# Patient Record
Sex: FEMALE | Race: WHITE | Employment: OTHER | URBAN - METROPOLITAN AREA
[De-identification: names, ages, dates, MRNs, and addresses within clinical notes are randomized per-mention and may not be internally consistent; named-entity substitution may affect disease eponyms.]

---

## 2017-11-01 PROBLEM — R07.9 CHEST PAIN: Status: ACTIVE | Noted: 2017-11-01

## 2018-05-07 ENCOUNTER — TELEPHONE (OUTPATIENT)
Dept: PRIMARY CARE CLINIC | Age: 53
End: 2018-05-07

## 2018-06-06 ENCOUNTER — TELEPHONE (OUTPATIENT)
Dept: PRIMARY CARE CLINIC | Age: 53
End: 2018-06-06

## 2018-06-06 DIAGNOSIS — E03.9 HYPOTHYROIDISM, UNSPECIFIED TYPE: Primary | Chronic | ICD-10-CM

## 2018-06-14 DIAGNOSIS — E03.9 HYPOTHYROIDISM, UNSPECIFIED TYPE: Chronic | ICD-10-CM

## 2018-06-18 ENCOUNTER — NURSE ONLY (OUTPATIENT)
Dept: PRIMARY CARE CLINIC | Age: 53
End: 2018-06-18

## 2018-06-18 ENCOUNTER — HOSPITAL ENCOUNTER (OUTPATIENT)
Age: 53
Discharge: HOME | End: 2018-06-20
Payer: MEDICARE

## 2018-06-18 DIAGNOSIS — E03.9 ACQUIRED HYPOTHYROIDISM: Primary | ICD-10-CM

## 2018-06-18 LAB — TSH SERPL DL<=0.05 MIU/L-ACNC: 0.46 UIU/ML (ref 0.27–4.2)

## 2018-06-18 PROCEDURE — 84443 ASSAY THYROID STIM HORMONE: CPT

## 2018-07-02 RX ORDER — FLUDROCORTISONE ACETATE 0.1 MG/1
0.1 TABLET ORAL DAILY
Qty: 30 TABLET | Refills: 5 | Status: SHIPPED | OUTPATIENT
Start: 2018-07-02 | End: 2019-01-08 | Stop reason: SDUPTHER

## 2018-07-07 ENCOUNTER — APPOINTMENT (OUTPATIENT)
Dept: GENERAL RADIOLOGY | Age: 53
End: 2018-07-07
Payer: MEDICARE

## 2018-07-07 ENCOUNTER — HOSPITAL ENCOUNTER (EMERGENCY)
Age: 53
Discharge: HOME | End: 2018-07-07
Attending: EMERGENCY MEDICINE
Payer: MEDICARE

## 2018-07-07 VITALS
HEIGHT: 60 IN | DIASTOLIC BLOOD PRESSURE: 84 MMHG | HEART RATE: 56 BPM | OXYGEN SATURATION: 100 % | RESPIRATION RATE: 16 BRPM | WEIGHT: 133 LBS | TEMPERATURE: 98.3 F | SYSTOLIC BLOOD PRESSURE: 126 MMHG | BODY MASS INDEX: 26.11 KG/M2

## 2018-07-07 DIAGNOSIS — S52.124A CLOSED NONDISPLACED FRACTURE OF HEAD OF RIGHT RADIUS, INITIAL ENCOUNTER: Primary | ICD-10-CM

## 2018-07-07 PROCEDURE — 73080 X-RAY EXAM OF ELBOW: CPT

## 2018-07-07 PROCEDURE — 99283 EMERGENCY DEPT VISIT LOW MDM: CPT

## 2018-07-07 RX ORDER — NAPROXEN 250 MG/1
250 TABLET ORAL 2 TIMES DAILY WITH MEALS
COMMUNITY
End: 2018-08-23 | Stop reason: ALTCHOICE

## 2018-07-07 ASSESSMENT — PAIN SCALES - WONG BAKER: WONGBAKER_NUMERICALRESPONSE: 6

## 2018-07-07 ASSESSMENT — PAIN DESCRIPTION - ORIENTATION: ORIENTATION: RIGHT

## 2018-07-07 ASSESSMENT — PAIN DESCRIPTION - FREQUENCY: FREQUENCY: CONTINUOUS

## 2018-07-07 ASSESSMENT — PAIN DESCRIPTION - LOCATION: LOCATION: ELBOW

## 2018-07-07 ASSESSMENT — PAIN DESCRIPTION - ONSET: ONSET: GRADUAL

## 2018-07-07 ASSESSMENT — PAIN DESCRIPTION - PROGRESSION: CLINICAL_PROGRESSION: GRADUALLY WORSENING

## 2018-07-07 ASSESSMENT — PAIN DESCRIPTION - PAIN TYPE: TYPE: ACUTE PAIN

## 2018-07-07 ASSESSMENT — PAIN DESCRIPTION - DESCRIPTORS: DESCRIPTORS: ACHING;CONSTANT;DISCOMFORT;SORE

## 2018-07-07 NOTE — ED PROVIDER NOTES
07/07/18. RADIOLOGY:  Interpreted by Radiologist.  XR ELBOW RIGHT (MIN 3 VIEWS)   Final Result   Limited study, the study is nearly nondiagnostic,   degenerative changes, findings suspicious for a radial head fracture.          ------------------------- NURSING NOTES AND VITALS REVIEWED ---------------------------   The nursing notes within the ED encounter and vital signs as below have been reviewed. /84   Pulse 56   Temp 98.3 °F (36.8 °C) (Oral)   Resp 16   Ht 5' (1.524 m)   Wt 133 lb (60.3 kg)   SpO2 100%   BMI 25.97 kg/m²   Oxygen Saturation Interpretation: Normal      ---------------------------------------------------PHYSICAL EXAM--------------------------------------      Constitutional/General: Alert and oriented x3, well appearing, non toxic in NAD  Head: NC/AT  Eyes: PERRL, EOMI  Mouth: Oropharynx clear, handling secretions, no trismus  Neck: Supple, full ROM,   Pulmonary: Lungs clear to auscultation bilaterally, no wheezes, rales, or rhonchi. Not in respiratory distress  Cardiovascular:  Regular rate and rhythm, no murmurs, gallops, or rubs. 2+ distal pulses  Abdomen: Soft, non tender, non distended,   Extremities: Moves all extremities x 4. Warm and well perfused. There is pain on flexion the wrist and quite a bit of pain on supination of the patient's right forearm. Skin: warm and dry without rash  Neurologic: GCS 15,  Psych: Normal Affect      ------------------------------ ED COURSE/MEDICAL DECISION MAKING----------------------  Medications - No data to display      Medical Decision Making:    Variation many but with limited history due to trisomy 21. However the patient's x-ray strongly suggestive of a radial head fracture. Counseling: The emergency provider has spoken with the patient and discussed todays results, in addition to providing specific details for the plan of care and counseling regarding the diagnosis and prognosis.   Questions are answered at this time and

## 2018-07-10 ENCOUNTER — CARE COORDINATION (OUTPATIENT)
Dept: CARE COORDINATION | Age: 53
End: 2018-07-10

## 2018-07-10 NOTE — CARE COORDINATION
Ambulatory Care Coordination ED Follow up Call       Reason for ED Visit:  Arm pain  Care Management Risk Score: CMRS 1  How are you feeling? :     improved  Patient Reports the following:  Patients dad Rudy Damon reports, patient is doing well. Very little pain. She's on schedule with the orthopedics. .     Did you call your PCP prior to going to the ED? No          Post Discharge Status:  What health concerns since you left the Emergency Room? None    Do you have wounds that you are caring for at home? No    Do you have a follow up appt scheduled? yes    Review of Instructions:                                 Do you have any questions regarding your discharge instructions?:  No  Medications:    What questions do you have about your medications? No  Are you taking your medications as directed? If not - why? Yes   Can you afford your medications? yes  ADLS:  Do you need assistance of any kind at home? No   What assistance is needed? None      FU appts/Provider:    Future Appointments  Date Time Provider Drea Mar   8/20/2018 1:15 PM [facility] Drew [facility] [address] [facility] Due   Topic Date Due    HIV screen  03/09/1981    Cervical cancer screen  01/08/2016    Shingles Vaccine (1 of 2 - 2 Dose Series) 03/09/2016     Patient advised to contact PCP office to have HM items/records faxed to PCP Office directly?   N/A

## 2018-08-23 ENCOUNTER — TELEPHONE (OUTPATIENT)
Dept: PRIMARY CARE CLINIC | Age: 53
End: 2018-08-23

## 2018-08-23 ENCOUNTER — OFFICE VISIT (OUTPATIENT)
Dept: PRIMARY CARE CLINIC | Age: 53
End: 2018-08-23
Payer: MEDICARE

## 2018-08-23 VITALS
WEIGHT: 139 LBS | TEMPERATURE: 97 F | OXYGEN SATURATION: 98 % | HEART RATE: 72 BPM | SYSTOLIC BLOOD PRESSURE: 128 MMHG | BODY MASS INDEX: 27.15 KG/M2 | DIASTOLIC BLOOD PRESSURE: 82 MMHG

## 2018-08-23 DIAGNOSIS — E03.9 ACQUIRED HYPOTHYROIDISM: Primary | Chronic | ICD-10-CM

## 2018-08-23 DIAGNOSIS — Q90.9 DOWN'S SYNDROME: Chronic | ICD-10-CM

## 2018-08-23 PROCEDURE — G8427 DOCREV CUR MEDS BY ELIG CLIN: HCPCS | Performed by: INTERNAL MEDICINE

## 2018-08-23 PROCEDURE — 1036F TOBACCO NON-USER: CPT | Performed by: INTERNAL MEDICINE

## 2018-08-23 PROCEDURE — G8419 CALC BMI OUT NRM PARAM NOF/U: HCPCS | Performed by: INTERNAL MEDICINE

## 2018-08-23 PROCEDURE — 99213 OFFICE O/P EST LOW 20 MIN: CPT | Performed by: INTERNAL MEDICINE

## 2018-08-23 PROCEDURE — 3014F SCREEN MAMMO DOC REV: CPT | Performed by: INTERNAL MEDICINE

## 2018-08-23 PROCEDURE — 3017F COLORECTAL CA SCREEN DOC REV: CPT | Performed by: INTERNAL MEDICINE

## 2018-08-23 NOTE — TELEPHONE ENCOUNTER
Patient's mother called with information about a possible alternative for Synthroid. She said it is called dulse iodine and it was discovered by Ranjit Iverson, a researcher.

## 2018-08-23 NOTE — PROGRESS NOTES
8/23/18    Jordy Diaz Sharath, a female of 47 y.o. came to the office     Jordy Early presents today for 6 month follow up. She has a history of Down syndrome hypothyroidism and syncope. Her TSH was WNL. She was in the ER on July 7 for a radial head fracture - she had a fall at that time. She followed up with orthopedics - who did not recommend a sling. Her issues have improved. She  She does not have any symptoms of hypo/hyperthyroidism. Patient Active Problem List   Diagnosis    Down's syndrome    Systolic murmur    Vasodepressor syncope    Asymptomatic varicose veins of bilateral lower extremities    Hypothyroidism    Chest pain        No Known Allergies    Current Outpatient Prescriptions on File Prior to Visit   Medication Sig Dispense Refill    fludrocortisone (FLORINEF) 0.1 MG tablet Take 1 tablet by mouth daily 30 tablet 5    levothyroxine (SYNTHROID) 100 MCG tablet Take 1 tablet by mouth Daily (Patient taking differently: Take 150 mcg by mouth Daily ) 30 tablet 5     No current facility-administered medications on file prior to visit. Review of Systems  Constitutional: Negative for activity change, appetite change, chills, fatigue and fever. Respiratory: Negative for choking, chest tightness, shortness of breath and wheezing. Cardiovascular: Negative for chest pain, palpitations and leg swelling. Gastrointestinal: Negative for abdominal distention, constipation, diarrhea, nausea and vomiting. Musculoskeletal: Negative for arthralgias, back pain, gait problem and joint swelling. Neurological: Negative for dizziness, weakness, numbness and headaches. OBJECTIVE:  /82   Pulse 72   Temp 97 °F (36.1 °C)   Wt 139 lb (63 kg)   SpO2 98%   BMI 27.15 kg/m²      Physical Exam   Constitutional:  oriented to person, place, and time. appears well-developed and well-nourished. No distress. HENT: No sinus tenderness or lymphadenopathy  Head: Normocephalic and atraumatic.    Eyes: Left eye exhibits no discharge. No scleral icterus. Neck: No tracheal deviation present. No thyromegaly present. Cardiovascular: Normal rate, regular rhythm, normal heart sounds and intact distal pulses. Exam reveals no gallop and no friction rub. No murmur heard. Pulmonary/Chest: Effort normal and breath sounds normal. No respiratory distress. She has no wheezes. no rales. no tenderness. Musculoskeletal:  no tenderness. Neurological:alert and oriented to person, place, and time. Skin: No diaphoresis. Psychiatric: Normal mood and affect. Behavior is normal.     ASSESSMENT AND PLAN:    Milton Payne was seen today for hypothyroidism and 6 month follow-up. Diagnoses and all orders for this visit:    Acquired hypothyroidism    Down's syndrome        Milton MORALES See presents today for routine follow up. She did have a radial fracture - this appears to be healing well. She is still physically active. Her mom is present who questioned her thyroid medication. I will keep it the same for now and recheck it in 3 months. Return in about 6 months (around 2/23/2019).     Natacha Denson DO

## 2018-09-14 DIAGNOSIS — E03.9 HYPOTHYROIDISM, UNSPECIFIED TYPE: ICD-10-CM

## 2018-09-14 RX ORDER — LEVOTHYROXINE SODIUM 0.1 MG/1
100 TABLET ORAL DAILY
Qty: 30 TABLET | Refills: 5 | Status: SHIPPED | OUTPATIENT
Start: 2018-09-14 | End: 2018-12-18 | Stop reason: SDUPTHER

## 2018-12-11 ENCOUNTER — TELEPHONE (OUTPATIENT)
Dept: PRIMARY CARE CLINIC | Age: 53
End: 2018-12-11

## 2018-12-11 DIAGNOSIS — Q90.9 DOWN'S SYNDROME: Primary | Chronic | ICD-10-CM

## 2018-12-11 DIAGNOSIS — E03.9 ACQUIRED HYPOTHYROIDISM: Chronic | ICD-10-CM

## 2018-12-11 DIAGNOSIS — Z13.220 LIPID SCREENING: ICD-10-CM

## 2018-12-13 LAB
BASOPHILS ABSOLUTE: 0.07 /ΜL
BASOPHILS RELATIVE PERCENT: 2.2 %
BUN BLDV-MCNC: 28 MG/DL
CALCIUM SERPL-MCNC: 8.3 MG/DL
CHLORIDE BLD-SCNC: 107 MMOL/L
CHOLESTEROL, TOTAL: 174 MG/DL
CHOLESTEROL/HDL RATIO: 2.9
CO2: 30 MMOL/L
CREAT SERPL-MCNC: 0.94 MG/DL
EOSINOPHILS ABSOLUTE: 0.12 /ΜL
EOSINOPHILS RELATIVE PERCENT: 3.8 %
GFR CALCULATED: >60
GLUCOSE BLD-MCNC: 70 MG/DL
HCT VFR BLD CALC: 46.3 % (ref 36–46)
HDLC SERPL-MCNC: 59 MG/DL (ref 35–70)
HEMOGLOBIN: 15.2 G/DL (ref 12–16)
LDL CHOLESTEROL CALCULATED: 98 MG/DL (ref 0–160)
LYMPHOCYTES ABSOLUTE: 1.26 /ΜL
LYMPHOCYTES RELATIVE PERCENT: 39.4 %
MCH RBC QN AUTO: ABNORMAL PG
MCHC RBC AUTO-ENTMCNC: ABNORMAL G/DL
MCV RBC AUTO: 97.1 FL
MONOCYTES ABSOLUTE: 0.35 /ΜL
MONOCYTES RELATIVE PERCENT: 10.9 %
NEUTROPHILS ABSOLUTE: 1.39 /ΜL
NEUTROPHILS RELATIVE PERCENT: 43.4 %
PLATELET # BLD: 188 K/ΜL
PMV BLD AUTO: 10 FL
POTASSIUM SERPL-SCNC: 4.6 MMOL/L
RBC # BLD: 4.77 10^6/ΜL
SODIUM BLD-SCNC: 143 MMOL/L
TRIGL SERPL-MCNC: 83 MG/DL
TSH SERPL DL<=0.05 MIU/L-ACNC: 6.13 UIU/ML
VLDLC SERPL CALC-MCNC: NORMAL MG/DL
WBC # BLD: 3.2 10^3/ML

## 2018-12-14 DIAGNOSIS — Z13.220 LIPID SCREENING: ICD-10-CM

## 2018-12-14 DIAGNOSIS — E03.9 ACQUIRED HYPOTHYROIDISM: Chronic | ICD-10-CM

## 2018-12-14 DIAGNOSIS — Q90.9 DOWN'S SYNDROME: Chronic | ICD-10-CM

## 2018-12-18 DIAGNOSIS — E03.9 HYPOTHYROIDISM, UNSPECIFIED TYPE: ICD-10-CM

## 2018-12-18 RX ORDER — LEVOTHYROXINE SODIUM 112 UG/1
112 TABLET ORAL DAILY
Qty: 30 TABLET | Refills: 3 | Status: SHIPPED | OUTPATIENT
Start: 2018-12-18 | End: 2019-02-19 | Stop reason: SDUPTHER

## 2019-01-08 RX ORDER — FLUDROCORTISONE ACETATE 0.1 MG/1
0.1 TABLET ORAL DAILY
Qty: 30 TABLET | Refills: 5 | Status: SHIPPED | OUTPATIENT
Start: 2019-01-08 | End: 2019-02-19 | Stop reason: SDUPTHER

## 2019-02-19 DIAGNOSIS — E03.9 HYPOTHYROIDISM, UNSPECIFIED TYPE: ICD-10-CM

## 2019-02-19 RX ORDER — FLUDROCORTISONE ACETATE 0.1 MG/1
0.1 TABLET ORAL DAILY
Qty: 30 TABLET | Refills: 5 | Status: SHIPPED | OUTPATIENT
Start: 2019-02-19 | End: 2019-04-05

## 2019-02-19 RX ORDER — LEVOTHYROXINE SODIUM 112 UG/1
112 TABLET ORAL DAILY
Qty: 30 TABLET | Refills: 3 | Status: SHIPPED | OUTPATIENT
Start: 2019-02-19 | End: 2019-12-03

## 2019-02-21 ENCOUNTER — OFFICE VISIT (OUTPATIENT)
Dept: PRIMARY CARE CLINIC | Age: 54
End: 2019-02-21
Payer: MEDICARE

## 2019-02-21 ENCOUNTER — HOSPITAL ENCOUNTER (OUTPATIENT)
Age: 54
Discharge: HOME | End: 2019-02-23
Payer: MEDICARE

## 2019-02-21 VITALS
SYSTOLIC BLOOD PRESSURE: 110 MMHG | DIASTOLIC BLOOD PRESSURE: 80 MMHG | WEIGHT: 140 LBS | BODY MASS INDEX: 27.34 KG/M2 | TEMPERATURE: 98.2 F | HEART RATE: 68 BPM | OXYGEN SATURATION: 98 %

## 2019-02-21 DIAGNOSIS — L71.9 ROSACEA: ICD-10-CM

## 2019-02-21 DIAGNOSIS — L30.9 ACUTE ECZEMA: ICD-10-CM

## 2019-02-21 DIAGNOSIS — Q90.9 DOWN'S SYNDROME: Chronic | ICD-10-CM

## 2019-02-21 DIAGNOSIS — E03.9 ACQUIRED HYPOTHYROIDISM: Chronic | ICD-10-CM

## 2019-02-21 DIAGNOSIS — E03.9 ACQUIRED HYPOTHYROIDISM: Primary | Chronic | ICD-10-CM

## 2019-02-21 LAB — TSH SERPL DL<=0.05 MIU/L-ACNC: 0.96 UIU/ML (ref 0.27–4.2)

## 2019-02-21 PROCEDURE — G8419 CALC BMI OUT NRM PARAM NOF/U: HCPCS | Performed by: INTERNAL MEDICINE

## 2019-02-21 PROCEDURE — 84443 ASSAY THYROID STIM HORMONE: CPT

## 2019-02-21 PROCEDURE — 99213 OFFICE O/P EST LOW 20 MIN: CPT | Performed by: INTERNAL MEDICINE

## 2019-02-21 PROCEDURE — 3017F COLORECTAL CA SCREEN DOC REV: CPT | Performed by: INTERNAL MEDICINE

## 2019-02-21 PROCEDURE — 1036F TOBACCO NON-USER: CPT | Performed by: INTERNAL MEDICINE

## 2019-02-21 PROCEDURE — G8427 DOCREV CUR MEDS BY ELIG CLIN: HCPCS | Performed by: INTERNAL MEDICINE

## 2019-02-21 PROCEDURE — G8484 FLU IMMUNIZE NO ADMIN: HCPCS | Performed by: INTERNAL MEDICINE

## 2019-02-21 ASSESSMENT — PATIENT HEALTH QUESTIONNAIRE - PHQ9
SUM OF ALL RESPONSES TO PHQ9 QUESTIONS 1 & 2: 0
SUM OF ALL RESPONSES TO PHQ QUESTIONS 1-9: 0
2. FEELING DOWN, DEPRESSED OR HOPELESS: 0
SUM OF ALL RESPONSES TO PHQ QUESTIONS 1-9: 0
1. LITTLE INTEREST OR PLEASURE IN DOING THINGS: 0

## 2019-02-21 ASSESSMENT — ENCOUNTER SYMPTOMS
WHEEZING: 0
SHORTNESS OF BREATH: 0
COLOR CHANGE: 1
BACK PAIN: 0

## 2019-03-19 ENCOUNTER — OFFICE VISIT (OUTPATIENT)
Dept: PRIMARY CARE CLINIC | Age: 54
End: 2019-03-19
Payer: MEDICARE

## 2019-03-19 VITALS
WEIGHT: 136 LBS | DIASTOLIC BLOOD PRESSURE: 64 MMHG | BODY MASS INDEX: 26.56 KG/M2 | SYSTOLIC BLOOD PRESSURE: 120 MMHG | HEART RATE: 61 BPM | OXYGEN SATURATION: 98 % | TEMPERATURE: 97 F

## 2019-03-19 DIAGNOSIS — R55 SYNCOPE, UNSPECIFIED SYNCOPE TYPE: Primary | ICD-10-CM

## 2019-03-19 DIAGNOSIS — Q90.9 DOWN'S SYNDROME: ICD-10-CM

## 2019-03-19 DIAGNOSIS — S06.5XAA SUBDURAL HEMATOMA: ICD-10-CM

## 2019-03-19 PROCEDURE — G8484 FLU IMMUNIZE NO ADMIN: HCPCS | Performed by: INTERNAL MEDICINE

## 2019-03-19 PROCEDURE — 99213 OFFICE O/P EST LOW 20 MIN: CPT | Performed by: INTERNAL MEDICINE

## 2019-03-19 PROCEDURE — 1036F TOBACCO NON-USER: CPT | Performed by: INTERNAL MEDICINE

## 2019-03-19 PROCEDURE — 3017F COLORECTAL CA SCREEN DOC REV: CPT | Performed by: INTERNAL MEDICINE

## 2019-03-19 PROCEDURE — G8419 CALC BMI OUT NRM PARAM NOF/U: HCPCS | Performed by: INTERNAL MEDICINE

## 2019-03-19 PROCEDURE — G8427 DOCREV CUR MEDS BY ELIG CLIN: HCPCS | Performed by: INTERNAL MEDICINE

## 2019-03-19 RX ORDER — CLINDAMYCIN PHOSPHATE 10 UG/ML
LOTION TOPICAL
Status: ON HOLD | COMMUNITY
Start: 2019-02-20 | End: 2019-05-02 | Stop reason: HOSPADM

## 2019-03-19 RX ORDER — KETOCONAZOLE 20 MG/G
CREAM TOPICAL
Status: ON HOLD | COMMUNITY
Start: 2019-02-20 | End: 2019-05-02 | Stop reason: HOSPADM

## 2019-03-20 ENCOUNTER — TELEPHONE (OUTPATIENT)
Dept: PRIMARY CARE CLINIC | Age: 54
End: 2019-03-20

## 2019-03-20 DIAGNOSIS — S06.5XAA SUBDURAL HEMATOMA: Primary | ICD-10-CM

## 2019-03-21 ENCOUNTER — TELEPHONE (OUTPATIENT)
Dept: PRIMARY CARE CLINIC | Age: 54
End: 2019-03-21

## 2019-03-21 DIAGNOSIS — F41.8 SITUATIONAL ANXIETY: Primary | ICD-10-CM

## 2019-03-21 RX ORDER — LORAZEPAM 0.5 MG/1
0.5 TABLET ORAL EVERY 8 HOURS PRN
Qty: 2 TABLET | Refills: 0 | Status: SHIPPED | OUTPATIENT
Start: 2019-03-21 | End: 2019-05-21

## 2019-03-22 ENCOUNTER — HOSPITAL ENCOUNTER (OUTPATIENT)
Dept: CT IMAGING | Age: 54
Discharge: HOME | End: 2019-03-24
Payer: MEDICARE

## 2019-03-22 DIAGNOSIS — S06.5XAA SUBDURAL HEMATOMA: ICD-10-CM

## 2019-03-22 PROCEDURE — 70450 CT HEAD/BRAIN W/O DYE: CPT

## 2019-03-25 ENCOUNTER — INITIAL CONSULT (OUTPATIENT)
Dept: NEUROSURGERY | Age: 54
End: 2019-03-25
Payer: MEDICARE

## 2019-03-25 VITALS
WEIGHT: 134 LBS | BODY MASS INDEX: 26.31 KG/M2 | DIASTOLIC BLOOD PRESSURE: 68 MMHG | HEART RATE: 73 BPM | SYSTOLIC BLOOD PRESSURE: 112 MMHG | HEIGHT: 60 IN

## 2019-03-25 DIAGNOSIS — G44.011 INTRACTABLE EPISODIC CLUSTER HEADACHE: ICD-10-CM

## 2019-03-25 DIAGNOSIS — S06.5XAA SUBDURAL HEMATOMA: Primary | ICD-10-CM

## 2019-03-25 PROCEDURE — 3017F COLORECTAL CA SCREEN DOC REV: CPT | Performed by: PHYSICIAN ASSISTANT

## 2019-03-25 PROCEDURE — 1036F TOBACCO NON-USER: CPT | Performed by: PHYSICIAN ASSISTANT

## 2019-03-25 PROCEDURE — G8484 FLU IMMUNIZE NO ADMIN: HCPCS | Performed by: PHYSICIAN ASSISTANT

## 2019-03-25 PROCEDURE — 99213 OFFICE O/P EST LOW 20 MIN: CPT | Performed by: PHYSICIAN ASSISTANT

## 2019-03-25 PROCEDURE — G8427 DOCREV CUR MEDS BY ELIG CLIN: HCPCS | Performed by: PHYSICIAN ASSISTANT

## 2019-03-25 PROCEDURE — G8419 CALC BMI OUT NRM PARAM NOF/U: HCPCS | Performed by: PHYSICIAN ASSISTANT

## 2019-03-25 ASSESSMENT — ENCOUNTER SYMPTOMS
GASTROINTESTINAL NEGATIVE: 1
ALLERGIC/IMMUNOLOGIC NEGATIVE: 1
RESPIRATORY NEGATIVE: 1
EYES NEGATIVE: 1

## 2019-03-26 ENCOUNTER — TELEPHONE (OUTPATIENT)
Dept: NEUROSURGERY | Age: 54
End: 2019-03-26

## 2019-03-26 ENCOUNTER — TELEPHONE (OUTPATIENT)
Dept: PRIMARY CARE CLINIC | Age: 54
End: 2019-03-26

## 2019-03-26 DIAGNOSIS — R55 SYNCOPE, UNSPECIFIED SYNCOPE TYPE: Primary | ICD-10-CM

## 2019-03-26 DIAGNOSIS — S06.5XAA SUBDURAL HEMATOMA: Primary | ICD-10-CM

## 2019-03-28 ENCOUNTER — TELEPHONE (OUTPATIENT)
Dept: NON INVASIVE DIAGNOSTICS | Age: 54
End: 2019-03-28

## 2019-03-28 ENCOUNTER — TELEPHONE (OUTPATIENT)
Dept: NEUROSURGERY | Age: 54
End: 2019-03-28

## 2019-03-28 NOTE — TELEPHONE ENCOUNTER
----- Message from Grayson Talley DO sent at 3/26/2019  2:16 PM EDT -----  Unfortunately I cannot give her any advice. I have not seen her in 7 years and unaware if her clinical history has changed. If her mother wants I can see her in the office as an IE and then need records from [place].  ----- Message -----  From: Patrizia Rome  Sent: 3/26/2019   1:53 PM  To: Grayson Talley DO    Patient was seen 6/26/2012. She is a Down Syndrome patient. Mother said she had a bad fall in [place] and was in the ICU. The mother said she had 3 dizzy spells this week.       Please advise

## 2019-04-05 ENCOUNTER — OFFICE VISIT (OUTPATIENT)
Dept: PRIMARY CARE CLINIC | Age: 54
End: 2019-04-05
Payer: MEDICARE

## 2019-04-05 VITALS
WEIGHT: 136 LBS | TEMPERATURE: 97 F | HEART RATE: 58 BPM | DIASTOLIC BLOOD PRESSURE: 62 MMHG | BODY MASS INDEX: 26.56 KG/M2 | SYSTOLIC BLOOD PRESSURE: 120 MMHG | OXYGEN SATURATION: 95 %

## 2019-04-05 DIAGNOSIS — L71.9 ROSACEA: ICD-10-CM

## 2019-04-05 DIAGNOSIS — Q90.9 DOWN'S SYNDROME: ICD-10-CM

## 2019-04-05 DIAGNOSIS — H04.123 DRY EYES: ICD-10-CM

## 2019-04-05 DIAGNOSIS — R55 SYNCOPE, UNSPECIFIED SYNCOPE TYPE: ICD-10-CM

## 2019-04-05 DIAGNOSIS — J40 BRONCHITIS: Primary | ICD-10-CM

## 2019-04-05 LAB
INFLUENZA A ANTIBODY: NEGATIVE
INFLUENZA B ANTIBODY: NEGATIVE

## 2019-04-05 PROCEDURE — G8419 CALC BMI OUT NRM PARAM NOF/U: HCPCS | Performed by: INTERNAL MEDICINE

## 2019-04-05 PROCEDURE — 3017F COLORECTAL CA SCREEN DOC REV: CPT | Performed by: INTERNAL MEDICINE

## 2019-04-05 PROCEDURE — 87804 INFLUENZA ASSAY W/OPTIC: CPT | Performed by: INTERNAL MEDICINE

## 2019-04-05 PROCEDURE — 99214 OFFICE O/P EST MOD 30 MIN: CPT | Performed by: INTERNAL MEDICINE

## 2019-04-05 PROCEDURE — 1036F TOBACCO NON-USER: CPT | Performed by: INTERNAL MEDICINE

## 2019-04-05 PROCEDURE — G8427 DOCREV CUR MEDS BY ELIG CLIN: HCPCS | Performed by: INTERNAL MEDICINE

## 2019-04-05 RX ORDER — FLUDROCORTISONE ACETATE 0.1 MG/1
0.2 TABLET ORAL DAILY
Qty: 60 TABLET | Refills: 2 | Status: ON HOLD | OUTPATIENT
Start: 2019-04-05 | End: 2019-11-27 | Stop reason: HOSPADM

## 2019-04-05 RX ORDER — DOXYCYCLINE HYCLATE 100 MG/1
100 CAPSULE ORAL 2 TIMES DAILY
Qty: 20 CAPSULE | Refills: 0 | Status: SHIPPED | OUTPATIENT
Start: 2019-04-05 | End: 2019-04-15

## 2019-04-05 NOTE — PROGRESS NOTES
4/5/19    Bridger Early, a female of 49 y.o. came to the office     Bridger Early presents today for evaluation of a cough. Is also been having a sore throat. Her cough is occasionally productive. She does have some abdominal pain and nausea. She denies any fevers. She is following with neurosurgery who just fitted her with a home at this morning. She does have question why discomfort with photophobia that is been persistent since her injury. She's been using refresh drops. Despite her stomach issues she denies any changes in her bowel habits    Patient Active Problem List   Diagnosis    Down's syndrome    Systolic murmur    Vasodepressor syncope    Asymptomatic varicose veins of bilateral lower extremities    Hypothyroidism    Chest pain        No Known Allergies    Current Outpatient Medications on File Prior to Visit   Medication Sig Dispense Refill    LORazepam (ATIVAN) 0.5 MG tablet Take 1 tablet by mouth every 8 hours as needed for Anxiety (take 1/2 tablet 30 min before procedure) for up to 2 doses. 2 tablet 0    clindamycin (CLEOCIN T) 1 % lotion       ketoconazole (NIZORAL) 2 % cream       Crisaborole (EUCRISA) 2 % OINT Apply 1 Squirt topically 2 times daily 1 Tube 0    levothyroxine (SYNTHROID) 112 MCG tablet Take 1 tablet by mouth Daily 30 tablet 3     No current facility-administered medications on file prior to visit. Review of Systems  Constitutional:Negative for activity change, appetite change, chills, fatigue and fever. Respiratory:   Cardiovascular: Negative for chest pain, palpitations and leg swelling. Gastrointestinal: Negative for abdominal distention, constipation, diarrhea, nausea and vomiting. Musculoskeletal: Negative for arthralgias, back pain, gait problem and joint swelling. Neurological: Negative for dizziness, weakness,numbness and headaches.      OBJECTIVE:  /62   Pulse 58   Temp 97 °F (36.1 °C)   Wt 136 lb (61.7 kg)   SpO2 95%   BMI 26.56 kg/m² Physical Exam   Constitutional:  oriented to person, place, and time. appears well-developed and well-nourished. No distress. HENT: No sinustenderness or lymphadenopathy  Head: Normocephalic and atraumatic. Eyes: Left eye exhibits no discharge. No scleral icterus. Neck: No tracheal deviation present. No thyromegalypresent. Cardiovascular: Normal rate, regular rhythm, normal heart sounds and intact distal pulses. Exam reveals no gallop and no friction rub. No murmur heard. Pulmonary/Chest: Effort normal and breath sounds normal. No respiratory distress. She has no wheezes. no rales. no tenderness. Musculoskeletal:  no tenderness. Neurological:alert and oriented to person, place, and time. Skin: No diaphoresis. Psychiatric: Normal mood and affect. Behavior isnormal.     ASSESSMENT AND PLAN:    Leydi Morgan was seen today for cough and abdominal pain. Diagnoses and all orders for this visit:    Bronchitis  -     POCT Influenza A/B  -     doxycycline hyclate (VIBRAMYCIN) 100 MG capsule; Take 1 capsule by mouth 2 times daily for 10 days    Syncope, unspecified syncope type  -     fludrocortisone (FLORINEF) 0.1 MG tablet; Take 2 tablets by mouth daily    Down's syndrome    Rosacea    Dry eyes        Leydi MORALES See presents today for evaluation of bronchitis. I will treat her with doxycycline. I will also obtain influenza testing. For her orthostasis I will increase her Florinef to 2 tablets daily. I did review her chart which shows that she is following up with neurosurgery and is due to see electrophysiology in June. She denies any recurrence of her falls. She has follow-up with neurosurgery who plans on repeating a CT scan of the head. She does have rosacea and is seeing dermatology for this. I hope that the doxycycline will help this issue as well. We Discussed her dry eyes, I advised her to start using referesh optiva 3-4 times a day.      Please note that the above documentation was prepared using voice recognition software. Every attempt was made to ensure accuracy but there may be spelling, grammatical, and contextual errors. No follow-ups on file.     David Fitzgerald, DO

## 2019-04-11 ENCOUNTER — HOSPITAL ENCOUNTER (OUTPATIENT)
Dept: CT IMAGING | Age: 54
Discharge: HOME | End: 2019-04-13
Payer: MEDICARE

## 2019-04-11 ENCOUNTER — TELEPHONE (OUTPATIENT)
Dept: NEUROSURGERY | Age: 54
End: 2019-04-11

## 2019-04-11 ENCOUNTER — HOSPITAL ENCOUNTER (INPATIENT)
Age: 54
LOS: 5 days | Discharge: NURSING, REHAB OR LONG-TERM CARE | DRG: 026 | End: 2019-04-16
Attending: EMERGENCY MEDICINE | Admitting: FAMILY MEDICINE
Payer: MEDICARE

## 2019-04-11 DIAGNOSIS — S06.5XAA SUBDURAL HEMATOMA: Primary | ICD-10-CM

## 2019-04-11 DIAGNOSIS — S06.5XAA SUBDURAL HEMATOMA: ICD-10-CM

## 2019-04-11 LAB
ALBUMIN SERPL-MCNC: 3.7 G/DL (ref 3.5–5.2)
ALP BLD-CCNC: 129 U/L (ref 35–104)
ALT SERPL-CCNC: 31 U/L (ref 0–32)
ANION GAP SERPL CALCULATED.3IONS-SCNC: 9 MMOL/L (ref 7–16)
APTT: 32.3 SEC (ref 24.5–35.1)
AST SERPL-CCNC: 31 U/L (ref 0–31)
BASOPHILS ABSOLUTE: 0.04 E9/L (ref 0–0.2)
BASOPHILS RELATIVE PERCENT: 0.8 % (ref 0–2)
BILIRUB SERPL-MCNC: 0.5 MG/DL (ref 0–1.2)
BUN BLDV-MCNC: 13 MG/DL (ref 6–20)
CALCIUM SERPL-MCNC: 9.1 MG/DL (ref 8.6–10.2)
CHLORIDE BLD-SCNC: 104 MMOL/L (ref 98–107)
CO2: 27 MMOL/L (ref 22–29)
CREAT SERPL-MCNC: 0.8 MG/DL (ref 0.5–1)
EOSINOPHILS ABSOLUTE: 0.02 E9/L (ref 0.05–0.5)
EOSINOPHILS RELATIVE PERCENT: 0.4 % (ref 0–6)
GFR AFRICAN AMERICAN: >60
GFR NON-AFRICAN AMERICAN: >60 ML/MIN/1.73
GLUCOSE BLD-MCNC: 138 MG/DL (ref 74–99)
HCT VFR BLD CALC: 40.9 % (ref 34–48)
HEMOGLOBIN: 13.9 G/DL (ref 11.5–15.5)
IMMATURE GRANULOCYTES #: 0.04 E9/L
IMMATURE GRANULOCYTES %: 0.8 % (ref 0–5)
INR BLD: 1.1
LYMPHOCYTES ABSOLUTE: 1.27 E9/L (ref 1.5–4)
LYMPHOCYTES RELATIVE PERCENT: 26.5 % (ref 20–42)
MCH RBC QN AUTO: 31.8 PG (ref 26–35)
MCHC RBC AUTO-ENTMCNC: 34 % (ref 32–34.5)
MCV RBC AUTO: 93.6 FL (ref 80–99.9)
MONOCYTES ABSOLUTE: 0.19 E9/L (ref 0.1–0.95)
MONOCYTES RELATIVE PERCENT: 4 % (ref 2–12)
NEUTROPHILS ABSOLUTE: 3.24 E9/L (ref 1.8–7.3)
NEUTROPHILS RELATIVE PERCENT: 67.5 % (ref 43–80)
PDW BLD-RTO: 12.8 FL (ref 11.5–15)
PLATELET # BLD: 226 E9/L (ref 130–450)
PMV BLD AUTO: 9.6 FL (ref 7–12)
POTASSIUM SERPL-SCNC: 3.7 MMOL/L (ref 3.5–5)
PROTHROMBIN TIME: 12.8 SEC (ref 9.3–12.4)
RBC # BLD: 4.37 E12/L (ref 3.5–5.5)
SODIUM BLD-SCNC: 140 MMOL/L (ref 132–146)
TOTAL PROTEIN: 7.4 G/DL (ref 6.4–8.3)
WBC # BLD: 4.8 E9/L (ref 4.5–11.5)

## 2019-04-11 PROCEDURE — 85610 PROTHROMBIN TIME: CPT

## 2019-04-11 PROCEDURE — 85730 THROMBOPLASTIN TIME PARTIAL: CPT

## 2019-04-11 PROCEDURE — 70450 CT HEAD/BRAIN W/O DYE: CPT

## 2019-04-11 PROCEDURE — 2000000000 HC ICU R&B

## 2019-04-11 PROCEDURE — 36415 COLL VENOUS BLD VENIPUNCTURE: CPT

## 2019-04-11 PROCEDURE — 99285 EMERGENCY DEPT VISIT HI MDM: CPT

## 2019-04-11 PROCEDURE — 85025 COMPLETE CBC W/AUTO DIFF WBC: CPT

## 2019-04-11 PROCEDURE — 80053 COMPREHEN METABOLIC PANEL: CPT

## 2019-04-11 ASSESSMENT — PAIN DESCRIPTION - LOCATION: LOCATION: HEAD

## 2019-04-11 ASSESSMENT — PAIN DESCRIPTION - PAIN TYPE: TYPE: ACUTE PAIN

## 2019-04-11 ASSESSMENT — PAIN SCALES - GENERAL: PAINLEVEL_OUTOF10: 7

## 2019-04-11 NOTE — TELEPHONE ENCOUNTER
Lilo's See mother called, will order head CT.   I suggested they go to the ED, but they do not want to

## 2019-04-12 ENCOUNTER — APPOINTMENT (OUTPATIENT)
Dept: CT IMAGING | Age: 54
DRG: 026 | End: 2019-04-12
Payer: MEDICARE

## 2019-04-12 ENCOUNTER — ANESTHESIA EVENT (OUTPATIENT)
Dept: OPERATING ROOM | Age: 54
DRG: 026 | End: 2019-04-12
Payer: MEDICARE

## 2019-04-12 ENCOUNTER — ANESTHESIA (OUTPATIENT)
Dept: OPERATING ROOM | Age: 54
DRG: 026 | End: 2019-04-12
Payer: MEDICARE

## 2019-04-12 ENCOUNTER — TELEPHONE (OUTPATIENT)
Dept: PRIMARY CARE CLINIC | Age: 54
End: 2019-04-12

## 2019-04-12 VITALS
DIASTOLIC BLOOD PRESSURE: 73 MMHG | RESPIRATION RATE: 20 BRPM | SYSTOLIC BLOOD PRESSURE: 116 MMHG | OXYGEN SATURATION: 99 %

## 2019-04-12 LAB
ABO/RH: NORMAL
ANION GAP SERPL CALCULATED.3IONS-SCNC: 12 MMOL/L (ref 7–16)
ANTIBODY SCREEN: NORMAL
BACTERIA: ABNORMAL /HPF
BASOPHILS ABSOLUTE: 0.03 E9/L (ref 0–0.2)
BASOPHILS RELATIVE PERCENT: 0.6 % (ref 0–2)
BILIRUBIN URINE: NEGATIVE
BLOOD, URINE: ABNORMAL
BUN BLDV-MCNC: 11 MG/DL (ref 6–20)
CALCIUM SERPL-MCNC: 8.9 MG/DL (ref 8.6–10.2)
CHLORIDE BLD-SCNC: 102 MMOL/L (ref 98–107)
CLARITY: CLEAR
CO2: 25 MMOL/L (ref 22–29)
COLOR: YELLOW
CREAT SERPL-MCNC: 0.8 MG/DL (ref 0.5–1)
EOSINOPHILS ABSOLUTE: 0.01 E9/L (ref 0.05–0.5)
EOSINOPHILS RELATIVE PERCENT: 0.2 % (ref 0–6)
EPITHELIAL CELLS, UA: ABNORMAL /HPF
GFR AFRICAN AMERICAN: >60
GFR NON-AFRICAN AMERICAN: >60 ML/MIN/1.73
GLUCOSE BLD-MCNC: 116 MG/DL (ref 74–99)
GLUCOSE URINE: NEGATIVE MG/DL
HCT VFR BLD CALC: 42 % (ref 34–48)
HEMOGLOBIN: 14.1 G/DL (ref 11.5–15.5)
IMMATURE GRANULOCYTES #: 0.02 E9/L
IMMATURE GRANULOCYTES %: 0.4 % (ref 0–5)
KETONES, URINE: 40 MG/DL
LEUKOCYTE ESTERASE, URINE: NEGATIVE
LYMPHOCYTES ABSOLUTE: 1.7 E9/L (ref 1.5–4)
LYMPHOCYTES RELATIVE PERCENT: 33.5 % (ref 20–42)
MCH RBC QN AUTO: 31.8 PG (ref 26–35)
MCHC RBC AUTO-ENTMCNC: 33.6 % (ref 32–34.5)
MCV RBC AUTO: 94.8 FL (ref 80–99.9)
METER GLUCOSE: 102 MG/DL (ref 74–99)
MONOCYTES ABSOLUTE: 0.24 E9/L (ref 0.1–0.95)
MONOCYTES RELATIVE PERCENT: 4.7 % (ref 2–12)
NEUTROPHILS ABSOLUTE: 3.07 E9/L (ref 1.8–7.3)
NEUTROPHILS RELATIVE PERCENT: 60.6 % (ref 43–80)
NITRITE, URINE: NEGATIVE
PDW BLD-RTO: 12.7 FL (ref 11.5–15)
PH UA: 6 (ref 5–9)
PLATELET # BLD: 241 E9/L (ref 130–450)
PMV BLD AUTO: 9.6 FL (ref 7–12)
POTASSIUM SERPL-SCNC: 3.8 MMOL/L (ref 3.5–5)
PROTEIN UA: NEGATIVE MG/DL
RBC # BLD: 4.43 E12/L (ref 3.5–5.5)
RBC UA: ABNORMAL /HPF (ref 0–2)
SODIUM BLD-SCNC: 139 MMOL/L (ref 132–146)
SPECIFIC GRAVITY UA: 1.01 (ref 1–1.03)
UROBILINOGEN, URINE: 0.2 E.U./DL
WBC # BLD: 5.1 E9/L (ref 4.5–11.5)
WBC UA: ABNORMAL /HPF (ref 0–5)

## 2019-04-12 PROCEDURE — 2580000003 HC RX 258: Performed by: NURSE ANESTHETIST, CERTIFIED REGISTERED

## 2019-04-12 PROCEDURE — 2580000003 HC RX 258: Performed by: PHYSICIAN ASSISTANT

## 2019-04-12 PROCEDURE — 7100000001 HC PACU RECOVERY - ADDTL 15 MIN: Performed by: NEUROLOGICAL SURGERY

## 2019-04-12 PROCEDURE — 6370000000 HC RX 637 (ALT 250 FOR IP): Performed by: NEUROLOGICAL SURGERY

## 2019-04-12 PROCEDURE — 3600000015 HC SURGERY LEVEL 5 ADDTL 15MIN: Performed by: NEUROLOGICAL SURGERY

## 2019-04-12 PROCEDURE — 36415 COLL VENOUS BLD VENIPUNCTURE: CPT

## 2019-04-12 PROCEDURE — 009400Z DRAINAGE OF INTRACRANIAL SUBDURAL SPACE WITH DRAINAGE DEVICE, OPEN APPROACH: ICD-10-PCS | Performed by: NEUROLOGICAL SURGERY

## 2019-04-12 PROCEDURE — 6370000000 HC RX 637 (ALT 250 FOR IP): Performed by: PHYSICIAN ASSISTANT

## 2019-04-12 PROCEDURE — 80048 BASIC METABOLIC PNL TOTAL CA: CPT

## 2019-04-12 PROCEDURE — 82962 GLUCOSE BLOOD TEST: CPT

## 2019-04-12 PROCEDURE — 6370000000 HC RX 637 (ALT 250 FOR IP): Performed by: NURSE PRACTITIONER

## 2019-04-12 PROCEDURE — 61154 BURR HOLE W/EVAC&/DRG HMTMA: CPT | Performed by: PHYSICIAN ASSISTANT

## 2019-04-12 PROCEDURE — 87081 CULTURE SCREEN ONLY: CPT

## 2019-04-12 PROCEDURE — 2000000000 HC ICU R&B

## 2019-04-12 PROCEDURE — 99222 1ST HOSP IP/OBS MODERATE 55: CPT | Performed by: NEUROLOGICAL SURGERY

## 2019-04-12 PROCEDURE — 6360000002 HC RX W HCPCS: Performed by: EMERGENCY MEDICINE

## 2019-04-12 PROCEDURE — 7100000000 HC PACU RECOVERY - FIRST 15 MIN: Performed by: NEUROLOGICAL SURGERY

## 2019-04-12 PROCEDURE — 2500000003 HC RX 250 WO HCPCS: Performed by: NURSE ANESTHETIST, CERTIFIED REGISTERED

## 2019-04-12 PROCEDURE — 6360000002 HC RX W HCPCS: Performed by: NURSE ANESTHETIST, CERTIFIED REGISTERED

## 2019-04-12 PROCEDURE — 3600000005 HC SURGERY LEVEL 5 BASE: Performed by: NEUROLOGICAL SURGERY

## 2019-04-12 PROCEDURE — 00N00ZZ RELEASE BRAIN, OPEN APPROACH: ICD-10-PCS | Performed by: NEUROLOGICAL SURGERY

## 2019-04-12 PROCEDURE — 6360000002 HC RX W HCPCS: Performed by: PHYSICIAN ASSISTANT

## 2019-04-12 PROCEDURE — 86901 BLOOD TYPING SEROLOGIC RH(D): CPT

## 2019-04-12 PROCEDURE — 3700000000 HC ANESTHESIA ATTENDED CARE: Performed by: NEUROLOGICAL SURGERY

## 2019-04-12 PROCEDURE — 2720000010 HC SURG SUPPLY STERILE: Performed by: NEUROLOGICAL SURGERY

## 2019-04-12 PROCEDURE — 86850 RBC ANTIBODY SCREEN: CPT

## 2019-04-12 PROCEDURE — 6360000002 HC RX W HCPCS: Performed by: NEUROLOGICAL SURGERY

## 2019-04-12 PROCEDURE — 3700000001 HC ADD 15 MINUTES (ANESTHESIA): Performed by: NEUROLOGICAL SURGERY

## 2019-04-12 PROCEDURE — 61154 BURR HOLE W/EVAC&/DRG HMTMA: CPT | Performed by: NEUROLOGICAL SURGERY

## 2019-04-12 PROCEDURE — 2709999900 HC NON-CHARGEABLE SUPPLY: Performed by: NEUROLOGICAL SURGERY

## 2019-04-12 PROCEDURE — 2500000003 HC RX 250 WO HCPCS: Performed by: NEUROLOGICAL SURGERY

## 2019-04-12 PROCEDURE — 2580000003 HC RX 258: Performed by: FAMILY MEDICINE

## 2019-04-12 PROCEDURE — 99233 SBSQ HOSP IP/OBS HIGH 50: CPT | Performed by: NURSE PRACTITIONER

## 2019-04-12 PROCEDURE — 70450 CT HEAD/BRAIN W/O DYE: CPT

## 2019-04-12 PROCEDURE — 86900 BLOOD TYPING SEROLOGIC ABO: CPT

## 2019-04-12 PROCEDURE — C1713 ANCHOR/SCREW BN/BN,TIS/BN: HCPCS | Performed by: NEUROLOGICAL SURGERY

## 2019-04-12 PROCEDURE — 6360000002 HC RX W HCPCS: Performed by: FAMILY MEDICINE

## 2019-04-12 PROCEDURE — 85025 COMPLETE CBC W/AUTO DIFF WBC: CPT

## 2019-04-12 PROCEDURE — 81001 URINALYSIS AUTO W/SCOPE: CPT

## 2019-04-12 DEVICE — BURR HOLE COVER PLATE WITH TAB, 14MM
Type: IMPLANTABLE DEVICE | Site: CRANIAL | Status: FUNCTIONAL
Brand: UNIVERSAL NEURO 2

## 2019-04-12 DEVICE — SCREW UN3 SLFTP 1.5X4MM: Type: IMPLANTABLE DEVICE | Site: CRANIAL | Status: FUNCTIONAL

## 2019-04-12 DEVICE — LOW PROFILE BURR HOLE COVER, W/TAB, 10MM
Type: IMPLANTABLE DEVICE | Site: CRANIAL | Status: FUNCTIONAL
Brand: UNIVERSAL NEURO 2

## 2019-04-12 RX ORDER — DIAPER,BRIEF,INFANT-TODD,DISP
EACH MISCELLANEOUS PRN
Status: DISCONTINUED | OUTPATIENT
Start: 2019-04-12 | End: 2019-04-12 | Stop reason: ALTCHOICE

## 2019-04-12 RX ORDER — PROPOFOL 10 MG/ML
INJECTION, EMULSION INTRAVENOUS PRN
Status: DISCONTINUED | OUTPATIENT
Start: 2019-04-12 | End: 2019-04-12 | Stop reason: SDUPTHER

## 2019-04-12 RX ORDER — LIDOCAINE HYDROCHLORIDE AND EPINEPHRINE BITARTRATE 20; .01 MG/ML; MG/ML
INJECTION, SOLUTION SUBCUTANEOUS PRN
Status: DISCONTINUED | OUTPATIENT
Start: 2019-04-12 | End: 2019-04-12 | Stop reason: ALTCHOICE

## 2019-04-12 RX ORDER — SODIUM CHLORIDE 9 MG/ML
INJECTION, SOLUTION INTRAVENOUS CONTINUOUS PRN
Status: DISCONTINUED | OUTPATIENT
Start: 2019-04-12 | End: 2019-04-12 | Stop reason: SDUPTHER

## 2019-04-12 RX ORDER — DEXAMETHASONE SODIUM PHOSPHATE 10 MG/ML
INJECTION INTRAMUSCULAR; INTRAVENOUS PRN
Status: DISCONTINUED | OUTPATIENT
Start: 2019-04-12 | End: 2019-04-12 | Stop reason: SDUPTHER

## 2019-04-12 RX ORDER — SODIUM CHLORIDE 0.9 % (FLUSH) 0.9 %
10 SYRINGE (ML) INJECTION PRN
Status: DISCONTINUED | OUTPATIENT
Start: 2019-04-12 | End: 2019-04-16 | Stop reason: HOSPADM

## 2019-04-12 RX ORDER — HYDRALAZINE HYDROCHLORIDE 20 MG/ML
5 INJECTION INTRAMUSCULAR; INTRAVENOUS EVERY 4 HOURS PRN
Status: DISCONTINUED | OUTPATIENT
Start: 2019-04-12 | End: 2019-04-12

## 2019-04-12 RX ORDER — FLUDROCORTISONE ACETATE 0.1 MG/1
0.2 TABLET ORAL DAILY
Status: DISCONTINUED | OUTPATIENT
Start: 2019-04-12 | End: 2019-04-16 | Stop reason: HOSPADM

## 2019-04-12 RX ORDER — NEOSTIGMINE METHYLSULFATE 1 MG/ML
INJECTION, SOLUTION INTRAVENOUS PRN
Status: DISCONTINUED | OUTPATIENT
Start: 2019-04-12 | End: 2019-04-12 | Stop reason: SDUPTHER

## 2019-04-12 RX ORDER — CEFAZOLIN SODIUM 2 G/50ML
2 SOLUTION INTRAVENOUS ONCE
Status: COMPLETED | OUTPATIENT
Start: 2019-04-12 | End: 2019-04-12

## 2019-04-12 RX ORDER — ONDANSETRON 2 MG/ML
4 INJECTION INTRAMUSCULAR; INTRAVENOUS EVERY 6 HOURS PRN
Status: DISCONTINUED | OUTPATIENT
Start: 2019-04-12 | End: 2019-04-12 | Stop reason: SDUPTHER

## 2019-04-12 RX ORDER — FENTANYL CITRATE 50 UG/ML
INJECTION, SOLUTION INTRAMUSCULAR; INTRAVENOUS PRN
Status: DISCONTINUED | OUTPATIENT
Start: 2019-04-12 | End: 2019-04-12 | Stop reason: SDUPTHER

## 2019-04-12 RX ORDER — LEVOTHYROXINE SODIUM 112 UG/1
112 TABLET ORAL DAILY
Status: DISCONTINUED | OUTPATIENT
Start: 2019-04-12 | End: 2019-04-16 | Stop reason: HOSPADM

## 2019-04-12 RX ORDER — SODIUM CHLORIDE 9 MG/ML
INJECTION, SOLUTION INTRAVENOUS CONTINUOUS
Status: DISCONTINUED | OUTPATIENT
Start: 2019-04-12 | End: 2019-04-16 | Stop reason: HOSPADM

## 2019-04-12 RX ORDER — ONDANSETRON 2 MG/ML
INJECTION INTRAMUSCULAR; INTRAVENOUS
Status: DISCONTINUED
Start: 2019-04-12 | End: 2019-04-12

## 2019-04-12 RX ORDER — SODIUM CHLORIDE 9 MG/ML
INJECTION, SOLUTION INTRAVENOUS CONTINUOUS
Status: DISCONTINUED | OUTPATIENT
Start: 2019-04-12 | End: 2019-04-12

## 2019-04-12 RX ORDER — DEXTROSE MONOHYDRATE 50 MG/ML
100 INJECTION, SOLUTION INTRAVENOUS PRN
Status: DISCONTINUED | OUTPATIENT
Start: 2019-04-12 | End: 2019-04-12

## 2019-04-12 RX ORDER — SODIUM CHLORIDE 0.9 % (FLUSH) 0.9 %
10 SYRINGE (ML) INJECTION PRN
Status: DISCONTINUED | OUTPATIENT
Start: 2019-04-12 | End: 2019-04-12 | Stop reason: SDUPTHER

## 2019-04-12 RX ORDER — ROCURONIUM BROMIDE 10 MG/ML
INJECTION, SOLUTION INTRAVENOUS PRN
Status: DISCONTINUED | OUTPATIENT
Start: 2019-04-12 | End: 2019-04-12 | Stop reason: SDUPTHER

## 2019-04-12 RX ORDER — SODIUM CHLORIDE 0.9 % (FLUSH) 0.9 %
10 SYRINGE (ML) INJECTION EVERY 12 HOURS SCHEDULED
Status: DISCONTINUED | OUTPATIENT
Start: 2019-04-12 | End: 2019-04-16 | Stop reason: HOSPADM

## 2019-04-12 RX ORDER — ONDANSETRON 2 MG/ML
4 INJECTION INTRAMUSCULAR; INTRAVENOUS EVERY 6 HOURS PRN
Status: DISCONTINUED | OUTPATIENT
Start: 2019-04-12 | End: 2019-04-16 | Stop reason: HOSPADM

## 2019-04-12 RX ORDER — HYDROCODONE BITARTRATE AND ACETAMINOPHEN 5; 325 MG/1; MG/1
2 TABLET ORAL EVERY 4 HOURS PRN
Status: DISCONTINUED | OUTPATIENT
Start: 2019-04-12 | End: 2019-04-13

## 2019-04-12 RX ORDER — ONDANSETRON 2 MG/ML
INJECTION INTRAMUSCULAR; INTRAVENOUS PRN
Status: DISCONTINUED | OUTPATIENT
Start: 2019-04-12 | End: 2019-04-12 | Stop reason: SDUPTHER

## 2019-04-12 RX ORDER — SODIUM CHLORIDE 0.9 % (FLUSH) 0.9 %
10 SYRINGE (ML) INJECTION EVERY 12 HOURS SCHEDULED
Status: DISCONTINUED | OUTPATIENT
Start: 2019-04-12 | End: 2019-04-12 | Stop reason: SDUPTHER

## 2019-04-12 RX ORDER — LEVETIRACETAM 5 MG/ML
500 INJECTION INTRAVASCULAR EVERY 12 HOURS
Status: DISCONTINUED | OUTPATIENT
Start: 2019-04-12 | End: 2019-04-13

## 2019-04-12 RX ORDER — HYDROCODONE BITARTRATE AND ACETAMINOPHEN 5; 325 MG/1; MG/1
1 TABLET ORAL EVERY 4 HOURS PRN
Status: DISCONTINUED | OUTPATIENT
Start: 2019-04-12 | End: 2019-04-13

## 2019-04-12 RX ORDER — CEFAZOLIN SODIUM 2 G/50ML
2 SOLUTION INTRAVENOUS EVERY 8 HOURS
Status: DISCONTINUED | OUTPATIENT
Start: 2019-04-12 | End: 2019-04-14 | Stop reason: SDUPTHER

## 2019-04-12 RX ORDER — LORAZEPAM 0.5 MG/1
0.5 TABLET ORAL EVERY 8 HOURS PRN
Status: DISCONTINUED | OUTPATIENT
Start: 2019-04-12 | End: 2019-04-16 | Stop reason: HOSPADM

## 2019-04-12 RX ORDER — NICOTINE POLACRILEX 4 MG
15 LOZENGE BUCCAL PRN
Status: DISCONTINUED | OUTPATIENT
Start: 2019-04-12 | End: 2019-04-12

## 2019-04-12 RX ORDER — GLYCOPYRROLATE 1 MG/5 ML
SYRINGE (ML) INTRAVENOUS PRN
Status: DISCONTINUED | OUTPATIENT
Start: 2019-04-12 | End: 2019-04-12 | Stop reason: SDUPTHER

## 2019-04-12 RX ORDER — DEXTROSE MONOHYDRATE 25 G/50ML
12.5 INJECTION, SOLUTION INTRAVENOUS PRN
Status: DISCONTINUED | OUTPATIENT
Start: 2019-04-12 | End: 2019-04-12

## 2019-04-12 RX ORDER — HYDRALAZINE HYDROCHLORIDE 20 MG/ML
5 INJECTION INTRAMUSCULAR; INTRAVENOUS EVERY 10 MIN PRN
Status: DISCONTINUED | OUTPATIENT
Start: 2019-04-12 | End: 2019-04-15

## 2019-04-12 RX ADMIN — DEXAMETHASONE SODIUM PHOSPHATE 10 MG: 10 INJECTION INTRAMUSCULAR; INTRAVENOUS at 13:15

## 2019-04-12 RX ADMIN — FLUDROCORTISONE ACETATE 0.2 MG: 0.1 TABLET ORAL at 21:25

## 2019-04-12 RX ADMIN — LEVETIRACETAM 500 MG: 5 INJECTION INTRAVENOUS at 04:57

## 2019-04-12 RX ADMIN — SODIUM CHLORIDE: 9 INJECTION, SOLUTION INTRAVENOUS at 16:31

## 2019-04-12 RX ADMIN — FENTANYL CITRATE 50 MCG: 50 INJECTION, SOLUTION INTRAMUSCULAR; INTRAVENOUS at 13:12

## 2019-04-12 RX ADMIN — SODIUM CHLORIDE: 9 INJECTION, SOLUTION INTRAVENOUS at 20:28

## 2019-04-12 RX ADMIN — SODIUM CHLORIDE: 9 INJECTION, SOLUTION INTRAVENOUS at 13:03

## 2019-04-12 RX ADMIN — Medication 0.6 MG: at 13:57

## 2019-04-12 RX ADMIN — CEFAZOLIN SODIUM 2 G: 2 SOLUTION INTRAVENOUS at 21:25

## 2019-04-12 RX ADMIN — ROCURONIUM BROMIDE 30 MG: 10 INJECTION, SOLUTION INTRAVENOUS at 13:12

## 2019-04-12 RX ADMIN — LEVETIRACETAM 500 MG: 5 INJECTION INTRAVENOUS at 16:28

## 2019-04-12 RX ADMIN — PROPOFOL 120 MG: 10 INJECTION, EMULSION INTRAVENOUS at 13:12

## 2019-04-12 RX ADMIN — Medication 3 MG: at 13:57

## 2019-04-12 RX ADMIN — PHENYLEPHRINE HYDROCHLORIDE 100 MCG: 10 INJECTION INTRAVENOUS at 13:33

## 2019-04-12 RX ADMIN — ONDANSETRON 4 MG: 2 INJECTION INTRAMUSCULAR; INTRAVENOUS at 00:46

## 2019-04-12 RX ADMIN — SODIUM CHLORIDE: 9 INJECTION, SOLUTION INTRAVENOUS at 04:57

## 2019-04-12 RX ADMIN — CEFAZOLIN SODIUM 2 G: 2 SOLUTION INTRAVENOUS at 13:16

## 2019-04-12 RX ADMIN — HYDROCODONE BITARTRATE AND ACETAMINOPHEN 1 TABLET: 5; 325 TABLET ORAL at 21:26

## 2019-04-12 RX ADMIN — ONDANSETRON HYDROCHLORIDE 4 MG: 2 INJECTION, SOLUTION INTRAMUSCULAR; INTRAVENOUS at 13:08

## 2019-04-12 ASSESSMENT — PULMONARY FUNCTION TESTS
PIF_VALUE: 21
PIF_VALUE: 21
PIF_VALUE: 3
PIF_VALUE: 21
PIF_VALUE: 1
PIF_VALUE: 21
PIF_VALUE: 22
PIF_VALUE: 21
PIF_VALUE: 1
PIF_VALUE: 21
PIF_VALUE: 22
PIF_VALUE: 21
PIF_VALUE: 21
PIF_VALUE: 18
PIF_VALUE: 2
PIF_VALUE: 21
PIF_VALUE: 22
PIF_VALUE: 21
PIF_VALUE: 21
PIF_VALUE: 20
PIF_VALUE: 22
PIF_VALUE: 21
PIF_VALUE: 20
PIF_VALUE: 22
PIF_VALUE: 21
PIF_VALUE: 0
PIF_VALUE: 21
PIF_VALUE: 22
PIF_VALUE: 21
PIF_VALUE: 18
PIF_VALUE: 4
PIF_VALUE: 21
PIF_VALUE: 21
PIF_VALUE: 24
PIF_VALUE: 18
PIF_VALUE: 19
PIF_VALUE: 18
PIF_VALUE: 21
PIF_VALUE: 21
PIF_VALUE: 22
PIF_VALUE: 21
PIF_VALUE: 22
PIF_VALUE: 11
PIF_VALUE: 0
PIF_VALUE: 21
PIF_VALUE: 18
PIF_VALUE: 1

## 2019-04-12 ASSESSMENT — PAIN SCALES - GENERAL
PAINLEVEL_OUTOF10: 0
PAINLEVEL_OUTOF10: 0
PAINLEVEL_OUTOF10: 6
PAINLEVEL_OUTOF10: 0
PAINLEVEL_OUTOF10: 0

## 2019-04-12 NOTE — CONSULTS
[address]                  [address] [facility]  [facility]                                  CONSULTATION    PATIENT NAME: Marisa ALEXANDRA                        :        1965  MED REC NO:   [id number]                            ROOM:       4416  ACCOUNT NO:   [de-identified]                           ADMIT DATE: 2019  PROVIDER:     RASHAWN Wright    CONSULT DATE:  2019    HISTORY OF PRESENT ILLNESS:  This 52-year-old female presented to the ED  yesterday with altered mental status. Head CT was done, which  demonstrated a 2-cm right chronic subdural hematoma. The patient is  known to our service, we saw her in the clinic on . At that time  she was one month out from a fall that caused the subdural.  [place]  [place] of state, actually treated in [place]. When stable she  came home. She was doing well and pretty much back to her baseline  mental status. She is developmentally delayed. At the present time,  she denies any headache. She is answering questions appropriately and  following commands. PAST MEDICAL HISTORY:  Significant for being developmentally delayed,  hypothyroidism. PAST SURGICAL HISTORY:  Significant for left total knee arthroplasty,  abdominal procedure, head surgery, toe surgery. SOCIAL HISTORY:  The patient denies any tobacco or alcohol use. MEDICATIONS:  Please see electronic medical record. ALLERGIES:  No known drug allergies. REVIEW OF SYSTEMS:  The patient denying any chest pain or shortness of  breath. Otherwise, please see the HPI for all other pertinent positive  review of systems. PHYSICAL EXAMINATION:  NEUROLOGIC:  Well-nourished female, appearing her stated age in no acute  distress. She is alert to herself. Face is symmetrical.  Pupils equal  and reactive. Tongue is midline. Speech is fairly clear.  She has good  strength and sensation in all four extremities with no focal motor or  sensory deficit appreciated. SKIN:  Warm and dry. LUNGS:  No respiratory distress. ABDOMEN:  No significant abdominal distention. IMPRESSION:  A 64-year-old female with Down syndrome, has a 2-cm right  chronic subdural hematoma with altered mental status. PLAN:  We will plan on adonis hole craniotomy today. We will keep her  n.p.o. No anticoagulation. RASHAWN DANG    I have examined the patient and agree with above. She has a right SDH.   To OR for bur holes    Urban Alcaraz      D: 04/12/2019 7:20:08       T: 04/12/2019 7:22:27     [email or web address]  Job#: [id number]     Doc#: [id number]    CC:

## 2019-04-12 NOTE — BRIEF OP NOTE
Brief Postoperative Note  ______________________________________________________________    Patient: Jessica Nino See  YOB: 1965  MRN: [id number]  Date of Procedure: 4/12/2019    Pre-Op Diagnosis: . Post-Op Diagnosis: Same       Procedure(s):  RIGHT CRANIOTOMY FRANCISCO JAVIER HOLES    Anesthesia: General    Surgeon(s):  Ida Atkins MD    Assistant: Jackelin Lacey    Estimated Blood Loss (mL): 728     Complications: None    Specimens:   * No specimens in log *    Implants:  Implant Name Type Inv. Item Serial No.  Lot No. LRB No. Used   SCREW UN3 SLFTP 1.5X4MM Screw/Plate/Nail/Hussein SCREW UN3 SLFTP 1.5X4MM  JOSE: ORTHOPAEDICS  Right 6   PLATE BUR PLT CVR HL LPROF W/ TAB 10MM Screw/Plate/Nail/Hussein PLATE BUR PLT CVR HL LPROF W/ TAB 10MM  JOSE: ORTHOPAEDICS  Right 1   PLATE SHUNT W/EXPANDED GAP 14MM Screw/Plate/Nail/Hussein PLATE SHUNT W/EXPANDED GAP 14MM  JOSE: ORTHOPAEDICS  Right 1         Drains:   Open Drain Right Scalp (Active)   Site Description Bleeding 4/12/2019  6:00 PM   Dressing Status New drainage; Old drainage; Intact 4/12/2019  6:00 PM   Drainage Appearance Serosanguinous 4/12/2019  6:00 PM   Status Unclamped 4/12/2019  6:00 PM   Output (ml) 0 ml 4/12/2019  6:00 PM       Urethral Catheter Latex (Active)   Catheter Indications Need for fluid management in critically ill patients in a critical care setting not able to be managed by other means such as BSC with hat, bedpan, urinal, condom catheter, or short term intermittent urethral catherization 4/12/2019  4:05 AM   Urine Color Yellow 4/12/2019  2:21 PM   Urine Appearance Clear 4/12/2019  4:05 AM   Output (mL) 30 mL 4/12/2019  6:00 PM       Findings: see dictated op note    Laura Galaviz MD  Date: 4/12/2019  Time: 7:31 PM

## 2019-04-12 NOTE — PROGRESS NOTES
Pt in line room. Family at bedside. Vitals stable per documentation. Chart left upstairs at time of transport. NSICU RN will either bring chart down or tube to PACU.    Saran Phillips RN

## 2019-04-12 NOTE — PROGRESS NOTES
Unable to complete database at this time d/t pt not being able to answer questions and her family not being present

## 2019-04-12 NOTE — PROGRESS NOTES
OCCUPATIONAL THERAPY INITIAL EVALUATION      Date:2019  Patient Name: Holly Pelaez See  MRN: [id number]  : 1965  Room: [address] [place] [address]-A     Chart reviewed. Discussed pt status in nursing rounds this am. Pt scheduled for surgery. Will hold at this time and re-attempt OT evaluation when appropriate.   Alysha Ibrahim, OTR/L [id number]

## 2019-04-12 NOTE — H&P
Hospital Medicine History & Physical      PCP: Doris Torres DO    Date of Admission: 4/11/2019    Date of Service: Pt seen/examined on 4/11/2019 and admitted to Inpatient with expected LOS greater than two midnights due to medical therapy. Chief Complaint:  AMS      History Of Present Illness:    Unable to obtain history due to mental status. H&P as per ED note. Alvaro Rome See is a 49 y.o. female presenting to the ED for head injury, beginning 1 month ago. The complaint has been constant, moderate in severity, and worsened by nothing. Patient fell a few months ago and was diagnosed with a subdural hematoma. She has been following up neurology and had a worsening CT scan completed today. Patient has been less alert per family and has been nauseated with emesis. Patient is MR. Patient is a poor historian. Unable to obtain a complete HPI due to this patient's mental status. Past Medical History:          Diagnosis Date    Arthritis     Down syndrome     Hypothyroidism     Osteoarthritis     Syncope     Thyroid disease     Varicose veins     Wears glasses        Past Surgical History:          Procedure Laterality Date    ABDOMEN SURGERY      duodenal atresia    ABDOMINAL ADHESION SURGERY      ECHO COMPL W DOP COLOR FLOW  5/14/2012         HIP SURGERY      Right    JOINT REPLACEMENT      left knee, right hip-dr Gerhard Mckeon KNEE SURGERY      Right    TOE SURGERY      Right foot    TOTAL KNEE ARTHROPLASTY Left 08/11/2016    DR. De    TUBAL LIGATION         Medications Prior to Admission:      Prior to Admission medications    Medication Sig Start Date End Date Taking?  Authorizing Provider   doxycycline hyclate (VIBRAMYCIN) 100 MG capsule Take 1 capsule by mouth 2 times daily for 10 days 4/5/19 4/15/19  Casey Loera, DO   fludrocortisone (FLORINEF) 0.1 MG tablet Take 2 tablets by mouth daily 4/5/19   Casey Loera, DO   LORazepam (ATIVAN) 0.5 MG tablet Take 1 tablet by mouth every 8 hours as needed for Anxiety (take 1/2 tablet 30 min before procedure) for up to 2 doses. 3/21/19 5/21/19  Wily Irishd, DO   clindamycin (CLEOCIN T) 1 % lotion  2/20/19   Historical Provider, MD   ketoconazole (NIZORAL) 2 % cream  2/20/19   Historical Provider, MD   Crisaborole (EUCRISA) 2 % OINT Apply 1 Squirt topically 2 times daily 2/21/19   [facility] DO Evaristo   levothyroxine (SYNTHROID) 112 MCG tablet Take 1 tablet by mouth Daily 2/19/19   Wily Siad, DO       Allergies:  Patient has no known allergies. Social History:      TOBACCO:   reports that she has never smoked. She has never used smokeless tobacco.  ETOH:   reports that she does not drink alcohol. Family History:          Problem Relation Age of Onset    Heart Disease Maternal Grandmother     Heart Disease Maternal Grandfather     Heart Disease Paternal Aunt     Heart Disease Paternal Uncle          REVIEW OF SYSTEMS:   Pertinent positives as noted in the HPI. All other systems reviewed and negative. PHYSICAL EXAM:  BP (!) 123/48   Pulse 66   Temp 98 °F (36.7 °C) (Temporal)   Resp 18   Wt 140 lb (63.5 kg)   SpO2 99%   BMI 27.34 kg/m²   General appearance: No apparent distress, appears stated age and cooperative. HEENT: Normal cephalic, atraumatic without obvious deformity. Pupils equal, round, and reactive to light. Extra ocular muscles intact. Conjunctivae/corneas clear. Neck: Supple, with full range of motion. No jugular venous distention. Trachea midline. Respiratory:  Normal respiratory effort. Clear to auscultation, bilaterally without Rales/Wheezes/Rhonchi. Cardiovascular: Regular rate and rhythm with normal S1/S2 without murmurs, rubs or gallops. Brisk capillary refill. 2+ lower extremity pulses (dorsalis pedis). Abdomen: Soft, non-tender, non-distended with normal bowel sounds. Musculoskeletal: No clubbing, cyanosis or edema bilaterally. Full range of motion without deformity. Brisk capillary refill. 2+ lower extremity pulses (dorsalis pedis). Skin: Normal skin color. No rashes or lesions. Neurologic:  AAOX1            Labs:     Recent Labs     04/11/19 2320 04/12/19  0507   WBC 4.8 5.1   HGB 13.9 14.1   HCT 40.9 42.0    241     Recent Labs     04/11/19  2320 04/12/19  0507    139   K 3.7 3.8    102   CO2 27 25   BUN 13 11   CREATININE 0.8 0.8   CALCIUM 9.1 8.9     Recent Labs     04/11/19  2320   AST 31   ALT 31   BILITOT 0.5   ALKPHOS 129*     Recent Labs     04/11/19 2320   INR 1.1     No results for input(s): CKTOTAL, TROPONINI in the last 72 hours. Urinalysis:      Lab Results   Component Value Date    NITRU Negative 04/12/2019    WBCUA 0-1 04/12/2019    WBCUA 0-1 05/12/2012    BACTERIA RARE 04/12/2019    RBCUA 5-10 04/12/2019    RBCUA NONE 05/12/2012    BLOODU SMALL 04/12/2019    SPECGRAV 1.015 04/12/2019    GLUCOSEU Negative 04/12/2019    GLUCOSEU NEGATIVE 05/12/2012       Imaging:  CT head without contrast    (Results Pending)           ASSESSMENT:    Acute encephalopathy secondary to expanding subdural hematoma - head CT noted, Neurosurgery to take to the OR today, monitor vitals. Keppra. Hypothyroidism - resume home meds    Down syndrome      · DVT Prophylaxis  · SCD  · Diet  · Diet NPO Effective Now  · Code Status  · Full Code  · PT/OT Eval Status  · eval and treat  · Disposition  · NSICU      Tal Bettencourt MD  [employer]  Please contact me via [employer]        NOTE: This report was transcribed using voice recognition software. Every effort was made to ensure accuracy; however, inadvertent computerized transcription errors may be present.

## 2019-04-12 NOTE — PROGRESS NOTES
Pt taken to OR line room with family, transport, RN, heart monitor, and IV pump. VSS, NAD. Report given to OR nurse at bedside.

## 2019-04-12 NOTE — TELEPHONE ENCOUNTER
visitng nurse called [phone] .  She said kelle has been dropping weight down 5 pounds knot eating much and also is slowing down had issues writing her name this week also ,just FYI she has a ct scan today

## 2019-04-12 NOTE — PROGRESS NOTES
[facility]uro Science Intensive Care Unit  Critical Care  Daily Progress Note 4/12/2019    Date of Admission: 4/11/19  CC: [address]  4/11/19 Presented to ED for AMS. Has recent hx of fall resulting in SDH. CT head revealed increased right SDH with shift approx 9mm. Admitted to NSICU    OVERNIGHT EVENTS: T max 98F. Did not require additional doses of antihypertensive agents in the previous 24 hours. Did not require insulin in the previous 24 hours. PHYSICAL EXAM:    /74   Pulse 59   Temp 97.4 °F (36.3 °C) (Oral)   Resp 12   Wt 140 lb (63.5 kg)   SpO2 97%   BMI 27.34 kg/m²     Intake/Output Summary (Last 24 hours) at 4/12/2019 1123  Last data filed at 4/12/2019 1100  Gross per 24 hour   Intake 156 ml   Output 530 ml   Net -374 ml       General appearance:  Comfortable. NEUROLOGIC:        GCS:    4 - Opens eyes on own   6 - Follows simple motor commands  5 - Alert and oriented       Pupil size:  Left 3 mm  Right 3 mm  Pupil reaction: Yes   PERRLA  Wiggles fingers: Left Yes Right Yes  Hand grasp:   Left normal     Right normal  Wiggles toes: Left Yes    Right Yes  Plantar flexion: Left normal    Right normal    CONSTITUTIONAL: No acute distress  CARDIOVASCULAR: S1 S2, regular rate, regular rhythm,Monitor: SR  PULMONARY:  Respirations unlabored. No rhonchi/rales/wheezes. RENAL:  Patten to gravity, clear yellow urine. ABDOMEN: Soft, nontender, nondistended, nontympanic, normal bowel sounds. MUSCULOSKELETAL:   SKIN/EXTREMITIES: No rashes/ecchymosis, no edema/clubbing, warm/dry, good capillary refill. IV ACCESS:   PIV      ASSESSMENT/PLAN:     Active Problems:    SDH (subdural hematoma) (HCC)  Resolved Problems:    * No resolved hospital problems. *          Neuro:  Right SDH with shift. Rose Mary Duran Hx fall with SDH. Neurosurgery following. Monitor neuro status. Keppra. Plan for adonis hole craniotomy today  CV: No acute issues. SBP goal < 160 mm Hg.  when necessary hydralazine.   Pulm: No acute issues. Monitor respiratory status. GI: Nothing by mouth for surgery. Protonix. Monitor bowel function. Zofran. Renal:  No acute issues. Monitor urinary output, renal function and electrolytes. ID: No acute issues. Afebrile. Endocrine:   Hyperglycemia. Hx thyroid disease. Synthroid. Follow blood sugar. MSK:. Deconditioned. PT OT when able. Heme: No acute issues. Bowel regime: M OM. Pain control/Sedation:  Ativan  DVT prophylaxis: SCDs. GI prophylaxis: Protonix  Glucose protocol: Follow labs  Mouth/Eye care: As needed  Patten: Keep in place for critical care monitoring of fluid balance. Consults: Medicine. Neurosurgery. Patient/Family update: Updated family and room questions asked answers provided. Code status:  Full      Disposition:  NSICU.         KARLA Blair  4/12/2019  12:08 PM

## 2019-04-12 NOTE — PROGRESS NOTES
FLOOR CALLED. NURSE TO NURSE GIVEN. SPOKE WITH SOY,THE PATIENT'S TEST RESULTS REVIEWED, VITAL SIGNS REPORTED TO RECEIVING NURSE. ANY AND ALL IMPORTANT INFORMATION REGARDING PT DISCLOSED.

## 2019-04-12 NOTE — ED PROVIDER NOTES
HPI:  4/11/19, Time: 11:06 PM         Finn Early is a 49 y.o. female presenting to the ED for head injury, beginning 1 month ago. The complaint has been constant, moderate in severity, and worsened by nothing. Patient fell a few months ago and was diagnosed with a subdural hematoma. She has been following up neurology and had a worsening CT scan completed today. Patient has been less alert per family and has been nauseated with emesis. Patient is MR. Patient is a poor historian. Unable to obtain a complete HPI due to this patient's mental status. ROS:   Unable to obtain a complete ROS due to the patients mental status.           --------------------------------------------- PAST HISTORY ---------------------------------------------  Past Medical History:  has a past medical history of Arthritis, Down syndrome, Hypothyroidism, Osteoarthritis, Syncope, Thyroid disease, Varicose veins, and Wears glasses. Past Surgical History:  has a past surgical history that includes knee surgery; hip surgery; Toe Surgery; ECHO Compl W Dop Color Flow (5/14/2012); Tubal ligation; Total knee arthroplasty (Left, 08/11/2016); Abdominal adhesion surgery; Abdomen surgery; and joint replacement. Social History:  reports that she has never smoked. She has never used smokeless tobacco. She reports that she does not drink alcohol or use drugs. Family History: family history includes Heart Disease in her maternal grandfather, maternal grandmother, paternal aunt, and paternal uncle. The patients home medications have been reviewed. Allergies: Patient has no known allergies.         ---------------------------------------------------PHYSICAL EXAM--------------------------------------    Constitutional:  Well developed, well nourished, no acute distress, non-toxic appearance   Eyes:  PERRL, conjunctiva normal, EOMI  HENT:  Atraumatic, external ears normal, nose normal, oropharynx moist. Neck- normal range of motion, no tenderness, supple   Respiratory:  No respiratory distress, normal breath sounds, no rales, no wheezing   Cardiovascular:  Normal rate, normal rhythm, no murmurs, no gallops, no rubs. Radial and DP pulses 2+ bilaterally. GI:  Soft, nondistended, normal bowel sounds, nontender, no organomegaly, no mass, no rebound, no guarding   Musculoskeletal:  No edema, no tenderness, no deformities. Back- no tenderness  Integument:  Well hydrated, no rash. Adequate perfusion. Neurologic:  Alert & oriented x 1 - oriented to person only, CN 2-12 normal, normal motor function, normal sensory function, no focal deficits noted. Psychiatric:  Somnolent affect      -------------------------------------------------- RESULTS -------------------------------------------------  I have personally reviewed all laboratory and imaging results for this patient. Results are listed below.      LABS:  Results for orders placed or performed during the hospital encounter of 04/11/19   CBC auto differential   Result Value Ref Range    WBC 4.8 4.5 - 11.5 E9/L    RBC 4.37 3.50 - 5.50 E12/L    Hemoglobin 13.9 11.5 - 15.5 g/dL    Hematocrit 40.9 34.0 - 48.0 %    MCV 93.6 80.0 - 99.9 fL    MCH 31.8 26.0 - 35.0 pg    MCHC 34.0 32.0 - 34.5 %    RDW 12.8 11.5 - 15.0 fL    Platelets 087 917 - 785 E9/L    MPV 9.6 7.0 - 12.0 fL    Neutrophils % 67.5 43.0 - 80.0 %    Immature Granulocytes % 0.8 0.0 - 5.0 %    Lymphocytes % 26.5 20.0 - 42.0 %    Monocytes % 4.0 2.0 - 12.0 %    Eosinophils % 0.4 0.0 - 6.0 %    Basophils % 0.8 0.0 - 2.0 %    Neutrophils # 3.24 1.80 - 7.30 E9/L    Immature Granulocytes # 0.04 E9/L    Lymphocytes # 1.27 (L) 1.50 - 4.00 E9/L    Monocytes # 0.19 0.10 - 0.95 E9/L    Eosinophils # 0.02 (L) 0.05 - 0.50 E9/L    Basophils # 0.04 0.00 - 0.20 E9/L   Comprehensive Metabolic Panel   Result Value Ref Range    Sodium 140 132 - 146 mmol/L    Potassium 3.7 3.5 - 5.0 mmol/L    Chloride 104 98 - 107 mmol/L    CO2 27 22 - 29 mmol/L    Anion monitored during their ED course. Consultations:               Spoke with Dr. Beverly Campoverde (neurosurgery). Discussed the case. He does not recommend any medical intervention at this time. He recommended medical admission to the neuro ICU. He will see the patient. Spoke with Dr. Dayana López. Discussed the case. Patient admitted to the neuro ICU. Counseling: The emergency provider has spoken with the patient and discussed todays results, in addition to providing specific details for the plan of care and counseling regarding the diagnosis and prognosis. Questions are answered at this time and they are agreeable with the plan.       --------------------------------- IMPRESSION AND DISPOSITION ---------------------------------    IMPRESSION  1. Subdural hematoma ([address]        DISPOSITION  Disposition: Admit to neuro CCU/ICU  Patient condition is stable    4/11/19, 11:11 PM.    Portions of this note is prepared by Dl Dia, acting as Scribe for Sarina Sevilla DO. Sarina Sevilla DO:  The scribe's documentation has been prepared under my direction and personally reviewed by me in its entirety. I confirm that the note above accurately reflects all work, treatment, procedures, and medical decision making performed by me.                  Sarina Sevilla DO  04/12/19 0045

## 2019-04-12 NOTE — ANESTHESIA PRE PROCEDURE
Department of Anesthesiology  Preprocedure Note       Name:  Yasmani Perez See   Age:  49 y.o.  :  1965                                          MRN:  [id number]         Date:  2019      Surgeon: Nadir Matos):  Annie Del Cid MD    Procedure: RIGHT CRANIOTOMY FRANCISCO JAVIER HOLES (Right )    Medications prior to admission:   Prior to Admission medications    Medication Sig Start Date End Date Taking? Authorizing Provider   doxycycline hyclate (VIBRAMYCIN) 100 MG capsule Take 1 capsule by mouth 2 times daily for 10 days 4/5/19 4/15/19  Jeniffer Loera DO   fludrocortisone (FLORINEF) 0.1 MG tablet Take 2 tablets by mouth daily 19   Jeniffer Loera DO   LORazepam (ATIVAN) 0.5 MG tablet Take 1 tablet by mouth every 8 hours as needed for Anxiety (take 1/2 tablet 30 min before procedure) for up to 2 doses.  3/21/19 5/21/19  Amira Lopez DO   clindamycin (CLEOCIN T) 1 % lotion  19   Historical Provider, MD   ketoconazole (NIZORAL) 2 % cream  19   Historical Provider, MD   Crisaborole (EUCRISA) 2 % OINT Apply 1 Squirt topically 2 times daily 19   Jeniffer Loera DO   levothyroxine (SYNTHROID) 112 MCG tablet Take 1 tablet by mouth Daily 19   Amira Lopez DO       Current medications:    Current Facility-Administered Medications   Medication Dose Route Frequency Provider Last Rate Last Dose    ondansetron (ZOFRAN) injection 4 mg  4 mg Intravenous Q6H PRN Kira Heath DO   4 mg at 19 0046    ondansetron (ZOFRAN) 4 MG/2ML injection             levothyroxine (SYNTHROID) tablet 112 mcg  112 mcg Oral Daily Ten Ahumada MD        LORazepam (ATIVAN) tablet 0.5 mg  0.5 mg Oral Q8H PRN Ten Ahumada MD        sodium chloride flush 0.9 % injection 10 mL  10 mL Intravenous 2 times per day Ten Ahumada MD        sodium chloride flush 0.9 % injection 10 mL  10 mL Intravenous PRN Ten Ahumada MD        magnesium hydroxide (MILK OF MAGNESIA) 400 MG/5ML suspension 30 mL  30 Counseling given: Not Answered      Vital Signs (Current):   Vitals:    04/12/19 0900 04/12/19 1000 04/12/19 1100 04/12/19 1210   BP: 99/63 113/65 127/74 (!) 140/87   Pulse: 56 54 59 61   Resp: 14 13 12 20   Temp:       TempSrc:       SpO2: 94% 94% 97% 98%   Weight:    140 lb (63.5 kg)                                              BP Readings from Last 3 Encounters:   04/12/19 (!) 140/87   04/05/19 120/62   03/25/19 112/68       NPO Status: Time of last liquid consumption: 2300                        Time of last solid consumption: 2300                        Date of last liquid consumption: 04/11/19                        Date of last solid food consumption: 04/11/19    BMI:   Wt Readings from Last 3 Encounters:   04/12/19 140 lb (63.5 kg)   04/05/19 136 lb (61.7 kg)   03/25/19 134 lb (60.8 kg)     Body mass index is 27.34 kg/m². CBC:   Lab Results   Component Value Date    WBC 5.1 04/12/2019    RBC 4.43 04/12/2019    HGB 14.1 04/12/2019    HCT 42.0 04/12/2019    MCV 94.8 04/12/2019    RDW 12.7 04/12/2019     04/12/2019       CMP:   Lab Results   Component Value Date     04/12/2019    K 3.8 04/12/2019     04/12/2019    CO2 25 04/12/2019    BUN 11 04/12/2019    CREATININE 0.8 04/12/2019    GFRAA >60 04/12/2019    LABGLOM >60 04/12/2019    GLUCOSE 116 04/12/2019    GLUCOSE 96 05/15/2012    PROT 7.4 04/11/2019    CALCIUM 8.9 04/12/2019    BILITOT 0.5 04/11/2019    ALKPHOS 129 04/11/2019    AST 31 04/11/2019    ALT 31 04/11/2019       POC Tests: No results for input(s): POCGLU, POCNA, POCK, POCCL, POCBUN, POCHEMO, POCHCT in the last 72 hours.     Coags:   Lab Results   Component Value Date    PROTIME 12.8 04/11/2019    PROTIME 11.0 05/12/2012    INR 1.1 04/11/2019    APTT 32.3 04/11/2019       HCG (If Applicable):   Lab Results   Component Value Date    PREGTESTUR NEGATIVE 07/25/2016        ABGs: No results found for: PHART, PO2ART, HSW6GZS, OVG1POB, BEART, N6IZOPYO     Type & Screen (If

## 2019-04-12 NOTE — PROGRESS NOTES
Pt seen and examined  Dictated  49year old female with enlargement of her right SDH on head CT.   Will take to OR this afternoon for adonis hole craniotomy

## 2019-04-13 ENCOUNTER — APPOINTMENT (OUTPATIENT)
Dept: CT IMAGING | Age: 54
DRG: 026 | End: 2019-04-13
Payer: MEDICARE

## 2019-04-13 LAB
ANION GAP SERPL CALCULATED.3IONS-SCNC: 12 MMOL/L (ref 7–16)
BASOPHILS ABSOLUTE: 0 E9/L (ref 0–0.2)
BASOPHILS RELATIVE PERCENT: 0 % (ref 0–2)
BUN BLDV-MCNC: 10 MG/DL (ref 6–20)
CALCIUM SERPL-MCNC: 8.1 MG/DL (ref 8.6–10.2)
CHLORIDE BLD-SCNC: 110 MMOL/L (ref 98–107)
CO2: 21 MMOL/L (ref 22–29)
CREAT SERPL-MCNC: 0.7 MG/DL (ref 0.5–1)
EOSINOPHILS ABSOLUTE: 0 E9/L (ref 0.05–0.5)
EOSINOPHILS RELATIVE PERCENT: 0 % (ref 0–6)
GFR AFRICAN AMERICAN: >60
GFR NON-AFRICAN AMERICAN: >60 ML/MIN/1.73
GLUCOSE BLD-MCNC: 108 MG/DL (ref 74–99)
HCT VFR BLD CALC: 39 % (ref 34–48)
HEMOGLOBIN: 13.2 G/DL (ref 11.5–15.5)
IMMATURE GRANULOCYTES #: 0.01 E9/L
IMMATURE GRANULOCYTES %: 0.2 % (ref 0–5)
LYMPHOCYTES ABSOLUTE: 0.94 E9/L (ref 1.5–4)
LYMPHOCYTES RELATIVE PERCENT: 15.8 % (ref 20–42)
MAGNESIUM: 2 MG/DL (ref 1.6–2.6)
MCH RBC QN AUTO: 31.7 PG (ref 26–35)
MCHC RBC AUTO-ENTMCNC: 33.8 % (ref 32–34.5)
MCV RBC AUTO: 93.8 FL (ref 80–99.9)
METER GLUCOSE: 102 MG/DL (ref 74–99)
METER GLUCOSE: 91 MG/DL (ref 74–99)
MONOCYTES ABSOLUTE: 0.32 E9/L (ref 0.1–0.95)
MONOCYTES RELATIVE PERCENT: 5.4 % (ref 2–12)
MRSA CULTURE ONLY: NORMAL
NEUTROPHILS ABSOLUTE: 4.69 E9/L (ref 1.8–7.3)
NEUTROPHILS RELATIVE PERCENT: 78.6 % (ref 43–80)
PDW BLD-RTO: 12.7 FL (ref 11.5–15)
PHOSPHORUS: 2.2 MG/DL (ref 2.5–4.5)
PLATELET # BLD: 228 E9/L (ref 130–450)
PMV BLD AUTO: 9.6 FL (ref 7–12)
POTASSIUM SERPL-SCNC: 3.7 MMOL/L (ref 3.5–5)
RBC # BLD: 4.16 E12/L (ref 3.5–5.5)
SODIUM BLD-SCNC: 143 MMOL/L (ref 132–146)
WBC # BLD: 6 E9/L (ref 4.5–11.5)

## 2019-04-13 PROCEDURE — 6360000002 HC RX W HCPCS: Performed by: NEUROLOGICAL SURGERY

## 2019-04-13 PROCEDURE — 2580000003 HC RX 258: Performed by: PHYSICIAN ASSISTANT

## 2019-04-13 PROCEDURE — 6360000002 HC RX W HCPCS: Performed by: PHYSICIAN ASSISTANT

## 2019-04-13 PROCEDURE — 70450 CT HEAD/BRAIN W/O DYE: CPT

## 2019-04-13 PROCEDURE — 6370000000 HC RX 637 (ALT 250 FOR IP): Performed by: PHYSICIAN ASSISTANT

## 2019-04-13 PROCEDURE — 84100 ASSAY OF PHOSPHORUS: CPT

## 2019-04-13 PROCEDURE — 83735 ASSAY OF MAGNESIUM: CPT

## 2019-04-13 PROCEDURE — 2580000003 HC RX 258: Performed by: NURSE PRACTITIONER

## 2019-04-13 PROCEDURE — 2500000003 HC RX 250 WO HCPCS: Performed by: NURSE PRACTITIONER

## 2019-04-13 PROCEDURE — 99233 SBSQ HOSP IP/OBS HIGH 50: CPT | Performed by: NURSE PRACTITIONER

## 2019-04-13 PROCEDURE — 97161 PT EVAL LOW COMPLEX 20 MIN: CPT | Performed by: PHYSICAL THERAPIST

## 2019-04-13 PROCEDURE — 36415 COLL VENOUS BLD VENIPUNCTURE: CPT

## 2019-04-13 PROCEDURE — 80048 BASIC METABOLIC PNL TOTAL CA: CPT

## 2019-04-13 PROCEDURE — 6370000000 HC RX 637 (ALT 250 FOR IP): Performed by: NURSE PRACTITIONER

## 2019-04-13 PROCEDURE — 2000000000 HC ICU R&B

## 2019-04-13 PROCEDURE — 82962 GLUCOSE BLOOD TEST: CPT

## 2019-04-13 PROCEDURE — 85025 COMPLETE CBC W/AUTO DIFF WBC: CPT

## 2019-04-13 RX ORDER — LEVETIRACETAM 500 MG/1
500 TABLET ORAL 2 TIMES DAILY
Status: DISCONTINUED | OUTPATIENT
Start: 2019-04-13 | End: 2019-04-16 | Stop reason: HOSPADM

## 2019-04-13 RX ADMIN — Medication 10 ML: at 21:28

## 2019-04-13 RX ADMIN — LEVETIRACETAM 500 MG: 5 INJECTION INTRAVENOUS at 05:30

## 2019-04-13 RX ADMIN — FLUDROCORTISONE ACETATE 0.2 MG: 0.1 TABLET ORAL at 09:18

## 2019-04-13 RX ADMIN — POTASSIUM PHOSPHATE, MONOBASIC AND POTASSIUM PHOSPHATE, DIBASIC 30 MMOL: 224; 236 INJECTION, SOLUTION, CONCENTRATE INTRAVENOUS at 15:51

## 2019-04-13 RX ADMIN — CEFAZOLIN SODIUM 2 G: 2 SOLUTION INTRAVENOUS at 21:23

## 2019-04-13 RX ADMIN — HYDROMORPHONE HYDROCHLORIDE 0.5 MG: 1 INJECTION, SOLUTION INTRAMUSCULAR; INTRAVENOUS; SUBCUTANEOUS at 06:46

## 2019-04-13 RX ADMIN — CEFAZOLIN SODIUM 2 G: 2 SOLUTION INTRAVENOUS at 13:00

## 2019-04-13 RX ADMIN — CEFAZOLIN SODIUM 2 G: 2 SOLUTION INTRAVENOUS at 05:30

## 2019-04-13 RX ADMIN — LEVETIRACETAM 500 MG: 500 TABLET ORAL at 17:42

## 2019-04-13 RX ADMIN — HYDROMORPHONE HYDROCHLORIDE 0.5 MG: 1 INJECTION, SOLUTION INTRAMUSCULAR; INTRAVENOUS; SUBCUTANEOUS at 01:07

## 2019-04-13 RX ADMIN — Medication 10 ML: at 09:21

## 2019-04-13 RX ADMIN — LORAZEPAM 0.5 MG: 0.5 TABLET ORAL at 21:27

## 2019-04-13 ASSESSMENT — PAIN SCALES - GENERAL
PAINLEVEL_OUTOF10: 0
PAINLEVEL_OUTOF10: 0
PAINLEVEL_OUTOF10: 8
PAINLEVEL_OUTOF10: 0
PAINLEVEL_OUTOF10: 0
PAINLEVEL_OUTOF10: 7

## 2019-04-13 NOTE — PLAN OF CARE
Problem: Falls - Risk of:  Goal: Will remain free from falls  Description  Will remain free from falls  4/13/2019 0009 by Roz Murry RN  Outcome: Met This Shift  4/12/2019 1327 by Suzie Kaiser RN  Outcome: Met This Shift     Problem: Falls - Risk of:  Goal: Absence of physical injury  Description  Absence of physical injury  4/12/2019 1327 by Suzie Kaiser RN  Outcome: Met This Shift     Problem: Risk for Impaired Skin Integrity  Goal: Tissue integrity - skin and mucous membranes  Description  Structural intactness and normal physiological function of skin and  mucous membranes.   Outcome: Met This Shift     Problem: Pain:  Goal: Pain level will decrease  Description  Pain level will decrease  Outcome: Met This Shift

## 2019-04-13 NOTE — PROGRESS NOTES
Department of Neurosurgery  Progress Note    CHIEF COMPLAINT: pt s/p adonis hole craniotomy    SUBJECTIVE:  Denies headache. No other concerns    REVIEW OF SYSTEMS :  Constitutional: Negative for chills and fever. Neurological: Negative for dizziness, tremors and speech change. OBJECTIVE:   VITALS:  BP (!) 145/82   Pulse 69   Temp 97.8 °F (36.6 °C)   Resp 21   Wt 135 lb 1.6 oz (61.3 kg)   SpO2 100%   BMI 26.38 kg/m²   PHYSICAL:  CONSTITUTIONAL:  awake, alert, cooperative, no apparent distress, and appears stated age. CATES. FC.   Face symmetric, speech clear    DATA:  CBC:   Lab Results   Component Value Date    WBC 6.0 04/13/2019    RBC 4.16 04/13/2019    HGB 13.2 04/13/2019    HCT 39.0 04/13/2019    MCV 93.8 04/13/2019    MCH 31.7 04/13/2019    MCHC 33.8 04/13/2019    RDW 12.7 04/13/2019     04/13/2019    MPV 9.6 04/13/2019     BMP:    Lab Results   Component Value Date     04/13/2019    K 3.7 04/13/2019     04/13/2019    CO2 21 04/13/2019    BUN 10 04/13/2019    LABALBU 3.7 04/11/2019    LABALBU 2.8 05/13/2012    CREATININE 0.7 04/13/2019    CALCIUM 8.1 04/13/2019    GFRAA >60 04/13/2019    LABGLOM >60 04/13/2019    GLUCOSE 108 04/13/2019    GLUCOSE 96 05/15/2012     PT/INR:    Lab Results   Component Value Date    PROTIME 12.8 04/11/2019    PROTIME 11.0 05/12/2012    INR 1.1 04/11/2019     PTT:    Lab Results   Component Value Date    APTT 32.3 04/11/2019   [APTT}    Current Inpatient Medications  Current Facility-Administered Medications: ondansetron (ZOFRAN) injection 4 mg, 4 mg, Intravenous, Q6H PRN  levothyroxine (SYNTHROID) tablet 112 mcg, 112 mcg, Oral, Daily  LORazepam (ATIVAN) tablet 0.5 mg, 0.5 mg, Oral, Q8H PRN  sodium chloride flush 0.9 % injection 10 mL, 10 mL, Intravenous, 2 times per day  sodium chloride flush 0.9 % injection 10 mL, 10 mL, Intravenous, PRN  magnesium hydroxide (MILK OF MAGNESIA) 400 MG/5ML suspension 30 mL, 30 mL, Oral, Daily PRN  levetiracetam (KEPPRA) 500 mg/100 mL IVPB, 500 mg, Intravenous, Q12H  0.9 % sodium chloride infusion, , Intravenous, Continuous  ceFAZolin (ANCEF) 2 g in dextrose 3 % 50 mL IVPB (duplex), 2 g, Intravenous, Q8H  HYDROmorphone (DILAUDID) injection 0.25 mg, 0.25 mg, Intravenous, Q3H PRN **OR** HYDROmorphone (DILAUDID) injection 0.5 mg, 0.5 mg, Intravenous, Q3H PRN  HYDROcodone-acetaminophen (NORCO) 5-325 MG per tablet 1 tablet, 1 tablet, Oral, Q4H PRN **OR** HYDROcodone-acetaminophen (NORCO) 5-325 MG per tablet 2 tablet, 2 tablet, Oral, Q4H PRN  fludrocortisone (FLORINEF) tablet 0.2 mg, 0.2 mg, Oral, Daily  hydrALAZINE (APRESOLINE) injection 5 mg, 5 mg, Intravenous, Q10 Min PRN    ASSESSMENT:   · 49year old female s/p right adonis hole craniotomy for SDH on 4/12 - stable. Head CT this AM showed improvement of the pre op SDH    PLAN:  · WBAT  · PT/OT  · Ok to tx to [facility]  · No anticoagulants      Electronically signed by RASHAWN Aldrich on 4/13/2019 at 8:00 AM     The patient had right brain compression associated with a subdural hematoma that was treated with right  bur hole drainage of the subdural hematoma.     Angelica Skinner

## 2019-04-13 NOTE — PROGRESS NOTES
Hospitalist Progress Note      PCP: Anuj Jin DO    Date of Admission: 4/11/2019  Days in the hospital: 2    Chief Complaint: [address] Course:     Admitted for AMS secondary to expanding subdural hematoma. Neurosurgery was on board and craniotomy was performed. Subjective  Patient seen and examined at bedside. s/p craniotomy, No overnight events. Medications:  Reviewed    Infusion Medications    sodium chloride 100 mL/hr at 04/12/19 2028     Scheduled Medications    levothyroxine  112 mcg Oral Daily    sodium chloride flush  10 mL Intravenous 2 times per day    levetiracetam  500 mg Intravenous Q12H    ceFAZolin (ANCEF) IVPB  2 g Intravenous Q8H    fludrocortisone  0.2 mg Oral Daily     PRN Meds: ondansetron, LORazepam, sodium chloride flush, magnesium hydroxide, HYDROmorphone **OR** HYDROmorphone, HYDROcodone 5 mg - acetaminophen **OR** HYDROcodone 5 mg - acetaminophen, hydrALAZINE      Intake/Output Summary (Last 24 hours) at 4/13/2019 0749  Last data filed at 4/13/2019 0600  Gross per 24 hour   Intake 1582 ml   Output 1105 ml   Net 477 ml       Exam:    BP (!) 145/82   Pulse 69   Temp 97.8 °F (36.6 °C)   Resp 21   Wt 135 lb 1.6 oz (61.3 kg)   SpO2 100%   BMI 26.38 kg/m²     General appearance: No apparent distress, appears stated age and cooperative. HEENT: Normal cephalic, atraumatic without obvious deformity. Pupils equal, round, and reactive to light. Extra ocular muscles intact. Conjunctivae/corneas clear. Neck: Supple, with full range of motion. No jugular venous distention. Trachea midline. Respiratory:  Normal respiratory effort. Clear to auscultation, bilaterally without Rales/Wheezes/Rhonchi. Cardiovascular: Regular rate and rhythm with normal S1/S2 without murmurs, rubs or gallops. Brisk capillary refill. 2+ lower extremity pulses (dorsalis pedis). Abdomen: Soft, non-tender, non-distended with normal bowel sounds.   Musculoskeletal: No clubbing, cyanosis or edema bilaterally. Full range of motion without deformity. Brisk capillary refill. 2+ lower extremity pulses (dorsalis pedis). Skin: Normal skin color. No rashes or lesions. Neurologic:  AAOX1          Labs:   Recent Labs     04/11/19 2320 04/12/19 0507 04/13/19  0505   WBC 4.8 5.1 6.0   HGB 13.9 14.1 13.2   HCT 40.9 42.0 39.0    241 228     Recent Labs     04/11/19 2320 04/12/19  0507 04/13/19  0505    139 143   K 3.7 3.8 3.7    102 110*   CO2 27 25 21*   BUN 13 11 10   CREATININE 0.8 0.8 0.7   CALCIUM 9.1 8.9 8.1*   PHOS  --   --  2.2*     Recent Labs     04/11/19 2320   AST 31   ALT 31   BILITOT 0.5   ALKPHOS 129*     Recent Labs     04/11/19 2320   INR 1.1     No results for input(s): CKTOTAL, TROPONINI in the last 72 hours. Imaging:  CT head without contrast   Final Result   Large right-sided subdural hemorrhage with mixed densities. The   overall size is not changed since prior study. There is significant   mass effect on the right lateral ventricle and midline shift to the   left. Along the more caudal aspect of the subdural there are areas of   increased attenuation which are mildly more prominent than the prior   study suggestive of acute subdural hemorrhagic component         CT HEAD WO CONTRAST    (Results Pending)         Assessment/Plan:  Active Hospital Problems    Diagnosis Date Noted    Subdural hematoma ([address] [Y70.9E6X]     SDH (subdural hematoma) [address] [address] [S06.5X9A] 04/11/2019     Acute encephalopathy secondary to expanding subdural hematoma - head CT noted, S/p craniotomy, Neurosurgery on board,  monitor vitals. Keppra.     Hypothyroidism - resume home meds     Down syndrome    PT/OT/SLP        · DVT Prophylaxis  ? SCD  · Diet  ? Diet NPO Effective Now  · Code Status  ? Full Code  · PT/OT Eval Status  ? eval and treat  · Disposition  ?  NSICU           Will Printers MD [facility]  Please contact me through perfect serve    NOTE: This report was

## 2019-04-13 NOTE — PROGRESS NOTES
Neuro Science Intensive Care Unit  Critical Care  Daily Progress Note 4/13/2019    Date of Admission: 4/11/19  CC: [address]  4/11/19 Presented to ED for AMS. Has recent hx of fall resulting in SDH. CT head revealed increased right SDH with shift approx 9mm. Admitted to NSICU  4/12/19 Underwent [place] hole craniotomy with insertion of drain. OVERNIGHT EVENTS: T max 97.9F. Did not require additional doses of antihypertensive agents in the previous 24 hours. Did not require insulin in the previous 24 hours. PHYSICAL EXAM:    BP (!) 91/56   Pulse 57   Temp 98.2 °F (36.8 °C)   Resp 18   Wt 135 lb 1.6 oz (61.3 kg)   SpO2 100%   BMI 26.38 kg/m²     Intake/Output Summary (Last 24 hours) at 4/13/2019 1011  Last data filed at 4/13/2019 0900  Gross per 24 hour   Intake 1582 ml   Output 1100 ml   Net 482 ml       General appearance:  Comfortable. NEUROLOGIC:    GCS:    4 - Opens eyes on own   6 - Follows simple motor commands  4 - Seems confused, disoriented      Pupil size:  Left 3 mm  Right 3 mm  Pupil reaction: Yes   PERRLA  Wiggles fingers: Left Yes Right Yes  Hand grasp:   Left normal     Right normal  Wiggles toes: Left Yes    Right Yes  Plantar flexion: Left normal    Right normal    CONSTITUTIONAL: No acute distress  CARDIOVASCULAR: S1 S2, regular rate, regular rhythm,Monitor: SR  PULMONARY:  Respirations unlabored. No rhonchi/rales/wheezes. RENAL:  Patten to gravity, clear yellow urine. ABDOMEN: Soft, nontender, nondistended, nontympanic, normal bowel sounds. MUSCULOSKELETAL: CATES  SKIN/EXTREMITIES: No rashes/ecchymosis, no edema/clubbing, warm/dry, good capillary refill. IV ACCESS:   PIV      ASSESSMENT/PLAN:     Active Problems:    SDH (subdural hematoma) (HCC)    Subdural hematoma (HCC)  Resolved Problems:    * No resolved hospital problems. *          Neuro:  Right SDH with shift. Hx fall with SDH. S/p [place] hole craniotomy evacuation SDH with insertion of drain. Neurosurgery following. Monitor neuro status. Keppra. Neurosurgery removed drain this AM.   CV: No acute issues. Hx syncope. Florinef. Pulm: No acute issues. Monitor respiratory status. GI: Poor appetite. Protonix. Monitor bowel function. Zofran. Renal:  No acute issues. Monitor urinary output, renal function and electrolytes. ID: No acute issues. Afebrile. Endocrine:   Hyperglycemia. Hx thyroid disease. Synthroid. Follow blood sugar. MSK:. Deconditioned. PT OT when able. Heme: No acute issues. Bowel regime: MOM. Pain control/Sedation:  Ativan Norco.   DVT prophylaxis: SCDs. No chemoprophylaxis. GI prophylaxis: Protonix  Glucose protocol: Follow labs  Mouth/Eye care: As needed  Sandor: SHO   Consults: Medicine. Neurosurgery. Patient/Family update: Will update as family available  Code status:  Full      Disposition: Transfer from [facility].         KARLA Blair  4/13/2019  10:11 AM

## 2019-04-13 NOTE — PLAN OF CARE
Problem: Restraint Use - Nonviolent/Non-Self-Destructive Behavior:  Goal: Absence of restraint indications  Description  Absence of restraint indications  Outcome: Met This Shift     Problem: Restraint Use - Nonviolent/Non-Self-Destructive Behavior:  Goal: Absence of restraint indications  Description  Absence of restraint indications  Outcome: Met This Shift

## 2019-04-13 NOTE — ANESTHESIA POSTPROCEDURE EVALUATION
Department of Anesthesiology  Postprocedure Note    Patient: Rito Early  MRN: [id number]  YOB: 1965  Date of evaluation: 4/13/2019  Time:  6:41 AM     Procedure Summary     Date:  04/12/19 Room / Location:  [facility]    Anesthesia Start:  1303 Anesthesia Stop:  6509    Procedure:  RIGHT CRANIOTOMY FRANCISCO JAVIER HOLES (Right ) Diagnosis:  (.)    Surgeon:  Krystal Pina MD Responsible Provider:  Jessica Garcia DO    Anesthesia Type:  general ASA Status:  3          Anesthesia Type: general    Leonel Phase I: Leonel Score: 10    Leonel Phase II:      Last vitals: Reviewed and per EMR flowsheets.        Anesthesia Post Evaluation    Patient location during evaluation: PACU  Patient participation: complete - patient participated  Level of consciousness: awake and alert  Airway patency: patent  Nausea & Vomiting: no nausea and no vomiting  Complications: no  Cardiovascular status: blood pressure returned to baseline  Respiratory status: acceptable  Hydration status: euvolemic

## 2019-04-13 NOTE — PLAN OF CARE
Problem: Restraint Use - Nonviolent/Non-Self-Destructive Behavior:  Goal: Absence of restraint-related injury  Description  Absence of restraint-related injury  Outcome: Met This Shift

## 2019-04-13 NOTE — PROGRESS NOTES
RN called stating patient was less responsive, difficult to arouse. Unable to arouse patient with sternal rubbing. Did say ouch with needle stick for POCT. . Stat CT head done.

## 2019-04-13 NOTE — PLAN OF CARE
Problem: Restraint Use - Nonviolent/Non-Self-Destructive Behavior:  Goal: Absence of restraint indications  Description  Absence of restraint indications  4/13/2019 0931 by Toribio Gipson RN  Outcome: Met This Shift     Problem: Restraint Use - Nonviolent/Non-Self-Destructive Behavior:  Goal: Absence of restraint-related injury  Description  Absence of restraint-related injury  4/13/2019 0931 by Toribio Gipson RN  Outcome: Met This Shift

## 2019-04-13 NOTE — PLAN OF CARE
Problem: Falls - Risk of:  Goal: Will remain free from falls  Description  Will remain free from falls  4/13/2019 0933 by Franky Wallace RN  Outcome: Met This Shift     Problem: Falls - Risk of:  Goal: Absence of physical injury  Description  Absence of physical injury  Outcome: Met This Shift     Problem: Risk for Impaired Skin Integrity  Goal: Tissue integrity - skin and mucous membranes  Description  Structural intactness and normal physiological function of skin and  mucous membranes.   4/13/2019 0933 by Franky Wallace RN  Outcome: Met This Shift

## 2019-04-13 NOTE — PROGRESS NOTES
potential is Good for reaching above PT goals. Pts/ family goals   1. None stated    Patient and or family understand(s) diagnosis, prognosis, and plan of care. PLAN  PT care will be provided in accordance with the objectives noted above. Whenever appropriate, clear delegation orders will be provided for nursing staff. Exercises and functional mobility practice will be used as well as appropriate assistive devices or modalities to obtain goals. Patient and family education will also be administered as needed. Frequency of treatments will be 2-5x/week x 3-5 days.     Time in: 0840  Time out: [facility]   License number:  [id number]

## 2019-04-13 NOTE — OP NOTE
[address]                  [address] [facility],  [facility]                                OPERATIVE REPORT    PATIENT NAME: Shira ALEXANDRA                        :        1965  MED REC NO:   [id number]                            ROOM:       [facility]  ACCOUNT NO:   [de-identified]                           ADMIT DATE: 2019  PROVIDER:     Krystal Pina MD      DATE OF PROCEDURE:  2019    PREOPERATIVE DIAGNOSIS:  Right frontal subacute on chronic subdural  hematoma. POSTOPERATIVE DIAGNOSIS:  Right frontal subacute on chronic subdural  hematoma. OPERATIONS PERFORMED:  1. Right frontal adonis hole drainage of right frontal subacute on  chronic subdural hematoma. 2.  Placement of right frontal subdural drain. ANESTHESIA:  Generalized endotracheal anesthesia. SURGEON 1[facility]  [facility]:  Solitario Sorto PA-C    COMPLICATIONS:  None. ESTIMATED BLOOD LOSS:  200 mL. SPECIMEN:  None. OPERATIVE INDICATIONS:  The patient is a 52-year-old lady who was seen  in the office approximately three weeks ago after she had suffered a  traumatic head injury out of town in the [place]. She was seen for  followup. At that time, she had minimal shift and minimal right frontal  subacute subdural hematoma. She was noted to be more somnolent  yesterday and she subsequently had a CT scan of her head that showed  enlarging right frontal subdural hematoma and after risks, benefits, and  alternatives were discussed with the patients family, it was determined  that she would undergo the above-listed procedure. As the patient is  developmentally delayed, all decisions were made by her family. OPERATIVE PROCEDURE:  The patient was brought into the operating room. A timeout was performed where she was identified by her name, medical  record number, and the operative procedure which she was about to  undergo.   Next, induction of generalized endotracheal anesthesia was  then commenced. Upon completion of induction of generalized  endotracheal anesthesia, she received preoperative antibiotics. She was  then positioned on the operating table with her head in a horseshoe and  her head was then turned towards the left side. Next, her hair was  clipped and in the right frontal region, two 5 cm incisions were then  marked out, one in the anterior frontal region and the other in the  posterior frontal region. Both the incisions were marked out. They  were prepped and draped in the usual sterile fashion. A #10 blade was  then used to open up both up incisions. Monopolar cautery was used to  dissect through the five layers of the scalp down to the pericranium at  both incisions. Next self-retaining Weitlaner retractor was placed into  both incisions. I then used high-speed adonis with a perforated bit to  two drill adonis holes. After the adonis holes are drilled, I used a small  straight curette to remove the thin layer of pericranium that was left  in place. I then proceeded to coagulate the dura at both sides. I then  opened the dura in a cruciate fashion at both sites. After this was  done, there was egress of dark motor oil-appearing fluid under pressure. Once the spontaneous egress had seized, I then proceeded to then insert  a red rubber catheter into the subdural space. I irrigated the subdural  space with a liter of saline until the irrigation became clear, and  after this was done, I then inserted a subdural drain that was tunneled  out anteriorly. I then anchored it to the scalp using the 3-0 nylon  suture. I then proceeded to then close the adonis holes, cover the adonis  holes using the Kaylene Leibinger cranial fixation system. After this  was done, I then proceeded to then close both incisions in layers using  2-0 Vicryl for the galea and staples for the skin. A dry sterile  dressing was placed over both incisions.   The patient was then  subsequently extubated and transported to the postanesthesia care unit  in stable condition. There were no complications. The counts were  correct. I was present for the entire case. Please note Vadim Martínez PA-C, was the only qualified assistant. He  assisted with primary exposure and primary closure.         Miguel Capone MD    D: 04/12/2019 19:31:44       T: 04/13/2019 0:34:07     BRAD/CHRIS_CARTER_ROB  Job#: [id number]     Doc#: [id number]    CC:

## 2019-04-14 LAB
ANION GAP SERPL CALCULATED.3IONS-SCNC: 7 MMOL/L (ref 7–16)
BASOPHILS ABSOLUTE: 0.03 E9/L (ref 0–0.2)
BASOPHILS RELATIVE PERCENT: 0.7 % (ref 0–2)
BUN BLDV-MCNC: 10 MG/DL (ref 6–20)
CALCIUM IONIZED: 1.19 MMOL/L (ref 1.15–1.33)
CALCIUM SERPL-MCNC: 7.9 MG/DL (ref 8.6–10.2)
CHLORIDE BLD-SCNC: 109 MMOL/L (ref 98–107)
CO2: 29 MMOL/L (ref 22–29)
CREAT SERPL-MCNC: 0.8 MG/DL (ref 0.5–1)
EOSINOPHILS ABSOLUTE: 0.02 E9/L (ref 0.05–0.5)
EOSINOPHILS RELATIVE PERCENT: 0.5 % (ref 0–6)
GFR AFRICAN AMERICAN: >60
GFR NON-AFRICAN AMERICAN: >60 ML/MIN/1.73
GLUCOSE BLD-MCNC: 90 MG/DL (ref 74–99)
HCT VFR BLD CALC: 38.3 % (ref 34–48)
HEMOGLOBIN: 13 G/DL (ref 11.5–15.5)
IMMATURE GRANULOCYTES #: 0.01 E9/L
IMMATURE GRANULOCYTES %: 0.2 % (ref 0–5)
LYMPHOCYTES ABSOLUTE: 1.76 E9/L (ref 1.5–4)
LYMPHOCYTES RELATIVE PERCENT: 39.8 % (ref 20–42)
MAGNESIUM: 2 MG/DL (ref 1.6–2.6)
MCH RBC QN AUTO: 32 PG (ref 26–35)
MCHC RBC AUTO-ENTMCNC: 33.9 % (ref 32–34.5)
MCV RBC AUTO: 94.3 FL (ref 80–99.9)
MONOCYTES ABSOLUTE: 0.41 E9/L (ref 0.1–0.95)
MONOCYTES RELATIVE PERCENT: 9.3 % (ref 2–12)
NEUTROPHILS ABSOLUTE: 2.19 E9/L (ref 1.8–7.3)
NEUTROPHILS RELATIVE PERCENT: 49.5 % (ref 43–80)
PDW BLD-RTO: 12.9 FL (ref 11.5–15)
PHOSPHORUS: 3 MG/DL (ref 2.5–4.5)
PLATELET # BLD: 197 E9/L (ref 130–450)
PMV BLD AUTO: 9.4 FL (ref 7–12)
POTASSIUM SERPL-SCNC: 3.6 MMOL/L (ref 3.5–5)
RBC # BLD: 4.06 E12/L (ref 3.5–5.5)
SODIUM BLD-SCNC: 145 MMOL/L (ref 132–146)
WBC # BLD: 4.4 E9/L (ref 4.5–11.5)

## 2019-04-14 PROCEDURE — 97535 SELF CARE MNGMENT TRAINING: CPT

## 2019-04-14 PROCEDURE — 84100 ASSAY OF PHOSPHORUS: CPT

## 2019-04-14 PROCEDURE — 1200000000 HC SEMI PRIVATE

## 2019-04-14 PROCEDURE — 6370000000 HC RX 637 (ALT 250 FOR IP): Performed by: PHYSICIAN ASSISTANT

## 2019-04-14 PROCEDURE — 36415 COLL VENOUS BLD VENIPUNCTURE: CPT

## 2019-04-14 PROCEDURE — 83735 ASSAY OF MAGNESIUM: CPT

## 2019-04-14 PROCEDURE — 2580000003 HC RX 258: Performed by: PHYSICIAN ASSISTANT

## 2019-04-14 PROCEDURE — 82330 ASSAY OF CALCIUM: CPT

## 2019-04-14 PROCEDURE — 6360000002 HC RX W HCPCS: Performed by: NEUROLOGICAL SURGERY

## 2019-04-14 PROCEDURE — 97166 OT EVAL MOD COMPLEX 45 MIN: CPT

## 2019-04-14 PROCEDURE — 85025 COMPLETE CBC W/AUTO DIFF WBC: CPT

## 2019-04-14 PROCEDURE — 97530 THERAPEUTIC ACTIVITIES: CPT

## 2019-04-14 PROCEDURE — 6370000000 HC RX 637 (ALT 250 FOR IP)

## 2019-04-14 PROCEDURE — 99233 SBSQ HOSP IP/OBS HIGH 50: CPT | Performed by: NURSE PRACTITIONER

## 2019-04-14 PROCEDURE — 6370000000 HC RX 637 (ALT 250 FOR IP): Performed by: NURSE PRACTITIONER

## 2019-04-14 PROCEDURE — 80048 BASIC METABOLIC PNL TOTAL CA: CPT

## 2019-04-14 PROCEDURE — 6360000002 HC RX W HCPCS: Performed by: PHYSICIAN ASSISTANT

## 2019-04-14 RX ORDER — ACETAMINOPHEN 325 MG/1
TABLET ORAL
Status: COMPLETED
Start: 2019-04-14 | End: 2019-04-14

## 2019-04-14 RX ORDER — ACETAMINOPHEN 325 MG/1
650 TABLET ORAL EVERY 4 HOURS PRN
Status: DISCONTINUED | OUTPATIENT
Start: 2019-04-14 | End: 2019-04-16 | Stop reason: HOSPADM

## 2019-04-14 RX ORDER — ACETAMINOPHEN 325 MG/1
650 TABLET ORAL EVERY 4 HOURS PRN
Status: DISCONTINUED | OUTPATIENT
Start: 2019-04-14 | End: 2019-04-14

## 2019-04-14 RX ADMIN — Medication 10 ML: at 21:00

## 2019-04-14 RX ADMIN — FLUDROCORTISONE ACETATE 0.2 MG: 0.1 TABLET ORAL at 08:19

## 2019-04-14 RX ADMIN — LEVETIRACETAM 500 MG: 500 TABLET ORAL at 18:29

## 2019-04-14 RX ADMIN — CEFAZOLIN SODIUM 2 G: 2 SOLUTION INTRAVENOUS at 05:30

## 2019-04-14 RX ADMIN — LEVETIRACETAM 500 MG: 500 TABLET ORAL at 06:06

## 2019-04-14 RX ADMIN — ACETAMINOPHEN 650 MG: 325 TABLET, FILM COATED ORAL at 14:07

## 2019-04-14 RX ADMIN — DEXTROSE MONOHYDRATE 2 G: 50 INJECTION, SOLUTION INTRAVENOUS at 14:40

## 2019-04-14 RX ADMIN — LEVOTHYROXINE SODIUM 112 MCG: 112 TABLET ORAL at 06:02

## 2019-04-14 RX ADMIN — LORAZEPAM 0.5 MG: 0.5 TABLET ORAL at 22:27

## 2019-04-14 RX ADMIN — ACETAMINOPHEN 650 MG: 325 TABLET, FILM COATED ORAL at 08:11

## 2019-04-14 ASSESSMENT — PAIN SCALES - GENERAL
PAINLEVEL_OUTOF10: 0
PAINLEVEL_OUTOF10: 4
PAINLEVEL_OUTOF10: 0
PAINLEVEL_OUTOF10: 6
PAINLEVEL_OUTOF10: 0
PAINLEVEL_OUTOF10: 3

## 2019-04-14 ASSESSMENT — PAIN DESCRIPTION - LOCATION
LOCATION: KNEE
LOCATION: LEG

## 2019-04-14 ASSESSMENT — PAIN DESCRIPTION - ORIENTATION
ORIENTATION: RIGHT
ORIENTATION: RIGHT;UPPER

## 2019-04-14 ASSESSMENT — PAIN DESCRIPTION - PAIN TYPE
TYPE: ACUTE PAIN
TYPE: ACUTE PAIN

## 2019-04-14 ASSESSMENT — PAIN SCALES - WONG BAKER: WONGBAKER_NUMERICALRESPONSE: 6

## 2019-04-14 ASSESSMENT — PAIN DESCRIPTION - DESCRIPTORS
DESCRIPTORS: ACHING;DISCOMFORT;SPASM
DESCRIPTORS: ACHING;DISCOMFORT

## 2019-04-14 NOTE — PROGRESS NOTES
Occupational Therapy  OCCUPATIONAL THERAPY INITIAL EVALUATION      Date:2019  Patient Name: Vy Webb See  MRN: [id number]  : 1965  Room: [address] [place] [address]      Evaluating OT: Mohini Eduardo OTR/L     AM-PAC Daily Activity Raw Score:   Recommended Adaptive Equipment: continue to assess     Comments: Based on patient's functional performance as stated above and level of assistance needed prior to admission, this therapist believes that the patient would benefit from Continued therapy for ADL retraining, strengthening, building endurance/activity tolerance and overall functioning in order to safely return home. Diagnosis: SDH  Pt sustained a fall ~1 month ago, worsening CT and symptomatic so pt came to ED    Surgery: s/p adonis hole crani     Pertinent Medical History: frequent falls, MR down syndrome, Arthritis, OA, COLIN and TKA    Precautions:  Falls, MR     Home Living: Pt ? Historian, states she lives with her parents  in a 2 story house with ? step(s) to enter and ? rail(s); bed/bath on 1st and 2nd floor  Bathroom setup: Tub-Shower combo  Equipment owned: [employer], [employer], [employer] chair    Prior Level of Function: assist with ADLs assist with IADLs; using [employer] or [employer]? for ambulation. -homemaking   Driving: no  Occupation: pt enjoys food, dogs and the color pink     Pain Level: pt c/o sever cramping, tender to touch pain in R knee/R thigh-RN aware. Pt initially stating pain is a cramp, however with any attempts to touch or range RLE at knee pt muscle guarding and crying out in pain. NP also notified. Pt ? Historian, unclear if pain is new or from a previous fall    Cognition: A&O: person, place but confused to time; Follows 1-2 step directions. Emotionally labile throughout session. However pt cooperative. baseline MR    Memory: F-   Sequencing: F   Problem solving: F-   Judgement/safety: F     CAM-ICU: negative   RASS: 0     Functional Assessment:   Initial Eval Status  Date:  Treatment Status  Date: Short Term Goals  Treatment frequency: 1-3x/week on PRN    Feeding Min A  Assist with lids/containers; pt easily distracted and labile by pain in R thigh/knee while in chair position in bed   IND       Grooming/Hygiene Min A  To wash face and brush teeth seated EOB; assist with lids and hair care, Supervision sitting balance; pt refusing to get up to chair for task this date d/t RLE pain    SBA   while standing at sink  Mod I    seated   UB Dressing Mod A overall seated EOB   SBA  Seated EOB      LB Dressing DEP   Min A   with AE PRN   Bathing Max A     Toileting DEP   Min A  Including all clothing mgmt    Bed Mobility  Supine to sit: Max A  Sit to supine: Max A  Max cues for sequencing, assist with truck and RLE     Functional Transfers Sit to stand: NT  Stand to sit: NT  Pt refusing . d/t severe pain/cramp in R thigh/knee. Pt muscle guarding with attempts to perform PROM  Min A  From varying surfaces with G safety    Functional Mobility NT   Min A   with G endurance for household distances    Balance Sitting:      Static: Supervision    Dynamic:SBA/Supervision  Standing: NT     Endurance/Activity Tolerance F-; limited by pain    G    during 15-20 min ADL task    Visual/  Perceptual Glasses: yes; at all times; grossly [facility] vision.  ? Depth perception deficits noted during ADL tasks         UE strengthening    See UE Assessment Below  G tolerance  For BUE AROM exercises in all planes to improve overall function for ADL tasks      UE Assessment  Hand dominance: R       Strength ROM  Comments   RUE  Proximal: 4/5  Distal: 4/5 Proximal:WFL  Distal: WFL G  and F+ FMC/dexterity noted during ADL tasks   LUE Proximal: 3-/5  Distal: 4-/5 Proximal: < 3/4 full range   Distal: WFL G  and F+ FMC/dexterity noted during ADL tasks     Hearing: [facility]  Sensation:  No c/o numbness or tingling  Tone: WNL  Edema: none noted     Vitals:   BP at rest: 107/77 BP at end of session: 94/68   HR at rest: 67 HR at end of session: 67   Spo2 at rest:NT  Spo2 at end of session: 98%   Comments: pt on RA                            Comments/Treatment Narrative:  OK from RN to see pt. Upon arrival pt supine in bed, c/o cramping/tender to touch pain in RLE. RN aware. Pain limiting pt throughout session. Causing pt to be emotionally labile, Pt not due for meds. After Bed mobility, pt refusing to get OOB, but agreeable to sit EOB for ADL tasks and dyn sitting exercises. pt with f- tolerance EOB ~5mins, then returned to bed in chair position for self feeding task, pt left with all devices within reach, all lines and tubes intact. Pt required cues and education as noted above for safe facilitation and completion of tasks. Pt properly positioned using pillows and bed mechanics to improve interaction with environment, overall functioning and decrease/prevent edema and contractures. During functional activites and ADLs pt educated on proper hand placement, safety technique, sequencing and pain mgmt techniques. Pt  additionally educated on OT POC, OT role, OT d/c plan, Importance of completing ADL tasks daily as IND as possible to aide in recovery process, fall risk precautions, ROM exercises. Therapist provided skilled monitoring of HR, O2 saturation, blood pressure and patients response during treatment session. Prior to and at the end of session, environmental modifications/line management completed for patients safety and efficiency of treatment session. Eval Complexity:   mod Complexity  · History: Expanded review of medical records and additional review of physical, cognitive, or psychosocial history related to current functional performance  · Exam: 3+ performance deficits  · Assistance/Modification: Min/mod assistance or modifications required to perform tasks. May have comorbidities that affect occupational performance.     Assessment of current deficits:   Functional mobility [x]  ADLs [x] Strength [x]  Cognition [x]  Functional transfers  [x] IADLs [x] Safety Awareness []  Endurance [x]  Fine Motor Coordination [] Balance [x] Vision/perception [] Sensation []   Gross Motor Coordination [] ROM [x] Delirium []                  Motor Control []    Plan of Care:  ADL retraining [x]   Equipment needs [x]   Neuromuscular re-education [] Energy Conservation Techniques [x]  Functional Transfer training [x] Patient and/or Family Education [x]  Functional Mobility training [x]  Environmental Modifications [x]  Cognitive re-training []   Compensatory techniques for ADLs [x]  Splinting Needs []   Positioning to improve overall function [x]   Therapeutic Activity [x]                       Therapeutic Exercise  [x]  Visual/Perceptual: []    Delirium prevention/treatment  [x]   Other:  []    Rehab Potential: Good for established goals    Patient / Family Goal: to decrease pain and go to the bathroom out of the bed    Patient and/or family were instructed/educated on diagnosis, prognosis/goals and plan of care. Demonstrated f- understanding, further information  needed. [] Malnutrition indicators have been identified and nursing has been notified to ensure a dietitian consult is ordered.     mod Evaluation + 40 timed treatment minutes  Treatment Time in: 0840  Treatment Time out: [address], OTR/L   5972

## 2019-04-14 NOTE — PROGRESS NOTES
Neuro Science Intensive Care Unit  Critical Care  Daily Progress Note 4/14/2019    Date of Admission: 4/11/19  CC: [address]  4/11/19 Presented to ED for AMS. Has recent hx of fall resulting in SDH. CT head revealed increased right SDH with shift approx 9mm. Admitted to NSICU  4/12/19 Underwent Tremaine hole craniotomy with insertion of drain. 4/13/19 Drain removed by neurosurgery. Transfer held due to increased lethargy. OVERNIGHT EVENTS: T max 97.9F. Did not require additional doses of antihypertensive agents in the previous 24 hours. Did not require insulin in the previous 24 hours. Complaining of right thigh pain. PHYSICAL EXAM:    /74   Pulse 65   Temp 98.1 °F (36.7 °C)   Resp 11   Wt 134 lb 9.6 oz (61.1 kg)   SpO2 96%   BMI 26.29 kg/m²     Intake/Output Summary (Last 24 hours) at 4/14/2019 0750  Last data filed at 4/14/2019 0500  Gross per 24 hour   Intake 1439 ml   Output 145 ml   Net 1294 ml       General appearance:  Comfortable. NEUROLOGIC:    GCS:    4 - Opens eyes on own   6 - Follows simple motor commands  4 - Seems confused, disoriented      Pupil size:  Left 3 mm  Right 3 mm  Pupil reaction: Yes   PERRLA  Wiggles fingers: Left Yes Right Yes  Hand grasp:   Left normal     Right normal  Wiggles toes: Left Yes    Right Yes  Plantar flexion: Left normal    Right normal    CONSTITUTIONAL: No acute distress  CARDIOVASCULAR: S1 S2, regular rate, regular rhythm,Monitor: SR  PULMONARY:  Respirations unlabored. No rhonchi/rales/wheezes. RENAL:  Patten to gravity, clear yellow urine. ABDOMEN: Soft, nontender, nondistended, nontympanic, normal bowel sounds. MUSCULOSKELETAL: CATES  SKIN/EXTREMITIES: No rashes/ecchymosis, no edema/clubbing, warm/dry, good capillary refill. IV ACCESS:   PIV      ASSESSMENT/PLAN:     Active Problems:    SDH (subdural hematoma) (HCC)    Subdural hematoma (HCC)  Resolved Problems:    * No resolved hospital problems.  *          Neuro:

## 2019-04-14 NOTE — PROGRESS NOTES
Department of Neurosurgery  Progress Note    CHIEF COMPLAINT: pt s/p adonis hole craniotomy    SUBJECTIVE:  Denies headache. C/o right knee pain, known DJD. No other concerns from pt or her parents    REVIEW OF SYSTEMS :  Constitutional: Negative for chills and fever. Neurological: Negative for dizziness, tremors and speech change. OBJECTIVE:   VITALS:  /71   Pulse 69   Temp 97.9 °F (36.6 °C) (Temporal)   Resp 18   Wt 134 lb 9.6 oz (61.1 kg)   SpO2 96%   BMI 26.29 kg/m²   PHYSICAL:  CONSTITUTIONAL:  awake, alert, cooperative, no apparent distress, and appears stated age. CATES. FC. Face symmetric, speech clear.   Op site benign    DATA:  CBC:   Lab Results   Component Value Date    WBC 4.4 04/14/2019    RBC 4.06 04/14/2019    HGB 13.0 04/14/2019    HCT 38.3 04/14/2019    MCV 94.3 04/14/2019    MCH 32.0 04/14/2019    MCHC 33.9 04/14/2019    RDW 12.9 04/14/2019     04/14/2019    MPV 9.4 04/14/2019     BMP:    Lab Results   Component Value Date     04/14/2019    K 3.6 04/14/2019     04/14/2019    CO2 29 04/14/2019    BUN 10 04/14/2019    LABALBU 3.7 04/11/2019    LABALBU 2.8 05/13/2012    CREATININE 0.8 04/14/2019    CALCIUM 7.9 04/14/2019    GFRAA >60 04/14/2019    LABGLOM >60 04/14/2019    GLUCOSE 90 04/14/2019    GLUCOSE 96 05/15/2012     PT/INR:    Lab Results   Component Value Date    PROTIME 12.8 04/11/2019    PROTIME 11.0 05/12/2012    INR 1.1 04/11/2019     PTT:    Lab Results   Component Value Date    APTT 32.3 04/11/2019   [APTT}    Current Inpatient Medications  Current Facility-Administered Medications: acetaminophen (TYLENOL) tablet 650 mg, 650 mg, Oral, Q4H PRN  levETIRAcetam (KEPPRA) tablet 500 mg, 500 mg, Oral, BID  ondansetron (ZOFRAN) injection 4 mg, 4 mg, Intravenous, Q6H PRN  levothyroxine (SYNTHROID) tablet 112 mcg, 112 mcg, Oral, Daily  LORazepam (ATIVAN) tablet 0.5 mg, 0.5 mg, Oral, Q8H PRN  sodium chloride flush 0.9 % injection 10 mL, 10 mL, Intravenous, 2

## 2019-04-14 NOTE — PROGRESS NOTES
Hospitalist Progress Note      PCP: Valeria Perkins DO    Date of Admission: 4/11/2019  Days in the hospital: 3    Chief Complaint: [address] Course:     Admitted for AMS secondary to expanding subdural hematoma. Neurosurgery was on board and craniotomy was performed. Subjective  Patient seen and examined at bedside. s/p craniotomy, No overnight events. Transfer held yesterday due to AMS      Medications:  Reviewed    Infusion Medications    sodium chloride 100 mL/hr at 04/12/19 2028     Scheduled Medications    levETIRAcetam  500 mg Oral BID    levothyroxine  112 mcg Oral Daily    sodium chloride flush  10 mL Intravenous 2 times per day    ceFAZolin (ANCEF) IVPB  2 g Intravenous Q8H    fludrocortisone  0.2 mg Oral Daily     PRN Meds: ondansetron, LORazepam, sodium chloride flush, magnesium hydroxide, hydrALAZINE      Intake/Output Summary (Last 24 hours) at 4/14/2019 0733  Last data filed at 4/14/2019 0500  Gross per 24 hour   Intake 1439 ml   Output 145 ml   Net 1294 ml       Exam:    /74   Pulse 65   Temp 98.1 °F (36.7 °C)   Resp 11   Wt 134 lb 9.6 oz (61.1 kg)   SpO2 96%   BMI 26.29 kg/m²     General appearance: No apparent distress, appears stated age and cooperative. HEENT: Normal cephalic, atraumatic without obvious deformity. Pupils equal, round, and reactive to light. Extra ocular muscles intact. Conjunctivae/corneas clear. Neck: Supple, with full range of motion. No jugular venous distention. Trachea midline. Respiratory:  Normal respiratory effort. Clear to auscultation, bilaterally without Rales/Wheezes/Rhonchi. Cardiovascular: Regular rate and rhythm with normal S1/S2 without murmurs, rubs or gallops. Brisk capillary refill. 2+ lower extremity pulses (dorsalis pedis). Abdomen: Soft, non-tender, non-distended with normal bowel sounds. Musculoskeletal: No clubbing, cyanosis or edema bilaterally. Full range of motion without deformity.  Brisk capillary refill. 2+ lower extremity pulses (dorsalis pedis). Skin: Normal skin color. No rashes or lesions. Neurologic:  AAOX1, follows commands          Labs:   Recent Labs     04/12/19  0507 04/13/19  0505 04/14/19  0430   WBC 5.1 6.0 4.4*   HGB 14.1 13.2 13.0   HCT 42.0 39.0 38.3    228 197     Recent Labs     04/12/19  0507 04/13/19  0505 04/14/19  0430    143 145   K 3.8 3.7 3.6    110* 109*   CO2 25 21* 29   BUN 11 10 10   CREATININE 0.8 0.7 0.8   CALCIUM 8.9 8.1* 7.9*   PHOS  --  2.2* 3.0     Recent Labs     04/11/19  2320   AST 31   ALT 31   BILITOT 0.5   ALKPHOS 129*     Recent Labs     04/11/19  2320   INR 1.1     No results for input(s): CKTOTAL, TROPONINI in the last 72 hours. Imaging:  CT HEAD WO CONTRAST   Final Result   Removal right-sided subdural drain. The amount of air in the right   subdural space is less but the overall size of the subdural collection   is unchanged. The amount of varied densities with subacute and acute   components are unchanged. The amount of mass effect on the right lateral ventricle and midline   shift to the left is unchanged. There is a small amount of air in the left subdural space which is   mildly more prominent then on the prior study         CT HEAD WO CONTRAST   Final Result      Postoperative change with reduction subdural collection although   significant hematoma remains with mass effect; significant reduction   in amount of mass effect since yesterday's exam      No new abnormality         CT head without contrast   Final Result   Large right-sided subdural hemorrhage with mixed densities. The   overall size is not changed since prior study. There is significant   mass effect on the right lateral ventricle and midline shift to the   left.       Along the more caudal aspect of the subdural there are areas of   increased attenuation which are mildly more prominent than the prior   study suggestive of acute subdural hemorrhagic component Assessment/Plan:  Active Hospital Problems    Diagnosis Date Noted    Subdural hematoma ([facility] [Q60.8U7X]     SDH (subdural hematoma) ([facility] [S06.5X9A] 04/11/2019     Acute encephalopathy secondary to expanding subdural hematoma - head CT noted, S/p craniotomy, Neurosurgery on board,  monitor vitals. Keppra. As per neurosurgery - no anticoagulants      Hypothyroidism - resume home meds     Down syndrome    PT/OT/SLP        · DVT Prophylaxis  ? SCD  · Diet  ? Diet NPO Effective Now  · Code Status  ? Full Code  · PT/OT Eval Status  ? eval and treat  · Disposition  ? NSICU           Anne Be MD  [employer]  Please contact me through perfect serve    NOTE: This report was transcribed using voice recognition software. Every effort was made to ensure accuracy; however, inadvertent computerized transcription errors may be present.

## 2019-04-15 ENCOUNTER — APPOINTMENT (OUTPATIENT)
Dept: GENERAL RADIOLOGY | Age: 54
DRG: 026 | End: 2019-04-15
Payer: MEDICARE

## 2019-04-15 LAB
ANION GAP SERPL CALCULATED.3IONS-SCNC: 10 MMOL/L (ref 7–16)
ANION GAP SERPL CALCULATED.3IONS-SCNC: 6 MMOL/L (ref 7–16)
BASOPHILS ABSOLUTE: 0.03 E9/L (ref 0–0.2)
BASOPHILS ABSOLUTE: 0.03 E9/L (ref 0–0.2)
BASOPHILS RELATIVE PERCENT: 0.4 % (ref 0–2)
BASOPHILS RELATIVE PERCENT: 0.6 % (ref 0–2)
BUN BLDV-MCNC: 5 MG/DL (ref 6–20)
BUN BLDV-MCNC: 5 MG/DL (ref 6–20)
CALCIUM SERPL-MCNC: 7.5 MG/DL (ref 8.6–10.2)
CALCIUM SERPL-MCNC: 7.8 MG/DL (ref 8.6–10.2)
CHLORIDE BLD-SCNC: 105 MMOL/L (ref 98–107)
CHLORIDE BLD-SCNC: 109 MMOL/L (ref 98–107)
CO2: 24 MMOL/L (ref 22–29)
CO2: 27 MMOL/L (ref 22–29)
CREAT SERPL-MCNC: 0.5 MG/DL (ref 0.5–1)
CREAT SERPL-MCNC: 0.6 MG/DL (ref 0.5–1)
EOSINOPHILS ABSOLUTE: 0.03 E9/L (ref 0.05–0.5)
EOSINOPHILS ABSOLUTE: 0.05 E9/L (ref 0.05–0.5)
EOSINOPHILS RELATIVE PERCENT: 0.6 % (ref 0–6)
EOSINOPHILS RELATIVE PERCENT: 0.7 % (ref 0–6)
GFR AFRICAN AMERICAN: >60
GFR AFRICAN AMERICAN: >60
GFR NON-AFRICAN AMERICAN: >60 ML/MIN/1.73
GFR NON-AFRICAN AMERICAN: >60 ML/MIN/1.73
GLUCOSE BLD-MCNC: 117 MG/DL (ref 74–99)
GLUCOSE BLD-MCNC: 125 MG/DL (ref 74–99)
HCT VFR BLD CALC: 38.1 % (ref 34–48)
HCT VFR BLD CALC: 38.8 % (ref 34–48)
HEMOGLOBIN: 13.2 G/DL (ref 11.5–15.5)
HEMOGLOBIN: 13.3 G/DL (ref 11.5–15.5)
IMMATURE GRANULOCYTES #: 0.03 E9/L
IMMATURE GRANULOCYTES #: 0.03 E9/L
IMMATURE GRANULOCYTES %: 0.4 % (ref 0–5)
IMMATURE GRANULOCYTES %: 0.6 % (ref 0–5)
LYMPHOCYTES ABSOLUTE: 0.95 E9/L (ref 1.5–4)
LYMPHOCYTES ABSOLUTE: 1.18 E9/L (ref 1.5–4)
LYMPHOCYTES RELATIVE PERCENT: 17.6 % (ref 20–42)
LYMPHOCYTES RELATIVE PERCENT: 18 % (ref 20–42)
MAGNESIUM: 1.8 MG/DL (ref 1.6–2.6)
MAGNESIUM: 2.1 MG/DL (ref 1.6–2.6)
MCH RBC QN AUTO: 31.7 PG (ref 26–35)
MCH RBC QN AUTO: 32.1 PG (ref 26–35)
MCHC RBC AUTO-ENTMCNC: 34.3 % (ref 32–34.5)
MCHC RBC AUTO-ENTMCNC: 34.6 % (ref 32–34.5)
MCV RBC AUTO: 92.4 FL (ref 80–99.9)
MCV RBC AUTO: 92.7 FL (ref 80–99.9)
MONOCYTES ABSOLUTE: 0.53 E9/L (ref 0.1–0.95)
MONOCYTES ABSOLUTE: 0.6 E9/L (ref 0.1–0.95)
MONOCYTES RELATIVE PERCENT: 10 % (ref 2–12)
MONOCYTES RELATIVE PERCENT: 9 % (ref 2–12)
NEUTROPHILS ABSOLUTE: 3.72 E9/L (ref 1.8–7.3)
NEUTROPHILS ABSOLUTE: 4.8 E9/L (ref 1.8–7.3)
NEUTROPHILS RELATIVE PERCENT: 70.2 % (ref 43–80)
NEUTROPHILS RELATIVE PERCENT: 71.9 % (ref 43–80)
PDW BLD-RTO: 12.8 FL (ref 11.5–15)
PDW BLD-RTO: 13 FL (ref 11.5–15)
PHOSPHORUS: 1.7 MG/DL (ref 2.5–4.5)
PLATELET # BLD: 189 E9/L (ref 130–450)
PLATELET # BLD: 203 E9/L (ref 130–450)
PMV BLD AUTO: 9.6 FL (ref 7–12)
PMV BLD AUTO: 9.7 FL (ref 7–12)
POTASSIUM SERPL-SCNC: 2.8 MMOL/L (ref 3.5–5)
POTASSIUM SERPL-SCNC: 3.5 MMOL/L (ref 3.5–5)
PROCALCITONIN: 0.09 NG/ML (ref 0–0.08)
RBC # BLD: 4.11 E12/L (ref 3.5–5.5)
RBC # BLD: 4.2 E12/L (ref 3.5–5.5)
SODIUM BLD-SCNC: 138 MMOL/L (ref 132–146)
SODIUM BLD-SCNC: 143 MMOL/L (ref 132–146)
WBC # BLD: 5.3 E9/L (ref 4.5–11.5)
WBC # BLD: 6.7 E9/L (ref 4.5–11.5)

## 2019-04-15 PROCEDURE — 6370000000 HC RX 637 (ALT 250 FOR IP): Performed by: NURSE PRACTITIONER

## 2019-04-15 PROCEDURE — 80048 BASIC METABOLIC PNL TOTAL CA: CPT

## 2019-04-15 PROCEDURE — 84145 PROCALCITONIN (PCT): CPT

## 2019-04-15 PROCEDURE — 87040 BLOOD CULTURE FOR BACTERIA: CPT

## 2019-04-15 PROCEDURE — 2500000003 HC RX 250 WO HCPCS: Performed by: NURSE PRACTITIONER

## 2019-04-15 PROCEDURE — 83735 ASSAY OF MAGNESIUM: CPT

## 2019-04-15 PROCEDURE — 6360000002 HC RX W HCPCS: Performed by: NURSE PRACTITIONER

## 2019-04-15 PROCEDURE — 97164 PT RE-EVAL EST PLAN CARE: CPT

## 2019-04-15 PROCEDURE — 97530 THERAPEUTIC ACTIVITIES: CPT

## 2019-04-15 PROCEDURE — 2580000003 HC RX 258: Performed by: FAMILY MEDICINE

## 2019-04-15 PROCEDURE — 2580000003 HC RX 258: Performed by: PHYSICIAN ASSISTANT

## 2019-04-15 PROCEDURE — 84100 ASSAY OF PHOSPHORUS: CPT

## 2019-04-15 PROCEDURE — 36415 COLL VENOUS BLD VENIPUNCTURE: CPT

## 2019-04-15 PROCEDURE — 71045 X-RAY EXAM CHEST 1 VIEW: CPT

## 2019-04-15 PROCEDURE — 99024 POSTOP FOLLOW-UP VISIT: CPT | Performed by: PHYSICIAN ASSISTANT

## 2019-04-15 PROCEDURE — 85025 COMPLETE CBC W/AUTO DIFF WBC: CPT

## 2019-04-15 PROCEDURE — 2580000003 HC RX 258: Performed by: NURSE PRACTITIONER

## 2019-04-15 PROCEDURE — 97535 SELF CARE MNGMENT TRAINING: CPT

## 2019-04-15 PROCEDURE — 1200000000 HC SEMI PRIVATE

## 2019-04-15 RX ORDER — MAGNESIUM SULFATE IN WATER 40 MG/ML
2 INJECTION, SOLUTION INTRAVENOUS ONCE
Status: COMPLETED | OUTPATIENT
Start: 2019-04-15 | End: 2019-04-15

## 2019-04-15 RX ORDER — 0.9 % SODIUM CHLORIDE 0.9 %
500 INTRAVENOUS SOLUTION INTRAVENOUS ONCE
Status: COMPLETED | OUTPATIENT
Start: 2019-04-15 | End: 2019-04-15

## 2019-04-15 RX ORDER — LEVETIRACETAM 500 MG/1
500 TABLET ORAL 2 TIMES DAILY
Status: DISCONTINUED | OUTPATIENT
Start: 2019-04-15 | End: 2019-04-15 | Stop reason: SDUPTHER

## 2019-04-15 RX ADMIN — ACETAMINOPHEN 650 MG: 325 TABLET, FILM COATED ORAL at 20:09

## 2019-04-15 RX ADMIN — ACETAMINOPHEN 650 MG: 325 TABLET, FILM COATED ORAL at 09:55

## 2019-04-15 RX ADMIN — ACETAMINOPHEN 650 MG: 325 TABLET, FILM COATED ORAL at 04:09

## 2019-04-15 RX ADMIN — SODIUM CHLORIDE 250 ML: 9 INJECTION, SOLUTION INTRAVENOUS at 20:27

## 2019-04-15 RX ADMIN — LEVETIRACETAM 500 MG: 500 TABLET ORAL at 17:53

## 2019-04-15 RX ADMIN — MAGNESIUM SULFATE HEPTAHYDRATE 2 G: 40 INJECTION, SOLUTION INTRAVENOUS at 08:46

## 2019-04-15 RX ADMIN — POTASSIUM PHOSPHATE, MONOBASIC AND POTASSIUM PHOSPHATE, DIBASIC 30 MMOL: 224; 236 INJECTION, SOLUTION, CONCENTRATE INTRAVENOUS at 08:41

## 2019-04-15 RX ADMIN — SODIUM CHLORIDE: 9 INJECTION, SOLUTION INTRAVENOUS at 00:10

## 2019-04-15 RX ADMIN — FLUDROCORTISONE ACETATE 0.2 MG: 0.1 TABLET ORAL at 08:44

## 2019-04-15 ASSESSMENT — PAIN SCALES - GENERAL
PAINLEVEL_OUTOF10: 0
PAINLEVEL_OUTOF10: 10
PAINLEVEL_OUTOF10: 0
PAINLEVEL_OUTOF10: 0
PAINLEVEL_OUTOF10: 3
PAINLEVEL_OUTOF10: 0

## 2019-04-15 ASSESSMENT — PAIN DESCRIPTION - DESCRIPTORS: DESCRIPTORS: ACHING;DISCOMFORT

## 2019-04-15 ASSESSMENT — PAIN DESCRIPTION - LOCATION: LOCATION: KNEE

## 2019-04-15 ASSESSMENT — PAIN DESCRIPTION - ORIENTATION: ORIENTATION: RIGHT

## 2019-04-15 ASSESSMENT — PAIN DESCRIPTION - PAIN TYPE: TYPE: ACUTE PAIN

## 2019-04-15 NOTE — CONSULTS
have some  GI treated as an infant. ORTHOPEDIC:  Negative. NEUROLOGIC:  Positive  as above. PHYSICAL EXAMINATION:  GENERAL:  A well-nourished, well-developed, white female. HEENT:  Head:  There does appear to be slightly small cranium and there  is the craniotomy present. Eyes:  Pupils equal, round, reactive to  light. Pharynx normal.  LUNGS:  Clear to P and A. HEART:  Regular rate and rhythm. S1, S2 normal.  No murmurs or gallops. ABDOMEN:  Bowel sounds normal.  Soft, nontender. No masses or  organomegaly. EXTREMITIES:  Without clubbing, cyanosis or edema. There is a  contracture of the right knee. MENTAL STATUS:  Alert. The patient does have some higher level  cognitive deficits, but is basically oriented. Sensation is intact. NEUROMUSCULAR:  4/5 except for the right leg which she again appears to  be having some difficulty from an orthopedic standpoint. PROBLEM LIST:  1. Right-sided subdural hematoma. 2.  Status post bur hole craniotomy. 3.  Right knee contracture. 4.  Developmental delay. RECOMMENDATIONS:  I think the patient is going to require acute rehab. I will have to look a little closer at the right knee and work on  getting that functioning. We may have to recheck some films, but the  biggest issue has been the craniotomy, and she seems to be recovering  pretty well from that. We will address any pre-cert issues, but this  clearly is worsening from the time of her previous discharge from rehab  in [place].         Sneha Diop MD    D: 04/15/2019 13:09:25       T: 04/15/2019 13:11:23     [id number]  Job#: [id number]     Doc#: [id number]    CC:

## 2019-04-15 NOTE — PLAN OF CARE
Problem: Falls - Risk of:  Goal: Will remain free from falls  Description  Will remain free from falls  4/15/2019 0234 by Brandt Pope RN  Outcome: Met This Shift  4/14/2019 1908 by Sahil Rodriguez RN  Outcome: Met This Shift     Problem: Falls - Risk of:  Goal: Absence of physical injury  Description  Absence of physical injury  4/14/2019 1908 by Sahil Rodriguez RN  Outcome: Met This Shift     Problem: Risk for Impaired Skin Integrity  Goal: Tissue integrity - skin and mucous membranes  Description  Structural intactness and normal physiological function of skin and  mucous membranes.   4/15/2019 0234 by Brandt Pope RN  Outcome: Met This Shift  4/14/2019 1908 by Sahil Rodriguez RN  Outcome: Met This Shift

## 2019-04-15 NOTE — PROGRESS NOTES
Hospitalist Progress Note      PCP: Tita Stevenson DO    Date of Admission: 4/11/2019  Days in the hospital: 4    Chief Complaint: [address] Course:     Admitted for AMS secondary to expanding subdural hematoma. Neurosurgery was on board and craniotomy was performed. No anticoagulants a per neurosurgery and on keppra. Repeat head CT in 4 weeks. Subjective  Patient seen and examined at bedside. s/p craniotomy, No overnight events. No complaints. Medications:  Reviewed    Infusion Medications    sodium chloride 100 mL/hr at 04/15/19 0010     Scheduled Medications    potassium phosphate IVPB  30 mmol Intravenous Once    magnesium sulfate  2 g Intravenous Once    levETIRAcetam  500 mg Oral BID    levothyroxine  112 mcg Oral Daily    sodium chloride flush  10 mL Intravenous 2 times per day    fludrocortisone  0.2 mg Oral Daily     PRN Meds: acetaminophen, ondansetron, LORazepam, sodium chloride flush, magnesium hydroxide, hydrALAZINE      Intake/Output Summary (Last 24 hours) at 4/15/2019 0711  Last data filed at 4/15/2019 0600  Gross per 24 hour   Intake 1571 ml   Output 750 ml   Net 821 ml       Exam:    BP (!) 130/90   Pulse 65   Temp 98.1 °F (36.7 °C)   Resp 17   Wt 134 lb 9.6 oz (61.1 kg)   SpO2 99%   BMI 26.29 kg/m²     General appearance: No apparent distress, appears stated age and cooperative. HEENT: Normal cephalic, atraumatic without obvious deformity. Pupils equal, round, and reactive to light. Extra ocular muscles intact. Conjunctivae/corneas clear. Neck: Supple, with full range of motion. No jugular venous distention. Trachea midline. Respiratory:  Normal respiratory effort. Clear to auscultation, bilaterally without Rales/Wheezes/Rhonchi. Cardiovascular: Regular rate and rhythm with normal S1/S2 without murmurs, rubs or gallops. Brisk capillary refill. 2+ lower extremity pulses (dorsalis pedis).    Abdomen: Soft, non-tender, non-distended with normal bowel sounds. Musculoskeletal: No clubbing, cyanosis or edema bilaterally. Full range of motion without deformity. Brisk capillary refill. 2+ lower extremity pulses (dorsalis pedis). Skin: Normal skin color. No rashes or lesions. Neurologic:  Awake, follows commands          Labs:   Recent Labs     04/13/19  0505 04/14/19  0430 04/15/19  0500   WBC 6.0 4.4* 5.3   HGB 13.2 13.0 13.2   HCT 39.0 38.3 38.1    197 189     Recent Labs     04/13/19  0505 04/14/19  0430 04/15/19  0500    145 143   K 3.7 3.6 2.8*   * 109* 109*   CO2 21* 29 24   BUN 10 10 5*   CREATININE 0.7 0.8 0.5   CALCIUM 8.1* 7.9* 7.5*   PHOS 2.2* 3.0 1.7*     No results for input(s): AST, ALT, BILIDIR, BILITOT, ALKPHOS in the last 72 hours. No results for input(s): INR in the last 72 hours. No results for input(s): Kaleb Aver in the last 72 hours. Imaging:  CT HEAD WO CONTRAST   Final Result   Removal right-sided subdural drain. The amount of air in the right   subdural space is less but the overall size of the subdural collection   is unchanged. The amount of varied densities with subacute and acute   components are unchanged. The amount of mass effect on the right lateral ventricle and midline   shift to the left is unchanged. There is a small amount of air in the left subdural space which is   mildly more prominent then on the prior study         CT HEAD WO CONTRAST   Final Result      Postoperative change with reduction subdural collection although   significant hematoma remains with mass effect; significant reduction   in amount of mass effect since yesterday's exam      No new abnormality         CT head without contrast   Final Result   Large right-sided subdural hemorrhage with mixed densities. The   overall size is not changed since prior study. There is significant   mass effect on the right lateral ventricle and midline shift to the   left.       Along the more caudal aspect of the subdural there are areas of   increased attenuation which are mildly more prominent than the prior   study suggestive of acute subdural hemorrhagic component               Assessment/Plan:  Active Hospital Problems    Diagnosis Date Noted    Subdural hematoma ([facility] [address] [O46.0P1Z]     SDH (subdural hematoma) ([facility] [address] [S06.5X9A] 04/11/2019     Acute encephalopathy secondary to expanding subdural hematoma - head CT noted, S/p craniotomy, Neurosurgery on board,  monitor vitals. Keppra. As per neurosurgery - no anticoagulants   To have repeat head ct in 4 weeks     Hypothyroidism - resume home meds     Down syndrome    PT/OT/SLP        · DVT Prophylaxis  ? SCD  · Diet  ? Diet NPO Effective Now  · Code Status  ? Full Code  · PT/OT Eval Status  ? eval and treat  · Disposition  ? NSICU- ok for general floor            Radha Crawley MD  [employer]  Please contact me through perfect serve    NOTE: This report was transcribed using voice recognition software. Every effort was made to ensure accuracy; however, inadvertent computerized transcription errors may be present.

## 2019-04-15 NOTE — CARE COORDINATION
Met with pt and mom in room. Per Mom, pt's initial injury occurred with a fall while they were on their way to [place]. Pt ended up in a hospital and subsequently a rehab facility in [facility]. When she returned home , pt was active with [facility] for skilled nursing and was receiving therapy through [facility]. There are 14 steps in the home. Mom is requesting pt go to rehab prior to returning home. She is not interested in MAR. She prefers AR as pt has tolerated AR in the past . Offered list of choices, but mom declines. She states pt has been at [facility] , but prefers [facility] since it is closer to home. Will need PM & R order. Pt works at the [employer] and is planning to return there when able.

## 2019-04-15 NOTE — PROGRESS NOTES
activity. Memory: F-              Sequencing: F-              Problem solving: P              Judgement/safety: P+                CAM-ICU: NT  RASS: 0                Functional Assessment:    Initial Eval Status  Date: 4/14 Treatment Status  Date:4/15 Short Term Goals  Treatment frequency: 1-3x/week on PRN    Feeding Min A  Assist with lids/containers; pt easily distracted and labile by pain in R thigh/knee while in chair position in bed  S; set up   Pt declines sitting up in chair to eat meal due to fear of pain R knee. IND          Grooming/Hygiene Min A  To wash face and brush teeth seated EOB; assist with lids and hair care, Supervision sitting balance; pt refusing to get up to chair for task this date d/t RLE pain   Min A  After set up seated up in chair to wash face and brush teeth; decreased awareness of toothpaste not on brush. Pt declines assist.   SBA   while standing at sink  Mod I    seated   UB Dressing Mod A overall seated EOB NT  SBA  Seated EOB      LB Dressing DEP  NT due to increased pain R LE  Min A   with AE PRN   Bathing Max A  NT     Toileting DEP DEP  Min A  Including all clothing mgmt    Bed Mobility  Supine to sit: Max A  Sit to supine: Max A  Max cues for sequencing, assist with truck and RLE Supine to sit: Dep+2  Scoot: Dep  *increased assist due to poor participation- limited by knee pain. Pt tearful throughout session. Mother reports pt will be tearful but wants therapy to attempt to push through. Mother present prior to session to enocurage OOB activity.       Functional Transfers Sit to stand: NT  Stand to sit: NT  Pt refusing . d/t severe pain/cramp in R thigh/knee. Pt muscle guarding with attempts to perform PROM Sit<>stand: Max A+2 [employer] level with max cues for hand placement, posture, and WB. Pt requires encouragement to rest R toes on floor. Performs transfers maintaining  NWB due to fear of pain.  Min A  From varying surfaces with G safety    Functional Mobility NT  Mod guarding R LE; encouraged to WB as able with poor follow through. Pt assisted to bedside chair to improve activity tolerance and to perform grooming tasks. Pt required mod cues for problem solving. Demo overall fair tolerance with encouragement. Mother reports pt was very independent prior to admission and would like for pt to go to acute rehab for therapy prior to discharge home. At end of session pt left seated up in chair;  all lines and tubes intact, call light within reach. Mother present. · Pt has made F progress towards set goals.    · Continue with current plan of care    Time in: 1010  Time out:1050  Total Tx Time: [address], OTR/L 6970

## 2019-04-15 NOTE — PROGRESS NOTES
Acute Rehab Pre-Admission Screen      Referral date: 4/15/19   1027  Onset/Hospital Admit Date: 4/11/2019 10:51 PM  Current Location: [address] [place] [address]-A  Admitting Diagnosis: SDH   Surgery /  Date:  4/12/19 - right craniotomy adonis holes drainage of right frontal subacute on chronic SDH  Name: Tracy Agudelo See  YOB: 1965  Age: 49 y.o. Address: [address] Phone: [phone] (home)  [address]:     Sex: female  Race:   Marital Status: single   Ethnic/Cultural/Presybeterian Considerations: Taoism    Advanced Directives: [x] Full Code  [] [address] [] Medications only       [] Living Will  [] DPOA      []Organ donor      [] No mechanical breathing or ventilation     [] no tube feeding, nutrition or hydration      [x] Patient does not have advanced directives or living will     Copies in Chart: n/a    COVERAGE INFORMATION   Primary Insurer: medicare A&B    Secondary Insurer: medicaid    Medicare #: [id number]  Medicaid #: [id number]  Verified coverage: [] Patient  [] Family/caregiver    [x] financial department [] insurance carrier    PHYSICIAN / REFERRAL INFORMATION    Referring Physician: Rocio MCFADEDN  Attending Physician: Lashon Conner DO  Primary Care Physician: Anuj Jin DO  Consultants/Opinions (see full consult notes on chart): Ryan Umaña ( neurosurgery) - has a 2-cm right chronic subdural hematoma with altered mental status. SOCIAL INFORMATION    Primary  Contact: Ino Chua See  Relationship: parents  Primary Phone: [phone]  Secondary  Contact: Renetta Escobar  Relationship: brother  Primary Phone: [phone]    Previous Community Services:  Therapy at home via [address] available: [x] Yes [] No Hours per day available: as needed  Patient previously employed:  [x] Yes [] Part Time [] Full Time [] No [] Retired  Occupation/Profession: [address]  Prior living arrangements: [x] Home  [] Assisted living  [] SNF [] Other  Lived with:  [] Alone  [] Spouse  [x] Family  [] Other  Lived with: parents  Contact phone: [phone]  Home:  2 story home  3 entry steps  Rails: 1  Steps:  14 to 2nd floor  Rails:  1   Bedroom: [] 1st floor  [x] 2nd floor    Bathroom:  [] 1st floor  [x] 2nd floor    Prior Functional Level: Independent for: ADL's  Assistance for: mobility  Dependent for: driving  Dominant hand: [x] Right  [] Left    Previous Home Equipment:  [x] Janus Green [] Grab bars [] Orthotic / prosthetic   [] Shower chair [x] Tub bench  [x] 3-in-1 Commode [] Long handle sponge   [] Oxygen [] Sock aide  [] Wheelchair  [] motorized wc/scooter  [] Wheelchair cushion   [] Crutches [] Long handle shoehorn  [] Reachers [] Toilet seat elevator  [x] rollator   [] Walker(standard) [] Mechanical lift    [] None of the above    MEDICAL UPDATE:  History of present admission: Patient with history of Downs Syndrome. Approximately 1 month ago fell and hit her head. She was traveling out of [facility], in [place]. Taken to ED and found to have a large left SDH and SAH. [place] to be nonsurgical at that time and had some rehab. Discharged to home and had [facility] PT and OT.   NOW admitted with worsening of SDH on CT scan. Patient has been less alert and had nausea. On 4/12/19 had adonis hole craniotomy. Past Medical:  Past Medical History:   Diagnosis Date    Arthritis     Down syndrome     Hypothyroidism     Osteoarthritis     Syncope     Thyroid disease     Varicose veins     Wears glasses         Influenza vaccine given:  [] yes   [x] no  [] n/a (out of season)    Has patient had 2 or more falls in the past year? [x] yes   [] no Date unknown  Has patient had any fall with injury in the past year? [x] yes   [] no  [] unknown  Has patient had major surgery during past 100 days prior to admission?    [x] yes   [] no Type/ Date: 4/12/19 - right craniotomy adonis holes drainage of right frontal subacute on chronic SDH    Surgical History:  Past Surgical History: Procedure Laterality Date    ABDOMEN SURGERY      duodenal atresia    ABDOMINAL ADHESION SURGERY      CRANIOTOMY Right 4/12/2019    RIGHT CRANIOTOMY FRANCISCO JAVIER HOLES performed by Dolly Fairbanks MD at [facility] ECHO COMPL W DOP COLOR FLOW  5/14/2012         HIP SURGERY      Right    JOINT REPLACEMENT      left knee, right hip-dr Rubio Lucero KNEE SURGERY      Right    TOE SURGERY      Right foot    TOTAL KNEE ARTHROPLASTY Left 08/11/2016    DR. De    TUBAL LIGATION           Current Co-morbidities:  [] Alzheimer's   [] Dysphasia     [] Parkinsonism  [] Amputation   [] Edema of larynx  [] Peripheral artery disease   [] Anemia      [] Encephalopathy  [] Peripheral vascular disease  [] Anxiety   [] Gangrene   [] Pneumonia  [] Aphasia   [] Gout    [] Polyneuropathy  [] Asthma   [] Heart Failure (diastolic) [] Post-polio syndrome  [] Atrial fibrillation  [] Heart Failure (left-sided) [] Pseudomonas enteritis   [] Blind    [] Heart Failure (right-sided) [] Pulmonary embolism  [] Cellulitis     [] Heart Failure (systolic) [] Renal dialysis  [] Clostridium difficile  [] Hemiparesis   [] Renal failure  [] Congestive heart failure [] Hypertension   [] Rheumatoid arthritis  [] COPD   [] Hypotension   [] Seizure disorder   [] Coronary Artery Disease [x] Hypothyroidism  [] Septicemia   [] Deaf    [] Malnutrition   [] Sleep apnea  [] Depression   [] Morbid obesity  [] Spinal cord injury  [] Diabetes   [] MRSA   [] Stroke  [] Diabetic nephropathy  [] Myocardial infarction  [] Tracheostomy  [] Diabetic neuropathy  [] Osteoarthritis  [x] Traumatic brain injury   [] Diabetic retinopathy  [] Osteoporosis   [] Urinary tract infection  [] DVT    [] Pancytopenia  [] Vocal cord paralysis  [x] post op SDH        [] VRE          Medical/Functional Conditions, Risk for Clinical Complications/Interventions Required:    Medical/Functional Conditions: post op craniotomy for SDH ( monitor for compliations)    Risk for Medical/Clinical Complications: falls, injuries, decreased mobility    CLINICAL DATA:     Height : 5'     Weight:  131#   BMI: 25.6         Date: 4/15/19 Date: 4/16/19 Date:    temperature 98.4 98.4    pulse 78 78    respirations 16 18    Blood pressure 120/66 108/59    Pulse oximeter 99% room air 98% room air         ALLERGIES: Patient has no known allergies.     DIET : DIET GENERAL;    Current Lab and Diagnostic Tests:   Recent Results (from the past 24 hour(s))   Procalcitonin    Collection Time: 04/15/19  8:46 PM   Result Value Ref Range    Procalcitonin 0.09 (H) 0.00 - 0.08 ng/mL   CBC Auto Differential    Collection Time: 04/15/19  8:46 PM   Result Value Ref Range    WBC 6.7 4.5 - 11.5 E9/L    RBC 4.20 3.50 - 5.50 E12/L    Hemoglobin 13.3 11.5 - 15.5 g/dL    Hematocrit 38.8 34.0 - 48.0 %    MCV 92.4 80.0 - 99.9 fL    MCH 31.7 26.0 - 35.0 pg    MCHC 34.3 32.0 - 34.5 %    RDW 13.0 11.5 - 15.0 fL    Platelets 900 437 - 018 E9/L    MPV 9.6 7.0 - 12.0 fL    Neutrophils % 71.9 43.0 - 80.0 %    Immature Granulocytes % 0.4 0.0 - 5.0 %    Lymphocytes % 17.6 (L) 20.0 - 42.0 %    Monocytes % 9.0 2.0 - 12.0 %    Eosinophils % 0.7 0.0 - 6.0 %    Basophils % 0.4 0.0 - 2.0 %    Neutrophils # 4.80 1.80 - 7.30 E9/L    Immature Granulocytes # 0.03 E9/L    Lymphocytes # 1.18 (L) 1.50 - 4.00 E9/L    Monocytes # 0.60 0.10 - 0.95 E9/L    Eosinophils # 0.05 0.05 - 0.50 E9/L    Basophils # 0.03 0.00 - 0.20 Z0/Q   Basic Metabolic Panel    Collection Time: 04/15/19  8:46 PM   Result Value Ref Range    Sodium 138 132 - 146 mmol/L    Potassium 3.5 3.5 - 5.0 mmol/L    Chloride 105 98 - 107 mmol/L    CO2 27 22 - 29 mmol/L    Anion Gap 6 (L) 7 - 16 mmol/L    Glucose 125 (H) 74 - 99 mg/dL    BUN 5 (L) 6 - 20 mg/dL    CREATININE 0.6 0.5 - 1.0 mg/dL    GFR Non-African American >60 >=60 mL/min/1.73    GFR African American >60     Calcium 7.8 (L) 8.6 - 10.2 mg/dL   Magnesium    Collection Time: 04/15/19  8:46 PM   Result Value Ref Range    Magnesium 2.1 1.6 - 2.6 mg/dL   Basic metabolic panel    Collection Time: 04/16/19  6:17 AM   Result Value Ref Range    Sodium 141 132 - 146 mmol/L    Potassium 3.2 (L) 3.5 - 5.0 mmol/L    Chloride 109 (H) 98 - 107 mmol/L    CO2 24 22 - 29 mmol/L    Anion Gap 8 7 - 16 mmol/L    Glucose 110 (H) 74 - 99 mg/dL    BUN 5 (L) 6 - 20 mg/dL    CREATININE 0.6 0.5 - 1.0 mg/dL    GFR Non-African American >60 >=60 mL/min/1.73    GFR African American >60     Calcium 7.6 (L) 8.6 - 10.2 mg/dL   CBC Auto Differential    Collection Time: 04/16/19  6:17 AM   Result Value Ref Range    WBC 5.3 4.5 - 11.5 E9/L    RBC 3.85 3.50 - 5.50 E12/L    Hemoglobin 12.3 11.5 - 15.5 g/dL    Hematocrit 36.3 34.0 - 48.0 %    MCV 94.3 80.0 - 99.9 fL    MCH 31.9 26.0 - 35.0 pg    MCHC 33.9 32.0 - 34.5 %    RDW 13.2 11.5 - 15.0 fL    Platelets 386 495 - 245 E9/L    MPV 9.9 7.0 - 12.0 fL    Neutrophils % 69.8 43.0 - 80.0 %    Immature Granulocytes % 0.4 0.0 - 5.0 %    Lymphocytes % 19.5 (L) 20.0 - 42.0 %    Monocytes % 8.6 2.0 - 12.0 %    Eosinophils % 1.1 0.0 - 6.0 %    Basophils % 0.6 0.0 - 2.0 %    Neutrophils # 3.73 1.80 - 7.30 E9/L    Immature Granulocytes # 0.02 E9/L    Lymphocytes # 1.04 (L) 1.50 - 4.00 E9/L    Monocytes # 0.46 0.10 - 0.95 E9/L    Eosinophils # 0.06 0.05 - 0.50 E9/L    Basophils # 0.03 0.00 - 0.20 E9/L   Phosphorus    Collection Time: 04/16/19  6:17 AM   Result Value Ref Range    Phosphorus 2.1 (L) 2.5 - 4.5 mg/dL     Ct Head Without Contrast  Result Date: 4/12/2019  Large right-sided subdural hemorrhage with mixed densities. The overall size is not changed since prior study. There is significant mass effect on the right lateral ventricle and midline shift to the left.  Along the more caudal aspect of the subdural there are areas of increased attenuation which are mildly more prominent than the prior study suggestive of acute subdural hemorrhagic component     Ct Head Wo Contrast  Result Date: 4/11/2019  Very prominently increased right subdural hematoma with a severe subfalcine shift right to left since the prior comparison study. The subfalcine shift is about 9 mm at present. The right-sided subdural hematoma with minimal hemorrhage is about 2 cm thick presently. -increasing subdural hematoma in the right hemisphere with severe mass effect. Note: The critical results coordinator was contacted at the time of this dictation to initiate medication of results to the appropriate medical caregivers.       Additional labs or diagnostic studies needed before admission to rehabilitation unit:  None noted    Medications:   levETIRAcetam  500 mg Oral BID    levothyroxine  112 mcg Oral Daily    sodium chloride flush  10 mL Intravenous 2 times per day    fludrocortisone  0.2 mg Oral Daily      sodium chloride 100 mL/hr at 04/16/19 5203     acetaminophen, ondansetron, LORazepam, sodium chloride flush, magnesium hydroxide    SPECIAL PRECAUTIONS: [x] No current precautions  [] Cardiac  [] Renal [] Sternal [] Respiratory      [] Neurological           [] Hip  [] Spinal [] Seizure  [] Aspiration  [] Isolation precautions:    [] Contact   [] Respiratory   [] Protective     [] Droplet    [x] Weight Bearing precautions:         [] Non Weight Bearing        [] Toe Touch Weight Bearing        [] Partial Weight Bearing        [x] Weight Bearing as Tolerated        [x] Fall Risk:   [x] Recent history of falls [x] Falls risk level Satira Bologna Scale):high      [x] Bed Alarm    [] Do not leave alone in the bathroom    [x] Chair Alarm    [] Cognitive impairment      [] One to One supervision  [] [facility]   [] Safety enclosure bed  [] Decreased balance     SPECIAL REHABILITATION NEEDS:   [x] IV Therapy: [x] PRN Adapter  [] Midline  [] PICC      [] Central Line    [] TPN       [] Oxygen: [] Trach [] Bi-PAP [] CPAP  [] Nasal cannula  [] Liters:     [x] Wound Care:   [] Pressure ulcers(stage and location)    [] Wound vac   [x] Wound or incision care right head    [x] Pain Management (level of pain, meds): 5-0 right knee pain, controlled with tylenol     [x] Incontinence Bladder [] Patten  Insertion date:   []Hemodialysis and  Frequency:   [] Incontinence Bowel    [] Last bowel movement :not charted    [] Substance use history:  [] Tobacco  [] Alcohol  [] Other     [] Ethnic  [] Cultural  [] Spiritual  [] Language [] Needs  [] Other than English  [] Hearing Impaired  [] Visually Impaired  [] Speaking Impaired  [] Blind    [] Special equipment:  [] Devices/Splints  [] Type   [] Brace   [] Type  [] Bariatric bed  [] Extra wide commode  [] Extra wide wheelchair [] Extra wide walker  [] Gil walker  [] Gil wheelchair  [] Transfer lift    [] Other equipment     FUNCTIONAL STATUS PT / Ramona Christine / Dashawn Day:  FIM / EVAL Discipline Initial: 4/15/19 Follow Up: 4/16/19 Current:    Eating OT Minimum assistance  Set up      Grooming OT Minimum assistance  Minimum assistance      Bathing OT Max Assist  Max Assist      Dressing Upper Extremity OT Moderate Assist  Moderate Assist      Dressing Lower Extremity OT Dependent  Max Assist      Toileting OT Dependent  Dependent      Toilet Transfers OT nt Moderate Assist  x2    Tub/Shower Transfers OT nt nt    Homemaking OT nt nt    Bed Mobility PT Dependent x2 Moderate Assist      Bed/Wheelchair Transfers PT Max Assist x2 Moderate Assist  x2    Locomotion Walk / Wheelchair  Device:  Distance: PT nt 2 side steps to bedside chair. Limited by R knee discomfort.     Endurance PT      Expression SP      Social Interaction SP      Problem Solving SP      Memory SP      Comprehension SP      Swallowing SP      Bowel Management NSG      Bladder Management NSG        Comments on Functional Status: patient has knee pain and is to have a TKA in the future, this may impede her mobility    [x] Able to participate a minimum of 3 hours per day of therapy intervention    Required treatments/services: [x] Rehabilitation nursing [] Dietitian / nurtition                 [x] Case management  [] Respiratory Therapy      [x] Social work   [] Other     Required Therapy:  Therapy Hours per Day Days per Week Therapeutic Interventions Required   [x] Physical Therapy 1.5 5-7 Gait, transfers, Safety, strength, education, endurance   [x] Occupational Therapy 1.5 5-7 ADLs, IADLs, Safety, strength, education, endurance   [] Speech Pathology      [] Prosthetics / Orthotics       []         Anticipated Discharge Plan:   Anticipated DME Needs:  [x] Home     [] Commode   [] Alone    [] Wheelchair   [] Supervised    [] Walker   [x] Assist    [] Oxygen  [] LTAC     [] Hospital Bed  [] Assisted Living    [] Ramp  [] Steffen Juan   [x] To Be Determined      Anticipated Home Health Services:  Anticipated Outpatient Services:  [] PT       [] PT  [] OT      [] OT  [] Speech     [] Speech  [] Nursing     [] Dialysis  [] Aide      [x] To Be Determined  [x] To Be Determined    Anticipated support group:  [] Amputation  [] Multiple Sclerosis  [] Stroke  [x] Brain Injury  [] Spinal cord injury  [] Other     Barriers to discharge: pt has downs syndrome    Discharge Support: [] Patient lives alone and does not have a caregiver available     [x] Patient has a caregiver available     [] Discharge plan has been verified with patient's caregiver    [x] Caregiver is in agreement with the discharge plan     Expected functional status for safe discharge: supervision     Patient/support person goals: return home    Expected length of stay: 7-10 days    Discussed expected length of stay and agreeable to IRF plan: [x] Yes   [] No    Impairment Group Category: 2.22    Etiological Diagnosis: TBI    Primary Rehabilitation Diagnosis: TBI      Electronically signed by Heber Mills RN on 4/16/2019 at 1:01 PM    Prescreen completed __________________________________ (signature of prescreener)    Date:   4/16/19 Time:  1330      JUSTIFICATION FOR ADMISSION TO ACUTE REHABILITATION:  Patient has suffered decline in functional abilities for gait, transfers, cognition,  ADL's and IADL's as well as endurance. Patient has functional deficits requiring intensive therapy across multiple disciplines in order to return home safely. Patient will need physician oversight for respiratory issues, abnormal vital signs, nutritional and hydration status, safety issues, medications and therapy modalities. PT and OT will work on deficits as noted in evaluations. Case management and social work will provide services for DME and management of a safe discharge home.        RECOMMEND LEVEL OF CARE  Recommend inpatient rehabilitation: [x] Yes   [] No  If no indicate reason:    [] Functional level too high  [] Unmotivated  [] No insurance carrier approval [] Unlikely to return to community  [] No medical necessity  [] Patient or family chose other facility  [] Too medically complex  [] Inadequate discharge plan  [] Rehabilitation bed unavailable [] Functional level too low  [] patient or family refused ARU    If patient not accepted for IRF admission, recommended level of care:  [] [address]  [] [facility]  [] [place]   [] Home Care  [] Other      [] LTAC       Physician Assigned:  [] Dr. Braulio Chen [] Dr. Brielle Jerez  [x] Dr. Malik Moreno     [] Dr. Jonathan Marques [] Dr. Cristina Hardy  [] Dr. Ariadna Shanks:    ____________________________________________________________________  ____________________________________________________________________  ____________________________________________________________________  ____________________________________________________________________  ____________________________________________________________________      Physician Signature:_____________________________________    Print Signature:_________________________________________    Date:   4/16/19 Time:    5175

## 2019-04-15 NOTE — PLAN OF CARE
Problem: Falls - Risk of:  Goal: Will remain free from falls  Description  Will remain free from falls  4/15/2019 1144 by Bertin Wang RN  Outcome: Met This Shift  4/15/2019 0234 by Semaj Castro RN  Outcome: Met This Shift  Goal: Absence of physical injury  Description  Absence of physical injury  Outcome: Met This Shift     Problem: Risk for Impaired Skin Integrity  Goal: Tissue integrity - skin and mucous membranes  Description  Structural intactness and normal physiological function of skin and  mucous membranes.   4/15/2019 0234 by Semaj Castro RN  Outcome: Met This Shift

## 2019-04-15 NOTE — PROGRESS NOTES
Consult received,chart reviewed. Patient seen by Dr. Aditya Solano  And will accept into ARU pending bed availability. Plan admission tomorrow if medically stable.

## 2019-04-15 NOTE — PROGRESS NOTES
Department of Neurosurgery  Physician Assistant Progress Note    CHIEF COMPLAINT: pt s/p adonis hole craniotomy    SUBJECTIVE:  Denies headaches. States she it \"trying\" to get better. No family present. Some decreased responses over the weekend, head CTs obtained. REVIEW OF SYSTEMS :  Constitutional: Negative for chills and fever. Neurological: Negative for dizziness, tremors and speech change.      OBJECTIVE:   VITALS:  BP (!) 130/90   Pulse 65   Temp 98.1 °F (36.7 °C)   Resp 17   Wt 134 lb 9.6 oz (61.1 kg)   SpO2 99%   BMI 26.29 kg/m²   PHYSICAL:   Awake and alert   No apparent distress   Non-labored breathing   Speech clear, baseline   Face symetric   FC x 4 ext   Incisions clean and dry     DATA:  CBC:   Lab Results   Component Value Date    WBC 5.3 04/15/2019    RBC 4.11 04/15/2019    HGB 13.2 04/15/2019    HCT 38.1 04/15/2019    MCV 92.7 04/15/2019    MCH 32.1 04/15/2019    MCHC 34.6 04/15/2019    RDW 12.8 04/15/2019     04/15/2019    MPV 9.7 04/15/2019     BMP:    Lab Results   Component Value Date     04/15/2019    K 2.8 04/15/2019     04/15/2019    CO2 24 04/15/2019    BUN 5 04/15/2019    LABALBU 3.7 04/11/2019    LABALBU 2.8 05/13/2012    CREATININE 0.5 04/15/2019    CALCIUM 7.5 04/15/2019    GFRAA >60 04/15/2019    LABGLOM >60 04/15/2019    GLUCOSE 117 04/15/2019    GLUCOSE 96 05/15/2012     PT/INR:    Lab Results   Component Value Date    PROTIME 12.8 04/11/2019    PROTIME 11.0 05/12/2012    INR 1.1 04/11/2019     PTT:    Lab Results   Component Value Date    APTT 32.3 04/11/2019   [APTT}    Current Inpatient Medications  Current Facility-Administered Medications: potassium phosphate 30 mmol in dextrose 5 % 500 mL IVPB, 30 mmol, Intravenous, Once  magnesium sulfate 2 g in 50 mL IVPB premix, 2 g, Intravenous, Once  acetaminophen (TYLENOL) tablet 650 mg, 650 mg, Oral, Q4H PRN  levETIRAcetam (KEPPRA) tablet 500 mg, 500 mg, Oral, BID  ondansetron (ZOFRAN) injection 4 mg, 4 mg, Intravenous, Q6H PRN  levothyroxine (SYNTHROID) tablet 112 mcg, 112 mcg, Oral, Daily  LORazepam (ATIVAN) tablet 0.5 mg, 0.5 mg, Oral, Q8H PRN  sodium chloride flush 0.9 % injection 10 mL, 10 mL, Intravenous, 2 times per day  sodium chloride flush 0.9 % injection 10 mL, 10 mL, Intravenous, PRN  magnesium hydroxide (MILK OF MAGNESIA) 400 MG/5ML suspension 30 mL, 30 mL, Oral, Daily PRN  0.9 % sodium chloride infusion, , Intravenous, Continuous  fludrocortisone (FLORINEF) tablet 0.2 mg, 0.2 mg, Oral, Daily  hydrALAZINE (APRESOLINE) injection 5 mg, 5 mg, Intravenous, Q10 Min PRN    ASSESSMENT:   · 49year old female s/p right adonis hole craniotomy for SDH on 4/12 - stable.       SD drain removed 4/13    PLAN:  · WBAT  · Recent head CT reviewed from 4/13--no changes  · PT/OT  · Ok to tx to [facility]  · Medical management   · No anticoagulants  · Keppra  · Staples to be removed 2 weeks from surgery  · Will follow       Electronically signed by RASHAWN Vargas on 4/15/2019 at 8:36 AM

## 2019-04-15 NOTE — PROGRESS NOTES
bed upon arrival, agreeable to re-evaluation with encouragement. Pt was fearful of activity due to R knee pain. Pt required increased time and encouragement to complete mobility. Total assistance required to reach a seated position at EOB. Pt was emotional due to pain. Questionable severity of pain as pt was able to return to baseline affect once distracted. Pt stood and initially maintained NWB RLE. Pt used L foot musculature to complete pivot bedside chair. Pt was able to tolerated TTWB on RLE. Pt left in chair with all needs met and call light in reach. Extensive discussion with pt's mother and discharge options. Noted PMR consult already in chart. Will continue to progress as tolerated. Patient education  Pt educated on safety    Patient response to education:   Pt verbalized understanding Pt demonstrated skill Pt requires further education in this area   x partial x     Pts/ family goals   1. Return home    Patient and or family understand(s) diagnosis, prognosis, and plan of care. PLAN  PT care will be provided in accordance with the objectives noted above. Whenever appropriate, clear delegation orders will be provided for nursing staff. Exercises and functional mobility practice will be used as well as appropriate assistive devices or modalities to obtain goals. Patient and family education will also be administered as needed. Frequency of treatments will be 2-5x/week as able.     Time in: 1010  Time out: [address] [place], [place]  [id number]

## 2019-04-16 ENCOUNTER — HOSPITAL ENCOUNTER (INPATIENT)
Age: 54
LOS: 16 days | Discharge: NURSING, REHAB OR LONG-TERM CARE | DRG: 940 | End: 2019-05-02
Attending: PHYSICAL MEDICINE & REHABILITATION | Admitting: PHYSICAL MEDICINE & REHABILITATION
Payer: MEDICARE

## 2019-04-16 VITALS
HEIGHT: 60 IN | TEMPERATURE: 98.4 F | BODY MASS INDEX: 25.72 KG/M2 | SYSTOLIC BLOOD PRESSURE: 108 MMHG | WEIGHT: 131 LBS | RESPIRATION RATE: 18 BRPM | DIASTOLIC BLOOD PRESSURE: 59 MMHG | OXYGEN SATURATION: 98 % | HEART RATE: 78 BPM

## 2019-04-16 LAB
ANION GAP SERPL CALCULATED.3IONS-SCNC: 8 MMOL/L (ref 7–16)
BASOPHILS ABSOLUTE: 0.03 E9/L (ref 0–0.2)
BASOPHILS RELATIVE PERCENT: 0.6 % (ref 0–2)
BUN BLDV-MCNC: 5 MG/DL (ref 6–20)
CALCIUM SERPL-MCNC: 7.6 MG/DL (ref 8.6–10.2)
CHLORIDE BLD-SCNC: 109 MMOL/L (ref 98–107)
CO2: 24 MMOL/L (ref 22–29)
CREAT SERPL-MCNC: 0.6 MG/DL (ref 0.5–1)
EOSINOPHILS ABSOLUTE: 0.06 E9/L (ref 0.05–0.5)
EOSINOPHILS RELATIVE PERCENT: 1.1 % (ref 0–6)
FILM ARRAY ADENOVIRUS: NORMAL
FILM ARRAY BORDETELLA PERTUSSIS: NORMAL
FILM ARRAY CHLAMYDOPHILIA PNEUMONIAE: NORMAL
FILM ARRAY CORONAVIRUS 229E: NORMAL
FILM ARRAY CORONAVIRUS HKU1: NORMAL
FILM ARRAY CORONAVIRUS NL63: NORMAL
FILM ARRAY CORONAVIRUS OC43: NORMAL
FILM ARRAY INFLUENZA A VIRUS 09H1: NORMAL
FILM ARRAY INFLUENZA A VIRUS H1: NORMAL
FILM ARRAY INFLUENZA A VIRUS H3: NORMAL
FILM ARRAY INFLUENZA A VIRUS: NORMAL
FILM ARRAY INFLUENZA B: NORMAL
FILM ARRAY METAPNEUMOVIRUS: NORMAL
FILM ARRAY MYCOPLASMA PNEUMONIAE: NORMAL
FILM ARRAY PARAINFLUENZA VIRUS 1: NORMAL
FILM ARRAY PARAINFLUENZA VIRUS 2: NORMAL
FILM ARRAY PARAINFLUENZA VIRUS 3: NORMAL
FILM ARRAY PARAINFLUENZA VIRUS 4: NORMAL
FILM ARRAY RESPIRATORY SYNCITIAL VIRUS: NORMAL
FILM ARRAY RHINOVIRUS/ENTEROVIRUS: NORMAL
GFR AFRICAN AMERICAN: >60
GFR NON-AFRICAN AMERICAN: >60 ML/MIN/1.73
GLUCOSE BLD-MCNC: 110 MG/DL (ref 74–99)
HCT VFR BLD CALC: 36.3 % (ref 34–48)
HEMOGLOBIN: 12.3 G/DL (ref 11.5–15.5)
IMMATURE GRANULOCYTES #: 0.02 E9/L
IMMATURE GRANULOCYTES %: 0.4 % (ref 0–5)
LYMPHOCYTES ABSOLUTE: 1.04 E9/L (ref 1.5–4)
LYMPHOCYTES RELATIVE PERCENT: 19.5 % (ref 20–42)
MCH RBC QN AUTO: 31.9 PG (ref 26–35)
MCHC RBC AUTO-ENTMCNC: 33.9 % (ref 32–34.5)
MCV RBC AUTO: 94.3 FL (ref 80–99.9)
MONOCYTES ABSOLUTE: 0.46 E9/L (ref 0.1–0.95)
MONOCYTES RELATIVE PERCENT: 8.6 % (ref 2–12)
NEUTROPHILS ABSOLUTE: 3.73 E9/L (ref 1.8–7.3)
NEUTROPHILS RELATIVE PERCENT: 69.8 % (ref 43–80)
PDW BLD-RTO: 13.2 FL (ref 11.5–15)
PHOSPHORUS: 2.1 MG/DL (ref 2.5–4.5)
PLATELET # BLD: 178 E9/L (ref 130–450)
PMV BLD AUTO: 9.9 FL (ref 7–12)
POTASSIUM SERPL-SCNC: 3.2 MMOL/L (ref 3.5–5)
RBC # BLD: 3.85 E12/L (ref 3.5–5.5)
SODIUM BLD-SCNC: 141 MMOL/L (ref 132–146)
WBC # BLD: 5.3 E9/L (ref 4.5–11.5)

## 2019-04-16 PROCEDURE — 87633 RESP VIRUS 12-25 TARGETS: CPT

## 2019-04-16 PROCEDURE — 87798 DETECT AGENT NOS DNA AMP: CPT

## 2019-04-16 PROCEDURE — 97530 THERAPEUTIC ACTIVITIES: CPT

## 2019-04-16 PROCEDURE — 6370000000 HC RX 637 (ALT 250 FOR IP): Performed by: STUDENT IN AN ORGANIZED HEALTH CARE EDUCATION/TRAINING PROGRAM

## 2019-04-16 PROCEDURE — 85025 COMPLETE CBC W/AUTO DIFF WBC: CPT

## 2019-04-16 PROCEDURE — 80048 BASIC METABOLIC PNL TOTAL CA: CPT

## 2019-04-16 PROCEDURE — 6370000000 HC RX 637 (ALT 250 FOR IP): Performed by: NURSE PRACTITIONER

## 2019-04-16 PROCEDURE — 1280000000 HC REHAB R&B

## 2019-04-16 PROCEDURE — 97535 SELF CARE MNGMENT TRAINING: CPT

## 2019-04-16 PROCEDURE — 84100 ASSAY OF PHOSPHORUS: CPT

## 2019-04-16 PROCEDURE — 2580000003 HC RX 258: Performed by: NURSE PRACTITIONER

## 2019-04-16 PROCEDURE — 87581 M.PNEUMON DNA AMP PROBE: CPT

## 2019-04-16 PROCEDURE — 36415 COLL VENOUS BLD VENIPUNCTURE: CPT

## 2019-04-16 PROCEDURE — 87486 CHLMYD PNEUM DNA AMP PROBE: CPT

## 2019-04-16 RX ORDER — LEVETIRACETAM 500 MG/1
500 TABLET ORAL 2 TIMES DAILY
Status: CANCELLED | OUTPATIENT
Start: 2019-04-16

## 2019-04-16 RX ORDER — LEVOTHYROXINE SODIUM 112 UG/1
112 TABLET ORAL DAILY
Status: DISCONTINUED | OUTPATIENT
Start: 2019-04-17 | End: 2019-05-02 | Stop reason: HOSPADM

## 2019-04-16 RX ORDER — FLUDROCORTISONE ACETATE 0.1 MG/1
0.2 TABLET ORAL DAILY
Status: CANCELLED | OUTPATIENT
Start: 2019-04-16

## 2019-04-16 RX ORDER — ONDANSETRON 2 MG/ML
4 INJECTION INTRAMUSCULAR; INTRAVENOUS EVERY 6 HOURS PRN
Status: CANCELLED | OUTPATIENT
Start: 2019-04-16

## 2019-04-16 RX ORDER — FLUDROCORTISONE ACETATE 0.1 MG/1
0.2 TABLET ORAL DAILY
Status: DISCONTINUED | OUTPATIENT
Start: 2019-04-17 | End: 2019-05-02 | Stop reason: HOSPADM

## 2019-04-16 RX ORDER — ACETAMINOPHEN 325 MG/1
650 TABLET ORAL EVERY 4 HOURS PRN
Status: DISCONTINUED | OUTPATIENT
Start: 2019-04-16 | End: 2019-05-02 | Stop reason: HOSPADM

## 2019-04-16 RX ORDER — LEVETIRACETAM 500 MG/1
500 TABLET ORAL 2 TIMES DAILY
Status: DISCONTINUED | OUTPATIENT
Start: 2019-04-16 | End: 2019-05-02 | Stop reason: HOSPADM

## 2019-04-16 RX ORDER — LORAZEPAM 0.5 MG/1
0.5 TABLET ORAL EVERY 8 HOURS PRN
Status: DISCONTINUED | OUTPATIENT
Start: 2019-04-16 | End: 2019-05-02 | Stop reason: HOSPADM

## 2019-04-16 RX ORDER — LEVOTHYROXINE SODIUM 112 UG/1
112 TABLET ORAL DAILY
Status: CANCELLED | OUTPATIENT
Start: 2019-04-17

## 2019-04-16 RX ORDER — LORAZEPAM 0.5 MG/1
0.5 TABLET ORAL EVERY 8 HOURS PRN
Status: CANCELLED | OUTPATIENT
Start: 2019-04-16

## 2019-04-16 RX ORDER — ACETAMINOPHEN 325 MG/1
650 TABLET ORAL EVERY 4 HOURS PRN
Status: CANCELLED | OUTPATIENT
Start: 2019-04-16

## 2019-04-16 RX ORDER — ACETAMINOPHEN 325 MG/1
650 TABLET ORAL EVERY 4 HOURS PRN
Status: DISCONTINUED | OUTPATIENT
Start: 2019-04-16 | End: 2019-04-16 | Stop reason: SDUPTHER

## 2019-04-16 RX ADMIN — LEVETIRACETAM 500 MG: 500 TABLET, FILM COATED ORAL at 18:46

## 2019-04-16 RX ADMIN — SODIUM CHLORIDE: 9 INJECTION, SOLUTION INTRAVENOUS at 03:33

## 2019-04-16 RX ADMIN — POTASSIUM BICARBONATE 40 MEQ: 782 TABLET, EFFERVESCENT ORAL at 09:30

## 2019-04-16 RX ADMIN — ACETAMINOPHEN 650 MG: 325 TABLET, FILM COATED ORAL at 18:46

## 2019-04-16 RX ADMIN — LEVETIRACETAM 500 MG: 500 TABLET ORAL at 05:32

## 2019-04-16 RX ADMIN — FLUDROCORTISONE ACETATE 0.2 MG: 0.1 TABLET ORAL at 09:30

## 2019-04-16 RX ADMIN — ACETAMINOPHEN 650 MG: 325 TABLET, FILM COATED ORAL at 04:19

## 2019-04-16 RX ADMIN — LEVOTHYROXINE SODIUM 112 MCG: 112 TABLET ORAL at 05:32

## 2019-04-16 ASSESSMENT — PAIN DESCRIPTION - PAIN TYPE
TYPE: CHRONIC PAIN
TYPE: CHRONIC PAIN

## 2019-04-16 ASSESSMENT — PAIN DESCRIPTION - ORIENTATION
ORIENTATION: RIGHT
ORIENTATION: RIGHT

## 2019-04-16 ASSESSMENT — PAIN SCALES - GENERAL
PAINLEVEL_OUTOF10: 6
PAINLEVEL_OUTOF10: 5
PAINLEVEL_OUTOF10: 10
PAINLEVEL_OUTOF10: 0

## 2019-04-16 ASSESSMENT — PAIN DESCRIPTION - DESCRIPTORS
DESCRIPTORS: ACHING;DISCOMFORT;SORE
DESCRIPTORS: ACHING;DISCOMFORT

## 2019-04-16 ASSESSMENT — PAIN DESCRIPTION - FREQUENCY
FREQUENCY: CONTINUOUS
FREQUENCY: INTERMITTENT

## 2019-04-16 ASSESSMENT — PAIN SCALES - WONG BAKER: WONGBAKER_NUMERICALRESPONSE: 0

## 2019-04-16 ASSESSMENT — PAIN DESCRIPTION - PROGRESSION
CLINICAL_PROGRESSION: NOT CHANGED
CLINICAL_PROGRESSION: NOT CHANGED

## 2019-04-16 ASSESSMENT — PAIN DESCRIPTION - ONSET
ONSET: ON-GOING
ONSET: ON-GOING

## 2019-04-16 ASSESSMENT — PAIN DESCRIPTION - LOCATION
LOCATION: KNEE
LOCATION: KNEE

## 2019-04-16 ASSESSMENT — PAIN - FUNCTIONAL ASSESSMENT
PAIN_FUNCTIONAL_ASSESSMENT: ACTIVITIES ARE NOT PREVENTED
PAIN_FUNCTIONAL_ASSESSMENT: PREVENTS OR INTERFERES SOME ACTIVE ACTIVITIES AND ADLS

## 2019-04-16 NOTE — PROGRESS NOTES
Patient oriented to room and new admission folder given. Patient Guide reviewed and explanation of Patient Rights and Responsibilities completed. I gave a signed copy of The Important Message From Medicare to patient and father.  Blake Goodwin  4/16/2019  5:22 PM

## 2019-04-16 NOTE — SIGNIFICANT EVENT
Called by nursing staff to evaluate patient who just spiked a temp of 101.4. She just had craniotomy recently. Patient seen at bedside, she complains about pain in the right knee which apparently is long-standing, this knee is swollen but not red. It's tender to touch. Patient's father had refused imaging studies recently according to nursing staff. Patient is in mild painful distress, she is febrile to touch, and lungs were clear. Labs were sent including stat CBC, pro-calcitonin, urinalysis and chest x-ray. Possible etiologies for this postoperative fever included postop infection with Sepsis, rule out Rt septic knee, UTI and atelectasis. Tylenol as needed for fever. Follow-up labs and x-ray. Antibiotics have not been started yet. Temperature did improve with Tylenol and fluid. Monitor closely.

## 2019-04-16 NOTE — CARE COORDINATION
4/16/2019 social work transition of care  Pt to discharge to acute rehab today.   Electronically signed by VARGAS Curry on 4/16/2019 at 1:43 PM

## 2019-04-16 NOTE — PROGRESS NOTES
Physical Therapy  Treatment Note       NAME: Case Daniel See  : 1965  MRN: [id number]     Date of Service: 2019  Evaluating Therapist: Dwight Blanco PT, DPT     ROOM #: [id number]  DIAGNOSIS: SDH  PRECAUTIONS: Falls  PMHx: Down Syndrome, Hypothyroidism, OA  PROCEDURES:  R adonis hole with drain placement     Social:  Pt lives with parents in a 2 floor plan with 3 step(s) and 1 rail(s) to enter. Bedroom on 2nd floor with 14 steps and 1 rail. Prior to admission pt walked with no device and was Independent with iADLs. Pt works at [employer].      Initial Evaluation  Date: 4/15/19 Treatment  Date:   19 Short Term/ Long Term   Goals   AM-PAC 6 Clicks  85/93      Does pt have pain? Pt c/o R knee pain - unable to rate  Reported increased pain R knee.      Bed Mobility  Rolling: NT  Supine to sit: Dep x 2  Sit to supine: NT  Scooting: MaxA  Rolling: Mod A  Supine to sit: Mod A increased time. Sit to supine: NT  Scooting: Mod A.  ModA   Transfers Sit to stand: MaxA x 2  Stand to sit: MaxA  Stand pivot: ModA with [facility]  Sit to stand: Mod xA x 2  Stand to sit: Mod A  Stand pivot: ModA  with [facility] Noreen with AAD   Ambulation   NT  2 side steps to bedside chair. Limited by R knee discomfort. >40 feet with Noreen with AAD   Stair negotiation: ascended and descended NT  NT >4 steps with 1 rail with Noreen   BLE ROM Unable to assess RLE due to pain  AAROM completed to R knee. Patient self limiting ROM due to discomfort.      BLE strength LLE Grossly 4+/5  RLE NT due to pain  LLE Grossly 4+/5  RLE 3/5 Increase by 1/3 MMT grade   Balance Sitting: Noreen  Standing: ModA with [facility]  Sitting: CGA  Standing: ModA with [facility] Sitting: SBA  Standing: Noreen with AAD         Pt is alert and oriented x 2 (self and place)  Sensation: WNL  Edema:  WNL     ASSESSMENT    Pt displays functional ability as noted in the objective portion of this evaluation.       Comments/Treatment:  Pt supine in bed upon arrival, agreeable to treatment with much encouragement. Pt remains fearful of activity due to R knee pain. Pt required increased time and encouragement to complete mobility. Improved ability this date now Mod a for bed mobility. l assistance required to reach a seated position at EOB. Pt was emotional due to pain. Questionable severity of pain as pt was able to return to baseline affect once distracted. Pt assisted to standing with ability to bear weight through RLE. Pt assisted to standing with use of fww for support. Unable to take steps this date. Pt left in chair with all needs met and call light in reach. Noted PMR consult already in chart. Will continue to progress as tolerated.       Patient education  Pt educated on safety.      Patient response to education:   Pt verbalized understanding Pt demonstrated skill Pt requires further education in this area   x partial x        Pts/ family goals     1. Return home     Patient and or family understand(s) diagnosis, prognosis, and plan of care.     PLAN    PT care will be provided in accordance with the objectives noted above. Whenever appropriate, clear delegation orders will be provided for nursing staff. Exercises and functional mobility practice will be used as well as appropriate assistive devices or modalities to obtain goals.  Patient and family education will also be administered as needed.     Frequency of treatments will be 2-5x/week as able.     Time in: Drea Gilliam  Time out: 0900     Zoey Ely, [address] [facility]

## 2019-04-16 NOTE — PLAN OF CARE
Problem: Falls - Risk of:  Goal: Absence of physical injury  Description  Absence of physical injury  Outcome: Met This Shift     Problem: Risk for Impaired Skin Integrity  Goal: Tissue integrity - skin and mucous membranes  Description  Structural intactness and normal physiological function of skin and  mucous membranes.   Outcome: Met This Shift     Problem: Pain:  Goal: Control of chronic pain  Description  Control of chronic pain  Outcome: Met This Shift

## 2019-04-16 NOTE — DISCHARGE INSTR - COC
Continuity of Care Form    Patient Name: Haley Early   :  1965  MRN:  [id number]    Admit date:  2019  Discharge date: 19  Code Status Order: Full Code   Advance Directives:   [facility] Documentation     Date/Time Healthcare Directive Type of Healthcare Directive Copy in [facility] [address] Agent's Name Healthcare Agent's Phone Number    19 1218  No, patient does not have an advance directive for healthcare treatment -- -- -- -- --          Admitting Physician:  Kenyon Willis MD  PCP: Rodríguez Fernandez DO    Discharging Nurse: [facility] Unit/Room#: 0394/1409-A  Discharging Unit Phone Number: [phone]    Emergency Contact:   Extended Emergency Contact Information  Primary Emergency Contact: Caitlin Early  [facility] [address] [place] [address] Phone: [phone]  Relation: Legal Guardian  Secondary Emergency Contact: Zach Early  Address: [facility], [address] [place] [address] Phone: [phone]  Relation: Parent    Past Surgical History:  Past Surgical History:   Procedure Laterality Date    ABDOMEN SURGERY      duodenal atresia    ABDOMINAL ADHESION SURGERY      CRANIOTOMY Right 2019    RIGHT CRANIOTOMY FRANCISCO JAVIER HOLES performed by Nathalia Rodriguez MD at [place] [address] ECHO COMPL W DOP COLOR FLOW  2012         HIP SURGERY      Right    JOINT REPLACEMENT      left knee, right hip-dr Tarsha Stewart KNEE SURGERY      Right    TOE SURGERY      Right foot    TOTAL KNEE ARTHROPLASTY Left 2016    DR. De    TUBAL LIGATION         Immunization History:   Immunization History   Administered Date(s) Administered    Hepatitis A 2012    Hepatitis B (Recombivax HB) 2012, 2012, 2012    Influenza Vaccine, unspecified formulation 2015    Influenza, High Dose (Fluzone 65 yrs and older) 2017    Influenza, Quadv, 3 yrs and older, IM, PF (Fluzone 3 yrs and older or Afluria 5 yrs and older) 09/20/2016    Pneumococcal Polysaccharide (Cnlypppht36) 12/06/2007, 12/10/2008    Tdap (Boostrix, Adacel) 04/11/2012       Active Problems:  Patient Active Problem List   Diagnosis Code    Down's syndrome T38.3    Systolic murmur V97.7    Vasodepressor syncope R55    Asymptomatic varicose veins of bilateral lower extremities I83.93    Hypothyroidism E03.9    Chest pain R07.9    SDH (subdural hematoma) (HCC) S06.5X9A    Subdural hematoma (HCC) S06.5X9A       Isolation/Infection:   Isolation          No Isolation            Nurse Assessment:  Last Vital Signs: BP (!) 108/59   Pulse 78   Temp 98.4 °F (36.9 °C) (Temporal)   Resp 18   Ht 5' (1.524 m)   Wt 131 lb (59.4 kg)   SpO2 98%   BMI 25.58 kg/m²     Last documented pain score (0-10 scale): Pain Level: 5  Last Weight:   Wt Readings from Last 1 Encounters:   04/15/19 131 lb (59.4 kg)     Mental Status:  oriented, alert and coherent    IV Access:  - None    Nursing Mobility/ADLs:  Walking   Assisted  Transfer  Assisted  Bathing  Assisted  Dressing  Assisted  Toileting  Assisted  Feeding  Independent  Med Admin  Independent  Med Delivery   prefers mixed with CHOCOLATE PUDDING    Wound Care Documentation and Therapy:        Elimination:  Continence:   · Bowel: Yes  · Bladder: Yes  INCONTINENT AT TIMES  Urinary Catheter: None   Colostomy/Ileostomy/Ileal Conduit: No       Date of Last BM: NONE RECORDED IN LAST 24 HOURS    Intake/Output Summary (Last 24 hours) at 4/16/2019 1328  Last data filed at 4/16/2019 0533  Gross per 24 hour   Intake 1180 ml   Output --   Net 1180 ml     I/O last 3 completed shifts: In: 1300 [P.O.:300; I.V.:1000]  Out: -     Safety Concerns:     None and At Risk for Falls    Impairments/Disabilities:      None    Nutrition Therapy:  Current Nutrition Therapy:   - Oral Diet:  General    Routes of Feeding: Oral  Liquids:  Thin Liquids  Daily Fluid Restriction: no  Last Modified Barium Swallow with Video (Video Swallowing Test): not done    Treatments at the Time of Hospital Discharge:   Respiratory Treatments: NONE  Oxygen Therapy:  is not on home oxygen therapy. Ventilator:    - No ventilator support    Rehab Therapies: Physical Therapy and Occupational Therapy  Weight Bearing Status/Restrictions: No weight bearing restirctions  Other Medical Equipment (for information only, NOT a DME order):  walker  Other Treatments: ***    Patient's personal belongings (please select all that are sent with patient):  {[facility] DME Belongings:461052016}    RN SIGNATURE:  {Esignature:[id number]}    CASE MANAGEMENT/SOCIAL WORK SECTION    Inpatient Status Date: ***    Readmission Risk Assessment Score:  Readmission Risk              Risk of Unplanned Readmission:        10           Discharging to Facility/ Agency   · Name:   · Address:  · Phone:  · Fax:    Dialysis Facility (if applicable)   · Name:  · Address:  · Dialysis Schedule:  · Phone:  · Fax:    / signature: {Esignature:464268251}    PHYSICIAN SECTION    Prognosis: {Prognosis:8347657847}    Condition at Discharge: [address] [place] [address] Patient Condition:[id number]}    Rehab Potential (if transferring to Rehab): {Prognosis:9426777012}    Recommended Labs or Other Treatments After Discharge: ***    Physician Certification: I certify the above information and transfer of Ana Damon See  is necessary for the continuing treatment of the diagnosis listed and that she requires {Admit to Appropriate Level of Care:12955} for {GREATER/LESS:150788809} 30 days.      Update Admission H&P: {CHP DME Changes in PDJFT:175936560}    PHYSICIAN SIGNATURE:  {Esignature:547624526}

## 2019-04-16 NOTE — PROGRESS NOTES
Type and Reason for Visit: Consult, Calorie Count    Patient pending unit transfer from [phone] to [phone] per progress notes. Provided calorie count envelope with ARU RN, pending unit transfer. Dietary and nursing was notified.     Electronically signed by Estrella Kelly MS, RD, LD on 4/16/19 at 3:14 PM    Contact Number: [phone]

## 2019-04-16 NOTE — DISCHARGE SUMMARY
Hospital Medicine Discharge Summary    Patient ID: Ana Damon See      Patient's PCP: Molly Young DO    Admit Date: 4/11/2019     Discharge Date:   04/16/19    Admitting Physician: Antony Denson MD     Discharge Physician: Aminata Rogel MD     Discharge Diagnoses: Active Hospital Problems    Diagnosis Date Noted    Subdural hematoma ([facility] [address] [I84.7W3V]     SDH (subdural hematoma) ([facility] [address] [S06.5X9A] 04/11/2019       The patient was seen and examined on day of discharge and this discharge summary is in conjunction with any daily progress note from day of discharge. Admission HPI: Abimael Angela a 49 y. o. female presenting to the ED for head injury, beginning 1 month ago.  The complaint has been constant, moderate in severity, and worsened by nothing.  Patient fell a few months ago and was diagnosed with a subdural hematoma. She has been following up neurology and had a worsening CT scan completed today. Patient has been less alert per family and has been nauseated with emesis. Patient is MR. Patient is a poor historian. Unable to obtain a complete HPI due to this patient's mental status. Hospital Course:   Ms. Ana Damon See, a 49y.o. year old female  who  has a past medical history of Arthritis, Down syndrome, Hypothyroidism, Osteoarthritis, Syncope, Thyroid disease, Varicose veins, and Wears glasses. During the course the patient's hospital stay Admitted for AMS secondary to expanding subdural hematoma. Neurosurgery was on board and craniotomy was performed. No anticoagulants a per neurosurgery and on keppra. Repeat head CT in 4 weeks. 04/15/19- overnight had one fever spike of 101 but no complaints except her chronic knee pain which did not show any infectious signs. Fever improved with tylenol , CXR was clear, no leucocytosis. She felt fine with no complaints. Pro- diane - 0.09.   Staples to be removed 2 weeks from surgery  Stable condition at time of discharge to acute rehab.      Consults:     IP CONSULT TO NEUROSURGERY  IP CONSULT TO HOSPITALIST  IP CONSULT TO SOCIAL WORK  IP CONSULT TO NEUROSURGERY  IP CONSULT TO PHYSICAL MEDICINE REHAB  IP CONSULT TO DIETITIAN    Significant Diagnostic Studies:  As above      Discharge Instructions/Follow-up:  As above       Activity: activity as tolerated    Physical Exam:  Vitals:    04/15/19 2345   BP: (!) 108/59   Pulse: 78   Resp: 18   Temp: 98.4 °F (36.9 °C)   SpO2: 98%     General appearance: No apparent distress, appears stated age and cooperative. HEENT: Normal cephalic, atraumatic without obvious deformity. Pupils equal, round, and reactive to light.  Extra ocular muscles intact. Conjunctivae/corneas clear. Neck: Supple, with full range of motion. No jugular venous distention. Trachea midline. Respiratory:  Normal respiratory effort. Clear to auscultation, bilaterally without Rales/Wheezes/Rhonchi. Cardiovascular: Regular rate and rhythm with normal S1/S2 without murmurs, rubs or gallops. Brisk capillary refill. 2+ lower extremity pulses (dorsalis pedis). Abdomen: Soft, non-tender, non-distended with normal bowel sounds. Musculoskeletal: No clubbing, cyanosis or edema bilaterally.  Full range of motion without deformity. Brisk capillary refill. 2+ lower extremity pulses (dorsalis pedis). Skin: Normal skin color.  No rashes or lesions. Neurologic:  Awake, follows commands          Labs:  For convenience and continuity at follow-up the following most recent labs are provided:      CBC:    Lab Results   Component Value Date    WBC 5.3 04/16/2019    HGB 12.3 04/16/2019    HCT 36.3 04/16/2019     04/16/2019       Renal:    Lab Results   Component Value Date     04/16/2019    K 3.2 04/16/2019     04/16/2019    CO2 24 04/16/2019    BUN 5 04/16/2019    CREATININE 0.6 04/16/2019    CALCIUM 7.6 04/16/2019    PHOS 2.1 04/16/2019       Imaging:  XR CHEST PORTABLE   Final Result      NO ACUTE CARDIOPULMONARY PROCESS CT HEAD WO CONTRAST   Final Result   Removal right-sided subdural drain. The amount of air in the right   subdural space is less but the overall size of the subdural collection   is unchanged. The amount of varied densities with subacute and acute   components are unchanged. The amount of mass effect on the right lateral ventricle and midline   shift to the left is unchanged. There is a small amount of air in the left subdural space which is   mildly more prominent then on the prior study         CT HEAD WO CONTRAST   Final Result      Postoperative change with reduction subdural collection although   significant hematoma remains with mass effect; significant reduction   in amount of mass effect since yesterday's exam      No new abnormality         CT head without contrast   Final Result   Large right-sided subdural hemorrhage with mixed densities. The   overall size is not changed since prior study. There is significant   mass effect on the right lateral ventricle and midline shift to the   left. Along the more caudal aspect of the subdural there are areas of   increased attenuation which are mildly more prominent than the prior   study suggestive of acute subdural hemorrhagic component               Discharge Medications:     Current Discharge Medication List           Details   fludrocortisone (FLORINEF) 0.1 MG tablet Take 2 tablets by mouth daily  Qty: 60 tablet, Refills: 2    Associated Diagnoses: Syncope, unspecified syncope type      levothyroxine (SYNTHROID) 112 MCG tablet Take 1 tablet by mouth Daily  Qty: 30 tablet, Refills: 3    Associated Diagnoses: Hypothyroidism, unspecified type      LORazepam (ATIVAN) 0.5 MG tablet Take 1 tablet by mouth every 8 hours as needed for Anxiety (take 1/2 tablet 30 min before procedure) for up to 2 doses.   Qty: 2 tablet, Refills: 0    Associated Diagnoses: Situational anxiety      clindamycin (CLEOCIN T) 1 % lotion       ketoconazole (NIZORAL) 2 % cream       Crisaborole (EUCRISA) 2 % OINT Apply 1 Squirt topically 2 times daily  Qty: 1 Tube, Refills: 0    Associated Diagnoses: Acute eczema             Time Spent on discharge is more than 31 min in the examination, evaluation, counseling and review of medications and discharge plan. Dayana Lacey MD   4/16/2019      Thank you Tita Stevenson DO for the opportunity to be involved in this patient's care. If you have any questions or concerns please feel free to contact me. NOTE: This report was transcribed using voice recognition software. Every effort was made to ensure accuracy; however, inadvertent computerized transcription errors may be present.

## 2019-04-16 NOTE — PROGRESS NOTES
Pt accepted to ARU room 5515B. Orders placed under misc nursing. Bed currently dirty and staff from ARU will call for report when bed is ready.

## 2019-04-16 NOTE — PROGRESS NOTES
OT BEDSIDE TREATMENT NOTE      Date:2019  Patient Name: Bridger Kumar See  MRN: [id number]  : 1965  [facility]     Per OT Eval:    Evaluating OT: [place], OTR/L      AM-PAC Daily Activity Raw Score:   Recommended Adaptive Equipment: continue to assess      Comments: Based on patient's functional performance as stated above and level of assistance needed prior to admission, this therapist believes that the patient would benefit from Continued therapy for ADL retraining, strengthening, building endurance/activity tolerance and overall functioning in order to safely return home.       Diagnosis: SDH  Pt sustained a fall ~1 month ago, worsening CT and symptomatic so pt came to ED     Surgery: s/p adonis hole crani     Pertinent Medical History: frequent falls, MR down syndrome, Arthritis, OA, COLIN and TKA     Precautions:  Falls, MR     Home Living: Per mother, pt lives with parents in a 2 floor plan with 3 steps to enter/1HR; 14 steps/1HR to 2nd floor bed/bath. Father supervises pt while negotiating steps. Bathroom setup: Tub-Shower combo with extended bench, 3in1 commode  Equipment owned: rollator, cane, tub bench, 3in1 commode     Prior Level of Function: Mod I with ADLS per mother report; assist with IADLs; Pt ambulated with no device. Pt used rollator on vacation.     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 does not rate    Cognition: A&O grossly to 3 (not to situation). Follows 1 step commands with min. cues, easily distracted and requires frequent re-direction. Also requires mod/max.  encouragement throughout    Functional Assessment:     Initial Eval Status  Date:  Treatment Status  Date:19  Short Term Goals  Treatment frequency: 1-3x/week on PRN   Feeding Min A  Assist with lids/containers; pt transfer/mobility training with increased time. At end of session seated in bedside chair, all needs met, RN notified, all lines and tubes intact, call light within reach. Mother arrived following session, all questions answered/addressed. Pt. would benefit from aggressive OT services/ Acute Rehab stay. · Pt has made fair progress towards set goals. · Continue with current plan of care    Time in: 8:35  Time out: 9:00  Total Tx Time: 25 min. treatment to include ADLs, functional transfers/mobility training/instruction     Charity Solorzano, OTR/L   License #  [id number]

## 2019-04-16 NOTE — PROGRESS NOTES
Department of Neurosurgery  Physician Assistant Progress Note    CHIEF COMPLAINT: pt s/p adonis hole craniotomy    SUBJECTIVE:  Denies headaches. Family present without concerns. Awaiting rehab placement. REVIEW OF SYSTEMS :  Constitutional: Negative for chills and fever. Neurological: Negative for dizziness, tremors and speech change.      OBJECTIVE:   VITALS:  BP (!) 108/59   Pulse 78   Temp 98.4 °F (36.9 °C) (Temporal)   Resp 18   Ht 5' (1.524 m)   Wt 131 lb (59.4 kg)   SpO2 98%   BMI 25.58 kg/m²   PHYSICAL:   Awake and alert   No apparent distress   Non-labored breathing   Speech clear, baseline   Face symetric   FC x 4 ext   Incisions clean and dry     DATA:  CBC:   Lab Results   Component Value Date    WBC 5.3 04/16/2019    RBC 3.85 04/16/2019    HGB 12.3 04/16/2019    HCT 36.3 04/16/2019    MCV 94.3 04/16/2019    MCH 31.9 04/16/2019    MCHC 33.9 04/16/2019    RDW 13.2 04/16/2019     04/16/2019    MPV 9.9 04/16/2019     BMP:    Lab Results   Component Value Date     04/16/2019    K 3.2 04/16/2019     04/16/2019    CO2 24 04/16/2019    BUN 5 04/16/2019    LABALBU 3.7 04/11/2019    LABALBU 2.8 05/13/2012    CREATININE 0.6 04/16/2019    CALCIUM 7.6 04/16/2019    GFRAA >60 04/16/2019    LABGLOM >60 04/16/2019    GLUCOSE 110 04/16/2019    GLUCOSE 96 05/15/2012     PT/INR:    Lab Results   Component Value Date    PROTIME 12.8 04/11/2019    PROTIME 11.0 05/12/2012    INR 1.1 04/11/2019     PTT:    Lab Results   Component Value Date    APTT 32.3 04/11/2019   [APTT}    Current Inpatient Medications  Current Facility-Administered Medications: acetaminophen (TYLENOL) tablet 650 mg, 650 mg, Oral, Q4H PRN  levETIRAcetam (KEPPRA) tablet 500 mg, 500 mg, Oral, BID  ondansetron (ZOFRAN) injection 4 mg, 4 mg, Intravenous, Q6H PRN  levothyroxine (SYNTHROID) tablet 112 mcg, 112 mcg, Oral, Daily  LORazepam (ATIVAN) tablet 0.5 mg, 0.5 mg, Oral, Q8H PRN  sodium chloride flush 0.9 % injection 10 mL, 10 mL, Intravenous, 2 times per day  sodium chloride flush 0.9 % injection 10 mL, 10 mL, Intravenous, PRN  magnesium hydroxide (MILK OF MAGNESIA) 400 MG/5ML suspension 30 mL, 30 mL, Oral, Daily PRN  0.9 % sodium chloride infusion, , Intravenous, Continuous  fludrocortisone (FLORINEF) tablet 0.2 mg, 0.2 mg, Oral, Daily    ASSESSMENT:   · 49year old female s/p right adonis hole craniotomy for SDH on 4/12 - stable.       SD drain removed 4/13    PLAN:  · WBAT  · Recent head CT reviewed from 4/13--no changes  · PT/OT  · Medical management   · No anticoagulants  · Keppra  · Staples to be removed 2 weeks from surgery  · Ok for [place] from [address] [place] [address]

## 2019-04-17 ENCOUNTER — APPOINTMENT (OUTPATIENT)
Dept: GENERAL RADIOLOGY | Age: 54
DRG: 940 | End: 2019-04-17
Attending: PHYSICAL MEDICINE & REHABILITATION
Payer: MEDICARE

## 2019-04-17 LAB
ANION GAP SERPL CALCULATED.3IONS-SCNC: 8 MMOL/L (ref 7–16)
BASOPHILS ABSOLUTE: 0.04 E9/L (ref 0–0.2)
BASOPHILS RELATIVE PERCENT: 1.2 % (ref 0–2)
BUN BLDV-MCNC: 6 MG/DL (ref 6–20)
CALCIUM SERPL-MCNC: 8.1 MG/DL (ref 8.6–10.2)
CHLORIDE BLD-SCNC: 108 MMOL/L (ref 98–107)
CO2: 28 MMOL/L (ref 22–29)
CREAT SERPL-MCNC: 0.6 MG/DL (ref 0.5–1)
EOSINOPHILS ABSOLUTE: 0.13 E9/L (ref 0.05–0.5)
EOSINOPHILS RELATIVE PERCENT: 4 % (ref 0–6)
GFR AFRICAN AMERICAN: >60
GFR NON-AFRICAN AMERICAN: >60 ML/MIN/1.73
GLUCOSE BLD-MCNC: 93 MG/DL (ref 74–99)
HCT VFR BLD CALC: 36.5 % (ref 34–48)
HEMOGLOBIN: 12.3 G/DL (ref 11.5–15.5)
IMMATURE GRANULOCYTES #: 0.01 E9/L
IMMATURE GRANULOCYTES %: 0.3 % (ref 0–5)
LYMPHOCYTES ABSOLUTE: 1.05 E9/L (ref 1.5–4)
LYMPHOCYTES RELATIVE PERCENT: 32.1 % (ref 20–42)
MAGNESIUM: 2.2 MG/DL (ref 1.6–2.6)
MCH RBC QN AUTO: 31.6 PG (ref 26–35)
MCHC RBC AUTO-ENTMCNC: 33.7 % (ref 32–34.5)
MCV RBC AUTO: 93.8 FL (ref 80–99.9)
MONOCYTES ABSOLUTE: 0.3 E9/L (ref 0.1–0.95)
MONOCYTES RELATIVE PERCENT: 9.2 % (ref 2–12)
NEUTROPHILS ABSOLUTE: 1.74 E9/L (ref 1.8–7.3)
NEUTROPHILS RELATIVE PERCENT: 53.2 % (ref 43–80)
PDW BLD-RTO: 13.2 FL (ref 11.5–15)
PLATELET # BLD: 193 E9/L (ref 130–450)
PMV BLD AUTO: 9.7 FL (ref 7–12)
POTASSIUM REFLEX MAGNESIUM: 3.5 MMOL/L (ref 3.5–5)
RBC # BLD: 3.89 E12/L (ref 3.5–5.5)
SODIUM BLD-SCNC: 144 MMOL/L (ref 132–146)
WBC # BLD: 3.3 E9/L (ref 4.5–11.5)

## 2019-04-17 PROCEDURE — 80048 BASIC METABOLIC PNL TOTAL CA: CPT

## 2019-04-17 PROCEDURE — 97535 SELF CARE MNGMENT TRAINING: CPT

## 2019-04-17 PROCEDURE — 36415 COLL VENOUS BLD VENIPUNCTURE: CPT

## 2019-04-17 PROCEDURE — 97530 THERAPEUTIC ACTIVITIES: CPT

## 2019-04-17 PROCEDURE — 6370000000 HC RX 637 (ALT 250 FOR IP): Performed by: PHYSICAL MEDICINE & REHABILITATION

## 2019-04-17 PROCEDURE — 97162 PT EVAL MOD COMPLEX 30 MIN: CPT

## 2019-04-17 PROCEDURE — 6370000000 HC RX 637 (ALT 250 FOR IP): Performed by: STUDENT IN AN ORGANIZED HEALTH CARE EDUCATION/TRAINING PROGRAM

## 2019-04-17 PROCEDURE — 97110 THERAPEUTIC EXERCISES: CPT

## 2019-04-17 PROCEDURE — 92523 SPEECH SOUND LANG COMPREHEN: CPT

## 2019-04-17 PROCEDURE — 1280000000 HC REHAB R&B

## 2019-04-17 PROCEDURE — 97166 OT EVAL MOD COMPLEX 45 MIN: CPT

## 2019-04-17 PROCEDURE — 73562 X-RAY EXAM OF KNEE 3: CPT

## 2019-04-17 PROCEDURE — 97165 OT EVAL LOW COMPLEX 30 MIN: CPT

## 2019-04-17 PROCEDURE — 83735 ASSAY OF MAGNESIUM: CPT

## 2019-04-17 PROCEDURE — 85025 COMPLETE CBC W/AUTO DIFF WBC: CPT

## 2019-04-17 PROCEDURE — 92610 EVALUATE SWALLOWING FUNCTION: CPT | Performed by: SPEECH-LANGUAGE PATHOLOGIST

## 2019-04-17 RX ADMIN — LEVETIRACETAM 500 MG: 500 TABLET, FILM COATED ORAL at 17:04

## 2019-04-17 RX ADMIN — DICLOFENAC EPOLAMINE 1 PATCH: 0.01 PATCH TOPICAL at 22:06

## 2019-04-17 RX ADMIN — LEVETIRACETAM 500 MG: 500 TABLET, FILM COATED ORAL at 06:12

## 2019-04-17 RX ADMIN — DICLOFENAC EPOLAMINE 1 PATCH: 0.01 PATCH TOPICAL at 13:30

## 2019-04-17 RX ADMIN — FLUDROCORTISONE ACETATE 0.2 MG: 0.1 TABLET ORAL at 08:38

## 2019-04-17 RX ADMIN — LEVOTHYROXINE SODIUM 112 MCG: 112 TABLET ORAL at 06:12

## 2019-04-17 ASSESSMENT — PAIN DESCRIPTION - ORIENTATION: ORIENTATION: RIGHT

## 2019-04-17 ASSESSMENT — PAIN DESCRIPTION - PROGRESSION: CLINICAL_PROGRESSION: NOT CHANGED

## 2019-04-17 ASSESSMENT — PAIN - FUNCTIONAL ASSESSMENT: PAIN_FUNCTIONAL_ASSESSMENT: PREVENTS OR INTERFERES SOME ACTIVE ACTIVITIES AND ADLS

## 2019-04-17 ASSESSMENT — PAIN DESCRIPTION - FREQUENCY: FREQUENCY: INTERMITTENT

## 2019-04-17 ASSESSMENT — PAIN DESCRIPTION - DESCRIPTORS: DESCRIPTORS: ACHING;DISCOMFORT;DULL

## 2019-04-17 ASSESSMENT — PAIN SCALES - GENERAL
PAINLEVEL_OUTOF10: 0
PAINLEVEL_OUTOF10: 5
PAINLEVEL_OUTOF10: 5

## 2019-04-17 ASSESSMENT — PAIN DESCRIPTION - LOCATION: LOCATION: KNEE

## 2019-04-17 ASSESSMENT — PAIN DESCRIPTION - PAIN TYPE: TYPE: CHRONIC PAIN

## 2019-04-17 ASSESSMENT — PAIN DESCRIPTION - ONSET: ONSET: ON-GOING

## 2019-04-17 NOTE — PLAN OF CARE
Problem: Inadequate oral food/beverage intake (NI-2.1)  Goal: Food and/or Nutrient Delivery  Description Add Ensure BID, continue calorie count   Individualized approach for food/nutrient provision.   Outcome: Met This Shift

## 2019-04-17 NOTE — PROGRESS NOTES
Gait Training, Cognitive Reorientation, Patient/Caregiver Education & Training, Home Management Training, Cognitive/Perceptual Training, Equipment Evaluation, Education, & procurement, Endurance Training, Balance Training, Neuromuscular Re-education, Safety Education & Training, Self-Care / ADL      [x] Continue with current OT Plan of care.   [] Prepare for Discharge     DISCHARGE RECOMMENDATIONS  Recommended DME:    Post Discharge Care:   []Home Independently  []Home with 24hr Care / Supervision []Home with Partial Supervision []Home with Home Health OT []Home with Out Pt OT []Other: ___   Comments:         Time in Time out Tx Time Breakdown  Variance:   First Session    [] Individual Tx-   [] Concurrent Tx -  [] Co-Tx -   [] Group Tx -   [] Time Missed -     Second Session 1:00 1:45 [] Individual Tx-   [x] Concurrent Tx - 45 Mins  [] Co-Tx -   [] Group Tx -   [] Time Missed -     Third Session    [] Individual Tx-   [] Concurrent Tx -  [] Co-Tx -   [] Group Tx -   [] Time Missed -         Total Tx Time  45 Mins       Mohsen JAUREGUI/MARIA GUADALUPE [id number]

## 2019-04-17 NOTE — PROGRESS NOTES
Nutrition Assessment    Type and Reason for Visit: Initial, Calorie Count, Positive Nutrition Screen    Nutrition Recommendations: Continue current calorie count. Continue current diet as ordered. Will start patient on Ensure BID to promote optimal PO intake and healing. D/w Patient and family bedside importance of maintaining PO intake for recovery and progress through therapy. Will continue to monitor daily while calorie count active. Nutrition Assessment: patient at risk of nutritional compromise d/t increased nutrient needs for healing w/ presence of surgical incision s/p adonis hole crani. Patient ordered a calorie count, envelope on door, kitchen and RN made aware, will closely monitor and follow patient's PO intake. Malnutrition Assessment:  · Malnutrition Status: At risk for malnutrition  · Context: Acute illness or injury  · Findings of the 6 clinical characteristics of malnutrition (Minimum of 2 out of 6 clinical characteristics is required to make the diagnosis of moderate or severe Protein Calorie Malnutrition based on AND/ASPEN Guidelines):  1. Energy Intake-Less than or equal to 75% of estimated energy requirement, Greater than or equal to 7 days(PTA to ARU  )    2. Weight Loss-No significant weight loss(using OV wts ),    3. Fat Loss-No significant subcutaneous fat loss,    4. Muscle Loss-No significant muscle mass loss,    5. Fluid Accumulation-No significant fluid accumulation,    6.  Strength-Not measured    Nutrition Risk Level:  Moderate    Nutrient Needs:  · Estimated Daily Total Kcal: 1169-5580(MSJ: 1134 x 1.2 )  · Estimated Daily Protein (g): 80-90(1.3-1.5)  · Estimated Daily Total Fluid (ml/day): 1300ml     Nutrition Diagnosis:   · Problem: Inadequate oral intake  · Etiology: related to Acute injury/trauma     Signs and symptoms:  as evidenced by Intake 50-75%    Objective Information:  · Nutrition-Focused Physical Findings: No I/Os documented, oriented x2, hx of downs syndrome, soft abd, active bs, no edema noted, admit to ARU s/p fall w/ SDH and adonis hole crani,.    · Wound Type: Surgical Wound  · Current Nutrition Therapies:  · Oral Diet Orders: General   · Oral Diet intake: 51-75%  · Oral Nutrition Supplement (ONS) Orders: None  · Anthropometric Measures:  · Ht: 5' (152.4 cm)   · Current Body Wt: 134 lb (60.8 kg)(4/16 actual bedscale- MARYCHUY updated wt 2/2 pt oob in wheelchair during visit )  · Usual Body Wt: (no actual wts per EMR- family reports unaware of any wt changes wt per OV 7/2018 = 139# )  · % Weight Change:  ,  No significant wt changes per EMR using OV wts- family unaware of any wt changes   · Ideal Body Wt: 100 lb (45.4 kg), % Ideal Body 134%  · BMI Classification: BMI 25.0 - 29.9 Overweight    Nutrition Interventions:   Continue current diet, Start ONS, Start Calorie Count  Continued Inpatient Monitoring, Education Initiated, Coordination of Care(d/w pt and family importance of maintaining PO intake )    Nutrition Evaluation:   · Evaluation: Goals set   · Goals: consume 75% or more of most meals/ONS     · Monitoring: Meal Intake, Supplement Intake, Diet Tolerance, Skin Integrity, Wound Healing, I&O, Mental Status/Confusion, Weight, Pertinent Labs, Chewing/Swallowing, Patient/Family Education, Monitor Bowel Function      Electronically signed by Arianna Pearl RD, LD on 4/17/19 at 2:52 PM    Contact Number: [phone]

## 2019-04-17 NOTE — PROGRESS NOTES
Occupational Therapy   Occupational Therapy Initial Assessment  Date: 2019   Patient Name: Aaron Hsu See  MRN: [id number]     : 1965  Diagnosis: subdural hematoma, s/p adonis hole craniotomy   Additional Pertinent Hx: down syndrome  Referring Practitioner:     Precautions: falls, down syndrome    Date of Service: 2019    Discharge Recommendations: home with 24 hr, home health OT (pending OT progress)     Subjective   General  Chart Reviewed: Yes  Pain Level: 5/10 R knee    Social/Functional History (mom present and able to confirm history)  Lives With: (parents)  Type of Home: House  Home Layout: Two level, Bed/Bath upstairs  Home Access: Stairs to enter with rails  Entrance Stairs - Number of Steps: 3  Bathroom Shower/Tub: Walk-in shower  Bathroom Toilet: Standard  Bathroom Equipment: Grab bars in shower, extended tub bench, 3 in 1 commode  Home Equipment: Cane,rollator, helmet  Receives Help From: Family  ADL Assistance: Independent  Homemaking Assistance: Needs assistance (able to participate and help out family)  Ambulation Assistance: Independent  Transfer Assistance: Independent  Active : No  Mode of Transportation: Van(to/from work)  Occupation: (works at [employer])     Objective   Vision: Within Functional Limits  Hearing: Within functional limits      Overall Orientation Status: Impaired  Orientation Level: Oriented to place, oriented to name (pt able to choose year and month with options)     Balance  Sitting Balance: Minimal assistance (EOB)  Standing Balance: Minimal assistance (at sink)    Toilet Transfer: Moderate assistance (cues for body mechanics)  Shower Transfers:  Moderate assistance (cues for body mechanics)  Bed-w/c transfer: Max A (pt verbalizing anxiety)    ADL  Feeding: Supervision (self feeding upon arrival to )  Grooming: Setup (seated at sink for oral hygiene)  UE sponge Bathing: Minimal assistance (cues for initiation)  LE sponge Bathing: Moderate assistance (assist with B feet and steadying when standing for assist with javi areas)  UE Dressing: Setup  LE Dressing: Maximum assistance (educated on crossing of leg technique, but pt required assist to chanda socks/brief/pants)  Toileting: Mod A (pt able to complete some pants management and javi care, assist for steadying and pants)    Coordination  Movements Are Fluid And Coordinated: Yes (pt able to open toothpaste and self feed)     Bed mobility  Supine to Sit: Moderate assistance (assist with RLE and to scoot to EOB)     Vision - Basic Assessment  Prior Vision: Wears glasses all the time    Cognition  Overall Cognitive Status: Exceptions (pt asked to complete clock drawing, pt only able to write numbers on clock but not line up correctly)   Arousal/Alertness: Delayed responses to stimuli  Following Commands: Follows one step commands with repetition; Follows one step commands with increased time   Attention Span: Attends with cues to redirect; Difficulty attending to directions; Difficulty dividing attention  Memory: (decreased awareness of date)  Safety Judgement: Decreased awareness of need for safety;Decreased awareness of need for assistance  Problem Solving: Assistance required to implement solutions;Assistance required to identify errors made;Assistance required to correct errors made;Assistance required to generate solutions  Insights: Decreased awareness of deficits  Initiation: Requires cues for some  Sequencing: Requires cues for some  Perception  Overall Perceptual Status: WFL     Sensation  Overall Sensation Status: WFL      BUE AROM : WFL  Gross BUE Strength: WFL 4+/5  B Hand Grasp: 4+/5    Plan: 2x/day, 5-7 days/wk  Plan weeks: 2-4  Current Treatment Recommendations: Strengthening, Functional Mobility Training, Gait Training, Cognitive Reorientation, Patient/Caregiver Education & Training, Home Management Training, Cognitive/Perceptual Training, Equipment Evaluation, Education, & procurement, Endurance Training, Balance Training, Neuromuscular Re-education, Safety Education & Training, Self-Care / ADL      Assessment   Performance deficits / Impairments: Decreased functional mobility ; Decreased strength;Decreased endurance;Decreased ADL status; Decreased safe awareness;Decreased cognition;Decreased balance  Prognosis: Good  Decision Making: Medium Complexity  Patient Education:  Pt and mom educated in regards to OT process and pt participated in OT goal planning. Pt in agreement with POC established. Pt appears motivated, pleasant, and cooperative. REQUIRES OT FOLLOW UP: Yes  Activity Tolerance: Patient Tolerated treatment well(but limited by pain and anxiety)  Treatment initiated: Pt educated in regards to body mechanics for bed mobility, sit-stands, ADLs, and functional transfers. Pt gets emotional and verbalizes anxiety frequently due to pain in RLE and fear of being mobile. Required cues to motivate and self challenge self.    Safety Devices in place: Yes  Type of devices: Call light within reach        Goals  Long term goals  Time Frame for Long term goals : 2-4 wks  Long term goal 1: Pt will be IND with self feeding  Long term goal 2: Pt will be IND with UE dessing  Long term goal 3: Pt will be Min A for LE dressing, using AE as needed  Long term goal 4: Pt will be SUP for bathing task, seated/standing  Long term goal 5: Pt will be SUP for toilet transfer  Long term goals 6: Pt will be SUP for shower transfer  Long term goal 7: Pt will be IND with grooming task, seated/standing  Long term goal 8: Pt will be Mod I for homemaking task  Patient Goals   Patient goals : pt demo'ing difficulty forming goal at this time       Therapy Time   Individual Concurrent Group Co-treatment   Time In Eval: 8:05  Tx: 8:20         Time Out Eval: 8:20  Tx: 9:05         Minutes [place], [place], OTR/L [id number]

## 2019-04-17 NOTE — PROGRESS NOTES
SPEECH/LANGUAGE PATHOLOGY  BEDSIDE SWALLOWING EVALUATION    PATIENT NAME:  Felicity Gilford A See      :  1965      TODAY'S DATE:  2019    SUMMARY OF EVALUATION     DYSPHAGIA DIAGNOSIS:   WFL      DIET RECOMMENDATIONS:  Regular, thin liquids as tolerated; no straws     FEEDING RECOMMENDATIONS:    []No assistance required   [x]Stand by assist    []Full assistance required  []Set up required    []Supervision with all PO intake     []Double swallow    []Chin tuck   []Multiple swallow     [x]Small bites/sips      []Alternate solids / liquids  []Check for oral pocketing   [x]No straw    []Throat clear       []Spoon sip liquids   []Effortful swallow   []   [] Malnutrition indicators have been identified and nursing has been notified to ensure a dietary consult is ordered.       THERAPY RECOMMENDATIONS:       []  Therapy is not recommended       [x]  Therapy is recommended to:     []  Improve oral motor strength and range of motion     []  Improve tongue base retraction      []  Improve laryngeal strength and range of motion     [x]  Mealtime assessment of patient's tolerance of prescribed diet and train on comp strats for safe swallow    []  Repeat bedside swallow study in    []  Video Swallowing Evaluation is recommended and requires a Physician order                PROCEDURE     Consistencies Administered During the Evaluation   Liquids: [x]  Thin    []  Nectar   []  Honey   Solids:  [x]  Pureed/Pudding   []  Soft Solid   [x]  Cookie   Other:      Method of Intake:   [x]  Cup  [x]  Spoon  [x]  Straw  [x]  Self Fed  []  Fed by Clinician     Position:   [x]  Upright seated ([x]In bed [] in chair)   []  Reclined in bed                   RESULTS     Oral Stage:   []  Normal   [x]  Functional  []  Abnormal     [x]  Dentition: ([x]  natural  [x]  missing teeth  []  edentulous  []  partials  [] dentures )    []  Inadequate labial seal resulting anterior labial spillage from ([] right  [] left  [] midline )    []  Decreased mastication due to ([]  poor/missing dentition  []  decreased lingual control  []  vertical munch  []  cognitive function)     [x]  Delayed A-P transit due to ([]  decreased initiation   [x] reduced lingual strength   []  cognitive function )    [x]  Oral residuals []  right buccal cavity  []  left buccal cavity []  sub lingually   []  on tongue base   [x]  throughout oral cavity     []  on superior tongue       []  on palate               []  on velum              []  anterior sulcus    Comments:      Pharyngeal Stage:  []  Normal   [x]  Functional      []  Abnormal     []  No signs of aspiration were noted during this evaluation however, silent aspiration cannot be ruled out at bedside. If silent aspiration is suspected, a Videofluoroscopic Study of Swallowing (MBS) is recommended and requires a physician order. [x]  Throat clearing present after presentation of ([x]  Thin per straw  []  nectar  []  honey  []  pureed  []  solid)    []  Immediate wet cough was noted after presentation of ([]  thin  []  nectar  []  honey  []  pureed  []  Solid)    []  Latent wet cough was noted after presentation of ([]  thin  []  nectar  []  honey  []  pureed  []  solid)    []  Wet respirations were noted after presentation of ([]  thin  []  nectar  []  honey  []  pureed  []  solid)    []  Wet/gurgly vocal quality was noted after presentation of ([]  thin  []  nectar  []  honey  []  pureed  []  Solid)    [] Multiple swallows were noted after presentation of ([]  thin  []  nectar  []  honey  []  pureed  [] solid)    []  Consistent O2  desaturation after the swallow    []  Eye watering/flushing of skin    []  Congested cough throughout evaluation    [] Delayed initiation of the pharyngeal swallow noted    []  Absent swallow                    [x]  Prognosis for improvements is good  [x]  This plan will be re-evaluated and revised in 1 week  if warranted.    [x]  Patient stated goals: quit coughing   [x]  Treatment goals discussed with [x]  patient/  [x]  family. [x]  The []  patient/ [x]  family understand the diagnosis, prognosis and plan of care. [x]The admitting diagnosis and active problem list, as listed below have been reviewed prior to initiation of this evaluation.      ADMITTING DIAGNOSIS: Subdural hematoma ([facility] [address] [S06.5X9A]     ACTIVE PROBLEM LIST:   Patient Active Problem List   Diagnosis    Down's syndrome    Systolic murmur    Vasodepressor syncope    Asymptomatic varicose veins of bilateral lower extremities    Hypothyroidism    Chest pain    SDH (subdural hematoma) (HCC)    Subdural hematoma (HCC)

## 2019-04-17 NOTE — CARE COORDINATION
Social Work Assessment Summary    PCP: Dr. Ej Dyer    Patient Resides: Pt lives at home with her her mom and dad, Myla Dick (80) and Zach (80). Home Architecture : Pt lives in a 2 story home with 3 steps to enter and 1 rail, and 14 steps to the 2nd floor with 1 rail. The bathroom has a walk-in shower with no curtain or door. Will you return to your own home? Yes        Primary Caregiver: Pts primary caregivers are her mom, Myla Dick (80) and her dad, Kelly Roland (80). They are both retired, healthy and they drive. Level of Function PTA : Pt was independent in all areas other than driving. Employment: Pt went to [employer] and worked in [employer]. DME Pta  : Pt owns a wheeled walker, a rollator, and a bedside commode. Community Agency Involvement PTA : Pt had [facility] come for PT. [facility] for skilled nursing. Do they have a medical profile: No    Family Teaching: TBS    Strength: \"Loves to be busy, very helpful. \"     Preference: \"Be independent, use no medical equipment and be where she was before. \"      NAME RELATION HOME # WORK # CELL #   Myla Dick and Kelly Roland parents Luke Pratt brother   [phone]              Height: 5'  Weight: [address] Intern  Lutricia Head  4/17/2019

## 2019-04-17 NOTE — PROGRESS NOTES
Physical Therapy    Facility/Department: [facility]  Initial Assessment    NAME: Khadra Siegel See  : 1965  MRN: [id number]    Date of Service: 2019  Evaluating Therapist: Toni Coughlin P.T.    ROOM: [facility]  DIAGNOSIS: TBI  PRECAUTIONS: Fall risk, Down Syndrome, OA   PROCEDURE(S):  R adonis hole with drain placement  HPI: The pt has been following with neurology after a fall that occurred in 2019 resulting in a temporal fracture, SDH, and SAH. The pt was admitted on  for worsening CT. Social:  Pt lives with her parents in a 2 floor plan with 3 steps and 1 rail to enter with 14 steps and 1 rail to the 2nd floor bath/bedroom, 1/2 bath on the 1st floor. Prior to admission pt ambulated with no AD and worked at [employer]. 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 Initial Evaluation  19          Short Term Goals Long Term Goals    Was pt agreeable to Eval/treatment? Yes Initial Evaluation Initial Evaluation     Does pt have pain?  R knee pain       Bed Mobility  Rolling: Min A  Supine to sit: Min A  Sit to supine: Min A  Scooting: Min A   SBA Modified Independent   Transfers Sit to stand: SBA  Stand to sit: Min A  Stand pivot: Min A with franck standard walker   SBA Modified Independent   Ambulation   10 feet with franck standard walker with Min A   20 feet with AAD with SBA >150 feet with AAD with Supervision   Walking 10 feet on uneven surface NT   10 feet with AAD with SBA >10 feet with AAD with Supervision   Wheel Chair Mobility NT       Car Transfers NT   SBA Supervision   Stair negotiation: ascended and descended 1 step with 2 rails with Min A   >4 steps with 1 rail with SBA >12 steps with 1 rail with Supervision   Curb Step:   ascended and descended NT   4 inch step with AAD and SBA 4 inch step with AAD and Supervision   Picking up object off the floor NT   Will  an object with SBA Will  an object with Supervision   BLE ROM WFL, RLE limited by pain       BLE Strength RLE is grossly 2/5 at the hip and knee and 4/5 at the ankle  LLE is grossly 4/5       Balance  Sitting EOB: Supervision  Dynamic standing: Min A       Date Family Teach Completed Pt's mother present for initial evaluation       Is additional Family Teaching Needed? Y or N Yes       Hindering Progress R knee pain       PT recommended ELOS 2 weeks       Team's Discharge Plan        Therapist at Team Meeting          Pt is alert and Oriented x3 to self, place, and day  Sensation: Denied numbness/tingling, reported symmetry to light touch sensation  Edema: R knee is swollen, no edema noted  Endurance: Fair, limited by pain  Skin was inspected: BLE appear intact     ASSESSMENT  Pt displays functional ability as noted in the objective portion of this evaluation. Comments: The pt perseverates on her R knee pain, she is able to return to baseline quickly but becomes emotional when talking about moving her R leg. Attempted to redirect and focus on task, the pt was able to use her arms to take weight off the R knee when given a franck standard walker. Educated the pt and her mother on the importance of movement for reducing knee pain and the pt's mother stated her understanding. The pt was able to perform several RLE LAQs with limited range that she reported was pain free. Patient education  Pt educated on moving her RLE in bed and in the chair and the importance of activity to reduce pain. Patient response to education:   Pt verbalized understanding Pt demonstrated skill Pt requires further education in this area   Yes With encouragement Yes     Rehab potential is Good for reaching above PT goals. Pts/ family goals   1. Per pt's mother, Stacie Mcnair she is up walking and stable on her feet, the way she was before she had her fall. \"    Patient and or family understand(s) diagnosis, prognosis, and plan of care: Yes    PLAN  PT care will be provided in accordance with the objectives noted above.  Whenever appropriate, clear delegation orders will be provided for nursing staff. Exercises and functional mobility practice will be used as well as appropriate assistive devices or modalities to obtain goals. Patient and family education will also be administered as needed. Frequency of treatments will be 2x/day M-F and 1x/day Sat or Sun x  14 days. Time in: 0915  Time out: [address] [facility] [address]. My [place] P.T.    License number:  [id number]

## 2019-04-17 NOTE — PROGRESS NOTES
Physical Therapy    Facility/Department: [address] REHAB  Rx. note    NAME: Brit Mccloud See  : 1965  MRN: [id number]    Date of Service: 2019  Evaluating Therapist: Yenni Wallace. Bety Bourgeois P.T.    ROOM: [facility]  DIAGNOSIS: TBI  PRECAUTIONS: Fall risk, Down Syndrome, OA   PROCEDURE(S):  R adonis hole with drain placement  HPI: The pt has been following with neurology after a fall that occurred in 2019 resulting in a temporal fracture, SDH, and SAH. The pt was admitted on  for worsening CT. Social:  Pt lives with her parents in a 2 floor plan with 3 steps and 1 rail to enter with 14 steps and 1 rail to the 2nd floor bath/bedroom, 1/2 bath on the 1st floor. Prior to admission pt ambulated with no AD and worked at [employer]. 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 Initial Evaluation  19      PM Short Term Goals Long Term Goals    Was pt agreeable to Eval/treatment? Yes Initial Evaluation yes     Does pt have pain?  R knee pain  Right knee pain     Bed Mobility  Rolling: Min A  Supine to sit: Min A  Sit to supine: Min A  Scooting: Min A    Rolling min  Supine to sit min  Sit to supine min  Scooting min SBA Modified Independent   Transfers Sit to stand: SBA  Stand to sit: Min A  Stand pivot: Min A with franck standard walker   sit to stand cga - assist for hand placement    Stand to sit   cga  Stand pivot franck standard walker  min SBA Modified Independent   Ambulation   10 feet with franck standard walker with Min A  15 feet x 2 franck standard   walker min assist  -increase time 20 feet with AAD with SBA >150 feet with AAD with Supervision   Walking 10 feet on uneven surface NT  n t 10 feet with AAD with SBA >10 feet with AAD with Supervision   Wheel Chair Mobility NT  nt     Car Transfers NT  nt SBA Supervision   Stair negotiation: ascended and descended 1 step with 2 rails with Min A          nt >4 steps with 1 rail with SBA >12 steps with 1 rail with Supervision Curb Step:   ascended and descended NT  nt 4 inch step with AAD and SBA 4 inch step with AAD and Supervision   Picking up object off the floor NT  nt Will  an object with SBA Will  an object with Supervision   BLE ROM WFL, RLE limited by pain  Decrease knee extension with pain and end range     BLE Strength RLE is grossly 2/5 at the hip and knee and 4/5 at the ankle  LLE is grossly 4/5       Balance  Sitting EOB: Supervision  Dynamic standing: Min A       Date Family Teach Completed Pt's mother present for initial evaluation  Pt father present     Is additional Family Teaching Needed? Y or N Yes  yes     Hindering Progress R knee pain  Right knee pain     PT recommended ELOS 2 weeks       Team's Discharge Plan        Therapist at Team Meeting            Therapeutic Exercise: Laq with cues and light assist on right,   Left laq with 3# weight   heel slides with assist on right  saq on right with assist and arom on left      Patient education  Pt educated on importance of mobility    Patient response to education:   Pt verbalized understanding Pt demonstrated skill Pt requires further education in this area   x x x     Additional Comments: pt fearful and crying a lot. Father present and reports she walked more at home and has more pain  and feels she is more emotional since this incident with TBI. Pt required increase time to ambulate due to pain but she was willing and required increase time and encouragement during rx. Pt back to room with father. PM     Time in: 245  Time out: 330        Pt is making slow progress toward established Physical Therapy goals. Continue with physical therapy current plan of care.   Cristy Subramanian, [address]

## 2019-04-17 NOTE — PROGRESS NOTES
4/17 Daily Calorie Count:     Breakfast Lunch Dinner Total   Calories (kcal) 330  200 Pending  pending   Protein (gm) 10 15 Pending Pending      Comments: Spoke w/ patient and family today. Started Ensure BID. ONS should come up on pending dinner tray to encourage patient's PO and increased protein intake. Will follow. Calculated Nutrient Needs:  · Estimated Daily Total Kcal: 1746-3904(MSJ: 1134 x 1.2 )  · Estimated Daily Protein (g): 80-90(1.3-1.5)  · Estimated Daily Total Fluid (ml/day): 1300ml     Thank you!  Electronically signed by Ronny Alvarado RD, LD on 4/17/2019 at 2:58 PM

## 2019-04-17 NOTE — H&P
[address]                  [address] [facility],  [address]                              HISTORY AND PHYSICAL    PATIENT NAME: Irene ALEXANDRA                        :        1965  MED REC NO:   [id number]                            ROOM:       [id number]  ACCOUNT NO:   [de-identified]                           ADMIT DATE: 2019  PROVIDER:     Stuart Ortega MD    History from the patient and medical records. CHIEF COMPLAINT:  Intracranial bleed. HISTORY OF PRESENT ILLNESS:  The patient was admitted to [facility]  with a subdural hematoma. She had had a fall in [place] with a  traumatic brain injury and was admitted there, initially to acute care  and then to rehab, but she was not felt to require any neurosurgical  intervention there. She improved and came home, but a few weeks after  she came home, she developed worsening nausea, emesis, and gait  impairment, and was found at [facility] to have a recurrent  subdural.  She underwent a bur hole craniotomy by Dr. Joey Schulz. She is  still at a moderate assist level functionally and is felt to be a good  candidate for further rehab. PAST MEDICAL HISTORY:  1. Developmental delay. 2.  Hypothyroidism. 3.  Previous knee replacement with severe osteoarthritis of the right  knee. ALLERGIES:  None known. CURRENT MEDICATIONS:  Florinef, Keppra, Synthroid and Tylenol for pain. HABITS:  No smoking or alcohol. SOCIAL HISTORY:  She lives with her mother. REVIEW OF SYSTEMS:  See prior consult. PHYSICAL EXAMINATION:  GENERAL:  A well-nourished, well-developed, white female. HEAD:  Craniotomy at the right side. LUNGS:  Clear to P and A. HEART:  Regular rate and rhythm. S1, S2 normal.  No murmurs or gallops. ABDOMEN:  Bowel sounds normal.  Soft, nontender. No masses or  organomegaly. EXTREMITIES:  Without clubbing, cyanosis or edema.   There is a  contracture at the right knee.  MENTAL STATUS:  Alert, some cognitive deficits. Sensation is intact. NEUROMUSCULAR:  4/5 throughout except the right knee which seems to be  limited more due to pain. PROBLEM LIST:  1. Right side subdural hematoma. 2.  Status post bur hole craniotomy. 3.  Right knee osteoarthritis. 4.  Previous left knee replacement. 5.  Developmental delay. 6.  Hypothyroidism. Individualized overall plan of care, I think the patient is a good  candidate for further rehab. I am going to x-ray the knee and try some  Voltaren to that and see if we can get improved pain control and  movement there. We may need an Orthopedic consult if it persists. She  has also, I think, got some deficits related to the subdural that we  will need to address. Overall medical prognosis is good. Rehab  prognosis is good. PT will be working on ambulation, transfers and advanced transfers an  hour and a half, five to seven days a week with goals of modified  independent. OT will be working on upper extremity strengthening,  functioning, ADL skills and basic homemaking an hour and a half, five to  seven days a week with goals of modified independent. Speech will be  seeing her half an hour daily. She will have 24-hour-a-day,  seven-day-a-week rehab nursing to address bowel and bladder issues,  carryover from therapy and safety. Overall length of stay will probably about two weeks with the patient  returning home at the end of that time.         Ashley Finley MD    D: 04/17/2019 8:50:50       T: 04/17/2019 8:53:08     [email or web address]  Job#: [id number]     Doc#: [id number]    CC:

## 2019-04-17 NOTE — PROGRESS NOTES
SPEECH/LANGUAGE PATHOLOGY  SPEECH/LANGUAGE/COGNITIVE EVALUATION      PATIENT NAME:  Klaudia MORALES See      :  1965      TODAY'S DATE:  2019      SPEECH PATHOLOGY DIAGNOSIS:    mild cognitive deficits    THERAPY RECOMMENDATIONS:    Speech Pathology intervention is recommended 3-5 times per week for LOS or when goals are met with emphasis on the following: To improve all areas of memory for functional activities  To improve problem solving                MOTOR SPEECH       Oral Peripheral Examination   Oral motor strength and coordination were within functional limits    Parameters of Speech Production  Respiration:  Within normal limits  Articulation:  Within normal limits  Resonance:  Within normal limits  Quality:   Within normal limits  Pitch: Within normal limits  Intensity: Within normal limits  Fluency:  Within normal limits  Prosody Within normal limits      RECEPTIVE LANGUAGE    Comprehension of Yes/No Questions: Within normal limits    Process  Simple Verbal Commands: Within normal limits  Process Intermediate Verbal Commands: Within normal limits  Process Complex Verbal Commands: Within normal limits     Comprehension of Conversation: Within normal limits       EXPRESSIVE LANGUAGE     Serials: Within normal limits    Imitation:  Words   Within normal limits  Sentences Within normal limits    Naming:  (Modality used: verbal)  Confrontation Naming  Within normal limits  Functional Description  Within normal limits}  Response Naming: Within normal limits    Conversation:   Within normal limits    COGNITION     Attention/Orientation  Attention: Sustained attention   Orientation:  Oriented to Person, Place  Memory   Biographical:  recalled Address, Birthdate, Age and Family  Delayed recall:  recalled 1/3 words    Organization/Problem Solving/Reasoning   Verbal Sequencing: At baseline  Problem solving (verbal):   At baseline      CLINICAL OBSERVATIONS NOTED DURING THE EVALUATION  Within functional limits                    Prognosis for improvements is good  This plan will be re-evaluated and revised in 1 week  if warranted. Patient stated goals: Agreed with above  Treatment goals discussed with patient andFamily  The Patient and Family understand the diagnosis, prognosis and plan of care. Malnutrition indicators have been identified and nursing has been notified to ensure a dietary consult is ordered. The admitting diagnosis and active problem list, as listed below have been reviewed prior to initiation of this evaluation.      ADMITTING DIAGNOSIS: Subdural hematoma ([facility] [address] [S06.5X9A]     ACTIVE PROBLEM LIST:   Patient Active Problem List   Diagnosis    Down's syndrome    Systolic murmur    Vasodepressor syncope    Asymptomatic varicose veins of bilateral lower extremities    Hypothyroidism    Chest pain    SDH (subdural hematoma) (HCC)    Subdural hematoma (HCC)

## 2019-04-18 PROCEDURE — 1280000000 HC REHAB R&B

## 2019-04-18 PROCEDURE — 97110 THERAPEUTIC EXERCISES: CPT

## 2019-04-18 PROCEDURE — 97530 THERAPEUTIC ACTIVITIES: CPT

## 2019-04-18 PROCEDURE — 6370000000 HC RX 637 (ALT 250 FOR IP): Performed by: STUDENT IN AN ORGANIZED HEALTH CARE EDUCATION/TRAINING PROGRAM

## 2019-04-18 PROCEDURE — 6370000000 HC RX 637 (ALT 250 FOR IP): Performed by: PHYSICAL MEDICINE & REHABILITATION

## 2019-04-18 PROCEDURE — 97535 SELF CARE MNGMENT TRAINING: CPT

## 2019-04-18 PROCEDURE — 97127 HC SP THER IVNTJ W/FOCUS COG FUNCJ: CPT | Performed by: SPEECH-LANGUAGE PATHOLOGIST

## 2019-04-18 PROCEDURE — 92526 ORAL FUNCTION THERAPY: CPT | Performed by: SPEECH-LANGUAGE PATHOLOGIST

## 2019-04-18 RX ADMIN — DICLOFENAC EPOLAMINE 1 PATCH: 0.01 PATCH TOPICAL at 08:40

## 2019-04-18 RX ADMIN — FLUDROCORTISONE ACETATE 0.2 MG: 0.1 TABLET ORAL at 08:40

## 2019-04-18 RX ADMIN — LEVOTHYROXINE SODIUM 112 MCG: 112 TABLET ORAL at 06:12

## 2019-04-18 RX ADMIN — DICLOFENAC EPOLAMINE 1 PATCH: 0.01 PATCH TOPICAL at 21:51

## 2019-04-18 RX ADMIN — LEVETIRACETAM 500 MG: 500 TABLET, FILM COATED ORAL at 06:12

## 2019-04-18 RX ADMIN — LEVETIRACETAM 500 MG: 500 TABLET, FILM COATED ORAL at 17:05

## 2019-04-18 RX ADMIN — ACETAMINOPHEN 650 MG: 325 TABLET, FILM COATED ORAL at 13:06

## 2019-04-18 ASSESSMENT — PAIN DESCRIPTION - FREQUENCY: FREQUENCY: INTERMITTENT

## 2019-04-18 ASSESSMENT — PAIN DESCRIPTION - DESCRIPTORS: DESCRIPTORS: ACHING;DISCOMFORT;DULL

## 2019-04-18 ASSESSMENT — PAIN DESCRIPTION - ONSET: ONSET: ON-GOING

## 2019-04-18 ASSESSMENT — PAIN DESCRIPTION - ORIENTATION: ORIENTATION: RIGHT

## 2019-04-18 ASSESSMENT — PAIN DESCRIPTION - LOCATION: LOCATION: KNEE

## 2019-04-18 ASSESSMENT — PAIN DESCRIPTION - PAIN TYPE: TYPE: CHRONIC PAIN

## 2019-04-18 ASSESSMENT — PAIN SCALES - GENERAL: PAINLEVEL_OUTOF10: 6

## 2019-04-18 NOTE — PROGRESS NOTES
Occupational Therapy  OCCUPATIONAL THERAPY DAILY NOTE    Date:2019  Patient Name: Carla Jay See  MRN: [id number]  : 1965  Room: [address] [place] [address]       Diagnosis: subdural hematoma, s/p adonis hole craniotomy   Precautions:  falls, down syndrome    Functional Assessment:   Date Status AE  Comments   Feeding 19 Supervision      Grooming 19 Set up     Bathing 19 UB: Min A  LB: Mod A     UB Dressing 19 Set up     LB Dressing 19 Max A  Don/doff pants and socks. Assistance to pull pants over hips and cuing for technique to don sock and pants onto RLE. Homemaking         Functional Transfers / Balance:   Date Status DME  Comments   Sit Balance 19 Min A     Stand Balance 19 CGA/Min A     [] Tub  [] Shower   Transfer 19 Mod A     Commode   Transfer 19 Mod A     Functional   Mobility TBA      Other:  Bed>WC   19   Max A    Per eval     Functional Exercises / Activity:  2# free weight 3 X 10 shoulder flexion and scapular protraction/retraction to increase BUE strength for independence with functional tasks and transfers. VCs for encouragement at times. Static stand at table top with focus on stand tolerance and stand balance. Pt completed X 3 stands X 3-4 Mins with CGA for safety while completing prehension clip activity. Min/Mod resistive mehran bar X 30 reps on 4 planes to increase BUE forearm, wrist and  strength for independence with functional tasks and transfers. Sensory / Neuromuscular Re-Education:      Cognitive Skills:   Status Comments   Problem   Solving fair     Memory fair     Sequencing fair     Safety fair       Visual Perception:    Education:  Pt educated on LB dressing technique. [] Family teach completed on:    Pain Level: Additional Notes:       Patient has made good  progress during treatment sessions toward set goals.  Therapy emphasis to obtain goals:Strengthening, Functional Mobility Training, Gait Training, Cognitive Reorientation, Patient/Caregiver Education & Training, Home Management Training, Cognitive/Perceptual Training, Equipment Evaluation, Education, & procurement, Endurance Training, Balance Training, Neuromuscular Re-education, Safety Education & Training, Self-Care / ADL      [x] Continue with current OT Plan of care. [] Prepare for Discharge     DISCHARGE RECOMMENDATIONS  Recommended DME:    Post Discharge Care:   []Home Independently  []Home with 24hr Care / Supervision []Home with Partial Supervision []Home with Home Health OT []Home with Out Pt OT []Other: ___   Comments:         Time in Time out Tx Time Breakdown  Variance:   First Session  10:45 11:30 [] Individual Tx-   [x] Concurrent Tx - 45 Mins  [] Co-Tx -   [] Group Tx -   [] Time Missed -     Second Session 1:00 1:45 [] Individual Tx-   [x] Concurrent Tx - 45 Mins  [] Co-Tx -   [] Group Tx -   [] Time Missed -     Third Session    [] Individual Tx-   [] Concurrent Tx -  [] Co-Tx -   [] Group Tx -   [] Time Missed -         Total Tx Time  90 Mins       Mohsen Young JAUREGUI/L [id number]  I have read the above note and agree with the documentation.   Gunnar Herrmann OTR/L [id number]

## 2019-04-18 NOTE — PROGRESS NOTES
Speech Language Pathology  DAILY PROGRESS NOTE  Speech Language Pathology  ACUTE REHABILITATION--DAILY PROGRESS NOTE      PATIENT NAME:  Chio MORALES See      :  1965      TODAY'S DATE:  2019         SWALLOWING        CURRENT LEVEL 6    Diet: regular, thin liquids as tolerated, no straws     Oral prep/oral:    min oral containment issues were identified. Fair-good oral prep and A-P propulsion of bolus were noted with good clearance of oral residuals. Pharyngeal:    clear vocal quality was noted. no clinical indicators of aspiration were apparent. Intake-   good    INTERVENTION:   Pt actively participated in mealtime assessment. Father present. Pt educated on comp strats for safe swallow, including no straws. Educated father on no straws recommendation. LANGUAGE     Receptive  Current Status  6      Short Term Goal 6       Long Term Goal 6     Expressive  Current Status  6      Short Term Goal 6       Long Term Goal 6            INTERVENTION:  NA    COGNITION:      Problem Solving Current Status  5      Short Term Goal 5       Long Term Goal 6       Memory  Current Status  3      Short Term Goal 4       Long Term Goal 5      INTERVENTION: Pt unable to completed simple 3 step sequencing task with visual cues. Father reports he is unsure if she would be able to complete prior to admit. Pt completed written orientation task with fair ability. Verbal cues needed. Father reports pt would have been able to provide this prior to admit. SPEECH:       INTERVENTION: Pt presents with distortions that were present prior to admit. Pt 100% intelligible to unknown listener.       Cognitive/linguistic supervision recommendations      24 hour supervision    Close supervision   Intermittent supervision    No supervision              x                                                       Oral feeding recommendations               Close supervision Remote  supervision  No supervision necessary    Not applicable (Pt NPO)              x                                                                                                                                                                                                      Will continue SP intervention as per previously established POC.     Three Hour Rule Tracking:    Individual therapy:    minutes  Concurrent therapy:  60 minutes  Group therapy:    minutes  Co-treatment therapy:   minutes    Total minutes for 4/18/2019: 60 minutes

## 2019-04-18 NOTE — PROGRESS NOTES
Yasmani Early is a 49 y.o. female patient. Current Facility-Administered Medications   Medication Dose Route Frequency Provider Last Rate Last Dose    diclofenac (FLECTOR) 1.3 % patch 1 patch  1 patch Transdermal BID Hardeep Al MD   1 patch at 04/18/19 0840    fludrocortisone (FLORINEF) tablet 0.2 mg  0.2 mg Oral Daily Virgin Rinne, MD   0.2 mg at 04/18/19 0840    levETIRAcetam (KEPPRA) tablet 500 mg  500 mg Oral BID Virgin Rinne, MD   500 mg at 04/18/19 0612    levothyroxine (SYNTHROID) tablet 112 mcg  112 mcg Oral Daily Virgin Rinne, MD   112 mcg at 04/18/19 0612    LORazepam (ATIVAN) tablet 0.5 mg  0.5 mg Oral Q8H PRN Virgin Rinne, MD        acetaminophen (TYLENOL) tablet 650 mg  650 mg Oral Q4H PRN Hardeep Al MD        magnesium hydroxide (MILK OF MAGNESIA) 400 MG/5ML suspension 30 mL  30 mL Oral Daily PRN Hardeep Al MD         No Known Allergies  Active Problems:    Subdural hematoma (HCC)  Resolved Problems:    * No resolved hospital problems. *    Blood pressure 122/74, pulse 68, temperature 98.6 °F (37 °C), resp. rate 15, height 5' (1.524 m), weight 134 lb 1.6 oz (60.8 kg), SpO2 95 %. Subjective:  Symptoms:  Stable. She reports weakness. Diet:  Adequate intake. Activity level: Impaired due to weakness. Pain:  She reports no pain. Objective:  General Appearance: In no acute distress. Vital signs: (most recent): Blood pressure 122/74, pulse 68, temperature 98.6 °F (37 °C), resp. rate 15, height 5' (1.524 m), weight 134 lb 1.6 oz (60.8 kg), SpO2 95 %. Vital signs are normal.    Output: Producing urine and producing stool. Lungs:  Normal effort and normal respiratory rate. Breath sounds clear to auscultation. Heart: Normal rate. Regular rhythm. S1 normal and S2 normal.    Abdomen: Abdomen is soft. Bowel sounds are normal.   There is no abdominal tenderness. Extremities: Decreased range of motion. Neurological: Patient is alert. (4/5 str). Assessment:    Condition: In stable condition. Improving.   (SDH). Plan:   Encourage ambulation. (Moving right knee better  X-ray showed osteoarthritis  Improved with Flector patch).        Billie Apley, MD  4/18/2019

## 2019-04-18 NOTE — PROGRESS NOTES
Car Transfers NT [address] with hand placement  SBA Supervision   Stair negotiation: ascended and descended 1 step with 2 rails with Min A  4 steps 2 rails  Min/cga  - non reciprocal  Lead with good and down with sore leg     4 steps 2 rails  Min/cga  - non reciprocal  Lead with good and down with sore leg     4 steps L HR ascending with sideways approach with max cues and min/mod assist      Handrail on left at home ascending  17 steps    >4 steps with 1 rail with SBA >12 steps with 1 rail with Supervision   Curb Step:   ascended and descended NT  nt 4 inch step with AAD and SBA 4 inch step with AAD and Supervision   Picking up object off the floor NT  nt Will  an object with SBA Will  an object with Supervision   BLE ROM WFL, RLE limited by pain  Decrease knee extension with pain and end range     BLE Strength RLE is grossly 2/5 at the hip and knee and 4/5 at the ankle  LLE is grossly 4/5       Balance  Sitting EOB: Supervision  Dynamic standing: Min A       Date Family Teach Completed Pt's mother present for initial evaluation Father present am Mother present pm     Is additional Family Teaching Needed? Y or N Yes yes yes     Hindering Progress R knee pain Right knee pain Right knee pain     PT recommended ELOS 2 weeks       Team's Discharge Plan        Therapist at Team Meeting            Therapeutic Exercise:   SLR  2 x 10 on left and AA on right x 10     Function     Patient education  Pt educated on steps    Patient response to education:   Pt verbalized understanding Pt demonstrated skill Pt requires further education in this area   x x x     Additional Comments:   Pt was walking better this am but has episodes of knee pain which limits her. PM   Increase distance with gait but co a cramping in her leg. Used ww for her second gait she did well with gait smoother but limited by pain.       Pt's mother present in pm and father in am.    Pt required increase time to ambulate due to pain but she was willing and required increase time and encouragement during rx. Am  845 to 930  - back to room with father  Pm  245  To 18  Back to room with mother         Pt is making slow progress toward established Physical Therapy goals. Continue with physical therapy current plan of care.   Kyle Torres, [address]

## 2019-04-19 PROCEDURE — 97127 HC SP THER IVNTJ W/FOCUS COG FUNCJ: CPT | Performed by: SPEECH-LANGUAGE PATHOLOGIST

## 2019-04-19 PROCEDURE — 97530 THERAPEUTIC ACTIVITIES: CPT

## 2019-04-19 PROCEDURE — 1280000000 HC REHAB R&B

## 2019-04-19 PROCEDURE — 6370000000 HC RX 637 (ALT 250 FOR IP): Performed by: PHYSICAL MEDICINE & REHABILITATION

## 2019-04-19 PROCEDURE — 97535 SELF CARE MNGMENT TRAINING: CPT

## 2019-04-19 PROCEDURE — 6370000000 HC RX 637 (ALT 250 FOR IP): Performed by: STUDENT IN AN ORGANIZED HEALTH CARE EDUCATION/TRAINING PROGRAM

## 2019-04-19 PROCEDURE — 92526 ORAL FUNCTION THERAPY: CPT | Performed by: SPEECH-LANGUAGE PATHOLOGIST

## 2019-04-19 PROCEDURE — 97110 THERAPEUTIC EXERCISES: CPT

## 2019-04-19 RX ADMIN — DICLOFENAC EPOLAMINE 1 PATCH: 0.01 PATCH TOPICAL at 08:45

## 2019-04-19 RX ADMIN — DICLOFENAC EPOLAMINE 1 PATCH: 0.01 PATCH TOPICAL at 21:03

## 2019-04-19 RX ADMIN — LEVETIRACETAM 500 MG: 500 TABLET, FILM COATED ORAL at 06:36

## 2019-04-19 RX ADMIN — FLUDROCORTISONE ACETATE 0.2 MG: 0.1 TABLET ORAL at 08:45

## 2019-04-19 RX ADMIN — LEVOTHYROXINE SODIUM 112 MCG: 112 TABLET ORAL at 06:37

## 2019-04-19 RX ADMIN — LEVETIRACETAM 500 MG: 500 TABLET, FILM COATED ORAL at 16:38

## 2019-04-19 ASSESSMENT — PAIN DESCRIPTION - DESCRIPTORS: DESCRIPTORS: PATIENT UNABLE TO DESCRIBE

## 2019-04-19 ASSESSMENT — PAIN DESCRIPTION - ORIENTATION: ORIENTATION: RIGHT

## 2019-04-19 ASSESSMENT — PAIN DESCRIPTION - ONSET: ONSET: ON-GOING

## 2019-04-19 ASSESSMENT — PAIN DESCRIPTION - LOCATION: LOCATION: KNEE

## 2019-04-19 ASSESSMENT — PAIN SCALES - GENERAL
PAINLEVEL_OUTOF10: 0
PAINLEVEL_OUTOF10: 0

## 2019-04-19 ASSESSMENT — PAIN DESCRIPTION - FREQUENCY: FREQUENCY: INTERMITTENT

## 2019-04-19 ASSESSMENT — PAIN DESCRIPTION - PROGRESSION: CLINICAL_PROGRESSION: NOT CHANGED

## 2019-04-19 ASSESSMENT — PAIN DESCRIPTION - PAIN TYPE: TYPE: CHRONIC PAIN

## 2019-04-19 NOTE — PROGRESS NOTES
Occupational Therapy  OCCUPATIONAL THERAPY DAILY NOTE    Date:2019  Patient Name: Aaron Hsu See  MRN: [id number]  : 1965  Room: [address] [place], [place] [address]       Diagnosis: subdural hematoma, s/p adonis hole craniotomy   Precautions:  falls, down syndrome    Functional Assessment:   Date Status AE  Comments   Feeding 19 Supervision      Grooming 19 Set up     Bathing 19 UB: Min A  LB: Mod A     UB Dressing 19 Set up     LB Dressing 19 MOD A  LH shoe horn  To chanda/doff socks, tennis shoes    Homemaking         Functional Transfers / Balance:   Date Status DME  Comments   Sit Balance 19 Min A     Stand Balance 19 CGA     [] Tub  [x] Shower   Transfer 19 Min A ww Assistance to manage walker and VCs for safety. Commode   Transfer    Toiletin19 Min A      MIN A  ww AM/PM VCs for hand placement. Pt requiring assist to maintain standing balance to manage clothing; nursing staff assist with pt when finished with toileting task   Functional   Mobility 19 Min A ww Short distance in room v/c's for safety    Other:  Bed>WC   19   Max A         Functional Exercises / Activity:  Mod resistive theraputty with focus on BUE strength, endurance and B hand strength. Pt rolled out putty with wheel and retrieved coins. Mod VCs for encouragement and problem solving required to retrieve coins from putty. Pt could accurately sort coins into piles. Min/Mod resistive mehran bar X 20 reps on 1 plane to increase BUE forearm, wrist and  strength for independence with functional tasks and transfers. Pt engaged in pipe building activity requiring mod/max v/c's to follow diagram focusing on problem solving, [address] to increased independence with ADL tasks.      Sensory / Neuromuscular Re-Education:      Cognitive Skills:   Status Comments   Problem   Solving fair     Memory fair     Sequencing fair     Safety fair       Visual Perception:    Education:  Pt educated on safety during functional transfers. [] Family teach completed on:    Pain Level: Additional Notes:       Patient has made good  progress during treatment sessions toward set goals. Therapy emphasis to obtain goals:Strengthening, Functional Mobility Training, Gait Training, Cognitive Reorientation, Patient/Caregiver Education & Training, Home Management Training, Cognitive/Perceptual Training, Equipment Evaluation, Education, & procurement, Endurance Training, Balance Training, Neuromuscular Re-education, Safety Education & Training, Self-Care / ADL      [x] Continue with current OT Plan of care.   [] Prepare for Discharge     DISCHARGE RECOMMENDATIONS  Recommended DME:    Post Discharge Care:   []Home Independently  []Home with 24hr Care / Supervision []Home with Partial Supervision []Home with Home Health OT []Home with Out Pt OT []Other: ___   Comments:         Time in Time out Tx Time Breakdown  Variance:   First Session  10:45 11:30 [] Individual Tx-   [x] Concurrent Tx - 45 Mins  [] Co-Tx -   [] Group Tx -   [] Time Missed -     Second Session [phone] [] Individual Tx-   [x] Concurrent Tx - 45 min  [] Co-Tx -   [] Group Tx -   [] Time Missed -     Third Session    [] Individual Tx-   [] Concurrent Tx -  [] Co-Tx -   [] Group Tx -   [] Time Missed -         Total Tx Time  90 Mins       Mariam JAUREGUI/L [facility] [id number]

## 2019-04-19 NOTE — PROGRESS NOTES
Physical Therapy    Facility/Department: [address] REHAB  Rx. note    NAME: Finn Rhodes See  : 1965  MRN: [id number]    Date of Service: 2019  Evaluating Therapist: Eneida Stringer. Dottie Norwood P.T.    ROOM: [facility]  DIAGNOSIS: TBI  PRECAUTIONS: Fall risk, Down Syndrome, OA   PROCEDURE(S):  R adonis hole with drain placement  HPI: The pt has been following with neurology after a fall that occurred in 2019 resulting in a temporal fracture, SDH, and SAH. The pt was admitted on  for worsening CT. Social:  Pt lives with her parents in a 2 floor plan with 3 steps and 1 rail to enter with 14 steps and 1 rail to the 2nd floor bath/bedroom, 1/2 bath on the 1st floor. Prior to admission pt ambulated with no AD and worked at [employer]. 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   The 14 steps rail on left ascending and on the 3 steps rail on the right      Initial Evaluation  19 AM   PM Short Term Goals Long Term Goals    Was pt agreeable to Eval/treatment? Yes yes yes     Does pt have pain?  R knee pain Right knee pain Right knee pain     Bed Mobility  Rolling: Min A  Supine to sit: Min A  Sit to supine: Min A  Scooting: Min A Supine to sit sba  Sit to supine sba  Scooting sba     Rolling nt  Supine to sit nt  Sit to supine sba  Scooting sba SBA Modified Independent   Transfers Sit to stand: SBA  Stand to sit: Min A  Stand pivot: Min A with franck standard walker Sit to stand sba  Stand to sit sba cues for hand placement with sit <> stand  sit to stand sba/    Stand to sit   sba with cues for hand placement   Stand pivot franck standard walker  cga/sba SBA Modified Independent   Ambulation   10 feet with franck standard walker with Min A  50 feet with ww with occasional cues for walker placement and posture, encouragement with  sba    20 feet x 2 50 feet jr ww sba/ occasional cga     Recommend chair follow 20 feet with AAD with SBA >150 feet with AAD with Supervision   Walking 10 feet on uneven surface NT  n t 10 feet with AAD with SBA >10 feet with AAD with Supervision   Wheel Chair Mobility NT  nt     Car Transfers NT sba with using the handi bar sba   Verbal tactile cues for hand placement   SBA Supervision   Stair negotiation: ascended and descended 1 step with 2 rails with Min A  4 steps ascending rail on left and using therapist arm on her right to simulate chair rail like home with ligh min assist  -  non reciprocal  Lead with good and down with sore leg     4 steps 2 rails  X 2  Min/cga  - non reciprocal  Lead with good and down with sore leg         Handrail on left at home ascending  14 steps        >4 steps with 1 rail with SBA >12 steps with 1 rail with Supervision   Curb Step:   ascended and descended NT  nt 4 inch step with AAD and SBA 4 inch step with AAD and Supervision   Picking up object off the floor NT  nt Will  an object with SBA Will  an object with Supervision   BLE ROM WFL, RLE limited by pain  Decrease knee extension with pain and end range     BLE Strength RLE is grossly 2/5 at the hip and knee and 4/5 at the ankle  LLE is grossly 4/5       Balance  Sitting EOB: Supervision  Dynamic standing: Min A       Date Family Teach Completed Pt's mother present for initial evaluation Father present am Mother present pm     Is additional Family Teaching Needed?   Y or N Yes yes yes     Hindering Progress R knee pain Right knee pain Right knee pain     PT recommended ELOS 2 weeks       Team's Discharge Plan        Therapist at Team Meeting            Therapeutic Exercise:   AM  - function  Pm  laq        Patient education  Pt educated on steps    Patient response to education:   Pt verbalized understanding Pt demonstrated skill Pt requires further education in this area   x x x     Additional Comments:   Using a ww vs standard walker pain more under control using a ww and will continue to progress      Am  915 to 1000  - back to room with father  Pm  245  To 330 Back to room and pt in bed         Pt is making slow progress toward established Physical Therapy goals. Continue with physical therapy current plan of care.   Stevan Zaragoza, [address]

## 2019-04-19 NOTE — PROGRESS NOTES
Finn Early is a 49 y.o. female patient. Current Facility-Administered Medications   Medication Dose Route Frequency Provider Last Rate Last Dose    diclofenac (FLECTOR) 1.3 % patch 1 patch  1 patch Transdermal BID Charis Aaron MD   1 patch at 04/18/19 2151    fludrocortisone (FLORINEF) tablet 0.2 mg  0.2 mg Oral Daily Kaylyn Graves MD   0.2 mg at 04/18/19 0840    levETIRAcetam (KEPPRA) tablet 500 mg  500 mg Oral BID Kaylyn Graves MD   500 mg at 04/19/19 0636    levothyroxine (SYNTHROID) tablet 112 mcg  112 mcg Oral Daily Kaylyn Graves MD   112 mcg at 04/19/19 6518    LORazepam (ATIVAN) tablet 0.5 mg  0.5 mg Oral Q8H PRN Kaylyn Graves MD        acetaminophen (TYLENOL) tablet 650 mg  650 mg Oral Q4H PRN Charis Aaron MD   650 mg at 04/18/19 1306    magnesium hydroxide (MILK OF MAGNESIA) 400 MG/5ML suspension 30 mL  30 mL Oral Daily PRN Charis Aaron MD         No Known Allergies  Active Problems:    Subdural hematoma (HCC)  Resolved Problems:    * No resolved hospital problems. *    Blood pressure (!) 96/57, pulse 78, temperature 97.8 °F (36.6 °C), temperature source Temporal, resp. rate 16, height 5' (1.524 m), weight 134 lb 1.6 oz (60.8 kg), SpO2 98 %. Subjective:  Symptoms:  Stable. She reports weakness. Diet:  Adequate intake. Activity level: Impaired due to weakness. Pain:  She complains of pain that is moderate. Objective:  General Appearance: In no acute distress. Vital signs: (most recent): Blood pressure (!) 96/57, pulse 78, temperature 97.8 °F (36.6 °C), temperature source Temporal, resp. rate 16, height 5' (1.524 m), weight 134 lb 1.6 oz (60.8 kg), SpO2 98 %. Vital signs are normal.    Output: Producing urine and producing stool. Lungs:  Normal effort and normal respiratory rate. Breath sounds clear to auscultation. Heart: Normal rate. Regular rhythm. S1 normal and S2 normal.    Abdomen: Abdomen is soft.   Bowel sounds are normal.   There is no abdominal tenderness. Extremities: Decreased range of motion. Neurological: Patient is alert. (4/5 str).   Initial Evaluation  4/17/19      PM Short Term Goals Long Term Goals    Was pt agreeable to Eval/treatment? Yes Initial Evaluation yes       Does pt have pain? R knee pain Right knee pain Right knee pain       Bed Mobility  Rolling: Min A  Supine to sit: Min A  Sit to supine: Min A  Scooting: Min A Supine to sit sba  Sit to supine sba  Scooting sba      Rolling min  Supine to sit min  Sit to supine min  Scooting min SBA Modified Independent   Transfers Sit to stand: SBA  Stand to sit: Min A  Stand pivot: Min A with franck standard walker Sit to stand cga  Stand to sit min sitting  Sat abruptly  sit to stand sba/     Stand to sit   cga  Stand pivot franck standard walker  cga SBA Modified Independent   Ambulation   10 feet with franck standard walker with Min A 18 and 15 feet with franck st walker with light cga and encouragement  25 feet standard franck standard   walker      20 feet ww  Sba/cga  assist    Recommend chair follow 20 feet with AAD with SBA >150 feet with AAD with Supervision   Walking 10 feet on uneven surface NT   n t 10 feet with AAD with SBA >10 feet with AAD with Supervision   Wheel Chair Mobility NT   nt       Car Transfers NT cga  cga   Assist with hand placement  SBA Supervision   Stair negotiation: ascended and descended 1 step with 2 rails with Min A  4 steps 2 rails  Min/cga  - non reciprocal  Lead with good and down with sore leg              Assessment:    Condition: In stable condition. Improving.   (SDH). Plan:   Encourage ambulation. (Tolerating therapy  Right knee is moving better  Flector is helping with pain).        Kelly Swenson MD  4/19/2019

## 2019-04-19 NOTE — PROGRESS NOTES
Speech Language Pathology  DAILY PROGRESS NOTE  Speech Language Pathology  ACUTE REHABILITATION--DAILY PROGRESS NOTE      PATIENT NAME:  Kelsea MORALES See      :  1965      TODAY'S DATE:  2019         SWALLOWING        CURRENT LEVEL 6    Diet: regular, thin liquids as tolerated, no straws     Oral prep/oral:    min oral containment issues were identified. Fair-good oral prep and A-P propulsion of bolus were noted with good clearance of oral residuals. Pharyngeal:    clear vocal quality was noted. no clinical indicators of aspiration were apparent. Intake-   good    INTERVENTION:   Pt actively participated in mealtime assessment. Mother present. Pt educated on comp strats for safe swallow, including no straws. LANGUAGE     Receptive  Current Status  6      Short Term Goal 6       Long Term Goal 6     Expressive  Current Status  6      Short Term Goal 6       Long Term Goal 6            INTERVENTION:  NA    COGNITION:      Problem Solving Current Status  5      Short Term Goal 5       Long Term Goal 6       Memory  Current Status  3      Short Term Goal 4       Long Term Goal 5      INTERVENTION: Memory recall task completed this date related to friend and family first names and living locations. Father reports pt was able to do this prior to admit. Pt able to recall approximately 60%, increased to 70% with mod verbal cues from father. Written cues provided to assist with recall. SPEECH:       INTERVENTION: Pt presents with distortions that were present prior to admit. Pt 100% intelligible to unknown listener.       Cognitive/linguistic supervision recommendations      24 hour supervision    Close supervision   Intermittent supervision    No supervision              x                                                       Oral feeding recommendations               Close supervision Remote  supervision  No supervision necessary    Not applicable (Pt NPO)              x Will continue SP intervention as per previously established POC.     Three Hour Rule Tracking:    Individual therapy:    minutes  Concurrent therapy:  60 minutes  Group therapy:    minutes  Co-treatment therapy:   minutes    Total minutes for 4/19/2019: 60 minutes

## 2019-04-19 NOTE — PROGRESS NOTES
4/19 Daily Calorie Count:     Breakfast Lunch Dinner Total   Calories (kcal) None saved 75% doc None saved 75% doc Pending  Pending    Protein (gm)   Pending  pending     Comments: No actual data saved 4/19- however patient documented to have consumed 75% of breakfast and lunch tray. Spoke with nursing staff and this is correct- patient has been consuming most trays and drinking Ensure ONS. Spoke with family about ordering trays as well- mother will be calling kitchen for meal ordering. 4/18 Daily Calorie Count:       Breakfast Lunch Dinner Total   Calories (kcal) 300 770 pending pending   Protein (gm) 14 34 pending pending      Comments: Spoke w/ patient and family today. Patient reports eating most of her breakfast and lunch today as well as the Ensure. Will continue to follow.     4/17 Daily Calorie Count:       Breakfast Lunch Dinner Total   Calories (kcal) 330  200 Pending  pending   Protein (gm) 10 15 Pending Pending       Comments: Dinner ticket from 4/17 not saved.         Electronically signed by Elvis Chavez RD, LUCILA on 4/19/2019 at 3:03 PM

## 2019-04-20 ENCOUNTER — APPOINTMENT (OUTPATIENT)
Dept: ULTRASOUND IMAGING | Age: 54
DRG: 940 | End: 2019-04-20
Attending: PHYSICAL MEDICINE & REHABILITATION
Payer: MEDICARE

## 2019-04-20 LAB
BLOOD CULTURE, ROUTINE: NORMAL
CULTURE, BLOOD 2: NORMAL

## 2019-04-20 PROCEDURE — 6370000000 HC RX 637 (ALT 250 FOR IP): Performed by: STUDENT IN AN ORGANIZED HEALTH CARE EDUCATION/TRAINING PROGRAM

## 2019-04-20 PROCEDURE — 6370000000 HC RX 637 (ALT 250 FOR IP): Performed by: PHYSICAL MEDICINE & REHABILITATION

## 2019-04-20 PROCEDURE — 93971 EXTREMITY STUDY: CPT

## 2019-04-20 PROCEDURE — 1280000000 HC REHAB R&B

## 2019-04-20 PROCEDURE — 97530 THERAPEUTIC ACTIVITIES: CPT

## 2019-04-20 PROCEDURE — 92508 TX SP LANG VOICE COMM GROUP: CPT | Performed by: SPEECH-LANGUAGE PATHOLOGIST

## 2019-04-20 RX ADMIN — LEVETIRACETAM 500 MG: 500 TABLET, FILM COATED ORAL at 06:33

## 2019-04-20 RX ADMIN — DICLOFENAC EPOLAMINE 1 PATCH: 0.01 PATCH TOPICAL at 08:42

## 2019-04-20 RX ADMIN — LEVETIRACETAM 500 MG: 500 TABLET, FILM COATED ORAL at 17:58

## 2019-04-20 RX ADMIN — LEVOTHYROXINE SODIUM 112 MCG: 112 TABLET ORAL at 06:33

## 2019-04-20 RX ADMIN — ACETAMINOPHEN 650 MG: 325 TABLET, FILM COATED ORAL at 08:42

## 2019-04-20 RX ADMIN — FLUDROCORTISONE ACETATE 0.2 MG: 0.1 TABLET ORAL at 08:42

## 2019-04-20 RX ADMIN — DICLOFENAC EPOLAMINE 1 PATCH: 0.01 PATCH TOPICAL at 20:48

## 2019-04-20 ASSESSMENT — PAIN SCALES - GENERAL: PAINLEVEL_OUTOF10: 2

## 2019-04-20 NOTE — PROGRESS NOTES
[facility] CLINICS Physical Medicine and Rehabilitation  Progress Note    GENERAL:   Covering for Dr Papito Black. 49year old F, with Down syndrome, admitted to the ARU on 4/16. Patient had fallen a few months ago, and found to have a SDH, which was treated conservatively. She presented to the ED on 4/11 with altered mental status. CT head:  Increase right subdural hematoma with severe subfalcine right to left shift, of 9 mm. Patient was evaluated by Dr Meredith Arango,  and on 4/12 had Right Frontal tremaine Hole Drainage of Subdural Hematoma, with placement of a right Frontal Subdural Drain. Patient up in the chair, father and brother visiting. Scalp Tremaine holes incisions healing, staples in place, no drainage. Reports Pain and swelling of the right knee, and has also tenderness and swelling of the right calf. XRay right knee, 4/17:  Large Right Knee joint effusion, with severe lateral Patello-Femoral Osteoarthritis. VITALS:   Vitals:    04/17/19 1438 04/18/19 1015 04/19/19 0830 04/20/19 0800   BP:  (!) 96/57 99/76 111/75   Pulse:  78 85 73   Resp:  16 20 15   Temp:  97.8 °F (36.6 °C) 97.7 °F (36.5 °C) 97.9 °F (36.6 °C)   TempSrc:  Temporal Temporal Temporal   SpO2:  98%     Weight:       Height: 5' (1.524 m)          Scheduled Meds:   diclofenac  1 patch Transdermal BID    fludrocortisone  0.2 mg Oral Daily    levETIRAcetam  500 mg Oral BID    levothyroxine  112 mcg Oral Daily     Continuous Infusions:  PRN Meds:. LORazepam, acetaminophen, magnesium hydroxide      LABS:  CBC with Differential:    Lab Results   Component Value Date    WBC 3.3 04/17/2019    RBC 3.89 04/17/2019    HGB 12.3 04/17/2019    HCT 36.5 04/17/2019     04/17/2019    MCV 93.8 04/17/2019    MCH 31.6 04/17/2019    MCHC 33.7 04/17/2019    RDW 13.2 04/17/2019    SEGSPCT 30 05/15/2012    LYMPHOPCT 32.1 04/17/2019    MONOPCT 9.2 04/17/2019    EOSPCT 3.8 12/13/2018    BASOPCT 1.2 04/17/2019    MONOSABS 0.30 04/17/2019    LYMPHSABS 1.05 04/17/2019 EOSABS 0.13 04/17/2019    BASOSABS 0.04 04/17/2019     BMP:    Lab Results   Component Value Date     04/17/2019    K 3.5 04/17/2019     04/17/2019    CO2 28 04/17/2019    BUN 6 04/17/2019    LABALBU 3.7 04/11/2019    LABALBU 2.8 05/13/2012    CREATININE 0.6 04/17/2019    CALCIUM 8.1 04/17/2019    GFRAA >60 04/17/2019    LABGLOM >60 04/17/2019    GLUCOSE 93 04/17/2019    GLUCOSE 96 05/15/2012       FUNCTIONAL STATUS:     Cognition:   Severe Deficits. Speech:  WFL. ADLs and Self Care:  MOD to MIN A. Bed Mobility:  MIN to SBA. Transfers:   SBA. Gait:     50' x 2 with Victoriano Milder WW, with SBA. Stairs:    > 4 steps with 1 rail with SBA.    ROM:        PLAN:    1.) US right L.EX. To rule out DVT. 2.) Ortho Consult, for possible IA steroid injection.   3.) Continue Rehab Program.

## 2019-04-20 NOTE — CONSULTS
Hospital Medicine  Consult History & Physical        Chief Complaint: Right lower extremity swelling      History Of Present Illness:      49 y.o. female who we are asked to see/evaluate by Garfield Hatfield MD for medical management of DVT. She has a history of Down syndrome found to have an SDH and   status post right frontal adonis hole. Ultrasound shows DVT in the right popliteal vein which is nonocclusive. She reports right lower extremity tenderness. She denies fever, chills, nausea, vomiting, chest pain. Past Medical History:        Diagnosis Date    Arthritis     Down syndrome     Hypothyroidism     Osteoarthritis     Syncope     Thyroid disease     Varicose veins     Wears glasses        Past Surgical History:        Procedure Laterality Date    ABDOMEN SURGERY      duodenal atresia    ABDOMINAL ADHESION SURGERY      CRANIOTOMY Right 4/12/2019    RIGHT CRANIOTOMY ADONIS HOLES performed by Homa Purcell MD at [facility] COMPL W DOP COLOR FLOW  5/14/2012         HIP SURGERY      Right    JOINT REPLACEMENT      left knee, right hip-dr Mehta Pinjackson KNEE SURGERY      Right    TOE SURGERY      Right foot    TOTAL KNEE ARTHROPLASTY Left 08/11/2016    DR. De    TUBAL LIGATION         Medications Prior to Admission:    Prior to Admission medications    Medication Sig Start Date End Date Taking?  Authorizing Provider   fludrocortisone (FLORINEF) 0.1 MG tablet Take 2 tablets by mouth daily 4/5/19  Yes Rachid Loera,    clindamycin (CLEOCIN T) 1 % lotion  2/20/19  Yes Historical Provider, MD   ketoconazole (NIZORAL) 2 % cream  2/20/19  Yes Historical Provider, MD   Crisaborole (EUCRISA) 2 % OINT Apply 1 Squirt topically 2 times daily 2/21/19  Yes Rachid Loera DO   levothyroxine (SYNTHROID) 112 MCG tablet Take 1 tablet by mouth Daily 2/19/19  Yes Rachid Loera DO   LORazepam (ATIVAN) 0.5 MG tablet Take 1 tablet by mouth every 8 hours as needed for Anxiety (take 1/2 tablet 30 min before procedure) for up to 2 doses. 3/21/19 5/21/19  Maryelizabeth Eisenmenger, DO       Allergies:  Patient has no known allergies. Social History:      The patient currently lives     TOBACCO:   reports that she has never smoked. She has never used smokeless tobacco.  ETOH:   reports that she does not drink alcohol. Family History:      Reviewed in detail and negative for DM, CAD, Cancer, CVA. Positive as follows:        Problem Relation Age of Onset    Heart Disease Maternal Grandmother     Heart Disease Maternal Grandfather     Heart Disease Paternal Aunt     Heart Disease Paternal Uncle        REVIEW OF SYSTEMS:   Pertinent positives as noted in the HPI. All other systems reviewed and negative. PHYSICAL EXAM:  /75   Pulse 73   Temp 97.9 °F (36.6 °C) (Temporal)   Resp 15   Ht 5' (1.524 m)   Wt 134 lb 1.6 oz (60.8 kg)   SpO2 98%   BMI 26.19 kg/m²   General appearance: No apparent distress, appears stated age  HEENT: Normal cephalic, atraumatic without obvious deformity. . Conjunctivae/corneas clear. Neck: Supple, No jugular venous distention. Respiratory:  Normal respiratory effort. Clear to auscultation, bilaterally without Rales/Wheezes/Rhonchi. Cardiovascular: Regular rate and rhythm with normal S1/S2 without murmurs, rubs or gallops. Abdomen: Soft, non-tender, non-distended with normal bowel sounds. Musculoskeletal: Right lower extremity more swollen than left. Neurologic:  Difficult at this time  Psychiatric: Difficult to assess at this time. Labs:     No results for input(s): WBC, HGB, HCT, PLT in the last 72 hours. No results for input(s): NA, K, CL, CO2, BUN, CREATININE, CALCIUM, PHOS in the last 72 hours. Invalid input(s): MAGNES  No results for input(s): AST, ALT, BILIDIR, BILITOT, ALKPHOS in the last 72 hours. No results for input(s): INR in the last 72 hours. No results for input(s): Ellender Christy in the last 72 hours.     Urinalysis:    Lab Results

## 2019-04-20 NOTE — PROGRESS NOTES
step with AAD and SBA 4 inch step with AAD and Supervision   Picking up object off the floor NT  Will  an object with SBA Will  an object with Supervision   BLE ROM WFL, RLE limited by pain      BLE Strength RLE is grossly 2/5 at the hip and knee and 4/5 at the ankle  LLE is grossly 4/5      Balance  Sitting EOB: Supervision  Dynamic standing: Min A      Date Family Teach Completed Pt's mother present for initial evaluation Mother present am     Is additional Family Teaching Needed? Y or N Yes yes     Hindering Progress R knee pain Right knee pain     PT recommended ELOS 2 weeks      Team's Discharge Plan       Therapist at Team Meeting           Therapeutic Exercise:   Weaving QC 4 reps with jr ww with SBA        Patient education  Pt educated on hand placement during sit<>stand, stair negotiation    Patient response to education:   Pt verbalized understanding Pt demonstrated skill Pt requires further education in this area   yes With cues reinforce     Additional Comments:   Pt required cues for hand placement during all sit<>stand transfers. Pt with occasional c/o R knee pain during ambulation. Educated pt on decreasing B step length and WB through BUE when advancing LLE. Pt expressed decreased pain with this sequencing but required frequent cueing to perform. Pt required cues for BLE sequencing while ascending and descending stairs. Pt became tearful and anxious after completing stairs.        AM  Time in: 1030  Time out: Yee 263 PTA [id number]

## 2019-04-20 NOTE — PROGRESS NOTES
Shante sent to Dr Aiken Guest regarding new consult to vascular. Message read at [address]. Dr Arlene Cedeño updated.

## 2019-04-20 NOTE — PROGRESS NOTES
Spoke to Dr Ángel Garcia. Patient not to have anticoagulations. He recommended consult to vascular for IVC filter placement. Notified Dr Juno Thorne of Dr Tamika Sepulveda recommendations.

## 2019-04-20 NOTE — PROGRESS NOTES
Speech Language Pathology   DAILY PROGRESS NOTE                 SWALLOWING:  current FIM level: 5    Patient actively participated in functionally-based mealtime assessment; required min assistance to set up meal tray and was able to feed self independently. Diet: Regular consistency solids and thin consistency liquids     Oral prep/oral-     No oral containment issues were identified. Adequate oral prep and A-P propulsion of bolus were noted with good clearance of oral residuals. Pharyngeal-     Clear vocal quality was maintained throughout. No overt clinical indicators of aspiration were apparent.      Intake: good        OTHER:    Given current swallowing deficits, SLP recommends supervision with all PO intake:           [x]  Yes           []  No      Silva Crenshwa MSCCC/SLP  Speech Language Pathologist  [id number]           Three Hour Rule Tracking:    Individual therapy:   0 minutes  Concurrent therapy:  0 minutes  Group therapy:   30 minutes  Co-treatment therapy:  0 minutes    Total minutes for 4/20/2019: 30 minutes

## 2019-04-21 PROBLEM — I82.431 ACUTE DEEP VEIN THROMBOSIS (DVT) OF POPLITEAL VEIN OF RIGHT LOWER EXTREMITY (HCC): Status: ACTIVE | Noted: 2019-04-21

## 2019-04-21 PROCEDURE — 99223 1ST HOSP IP/OBS HIGH 75: CPT | Performed by: SURGERY

## 2019-04-21 PROCEDURE — 6370000000 HC RX 637 (ALT 250 FOR IP): Performed by: STUDENT IN AN ORGANIZED HEALTH CARE EDUCATION/TRAINING PROGRAM

## 2019-04-21 PROCEDURE — 1280000000 HC REHAB R&B

## 2019-04-21 PROCEDURE — 6370000000 HC RX 637 (ALT 250 FOR IP): Performed by: PHYSICAL MEDICINE & REHABILITATION

## 2019-04-21 RX ADMIN — LEVETIRACETAM 500 MG: 500 TABLET, FILM COATED ORAL at 17:56

## 2019-04-21 RX ADMIN — FLUDROCORTISONE ACETATE 0.2 MG: 0.1 TABLET ORAL at 08:11

## 2019-04-21 RX ADMIN — ACETAMINOPHEN 650 MG: 325 TABLET, FILM COATED ORAL at 12:52

## 2019-04-21 RX ADMIN — LEVETIRACETAM 500 MG: 500 TABLET, FILM COATED ORAL at 06:18

## 2019-04-21 RX ADMIN — DICLOFENAC EPOLAMINE 1 PATCH: 0.01 PATCH TOPICAL at 20:24

## 2019-04-21 RX ADMIN — DICLOFENAC EPOLAMINE 1 PATCH: 0.01 PATCH TOPICAL at 08:11

## 2019-04-21 RX ADMIN — LEVOTHYROXINE SODIUM 112 MCG: 112 TABLET ORAL at 06:18

## 2019-04-21 ASSESSMENT — PAIN SCALES - GENERAL
PAINLEVEL_OUTOF10: 0
PAINLEVEL_OUTOF10: 4

## 2019-04-21 NOTE — CONSULTS
Department of Orthopedic Surgery  Resident Consult Note          Reason for Consult:  Right RLE pain and swelling     HISTORY OF PRESENT ILLNESS:       Patient is a 49 y.o. female who presents with right leg pain and swelling. Patient is MR. Patient was found to have SDH 4/11/19. Denies numbness/tingling/paresthesias. Denies any other orthopedic complaints at this time. Past Medical History:        Diagnosis Date    Arthritis     Down syndrome     Hypothyroidism     Osteoarthritis     Syncope     Thyroid disease     Varicose veins     Wears glasses      Past Surgical History:        Procedure Laterality Date    ABDOMEN SURGERY      duodenal atresia    ABDOMINAL ADHESION SURGERY      CRANIOTOMY Right 4/12/2019    RIGHT CRANIOTOMY FRANCISCO JAVIER HOLES performed by Robin Encinas MD at [facility] ECHO COMPL W DOP COLOR FLOW  5/14/2012         HIP SURGERY      Right    JOINT REPLACEMENT      left knee, right hip-dr Verna Mcardle KNEE SURGERY      Right    TOE SURGERY      Right foot    TOTAL KNEE ARTHROPLASTY Left 08/11/2016    DR. De    TUBAL LIGATION       Current Medications:   Current Facility-Administered Medications: diclofenac (FLECTOR) 1.3 % patch 1 patch, 1 patch, Transdermal, BID  fludrocortisone (FLORINEF) tablet 0.2 mg, 0.2 mg, Oral, Daily  levETIRAcetam (KEPPRA) tablet 500 mg, 500 mg, Oral, BID  levothyroxine (SYNTHROID) tablet 112 mcg, 112 mcg, Oral, Daily  LORazepam (ATIVAN) tablet 0.5 mg, 0.5 mg, Oral, Q8H PRN  acetaminophen (TYLENOL) tablet 650 mg, 650 mg, Oral, Q4H PRN  magnesium hydroxide (MILK OF MAGNESIA) 400 MG/5ML suspension 30 mL, 30 mL, Oral, Daily PRN  Allergies:  Patient has no known allergies. Social History:   TOBACCO:   reports that she has never smoked. She has never used smokeless tobacco.  ETOH:   reports that she does not drink alcohol. DRUGS:   reports that she does not use drugs.   ACTIVITIES OF DAILY LIVING:    OCCUPATION:    Family History:       Problem Relation Age of Onset    Heart Disease Maternal Grandmother     Heart Disease Maternal Grandfather     Heart Disease Paternal Aunt     Heart Disease Paternal Uncle        REVIEW OF SYSTEMS:  CONSTITUTIONAL:  negative for  fevers, chills  EYES:  negative for blurred vision, visual disturbance  HEENT:  negative for  hearing loss, voice change  RESPIRATORY:  negative for  dyspnea, wheezing  CARDIOVASCULAR:  negative for  chest pain, palpitations  GASTROINTESTINAL:  negative for nausea, vomiting  GENITOURINARY:  negative for frequency, urinary incontinence  HEMATOLOGIC/LYMPHATIC:  negative for bleeding and petechiae  MUSCULOSKELETAL:  positive for  Right leg pain  NEUROLOGICAL:  negative for headaches, dizziness  BEHAVIOR/PSYCH:  negative for increased agitation and anxiety    PHYSICAL EXAM:    VITALS:  /75   Pulse 73   Temp 97.9 °F (36.6 °C) (Temporal)   Resp 15   Ht 5' (1.524 m)   Wt 134 lb 1.6 oz (60.8 kg)   SpO2 98%   BMI 26.19 kg/m²   CONSTITUTIONAL:  Awake, MR, does answer questions  MUSCULOSKELETAL:  Right lower Extremity:  Skin intact with moderate swelling to RLE   Minimal joint effusion at knee  Full ROM at knee with mild pain  +TTP medial and lateral joint lines  Compartments soft and compressible, calf is tender to squeeze  +DP & PT pulses, Brisk Cap refill, Toes warm and perfused  Sensation grossly intact superficial/deep peroneal,saphenous,sural,tibial n. distributions  · +GS/TA/EH    Secondary Exam:   bilateralUE: No obvious signs of trauma. -TTP to fingers, hand, wrist, forearm, elbow, humerus, shoulder or clavicle. leftLE: No obvious signs of trauma.    -TTP to foot, ankle, leg, knee, thigh, hip    · Pelvis: -TTP, -Log roll, -Heel strike     DATA:    CBC:   Lab Results   Component Value Date    WBC 3.3 04/17/2019    RBC 3.89 04/17/2019    HGB 12.3 04/17/2019    HCT 36.5 04/17/2019    MCV 93.8 04/17/2019    MCH 31.6 04/17/2019    MCHC 33.7 04/17/2019    RDW 13.2 04/17/2019

## 2019-04-21 NOTE — CONSULTS
Vascular Surgery Inpatient Consultation Note      Reason for Consultation:  Vena cava filter. HISTORY OF PRESENT ILLNESS:                The patient is a 49 y.o. female who is admitted to the hospital for treatment of altered mental status. Patient has history of a chronic subdural hematoma developed [place] She required a bur hole craniotomy and is recovering in the rehabilitation unit. The patient developed significant right knee pain and was diagnosed with an effusion. She also had swelling of the lower leg and an ultrasound reported DVT involving the popliteal vein. Neurosurgery has recommended that the patient not receive anticoagulation. Vascular surgery is consulted for evaluation and treatment. Patient has history of Down syndrome. Her parents are present at bedside. IMPRESSION:  Acute deep venous thrombosis involving the popliteal vein on the right lower extremity. Recent bur hole craniotomy. RECOMMENDATIONS:  I agree with the indication for the vena cava filter. I reviewed the procedure with the patient's parents. I reviewed the risks, benefits, and alternatives of the procedure. They understand and consent for the procedure. All questions were answered.     Patient Active Problem List   Diagnosis Code    Down's syndrome E23.7    Systolic murmur R40.5    Vasodepressor syncope R55    Asymptomatic varicose veins of bilateral lower extremities I83.93    Hypothyroidism E03.9    Chest pain R07.9    SDH (subdural hematoma) (HCC) S06.5X9A    Subdural hematoma (HCC) S06.5X9A    Acute deep vein thrombosis (DVT) of popliteal vein of right lower extremity (HCC) I82.431       Past Medical History:   Diagnosis Date    Arthritis     Down syndrome     Hypothyroidism     Osteoarthritis     Syncope     Thyroid disease     Varicose veins     Wears glasses         Past Surgical History:   Procedure Laterality Date    ABDOMEN SURGERY      duodenal atresia    ABDOMINAL ADHESION SURGERY      CRANIOTOMY Right 4/12/2019    RIGHT CRANIOTOMY FRANCISCO JAVIER HOLES performed by Qing Farris MD at [facility] ECHO COMPL W DOP COLOR FLOW  5/14/2012         HIP SURGERY      Right    JOINT REPLACEMENT      left knee, right hip-dr Gale Schulz KNEE SURGERY      Right    TOE SURGERY      Right foot    TOTAL KNEE ARTHROPLASTY Left 08/11/2016    DR. De    TUBAL LIGATION         Current Medications:      LORazepam, acetaminophen, magnesium hydroxide    diclofenac  1 patch Transdermal BID    fludrocortisone  0.2 mg Oral Daily    levETIRAcetam  500 mg Oral BID    levothyroxine  112 mcg Oral Daily        Allergies:  Patient has no known allergies.     Social History     Socioeconomic History    Marital status: Single     Spouse name: Not on file    Number of children: Not on file    Years of education: Not on file    Highest education level: Not on file   Occupational History    Not on file   Social Needs    Financial resource strain: Not on file    Food insecurity:     Worry: Not on file     Inability: Not on file    Transportation needs:     Medical: Not on file     Non-medical: Not on file   Tobacco Use    Smoking status: Never Smoker    Smokeless tobacco: Never Used   Substance and Sexual Activity    Alcohol use: No    Drug use: No    Sexual activity: Never   Lifestyle    Physical activity:     Days per week: Not on file     Minutes per session: Not on file    Stress: Not on file   Relationships    Social connections:     Talks on phone: Not on file     Gets together: Not on file     Attends Christianity service: Not on file     Active member of club or organization: Not on file     Attends meetings of clubs or organizations: Not on file     Relationship status: Not on file    Intimate partner violence:     Fear of current or ex partner: Not on file     Emotionally abused: Not on file     Physically abused: Not on file     Forced sexual activity: Not on file   Other Topics Concern    Not on file   Social History Narrative    Not on file        Family History   Problem Relation Age of Onset    Heart Disease Maternal Grandmother     Heart Disease Maternal Grandfather     Heart Disease Paternal Aunt     Heart Disease Paternal Uncle        REVIEW OF SYSTEMS:      Eyes:      Blurred vision:  No [x]/Yes []               Diplopia:   No [x]/Yes []               Vision loss:       No [x]/Yes []   Ears, nose, throat:             Hearing loss:    No [x]/Yes []      Vertigo:   No [x]/Yes []                       Swallowing problem:  No [x]/Yes []               Nose bleeds:   No [x]/Yes []      Voice hoarseness:  No [x]/Yes []  Respiratory:             Cough:   No [x]/Yes []      Pleuritic chest pain:  No [x]/Yes []                        Dyspnea:   No [x]/Yes []      Wheezing:   No [x]/Yes []  Cardiovascular:             Angina:   No [x]/Yes []      Palpitations:   No [x]/Yes []          Claudication:    No [x]/Yes []      Leg swelling:   No [x]/Yes []  Gastrointestinal:             Nausea or vomiting:  No [x]/Yes []               Abdominal pain:  No [x]/Yes []                     Intestinal bleeding: No [x]/Yes []  Musculoskeletal:             Leg pain:   No [x]/Yes []      Back pain:   No [x]/Yes []                    Weakness:   No [x]/Yes []  Neurologic:             Numbness:   No [x]/Yes []      Paralysis:   No [x]/Yes []                       Headaches:   No [x]/Yes []  Hematologic, lymphatic:   Anemia:   No [x]/Yes []              Bleeding or bruising:  No [x]/Yes []              Fevers or chills: No [x]/Yes []  Endocrine:             Temp intolerance:   No [x]/Yes []                       Polydipsia, polyuria:  No [x]/Yes []  Skin:              Rash:    No [x]/Yes []      Ulcers:   No [x]/Yes []              Abnorm pigment: No [x]/Yes []  :              Frequency/urgency:  No [x]/Yes []      Hematuria:    No [x]/Yes []                      Incontinence:    No [x]/Yes []    PHYSICAL EXAM:  Vitals:    04/21/19 0800   BP: (!) 95/58   Pulse: 79   Resp: 15   Temp: 99.1 °F (37.3 °C)   SpO2: 97%     General Appearance: alert and oriented to person, place and time, well developed and well- nourished, in no acute distress  Skin: warm and dry, no rash or erythema  Head: normocephalic, right craniotomy incision healing nicely. Eyes: extraocular eye movements intact, conjunctivae normal  ENT: external ear and ear canal normal bilaterally, nose without deformity  Pulmonary/Chest: normal air movement, no respiratory distress  Cardiovascular: normal rate, regular rhythm  Abdomen: soft, non-tender, non-distended, no masses or organomegaly  Musculoskeletal: normal range of motion, no joint deformity or tenderness  Neurologic: no cranial nerve deficit, speech normal  Extremities: right leg edema    PULSE EXAM      Right      Left   Brachial     Radial     Femoral     Popliteal     Dorsalis Pedis     Posterior Tibial     (3=normal, 2=diminished, 1=barely palpable, 4=widened)      LABS:    Lab Results   Component Value Date    WBC 3.3 (L) 04/17/2019    HGB 12.3 04/17/2019    HCT 36.5 04/17/2019     04/17/2019    PROTIME 12.8 (H) 04/11/2019    INR 1.1 04/11/2019    K 3.5 04/17/2019    BUN 6 04/17/2019    CREATININE 0.6 04/17/2019       RADIOLOGY:    US RLE reviewed.

## 2019-04-21 NOTE — PROGRESS NOTES
Occupational Therapy  OCCUPATIONAL THERAPY DAILY NOTE    Date:2019  Patient Name: Yasmani Comment See  MRN: [id number]  : 1965  Room: [address] [place], [place] [address]       Diagnosis: subdural hematoma, s/p adonis hole craniotomy   Precautions:  falls, down syndrome    Functional Assessment:   Date Status AE  Comments   Feeding 19 Supervision      Grooming 19 Set up     Bathing 19 UB: Min A  LB: Mod A     UB Dressing 19 Set up     LB Dressing 19 MOD A  LH shoe horn     Homemaking         Functional Transfers / Balance:   Date Status DME  Comments   Sit Balance 19 Min A     Stand Balance 19 CGA     [] Tub  [x] Shower   Transfer 19 Min A ww    Commode   Transfer    Toiletin19 Min A      MIN A  ww    Functional   Mobility 19 Min A ww    Other:  Bed>WC   19   Max A         Functional Exercises / Activity:      Sensory / Neuromuscular Re-Education:      Cognitive Skills:   Status Comments   Problem   Solving fair     Memory fair     Sequencing fair     Safety fair       Visual Perception:    Education:      [] Family teach completed on:    Pain Level: Additional Notes:       Patient has made good  progress during treatment sessions toward set goals. Therapy emphasis to obtain goals:Strengthening, Functional Mobility Training, Gait Training, Cognitive Reorientation, Patient/Caregiver Education & Training, Home Management Training, Cognitive/Perceptual Training, Equipment Evaluation, Education, & procurement, Endurance Training, Balance Training, Neuromuscular Re-education, Safety Education & Training, Self-Care / ADL      [x] Continue with current OT Plan of care.   [] Prepare for Discharge     DISCHARGE RECOMMENDATIONS  Recommended DME:    Post Discharge Care:   []Home Independently  []Home with 24hr Care / Supervision []Home with Partial Supervision []Home with Home Health OT []Home with Out Pt OT []Other: ___   Comments:         Time in Time out Tx Time Breakdown  Variance:   First Session  1025  [] Individual Tx-   [] Concurrent Tx -   [] Co-Tx -   [] Group Tx -   [x] Time Missed - 30 Mins  Hold per nursing.    Second Session   [] Individual Tx-   [] Concurrent Tx -   [] Co-Tx -   [] Group Tx -   [] Time Missed -     Third Session    [] Individual Tx-   [] Concurrent Tx -  [] Co-Tx -   [] Group Tx -   [] Time Missed -         Total Tx Time  0 Mins       Joi JAUREGUI/MARIA GUADALUPE [id number]

## 2019-04-21 NOTE — PROGRESS NOTES
Occupational Therapy  Therapeutic Recreation Assessment     LEISURE INTEREST SURVEY               Key: P = Past Interest C = Current Interest F = Future Interest     Arts/Crafts:  ___Mechanical  ___Woodworking    ___ Painting   ___Floral arranging ___ Ceramics         _ __ Knitting                                                     ___ Crocheting        _C__Other: [place]qu[place] / Crafts___________      Cooking:  _C _Baking ___Cooking    ___   Other: _______   Comments:       Games:  C___Cards  ___Darts  ___Board games    ___Word games  ___Crossword puzzles    ___Seek-n-find/jumble                   _C__Other: _Uno________      Horticulture:  ___Vegetables  ___Flowers  ___House plants                                                     ___Other: ___________      House/Yard Care:  ___Ironing  ___Laundry  ___Cleaning                                                      ___Repairs              ___Lawn care                                                     ___Other: ___________      Music Listening/Playing:  ___Classical       ___Big Band       _F__[id number]-Zshx                                                     ___Country          ___R&B             ___Gospel/Hymns                                                     _C__Other: __ABBA_________      Outdoor Activities:  ___Hunting          ___Fishing          ___Camping                                                     ___Other: ___________      Pets/Animals:  _P__Dogs             ___Cats                ___Fish                                                     ___Birds             ___Livestock         ___Other: _________    Reading:   ___Newspapers  ___Magazines ___Other:stories                                                     _C__Other: ___[employer]________      Sikhism Activities:  Zoroastrian: ___________   Jenny Golas Activities: ___________      Shopping:   ___Grocery  ___Clothing  ___Flea market     ___Other: ___________      Socialization: C___Family  ___Friends ___Neighbors       ___Church  ___Volunteer ___Club/Organization                                                     ___Other: ___________    Sports/Exercises:  ___Walking  ___Football  _P__Basketball     ___Golf  ___Baseball  ___Tennis       ___Bowling  _P__Other: ___Cheerleader________      Traveling:   ___Global  ___Stateside  ___Local       _C__Other: ___Car rides________      TV/Radio:   ___Mysteries  _C__Comedies ___Romance                                                     ___Game show       ___Sports       ___News                                                      ___Talk show          ___Other: ___________      Other:    Personal Leisure Profile:   Question Response   Attributes Identify a positive quality: []Ambitious  []Appreciative  []Caring  []Courteous  []Considerate  []Compassionate  []Creative  []Dependable   []Generous  []Honest  []Hard working  [employer]  [x]Kind  [x][place]   [][place]  []Mannerly  []Patient  []Polite  []Respectful  []Sociable  []Sense of humor  []Thoughtful  []Other: ___________    Linda Labrum are very good at [employer]   Awareness Why do you participate in your leisure interest: []Competition  []Creativity  []Concentration  []Exercise  [x]Enjoyment  []Fun  []Hand/eye Coordination  []Increase confidence  []Learning a new skill  []Relaxation  []Social Interaction  []Supporting one another  []Thinking  [x]Other: __\" I like It \"_________    Are your leisure activities limited at this time? []Yes  [x]No  Comments: _________    How often do you participate in your leisure interest? \"Sometimes I do\"    Resources Name a restaurant, store or business you frequent? [employer]   Personal When are you most happy?  Doing paper     Barriers to Leisure Participation:  []Visual   [][place]  []Cognition/Communication  []Motivation  []Financial  []Transportation  []Time Constraints  [x]Physical Ability  []Leisure Awareness  []Drug/Alcohol  []Other: ___________    Recommendations:  [x]Group Session

## 2019-04-22 ENCOUNTER — ANESTHESIA (OUTPATIENT)
Dept: OPERATING ROOM | Age: 54
DRG: 940 | End: 2019-04-22
Payer: MEDICARE

## 2019-04-22 ENCOUNTER — ANESTHESIA EVENT (OUTPATIENT)
Dept: OPERATING ROOM | Age: 54
DRG: 940 | End: 2019-04-22
Payer: MEDICARE

## 2019-04-22 VITALS
SYSTOLIC BLOOD PRESSURE: 89 MMHG | OXYGEN SATURATION: 99 % | DIASTOLIC BLOOD PRESSURE: 48 MMHG | RESPIRATION RATE: 14 BRPM

## 2019-04-22 PROCEDURE — C1769 GUIDE WIRE: HCPCS | Performed by: SURGERY

## 2019-04-22 PROCEDURE — 06H03DZ INSERTION OF INTRALUMINAL DEVICE INTO INFERIOR VENA CAVA, PERCUTANEOUS APPROACH: ICD-10-PCS | Performed by: SURGERY

## 2019-04-22 PROCEDURE — 1280000000 HC REHAB R&B

## 2019-04-22 PROCEDURE — 2580000003 HC RX 258: Performed by: SURGERY

## 2019-04-22 PROCEDURE — 2500000003 HC RX 250 WO HCPCS: Performed by: SURGERY

## 2019-04-22 PROCEDURE — 3700000000 HC ANESTHESIA ATTENDED CARE: Performed by: SURGERY

## 2019-04-22 PROCEDURE — 6360000002 HC RX W HCPCS: Performed by: SURGERY

## 2019-04-22 PROCEDURE — 6370000000 HC RX 637 (ALT 250 FOR IP): Performed by: STUDENT IN AN ORGANIZED HEALTH CARE EDUCATION/TRAINING PROGRAM

## 2019-04-22 PROCEDURE — C1880 VENA CAVA FILTER: HCPCS | Performed by: SURGERY

## 2019-04-22 PROCEDURE — 3700000001 HC ADD 15 MINUTES (ANESTHESIA): Performed by: SURGERY

## 2019-04-22 PROCEDURE — 6360000002 HC RX W HCPCS

## 2019-04-22 PROCEDURE — 37191 INS ENDOVAS VENA CAVA FILTR: CPT | Performed by: SURGERY

## 2019-04-22 PROCEDURE — 3600000012 HC SURGERY LEVEL 2 ADDTL 15MIN: Performed by: SURGERY

## 2019-04-22 PROCEDURE — 7100000001 HC PACU RECOVERY - ADDTL 15 MIN: Performed by: SURGERY

## 2019-04-22 PROCEDURE — 2709999900 HC NON-CHARGEABLE SUPPLY: Performed by: SURGERY

## 2019-04-22 PROCEDURE — 2580000003 HC RX 258

## 2019-04-22 PROCEDURE — 6370000000 HC RX 637 (ALT 250 FOR IP): Performed by: PHYSICAL MEDICINE & REHABILITATION

## 2019-04-22 PROCEDURE — 3600000002 HC SURGERY LEVEL 2 BASE: Performed by: SURGERY

## 2019-04-22 PROCEDURE — 7100000000 HC PACU RECOVERY - FIRST 15 MIN: Performed by: SURGERY

## 2019-04-22 DEVICE — FILTER VASC L50MM DIA30MM INTRO 7FR L65CM VENA CAVA FEM W: Type: IMPLANTABLE DEVICE | Site: GROIN | Status: FUNCTIONAL

## 2019-04-22 RX ORDER — FENTANYL CITRATE 50 UG/ML
INJECTION, SOLUTION INTRAMUSCULAR; INTRAVENOUS PRN
Status: DISCONTINUED | OUTPATIENT
Start: 2019-04-22 | End: 2019-04-22 | Stop reason: SDUPTHER

## 2019-04-22 RX ORDER — OXYCODONE HYDROCHLORIDE AND ACETAMINOPHEN 5; 325 MG/1; MG/1
1 TABLET ORAL
Status: DISCONTINUED | OUTPATIENT
Start: 2019-04-22 | End: 2019-04-22 | Stop reason: HOSPADM

## 2019-04-22 RX ORDER — SODIUM CHLORIDE 9 MG/ML
INJECTION, SOLUTION INTRAVENOUS CONTINUOUS PRN
Status: DISCONTINUED | OUTPATIENT
Start: 2019-04-22 | End: 2019-04-22 | Stop reason: SDUPTHER

## 2019-04-22 RX ORDER — FENTANYL CITRATE 50 UG/ML
25 INJECTION, SOLUTION INTRAMUSCULAR; INTRAVENOUS EVERY 5 MIN PRN
Status: DISCONTINUED | OUTPATIENT
Start: 2019-04-22 | End: 2019-04-22 | Stop reason: HOSPADM

## 2019-04-22 RX ORDER — MIDAZOLAM HYDROCHLORIDE 1 MG/ML
INJECTION INTRAMUSCULAR; INTRAVENOUS PRN
Status: DISCONTINUED | OUTPATIENT
Start: 2019-04-22 | End: 2019-04-22 | Stop reason: SDUPTHER

## 2019-04-22 RX ORDER — PROPOFOL 10 MG/ML
INJECTION, EMULSION INTRAVENOUS CONTINUOUS PRN
Status: DISCONTINUED | OUTPATIENT
Start: 2019-04-22 | End: 2019-04-22 | Stop reason: SDUPTHER

## 2019-04-22 RX ADMIN — PROPOFOL 50 MCG/KG/MIN: 10 INJECTION, EMULSION INTRAVENOUS at 09:07

## 2019-04-22 RX ADMIN — SODIUM CHLORIDE: 9 INJECTION, SOLUTION INTRAVENOUS at 09:00

## 2019-04-22 RX ADMIN — FENTANYL CITRATE 25 MCG: 50 INJECTION, SOLUTION INTRAMUSCULAR; INTRAVENOUS at 09:18

## 2019-04-22 RX ADMIN — LEVETIRACETAM 500 MG: 500 TABLET, FILM COATED ORAL at 16:56

## 2019-04-22 RX ADMIN — DICLOFENAC EPOLAMINE 1 PATCH: 0.01 PATCH TOPICAL at 21:58

## 2019-04-22 RX ADMIN — MIDAZOLAM HYDROCHLORIDE 1 MG: 1 INJECTION, SOLUTION INTRAMUSCULAR; INTRAVENOUS at 09:07

## 2019-04-22 RX ADMIN — MIDAZOLAM HYDROCHLORIDE 1 MG: 1 INJECTION, SOLUTION INTRAMUSCULAR; INTRAVENOUS at 09:00

## 2019-04-22 ASSESSMENT — PAIN SCALES - GENERAL
PAINLEVEL_OUTOF10: 0

## 2019-04-22 ASSESSMENT — PAIN - FUNCTIONAL ASSESSMENT: PAIN_FUNCTIONAL_ASSESSMENT: 0-10

## 2019-04-22 NOTE — PROGRESS NOTES
Occupational Therapy  OCCUPATIONAL THERAPY DAILY NOTE    Date:2019  Patient Name: Wilfrido Bhatt See  MRN: [id number]  : 1965  Room: OR POOL /NONE       Diagnosis: subdural hematoma, s/p adonis hole craniotomy   Precautions:  falls, down syndrome    Functional Assessment:   Date Status AE  Comments   Feeding 19 Supervision      Grooming 19 Set up     Bathing 19 UB: Min A  LB: Mod A     UB Dressing 19 Set up     LB Dressing 19 MOD A  LH shoe horn     Homemaking         Functional Transfers / Balance:   Date Status DME  Comments   Sit Balance 19 Min A     Stand Balance 19 SBA-CGA     [] Tub  [x] Shower   Transfer 19 Min A ww    Commode   Transfer    Toiletin19 CGA      MIN A  ww    Functional   Mobility 19 Min A ww    Other:  Bed<>WC   19   CGA         Functional Exercises / Activity:      Sensory / Neuromuscular Re-Education:      Cognitive Skills:   Status Comments   Problem   Solving fair     Memory fair     Sequencing fair     Safety fair       Visual Perception:    Education:      [] Family teach completed on:    Pain Level: Additional Notes:       Patient has made good  progress during treatment sessions toward set goals. Therapy emphasis to obtain goals:Strengthening, Functional Mobility Training, Gait Training, Cognitive Reorientation, Patient/Caregiver Education & Training, Home Management Training, Cognitive/Perceptual Training, Equipment Evaluation, Education, & procurement, Endurance Training, Balance Training, Neuromuscular Re-education, Safety Education & Training, Self-Care / ADL      [x] Continue with current OT Plan of care.   [] Prepare for Discharge     DISCHARGE RECOMMENDATIONS  Recommended DME:    Post Discharge Care:   []Home Independently  []Home with 24hr Care / Supervision []Home with Partial Supervision []Home with Home Health OT []Home with Out Pt OT []Other: ___   Comments:         Time in Time out Tx Time

## 2019-04-22 NOTE — PROGRESS NOTES
Nutrition Assessment    Type and Reason for Visit: Reassess    Nutrition Recommendations: Continue current diet and ONS as ordered. Patient w/ improved PO intake, drinking Ensure. Will d/c calorie count but continue to monitor and follow. Nutrition Assessment: Patient w/ improving PO intake since admit, usually % of meals. Drinking 100% of ensure shakes. Now at increased risk of nutritional compromse w/ patient in OR for IVC filter placement. Will d./c  calorie count but continue to monitor and follow patient. Malnutrition Assessment:  · Malnutrition Status: At risk for malnutrition  · Context: Acute illness or injury  · Findings of the 6 clinical characteristics of malnutrition (Minimum of 2 out of 6 clinical characteristics is required to make the diagnosis of moderate or severe Protein Calorie Malnutrition based on AND/ASPEN Guidelines):  1. Energy Intake-Less than or equal to 75% of estimated energy requirement, Greater than or equal to 7 days(PTA to ARU  )    2. Weight Loss-No significant weight loss(using OV wts ),    3. Fat Loss-No significant subcutaneous fat loss,    4. Muscle Loss-No significant muscle mass loss,    5. Fluid Accumulation-No significant fluid accumulation,    6.  Strength-Not measured    Nutrition Risk Level:  Moderate    Nutrient Needs:  · Estimated Daily Total Kcal: 4536-8426(MSJ: 1134 x 1.2 )  · Estimated Daily Protein (g): 80-90(1.3-1.5)  · Estimated Daily Total Fluid (ml/day): 1300ml     Nutrition Diagnosis:   · Problem: Inadequate oral intake  · Etiology: related to Acute injury/trauma     Signs and symptoms:  as evidenced by Diet history of poor intake    Objective Information:  · Nutrition-Focused Physical Findings: -I/Os, oriented x1 w/ hx of down syndrome, soft abd active bs, no edema, Admit w/ SDH s/p adonis hold crani   · Wound Type: Surgical Wound  · Current Nutrition Therapies:  · Oral Diet Orders: General   · Oral Diet intake: %  · Oral Nutrition Supplement (ONS) Orders: Standard High Calorie Oral Supplement  · Anthropometric Measures:  · Ht: 5' (152.4 cm)   · Current Body Wt: 134 lb (60.8 kg)(MARYCHUY updated wt 2/2 pt off unit in OR )  · Usual Body Wt: (no actual wts per EMR- family reports unaware of any wt changes wt per OV 7/2018 = 139# )  · % Weight Change:  ,  No significant wt changes per EMR using OV wts- family unaware of any wt changes   · Ideal Body Wt: 100 lb (45.4 kg), % Ideal Body 134%  · BMI Classification: BMI 25.0 - 29.9 Overweight    Nutrition Interventions:   Continue current diet, Continue current ONS(when diet re-ordered post op )  Continued Inpatient Monitoring, Coordination of Care, No recommendations at this time    Nutrition Evaluation:   · Evaluation: Progressing toward goals   · Goals: Consume 75% or more of most meals/ONS     · Monitoring: Meal Intake, Supplement Intake, Diet Tolerance, Skin Integrity, Wound Healing, I&O, Mental Status/Confusion, Weight, Pertinent Labs, Monitor Bowel Function      Electronically signed by Srinivasan Briseno RD, LD on 4/22/19 at 11:03 AM    Contact Number: [phone]

## 2019-04-22 NOTE — ANESTHESIA PRE PROCEDURE
Department of Anesthesiology  Preprocedure Note       Name:  Hloly Pelaez See   Age:  49 y.o.  :  1965                                          MRN:  [id number]         Date:  2019      Surgeon: Shantel Coleman):  Gilda Mccollum MD    Procedure: VENA CAVA FILTER INSERTION (N/A )    Medications prior to admission:   Prior to Admission medications    Medication Sig Start Date End Date Taking? Authorizing Provider   fludrocortisone (FLORINEF) 0.1 MG tablet Take 2 tablets by mouth daily 19  Yes Laine Loera DO   clindamycin (CLEOCIN T) 1 % lotion  19  Yes Historical Provider, MD   ketoconazole (NIZORAL) 2 % cream  19  Yes Historical Provider, MD   Crisaborole (EUCRISA) 2 % OINT Apply 1 Squirt topically 2 times daily 19  Yes Laine Loera DO   levothyroxine (SYNTHROID) 112 MCG tablet Take 1 tablet by mouth Daily 19  Yes Laine Loera DO   LORazepam (ATIVAN) 0.5 MG tablet Take 1 tablet by mouth every 8 hours as needed for Anxiety (take 1/2 tablet 30 min before procedure) for up to 2 doses.  3/21/19 5/21/19  Isabella Moss DO       Current medications:    Current Facility-Administered Medications   Medication Dose Route Frequency Provider Last Rate Last Dose    diclofenac (FLECTOR) 1.3 % patch 1 patch  1 patch Transdermal BID Magy Larson MD   1 patch at 19    fludrocortisone (FLORINEF) tablet 0.2 mg  0.2 mg Oral Daily Chandu Husain MD   0.2 mg at 19 3451    levETIRAcetam (KEPPRA) tablet 500 mg  500 mg Oral BID Chandu Husain MD   Stopped at 19 0523    levothyroxine (SYNTHROID) tablet 112 mcg  112 mcg Oral Daily Chandu Husain MD   Stopped at 1923    LORazepam (ATIVAN) tablet 0.5 mg  0.5 mg Oral Q8H PRN Chandu Husain MD        acetaminophen (TYLENOL) tablet 650 mg  650 mg Oral Q4H PRN Magy Larson MD   650 mg at 19 1252    magnesium hydroxide (MILK OF MAGNESIA) 400 MG/5ML suspension 30 mL  30 mL Oral Daily PRN Magy Larson MD BMI:   Wt Readings from Last 3 Encounters:   04/16/19 134 lb 1.6 oz (60.8 kg)   04/15/19 131 lb (59.4 kg)   04/05/19 136 lb (61.7 kg)     Body mass index is 26.19 kg/m². CBC:   Lab Results   Component Value Date    WBC 3.3 04/17/2019    RBC 3.89 04/17/2019    HGB 12.3 04/17/2019    HCT 36.5 04/17/2019    MCV 93.8 04/17/2019    RDW 13.2 04/17/2019     04/17/2019       CMP:   Lab Results   Component Value Date     04/17/2019    K 3.5 04/17/2019     04/17/2019    CO2 28 04/17/2019    BUN 6 04/17/2019    CREATININE 0.6 04/17/2019    GFRAA >60 04/17/2019    LABGLOM >60 04/17/2019    GLUCOSE 93 04/17/2019    GLUCOSE 96 05/15/2012    PROT 7.4 04/11/2019    CALCIUM 8.1 04/17/2019    BILITOT 0.5 04/11/2019    ALKPHOS 129 04/11/2019    AST 31 04/11/2019    ALT 31 04/11/2019       POC Tests: No results for input(s): POCGLU, POCNA, POCK, POCCL, POCBUN, POCHEMO, POCHCT in the last 72 hours. Coags:   Lab Results   Component Value Date    PROTIME 12.8 04/11/2019    PROTIME 11.0 05/12/2012    INR 1.1 04/11/2019    APTT 32.3 04/11/2019       HCG (If Applicable):   Lab Results   Component Value Date    PREGTESTUR NEGATIVE 07/25/2016        ABGs: No results found for: PHART, PO2ART, RXB4ZEM, DXZ6IEI, BEART, C0VFOJAW     Type & Screen (If Applicable):  No results found for: LABABO, LABRH     EKG 11/1/2017  HR 56  Sinus bradycardia  Possible Anterior infarct , age undetermined  Abnormal ECG  No previous ECGs available    ECHO 5/14/12  Findings       Left Ventricle    Extremely limited study    Off axis views    In certain views the LV is normal    EF 50-55%    Can not comment on WMA due to the limitation of the study       Right Ventricle    The right ventricle was not clearly visualized. Left Atrium    Normal left atrium. Interatrial septum not well visualized. Right Atrium    Right Atrium is not clearly visualized.        Mitral Valve    Physiologic and/or trace mitral regurgitation is present. Tricuspid Valve    Physiologic and/or trace tricuspid regurgitation. Aortic Valve       Pulmonic Valve    Physiologic and/or trace pulmonic regurgitation present. Conclusions       Summary    Extremely limited study    Off axis views    In certain views the LV is normal    EF 50-55%    Can not comment on WMA due to the limitation of the study    Physiologic and/or trace mitral regurgitation is present. Physiologic and/or trace tricuspid regurgitation. Anesthesia Evaluation  Patient summary reviewed and Nursing notes reviewed no history of anesthetic complications:   Airway: Mallampati: III  TM distance: <3 FB   Neck ROM: full  Mouth opening: > = 3 FB Dental:      Comment: Denies any loose, chipped or removable teeth    Pulmonary: breath sounds clear to auscultation                             Cardiovascular:Negative CV ROS          ECG reviewed  Rhythm: regular  Rate: normal  Echocardiogram reviewed                  Neuro/Psych:                ROS comment: Down's syndrome  HX recurrent subdural hematoma feb 26, 2019 GI/Hepatic/Renal: Neg GI/Hepatic/Renal ROS            Endo/Other:    (+) hypothyroidism: arthritis: OA., .                 Abdominal:           Vascular:   + DVT (Rt. popliteal artery), . Anesthesia Plan      MAC     ASA 3     (No IV present at this time )  Induction: intravenous. Anesthetic plan and risks discussed with patient, mother and father. Plan discussed with CRNA and attending. Kirk Greenwood RN   4/22/2019    Patient seen and examined, chart reviewed, agree with above findings. Anesthetic plan, risks, benefits, alternatives, and personnel involved discussed with patient and her parents. Patient and her parents verbalized an understanding and agreed to proceed. NPO status confirmed.     Anesthetic plan discussed with care team members and agreed upon.     Jerome Oliveira DO   4/22/2019  9:00 AM

## 2019-04-22 NOTE — PROGRESS NOTES
4/21 Daily Calorie Count:     Breakfast Lunch Dinner Total   Calories (kcal) 300 870 None saved  1170   Protein (gm) 8 42 None saved 50     Comments: patient intake improving. Per RN no issues w/ PO intake. Patient finding foods she enjoys and drinks 100% of ensure shakes         4/19 Daily Calorie Count:       Breakfast Lunch Dinner Total   Calories (kcal) None saved 75% doc None saved 75% doc Pending  Pending    Protein (gm)     Pending  pending      Comments: No actual data saved 4/19- however patient documented to have consumed 75% of breakfast and lunch tray. Spoke with nursing staff and this is correct- patient has been consuming most trays and drinking Ensure ONS. Spoke with family about ordering trays as well- mother will be calling kitchen for meal ordering.            4/18 Daily Calorie Count:       Breakfast Lunch Dinner Total   Calories (kcal) 300 770 pending pending   Protein (gm) 14 34 pending pending      Comments: Spoke w/ patient and family today. Patient reports eating most of her breakfast and lunch today as well as the Ensure. Will continue to follow.     4/17 Daily Calorie Count:       Breakfast Lunch Dinner Total   Calories (kcal) 330  200 Pending  pending   Protein (gm) 10 15 Pending Pending       Comments: Dinner ticket from 4/17 not saved.       Electronically signed by Jeremy Duarte RD, LUCILA on 4/22/2019 at 11:12 AM

## 2019-04-22 NOTE — PLAN OF CARE
Problem: Falls - Risk of:  Goal: Will remain free from falls  Description  Will remain free from falls  Outcome: Met This Shift     Problem: Risk for Impaired Skin Integrity  Goal: Tissue integrity - skin and mucous membranes  Description  Structural intactness and normal physiological function of skin and  mucous membranes. Outcome: Met This Shift     Problem: Inadequate oral food/beverage intake (NI-2.1)  Goal: Food and/or Nutrient Delivery  Description  Individualized approach for food/nutrient provision.   4/22/2019 1104 by Arron Miguel RD, LD  Outcome: Met This Shift

## 2019-04-22 NOTE — PROGRESS NOTES
tenderness. Extremities: Decreased range of motion. Neurological: Patient is alert. (4/5 str). Assessment:    Condition: In stable condition. Improving.   (SDH). Plan:   Encourage ambulation. (Right knee is improving  She has a popliteal deep vein thrombosis  Cannot be anticoagulated because of subdural  Vascular surgery is putting in an IVC filter).        Hedy Will MD  4/22/2019

## 2019-04-22 NOTE — PROGRESS NOTES
Physical Therapy    Facility/Department: [address] [facility]HAB  Weekly Note     NAME: Alvaro Rome See  : 1965  MRN: [id number]    Date of Service: 2019  Evaluating Therapist: Aleta Mcfarland P.T.    ROOM: [facility]  DIAGNOSIS: TBI  PRECAUTIONS: Fall risk, Down Syndrome, OA   PROCEDURE(S):  R adonis hole with drain placement  HPI: The pt has been following with neurology after a fall that occurred in 2019 resulting in a temporal fracture, SDH, and SAH. The pt was admitted on  for worsening CT. Social:  Pt lives with her parents in a 2 floor plan with 3 steps and 1 rail to enter with 14 steps and 1 rail to the 2nd floor bath/bedroom, 1/2 bath on the 1st floor. Prior to admission pt ambulated with no AD and worked at [employer]. 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 Initial Evaluation  19 Comments 19 Short Term Goals Long Term Goals    Was pt agreeable to Eval/treatment? Yes Yes       Does pt have pain?  R knee pain R knee pain       Bed Mobility  Rolling: Min A  Supine to sit: Min A  Sit to supine: Min A  Scooting: Min A Rolling SBA  Supine to sit SBA  Sit to supine SBA  Scooting to edge of bed SBA  SBA Modified Independent   Transfers Sit to stand: SBA  Stand to sit: Min A  Stand pivot: Min A with franck standard walker Sit to stand SBA  Stand to sit SBA  Stand pivot with jr ww with CG/SBA Verbal cues for hand placement  SBA Modified Independent   Ambulation   10 feet with franck standard walker with Min A 50 feet with jr ww with CG/SBA Verbal cues for walker placement, posture,  20 feet with AAD with SBA >150 feet with AAD with Supervision   Wheel Chair Mobility NT       Car Transfers NT SBA with using handi bar   SBA Supervision   Stair negotiation: ascended and descended 1 step with 2 rails with Min A 4 steps with bilateral rails with min/cga   >4 steps with 1 rail with SBA >12 steps with 1 rail with Supervision   BLE ROM WFL, RLE limited by pain BLE Strength RLE is grossly 2/5 at the hip and knee and 4/5 at the ankle  LLE is grossly 4/5       Balance  Sitting EOB: Supervision  Dynamic standing: Min A       Date Family Teach Completed Pt's mother present for initial evaluation Parents have attended therapy session however no hands on performed       Is additional Family Teaching Needed? Y or N Yes Yes       Hindering Progress R knee pain       PT recommended ELOS 2 weeks 2 weeks       Team's Discharge Plan  7-10 days      Therapist at Team Meeting  Cinthia Henderson PT, DPT [id number]        Date:  4/22/19  Supporting factors:  Supportive family   Barriers to discharge:  R knee pain   Additional comments:  Pt had procedure 4/22 in am and hold 4/22 in pm.  Pt can resume therapy on 4/23   DME:  Elif Shepard ww  After Care:  Home PT      Luis Alberto Gee [id number]    4/23/19:  FT to be set up at end of week.   Cinthia Henderson PT, DPT [id number]

## 2019-04-22 NOTE — PROGRESS NOTES
Speech Language Pathology      NAME:  Aaron Hsu See  :  1965  DATE: 2019  ROOM:  5515/5515-B    Attempted to see pt in AM during scheduled ST session and during noon meal. Pt off unit in AM for procedure and per nursing will not be eating noon meal. SLP to re attempt next date. Courtney HUNTLEY CCC/SLP [id number]  Speech-Language Pathologist      [facility] [H46.2B5Q]

## 2019-04-22 NOTE — OP NOTE
DATE OF OPERATION:    4/22/2019    PREOPERATIVE DIAGNOSIS:    Deep venous thrombosis    POSTOPERATIVE DIAGNOSIS:    Same    SURGEON:    Gautam Arauz M.D.    ASSISTANT(S):    Hayden Cohen CFA    ANESTHESIA:    [facility]    OPERATION:    Insertion of inferior vena cava filter (65015)    ESTIMATED BLOOD LOSS:    Minimal    COMPLICATIONS:    None    DESCRIPTION OF OPERATION:    The patient was identified and the procedure confirmed. The groin regions were prepped and draped in the usual sterile fashion. Lidocaine 1% mixed with marcaine 0.25% were used for local anesthesia. The right common femoral vein was percutaneously entered and a wire was advanced into the inferior vena cava under fluoroscopic guidance. A small incision was made around the wire. The dilator was passed over the wire followed by the introducer, which was advanced into the inferior vena cava. The filter Lonell Dills) was advanced through the introducer then positioned and deployed at the level of the 2nd- 3rd vertebra. The filter deployed properly. The introducer was removed and Monocryl stitch was placed for hemostasis. A sterile pressure bandage was applied to the puncture site in the operating room. Needle, sponge, and instrument counts were reported as correct. The patient tolerated the procedure and was transferred to the recovery in satisfactory condition.

## 2019-04-22 NOTE — PLAN OF CARE
Problem: Inadequate oral food/beverage intake (NI-2.1)  Goal: Food and/or Nutrient Delivery  Description continue current diet and ONS as ordered   Individualized approach for food/nutrient provision.   Outcome: Met This Shift

## 2019-04-22 NOTE — PROGRESS NOTES
Physical Therapy    Facility/Department: [address] REHAB  Rx. note    NAME: Jennifer Castillo See  : 1965  MRN: [id number]    Date of Service: 2019  Evaluating Therapist: Nadir Perry. Winnie Acevedo P.T.    [facility]OM: [facility]  DIAGNOSIS: TBI  PRECAUTIONS: Fall risk, Down Syndrome, OA   PROCEDURE(S):  R adonis hole with drain placement  HPI: The pt has been following with neurology after a fall that occurred in 2019 resulting in a temporal fracture, SDH, and SAH. The pt was admitted on  for worsening CT. Social:  Pt lives with her parents in a 2 floor plan with 3 steps and 1 rail to enter with 14 steps and 1 rail to the 2nd floor bath/bedroom, 1/2 bath on the 1st floor. Prior to admission pt ambulated with no AD and worked at [employer]. 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 Initial Evaluation  19 AM PM Short Term Goals Long Term Goals    Was pt agreeable to Eval/treatment? Yes NT see comments  NT see comments      Does pt have pain?  R knee pain       Bed Mobility  Rolling: Min A  Supine to sit: Min A  Sit to supine: Min A  Scooting: Min A   SBA Modified Independent   Transfers Sit to stand: SBA  Stand to sit: Min A  Stand pivot: Min A with franck standard walker   SBA Modified Independent   Ambulation   10 feet with franck standard walker with Min A   20 feet with AAD with SBA >150 feet with AAD with Supervision   Walking 10 feet on uneven surface NT   10 feet with AAD with SBA >10 feet with AAD with Supervision   Wheel Chair Mobility NT       Car Transfers NT   SBA Supervision   Stair negotiation: ascended and descended 1 step with 2 rails with Min A   >4 steps with 1 rail with SBA >12 steps with 1 rail with Supervision   Curb Step:   ascended and descended NT   4 inch step with AAD and SBA 4 inch step with AAD and Supervision   Picking up object off the floor NT   Will  an object with SBA Will  an object with Supervision   BLE ROM WFL, RLE limited by pain       BLE Strength RLE is grossly 2/5 at the hip and knee and 4/5 at the ankle  LLE is grossly 4/5       Balance  Sitting EOB: Supervision  Dynamic standing: Min A       Date Family Teach Completed Pt's mother present for initial evaluation       Is additional Family Teaching Needed? Y or N Yes       Hindering Progress R knee pain       PT recommended ELOS 2 weeks       Team's Discharge Plan        Therapist at Team Meeting            Additional Comments: No AM treatment session due to pt off the unit for procedure. PM  No treatment this pm due to pt on hold due to procedure this am.  Will resume physical therapy tomorrow per physician order.        Kush Valiente Number: [id number]

## 2019-04-23 PROCEDURE — 97535 SELF CARE MNGMENT TRAINING: CPT

## 2019-04-23 PROCEDURE — 97110 THERAPEUTIC EXERCISES: CPT

## 2019-04-23 PROCEDURE — 97530 THERAPEUTIC ACTIVITIES: CPT

## 2019-04-23 PROCEDURE — 6370000000 HC RX 637 (ALT 250 FOR IP): Performed by: PHYSICAL MEDICINE & REHABILITATION

## 2019-04-23 PROCEDURE — 97127 HC SP THER IVNTJ W/FOCUS COG FUNCJ: CPT | Performed by: SPEECH-LANGUAGE PATHOLOGIST

## 2019-04-23 PROCEDURE — 1280000000 HC REHAB R&B

## 2019-04-23 PROCEDURE — 6370000000 HC RX 637 (ALT 250 FOR IP): Performed by: STUDENT IN AN ORGANIZED HEALTH CARE EDUCATION/TRAINING PROGRAM

## 2019-04-23 RX ORDER — TRAMADOL HYDROCHLORIDE 50 MG/1
50 TABLET ORAL 3 TIMES DAILY
Status: DISCONTINUED | OUTPATIENT
Start: 2019-04-23 | End: 2019-04-27

## 2019-04-23 RX ADMIN — LEVOTHYROXINE SODIUM 112 MCG: 112 TABLET ORAL at 07:17

## 2019-04-23 RX ADMIN — LEVETIRACETAM 500 MG: 500 TABLET, FILM COATED ORAL at 07:16

## 2019-04-23 RX ADMIN — LEVETIRACETAM 500 MG: 500 TABLET, FILM COATED ORAL at 18:30

## 2019-04-23 RX ADMIN — FLUDROCORTISONE ACETATE 0.2 MG: 0.1 TABLET ORAL at 08:33

## 2019-04-23 RX ADMIN — TRAMADOL HYDROCHLORIDE 50 MG: 50 TABLET, FILM COATED ORAL at 20:41

## 2019-04-23 RX ADMIN — DICLOFENAC EPOLAMINE 1 PATCH: 0.01 PATCH TOPICAL at 08:32

## 2019-04-23 RX ADMIN — ACETAMINOPHEN 650 MG: 325 TABLET, FILM COATED ORAL at 14:36

## 2019-04-23 RX ADMIN — DICLOFENAC EPOLAMINE 1 PATCH: 0.01 PATCH TOPICAL at 20:40

## 2019-04-23 ASSESSMENT — PAIN SCALES - GENERAL
PAINLEVEL_OUTOF10: 8
PAINLEVEL_OUTOF10: 0
PAINLEVEL_OUTOF10: 3
PAINLEVEL_OUTOF10: 1

## 2019-04-23 NOTE — PROGRESS NOTES
Occupational Therapy     04/23/19 1432   Attendance   Activity Arts/Crafts  (Coloring)   Participation Active participation   Therapeutic Recreation   Community Reintegration Demonstrates ability to complete social goals   Leisure Education Demonstrates knowledge of benefits of leisure involvement  Jaime Michael stated\" I loved it\" as a benefit.)   Leisure Attitude/Participation Participates in 1:1 structured activity   Time Spent With Patient   Minutes 27

## 2019-04-23 NOTE — PROGRESS NOTES
Physical Therapy    Facility/Department: [address] REHAB  Rx. note    NAME: Joesph Kuo See  : 1965  MRN: [id number]    Date of Service: 2019  Evaluating Therapist: Teo Trent. Amber Cleary P.T.    ROOM: [facility]  DIAGNOSIS: TBI  PRECAUTIONS: Fall risk, Down Syndrome, OA   PROCEDURE(S):  R adonis hole with drain placement  HPI: The pt has been following with neurology after a fall that occurred in 2019 resulting in a temporal fracture, SDH, and SAH. The pt was admitted on  for worsening CT. Social:  Pt lives with her parents in a 2 floor plan with 3 steps and 1 rail to enter with 14 steps and 1 rail to the 2nd floor bath/bedroom, 1/2 bath on the 1st floor. Prior to admission pt ambulated with no AD and worked at [employer]. 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   The 14 steps rail on left ascending and on the 3 steps rail on the right      Initial Evaluation  19 AM   PM Short Term Goals Long Term Goals    Was pt agreeable to Eval/treatment? Yes yes yes     Does pt have pain? R knee pain Right knee pain and nursing notified.    Right knee pain     Bed Mobility  Rolling: Min A  Supine to sit: Min A  Sit to supine: Min A  Scooting: Min A NT   Rolling nt  Supine to sit nt  Sit to supine SBA  Scooting SBA SBA Modified Independent   Transfers Sit to stand: SBA  Stand to sit: Min A  Stand pivot: Min A with franck standard walker Sit to stand min A  Stand to sit min A  Stand pivot with min A  sit to stand CGA  Stand to sit CGA with cues for hand placement    SBA Modified Independent   Ambulation   10 feet with franck standard walker with Min A 25 feet x2 and 50 feet with jr ww with CGA 75 feet with ww with CG/SBA 20 feet with AAD with SBA >150 feet with AAD with Supervision   Walking 10 feet on uneven surface NT  n t 10 feet with AAD with SBA >10 feet with AAD with Supervision   Wheel Chair Mobility NT  nt     Car Transfers NT NT SBA with verbal cues for technique   SBA Supervision   Stair negotiation: ascended and descended 1 step with 2 rails with Min A 4 steps with bilateral rails with min/mod A  4 steps with bilateral rails with min A   >4 steps with 1 rail with SBA >12 steps with 1 rail with Supervision   Curb Step:   ascended and descended NT  nt 4 inch step with AAD and SBA 4 inch step with AAD and Supervision   Picking up object off the floor NT  nt Will  an object with SBA Will  an object with Supervision   BLE ROM WFL, RLE limited by pain       BLE Strength RLE is grossly 2/5 at the hip and knee and 4/5 at the ankle  LLE is grossly 4/5       Balance  Sitting EOB: Supervision  Dynamic standing: Min A       Date Family Teach Completed Pt's mother present for initial evaluation  Mother present pm     Is additional Family Teaching Needed? Y or N Yes yes yes     Hindering Progress R knee pain Right knee pain Right knee pain     PT recommended ELOS 2 weeks       Team's Discharge Plan        Therapist at Team Meeting              Patient education  Pt educated on hand placement with transfers     Patient response to education:   Pt verbalized understanding Pt demonstrated skill Pt requires further education in this area   x X with verbal cues  x     Additional Comments:   Pt reported increased R knee pain and nursing notified. Increased difficulty with stair negotiation especially with descending steps. Time in 0915  Time out 1000    Time in 1430  Time out 1515        Pt is making slow progress toward established Physical Therapy goals. Continue with physical therapy current plan of care.     Adrián Lubin [id number]

## 2019-04-23 NOTE — ANESTHESIA POSTPROCEDURE EVALUATION
Department of Anesthesiology  Postprocedure Note    Patient: Yun Cardenas See  MRN: [id number]  YOB: 1965  Date of evaluation: 4/22/2019  Time:  8:31 PM     Procedure Summary     Date:  04/22/19 Room / Location:  [facility]    Anesthesia Start:  0900 Anesthesia Stop:  0940    Procedure:  VENA CAVA FILTER INSERTION (Bilateral Groin) Diagnosis:  (pulmonary emboli)    Surgeon:  Peyton Valverde MD Responsible Provider:  Dominga Coughlin DO    Anesthesia Type:  MAC ASA Status:  3          Anesthesia Type: MAC    Leonel Phase I: Leonel Score: 10    Leonel Phase II:      Last vitals: Reviewed and per EMR flowsheets.        Anesthesia Post Evaluation    Patient location during evaluation: PACU  Patient participation: complete - patient participated  Level of consciousness: awake and alert  Pain score: 1  Airway patency: patent  Nausea & Vomiting: no nausea and no vomiting  Complications: no  Cardiovascular status: hemodynamically stable  Respiratory status: acceptable  Hydration status: euvolemic

## 2019-04-23 NOTE — PROGRESS NOTES
Pardeep Early is a 49 y.o. female patient. Current Facility-Administered Medications   Medication Dose Route Frequency Provider Last Rate Last Dose    diclofenac (FLECTOR) 1.3 % patch 1 patch  1 patch Transdermal BID Laura Butts MD   1 patch at 04/22/19 2158    fludrocortisone (FLORINEF) tablet 0.2 mg  0.2 mg Oral Daily Inocente Mojica MD   Stopped at 04/22/19 0757    levETIRAcetam (KEPPRA) tablet 500 mg  500 mg Oral BID Inocente Mojica MD   500 mg at 04/23/19 0716    levothyroxine (SYNTHROID) tablet 112 mcg  112 mcg Oral Daily Inocente Mojica MD   112 mcg at 04/23/19 0717    LORazepam (ATIVAN) tablet 0.5 mg  0.5 mg Oral Q8H PRN Inocente Mojica MD        acetaminophen (TYLENOL) tablet 650 mg  650 mg Oral Q4H PRN Laura Butts MD   650 mg at 04/21/19 1252    magnesium hydroxide (MILK OF MAGNESIA) 400 MG/5ML suspension 30 mL  30 mL Oral Daily PRN Laura Butts MD         No Known Allergies  Active Problems:    Subdural hematoma (HCC)    Acute deep vein thrombosis (DVT) of popliteal vein of right lower extremity (HCC)  Resolved Problems:    * No resolved hospital problems. *    Blood pressure 104/62, pulse 60, temperature 97.5 °F (36.4 °C), resp. rate 15, height 5' (1.524 m), weight 134 lb 1.6 oz (60.8 kg), SpO2 96 %. Subjective:  Symptoms:  Stable. She reports weakness. Diet:  Adequate intake. Activity level: Impaired due to weakness. Pain:  She reports no pain. Objective:  General Appearance: In no acute distress. Vital signs: (most recent): Blood pressure 104/62, pulse 60, temperature 97.5 °F (36.4 °C), resp. rate 15, height 5' (1.524 m), weight 134 lb 1.6 oz (60.8 kg), SpO2 96 %. Vital signs are normal.    Output: Producing urine and producing stool. Lungs:  Normal effort and normal respiratory rate. Breath sounds clear to auscultation. Heart: Normal rate. Regular rhythm. S1 normal and S2 normal.    Abdomen: Abdomen is soft.   Bowel sounds are normal.   There is no abdominal tenderness. Extremities: Normal range of motion. There is local swelling. Neurological: Patient is alert. (4/5 str). Assessment:    Condition: In stable condition. Improving.   (SDH). Plan:   Encourage ambulation. (Tolerating therapy well  Had IVC filter put in yesterday  Will get back to therapy today  Review in team).        Angie Elaine MD  4/23/2019

## 2019-04-23 NOTE — PROGRESS NOTES
Patient's mother states she could not find patient's glasses. Nursing searched in room as well as PT/OT gyms. Patient's physical and occupational therapist stated the last they saw her glasses were yesterday prior to surgery. Nursing called down to surgery, they stated the line room would have them if anything and they are closed until tomorrow. Will attempt to call tomorrow.

## 2019-04-23 NOTE — PROGRESS NOTES
Occupational Therapy  OCCUPATIONAL THERAPY DAILY NOTE    Date:2019  Patient Name: Jacque Mccoy See  MRN: [id number]  : 1965  Room: [address] [place] [address]       Diagnosis: subdural hematoma, s/p adonis hole craniotomy   Precautions:  falls, down syndrome    Functional Assessment:   Date Status AE  Comments   Feeding 19 Supervision      Grooming 19 Set up  Seated at sink level pt brushed teeth. VCs to place toothpaste onto toothbrush. Bathing 19 Min A  UB and LB bathing completed seated and standing with VCs to wash areas of body. Assistance to was buttocks on L side and thoroughly wash feet. UB Dressing 19 Set up  Don bra and pullover shirt. VCs to manage sleeve over RUE d/t IV site. LB Dressing 19 Min A  LH shoe horn  Don/doff socks, brief, pants and shoes. Assistance to manage R pant leg. Encouragement provided to tie shoelaces. Homemaking         Functional Transfers / Balance:   Date Status DME  Comments   Sit Balance 19 Min A     Stand Balance 19 SBA-CGA     [] Tub  [x] Shower   Transfer 19 Min A ww    Commode   Transfer    Toiletin19 CGA      MIN A  ww    Functional   Mobility 19 Min A ww    Other:  Bed<>WC   19   CGA         Functional Exercises / Activity:  Siddharth MaxxAthletes box with 5# weight X 30 on 3 planes to increase BUE strength and endurance for independence with functional tasks. Standing and seated pt participated in coloring activity to increase stand tolerance, stand balance and endurance for independence with functional tasks. Pt stood with fair stand tolerance and completed X 3 stands throughout task with fair endurance. Sensory / Neuromuscular Re-Education:      Cognitive Skills:   Status Comments   Problem   Solving fair     Memory fair     Sequencing fair     Safety fair       Visual Perception:    Education:  Pt educated on hand placement during sit to stands. Pt educated on technique for LB dressing.    [] Family

## 2019-04-23 NOTE — PATIENT CARE CONFERENCE
assist-Contact guard assist with jr wheeled walker  Short term Chair/bed transfers goal: standby assist  Long term Chair/bed transfers goal: Modified independent      Ambulation:   Current level: 50 ft wheeled walker standby assist-Contact guard assist and verbal cues  Short term ambulation goal: met  Long term ambulation goal: 150 ft supervision      Car transfers:   Current level: standby assist  Short term car transfers goal: supervision  Long term car transfers goal:supervision    Stairs:   Current level : 4 with 2 rails Contact guard assist  Short term stairs goal: 4 at standby assist  Long term stairs goal: 12 at supervision    OCCUPATIONAL THERAPY:      Tub/shower:   Current level: min assist with seat and rails  Short term tub/shower goal: Contact guard assist  Long term tub/shower goal: standby assist      Feeding:  Current level: supervision  Short term feeding goal: Modified independent  Long term feeding goal: independent    Grooming:   Current level: supervision seated  Short term grooming goal: Modified independent  Long term grooming goal: Modified independent    Bathing:  Current level: mod assist  Short term bathing goal: min assist  Long term bathing goal: standby assist    Homemaking:   Current level: to be assessed    Upper body dressing:  Current level: supervision  Short term upper body dressing goal: Modified independent  Long term upper body dressing goal: independent    Lower body dressing:  Current level: mod assist  Short term lower body dressing goal: min assist  Long term lower body dressing goal: min assist    Toilet transfer:   Current level: Contact guard assist-min assist  Short term toilet transfer goal: Contact guard assist  Long term toilet transfer goal: standby assist      SPEECH THERAPY  Swallowing:  Current level:  within functional evels    Comprehension:   Current level: Modified independent  Short term comprehension goal: Modified independent  Long term comprehension goal: Modified independent    Expression:   Current level: Modified independent  Short term expression goal: Modified independent  Long term expression goal: Modified independent    Problem solving:   Current level: supervision  Short term problem solving goal: Modified independent  Long term problem solving goal: Modified independent    Memory:  Current level: mod assist  Short term memory goal: min assist  Long term memory goal: supervision    Social interaction:  min assist, goal supervision    Safety awareness: fair      Patient/family's personal goals: \"go back home\"  Factors supporting goal achievement:  cooperative, making gains  Factors hindering goal achievement:  right knee pain      Discharge Plan   Estimated Length of Stay: 7-10 days         Destination: home  Services at Discharge: waiver assessment 4/29  Equipment at Discharge: to be assessed      INTERDISCIPLINARY TEAM/PHYSICIAN RECOMMENDATION AND/OR REVISIONS OF PLAN OF CARE:  family ed with parents to be scheduled      I approve the established interdisciplinary plan of care as documented within the medical record of Jennifer Early. Please see team conference signature page for those in attendance.     Electronically signed by Jairon Agarwal RN  on 4/23/2019 at 9:13 AM

## 2019-04-23 NOTE — PROGRESS NOTES
Speech Language Pathology  DAILY PROGRESS NOTE  Speech Language Pathology  ACUTE REHABILITATION--DAILY PROGRESS NOTE      PATIENT NAME:  Salima MORALES See      :  1965      TODAY'S DATE:  2019         SWALLOWING        CURRENT LEVEL 6    Diet: regular, thin liquids as tolerated, no straws     Oral prep/oral:    min oral containment issues were identified. Fair-good oral prep and A-P propulsion of bolus were noted with good clearance of oral residuals. Pharyngeal:    clear vocal quality was noted. no clinical indicators of aspiration were apparent. Intake-   good    INTERVENTION:   Pt actively participated in mealtime assessment. Pt educated on comp strats for safe swallow, including no straws. Decreased PO intake noted this date. LANGUAGE     Receptive  Current Status  6      Short Term Goal 6       Long Term Goal 6     Expressive  Current Status  6      Short Term Goal 6       Long Term Goal 6            INTERVENTION:  NA    COGNITION:      Problem Solving Current Status  5      Short Term Goal 5       Long Term Goal 6       Memory  Current Status  3      Short Term Goal 4       Long Term Goal 5      INTERVENTION: Memory recall task completed this date related to friend and family first names and living locations. Pt able to recall approximately 40%, increased to 50% with mod verbal cues. Written cues provided to assist with recall. STM/PS task completed with fair-poor ability this date. SPEECH:       INTERVENTION: Pt presents with distortions that were present prior to admit. Pt 100% intelligible to unknown listener.       Cognitive/linguistic supervision recommendations      24 hour supervision    Close supervision   Intermittent supervision    No supervision              x                                                       Oral feeding recommendations               Close supervision Remote  supervision  No supervision necessary    Not applicable (Pt NPO)              x Will continue SP intervention as per previously established POC.     Three Hour Rule Tracking:    Individual therapy:   15 minutes  Concurrent therapy:  30 minutes  Group therapy:    minutes  Co-treatment therapy:   minutes    Total minutes for 4/23/2019: 45 minutes

## 2019-04-23 NOTE — CARE COORDINATION
Per Team: ELOS: 7-10days. Updated patient and mom, Nury Greer. Goals: SBA/ Independent. Patient is requiring verbal cues for safety and insight. DME: TBD closer to D/C    Aftercare: TBD closer to D/C    FT: 4/29 PM with parents    Waiver assessment scheduled 4/29 AM.   Waiver , Lee Thompson. 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        Larry Hubbard- WIN Intern  Zunilda Ortiz  4/23/2019

## 2019-04-24 PROCEDURE — 97535 SELF CARE MNGMENT TRAINING: CPT

## 2019-04-24 PROCEDURE — 97530 THERAPEUTIC ACTIVITIES: CPT

## 2019-04-24 PROCEDURE — 6370000000 HC RX 637 (ALT 250 FOR IP): Performed by: PHYSICAL MEDICINE & REHABILITATION

## 2019-04-24 PROCEDURE — 1280000000 HC REHAB R&B

## 2019-04-24 PROCEDURE — 6370000000 HC RX 637 (ALT 250 FOR IP): Performed by: STUDENT IN AN ORGANIZED HEALTH CARE EDUCATION/TRAINING PROGRAM

## 2019-04-24 PROCEDURE — 97127 HC SP THER IVNTJ W/FOCUS COG FUNCJ: CPT | Performed by: SPEECH-LANGUAGE PATHOLOGIST

## 2019-04-24 PROCEDURE — 92526 ORAL FUNCTION THERAPY: CPT | Performed by: SPEECH-LANGUAGE PATHOLOGIST

## 2019-04-24 PROCEDURE — 97110 THERAPEUTIC EXERCISES: CPT

## 2019-04-24 RX ADMIN — DICLOFENAC EPOLAMINE 1 PATCH: 0.01 PATCH TOPICAL at 09:00

## 2019-04-24 RX ADMIN — TRAMADOL HYDROCHLORIDE 50 MG: 50 TABLET, FILM COATED ORAL at 13:48

## 2019-04-24 RX ADMIN — LEVOTHYROXINE SODIUM 112 MCG: 112 TABLET ORAL at 06:29

## 2019-04-24 RX ADMIN — LEVETIRACETAM 500 MG: 500 TABLET, FILM COATED ORAL at 19:46

## 2019-04-24 RX ADMIN — LEVETIRACETAM 500 MG: 500 TABLET, FILM COATED ORAL at 06:29

## 2019-04-24 RX ADMIN — DICLOFENAC EPOLAMINE 1 PATCH: 0.01 PATCH TOPICAL at 19:48

## 2019-04-24 RX ADMIN — TRAMADOL HYDROCHLORIDE 50 MG: 50 TABLET, FILM COATED ORAL at 19:46

## 2019-04-24 RX ADMIN — FLUDROCORTISONE ACETATE 0.2 MG: 0.1 TABLET ORAL at 08:58

## 2019-04-24 RX ADMIN — TRAMADOL HYDROCHLORIDE 50 MG: 50 TABLET, FILM COATED ORAL at 08:58

## 2019-04-24 ASSESSMENT — PAIN DESCRIPTION - PROGRESSION: CLINICAL_PROGRESSION: NOT CHANGED

## 2019-04-24 ASSESSMENT — PAIN DESCRIPTION - LOCATION
LOCATION: KNEE
LOCATION: KNEE

## 2019-04-24 ASSESSMENT — PAIN DESCRIPTION - PAIN TYPE
TYPE: ACUTE PAIN
TYPE: ACUTE PAIN

## 2019-04-24 ASSESSMENT — PAIN SCALES - GENERAL
PAINLEVEL_OUTOF10: 5
PAINLEVEL_OUTOF10: 9
PAINLEVEL_OUTOF10: 2
PAINLEVEL_OUTOF10: 0
PAINLEVEL_OUTOF10: 6
PAINLEVEL_OUTOF10: 0
PAINLEVEL_OUTOF10: 2
PAINLEVEL_OUTOF10: 2

## 2019-04-24 ASSESSMENT — PAIN DESCRIPTION - ONSET: ONSET: ON-GOING

## 2019-04-24 ASSESSMENT — PAIN DESCRIPTION - DESCRIPTORS
DESCRIPTORS: ACHING;DISCOMFORT
DESCRIPTORS: DISCOMFORT
DESCRIPTORS: ACHING;DISCOMFORT

## 2019-04-24 ASSESSMENT — PAIN DESCRIPTION - FREQUENCY
FREQUENCY: INTERMITTENT
FREQUENCY: INTERMITTENT

## 2019-04-24 ASSESSMENT — PAIN - FUNCTIONAL ASSESSMENT: PAIN_FUNCTIONAL_ASSESSMENT: ACTIVITIES ARE NOT PREVENTED

## 2019-04-24 ASSESSMENT — PAIN DESCRIPTION - ORIENTATION: ORIENTATION: RIGHT

## 2019-04-24 NOTE — PROGRESS NOTES
Physical Therapy    Facility/Department: [address] [facility]  Rx. note    NAME: Khadra Siegel See  : 1965  MRN: [id number]    Date of Service: 2019  Evaluating Therapist: Toni Coughlin P.T.    ROOM: [facility]  DIAGNOSIS: TBI  PRECAUTIONS: Fall risk, Down Syndrome, OA   PROCEDURE(S):  R adonis hole with drain placement  HPI: The pt has been following with neurology after a fall that occurred in 2019 resulting in a temporal fracture, SDH, and SAH. The pt was admitted on  for worsening CT. Social:  Pt lives with her parents in a 2 floor plan with 3 steps and 1 rail to enter with 14 steps and 1 rail to the 2nd floor bath/bedroom, 1/2 bath on the 1st floor. Prior to admission pt ambulated with no AD and worked at [employer]. 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   The 14 steps rail on left ascending and on the 3 steps rail on the right      Initial Evaluation  19 AM   PM Short Term Goals Long Term Goals    Was pt agreeable to Eval/treatment? Yes yes yes     Does pt have pain?  R knee pain Minimal Right knee pain    Right knee pain     Bed Mobility  Rolling: Min A  Supine to sit: Min A  Sit to supine: Min A  Scooting: Min A Sit to supine min A with R LE management   Supine to sit min A with R LE management    Rolling nt  Supine to sit CGA  Sit to supine SBA  Scooting SBA SBA Modified Independent   Transfers Sit to stand: SBA  Stand to sit: Min A  Stand pivot: Min A with franck standard walker Sit to stand CG/SBA  Stand to sit CG/SBA  Stand pivot with ww with CGA  sit to stand CG/ASBA  Stand to sit CG/SBA with cues for hand placement    SBA Modified Independent   Ambulation   10 feet with franck standard walker with Min A 75 feet and 100 feet with jr ww with SBA 75 feet x2  with jr ww with SBA 20 feet with AAD with SBA >150 feet with AAD with Supervision   Walking 10 feet on uneven surface NT  n t 10 feet with AAD with SBA >10 feet with AAD with Supervision   Wheel Chair Mobility NT  nt     Car Transfers NT NT NT SBA Supervision   Stair negotiation: ascended and descended 1 step with 2 rails with Min A 4 steps with unilateral rail + hand held assist with min A  NT >4 steps with 1 rail with SBA >12 steps with 1 rail with Supervision   Curb Step:   ascended and descended NT  nt 4 inch step with AAD and SBA 4 inch step with AAD and Supervision   Picking up object off the floor NT  nt Will  an object with SBA Will  an object with Supervision   BLE ROM WFL, RLE limited by pain       BLE Strength RLE is grossly 2/5 at the hip and knee and 4/5 at the ankle  LLE is grossly 4/5       Balance  Sitting EOB: Supervision  Dynamic standing: Min A       Date Family Teach Completed Pt's mother present for initial evaluation       Is additional Family Teaching Needed? Y or N Yes yes yes     Hindering Progress R knee pain Right knee pain Right knee pain     PT recommended ELOS 2 weeks       Team's Discharge Plan        Therapist at Team Meeting          PM  Ankle pumps ( x15)   Hip abduction ( AAROM R LE and AROM L LE 2x10)   Heel slides ( AAROM R LE and AROM L LE 2x10)     Patient education  Pt educated on hand placement with transfers     Patient response to education:   Pt verbalized understanding Pt demonstrated skill Pt requires further education in this area   x X with verbal cues  x     Additional Comments:   Pt having  difficulty with transitioning to chair after ambulation and requires verbal cues. Improved tolerance with ambulation as pt increased distance. Time in 0915  Time out 1000    Time in 1430  Time out 1515    Pt is making  progress toward established Physical Therapy goals. Continue with physical therapy current plan of care.     Klaudia Aaron [id number]

## 2019-04-24 NOTE — PROGRESS NOTES
Occupational Therapy  OCCUPATIONAL THERAPY DAILY NOTE    Date:2019  Patient Name: Anil Shelby See  MRN: [id number]  : 1965  Room: [address] [place], [place] [address]       Diagnosis: subdural hematoma, s/p adonis hole craniotomy   Precautions:  falls, down syndrome    Functional Assessment:   Date Status AE  Comments   Feeding 19 Supervision      Grooming 19 Set up     Bathing 19 Min A     UB Dressing 19 Set up     LB Dressing 19 Min A  LH shoe horn  To chanda shoes seated EOB requiring assist to tie B shoes v/c's for adaptive technique    Homemaking         Functional Transfers / Balance:   Date Status DME  Comments   Sit Balance 19 SBA  demo'd sitting EOB and shower bench    Stand Balance 19 SBA-CGA w/w demo'd during transfers    [] Tub  [x] Shower   Transfer 19 Min A w/w, grab bar  Pt completed requiring v/c's for hand placement, walker safety    Commode   Transfer    Toiletin19 CGA      MIN A  ww    Functional   Mobility 19 Min A ww In living environment on various surface v/c's for safety awareness    Other:  Bed<>WC      Couch, recliner    19   CGA      MIN A    w/w      w/w   V/c's for hand placement       Pt requiring v/c's for technique and hand placement      Functional Exercises / Activity:  Pt engaged in leisure activity bingo requiring mod/max v/c's to follow rule of play, locating numbers on card, increased time, focusing on insight/reason for increased independence with ADL tasks. Sensory / Neuromuscular Re-Education:      Cognitive Skills:   Status Comments   Problem   Solving fair     Memory fair     Sequencing fair     Safety fair       Visual Perception:    Education:  Pt educated on hand placement during sit to stands. Pt educated on technique for LB dressing.    [x] Family teach completed on: 19: mother present for therapy session participating in and observing functional transfers; discussing transfers safety to shower bench, couch/recliner in living environment, walker safety on various surfaces carpet/hard floors. Pain Level: Additional Notes:       Patient has made good  progress during treatment sessions toward set goals. Therapy emphasis to obtain goals:Strengthening, Functional Mobility Training, Gait Training, Cognitive Reorientation, Patient/Caregiver Education & Training, Home Management Training, Cognitive/Perceptual Training, Equipment Evaluation, Education, & procurement, Endurance Training, Balance Training, Neuromuscular Re-education, Safety Education & Training, Self-Care / ADL      [x] Continue with current OT Plan of care.   [] Prepare for Discharge     DISCHARGE RECOMMENDATIONS  Recommended DME:    Post Discharge Care:   []Home Independently  []Home with 24hr Care / Supervision []Home with Partial Supervision []Home with Home Health OT []Home with Out Pt OT []Other: ___   Comments:         Time in Time out Tx Time Breakdown  Variance:   First Session  9991 [id number] [] Individual Tx-   [x] Concurrent Tx - 45 Mins  [] Co-Tx -   [] Group Tx -   [] Time Missed -     Second Session  [phone] [x] Individual Tx- 45 Mins  [] Concurrent Tx -   [] Co-Tx -   [] Group Tx -   [] Time Missed -     Third Session    [] Individual Tx-   [] Concurrent Tx -  [] Co-Tx -   [] Group Tx -   [] Time Missed -         Total Tx Time 90 Mins       Carlo Pod   [id number]/L [id number]

## 2019-04-24 NOTE — PROGRESS NOTES
Holly Early is a 49 y.o. female patient. Current Facility-Administered Medications   Medication Dose Route Frequency Provider Last Rate Last Dose    traMADol (ULTRAM) tablet 50 mg  50 mg Oral TID Magy Larson MD   50 mg at 04/23/19 2041    diclofenac (FLECTOR) 1.3 % patch 1 patch  1 patch Transdermal BID Magy Larson MD   1 patch at 04/23/19 2040    fludrocortisone (FLORINEF) tablet 0.2 mg  0.2 mg Oral Daily Chandu Husain MD   0.2 mg at 04/23/19 6254    levETIRAcetam (KEPPRA) tablet 500 mg  500 mg Oral BID Chandu Husain MD   500 mg at 04/24/19 0413    levothyroxine (SYNTHROID) tablet 112 mcg  112 mcg Oral Daily Chandu Husain MD   112 mcg at 04/24/19 7732    LORazepam (ATIVAN) tablet 0.5 mg  0.5 mg Oral Q8H PRN Chandu Husain MD        acetaminophen (TYLENOL) tablet 650 mg  650 mg Oral Q4H PRN Magy Larson MD   650 mg at 04/23/19 1436    magnesium hydroxide (MILK OF MAGNESIA) 400 MG/5ML suspension 30 mL  30 mL Oral Daily PRN Magy Larson MD         No Known Allergies  Active Problems:    Subdural hematoma (HCC)    Acute deep vein thrombosis (DVT) of popliteal vein of right lower extremity (HCC)  Resolved Problems:    * No resolved hospital problems. *    Blood pressure 105/63, pulse 67, temperature 98.9 °F (37.2 °C), temperature source Temporal, resp. rate 16, height 5' (1.524 m), weight 134 lb 1.6 oz (60.8 kg), SpO2 96 %. Subjective:  Symptoms:  Stable. She reports weakness. Diet:  Adequate intake. Activity level: Impaired due to weakness. Pain:  She reports no pain. Objective:  General Appearance: In no acute distress. Vital signs: (most recent): Blood pressure 105/63, pulse 67, temperature 98.9 °F (37.2 °C), temperature source Temporal, resp. rate 16, height 5' (1.524 m), weight 134 lb 1.6 oz (60.8 kg), SpO2 96 %. Vital signs are normal.    Output: Producing urine and producing stool. Lungs:  Normal effort and normal respiratory rate.   Breath sounds clear to auscultation. Heart: Normal rate. Regular rhythm. S1 normal and S2 normal.    Abdomen: Abdomen is soft. Bowel sounds are normal.   There is no abdominal tenderness. Extremities: Normal range of motion. Neurological: Patient is alert. (4/ str). Assessment:      Date   Status   AE    Comments     Feeding   19   Supervision              Grooming   19   Set up       Seated at sink level pt brushed teeth. VCs to place toothpaste onto toothbrush. Bathing   19   Min A     UB and LB bathing completed seated and standing with VCs to wash areas of body. Assistance to was buttocks on L side and thoroughly wash feet. UB Dressing   19   Set up       Don bra and pullover shirt. VCs to manage sleeve over RUE d/t IV site. LB Dressing   19   Min A    LH shoe horn    Don/doff socks, brief, pants and shoes. Assistance to manage R pant leg. Encouragement provided to tie shoelaces. Homemaking                          Functional Transfers / Balance:      Date Status DME  Comments   Sit Balance 19   Min A       Stand Balance 19   SBA-CGA       [] Tub  [x] Shower   Transfer 19 Min A ww     Commode   Transfer     Toiletin19 CGA        MIN A  ww     Functional   Mobility 19   Min A ww     Other:  Bed<>WC    19    CGA                   Assessment:    Condition: In stable condition. Improving.   (SDH). Plan:   Encourage ambulation. (Tolerating therapy well  Has IVC filter in place  Incision healing  Working on balance and safety).        Carlene Regan MD  2019

## 2019-04-24 NOTE — PROGRESS NOTES
Vascular Surgery Progress Note    CC: f/u IVC filter    HISTORY:  The patient is awake, alert, and oriented. Denies pain. IMPRESSION:  POD # 2 s/p IVC filter insertion. PLAN:  OK for discharge from vascular. F/u prn. Please call with concerns. Patient Active Problem List   Diagnosis Code    Down's syndrome P24.5    Systolic murmur H57.9    Vasodepressor syncope R55    Asymptomatic varicose veins of bilateral lower extremities I83.93    Hypothyroidism E03.9    Chest pain R07.9    SDH (subdural hematoma) (HCC) S06.5X9A    Subdural hematoma (HCC) S06.5X9A    Acute deep vein thrombosis (DVT) of popliteal vein of right lower extremity (HCC) I82.431       Current Medications:      LORazepam, acetaminophen, magnesium hydroxide    traMADol  50 mg Oral TID    diclofenac  1 patch Transdermal BID    fludrocortisone  0.2 mg Oral Daily    levETIRAcetam  500 mg Oral BID    levothyroxine  112 mcg Oral Daily          PHYSICAL EXAM:    Vitals:    04/24/19 0930   BP: 96/68   Pulse: 68   Resp: 14   Temp: 97.5 °F (36.4 °C)   SpO2: 98%     CONSTITUTIONAL:  awake, alert, cooperative, no apparent distress  Right groin puncture site CDI. No swelling.     LABS:    Lab Results   Component Value Date    WBC 3.3 (L) 04/17/2019    HGB 12.3 04/17/2019    HCT 36.5 04/17/2019     04/17/2019    PROTIME 12.8 (H) 04/11/2019    INR 1.1 04/11/2019    APTT 32.3 04/11/2019    K 3.5 04/17/2019    BUN 6 04/17/2019    CREATININE 0.6 04/17/2019       RADIOLOGY:

## 2019-04-24 NOTE — PROGRESS NOTES
Speech Language Pathology  DAILY PROGRESS NOTE  Speech Language Pathology  ACUTE REHABILITATION--DAILY PROGRESS NOTE      PATIENT NAME:  Tirso MORALES See      :  1965      TODAY'S DATE:  2019         SWALLOWING        CURRENT LEVEL 6    Diet: regular, thin liquids as tolerated, no straws     Oral prep/oral:    min oral containment issues were identified. Fair-good oral prep and A-P propulsion of bolus were noted with good clearance of oral residuals. Pharyngeal:    clear vocal quality was noted. no clinical indicators of aspiration were apparent. Intake-   good    INTERVENTION:   Pt actively participated in mealtime assessment. Pt educated on comp strats for safe swallow, including no straws. Pt needed encouragement to increase po intake. Mother reports this is not new for this pt. LANGUAGE     Receptive  Current Status  6      Short Term Goal 6       Long Term Goal 6     Expressive  Current Status  6      Short Term Goal 6       Long Term Goal 6            INTERVENTION:  NA    COGNITION:      Problem Solving Current Status  5      Short Term Goal 5       Long Term Goal 6       Memory  Current Status  3      Short Term Goal 4       Long Term Goal 5      INTERVENTION: Memory recall task completed this date related to friend and family first names and living locations. Pt able to recall approximately 40%, increased to 50% with mod verbal cues. Written cues provided to assist with recall. Provided for self practive. STM/PS task completed with fair  ability this date. Poor delayed recall continues. SPEECH:       INTERVENTION: Pt presents with distortions that were present prior to admit. Pt 100% intelligible to unknown listener.       Cognitive/linguistic supervision recommendations      24 hour supervision    Close supervision   Intermittent supervision    No supervision              x                                                       Oral feeding recommendations

## 2019-04-25 PROCEDURE — 97127 HC SP THER IVNTJ W/FOCUS COG FUNCJ: CPT | Performed by: SPEECH-LANGUAGE PATHOLOGIST

## 2019-04-25 PROCEDURE — 97530 THERAPEUTIC ACTIVITIES: CPT

## 2019-04-25 PROCEDURE — 6370000000 HC RX 637 (ALT 250 FOR IP): Performed by: STUDENT IN AN ORGANIZED HEALTH CARE EDUCATION/TRAINING PROGRAM

## 2019-04-25 PROCEDURE — 1280000000 HC REHAB R&B

## 2019-04-25 PROCEDURE — 92526 ORAL FUNCTION THERAPY: CPT | Performed by: SPEECH-LANGUAGE PATHOLOGIST

## 2019-04-25 PROCEDURE — 97535 SELF CARE MNGMENT TRAINING: CPT

## 2019-04-25 PROCEDURE — 97110 THERAPEUTIC EXERCISES: CPT

## 2019-04-25 PROCEDURE — 6370000000 HC RX 637 (ALT 250 FOR IP): Performed by: PHYSICAL MEDICINE & REHABILITATION

## 2019-04-25 RX ADMIN — TRAMADOL HYDROCHLORIDE 50 MG: 50 TABLET, FILM COATED ORAL at 13:31

## 2019-04-25 RX ADMIN — LEVETIRACETAM 500 MG: 500 TABLET, FILM COATED ORAL at 06:30

## 2019-04-25 RX ADMIN — DICLOFENAC EPOLAMINE 1 PATCH: 0.01 PATCH TOPICAL at 10:26

## 2019-04-25 RX ADMIN — LEVOTHYROXINE SODIUM 112 MCG: 112 TABLET ORAL at 06:32

## 2019-04-25 RX ADMIN — ACETAMINOPHEN 650 MG: 325 TABLET, FILM COATED ORAL at 21:18

## 2019-04-25 RX ADMIN — LEVETIRACETAM 500 MG: 500 TABLET, FILM COATED ORAL at 16:55

## 2019-04-25 RX ADMIN — TRAMADOL HYDROCHLORIDE 50 MG: 50 TABLET, FILM COATED ORAL at 09:24

## 2019-04-25 RX ADMIN — DICLOFENAC EPOLAMINE 1 PATCH: 0.01 PATCH TOPICAL at 21:16

## 2019-04-25 RX ADMIN — FLUDROCORTISONE ACETATE 0.2 MG: 0.1 TABLET ORAL at 09:24

## 2019-04-25 ASSESSMENT — PAIN SCALES - GENERAL
PAINLEVEL_OUTOF10: 0
PAINLEVEL_OUTOF10: 5
PAINLEVEL_OUTOF10: 2
PAINLEVEL_OUTOF10: 6
PAINLEVEL_OUTOF10: 6
PAINLEVEL_OUTOF10: 0

## 2019-04-25 ASSESSMENT — PAIN DESCRIPTION - LOCATION
LOCATION: KNEE
LOCATION: KNEE

## 2019-04-25 ASSESSMENT — PAIN DESCRIPTION - DESCRIPTORS
DESCRIPTORS: ACHING;DISCOMFORT
DESCRIPTORS: DISCOMFORT
DESCRIPTORS: ACHING;DISCOMFORT;SORE

## 2019-04-25 ASSESSMENT — PAIN DESCRIPTION - PAIN TYPE
TYPE: ACUTE PAIN
TYPE: ACUTE PAIN

## 2019-04-25 ASSESSMENT — PAIN DESCRIPTION - ORIENTATION
ORIENTATION: RIGHT
ORIENTATION: RIGHT

## 2019-04-25 ASSESSMENT — PAIN DESCRIPTION - FREQUENCY
FREQUENCY: INTERMITTENT
FREQUENCY: INTERMITTENT

## 2019-04-25 ASSESSMENT — PAIN DESCRIPTION - ONSET
ONSET: GRADUAL
ONSET: GRADUAL

## 2019-04-25 NOTE — PROGRESS NOTES
Speech Language Pathology  DAILY PROGRESS NOTE  Speech Language Pathology  ACUTE REHABILITATION--DAILY PROGRESS NOTE      PATIENT NAME:  Felicity Gilford A See      :  1965      TODAY'S DATE:  2019         SWALLOWING        CURRENT LEVEL 6    Diet: regular, thin liquids as tolerated, no straws     Oral prep/oral:    min oral containment issues were identified. Fair-good oral prep and A-P propulsion of bolus were noted with good clearance of oral residuals. Pharyngeal:    clear vocal quality was noted. no clinical indicators of aspiration were apparent. Intake-   Fair-encouragement needed for pt to increase PO    INTERVENTION:   Pt actively participated in mealtime assessment. Pt educated on comp strats for safe swallow, including no straws. Pt needed encouragement to increase po intake. LANGUAGE     Receptive  Current Status  6      Short Term Goal 6       Long Term Goal 6     Expressive  Current Status  6      Short Term Goal 6       Long Term Goal 6            INTERVENTION:  NA    COGNITION:      Problem Solving Current Status  5      Short Term Goal 5       Long Term Goal 6       Memory  Current Status  3      Short Term Goal 4       Long Term Goal 5      INTERVENTION: Memory recall task completed this date related to friend and family first names and living locations. Pt able to recall approximately 25%, increased to 50% with mod verbal cues. Written cues provided to assist with recall. Provided for self practice. STM/PS task completed with fair ability this date. Poor delayed recall continues. SPEECH:       INTERVENTION: Pt presents with distortions that were present prior to admit. Pt 100% intelligible to unknown listener.       Cognitive/linguistic supervision recommendations      24 hour supervision    Close supervision   Intermittent supervision    No supervision              x                                                       Oral feeding recommendations               Close supervision Remote  supervision  No supervision necessary    Not applicable (Pt NPO)              x                                                                                                                                                                                                      Will continue SP intervention as per previously established POC.     Three Hour Rule Tracking:    Individual therapy:   15 minutes  Concurrent therapy:  45 minutes  Group therapy:    minutes  Co-treatment therapy:   minutes    Total minutes for 4/25/2019: 60 minutes

## 2019-04-25 NOTE — PROGRESS NOTES
Raymondo Sever See is a 49 y.o. female patient. Current Facility-Administered Medications   Medication Dose Route Frequency Provider Last Rate Last Dose    traMADol (ULTRAM) tablet 50 mg  50 mg Oral TID Ghada Powell MD   50 mg at 04/25/19 0924    diclofenac (FLECTOR) 1.3 % patch 1 patch  1 patch Transdermal BID Ghada Powell MD   1 patch at 04/25/19 1026    fludrocortisone (FLORINEF) tablet 0.2 mg  0.2 mg Oral Daily Geoff Garcia MD   0.2 mg at 04/25/19 3840    levETIRAcetam (KEPPRA) tablet 500 mg  500 mg Oral BID Geoff Garcia MD   500 mg at 04/25/19 0630    levothyroxine (SYNTHROID) tablet 112 mcg  112 mcg Oral Daily Geoff Garcia MD   112 mcg at 04/25/19 8791    LORazepam (ATIVAN) tablet 0.5 mg  0.5 mg Oral Q8H PRN Geoff Garcia MD        acetaminophen (TYLENOL) tablet 650 mg  650 mg Oral Q4H PRN Ghada Powell MD   650 mg at 04/23/19 1436    magnesium hydroxide (MILK OF MAGNESIA) 400 MG/5ML suspension 30 mL  30 mL Oral Daily PRN Ghada oPwell MD         No Known Allergies  Active Problems:    Subdural hematoma (HCC)    Acute deep vein thrombosis (DVT) of popliteal vein of right lower extremity (HCC)  Resolved Problems:    * No resolved hospital problems. *    Blood pressure 96/68, pulse 68, temperature 97.5 °F (36.4 °C), temperature source Temporal, resp. rate 14, height 5' (1.524 m), weight 134 lb 1.6 oz (60.8 kg), SpO2 98 %. Subjective:  Symptoms:  Stable. She reports weakness. Diet:  Adequate intake. Activity level: Impaired due to weakness. Pain:  She reports no pain. Objective:  General Appearance: In no acute distress. Vital signs: (most recent): Blood pressure 96/68, pulse 68, temperature 97.5 °F (36.4 °C), temperature source Temporal, resp. rate 14, height 5' (1.524 m), weight 134 lb 1.6 oz (60.8 kg), SpO2 98 %. Vital signs are normal.    Output: Producing urine and producing stool. Lungs:  Normal effort and normal respiratory rate.   Breath sounds clear to auscultation. Heart: Normal rate. Regular rhythm. S1 normal and S2 normal.    Abdomen: Abdomen is soft. Bowel sounds are normal.   There is no abdominal tenderness. Extremities: Normal range of motion. Neurological: Patient is alert. (4/5 str). Assessment:    Condition: In stable condition. Improving.   (SDH). Plan:   Encourage ambulation. (Recovering well  Remove staples  Working on strength balance and safety).        Billie Apley, MD  4/25/2019

## 2019-04-25 NOTE — PROGRESS NOTES
Physical Therapy    Facility/Department: [address] REHAB  Rx. note    NAME: Blessing Terry See  : 1965  MRN: [id number]    Date of Service: 2019  Evaluating Therapist: Jakob Lopez P.T.    ROOM: [facility]  DIAGNOSIS: TBI  PRECAUTIONS: Fall risk, Down Syndrome, OA   PROCEDURE(S):  R adonis hole with drain placement  HPI: The pt has been following with neurology after a fall that occurred in 2019 resulting in a temporal fracture, SDH, and SAH. The pt was admitted on  for worsening CT. Social:  Pt lives with her parents in a 2 floor plan with 3 steps and 1 rail to enter with 14 steps and 1 rail to the 2nd floor bath/bedroom, 1/2 bath on the 1st floor. Prior to admission pt ambulated with no AD and worked at [employer]. 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   The 14 steps rail on left ascending and on the 3 steps rail on the right      Initial Evaluation  19 AM   PM Short Term Goals Long Term Goals    Was pt agreeable to Eval/treatment? Yes yes yes     Does pt have pain? R knee pain  Right knee pain and pt medicated  Right knee pain     Bed Mobility  Rolling: Min A  Supine to sit: Min A  Sit to supine: Min A  Scooting: Min A NT   Rolling nt  Supine to sit SBA  Sit to supine SBA  Scooting SBA SBA Modified Independent   Transfers Sit to stand: SBA  Stand to sit: Min A  Stand pivot: Min A with franck standard walker Sit to stand SBA  Stand to sit SBA  Stand pivot with ww with CG/SBA  sit to stand SBA  Stand to sit SBA with cues for hand placement    SBA Modified Independent   Ambulation   10 feet with franck standard walker with Min A 75 feet and 100 feet with jr ww with SBA 75 feet  with jr ww with SBA ( tends to run into objects on R however able to correct with verbal cues.    20 feet with AAD with SBA >150 feet with AAD with Supervision   Walking 10 feet on uneven surface NT NT n t 10 feet with AAD with SBA >10 feet with AAD with Supervision   Wheel Chair mobility at this time. Time in 0930  Time out 1000    Time in 1430  Time out 1515    Pt is making  progress toward established Physical Therapy goals. Continue with physical therapy current plan of care.     Mira Matta [id number]

## 2019-04-25 NOTE — PROGRESS NOTES
Occupational Therapy  OCCUPATIONAL THERAPY DAILY NOTE    Date:2019  Patient Name: Yun Cardenas See  MRN: [id number]  : 1965  [facility]       Diagnosis: subdural hematoma, s/p adonis hole craniotomy   Precautions:  falls, down syndrome    Functional Assessment:   Date Status AE  Comments   Feeding 19 Supervision      Grooming 19 Set up     Bathing 19 Min A     UB Dressing 19 Set up     LB Dressing 19 SBA LH shoe horn  Don/doff pants, socks and shoes. Increased time to complete. Homemaking         Functional Transfers / Balance:   Date Status DME  Comments   Sit Balance 19 SBA     Stand Balance 19 SBA-CGA w/w    [] Tub  [x] Shower   Transfer 19 Min A w/w, grab bar     Commode   Transfer    Toiletin19 SBA/CGA      MIN A  ww    Functional   Mobility 19 Min A ww    Other:  Bed<>WC      Couch, recliner    19   CGA      MIN A    w/w      w/w      Functional Exercises / Activity:  3# dowel david 3 X 10 on all planes with VC for positioning for proper form. Theraputty with wheel standing to increase BUE strength, endurance and stand tolerance. Pt completes with SBA and requires encouragement to complete task. Pt demo's fair stand tolerance. Min/Mod resistive mehran bar X 20 reps on 2 planes to increase BUE forearm, wrist and  strength for independence with functional transfers, mobility and ADLs. Dynamic stand balance activity with focus on stand balance, endurance, safety awareness and strengthening. Pt thre bean bags into bucket using ww for safety. Pt retrieved bean bags with reacher and demo'd poor skill d/t carryover. Sensory / Neuromuscular Re-Education:      Cognitive Skills:   Status Comments   Problem   Solving fair     Memory fair     Sequencing fair     Safety fair       Visual Perception:    Education:  Pt educated on hand placement during sit to stands.    Pt educated on technique for LB dressing. [x] Family teach completed on: 4/25/19: mother present for therapy session participating in and observing functional transfers; discussing transfers safety to shower bench, couch/recliner in living environment, walker safety on various surfaces carpet/hard floors. Pain Level: Additional Notes:       Patient has made good  progress during treatment sessions toward set goals. Therapy emphasis to obtain goals:Strengthening, Functional Mobility Training, Gait Training, Cognitive Reorientation, Patient/Caregiver Education & Training, Home Management Training, Cognitive/Perceptual Training, Equipment Evaluation, Education, & procurement, Endurance Training, Balance Training, Neuromuscular Re-education, Safety Education & Training, Self-Care / ADL      [x] Continue with current OT Plan of care.   [] Prepare for Discharge     DISCHARGE RECOMMENDATIONS  Recommended DME:    Post Discharge Care:   []Home Independently  []Home with 24hr Care / Supervision []Home with Partial Supervision []Home with Home Health OT []Home with Out Pt OT []Other: ___   Comments:         Time in Time out Tx Time Breakdown  Variance:   First Session  [id number] [] Individual Tx-   [x] Concurrent Tx - 45 Mins  [] Co-Tx -   [] Group Tx -   [] Time Missed -     Second Session  1:45 2:30 [] Individual Tx-   [x] Concurrent Tx - 45 Mins  [] Co-Tx -   [] Group Tx -   [] Time Missed -     Third Session    [] Individual Tx-   [] Concurrent Tx -  [] Co-Tx -   [] Group Tx -   [] Time Missed -         Total Tx Time 90 Mins       Tee JAUREGUI/MARIA GUADALUPE [id number]

## 2019-04-26 ENCOUNTER — APPOINTMENT (OUTPATIENT)
Dept: GENERAL RADIOLOGY | Age: 54
DRG: 940 | End: 2019-04-26
Attending: PHYSICAL MEDICINE & REHABILITATION
Payer: MEDICARE

## 2019-04-26 PROCEDURE — 6370000000 HC RX 637 (ALT 250 FOR IP): Performed by: STUDENT IN AN ORGANIZED HEALTH CARE EDUCATION/TRAINING PROGRAM

## 2019-04-26 PROCEDURE — 97530 THERAPEUTIC ACTIVITIES: CPT

## 2019-04-26 PROCEDURE — 97127 HC SP THER IVNTJ W/FOCUS COG FUNCJ: CPT | Performed by: SPEECH-LANGUAGE PATHOLOGIST

## 2019-04-26 PROCEDURE — 97535 SELF CARE MNGMENT TRAINING: CPT

## 2019-04-26 PROCEDURE — 72040 X-RAY EXAM NECK SPINE 2-3 VW: CPT

## 2019-04-26 PROCEDURE — 6370000000 HC RX 637 (ALT 250 FOR IP): Performed by: PHYSICAL MEDICINE & REHABILITATION

## 2019-04-26 PROCEDURE — 1280000000 HC REHAB R&B

## 2019-04-26 PROCEDURE — 97110 THERAPEUTIC EXERCISES: CPT

## 2019-04-26 PROCEDURE — 92526 ORAL FUNCTION THERAPY: CPT | Performed by: SPEECH-LANGUAGE PATHOLOGIST

## 2019-04-26 RX ADMIN — FLUDROCORTISONE ACETATE 0.2 MG: 0.1 TABLET ORAL at 08:20

## 2019-04-26 RX ADMIN — TRAMADOL HYDROCHLORIDE 50 MG: 50 TABLET, FILM COATED ORAL at 20:58

## 2019-04-26 RX ADMIN — LEVETIRACETAM 500 MG: 500 TABLET, FILM COATED ORAL at 06:01

## 2019-04-26 RX ADMIN — TRAMADOL HYDROCHLORIDE 50 MG: 50 TABLET, FILM COATED ORAL at 13:15

## 2019-04-26 RX ADMIN — ACETAMINOPHEN 650 MG: 325 TABLET, FILM COATED ORAL at 08:21

## 2019-04-26 RX ADMIN — DICLOFENAC EPOLAMINE 1 PATCH: 0.01 PATCH TOPICAL at 08:20

## 2019-04-26 RX ADMIN — TRAMADOL HYDROCHLORIDE 50 MG: 50 TABLET, FILM COATED ORAL at 08:21

## 2019-04-26 RX ADMIN — LEVOTHYROXINE SODIUM 112 MCG: 112 TABLET ORAL at 06:01

## 2019-04-26 RX ADMIN — LEVETIRACETAM 500 MG: 500 TABLET, FILM COATED ORAL at 17:37

## 2019-04-26 RX ADMIN — DICLOFENAC EPOLAMINE 1 PATCH: 0.01 PATCH TOPICAL at 20:58

## 2019-04-26 ASSESSMENT — PAIN DESCRIPTION - DESCRIPTORS
DESCRIPTORS: ACHING;DISCOMFORT;DULL
DESCRIPTORS: ACHING;DISCOMFORT;DULL
DESCRIPTORS: ACHING;CONSTANT;DISCOMFORT

## 2019-04-26 ASSESSMENT — PAIN DESCRIPTION - PAIN TYPE
TYPE: CHRONIC PAIN

## 2019-04-26 ASSESSMENT — PAIN SCALES - GENERAL
PAINLEVEL_OUTOF10: 0
PAINLEVEL_OUTOF10: 7
PAINLEVEL_OUTOF10: 5
PAINLEVEL_OUTOF10: 2

## 2019-04-26 ASSESSMENT — PAIN DESCRIPTION - PROGRESSION
CLINICAL_PROGRESSION: NOT CHANGED

## 2019-04-26 ASSESSMENT — PAIN DESCRIPTION - ONSET
ONSET: ON-GOING

## 2019-04-26 ASSESSMENT — PAIN DESCRIPTION - FREQUENCY
FREQUENCY: CONTINUOUS

## 2019-04-26 ASSESSMENT — PAIN - FUNCTIONAL ASSESSMENT: PAIN_FUNCTIONAL_ASSESSMENT: ACTIVITIES ARE NOT PREVENTED

## 2019-04-26 ASSESSMENT — PAIN DESCRIPTION - LOCATION
LOCATION: KNEE

## 2019-04-26 ASSESSMENT — PAIN DESCRIPTION - ORIENTATION
ORIENTATION: RIGHT

## 2019-04-26 NOTE — PROGRESS NOTES
Physical Therapy    Facility/Department: [address] REHAB  Rx. note    NAME: Alvaro Rome See  : 1965  MRN: [id number]    Date of Service: 2019  Evaluating Therapist: Aleta Mcfarland P.T.    ROOM: [facility]  DIAGNOSIS: TBI  PRECAUTIONS: Fall risk, Down Syndrome, OA   PROCEDURE(S):  R adonis hole with drain placement  HPI: The pt has been following with neurology after a fall that occurred in 2019 resulting in a temporal fracture, SDH, and SAH. The pt was admitted on  for worsening CT. Social:  Pt lives with her parents in a 2 floor plan with 3 steps and 1 rail to enter with 14 steps and 1 rail to the 2nd floor bath/bedroom, 1/2 bath on the 1st floor. Prior to admission pt ambulated with no AD and worked at [employer]. 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   The 14 steps rail on left ascending and on the 3 steps rail on the right      Initial Evaluation  19 AM   PM Short Term Goals Long Term Goals    Was pt agreeable to Eval/treatment? Yes yes yes     Does pt have pain? R knee pain  Pt voiced no complaints of R knee pain  Right knee pain     Bed Mobility  Rolling: Min A  Supine to sit: Min A  Sit to supine: Min A  Scooting: Min A NT   SBA Modified Independent   Transfers Sit to stand: SBA  Stand to sit: Min A  Stand pivot: Min A with franck standard walker Sit to stand SBA  Stand to sit SBA    sit to stand SBA  Stand to sit SBA with cues for hand placement    SBA Modified Independent   Ambulation   10 feet with franck standard walker with Min A 75 feet and 100 feet with jr ww with SBA 75 feet  with jr ww with SBA ( tends to run into objects on R however able to correct with verbal cues.    20 feet with AAD with SBA >150 feet with AAD with Supervision   Walking 10 feet on uneven surface NT NT n t 10 feet with AAD with SBA >10 feet with AAD with Supervision   Wheel Chair Mobility NT NT nt     Car Transfers NT SBA SBA  Verbal cues for technique/sequencing SBA Supervision   Stair negotiation: ascended and descended 1 step with 2 rails with Min A 4 steps with unilateral rail + hand held assist with min/cga 4 steps with unilateral rail + hand held assist with min/cga >4 steps with 1 rail with SBA >12 steps with 1 rail with Supervision   Curb Step:   ascended and descended NT  nt 4 inch step with AAD and SBA 4 inch step with AAD and Supervision   Picking up object off the floor NT  nt Will  an object with SBA Will  an object with Supervision   BLE ROM WFL, RLE limited by pain       BLE Strength RLE is grossly 2/5 at the hip and knee and 4/5 at the ankle  LLE is grossly 4/5       Balance  Sitting EOB: Supervision  Dynamic standing: Min A       Date Family Teach Completed Pt's mother present for initial evaluation  Father present      Is additional Family Teaching Needed? Y or N Yes yes yes     Hindering Progress R knee pain Right knee pain Right knee pain     PT recommended ELOS 2 weeks       Team's Discharge Plan        Therapist at Team Meeting            Patient education  Pt educated on hand placement with transfers     Patient response to education:   Pt verbalized understanding Pt demonstrated skill Pt requires further education in this area   x X with verbal cues  x     Additional Comments:   AM  Pt continues to progress toward goals however still has difficulty transitioning to chair without verbal cues. Pt also continues to require hands on with stair negotiation. Difficulty with transitioning to chair with walker safety due to possible fatigue. Time in 0915  Time out 1000    Time in 1430  Time out 1515    Pt is making  progress toward established Physical Therapy goals. Continue with physical therapy current plan of care.     Luis Alberto Gee [id number]

## 2019-04-26 NOTE — PROGRESS NOTES
Occupational Therapy  OCCUPATIONAL THERAPY DAILY NOTE    Date:2019  Patient Name: Vy Webb See  MRN: [id number]  : 1965  Room: [address] [place], [place] [address]A       Diagnosis: subdural hematoma, s/p adonis hole craniotomy   Precautions:  falls, down syndrome    Functional Assessment:   Date Status AE  Comments   Feeding 19 Supervision      Grooming 19 Set up  Washed face. Bathing 19 SBA  UB and LB bathing completed seated and standing with SBA for stand balance and VCs to thoroughly rinse. UB Dressing 19 Set up  Don/doff bra and pullover shirt. LB Dressing 19 SBA LH shoe horn  Don/doff socks, pants, brief and don shoes. Encouragement provided to complete tasks and for problem solving. Homemaking         Functional Transfers / Balance:   Date Status DME  Comments   Sit Balance 19 SBA     Stand Balance 19 SBA-CGA w/w    [] Tub  [x] Shower   Transfer 19 SBA/CGA w/w, grab bar  VCs for walker positioning and hand placement. Commode   Transfer    Toiletin19 SBA/CGA      MIN A  ww    Functional   Mobility 19 SBA ww    Other:  Bed<>WC      Couch, recliner    19   CGA      MIN A    w/w      w/w      Functional Exercises / Activity:  Leisure task with focus on sequencing, following 2 step command, stand balance and stand tolerance. Pt participated in card game and required Min VCs throughout to recall instructions and problem solving with S/SBA for stand balance and demo'd fair stand tolerance completing X 2 stands throughout. MIn resistive mehran bar X 20 reps on 4 planes to increase B Forearm, wrist and  strength for independence with functional tasks. Sensory / Neuromuscular Re-Education:      Cognitive Skills:   Status Comments   Problem   Solving fair     Memory fair     Sequencing fair     Safety fair       Visual Perception:    Education:  Pt educated on hand placement during sit to stands.    Pt educated on technique for LB dressing. [x] Family teach completed on: 4/25/19: mother present for therapy session participating in and observing functional transfers; discussing transfers safety to shower bench, couch/recliner in living environment, walker safety on various surfaces carpet/hard floors. Pain Level: Additional Notes:       Patient has made good  progress during treatment sessions toward set goals. Therapy emphasis to obtain goals:Strengthening, Functional Mobility Training, Gait Training, Cognitive Reorientation, Patient/Caregiver Education & Training, Home Management Training, Cognitive/Perceptual Training, Equipment Evaluation, Education, & procurement, Endurance Training, Balance Training, Neuromuscular Re-education, Safety Education & Training, Self-Care / ADL      [x] Continue with current OT Plan of care. [] Prepare for Discharge     DISCHARGE RECOMMENDATIONS  Recommended DME:    Post Discharge Care:   []Home Independently  []Home with 24hr Care / Supervision []Home with Partial Supervision []Home with Home Health OT []Home with Out Pt OT []Other: ___   Comments:         Time in Time out Tx Time Breakdown  Variance:   First Session  8:20 9:15 [] Individual Tx-   [x] Concurrent Tx - 45 Mins  [] Co-Tx -   [] Group Tx -   [] Time Missed -     Second Session 10:45 11:30 [] Individual Tx-   [x] Concurrent Tx - 45 Mins  [] Co-Tx -   [] Group Tx -   [] Time Missed -     Third Session    [] Individual Tx-   [] Concurrent Tx -  [] Co-Tx -   [] Group Tx -   [] Time Missed -         Total Tx Time 90 Mins       Mariam Leon   JAUREGUI/L [id number]  I have read the above note and agree with the documentation.   Gerre Nyhan OTR/L [id number]

## 2019-04-26 NOTE — PROGRESS NOTES
clear to auscultation. Heart: Normal rate. Regular rhythm. S1 normal and S2 normal.    Abdomen: Abdomen is soft. Bowel sounds are normal.   There is no abdominal tenderness. Extremities: Decreased range of motion. Neurological: Patient is alert. (4/5 str). :      Date   Status   AE    Comments     Feeding   19   Supervision              Grooming   19   Set up             Bathing   19   Min A           UB Dressing   19   Set up             LB Dressing   19   SBA   LH shoe horn    Don/doff pants, socks and shoes. Increased time to complete. Homemaking                          Functional Transfers / Balance:      Date Status DME  Comments   Sit Balance 19   SBA       Stand Balance 19   SBA-CGA w/w     [] Tub  [x] Shower   Transfer 19 Min A w/w, grab bar      Commode   Transfer     Toiletin19 SBA/CGA        MIN A  ww     Functional   Mobility 19   Min A ww     Other:  Bed<>WC        Couch, recliner     19    CGA        MIN A     w/w        w/w        Functional Exercises / Activity:  3# dowel david 3 X 10 on all planes with VC for positioning for proper form.      Theraputty with wheel standing to increase BUE strength, endurance and stand tolerance. Pt completes with SBA and requires encouragement to complete task. Pt demo's fair stand tolerance.      Min/Mod resistive mehran bar X 20 reps on 2 planes to increase BUE forearm, wrist and  strength for independence with functional transfers, mobility and ADLs.    Dynamic stand balance activity with focus on stand balance, endurance, safety awareness and strengthening. Pt thre bean bags into bucket using ww for safety. Pt retrieved bean bags with reacher and demo'd poor skill d/t carryover.            Assessment:    Condition: In stable condition. Improving.   (SDH). Plan:   Encourage ambulation.   (Improving in therapy  Staples removed  Still has some right knee pain  Using Flector and therapy to knee).        Hyun Noriega MD  4/26/2019

## 2019-04-26 NOTE — PROGRESS NOTES
Will continue SP intervention as per previously established POC.     Three Hour Rule Tracking:    Individual therapy:    minutes  Concurrent therapy:  60 minutes  Group therapy:    minutes  Co-treatment therapy:   minutes    Total minutes for 4/26/2019: 60 minutes

## 2019-04-27 PROCEDURE — 6370000000 HC RX 637 (ALT 250 FOR IP): Performed by: STUDENT IN AN ORGANIZED HEALTH CARE EDUCATION/TRAINING PROGRAM

## 2019-04-27 PROCEDURE — 1280000000 HC REHAB R&B

## 2019-04-27 PROCEDURE — 97530 THERAPEUTIC ACTIVITIES: CPT

## 2019-04-27 PROCEDURE — 6370000000 HC RX 637 (ALT 250 FOR IP): Performed by: PHYSICAL MEDICINE & REHABILITATION

## 2019-04-27 PROCEDURE — 92526 ORAL FUNCTION THERAPY: CPT | Performed by: SPEECH-LANGUAGE PATHOLOGIST

## 2019-04-27 RX ORDER — TRAMADOL HYDROCHLORIDE 50 MG/1
50 TABLET ORAL EVERY 6 HOURS PRN
Status: DISCONTINUED | OUTPATIENT
Start: 2019-04-27 | End: 2019-05-02 | Stop reason: HOSPADM

## 2019-04-27 RX ADMIN — ACETAMINOPHEN 650 MG: 325 TABLET, FILM COATED ORAL at 08:53

## 2019-04-27 RX ADMIN — ACETAMINOPHEN 650 MG: 325 TABLET, FILM COATED ORAL at 18:11

## 2019-04-27 RX ADMIN — TRAMADOL HYDROCHLORIDE 50 MG: 50 TABLET, FILM COATED ORAL at 08:53

## 2019-04-27 RX ADMIN — DICLOFENAC EPOLAMINE 1 PATCH: 0.01 PATCH TOPICAL at 21:01

## 2019-04-27 RX ADMIN — LEVETIRACETAM 500 MG: 500 TABLET, FILM COATED ORAL at 18:10

## 2019-04-27 RX ADMIN — DICLOFENAC EPOLAMINE 1 PATCH: 0.01 PATCH TOPICAL at 08:53

## 2019-04-27 RX ADMIN — FLUDROCORTISONE ACETATE 0.2 MG: 0.1 TABLET ORAL at 08:53

## 2019-04-27 RX ADMIN — LEVOTHYROXINE SODIUM 112 MCG: 112 TABLET ORAL at 06:35

## 2019-04-27 RX ADMIN — LEVETIRACETAM 500 MG: 500 TABLET, FILM COATED ORAL at 06:35

## 2019-04-27 ASSESSMENT — PAIN DESCRIPTION - LOCATION
LOCATION: KNEE
LOCATION: KNEE

## 2019-04-27 ASSESSMENT — PAIN DESCRIPTION - ORIENTATION
ORIENTATION: RIGHT
ORIENTATION: RIGHT

## 2019-04-27 ASSESSMENT — PAIN SCALES - GENERAL
PAINLEVEL_OUTOF10: 4
PAINLEVEL_OUTOF10: 0
PAINLEVEL_OUTOF10: 6
PAINLEVEL_OUTOF10: 5
PAINLEVEL_OUTOF10: 0

## 2019-04-27 ASSESSMENT — PAIN DESCRIPTION - FREQUENCY: FREQUENCY: CONTINUOUS

## 2019-04-27 ASSESSMENT — PAIN DESCRIPTION - ONSET: ONSET: ON-GOING

## 2019-04-27 ASSESSMENT — PAIN DESCRIPTION - DESCRIPTORS
DESCRIPTORS: ACHING
DESCRIPTORS: ACHING;CONSTANT;DISCOMFORT;DULL

## 2019-04-27 ASSESSMENT — PAIN DESCRIPTION - PROGRESSION: CLINICAL_PROGRESSION: NOT CHANGED

## 2019-04-27 ASSESSMENT — PAIN DESCRIPTION - PAIN TYPE: TYPE: CHRONIC PAIN

## 2019-04-27 NOTE — PROGRESS NOTES
Physical Therapy    Facility/Department: [address] REHAB  Treatment Note    NAME: Yun Cardenas See  : 1965  MRN: [id number]    Date of Service: 2019  Evaluating Therapist: Hubert Murry P.TMartha    ROOM: [facility]  DIAGNOSIS: TBI  PRECAUTIONS: Fall risk, Down Syndrome, OA   PROCEDURE(S):  R adonis hole with drain placement  HPI: The pt has been following with neurology after a fall that occurred in 2019 resulting in a temporal fracture, SDH, and SAH. The pt was admitted on  for worsening CT. Social:  Pt lives with her parents in a 2 floor plan with 3 steps and 1 rail to enter with 14 steps and 1 rail to the 2nd floor bath/bedroom, 1/2 bath on the 1st floor. Prior to admission pt ambulated with no AD and worked at [employer]. 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   The 14 steps rail on left ascending and on the 3 steps rail on the right      Initial Evaluation  19 AM   Short Term Goals Long Term Goals    Was pt agreeable to Eval/treatment? Yes yes     Does pt have pain?  R knee pain Right knee pain     Bed Mobility  Rolling: Min A  Supine to sit: Min A  Sit to supine: Min A  Scooting: Min A NT SBA Modified Independent   Transfers Sit to stand: SBA  Stand to sit: Min A  Stand pivot: Min A with franck standard walker Sit to stand: SBA  Stand to sit: SBA  Stand pivot: SBA with jr std walker   SBA Modified Independent   Ambulation   10 feet with franck standard walker with Min A 75 feet x2 reps with jr std walker with SBA 20 feet with AAD with SBA >150 feet with AAD with Supervision   Walking 10 feet on uneven surface NT NT 10 feet with AAD with SBA >10 feet with AAD with Supervision   Wheel Chair Mobility NT NT     Car Transfers NT SBA SBA Supervision   Stair negotiation: ascended and descended 1 step with 2 rails with Min A 8 steps with 1 rail and HHA min A >4 steps with 1 rail with SBA >12 steps with 1 rail with Supervision   Curb Step:   ascended and descended NT  4 inch step with AAD and SBA 4 inch step with AAD and Supervision   Picking up object off the floor NT  Will  an object with SBA Will  an object with Supervision   BLE ROM WFL, RLE limited by pain      BLE Strength RLE is grossly 2/5 at the hip and knee and 4/5 at the ankle  LLE is grossly 4/5      Balance  Sitting EOB: Supervision  Dynamic standing: Min A      Date Family Teach Completed Pt's mother present for initial evaluation      Is additional Family Teaching Needed? Y or N Yes yes     Hindering Progress R knee pain Right knee pain     PT recommended ELOS 2 weeks      Team's Discharge Plan       Therapist at Team Meeting           Patient education  Pt educated on hand placement with transfers    Patient response to education:   Pt verbalized understanding Pt demonstrated skill Pt requires further education in this area   yes With cues reinforce     Additional Comments:   Pt ambulated with decreased chaparro with occasional c/o R knee pain. Pt required cues for hand placement during some sit<>stand transfers and for sequencing car transfer.  Pt required encouragement to increase stair negotiation      Time in: 0915  Time out: 0408 Pamela Isaacs,Third Floor PTA [id number]

## 2019-04-27 NOTE — PROGRESS NOTES
Jamaal Early is a 49 y.o. female patient. Current Facility-Administered Medications   Medication Dose Route Frequency Provider Last Rate Last Dose    traMADol (ULTRAM) tablet 50 mg  50 mg Oral TID Karey Anglin MD   50 mg at 04/26/19 2058    diclofenac (FLECTOR) 1.3 % patch 1 patch  1 patch Transdermal BID Karey Anglin MD   1 patch at 04/26/19 2058    fludrocortisone (FLORINEF) tablet 0.2 mg  0.2 mg Oral Daily Gianfranco Sams MD   0.2 mg at 04/26/19 0820    levETIRAcetam (KEPPRA) tablet 500 mg  500 mg Oral BID Gianfranco Sams MD   500 mg at 04/27/19 1450    levothyroxine (SYNTHROID) tablet 112 mcg  112 mcg Oral Daily Gianfranco Sams MD   112 mcg at 04/27/19 2304    LORazepam (ATIVAN) tablet 0.5 mg  0.5 mg Oral Q8H PRN Gianfranco Sams MD        acetaminophen (TYLENOL) tablet 650 mg  650 mg Oral Q4H PRN Karey Anglin MD   650 mg at 04/26/19 2062    magnesium hydroxide (MILK OF MAGNESIA) 400 MG/5ML suspension 30 mL  30 mL Oral Daily PRN Karey Anglin MD         No Known Allergies  Active Problems:    Subdural hematoma (HCC)    Acute deep vein thrombosis (DVT) of popliteal vein of right lower extremity (HCC)  Resolved Problems:    * No resolved hospital problems. *    Blood pressure 132/76, pulse 70, temperature 97.6 °F (36.4 °C), resp. rate 16, height 5' (1.524 m), weight 134 lb 1.6 oz (60.8 kg), SpO2 97 %. Subjective:  Symptoms:  Stable. She reports weakness. Diet:  Adequate intake. Activity level: Impaired due to weakness. Pain:  She reports no pain. Objective:  General Appearance: In no acute distress. Vital signs: (most recent): Blood pressure 132/76, pulse 70, temperature 97.6 °F (36.4 °C), resp. rate 16, height 5' (1.524 m), weight 134 lb 1.6 oz (60.8 kg), SpO2 97 %. Vital signs are normal.    Output: Producing urine and producing stool. Lungs:  Normal effort and normal respiratory rate. Breath sounds clear to auscultation. Heart: Normal rate. Regular rhythm.   S1 normal and S2 normal.    Abdomen: Abdomen is soft. Bowel sounds are normal.   There is no abdominal tenderness. Extremities: Normal range of motion. Neurological: Patient is alert. (4/5 str). Assessment:    Condition: In stable condition. Improving.   (SDH). Plan:   (Tolerating therapy well  There was some neck stiffness  I did an x-ray which just showed some osteophyte formation  I think that is normal given her underlying deformities  I have called her mother about the x-ray  We do not need to change her treatment at this point).        Elida Bundy MD  4/27/2019

## 2019-04-28 PROCEDURE — 97530 THERAPEUTIC ACTIVITIES: CPT

## 2019-04-28 PROCEDURE — 6370000000 HC RX 637 (ALT 250 FOR IP): Performed by: PHYSICAL MEDICINE & REHABILITATION

## 2019-04-28 PROCEDURE — 1280000000 HC REHAB R&B

## 2019-04-28 PROCEDURE — 6370000000 HC RX 637 (ALT 250 FOR IP): Performed by: STUDENT IN AN ORGANIZED HEALTH CARE EDUCATION/TRAINING PROGRAM

## 2019-04-28 PROCEDURE — 97535 SELF CARE MNGMENT TRAINING: CPT

## 2019-04-28 RX ADMIN — LEVOTHYROXINE SODIUM 112 MCG: 112 TABLET ORAL at 06:36

## 2019-04-28 RX ADMIN — ACETAMINOPHEN 650 MG: 325 TABLET, FILM COATED ORAL at 08:56

## 2019-04-28 RX ADMIN — DICLOFENAC EPOLAMINE 1 PATCH: 0.01 PATCH TOPICAL at 21:26

## 2019-04-28 RX ADMIN — LEVETIRACETAM 500 MG: 500 TABLET, FILM COATED ORAL at 18:11

## 2019-04-28 RX ADMIN — LEVETIRACETAM 500 MG: 500 TABLET, FILM COATED ORAL at 06:36

## 2019-04-28 RX ADMIN — TRAMADOL HYDROCHLORIDE 50 MG: 50 TABLET, FILM COATED ORAL at 08:55

## 2019-04-28 RX ADMIN — DICLOFENAC EPOLAMINE 1 PATCH: 0.01 PATCH TOPICAL at 08:55

## 2019-04-28 RX ADMIN — ACETAMINOPHEN 650 MG: 325 TABLET, FILM COATED ORAL at 21:24

## 2019-04-28 RX ADMIN — FLUDROCORTISONE ACETATE 0.2 MG: 0.1 TABLET ORAL at 08:56

## 2019-04-28 ASSESSMENT — PAIN SCALES - GENERAL
PAINLEVEL_OUTOF10: 0
PAINLEVEL_OUTOF10: 0
PAINLEVEL_OUTOF10: 5
PAINLEVEL_OUTOF10: 0
PAINLEVEL_OUTOF10: 10
PAINLEVEL_OUTOF10: 5

## 2019-04-28 ASSESSMENT — PAIN DESCRIPTION - PAIN TYPE: TYPE: CHRONIC PAIN

## 2019-04-28 ASSESSMENT — PAIN DESCRIPTION - LOCATION
LOCATION: KNEE
LOCATION: KNEE

## 2019-04-28 ASSESSMENT — PAIN DESCRIPTION - ORIENTATION
ORIENTATION: RIGHT
ORIENTATION: RIGHT

## 2019-04-28 ASSESSMENT — PAIN DESCRIPTION - DESCRIPTORS
DESCRIPTORS: ACHING;DISCOMFORT;DULL;SORE
DESCRIPTORS: ACHING

## 2019-04-28 ASSESSMENT — PAIN DESCRIPTION - PROGRESSION: CLINICAL_PROGRESSION: NOT CHANGED

## 2019-04-28 ASSESSMENT — PAIN DESCRIPTION - ONSET: ONSET: ON-GOING

## 2019-04-28 ASSESSMENT — PAIN DESCRIPTION - FREQUENCY: FREQUENCY: CONTINUOUS

## 2019-04-28 NOTE — PROGRESS NOTES
Occupational Therapy  OCCUPATIONAL THERAPY DAILY NOTE    Date:2019  Patient Name: Bear العلي See  MRN: [id number]  : 1965  Room: [address] [place], [place] [address]       Diagnosis: subdural hematoma, s/p adonis hole craniotomy   Precautions:  falls, down syndrome    Functional Assessment:   Date Status AE  Comments   Feeding 19 Supervision      Grooming 19 Set up  Pt stood at sink to wash her hands   Bathing 19 SBA     UB Dressing 19 Set up     LB Dressing 19 SBA LH shoe horn     Homemaking         Functional Transfers / Balance:   Date Status DME  Comments   Sit Balance 19 SBA     Stand Balance 19 SBA-CGA w/w    [] Tub  [x] Shower   Transfer 19 SBA/CGA w/w, grab bar     Commode   Transfer    Toiletin19 SBA/CGA      CGA ww      walker Pt required assist for balance and to don pants over hips   Functional   Mobility 19 SBA ww    Other:  Bed<>WC      Couch, recliner    19   CGA      MIN A    w/w      w/w      Functional Exercises / Activity:  Max assist to complete 24 piece jigsaw puzzle with assist for correct orientation for pieces. JAUREGUI provided verbal cues for matching colors, patterns, and the flat side to be on top, side or bottom. Sensory / Neuromuscular Re-Education:      Cognitive Skills:   Status Comments   Problem   Solving fair     Memory fair     Sequencing fair     Safety fair       Visual Perception:  Cues for orientation of puzzle pieces    Education:  Pt educated on safety with walker during functional transfers    [x] Family teach completed on: 19: mother present for therapy session participating in and observing functional transfers; discussing transfers safety to shower bench, couch/recliner in living environment, walker safety on various surfaces carpet/hard floors. Pain Level: Additional Notes:       Patient has made good  progress during treatment sessions toward set goals.  Therapy emphasis to obtain goals:Strengthening, Functional Mobility Training, Gait Training, Cognitive Reorientation, Patient/Caregiver Education & Training, Home Management Training, Cognitive/Perceptual Training, Equipment Evaluation, Education, & procurement, Endurance Training, Balance Training, Neuromuscular Re-education, Safety Education & Training, Self-Care / ADL      [x] Continue with current OT Plan of care. [] Prepare for Discharge     DISCHARGE RECOMMENDATIONS  Recommended DME:    Post Discharge Care:   []Home Independently  []Home with 24hr Care / Supervision []Home with Partial Supervision []Home with Home Health OT []Home with Out Pt OT []Other: ___   Comments:         Time in Time out Tx Time Breakdown  Variance:   First Session  [id number] [x] Individual Tx-  47 mins  [] Concurrent Tx -   [] Co-Tx -   [] Group Tx -   [] Time Missed -     Second Session   [] Individual Tx-   [] Concurrent Tx -   [] Co-Tx -   [] Group Tx -   [] Time Missed -     Third Session    [] Individual Tx-   [] Concurrent Tx -  [] Co-Tx -   [] Group Tx -   [] Time Missed -         Total Tx Time 47 mins     Zan Francisco JAUREGUI/L  I have read the above note and agree with the documentation.   Jamila Landa OTR/L [id number]

## 2019-04-29 PROCEDURE — 97530 THERAPEUTIC ACTIVITIES: CPT

## 2019-04-29 PROCEDURE — 97110 THERAPEUTIC EXERCISES: CPT

## 2019-04-29 PROCEDURE — 1280000000 HC REHAB R&B

## 2019-04-29 PROCEDURE — 97535 SELF CARE MNGMENT TRAINING: CPT

## 2019-04-29 PROCEDURE — 6370000000 HC RX 637 (ALT 250 FOR IP): Performed by: STUDENT IN AN ORGANIZED HEALTH CARE EDUCATION/TRAINING PROGRAM

## 2019-04-29 PROCEDURE — 92526 ORAL FUNCTION THERAPY: CPT | Performed by: SPEECH-LANGUAGE PATHOLOGIST

## 2019-04-29 PROCEDURE — 6370000000 HC RX 637 (ALT 250 FOR IP): Performed by: PHYSICAL MEDICINE & REHABILITATION

## 2019-04-29 PROCEDURE — 97127 HC SP THER IVNTJ W/FOCUS COG FUNCJ: CPT | Performed by: SPEECH-LANGUAGE PATHOLOGIST

## 2019-04-29 RX ADMIN — LEVETIRACETAM 500 MG: 500 TABLET, FILM COATED ORAL at 17:53

## 2019-04-29 RX ADMIN — ACETAMINOPHEN 650 MG: 325 TABLET, FILM COATED ORAL at 20:47

## 2019-04-29 RX ADMIN — LEVOTHYROXINE SODIUM 112 MCG: 112 TABLET ORAL at 06:17

## 2019-04-29 RX ADMIN — ACETAMINOPHEN 650 MG: 325 TABLET, FILM COATED ORAL at 08:45

## 2019-04-29 RX ADMIN — FLUDROCORTISONE ACETATE 0.2 MG: 0.1 TABLET ORAL at 08:45

## 2019-04-29 RX ADMIN — LEVETIRACETAM 500 MG: 500 TABLET, FILM COATED ORAL at 06:17

## 2019-04-29 RX ADMIN — DICLOFENAC EPOLAMINE 1 PATCH: 0.01 PATCH TOPICAL at 08:45

## 2019-04-29 RX ADMIN — DICLOFENAC EPOLAMINE 1 PATCH: 0.01 PATCH TOPICAL at 20:47

## 2019-04-29 ASSESSMENT — PAIN DESCRIPTION - FREQUENCY
FREQUENCY: CONTINUOUS
FREQUENCY: CONTINUOUS

## 2019-04-29 ASSESSMENT — PAIN SCALES - GENERAL
PAINLEVEL_OUTOF10: 5
PAINLEVEL_OUTOF10: 0
PAINLEVEL_OUTOF10: 2
PAINLEVEL_OUTOF10: 1

## 2019-04-29 ASSESSMENT — PAIN DESCRIPTION - DESCRIPTORS
DESCRIPTORS: ACHING;CONSTANT;DISCOMFORT
DESCRIPTORS: ACHING;CONSTANT;DISCOMFORT

## 2019-04-29 ASSESSMENT — PAIN DESCRIPTION - LOCATION
LOCATION: KNEE
LOCATION: KNEE

## 2019-04-29 ASSESSMENT — PAIN DESCRIPTION - PAIN TYPE
TYPE: CHRONIC PAIN
TYPE: CHRONIC PAIN

## 2019-04-29 ASSESSMENT — PAIN - FUNCTIONAL ASSESSMENT: PAIN_FUNCTIONAL_ASSESSMENT: ACTIVITIES ARE NOT PREVENTED

## 2019-04-29 ASSESSMENT — PAIN DESCRIPTION - PROGRESSION
CLINICAL_PROGRESSION: NOT CHANGED
CLINICAL_PROGRESSION: NOT CHANGED

## 2019-04-29 ASSESSMENT — PAIN DESCRIPTION - ORIENTATION
ORIENTATION: RIGHT
ORIENTATION: RIGHT

## 2019-04-29 ASSESSMENT — PAIN DESCRIPTION - ONSET
ONSET: ON-GOING
ONSET: ON-GOING

## 2019-04-29 NOTE — PROGRESS NOTES
with Min A 4 steps x2 with unilateral rail with CG/SBA 12 steps with unilateral rail with CG/SBA  ( B UE both on rail )  >4 steps with 1 rail with SBA >12 steps with 1 rail with Supervision   Curb Step:   ascended and descended NT  4 inch step with ww with min A 4 inch step with AAD and SBA 4 inch step with AAD and Supervision   Picking up object off the floor NT  nt Will  an object with SBA Will  an object with Supervision   BLE ROM WFL, RLE limited by pain       BLE Strength RLE is grossly 2/5 at the hip and knee and 4/5 at the ankle  LLE is grossly 4/5       Balance  Sitting EOB: Supervision  Dynamic standing: Min A       Date Family Teach Completed Pt's mother present for initial evaluation  Family schedule however no show 4/29     Is additional Family Teaching Needed? Y or N Yes yes Yes, if discharging home. Hindering Progress R knee pain Right knee pain Right knee pain     PT recommended ELOS 2 weeks       Team's Discharge Plan        Therapist at Team Meeting          AM  Pt performed ankle pumps, LAQS (L LE AROM and R LE AROM/AAROM 2x10)   Patient education  Pt educated on hand placement with transfers     Patient response to education:   Pt verbalized understanding Pt demonstrated skill Pt requires further education in this area   x X with verbal cues  x     Additional Comments:   AM  Pt continues to improve with ability to complete functional mobility. Pt continues to require verbal cues for technique/sequencing with walker as pt is transitioning to chair. Pt tends to turn with walker ( to far away from chair)  when approaching chair, pt requires verbal cues to approach chair with increased safety. Pt perseverating on speech therapy and R knee replacement throughout treatment session. Time in 0915  Time out 1000    Time in 1430  Time out 1515    Pt is making  progress toward established Physical Therapy goals. Continue with physical therapy current plan of care.     Meghan Bharath Jenkins [id number]

## 2019-04-29 NOTE — PROGRESS NOTES
Physical Therapy    Facility/Department: [address] [facility]  Weekly Note     NAME: Holly Pelaez See  : 1965  MRN: [id number]    Date of Service: 2019  Evaluating Therapist: Corby Alejandro P.T.    ROOM: [facility]  DIAGNOSIS: TBI  PRECAUTIONS: Fall risk, Down Syndrome, OA   PROCEDURE(S):  R adonis hole with drain placement  HPI: The pt has been following with neurology after a fall that occurred in 2019 resulting in a temporal fracture, SDH, and SAH. The pt was admitted on  for worsening CT. Social:  Pt lives with her parents in a 2 floor plan with 3 steps and 1 rail to enter with 14 steps and 1 rail to the 2nd floor bath/bedroom, 1/2 bath on the 1st floor. Prior to admission pt ambulated with no AD and worked at [employer]. 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 Initial Evaluation  19 Comments 19 Short Term Goals Long Term Goals    Was pt agreeable to Eval/treatment? Yes Yes  Yes       Does pt have pain?  R knee pain R knee pain  Pt did not report R knee       Bed Mobility  Rolling: Min A  Supine to sit: Min A  Sit to supine: Min A  Scooting: Min A Rolling SBA  Supine to sit SBA  Sit to supine SBA  Scooting to edge of bed SBA SBA with all aspects of bed mobility   SBA Modified Independent   Transfers Sit to stand: SBA  Stand to sit: Min A  Stand pivot: Min A with franck standard walker Sit to stand SBA  Stand to sit SBA  Stand pivot with jr ww with CG/SBA Sit to stand SBA  Stand to sit SBA   Verbal cues for improved technique/safety when transitioning to chair ( being able to square self up to chair appropriately )  SBA Modified Independent   Ambulation   10 feet with franck standard walker with Min A 50 feet with jr ww with CG/ feet with jr ww with SBA  20 feet with AAD with SBA >150 feet with AAD with Supervision   Wheel Chair Mobility NT        Car Transfers NT SBA with using handi bar  SBA  Verbal cues for proper hand placement SBA Supervision   Stair negotiation: ascended and descended 1 step with 2 rails with Min A 4 steps with bilateral rails with min/cga  12 steps  with unilateral rail ( holding on with B UE ) with CG/SBA  Pt requires CGA with descending steps due to Pt reports R knee pain. >4 steps with 1 rail with SBA >12 steps with 1 rail with Supervision   BLE ROM WFL, RLE limited by pain        BLE Strength RLE is grossly 2/5 at the hip and knee and 4/5 at the ankle  LLE is grossly 4/5        Balance  Sitting EOB: Supervision  Dynamic standing: Min A        Date Family Teach Completed Pt's mother present for initial evaluation Parents have attended therapy session however no hands on performed  Family scheduled for training 4/29-- no show  Mother/father present for therapy sessions. They were educated/instructed in proper technique/safety with all functional mobility. Is additional Family Teaching Needed? Y or N Yes Yes  Yes, if discharging home       Hindering Progress R knee pain        PT recommended ELOS 2 weeks 2 weeks  Friday       Team's Discharge Plan  7-10 days Discharge Thursday 5/2/19 to SNF      Therapist at Team Meeting  Ambrocio Lerma, PT, DPT [id number]   Franine Band, PT, DPT [id number]        Date:  4/22/19  Supporting factors:  Supportive family   Barriers to discharge:  R knee pain   Additional comments:  Pt had procedure 4/22 in am and hold 4/22 in pm.  Pt can resume therapy on 4/23   DME:  Jay [facility]aires  After Care:  Home PT      Ruma Smoke [id number]    4/23/19:  FT to be set up at end of week. Adaline Band, PT, DPT [id number]    Date:  4/29/19  Supporting factors:  Supportive family   Barriers to discharge:  R knee pain and some cognitive deficits with carry over with safety with mobility . Additional comments:  Pt progressing well toward goals of supervision however pt still requiring hands on with stair negotiation.   Family does not want pt to have pain medication however requesting a type of knee

## 2019-04-29 NOTE — PROGRESS NOTES
Breath sounds clear to auscultation. Heart: Normal rate. Regular rhythm. S1 normal and S2 normal.    Abdomen: Abdomen is soft. Bowel sounds are normal.   There is no abdominal tenderness. Extremities: Decreased range of motion. Neurological: Patient is alert. (4/5 L). Functional Assessment:      Date   Status   AE    Comments     Feeding   19   Supervision              Grooming   19   Set up       Pt stood at sink to wash her hands     Bathing   19   SBA           UB Dressing   19   Set up             LB Dressing   19   SBA   LH shoe horn          Homemaking                          Functional Transfers / Balance:      Date Status DME  Comments   Sit Balance 19   SBA       Stand Balance 19   SBA-CGA w/w     [] Tub  [x] Shower   Transfer 19 SBA/CGA w/w, grab bar      Commode   Transfer     Toiletin19 SBA/CGA        CGA ww        walker Pt required assist for balance and to don pants over hips   Functional   Mobility 19   SBA ww     Other:  Bed<>WC        Couch, recliner     19    CGA        MIN A     w/w        w/w              Assessment:    Condition: In stable condition. Improving.   (SDH). Plan:   Encourage ambulation. (Right knee pain is interfering with function  Family does not want her taking any pain medications  Or so has evaluated the knee and has no plans for intervention  If knee pain is limiting I still think she should use the Ultram).        Pilo Fitzgerald MD  2019

## 2019-04-29 NOTE — PROGRESS NOTES
Will continue SP intervention as per previously established POC.     Three Hour Rule Tracking:    Individual therapy:    minutes  Concurrent therapy:  60 minutes  Group therapy:    minutes  Co-treatment therapy:   minutes    Total minutes for 4/29/2019: 60 minutes

## 2019-04-29 NOTE — PROGRESS NOTES
Occupational Therapy  OCCUPATIONAL THERAPY DAILY NOTE    Date:2019  Patient Name: Brit Mccloud See  MRN: [id number]  : 1965  [facility]       Diagnosis: subdural hematoma, s/p adonis hole craniotomy   Precautions:  falls, down syndrome    Functional Assessment:   Date Status AE  Comments   Feeding 19 Supervision      Grooming 19 Set up  Standing at sink pt brushed teeth. Pt required VCs to rinse mouth out. Bathing 19 SBA  UB and LB bathing completed seated and standing. VCs to attend to body areas. UB Dressing 19 Set up  Don bra and pullover shirt. Set up provided for bra to properly don and secure closure. LB Dressing 19 SBA LH shoe horn  Don brief, pants, socks and shoes. Encouragement provided to complete shoe tying on R shoe. Homemaking 19 SBA  Standing at table top pt folded laundry and required encouragement to complete task and VCs for technique. Pt then placed clothing into dresser drawers. Functional Transfers / Balance:   Date Status DME  Comments   Sit Balance 19 Mod I     Stand Balance 19 S/SBA w/w    [] Tub  [x] Shower   Transfer 19 SBA w/w, grab bar     Commode   Transfer    Toiletin19 SBA/CGA      CGA ww      walker    Functional   Mobility 19 SBA ww    Other:  Bed<>WC      Couch, kitchen chair without arms    19   CGA       SBA   w/w      w/w         VCs for hand placement     Functional Exercises / Activity:      Sensory / Neuromuscular Re-Education:      Cognitive Skills:   Status Comments   Problem   Solving fair     Memory fair     Sequencing fair     Safety fair       Visual Perception:      Education:  Pt educated on hand placement during sit to stand transfers.     [x] Family teach completed on: 19: mother present for therapy session participating in and observing functional transfers; discussing transfers safety to shower bench, couch/recliner in living environment, walker safety on various surfaces carpet/hard floors. 4/29/19  Pt's mother and father present for family teach this PM and educated on levels of assist for ADLs, observed pt completing transfers throughout functional living environment and laundry task. Family communicates good understanding of instructions. Pain Level: Additional Notes:       Patient has made good  progress during treatment sessions toward set goals. Therapy emphasis to obtain goals:Strengthening, Functional Mobility Training, Gait Training, Cognitive Reorientation, Patient/Caregiver Education & Training, Home Management Training, Cognitive/Perceptual Training, Equipment Evaluation, Education, & procurement, Endurance Training, Balance Training, Neuromuscular Re-education, Safety Education & Training, Self-Care / ADL      [x] Continue with current OT Plan of care. [] Prepare for Discharge     DISCHARGE RECOMMENDATIONS  Recommended DME:  Grab bar around commode and shower. Post Discharge Care:   []Home Independently  [x]Home with 24hr Care / Supervision []Home with Partial Supervision []Home with Home Health OT []Home with Out Pt OT []Other: ___   Comments:         Time in Time out Tx Time Breakdown  Variance:   First Session  8:20 9:10 [x] Individual Tx-  50 mins  [] Concurrent Tx -   [] Co-Tx -   [] Group Tx -   [] Time Missed -     Second Session 1:00 1:45 [x] Individual Tx- 45 Mins  [] Concurrent Tx -   [] Co-Tx -   [] Group Tx -   [] Time Missed -     Third Session    [] Individual Tx-   [] Concurrent Tx -  [] Co-Tx -   [] Group Tx -   [] Time Missed -         Total Tx Time 95 mins       Anil Chilel JAUREGUI/L [id number]  I have read the above note and agree with the documentation.   Aliza Strickland OTR/L [id number]

## 2019-04-30 PROCEDURE — 1280000000 HC REHAB R&B

## 2019-04-30 PROCEDURE — 97127 HC SP THER IVNTJ W/FOCUS COG FUNCJ: CPT | Performed by: SPEECH-LANGUAGE PATHOLOGIST

## 2019-04-30 PROCEDURE — 97110 THERAPEUTIC EXERCISES: CPT

## 2019-04-30 PROCEDURE — 97530 THERAPEUTIC ACTIVITIES: CPT

## 2019-04-30 PROCEDURE — 6370000000 HC RX 637 (ALT 250 FOR IP): Performed by: PHYSICAL MEDICINE & REHABILITATION

## 2019-04-30 PROCEDURE — 6370000000 HC RX 637 (ALT 250 FOR IP): Performed by: STUDENT IN AN ORGANIZED HEALTH CARE EDUCATION/TRAINING PROGRAM

## 2019-04-30 PROCEDURE — 92526 ORAL FUNCTION THERAPY: CPT | Performed by: SPEECH-LANGUAGE PATHOLOGIST

## 2019-04-30 RX ADMIN — ACETAMINOPHEN 650 MG: 325 TABLET, FILM COATED ORAL at 09:00

## 2019-04-30 RX ADMIN — DICLOFENAC EPOLAMINE 1 PATCH: 0.01 PATCH TOPICAL at 21:28

## 2019-04-30 RX ADMIN — FLUDROCORTISONE ACETATE 0.2 MG: 0.1 TABLET ORAL at 08:56

## 2019-04-30 RX ADMIN — LEVETIRACETAM 500 MG: 500 TABLET, FILM COATED ORAL at 07:00

## 2019-04-30 RX ADMIN — DICLOFENAC EPOLAMINE 1 PATCH: 0.01 PATCH TOPICAL at 11:26

## 2019-04-30 RX ADMIN — LEVETIRACETAM 500 MG: 500 TABLET, FILM COATED ORAL at 17:31

## 2019-04-30 RX ADMIN — LEVOTHYROXINE SODIUM 112 MCG: 112 TABLET ORAL at 07:00

## 2019-04-30 ASSESSMENT — PAIN SCALES - GENERAL
PAINLEVEL_OUTOF10: 0
PAINLEVEL_OUTOF10: 0
PAINLEVEL_OUTOF10: 2
PAINLEVEL_OUTOF10: 1
PAINLEVEL_OUTOF10: 0

## 2019-04-30 ASSESSMENT — PAIN DESCRIPTION - DESCRIPTORS: DESCRIPTORS: ACHING;DISCOMFORT

## 2019-04-30 ASSESSMENT — PAIN DESCRIPTION - PROGRESSION: CLINICAL_PROGRESSION: NOT CHANGED

## 2019-04-30 ASSESSMENT — PAIN DESCRIPTION - FREQUENCY: FREQUENCY: CONTINUOUS

## 2019-04-30 ASSESSMENT — PAIN DESCRIPTION - ONSET: ONSET: ON-GOING

## 2019-04-30 ASSESSMENT — PAIN - FUNCTIONAL ASSESSMENT: PAIN_FUNCTIONAL_ASSESSMENT: ACTIVITIES ARE NOT PREVENTED

## 2019-04-30 ASSESSMENT — PAIN DESCRIPTION - LOCATION: LOCATION: KNEE

## 2019-04-30 ASSESSMENT — PAIN DESCRIPTION - PAIN TYPE: TYPE: CHRONIC PAIN

## 2019-04-30 ASSESSMENT — PAIN DESCRIPTION - ORIENTATION: ORIENTATION: RIGHT

## 2019-04-30 NOTE — PROGRESS NOTES
Physical Therapy    Facility/Department: [address] REHAB  Treatment Note     NAME: Jose C Allen See  : 1965  MRN: [id number]    Date of Service: 2019  Evaluating Therapist: Lilo Shane P.T.    ROOM: [facility]  DIAGNOSIS: TBI  PRECAUTIONS: Fall risk, Down Syndrome, OA   PROCEDURE(S):  R adonis hole with drain placement  HPI: The pt has been following with neurology after a fall that occurred in 2019 resulting in a temporal fracture, SDH, and SAH. The pt was admitted on  for worsening CT. Social:  Pt lives with her parents in a 2 floor plan with 3 steps and 1 rail to enter with 14 steps and 1 rail to the 2nd floor bath/bedroom, 1/2 bath on the 1st floor. Prior to admission pt ambulated with no AD and worked at [employer]. 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   The 14 steps rail on left ascending and on the 3 steps rail on the right      Initial Evaluation  19 AM   PM Short Term Goals Long Term Goals    Was pt agreeable to Eval/treatment? Yes yes yes     Does pt have pain? R knee pain  Pt voiced no complaints of R knee pain  Pt did not voice any complaints of R knee pain      Bed Mobility  Rolling: Min A  Supine to sit: Min A  Sit to supine: Min A  Scooting: Min A NT SBA with all aspects of bed mobility  SBA Modified Independent   Transfers Sit to stand: SBA  Stand to sit: Min A  Stand pivot: Min A with franck standard walker Sit to stand SBA  Stand to sit SBA    sit to stand SBA  Stand to sit SBA   SBA Modified Independent   Ambulation   10 feet with franck standard walker with Min A 150 feet with jr ww with SB/supervision  150  feet and 100 feet  with jr ww with SB/superision .    20 feet with AAD with SBA >150 feet with AAD with Supervision   Walking 10 feet on uneven surface NT NT n t 10 feet with AAD with SBA >10 feet with AAD with Supervision   Wheel Chair Mobility NT NT nt     Car Transfers NT SBA  SBA Supervision   Stair negotiation: ascended and descended 1 step with 2 rails with Min A 12 steps  with unilateral rail with CG/SBA NT >4 steps with 1 rail with SBA >12 steps with 1 rail with Supervision   Curb Step:   ascended and descended NT  Nt 4 inch step with AAD and SBA 4 inch step with AAD and Supervision   Picking up object off the floor NT  nt Will  an object with SBA Will  an object with Supervision   BLE ROM WFL, RLE limited by pain       BLE Strength RLE is grossly 2/5 at the hip and knee and 4/5 at the ankle  LLE is grossly 4/5       Balance  Sitting EOB: Supervision  Dynamic standing: Min A       Date Family Teach Completed Pt's mother present for initial evaluation  Family schedule however no show 4/29     Is additional Family Teaching Needed? Y or N Yes yes Yes, if discharging home. Hindering Progress R knee pain Right knee pain Right knee pain     PT recommended ELOS 2 weeks       Team's Discharge Plan        Therapist at Team Meeting          PM  Ankle pumps ( x20)   Marching ( B LE 2x10)   LAQs ( B LE 2x10)     Patient education  Pt educated on hand placement with transfers     Patient response to education:   Pt verbalized understanding Pt demonstrated skill Pt requires further education in this area   x X with verbal cues  x     Additional Comments:   AM  Pt continues to improve with ability to complete functional mobility. Pt continues to require verbal cues for technique/sequencing with walker as pt is transitioning to chair. Pt tends to turn with walker ( to far away from chair)  when approaching chair, pt requires verbal cues to approach chair with increased safety. Time in 0915  Time out 1000    Time in 1430  Time out 1515    Pt is making  progress toward established Physical Therapy goals. Continue with physical therapy current plan of care.     Kei Dove [id number]

## 2019-04-30 NOTE — PROGRESS NOTES
Haley Early is a 49 y.o. female patient. Current Facility-Administered Medications   Medication Dose Route Frequency Provider Last Rate Last Dose    traMADol (ULTRAM) tablet 50 mg  50 mg Oral Q6H PRN Lynnette Turcios MD   50 mg at 04/28/19 0855    diclofenac (FLECTOR) 1.3 % patch 1 patch  1 patch Transdermal BID Lynnette Turcios MD   1 patch at 04/29/19 2047    fludrocortisone (FLORINEF) tablet 0.2 mg  0.2 mg Oral Daily Will MD Vania   0.2 mg at 04/29/19 0845    levETIRAcetam (KEPPRA) tablet 500 mg  500 mg Oral BID Will MD Vania   500 mg at 04/30/19 0700    levothyroxine (SYNTHROID) tablet 112 mcg  112 mcg Oral Daily Will MD Vania   112 mcg at 04/30/19 0700    LORazepam (ATIVAN) tablet 0.5 mg  0.5 mg Oral Q8H PRN Will MD Vania        acetaminophen (TYLENOL) tablet 650 mg  650 mg Oral Q4H PRN Lynnette Turcios MD   650 mg at 04/29/19 2047    magnesium hydroxide (MILK OF MAGNESIA) 400 MG/5ML suspension 30 mL  30 mL Oral Daily PRN Lynnette Turcios MD         No Known Allergies  Active Problems:    Subdural hematoma (HCC)    Acute deep vein thrombosis (DVT) of popliteal vein of right lower extremity ([facility] [address]  Resolved Problems:    * No resolved hospital problems. *    Blood pressure (!) 117/59, pulse 70, temperature 97.8 °F (36.6 °C), temperature source Temporal, resp. rate 16, height 5' (1.524 m), weight 136 lb 11.2 oz (62 kg), SpO2 97 %. Subjective:  Symptoms:  Stable. She reports weakness. Diet:  Adequate intake. Activity level: Impaired due to pain. Pain:  She complains of pain that is moderate. Objective:  General Appearance: In no acute distress. Vital signs: (most recent): Blood pressure (!) 117/59, pulse 70, temperature 97.8 °F (36.6 °C), temperature source Temporal, resp. rate 16, height 5' (1.524 m), weight 136 lb 11.2 oz (62 kg), SpO2 97 %. Vital signs are normal.    Output: Producing urine and producing stool. Lungs:  Normal effort and normal respiratory rate.   Breath sounds clear to auscultation. Heart: Normal rate. Regular rhythm. S1 normal.    Abdomen: Abdomen is soft. Bowel sounds are normal.   There is no abdominal tenderness. Extremities: Decreased range of motion. There is deformity. Neurological: Patient is alert. (4/5 str). Assessment:    Condition: In stable condition. Improving.   (SDH). Plan:   Encourage ambulation. (Limited by knee pain  Review in team  Using flector  Family does not want pain meds).        Lukas Smith MD  4/30/2019

## 2019-04-30 NOTE — PROGRESS NOTES
Nutrition Assessment    Type and Reason for Visit: Reassess    Nutrition Recommendations: Continue current diet and ONS as ordered. Will continue to monitor intakes w/ noted since last assessment fluctuations in documented PO intake. Nutrition Assessment: Patient w/ documented 1-25% intakes since last assessment. Patient reports that she is eating \"ok\". Encouraged patient to continue to consume meals as able and ask for additional tray items if she does not like what she has ordered originallty. Will continue ONS as ordered to help supplement variable intakes. Malnutrition Assessment:  · Malnutrition Status: At risk for malnutrition  · Context: Acute illness or injury  · Findings of the 6 clinical characteristics of malnutrition (Minimum of 2 out of 6 clinical characteristics is required to make the diagnosis of moderate or severe Protein Calorie Malnutrition based on AND/ASPEN Guidelines):  1. Energy Intake-Less than or equal to 75% of estimated energy requirement, Greater than or equal to 7 days(PTA to ARU  )    2. Weight Loss-No significant weight loss(using OV wts ),    3. Fat Loss-No significant subcutaneous fat loss,    4. Muscle Loss-No significant muscle mass loss,    5. Fluid Accumulation-No significant fluid accumulation,    6.  Strength-Not measured    Nutrition Risk Level:  Moderate    Nutrient Needs:  · Estimated Daily Total Kcal: 3741-5751(MSJ: 1134 x 1.2 )  · Estimated Daily Protein (g): 80-90(1.3-1.5)  · Estimated Daily Total Fluid (ml/day): 1300ml     Nutrition Diagnosis:   · Problem: Inadequate oral intake  · Etiology: related to Acute injury/trauma     Signs and symptoms:  as evidenced by Intake 25-50%, Diet history of poor intake    Objective Information:  · Nutrition-Focused Physical Findings: +I/Os, oriented x 1 w/ hx of down syndrome, soft abd,active bs, +1 LLE edema   · Wound Type: Surgical Wound  · Current Nutrition Therapies:  · Oral Diet Orders: General   · Oral Diet intake: 26-50%  · Oral Nutrition Supplement (ONS) Orders: Standard High Calorie Oral Supplement  · Anthropometric Measures:  · Ht: 5' (152.4 cm)   · Current Body Wt: 136 lb (61.7 kg)(4/30 actual bedscale )  · Admission Body Wt: 134 lb (60.8 kg)(4/16 actual bedscale )  · Usual Body Wt: (no actual wts per EMR- family reports unaware of any wt changes wt per OV 7/2018 = 139# )  · % Weight Change:  ,  No significant wt changes per EMR using OV wts- family unaware of any wt changes   · Ideal Body Wt: 100 lb (45.4 kg), % Ideal Body 134%  · BMI Classification: BMI 25.0 - 29.9 Overweight    Nutrition Interventions:   Continue current diet  Continued Inpatient Monitoring, Coordination of Care    Nutrition Evaluation:   · Evaluation: Progress towards goals declining   · Goals: consume 75% or more of most meals/ONS     · Monitoring: Meal Intake, Supplement Intake, Diet Tolerance, Skin Integrity, Wound Healing, I&O, Mental Status/Confusion, Weight, Pertinent Labs, Monitor Bowel Function      Electronically signed by Alex Zapata RD, LD on 4/30/19 at 2:44 PM    Contact Number: x [phone]

## 2019-04-30 NOTE — CARE COORDINATION
After FT yesterday and meeting with Candelario BEE SSA - parents have decied on MAR. Choices provided. [facility] declined. [facility] a bed. They will accept on 5/3 @ 11 am via [facility]. Patient and mother Mrs. Early are aware of plan. JAIME completed.     Miri Yeboah

## 2019-04-30 NOTE — PROGRESS NOTES
Speech Language Pathology  ACUTE REHABILITATION--DAILY PROGRESS NOTE      PATIENT NAME:  Carlin MORALES See      :  1965      TODAY'S DATE:  2019         SWALLOWING        CURRENT LEVEL 6    Diet: regular, thin liquids as tolerated, no straws     Oral prep/oral:    min oral containment issues were identified. Fair-good oral prep and A-P propulsion of bolus were noted with good clearance of oral residuals. Pharyngeal:    clear vocal quality was noted. no clinical indicators of aspiration were apparent. Intake-   Good with encouragement from SLP    INTERVENTION:   Pt actively participated in mealtime assessment. Pt educated on comp strats for safe swallow, including no straws. Fair intake noted this date. Encouragement needed to increase po intake. LANGUAGE     Receptive  Current Status  6      Short Term Goal 6       Long Term Goal 6     Expressive  Current Status  6      Short Term Goal 6       Long Term Goal 6            INTERVENTION:  NA    COGNITION:      Problem Solving Current Status  5      Short Term Goal 5       Long Term Goal 6       Memory  Current Status  3      Short Term Goal 4       Long Term Goal 5      INTERVENTION: Attempted money management task that mother reports pt would have been able to complete prior to hospital admit. Poor completion noted. Max verbal and visual cues noted. Poor delayed recall continues. SPEECH:       INTERVENTION: Pt presents with distortions that were present prior to admit. Pt 100% intelligible to unknown listener.       Cognitive/linguistic supervision recommendations      24 hour supervision    Close supervision   Intermittent supervision    No supervision              x                                                       Oral feeding recommendations               Close supervision Remote  supervision  No supervision necessary    Not applicable (Pt NPO)              x Will continue SP intervention as per previously established POC.     Three Hour Rule Tracking:    Individual therapy:    minutes  Concurrent therapy:  60 minutes  Group therapy:    minutes  Co-treatment therapy:   minutes    Total minutes for 4/30/2019: 60 minutes

## 2019-04-30 NOTE — PATIENT CARE CONFERENCE
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, right [place] hole craniotomy 19  Impairment Group Code:  2.22      MEDICAL/FUNCTIONAL HISTORY/STATUS:  right knee still causing trouble , will  benefit from  brace??, has IVC filter in due to DVT found on ultrasound-non-occlusive right popliteal    Consultations/Labs/X-rays: none recent      MEDICATION UPDATE:  flector patch continues for right knee pain      NURSING FIMS:    Bowel:   Current level: Modified independent  Short term bowel goal:  Modified independent  Long term bowel goal: Modified independent    Bladder:   Current level: independent  Short term bladder goal: independent  Long term bladder goal: independent    Toilet Hygiene:   Current level : supervision  Short term Toilet hygiene goal: Modified independent  Long term toilet hygiene goal:  Modified independent    Skin integrity: 2 scalp incisions healing  Pain: right knee pain, flector patch effective    NUTRITION    Diet  general  Liquid consistency   thin    SOCIAL INFORMATION:  Lives with: parents  Prior community services:  went to a workshop Monday-Friday, had home health services for therapy  Home Architecture:  2 story, 3 entry, 1 rail, 14 up to 2nd, 1 rail, walk in shower  Prior Level of function:  independent, no driving  DME:  wheeled walker, rollator, bedside commode     FAMILY / PATIENT EDUCATION:  parents have been in for formal education    PHYSICAL THERAPY    Bed mobility:   Current level: standby assist  Short term bed mobility goal: standby assist  Long term bed mobility goal: Modified independent    Chair/bed transfers:  Current level: standby assist with verbal cues  Short term Chair/bed transfers goal: standby assist  Long term Chair/bed transfers goal: Modified independent      Ambulation:   Current level: 150 ft wheeled walker at standby assist  Short term ambulation goal: met  Long term ambulation goal: 150 ft. at supervision    Car transfers:   Current level: standby assist  Short term car transfers goal: standby assist  Long term car transfers goal:supervision    Stairs:   Current level : 12 with 1 rail at [facility] assist-Contact guard assist  Short term stairs goal: met  Long term stairs goal: 12 at supervision      OCCUPATIONAL THERAPY:      Tub/shower:   Current level: standby assist to walk in shower, seat, needs occasional verbal cues  Short term tub/shower goal: supervision  Long term tub/shower goal: supervision      Feeding:  Current level: supervision  Short term feeding goal: Modified independent  Long term feeding goal: independent    Grooming:   Current level: supervision  Short term grooming goal: Modified independent  Long term grooming goal: independent    Bathing:  Current level: standby assist  Short term bathing goal: standby assist  Long term bathing goal: standby assist    Homemaking:   Current level: light task , verbal cues  Short term homemaking goal: Modified independent  Long term homemaking goal: Modified independent    Upper body dressing:  Current level: supervision  Short term upper body dressing goal: supervision  Long term upper body dressing goal: independent    Lower body dressing:  Current level: standby assist  Short term lower body dressing goal: standby assist  Long term lower body dressing goal: standby assist    Toilet transfer:   Current level: standby assist  Short term toilet transfer goal: supervision  Long term toilet transfer goal: supervision      SPEECH THERAPY  Swallowing:  Current level:  Modified independent  Short term swallowing goal: Modified independent  Long term swallowing Goal: Modified independent    Comprehension:   Current level: Modified independent  Short term comprehension goal: Modified independent  Long term comprehension goal: Modified independent    Expression:   Current level: Modified independent  Short term expression goal: Modified independent  Long term expression goal: Modified independent    Problem solving:   Current level: supervision  Short term problem solving goal: Modified independent  Long term problem solving goal: Modified independent    Memory:  Current level: mod assist  Short term memory goal: supervision  Long term memory goal: supervision    Social interaction:  min assist, goal supervision    Safety awareness: fair    Comments: not making gains, family feels she is not at baseline    Patient/family's personal goals: \"go home on Friday\"  Factors supporting goal achievement:  made gains in OT and PT  Factors hindering goal achievement:  developmental delay      Discharge Plan   Estimated Length of Stay: 5/3/19            Destination: [facility] at Discharge: NA  Equipment at Discharge: NA      INTERDISCIPLINARY TEAM/PHYSICIAN [address]:  Family choice for SNF      I approve the established interdisciplinary plan of care as documented within the medical record of Fanta Early. Please see team conference signature page for those in attendance.     Electronically signed by Lupe Grant RN  on 4/30/2019 at 9:12 AM

## 2019-04-30 NOTE — PROGRESS NOTES
Occupational Therapy     04/30/19 1616   Attendance   Activity Cards  Shay Arroyo)   Participation Active participation   [place] Demonstrates ability to complete social goals   Social Skills Cooperates with others in group activity   Leisure Education Demonstrates knowledge of benefits of leisure involvement  John Hirsch identified enjoyment as a benefit.)   Time Spent With Patient   Minutes 30

## 2019-04-30 NOTE — PLAN OF CARE
Problem: Inadequate oral food/beverage intake (NI-2.1)  Goal: Food and/or Nutrient Delivery  Description continue current diet and ONS as ordered. Individualized approach for food/nutrient provision.   Outcome: Met This Shift

## 2019-04-30 NOTE — PROGRESS NOTES
Visual Perception:      Education:  Pt educated on hand placement during sit to stand transfers. [x] Family teach completed on: 4/25/19: mother present for therapy session participating in and observing functional transfers; discussing transfers safety to shower bench, couch/recliner in living environment, walker safety on various surfaces carpet/hard floors. 4/29/19  Pt's mother and father present for family teach this PM and educated on levels of assist for ADLs, observed pt completing transfers throughout functional living environment and laundry task. Family communicates good understanding of instructions. Pain Level: 0/10    Additional Notes:   4/30/19 Father present during PM tx session to offer support and encouragement. Patient has made good  progress during treatment sessions toward set goals. Therapy emphasis to obtain goals:Strengthening, Functional Mobility Training, Gait Training, Cognitive Reorientation, Patient/Caregiver Education & Training, Home Management Training, Cognitive/Perceptual Training, Equipment Evaluation, Education, & procurement, Endurance Training, Balance Training, Neuromuscular Re-education, Safety Education & Training, Self-Care / ADL      [x] Continue with current OT Plan of care. [] Prepare for Discharge     DISCHARGE RECOMMENDATIONS  Recommended DME:  Grab bar around commode and shower.    Post Discharge Care:   []Home Independently  [x]Home with 24hr Care / Supervision []Home with Partial Supervision []Home with Home Health OT []Home with Out Pt OT []Other: ___   Comments:         Time in Time out Tx Time Breakdown  Variance:   First Session  10:45 11:30 [] Individual Tx-   [x] Concurrent Tx - 45 Mins  [] Co-Tx -   [] Group Tx -   [] Time Missed -     Second Session 13:00pm 13:45pm [] Individual Tx-   [x] Concurrent Tx - 45[] Co-Tx -   [] Group Tx -   [] Time Missed -     Third Session    [] Individual Tx-   [] Concurrent Tx -  [] Co-Tx -   [] Group Tx -   []

## 2019-04-30 NOTE — DISCHARGE INSTR - COC
Continuity of Care Form    Patient Name: Jennifer Castillo See   :  1965  MRN:  [id number]    Admit date:  2019  Discharge date:  19    Code Status Order: Full Code   Advance Directives:   [facility] Documentation     Date/Time Healthcare Directive Type of Healthcare Directive Copy in [facility] [address] Agent's Name Healthcare Agent's Phone Number    19 0804  No, patient does not have an advance directive for healthcare treatment -- -- -- -- --    19 1802  Other (Comment) pt mother caitlin See is legal guardian -- -- -- -- --          Admitting Physician:  Hyun Noriega MD  PCP: David Fitzgerald DO    Discharging Nurse: [facility] Unit/Room#: 9253/0435-F  Discharging Unit Phone Number: [phone]    Emergency Contact:   Extended Emergency Contact Information  Primary Emergency Contact: Caitlin Early  Address: [address] Phone: [phone]  Relation: Legal Guardian  Secondary Emergency Contact: Zach Early  Address: [facility] [address] [facility] Phone: [phone]  Relation: Parent    Past Surgical History:  Past Surgical History:   Procedure Laterality Date    ABDOMEN SURGERY      duodenal atresia    ABDOMINAL ADHESION SURGERY      CRANIOTOMY Right 2019    RIGHT CRANIOTOMY FRANCISCO JAVIER HOLES performed by Kira Rodriguez MD at [facility] COMPL W DOP COLOR FLOW  2012         HIP SURGERY      Right    JOINT REPLACEMENT      left knee, right hip-dr Radha Andersen KNEE SURGERY      Right    TOE SURGERY      Right foot    TOTAL KNEE ARTHROPLASTY Left 2016    DR. De    TUBAL LIGATION      VENA CAVA FILTER PLACEMENT Bilateral 2019    VENA CAVA FILTER INSERTION performed by Leigh Reese MD at [address] [place] [address]       Immunization History:   Immunization History   Administered Date(s) Administered    Hepatitis A 2012    Hepatitis B (Recombivax HB) 04/11/2012, 05/30/2012, 12/07/2012    Influenza Vaccine, unspecified formulation 09/17/2015    Influenza, High Dose (Fluzone 65 yrs and older) 09/28/2017    Influenza, [place], 3 yrs and older, IM, PF (Fluzone 3 yrs and older or Afluria 5 yrs and older) 09/20/2016    Pneumococcal Polysaccharide (Unuclyyoj22) 12/06/2007, 12/10/2008    Tdap (Boostrix, Adacel) 04/11/2012       Active Problems:  Patient Active Problem List   Diagnosis Code    Down's syndrome X85.9    Systolic murmur I35.0    Vasodepressor syncope R55    Asymptomatic varicose veins of bilateral lower extremities I83.93    Hypothyroidism E03.9    Chest pain R07.9    SDH (subdural hematoma) (HCC) S06.5X9A    Subdural hematoma (HCC) S06.5X9A    Acute deep vein thrombosis (DVT) of popliteal vein of right lower extremity ([facility]) I82.431       Isolation/Infection:   Isolation          No Isolation            Nurse Assessment:  Last Vital Signs: /78   Pulse 67   Temp 98.9 °F (37.2 °C) (Temporal)   Resp 16   Ht 5' (1.524 m)   Wt 136 lb 11.2 oz (62 kg)   SpO2 99%   BMI 26.70 kg/m²     Last documented pain score (0-10 scale): Pain Level: 2  Last Weight:   Wt Readings from Last 1 Encounters:   04/30/19 136 lb 11.2 oz (62 kg)     Mental Status:  oriented, alert, coherent and able to concentrate and follow conversation    IV Access:  - None    Nursing Mobility/ADLs:  Walking   Assisted  Transfer  Assisted  Bathing  Assisted  Dressing  Assisted  Toileting  Assisted  Feeding  Assisted  Med Admin  Assisted  Med Delivery   prefers mixed with applesauce    Wound Care Documentation and Therapy:  Wound 04/22/19 Groin Anterior;Proximal;Right Puncture site with pressure dressing (Active)   Dressing Status Clean;Dry; Intact 4/22/2019 11:27 AM   Dressing Changed Changed/New 4/22/2019 11:27 AM   Dressing/Treatment Other (comment) 4/22/2019 11:27 AM   Number of days: 7        Elimination:  Continence:   · Bowel:  Yes  · Bladder: Yes  Urinary Catheter: None   Colostomy/Ileostomy/Ileal Conduit: No       Date of Last BM: 5-2-19    Intake/Output Summary (Last 24 hours) at 4/30/2019 0954  Last data filed at 4/30/2019 0649  Gross per 24 hour   Intake 900 ml   Output --   Net 900 ml     I/O last 3 completed shifts: In: 36 [P.O.:920]  Out: -     Safety Concerns:     History of Falls (last 30 days) and At Risk for Falls, Aspiration risk      Impairments/Disabilities:      Vision    Nutrition Therapy:  Current Nutrition Therapy:   - Oral Diet:  General    Routes of Feeding: Oral  Liquids: Thin Liquids  Daily Fluid Restriction: no  Last Modified Barium Swallow with Video (Video Swallowing Test): not done    Treatments at the Time of Hospital Discharge:   Respiratory Treatments: n/a  Oxygen Therapy:  is not on home oxygen therapy.   Ventilator:    - No ventilator support    Rehab Therapies: Physical Therapy, Occupational Therapy and Speech/Language Therapy  Weight Bearing Status/Restrictions: No weight bearing restirctions  Other Medical Equipment (for information only, NOT a DME order):  wheelchair, walker, bath bench and bedside commode  Other Treatments: n/a    Patient's personal belongings (please select all that are sent with patient):  Glasses    RN SIGNATURE:  Electronically signed by Marcos Bella RN on 5/2/19 at 8:55 AM    CASE MANAGEMENT/SOCIAL WORK SECTION    Inpatient Status Date: 04/16/19    Readmission Risk Assessment Score:  Readmission Risk              Risk of Unplanned Readmission:        12           Discharging to Facility/ Agency   · Name: [facility]lyssa Fernandez  · Address:  · Phone:  [phone]  · Fax:    Dialysis Facility (if applicable)   · Name:  · Address:  · Dialysis Schedule:  · Phone:  · Fax:    / signature: MICKY Little LSW    PHYSICIAN SECTION    Prognosis: Good    Condition at Discharge: Stable    Rehab Potential (if transferring to Rehab): Good    Recommended Labs or Other Treatments After Discharge: PT, OT    Physician Certification: I certify the above information and transfer of Alcus Rule See  is necessary for the continuing treatment of the diagnosis listed and that she requires [place] for less 30 days.      Update Admission H&P: No change in H&P    PHYSICIAN SIGNATURE:  Electronically signed by Angie Elaine MD on 4/30/19 at 9:55 AM

## 2019-05-01 PROCEDURE — 97530 THERAPEUTIC ACTIVITIES: CPT

## 2019-05-01 PROCEDURE — 97127 HC SP THER IVNTJ W/FOCUS COG FUNCJ: CPT | Performed by: SPEECH-LANGUAGE PATHOLOGIST

## 2019-05-01 PROCEDURE — 92526 ORAL FUNCTION THERAPY: CPT | Performed by: SPEECH-LANGUAGE PATHOLOGIST

## 2019-05-01 PROCEDURE — 6370000000 HC RX 637 (ALT 250 FOR IP): Performed by: PHYSICAL MEDICINE & REHABILITATION

## 2019-05-01 PROCEDURE — 6370000000 HC RX 637 (ALT 250 FOR IP): Performed by: STUDENT IN AN ORGANIZED HEALTH CARE EDUCATION/TRAINING PROGRAM

## 2019-05-01 PROCEDURE — 97110 THERAPEUTIC EXERCISES: CPT

## 2019-05-01 PROCEDURE — 1280000000 HC REHAB R&B

## 2019-05-01 RX ADMIN — ACETAMINOPHEN 650 MG: 325 TABLET, FILM COATED ORAL at 08:46

## 2019-05-01 RX ADMIN — DICLOFENAC EPOLAMINE 1 PATCH: 0.01 PATCH TOPICAL at 21:09

## 2019-05-01 RX ADMIN — LEVOTHYROXINE SODIUM 112 MCG: 112 TABLET ORAL at 06:18

## 2019-05-01 RX ADMIN — FLUDROCORTISONE ACETATE 0.2 MG: 0.1 TABLET ORAL at 08:46

## 2019-05-01 RX ADMIN — DICLOFENAC EPOLAMINE 1 PATCH: 0.01 PATCH TOPICAL at 08:45

## 2019-05-01 RX ADMIN — LEVETIRACETAM 500 MG: 500 TABLET, FILM COATED ORAL at 06:18

## 2019-05-01 RX ADMIN — LEVETIRACETAM 500 MG: 500 TABLET, FILM COATED ORAL at 18:57

## 2019-05-01 ASSESSMENT — PAIN - FUNCTIONAL ASSESSMENT: PAIN_FUNCTIONAL_ASSESSMENT: ACTIVITIES ARE NOT PREVENTED

## 2019-05-01 ASSESSMENT — PAIN SCALES - GENERAL
PAINLEVEL_OUTOF10: 0
PAINLEVEL_OUTOF10: 4
PAINLEVEL_OUTOF10: 0
PAINLEVEL_OUTOF10: 1

## 2019-05-01 ASSESSMENT — PAIN DESCRIPTION - FREQUENCY: FREQUENCY: INTERMITTENT

## 2019-05-01 ASSESSMENT — PAIN DESCRIPTION - ORIENTATION: ORIENTATION: RIGHT

## 2019-05-01 ASSESSMENT — PAIN DESCRIPTION - LOCATION: LOCATION: KNEE

## 2019-05-01 ASSESSMENT — PAIN DESCRIPTION - DESCRIPTORS: DESCRIPTORS: ACHING;DISCOMFORT

## 2019-05-01 ASSESSMENT — PAIN DESCRIPTION - ONSET: ONSET: ON-GOING

## 2019-05-01 ASSESSMENT — PAIN DESCRIPTION - PAIN TYPE: TYPE: CHRONIC PAIN

## 2019-05-01 ASSESSMENT — PAIN DESCRIPTION - PROGRESSION: CLINICAL_PROGRESSION: GRADUALLY IMPROVING

## 2019-05-01 NOTE — PROGRESS NOTES
NPO)              x                                                                                                                                                                                                      Will continue SP intervention as per previously established POC.     Three Hour Rule Tracking:    Individual therapy:    minutes  Concurrent therapy:  60 minutes  Group therapy:    minutes  Co-treatment therapy:   minutes    Total minutes for 5/1/2019: 60 minutes

## 2019-05-01 NOTE — PROGRESS NOTES
Finn Early is a 49 y.o. female patient. Current Facility-Administered Medications   Medication Dose Route Frequency Provider Last Rate Last Dose    traMADol (ULTRAM) tablet 50 mg  50 mg Oral Q6H PRN Charis Aaron MD   50 mg at 04/28/19 0855    diclofenac (FLECTOR) 1.3 % patch 1 patch  1 patch Transdermal BID Charis Aaron MD   1 patch at 04/30/19 2128    fludrocortisone (FLORINEF) tablet 0.2 mg  0.2 mg Oral Daily Kaylyn Graves MD   0.2 mg at 04/30/19 0856    levETIRAcetam (KEPPRA) tablet 500 mg  500 mg Oral BID Kaylyn Graves MD   500 mg at 05/01/19 6353    levothyroxine (SYNTHROID) tablet 112 mcg  112 mcg Oral Daily Kaylyn Graves MD   112 mcg at 05/01/19 0618    LORazepam (ATIVAN) tablet 0.5 mg  0.5 mg Oral Q8H PRN Kaylyn Graves MD        acetaminophen (TYLENOL) tablet 650 mg  650 mg Oral Q4H PRN Charis Aaron MD   650 mg at 04/30/19 0900    magnesium hydroxide (MILK OF MAGNESIA) 400 MG/5ML suspension 30 mL  30 mL Oral Daily PRN Charis Aaron MD         No Known Allergies  Active Problems:    Subdural hematoma (HCC)    Acute deep vein thrombosis (DVT) of popliteal vein of right lower extremity ([facility] [address]  Resolved Problems:    * No resolved hospital problems. *    Blood pressure 125/78, pulse 67, temperature 98.9 °F (37.2 °C), temperature source Temporal, resp. rate 16, height 5' (1.524 m), weight 136 lb 11.2 oz (62 kg), SpO2 99 %. Subjective:  Symptoms:  Stable. She reports weakness. Diet:  Adequate intake. Activity level: Impaired due to weakness. Pain:  She complains of pain that is mild. Objective:  General Appearance: In no acute distress. Vital signs: (most recent): Blood pressure 125/78, pulse 67, temperature 98.9 °F (37.2 °C), temperature source Temporal, resp. rate 16, height 5' (1.524 m), weight 136 lb 11.2 oz (62 kg), SpO2 99 %. Vital signs are normal.    Output: Producing urine and producing stool. Lungs:  Normal effort and normal respiratory rate.   Breath sounds clear to auscultation. Heart: Normal rate. Regular rhythm. S1 normal and S2 normal.    Abdomen: Abdomen is soft. Bowel sounds are normal.   There is no abdominal tenderness. Extremities: Normal range of motion. Neurological: Patient is alert. (4/5 str). Assessment:    Condition: In stable condition. Improving.   (SDH). Plan:   Encourage ambulation. (Tolerating therapy well  Still having some right knee pain  I discussed that with therapy yesterday  They did not feel that she would benefit from a brace  Even though vascular has okayed it I don't think a brace is a good idea with her thrombosis if it is not going to be of any benefit).        Billie Apley, MD  5/1/2019

## 2019-05-01 NOTE — PROGRESS NOTES
Perception:      Education:  Pt educated on purpose of therapeutic activity. [x] Family teach completed on: 4/25/19: mother present for therapy session participating in and observing functional transfers; discussing transfers safety to shower bench, couch/recliner in living environment, walker safety on various surfaces carpet/hard floors. 4/29/19  Pt's mother and father present for family teach this PM and educated on levels of assist for ADLs, observed pt completing transfers throughout functional living environment and laundry task. Family communicates good understanding of instructions. Pain Level: 0/10    Additional Notes:   4/30/19 Father present during PM tx session to offer support and encouragement. Patient has made good  progress during treatment sessions toward set goals. Therapy emphasis to obtain goals:Strengthening, Functional Mobility Training, Gait Training, Cognitive Reorientation, Patient/Caregiver Education & Training, Home Management Training, Cognitive/Perceptual Training, Equipment Evaluation, Education, & procurement, Endurance Training, Balance Training, Neuromuscular Re-education, Safety Education & Training, Self-Care / ADL      [x] Continue with current OT Plan of care. [] Prepare for Discharge     DISCHARGE RECOMMENDATIONS  Recommended DME:  Grab bar around commode and shower.    Post Discharge Care:   []Home Independently  [x]Home with 24hr Care / Supervision []Home with Partial Supervision []Home with Home Health OT []Home with Out Pt OT []Other: ___   Comments:         Time in Time out Tx Time Breakdown  Variance:   First Session  10:45 11:30 [] Individual Tx-   [x] Concurrent Tx - 45 Mins  [] Co-Tx -   [] Group Tx -   [] Time Missed -     Second Session 13:00pm 13:45pm [] Individual Tx-   [x] Concurrent Tx - 45 Mins  [] Co-Tx -   [] Group Tx -   [] Time Missed -     Third Session    [] Individual Tx-   [] Concurrent Tx -  [] Co-Tx -   [] Group Tx -   [] Time Missed -

## 2019-05-01 NOTE — PROGRESS NOTES
Supervision   Stair negotiation: ascended and descended 1 step with 2 rails with Min A 4 steps x2 with unilateral rail with CG/SBA 8 steps with unilateral rail with SBA >4 steps with 1 rail with SBA >12 steps with 1 rail with Supervision   Curb Step:   ascended and descended NT  Nt 4 inch step with AAD and SBA 4 inch step with AAD and Supervision   Picking up object off the floor NT  nt Will  an object with SBA Will  an object with Supervision   BLE ROM WFL, RLE limited by pain       BLE Strength RLE is grossly 2/5 at the hip and knee and 4/5 at the ankle  LLE is grossly 4/5       Balance  Sitting EOB: Supervision  Dynamic standing: Min A       Date Family Teach Completed Pt's mother present for initial evaluation  Family schedule however no show 4/29     Is additional Family Teaching Needed? Y or N Yes yes Yes, if discharging home. Hindering Progress R knee pain Right knee pain Right knee pain     PT recommended ELOS 2 weeks       Team's Discharge Plan        Therapist at Team Meeting          PM  Ankle pumps ( x20)   SAQs ( BLE 2x10)     Patient education  Pt educated on hand placement with transfers     Patient response to education:   Pt verbalized understanding Pt demonstrated skill Pt requires further education in this area   x X with verbal cues  x     Additional Comments:   AM  Pt continues to improve with ability to complete functional mobility. Pt continues to require verbal cues for technique/sequencing with walker as pt is transitioning to chair. Pt tends to turn with walker ( to far away from chair)  when approaching chair, pt requires verbal cues to approach chair with increased safety. Poor carry over with safety techniques with functional mobility. Time in 0915  Time out 1000    Time in 1430  Time out 1515    Pt is making  progress toward established Physical Therapy goals. Continue with physical therapy current plan of care.     Vianney Jade [id number]

## 2019-05-02 VITALS
WEIGHT: 136.7 LBS | OXYGEN SATURATION: 99 % | HEART RATE: 71 BPM | BODY MASS INDEX: 26.84 KG/M2 | SYSTOLIC BLOOD PRESSURE: 131 MMHG | DIASTOLIC BLOOD PRESSURE: 73 MMHG | RESPIRATION RATE: 16 BRPM | TEMPERATURE: 98.4 F | HEIGHT: 60 IN

## 2019-05-02 PROCEDURE — 6370000000 HC RX 637 (ALT 250 FOR IP): Performed by: STUDENT IN AN ORGANIZED HEALTH CARE EDUCATION/TRAINING PROGRAM

## 2019-05-02 PROCEDURE — 97127 HC SP THER IVNTJ W/FOCUS COG FUNCJ: CPT | Performed by: SPEECH-LANGUAGE PATHOLOGIST

## 2019-05-02 RX ORDER — LEVETIRACETAM 500 MG/1
500 TABLET ORAL 2 TIMES DAILY
Qty: 60 TABLET | Refills: 3 | Status: SHIPPED | OUTPATIENT
Start: 2019-05-02 | End: 2019-12-03

## 2019-05-02 RX ADMIN — LEVOTHYROXINE SODIUM 112 MCG: 112 TABLET ORAL at 05:58

## 2019-05-02 RX ADMIN — FLUDROCORTISONE ACETATE 0.2 MG: 0.1 TABLET ORAL at 08:49

## 2019-05-02 RX ADMIN — DICLOFENAC EPOLAMINE 1 PATCH: 0.01 PATCH TOPICAL at 08:49

## 2019-05-02 RX ADMIN — LEVETIRACETAM 500 MG: 500 TABLET, FILM COATED ORAL at 05:58

## 2019-05-02 NOTE — PROGRESS NOTES
Speech Language Pathology  ACUTE REHABILITATION--DAILY PROGRESS NOTE/  DISCHARGE SUMMARY      PATIENT NAME:  Kaycee MORALES See      :  1965      TODAY'S DATE:  2019         SWALLOWING        CURRENT LEVEL 6    Diet: regular, thin liquids as tolerated, no straws       LANGUAGE     Receptive  Current Status  6      Short Term Goal 6       Long Term Goal 6     Expressive  Current Status  6      Short Term Goal 6       Long Term Goal 6           COGNITION:      Problem Solving Current Status  5      Short Term Goal 5       Long Term Goal 6       Memory  Current Status  4      Short Term Goal 4       Long Term Goal 5      INTERVENTION: Pt completed STM/problem solving task with fair ability. Improved ability to follow multistep directives and delayed recall noted this date. SPEECH:    Pt presents with distortions that were present prior to admit. Pt 100% intelligible to unknown listener. Cognitive/linguistic supervision recommendations      24 hour supervision    Close supervision   Intermittent supervision    No supervision              x                                                       Oral feeding recommendations               Close supervision Remote  supervision  No supervision necessary    Not applicable (Pt NPO)              x                                                                                                                                                                                                      Will continue SP intervention as per previously established POC. Three Hour Rule Tracking:    Individual therapy:    minutes  Concurrent therapy:  30 minutes  Group therapy:    minutes  Co-treatment therapy:   minutes    Total minutes for 2019: 30 minutes        CURRENT LEVEL OF FUNCTION:  See above    Speech Pathologist (SLP) completed education with the patient and parents regarding type of swallowing and cognitive impairment.  Discussed compensatory strategies to assist in improving attention, STM/LTM, problem solving, abstract reasoning and safety/insight. Encouraged patient and family to engage SLP in structured Q&A session relative to identified deficit areas. Family indicated understanding of all information provided via satisfactory response. OUTCOME:    Good progress toward goals. Will benefit from ongoing SP intervention to target the identified deficit areas. Angie MORALES. ÓSCAR/SLP [id number]  Speech-Language Pathologist

## 2019-05-02 NOTE — PROGRESS NOTES
Physical Therapy    Facility/Department: [address] REHAB  Treatment Note     NAME: Vy Webb See  : 1965  MRN: [id number]    Date of Service: 2019  Evaluating Therapist: Nicole Adler. Karey Chow P.T.    ROOM: [facility]  DIAGNOSIS: TBI  PRECAUTIONS: Fall risk, Down Syndrome, OA   PROCEDURE(S):  R adonis hole with drain placement  HPI: The pt has been following with neurology after a fall that occurred in 2019 resulting in a temporal fracture, SDH, and SAH. The pt was admitted on  for worsening CT. Social:  Pt lives with her parents in a 2 floor plan with 3 steps and 1 rail to enter with 14 steps and 1 rail to the 2nd floor bath/bedroom, 1/2 bath on the 1st floor. Prior to admission pt ambulated with no AD and worked at [employer]. 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   The 14 steps rail on left ascending and on the 3 steps rail on the right      Initial Evaluation  19 AM   PM Short Term Goals Long Term Goals    Was pt agreeable to Eval/treatment? Yes yes NT     Does pt have pain?  R knee pain  R knee pain with stair negotiation       Bed Mobility  Rolling: Min A  Supine to sit: Min A  Sit to supine: Min A  Scooting: Min A Mod I with all aspects of bed mobility   SBA Modified Independent   Transfers Sit to stand: SBA  Stand to sit: Min A  Stand pivot: Min A with franck standard walker Sit to stand supervision   Stand to sit supervision     SBA Modified Independent   Ambulation   10 feet with franck standard walker with Min A 150 feet x2 with jr ww with supervision   20 feet with AAD with SBA >150 feet with AAD with Supervision   Walking 10 feet on uneven surface NT 10 feet with ww with SBA  10 feet with AAD with SBA >10 feet with AAD with Supervision   Wheel Chair Mobility NT NT      Car Transfers NT SBA  SBA Supervision   Stair negotiation: ascended and descended 1 step with 2 rails with Min A 8 steps with unilateral rail with CG/SBA  >4 steps with 1 rail with SBA >12 steps with 1 rail with Supervision   Curb Step:   ascended and descended NT 4 inch step with ww with SBA. Assistance with walker management   4 inch step with AAD and SBA 4 inch step with AAD and Supervision   Picking up object off the floor NT   Will  an object with SBA Will  an object with Supervision   BLE ROM WFL, RLE limited by pain       BLE Strength RLE is grossly 2/5 at the hip and knee and 4/5 at the ankle  LLE is grossly 4/5       Balance  Sitting EOB: Supervision  Dynamic standing: Min A       Date Family Teach Completed Pt's mother present for initial evaluation       Is additional Family Teaching Needed? Y or N Yes yes      Hindering Progress R knee pain Right knee pain      PT recommended ELOS 2 weeks       Team's Discharge Plan        Therapist at Team Meeting              Patient education  Pt educated on hand placement with transfers     Patient response to education:   Pt verbalized understanding Pt demonstrated skill Pt requires further education in this area   x X  x     Additional Comments:   AM  Pt continues to improve with ability to complete functional mobility. Pt continues to require verbal cues for technique/sequencing with walker as pt is transitioning to chair. Pt tends to turn with walker ( to far away from chair)  when approaching chair, pt requires verbal cues to approach chair with increased safety. Poor carry over with safety techniques with functional mobility. Time in 0915  Time out 1000    Pt is making  progress toward established Physical Therapy goals. Continue with physical therapy current plan of care.     Poppy Clements [id number]

## 2019-05-02 NOTE — PROGRESS NOTES
Occupational Therapy  OCCUPATIONAL THERAPY DAILY NOTE/DISCHARGE SUMMARY     Date:2019  Patient Name: Haley Garcia See  MRN: [id number]  : 1965  [facility]       Diagnosis: subdural hematoma, s/p adonis hole craniotomy   Precautions:  falls, down syndrome    Functional Assessment:   Date Status AE  Comments   Feeding 19 Supervision      Grooming 19 Set up     Bathing 19 SBA     UB Dressing 19 Set up     LB Dressing 19 SBA LH shoe horn     Homemaking 19 SBA       Functional Transfers / Balance:   Date Status DME  Comments   Sit Balance 19 Mod I     Stand Balance 19 S/SBA W/w  Table top    [] Tub  [x] Shower   Transfer 19 SBA w/w, grab bar     Commode   Transfer    Toiletin19 SBA/CGA      CGA ww      walker    Functional   Mobility 19 SBA ww    Other:  Bed>WC      Couch, kitchen chair without arms    19   CGA       SBA   w/w      w/w      Functional Exercises / Activity:       Sensory / Neuromuscular Re-Education:      Cognitive Skills:   Status Comments   Problem   Solving fair     Memory fair     Sequencing fair     Safety fair       Visual Perception:      Education:      [x] Family teach completed on: 19: mother present for therapy session participating in and observing functional transfers; discussing transfers safety to shower bench, couch/recliner in living environment, walker safety on various surfaces carpet/hard floors. 19  Pt's mother and father present for family teach this PM and educated on levels of assist for ADLs, observed pt completing transfers throughout functional living environment and laundry task. Family communicates good understanding of instructions. Pain Level: 0/10    Additional Notes:   19 Father present during PM tx session to offer support and encouragement. Patient has made good  progress during treatment sessions toward set goals.  Therapy emphasis to obtain goals:Strengthening, Functional Mobility Training, Gait Training, Cognitive Reorientation, Patient/Caregiver Education & Training, Home Management Training, Cognitive/Perceptual Training, Equipment Evaluation, Education, & procurement, Endurance Training, Balance Training, Neuromuscular Re-education, Safety Education & Training, Self-Care / ADL      [] Continue with current OT Plan of care. [x] Prepare for Discharge     DISCHARGE RECOMMENDATIONS  OCCUPATIONAL THERAPY DISCHARGE SUMMARY    Discontinue Occupational Therapy intervention. Pt has:   [] met all set goals. [x] made good progress toward set goals. [] has made slow progress toward goals and would benefit from rehab setting change.  [] had a medical decline and therefore was transferred off the Rehab unit. Long term goals  Time Frame for Long term goals : 2-4 wks  Long term goal 1: Pt will be IND with self feeding  Long term goal 2: Pt will be IND with UE dessing  Long term goal 3: Pt will be Min A for LE dressing, using AE as needed  Long term goal 4: Pt will be SUP for bathing task, seated/standing  Long term goal 5: Pt will be SUP for toilet transfer  Long term goals 6: Pt will be SUP for shower transfer  Long term goal 7: Pt will be IND with grooming task, seated/standing  Long term goal 8: Pt will be Mod I for homemaking task    The Patient has received education on:  [x]Transfer Safety [x]Compensatory tech for ADLs [x]AE training [x]DME training [x]Energy Conservation []Home / Kitchen Safety  [x]Fall Prevention  []Other:    Family training was completed: yes    Recommendations: Continue OT at [facility]       Recommended DME:  Grab bar around commode and shower.    Post Discharge Care:   []Home Independently  [x]Home with 24hr Care / Supervision []Home with Partial Supervision []Home with Home Health OT []Home with Out Pt OT []Other: ___   Comments:         Time in Time out Tx Time Breakdown  Variance:   First Session    [] Individual Tx-   [] Concurrent Tx -   [] Co-Tx -   [] Group Tx -   [] Time Missed -     Second Session   [] Individual Tx-   [] Concurrent Tx -   [] Co-Tx -   [] Group Tx -   [] Time Missed -     Third Session    [] Individual Tx-   [] Concurrent Tx -  [] Co-Tx -   [] Group Tx -   [] Time Missed -         Total Tx Time: 0 mins        Veleta Fabry OTR/L [id number]

## 2019-05-02 NOTE — PROGRESS NOTES
Physical Therapy    Facility/Department: [address] REHAB  Discharge Summary    NAME: Nerissa Alpers See  : 1965  MRN: [id number]    Date of Service: 2019  Evaluating Therapist: Lisa Yeboah. Nicole Perdue P.T.    ROOM: [facility]  DIAGNOSIS: TBI  PRECAUTIONS: Fall risk, Down Syndrome, OA   PROCEDURE(S):  R adonis hole with drain placement  HPI: The pt has been following with neurology after a fall that occurred in 2019 resulting in a temporal fracture, SDH, and SAH. The pt was admitted on  for worsening CT. Social:  Pt lives with her parents in a 2 floor plan with 3 steps and 1 rail to enter with 14 steps and 1 rail to the 2nd floor bath/bedroom, 1/2 bath on the 1st floor. Prior to admission pt ambulated with no AD and worked at [employer]. 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       The 14 steps rail on left ascending and on the 3 steps rail on the right      Initial Evaluation  19 Discharge  19 Short Term Goals Long Term Goals    Bed Mobility  Rolling: Min A  Supine to sit: Min A  Sit to supine: Min A  Scooting: Min A Mod I with all aspects of bed mobility  SBA Modified Independent   Transfers Sit to stand: SBA  Stand to sit: Min A  Stand pivot: Min A with franck standard walker Sit to stand supervision   Stand to sit supervision    SBA Modified Independent   Ambulation   10 feet with franck standard walker with Min A 150 feet x2 with jr ww with supervision  20 feet with AAD with SBA >150 feet with AAD with Supervision   Walking 10 feet on uneven surface NT 10 feet with ww with SBA 10 feet with AAD with SBA >10 feet with AAD with Supervision   Wheel Chair Mobility NT NT     Car Transfers NT SBA SBA Supervision   Stair negotiation: ascended and descended 1 step with 2 rails with Min A 8 steps with unilateral rail with CG/SBA >4 steps with 1 rail with SBA >12 steps with 1 rail with Supervision   Curb Step:   ascended and descended NT 4 inch step with ww with SBA.  Assistance with walker management  4 inch step with AAD and SBA 4 inch step with AAD and Supervision   Picking up object off the floor NT NT Will  an object with SBA Will  an object with Supervision   BLE ROM WFL, RLE limited by pain NT     BLE Strength RLE is grossly 2/5 at the hip and knee and 4/5 at the ankle  LLE is grossly 4/5 NT     Balance  Sitting EOB: Supervision  Dynamic standing: Min A Sitting EOB: Independent  Dynamic standing: SBA/Supervision with [employer]     Date Family Teach Completed Pt's mother present for initial evaluation Family have been present for multiple sessions     Hindering Progress R knee pain Right knee pain       Additional Comments: The pt made fair progress during her time in acute rehab meeting some but not all of her established long-term PT goals. The pt was generally limited by her R knee pain but was able to progress to a Supervision level for transfers and ambulation with a walker. Sherrell Knott.  Sande Peabody, [address]  number:  PT [phone]

## 2019-05-03 NOTE — DISCHARGE SUMMARY
[address]                  [address] [facility],  [address]                               DISCHARGE SUMMARY    PATIENT NAME: James ALEXANDRA                        :        1965  MED REC NO:   [id number]                            ROOM:       0094  ACCOUNT NO:   [de-identified]                           ADMIT DATE: 2019  PROVIDER:     Radha Bettencourt MD                  DISCHARGE DATE:  2019    INTRODUCTION:  The patient has a history of developmental delay. She  had a fall in [place] several months ago with a traumatic brain  injury and apparent subdural hematoma. She came back to this area and  was found to have had worsening of the subdural.  She was admitted to  [facility] and had a adonis hole craniotomy done. She tolerated the  surgery well, was transferred to acute rehab on 2019. HOSPITAL COURSE:  On admission, the patient was felt to be a good rehab  candidate. She improved overall to the point that she was at a standby  assist level with most of her functioning. She is approaching her  baseline, but still requires a lot more assistance and supervision than  she had previously. Her family was instructed. Her mother did not feel  that she could provide the 24- hour supervision and so the patient was  discharged to a skilled nursing facility on 2019. LABORATORY DATA:  CBC and metabolic profile were unremarkable. MEDICATIONS:  Include:  1. Flector patch to the knees. 2.  Florinef 0.2 mg daily. 3.  Keppra 500 mg b.i.d.  4.  Synthroid 112 mcg daily. DIAGNOSES:  1. Right-sided subdural hematoma. 2.  Status post adonis hole craniotomy. 3.  Right knee osteoarthritis. 4.  Previous left total knee replacement. 5.  Developmental delay. 6.  Hypothyroidism.         Danika Barakat MD    D: 2019 10:14:58       T: 2019 10:22:54     [email or web address]  Job#: [id number]     Doc#: [id number]    CC:

## 2019-05-06 ENCOUNTER — HOSPITAL ENCOUNTER (OUTPATIENT)
Dept: CT IMAGING | Age: 54
Discharge: HOME | End: 2019-05-08
Payer: MEDICARE

## 2019-05-06 ENCOUNTER — OFFICE VISIT (OUTPATIENT)
Dept: NEUROSURGERY | Age: 54
End: 2019-05-06

## 2019-05-06 VITALS — SYSTOLIC BLOOD PRESSURE: 110 MMHG | DIASTOLIC BLOOD PRESSURE: 59 MMHG | HEART RATE: 63 BPM

## 2019-05-06 DIAGNOSIS — S06.5XAA SUBDURAL HEMATOMA: Primary | ICD-10-CM

## 2019-05-06 DIAGNOSIS — S06.5XAA SUBDURAL HEMATOMA: ICD-10-CM

## 2019-05-06 PROCEDURE — 99024 POSTOP FOLLOW-UP VISIT: CPT | Performed by: PHYSICIAN ASSISTANT

## 2019-05-06 PROCEDURE — 70450 CT HEAD/BRAIN W/O DYE: CPT

## 2019-05-08 ENCOUNTER — TELEPHONE (OUTPATIENT)
Dept: NEUROSURGERY | Age: 54
End: 2019-05-08

## 2019-05-08 NOTE — TELEPHONE ENCOUNTER
Ester Vargas would like to speak with you regarding 5/6 head CT.  #[phone]    Josseline Churchill

## 2019-05-09 ENCOUNTER — CARE COORDINATION (OUTPATIENT)
Dept: CASE MANAGEMENT | Age: 54
End: 2019-05-09

## 2019-05-09 NOTE — CARE COORDINATION
Care Transition Discharge from [facility] Follow Up     Call within 2 business days of discharge: Yes        Spoke with Mikal Brooks [facility]    Attempted [facility] follow up call. [facility] Spoke with [employer] who states patient is currently at their facility for rehab.             Follow up appointments:    Future Appointments   Date Time Provider Drea Mar   6/5/2019  7:30 AM DO DIAZ Toscano [facility]   6/11/2019  1:00 PM Yuridia Galicia, [address] [facility]

## 2019-05-20 ENCOUNTER — APPOINTMENT (OUTPATIENT)
Dept: CT IMAGING | Age: 54
End: 2019-05-20
Payer: MEDICARE

## 2019-05-20 ENCOUNTER — APPOINTMENT (OUTPATIENT)
Dept: GENERAL RADIOLOGY | Age: 54
End: 2019-05-20
Payer: MEDICARE

## 2019-05-20 ENCOUNTER — HOSPITAL ENCOUNTER (EMERGENCY)
Age: 54
Discharge: HOME | End: 2019-05-20
Attending: EMERGENCY MEDICINE
Payer: MEDICARE

## 2019-05-20 ENCOUNTER — TELEPHONE (OUTPATIENT)
Dept: NEUROSURGERY | Age: 54
End: 2019-05-20

## 2019-05-20 VITALS
SYSTOLIC BLOOD PRESSURE: 120 MMHG | BODY MASS INDEX: 26.5 KG/M2 | DIASTOLIC BLOOD PRESSURE: 83 MMHG | TEMPERATURE: 98.2 F | OXYGEN SATURATION: 99 % | WEIGHT: 135 LBS | HEIGHT: 60 IN | HEART RATE: 98 BPM | RESPIRATION RATE: 20 BRPM

## 2019-05-20 DIAGNOSIS — R44.3 HALLUCINATIONS: ICD-10-CM

## 2019-05-20 DIAGNOSIS — N30.01 ACUTE CYSTITIS WITH HEMATURIA: Primary | ICD-10-CM

## 2019-05-20 DIAGNOSIS — I62.03 CHRONIC SUBDURAL HEMATOMA (HCC): ICD-10-CM

## 2019-05-20 LAB
ALBUMIN SERPL-MCNC: 3 G/DL (ref 3.5–5.2)
ALP BLD-CCNC: 127 U/L (ref 35–104)
ALT SERPL-CCNC: 16 U/L (ref 0–32)
AMORPHOUS: ABNORMAL
ANION GAP SERPL CALCULATED.3IONS-SCNC: 7 MMOL/L (ref 7–16)
AST SERPL-CCNC: 22 U/L (ref 0–31)
BACTERIA: ABNORMAL /HPF
BASOPHILS ABSOLUTE: 0.04 E9/L (ref 0–0.2)
BASOPHILS RELATIVE PERCENT: 0.6 % (ref 0–2)
BILIRUB SERPL-MCNC: 0.4 MG/DL (ref 0–1.2)
BILIRUBIN URINE: NEGATIVE
BLOOD, URINE: ABNORMAL
BUN BLDV-MCNC: 10 MG/DL (ref 6–20)
CALCIUM SERPL-MCNC: 8.9 MG/DL (ref 8.6–10.2)
CHLORIDE BLD-SCNC: 102 MMOL/L (ref 98–107)
CLARITY: ABNORMAL
CO2: 31 MMOL/L (ref 22–29)
COLOR: YELLOW
CREAT SERPL-MCNC: 0.7 MG/DL (ref 0.5–1)
EOSINOPHILS ABSOLUTE: 0.03 E9/L (ref 0.05–0.5)
EOSINOPHILS RELATIVE PERCENT: 0.4 % (ref 0–6)
GFR AFRICAN AMERICAN: >60
GFR NON-AFRICAN AMERICAN: >60 ML/MIN/1.73
GLUCOSE BLD-MCNC: 128 MG/DL (ref 74–99)
GLUCOSE URINE: NEGATIVE MG/DL
HCT VFR BLD CALC: 36.8 % (ref 34–48)
HEMOGLOBIN: 12.1 G/DL (ref 11.5–15.5)
IMMATURE GRANULOCYTES #: 0.03 E9/L
IMMATURE GRANULOCYTES %: 0.4 % (ref 0–5)
KETONES, URINE: NEGATIVE MG/DL
LACTIC ACID: 1.1 MMOL/L (ref 0.5–2.2)
LEUKOCYTE ESTERASE, URINE: ABNORMAL
LYMPHOCYTES ABSOLUTE: 1.29 E9/L (ref 1.5–4)
LYMPHOCYTES RELATIVE PERCENT: 17.9 % (ref 20–42)
MCH RBC QN AUTO: 30.6 PG (ref 26–35)
MCHC RBC AUTO-ENTMCNC: 32.9 % (ref 32–34.5)
MCV RBC AUTO: 93.2 FL (ref 80–99.9)
MONOCYTES ABSOLUTE: 0.61 E9/L (ref 0.1–0.95)
MONOCYTES RELATIVE PERCENT: 8.5 % (ref 2–12)
NEUTROPHILS ABSOLUTE: 5.2 E9/L (ref 1.8–7.3)
NEUTROPHILS RELATIVE PERCENT: 72.2 % (ref 43–80)
NITRITE, URINE: NEGATIVE
PDW BLD-RTO: 13.2 FL (ref 11.5–15)
PH UA: 6.5 (ref 5–9)
PLATELET # BLD: 303 E9/L (ref 130–450)
PMV BLD AUTO: 9.2 FL (ref 7–12)
POTASSIUM REFLEX MAGNESIUM: 3.9 MMOL/L (ref 3.5–5)
PROTEIN UA: ABNORMAL MG/DL
RBC # BLD: 3.95 E12/L (ref 3.5–5.5)
RBC UA: ABNORMAL /HPF (ref 0–2)
SODIUM BLD-SCNC: 140 MMOL/L (ref 132–146)
SPECIFIC GRAVITY UA: 1.01 (ref 1–1.03)
TOTAL PROTEIN: 7.3 G/DL (ref 6.4–8.3)
TSH SERPL DL<=0.05 MIU/L-ACNC: 7.22 UIU/ML (ref 0.27–4.2)
UROBILINOGEN, URINE: 0.2 E.U./DL
WBC # BLD: 7.2 E9/L (ref 4.5–11.5)
WBC UA: ABNORMAL /HPF (ref 0–5)

## 2019-05-20 PROCEDURE — 99285 EMERGENCY DEPT VISIT HI MDM: CPT

## 2019-05-20 PROCEDURE — 85025 COMPLETE CBC W/AUTO DIFF WBC: CPT

## 2019-05-20 PROCEDURE — 87088 URINE BACTERIA CULTURE: CPT

## 2019-05-20 PROCEDURE — 36415 COLL VENOUS BLD VENIPUNCTURE: CPT

## 2019-05-20 PROCEDURE — 80053 COMPREHEN METABOLIC PANEL: CPT

## 2019-05-20 PROCEDURE — 71045 X-RAY EXAM CHEST 1 VIEW: CPT

## 2019-05-20 PROCEDURE — 93005 ELECTROCARDIOGRAM TRACING: CPT | Performed by: FAMILY MEDICINE

## 2019-05-20 PROCEDURE — 81001 URINALYSIS AUTO W/SCOPE: CPT

## 2019-05-20 PROCEDURE — 93010 ELECTROCARDIOGRAM REPORT: CPT | Performed by: INTERNAL MEDICINE

## 2019-05-20 PROCEDURE — 70450 CT HEAD/BRAIN W/O DYE: CPT

## 2019-05-20 PROCEDURE — 83605 ASSAY OF LACTIC ACID: CPT

## 2019-05-20 PROCEDURE — 84443 ASSAY THYROID STIM HORMONE: CPT

## 2019-05-20 RX ORDER — CEPHALEXIN 500 MG/1
500 CAPSULE ORAL 2 TIMES DAILY
Qty: 14 CAPSULE | Refills: 0 | Status: SHIPPED | OUTPATIENT
Start: 2019-05-20 | End: 2019-05-27

## 2019-05-20 NOTE — TELEPHONE ENCOUNTER
Patient's father states for 3-4 days she has had worsening symptoms. She can't concentrate, has hallucinations, drowsy, weak, anxious and depressed. He doesn't know if these are due to [address].  Please call Lindsay Green at [phone]

## 2019-05-20 NOTE — ED NOTES
Spoke with [facility] regarding transport back to [place]. [facility] doesn't have any openings at the moment, but will send for her asap.      Shauna Thomas RN  05/20/19 1927

## 2019-05-20 NOTE — ED PROVIDER NOTES
Department of Emergency Medicine   ED  Provider Note  Admit Date/RoomTime: 5/20/2019  2:43 PM  ED Room: Peter Pickard    5/20/19  4:28 PM    History of Present Illness:   Bridger Early is a 49 y.o. female presenting to the ED for altered mental status, beginning 5-6 days ago. The complaint has been constant, mild in severity, and worsened by nothing. Patient had a fall in February that subsequently resulted in hematoma in March. Patient had evacuation with neurosurgery and developed a DVT requiring IVC filter in April. Patient has been compliant with Keppra for seizure ppx and denies new trauma or seizure activity. Family states patient has been having \"visual hallucinations\". She denies fevers/chills, SI/HI, chest pain, SOB, N/V and headaches/changes in vision. Review of Systems:   Pertinent positives and negatives are stated within HPI, all other systems reviewed and are negative.    --------------------------------------------- PAST HISTORY ---------------------------------------------  Past Medical History:  has a past medical history of Arthritis, Down syndrome, Hypothyroidism, Osteoarthritis, Syncope, Thyroid disease, Varicose veins, and Wears glasses. Past Surgical History:  has a past surgical history that includes knee surgery; hip surgery; Toe Surgery; ECHO Compl W Dop Color Flow (5/14/2012); Tubal ligation; Total knee arthroplasty (Left, 08/11/2016); Abdominal adhesion surgery; Abdomen surgery; joint replacement; craniotomy (Right, 4/12/2019); and Vena Cava Filter Placement (Bilateral, 4/22/2019). Social History:  reports that she has never smoked. She has never used smokeless tobacco. She reports that she does not drink alcohol or use drugs. Family History: family history includes Heart Disease in her maternal grandfather, maternal grandmother, paternal aunt, and paternal uncle. The patients home medications have been reviewed.     Allergies: Patient has no known allergies.     -------------------------------------------------- RESULTS -------------------------------------------------  All laboratory and radiology results have been personally reviewed by myself   LABS:  Results for orders placed or performed during the hospital encounter of 05/20/19   CBC Auto Differential   Result Value Ref Range    WBC 7.2 4.5 - 11.5 E9/L    RBC 3.95 3.50 - 5.50 E12/L    Hemoglobin 12.1 11.5 - 15.5 g/dL    Hematocrit 36.8 34.0 - 48.0 %    MCV 93.2 80.0 - 99.9 fL    MCH 30.6 26.0 - 35.0 pg    MCHC 32.9 32.0 - 34.5 %    RDW 13.2 11.5 - 15.0 fL    Platelets 113 931 - 129 E9/L    MPV 9.2 7.0 - 12.0 fL    Neutrophils % 72.2 43.0 - 80.0 %    Immature Granulocytes % 0.4 0.0 - 5.0 %    Lymphocytes % 17.9 (L) 20.0 - 42.0 %    Monocytes % 8.5 2.0 - 12.0 %    Eosinophils % 0.4 0.0 - 6.0 %    Basophils % 0.6 0.0 - 2.0 %    Neutrophils # 5.20 1.80 - 7.30 E9/L    Immature Granulocytes # 0.03 E9/L    Lymphocytes # 1.29 (L) 1.50 - 4.00 E9/L    Monocytes # 0.61 0.10 - 0.95 E9/L    Eosinophils # 0.03 (L) 0.05 - 0.50 E9/L    Basophils # 0.04 0.00 - 0.20 E9/L   Comprehensive Metabolic Panel w/ Reflex to MG   Result Value Ref Range    Sodium 140 132 - 146 mmol/L    Potassium reflex Magnesium 3.9 3.5 - 5.0 mmol/L    Chloride 102 98 - 107 mmol/L    CO2 31 (H) 22 - 29 mmol/L    Anion Gap 7 7 - 16 mmol/L    Glucose 128 (H) 74 - 99 mg/dL    BUN 10 6 - 20 mg/dL    CREATININE 0.7 0.5 - 1.0 mg/dL    GFR Non-African American >60 >=60 mL/min/1.73    GFR African American >60     Calcium 8.9 8.6 - 10.2 mg/dL    Total Protein 7.3 6.4 - 8.3 g/dL    Alb 3.0 (L) 3.5 - 5.2 g/dL    Total Bilirubin 0.4 0.0 - 1.2 mg/dL    Alkaline Phosphatase 127 (H) 35 - 104 U/L    ALT 16 0 - 32 U/L    AST 22 0 - 31 U/L   TSH without Reflex   Result Value Ref Range    TSH 7.220 (H) 0.270 - 4.200 uIU/mL   Lactic Acid, Plasma   Result Value Ref Range    Lactic Acid 1.1 0.5 - 2.2 mmol/L   Urinalysis, reflex to microscopic   Result Value Ref Range Color, UA Yellow Straw/Yellow    Clarity, UA CLOUDY (A) Clear    Glucose, Ur Negative Negative mg/dL    Bilirubin Urine Negative Negative    Ketones, Urine Negative Negative mg/dL    Specific Gravity, UA 1.010 1.005 - 1.030    Blood, Urine MODERATE (A) Negative    pH, UA 6.5 5.0 - 9.0    Protein, UA TRACE Negative mg/dL    Urobilinogen, Urine 0.2 <2.0 E.U./dL    Nitrite, Urine Negative Negative    Leukocyte Esterase, Urine TRACE (A) Negative   Microscopic Urinalysis   Result Value Ref Range    WBC, UA 1-3 0 - 5 /HPF    RBC, UA 0-1 0 - 2 /HPF    Bacteria, UA FEW (A) /HPF    Amorphous, UA MODERATE        RADIOLOGY:  Interpreted by Radiologist.  CT Head WO Contrast   Final Result      1. Evolving right subdural hemorrhage, approximately unchanged in size   and mass effect. 2. Mild hydrocephalus, similar to the prior study. XR CHEST PORTABLE   Final Result      No airspace opacities or pleural effusion.                ------------------------- NURSING NOTES AND VITALS REVIEWED ---------------------------   The nursing notes within the ED encounter and vital signs as below have been reviewed. BP (!) 123/91   Pulse 98   Temp 98.2 °F (36.8 °C) (Oral)   Resp 18   Ht 5' (1.524 m)   Wt 135 lb (61.2 kg)   SpO2 97%   BMI 26.37 kg/m²   Oxygen Saturation Interpretation: Normal      ---------------------------------------------------PHYSICAL EXAM--------------------------------------    Constitutional/General: Alert and oriented x2 (at baseline), well appearing, non toxic in NAD  HEENT: NC/AT, PERRL, EOMI, conjunctiva normal, sclera non icteric, oropharynx clear, handling secretions, no trismus, no asymmetry of the posterior oropharynx or uvular edema  Neck: Supple, full ROM, non tender to palpation in the midline, no stridor, no crepitus, no meningeal signs  Respiratory: Lungs CTAB, no wheezes, rales, or rhonchi. Not in respiratory distress  Cardiovascular: RRR. No murmurs, gallops, or rubs.  2+ distal pulses  Chest: No chest wall tenderness to palpation  GI:  Abdomen SNTND, +BSx4. No organomegaly, no palpable masses,  No rebound, guarding, or rigidity. Musculoskeletal: Moves all extremities x 4. Warm and well perfused, no clubbing, cyanosis, or edema. Capillary refill <3 seconds. Tenderness in right calf (known blood clot)  Integument: Skin warm and dry. No rashes. Lymphatic: No lymphadenopathy noted  Neurologic: GCS 15, no focal deficits, symmetric strength 5/5 in the upper and lower extremities bilaterally, CN 2-12 intact, senation grossly intact  Psychiatric: Normal Affect    EKG Interpretation    Interpreted by emergency department physician    Rhythm: normal sinus   Rate: normal (90-99)  Axis: normal  Ectopy: none  Conduction: normal  ST Segments: normal  T Waves: normal  Q Waves: none    Clinical Impression: normal sinus rhythm with no acute changes    Mj Villegas MD     ------------------------------ ED COURSE/MEDICAL DECISION MAKING----------------------  Medications - No data to display    Medical Decision Making:    Joesph Early is a 49 y.o. female with a PMH of head bleed who presented to the ED for evaluation of altered mental status. Physical exam reveals showed no focal neurologic deficits. Labs, UA, CXR, EKG and CT head were obtained and reviewed. CT head showed no acute changes, Neurosurgery was contacted and recommended discharge back to rehab pending lab results. UA concerning for UTI. Patient was given Rx for Keflex. Patient was determined to be in suitable condition for discharge back to facility. Counseling: The emergency provider has spoken with the family and discussed todays results, in addition to providing specific details for the plan of care and counseling regarding the diagnosis and prognosis.   Questions are answered at this time and they are agreeable with the plan.    --------------------------------- IMPRESSION AND DISPOSITION ---------------------------------    IMPRESSION  1. Acute cystitis with hematuria    2. Hallucinations    3.  [facility]      DISPOSITION  Disposition: Discharge to nursing home  Patient condition is stable             Riley Lucio MD  Resident  05/20/19 9680

## 2019-05-21 ENCOUNTER — OFFICE VISIT (OUTPATIENT)
Dept: VASCULAR SURGERY | Age: 54
End: 2019-05-21

## 2019-05-21 DIAGNOSIS — I82.411 ACUTE DEEP VEIN THROMBOSIS (DVT) OF RIGHT FEMORAL VEIN (HCC): Primary | ICD-10-CM

## 2019-05-21 LAB
EKG ATRIAL RATE: 99 BPM
EKG P AXIS: 74 DEGREES
EKG P-R INTERVAL: 182 MS
EKG Q-T INTERVAL: 338 MS
EKG QRS DURATION: 78 MS
EKG QTC CALCULATION (BAZETT): 433 MS
EKG R AXIS: 74 DEGREES
EKG T AXIS: 34 DEGREES
EKG VENTRICULAR RATE: 99 BPM

## 2019-05-21 PROCEDURE — 99024 POSTOP FOLLOW-UP VISIT: CPT | Performed by: SURGERY

## 2019-05-21 RX ORDER — ACETAMINOPHEN 160 MG
TABLET,DISINTEGRATING ORAL
Status: ON HOLD | COMMUNITY
End: 2019-11-27 | Stop reason: HOSPADM

## 2019-05-21 NOTE — ED NOTES
Paper work completed in soft chart. [facility]ing on [facility] to transport patient back to [facility].      Winnie Gilliam RN  05/20/19 2010

## 2019-05-21 NOTE — PROGRESS NOTES
Vascular Surgery Progress Note    Chief Complaint   Patient presents with    Post-Op Check     Leodan Avinash and swelling (R) leg, DVT. Patient returns for post operative evaluation status post vena cava filter insertion for right leg DVT. She is accompanied by her parents. They state that she is continuing to resist therapy due to pain in her knee with ambulation. She has had treatment with cortisone injections by Dr. Corine Atkins in the past.        Procedure Laterality Date    ABDOMEN SURGERY      duodenal atresia    ABDOMINAL ADHESION SURGERY      BRAIN SURGERY      CRANIOTOMY Right 4/12/2019    RIGHT CRANIOTOMY FRANCISCO JAVIER HOLES performed by Flakita Morrison MD at [facility] ECHO COMPL W DOP COLOR FLOW  5/14/2012         HIP SURGERY      Right    JOINT REPLACEMENT      left knee, right hip-dr Gilman Kate KNEE SURGERY      Right    TOE SURGERY      Right foot    TOTAL KNEE ARTHROPLASTY Left 08/11/2016    DR. De    TUBAL LIGATION      VENA CAVA FILTER PLACEMENT Bilateral 4/22/2019    VENA CAVA FILTER INSERTION performed by Gilda Mccollum MD at [address] [place] [address]       Physical Exam:  Leg swelling has significantly improved since hospitalization. Right      Left   Brachial     Radial     Femoral     Popliteal     Dorsalis Pedis     Posterior Tibial     (3=normal, 2=diminished, 1=barely palpable, 4=widened)    Problem List Items Addressed This Visit     None      Visit Diagnoses     Acute deep vein thrombosis (DVT) of right femoral vein ([address]    -  Primary          I reviewed with the patient that normal activities can be resumed as tolerated. She may benefit from reevaluation for arthritis of the knee. I reviewed with the parents that DVT would not cause pain with ambulation. Plan: Return as needed.

## 2019-05-21 NOTE — ED NOTES
Spoke with [facility]. Will be about 20 minutes or so until pick-up. Made family aware.      Julita Cooley RN  05/20/19 2959

## 2019-05-22 LAB — URINE CULTURE, ROUTINE: NORMAL

## 2019-06-03 ENCOUNTER — TELEPHONE (OUTPATIENT)
Dept: VASCULAR SURGERY | Age: 54
End: 2019-06-03

## 2019-06-03 ENCOUNTER — TELEPHONE (OUTPATIENT)
Dept: NEUROSURGERY | Age: 54
End: 2019-06-03

## 2019-06-03 NOTE — TELEPHONE ENCOUNTER
Patient's mother called, also Yuli from [facility]. Patient is having severe pain in left leg. Dr. Qasim Ramachandran ordered an ultrasound which now shows DVT in left leg, also. Patient did have filter placed in April, not on anticoagulation due to subdural hematoma. Patient's mom concerned because of the pain.

## 2019-06-03 NOTE — TELEPHONE ENCOUNTER
Ansley Mom called - patient has a confirmed DVT in her left leg and they want to know if she is able to take blood thinners.  [phone]

## 2019-06-04 ENCOUNTER — TELEPHONE (OUTPATIENT)
Dept: NEUROSURGERY | Age: 54
End: 2019-06-04

## 2019-06-04 NOTE — TELEPHONE ENCOUNTER
Umang from [facility] called stating patient's mother is asking if patient can be taken off Keppra. States she thinks she may be hallucinating from it. [place] states patient's keppra was changed from 500 mg BID to 250 mg BID. Unsure why it was changed.   [facility]

## 2019-06-04 NOTE — PROGRESS NOTES
Rfl: 3    levETIRAcetam (KEPPRA) 500 MG tablet, Take 1 tablet by mouth 2 times daily, Disp: 60 tablet, Rfl: 3    No current facility-administered medications for this visit. No Known Allergies    PHYSICAL EXAM:  Vitals:    06/05/19 0727   BP: 128/82   Pulse: 87   Resp: 16   Weight: 136 lb (61.7 kg)   Height: 5' (1.524 m)      Head: Normocephalic and atraumatic. Eyes: Conjunctivae are normal. Pupils are equal, round, and reactive to light. Neck: No hepatojugular reflux and no JVD present. Carotid bruit is not present. Cardiovascular: S1 normal, S2 normal and intact distal pulses. A regular rhythm present. PMI is not displaced. Pulmonary/Chest: Effort normal and breath sounds normal. No respiratory distress. Abdominal: Soft. Normal appearance and bowel sounds are normal.  No abdominal bruit and no pulsatile midline mass. There is no hepatomegaly. No tenderness. Skin: Skin is warm and dry. No bruising, no ecchymosis and no rash noted. Extremity: No clubbing or cyanosis. + edema. Pertinent Labs:  CBC: No results for input(s): WBC, HGB, HCT, PLT in the last 72 hours. BMP:No results for input(s): NA, K, CL, CO2, BUN, CREATININE, CALCIUM in the last 72 hours. Invalid input(s): GLU  ABGs: No results found for: PHART, PO2ART, WLN1YAX  INR: No results for input(s): INR in the last 72 hours. BNP: No results for input(s): BNP in the last 72 hours. TSH:   Lab Results   Component Value Date    TSH 7.220 (H) 05/20/2019      Cardiac Injury Profile: No results for input(s): CKTOTAL, CKMB, CKMBINDEX, TROPONINI in the last 72 hours.    Lipid Profile:   Lab Results   Component Value Date    TRIG 83 12/13/2018    HDL 59 12/13/2018    LDLCALC 98 12/13/2018    CHOL 174 12/13/2018      Hemoglobin A1C: No components found for: HGBA1C   Xray:   No orders to display     Pertinent Cardiac Testing:     Last TTE 5/12/12:  Summary    Extremely limited study    Off axis views    In certain views the LV is normal EF 50-55%    Can not comment on WMA due to the limitation of the study    Physiologic and/or trace mitral regurgitation is present. Physiologic and/or trace tricuspid regurgitation. ECG 6/5/2019: SR, NAD, PRWP, no acute ST changes. I have independently reviewed all of the ECGs and rhythm strips per above. I have personally reviewed the laboratory, cardiac diagnostic and radiographic testing as outlined above. I have reviewed previous records noted in [address]. Impression:    1. Vasodepressor syncope  - + TTT--2012  - Florinef 0.2 mg QD--not currently taking    2. Hypothyroidism  - Synthroid replacement  Lab Results   Component Value Date    TSH 7.220 (H) 05/20/2019    T4FREE 0.98 05/12/2012     3. Down's syndrome    4. SDH  - chronic    5. H/O RLE DVT  - s/p IVC filter    6. H/O OA  - hip and knee surgery    7. Behavioral changes  - Seroquel/Zoloft--new medications    Recommendations:    1. Resume Florinef 0.2 mg QD. 2. Discontinue or change Seroquel/Zoloft. These medications may/can contribute to hypotension and syncope. Will defer to her PCP. 3. 2D-echocardiogram for re-evaluation of LV/RV/Valvular function. 4. Office follow-up in 6 months. I have spent a total of 80 minutes with the patient and her family reviewing the above stated recommendations. A total of >50% of that time involved face-to-face time providing counseling and or coordination of care with the other providers. Thank you for allowing me to participate in your patient's care. Carly Ellis DO  [facility] Cardiac Electrophysiology  [address] [facility]    NOTE: This report was transcribed using voice recognition software. Every effort was made to ensure accuracy; however, inadvertent computerized transcription errors may be present.

## 2019-06-05 ENCOUNTER — OFFICE VISIT (OUTPATIENT)
Dept: NON INVASIVE DIAGNOSTICS | Age: 54
End: 2019-06-05
Payer: MEDICARE

## 2019-06-05 ENCOUNTER — TELEPHONE (OUTPATIENT)
Dept: NEUROSURGERY | Age: 54
End: 2019-06-05

## 2019-06-05 VITALS
RESPIRATION RATE: 16 BRPM | HEIGHT: 60 IN | SYSTOLIC BLOOD PRESSURE: 128 MMHG | HEART RATE: 87 BPM | BODY MASS INDEX: 26.7 KG/M2 | WEIGHT: 136 LBS | DIASTOLIC BLOOD PRESSURE: 82 MMHG

## 2019-06-05 DIAGNOSIS — R06.02 SHORTNESS OF BREATH: ICD-10-CM

## 2019-06-05 DIAGNOSIS — R55 VASODEPRESSOR SYNCOPE: Primary | Chronic | ICD-10-CM

## 2019-06-05 PROCEDURE — 99215 OFFICE O/P EST HI 40 MIN: CPT | Performed by: INTERNAL MEDICINE

## 2019-06-05 PROCEDURE — 93000 ELECTROCARDIOGRAM COMPLETE: CPT | Performed by: INTERNAL MEDICINE

## 2019-06-05 PROCEDURE — G8419 CALC BMI OUT NRM PARAM NOF/U: HCPCS | Performed by: INTERNAL MEDICINE

## 2019-06-05 PROCEDURE — G8427 DOCREV CUR MEDS BY ELIG CLIN: HCPCS | Performed by: INTERNAL MEDICINE

## 2019-06-05 RX ORDER — QUETIAPINE FUMARATE 25 MG/1
25 TABLET, FILM COATED ORAL DAILY
Status: ON HOLD | COMMUNITY
Start: 2019-06-03 | End: 2019-11-27 | Stop reason: HOSPADM

## 2019-06-05 RX ORDER — LORAZEPAM 0.5 MG/1
0.5 TABLET ORAL EVERY 8 HOURS PRN
COMMUNITY
End: 2019-12-19 | Stop reason: SDUPTHER

## 2019-06-05 NOTE — TELEPHONE ENCOUNTER
Art called in stating he is concerned for Mark Hayes. She is hallucinating and just not acting like herself.  Please call him #[phone]    Sierra Tavares

## 2019-06-06 ENCOUNTER — APPOINTMENT (OUTPATIENT)
Dept: CT IMAGING | Age: 54
End: 2019-06-06
Payer: MEDICARE

## 2019-06-06 ENCOUNTER — APPOINTMENT (OUTPATIENT)
Dept: GENERAL RADIOLOGY | Age: 54
End: 2019-06-06
Payer: MEDICARE

## 2019-06-06 ENCOUNTER — HOSPITAL ENCOUNTER (EMERGENCY)
Age: 54
Discharge: HOME | End: 2019-06-07
Attending: EMERGENCY MEDICINE
Payer: MEDICARE

## 2019-06-06 VITALS
SYSTOLIC BLOOD PRESSURE: 103 MMHG | BODY MASS INDEX: 26.7 KG/M2 | TEMPERATURE: 98.5 F | DIASTOLIC BLOOD PRESSURE: 63 MMHG | HEART RATE: 86 BPM | HEIGHT: 60 IN | WEIGHT: 136 LBS | RESPIRATION RATE: 18 BRPM | OXYGEN SATURATION: 97 %

## 2019-06-06 DIAGNOSIS — R44.3 HALLUCINATIONS: ICD-10-CM

## 2019-06-06 DIAGNOSIS — N30.00 ACUTE CYSTITIS WITHOUT HEMATURIA: Primary | ICD-10-CM

## 2019-06-06 DIAGNOSIS — I62.03 CHRONIC SUBDURAL HEMATOMA (HCC): ICD-10-CM

## 2019-06-06 LAB
ALBUMIN SERPL-MCNC: 2.8 G/DL (ref 3.5–5.2)
ALP BLD-CCNC: 104 U/L (ref 35–104)
ALT SERPL-CCNC: 12 U/L (ref 0–32)
ANION GAP SERPL CALCULATED.3IONS-SCNC: 9 MMOL/L (ref 7–16)
AST SERPL-CCNC: 22 U/L (ref 0–31)
BACTERIA: ABNORMAL /HPF
BASOPHILS ABSOLUTE: 0.03 E9/L (ref 0–0.2)
BASOPHILS RELATIVE PERCENT: 0.5 % (ref 0–2)
BILIRUB SERPL-MCNC: 0.4 MG/DL (ref 0–1.2)
BILIRUBIN URINE: NEGATIVE
BLOOD, URINE: ABNORMAL
BUN BLDV-MCNC: 11 MG/DL (ref 6–20)
CALCIUM SERPL-MCNC: 8.5 MG/DL (ref 8.6–10.2)
CHLORIDE BLD-SCNC: 100 MMOL/L (ref 98–107)
CHP ED QC CHECK: YES
CLARITY: CLEAR
CO2: 29 MMOL/L (ref 22–29)
COLOR: YELLOW
CREAT SERPL-MCNC: 0.7 MG/DL (ref 0.5–1)
EOSINOPHILS ABSOLUTE: 0.11 E9/L (ref 0.05–0.5)
EOSINOPHILS RELATIVE PERCENT: 1.7 % (ref 0–6)
GFR AFRICAN AMERICAN: >60
GFR NON-AFRICAN AMERICAN: >60 ML/MIN/1.73
GLUCOSE BLD-MCNC: 116 MG/DL
GLUCOSE BLD-MCNC: 116 MG/DL (ref 74–99)
GLUCOSE URINE: NEGATIVE MG/DL
HCT VFR BLD CALC: 32.7 % (ref 34–48)
HEMOGLOBIN: 10.3 G/DL (ref 11.5–15.5)
IMMATURE GRANULOCYTES #: 0.04 E9/L
IMMATURE GRANULOCYTES %: 0.6 % (ref 0–5)
KETONES, URINE: NEGATIVE MG/DL
LACTIC ACID, SEPSIS: 1.2 MMOL/L (ref 0.5–1.9)
LEUKOCYTE ESTERASE, URINE: ABNORMAL
LYMPHOCYTES ABSOLUTE: 1 E9/L (ref 1.5–4)
LYMPHOCYTES RELATIVE PERCENT: 15.3 % (ref 20–42)
MCH RBC QN AUTO: 29.3 PG (ref 26–35)
MCHC RBC AUTO-ENTMCNC: 31.5 % (ref 32–34.5)
MCV RBC AUTO: 93.2 FL (ref 80–99.9)
MONOCYTES ABSOLUTE: 0.55 E9/L (ref 0.1–0.95)
MONOCYTES RELATIVE PERCENT: 8.4 % (ref 2–12)
NEUTROPHILS ABSOLUTE: 4.79 E9/L (ref 1.8–7.3)
NEUTROPHILS RELATIVE PERCENT: 73.5 % (ref 43–80)
NITRITE, URINE: NEGATIVE
PDW BLD-RTO: 14.3 FL (ref 11.5–15)
PH UA: 6 (ref 5–9)
PLATELET # BLD: 189 E9/L (ref 130–450)
PMV BLD AUTO: 10.3 FL (ref 7–12)
POTASSIUM SERPL-SCNC: 4 MMOL/L (ref 3.5–5)
PROTEIN UA: 30 MG/DL
RBC # BLD: 3.51 E12/L (ref 3.5–5.5)
RBC UA: ABNORMAL /HPF (ref 0–2)
SODIUM BLD-SCNC: 138 MMOL/L (ref 132–146)
SPECIFIC GRAVITY UA: 1.02 (ref 1–1.03)
TOTAL PROTEIN: 6.5 G/DL (ref 6.4–8.3)
UROBILINOGEN, URINE: 0.2 E.U./DL
WBC # BLD: 6.5 E9/L (ref 4.5–11.5)
WBC UA: ABNORMAL /HPF (ref 0–5)

## 2019-06-06 PROCEDURE — 36415 COLL VENOUS BLD VENIPUNCTURE: CPT

## 2019-06-06 PROCEDURE — 93005 ELECTROCARDIOGRAM TRACING: CPT | Performed by: STUDENT IN AN ORGANIZED HEALTH CARE EDUCATION/TRAINING PROGRAM

## 2019-06-06 PROCEDURE — 85025 COMPLETE CBC W/AUTO DIFF WBC: CPT

## 2019-06-06 PROCEDURE — 80053 COMPREHEN METABOLIC PANEL: CPT

## 2019-06-06 PROCEDURE — 70450 CT HEAD/BRAIN W/O DYE: CPT

## 2019-06-06 PROCEDURE — 81001 URINALYSIS AUTO W/SCOPE: CPT

## 2019-06-06 PROCEDURE — 83605 ASSAY OF LACTIC ACID: CPT

## 2019-06-06 PROCEDURE — 93010 ELECTROCARDIOGRAM REPORT: CPT | Performed by: INTERNAL MEDICINE

## 2019-06-06 PROCEDURE — 87040 BLOOD CULTURE FOR BACTERIA: CPT

## 2019-06-06 PROCEDURE — 99285 EMERGENCY DEPT VISIT HI MDM: CPT

## 2019-06-06 PROCEDURE — 6360000002 HC RX W HCPCS: Performed by: EMERGENCY MEDICINE

## 2019-06-06 PROCEDURE — 71045 X-RAY EXAM CHEST 1 VIEW: CPT

## 2019-06-06 PROCEDURE — 96374 THER/PROPH/DIAG INJ IV PUSH: CPT

## 2019-06-06 RX ORDER — CEFDINIR 300 MG/1
300 CAPSULE ORAL 2 TIMES DAILY
Qty: 20 CAPSULE | Refills: 0 | Status: SHIPPED | OUTPATIENT
Start: 2019-06-06 | End: 2019-06-16

## 2019-06-06 RX ORDER — LORAZEPAM 2 MG/ML
0.5 INJECTION INTRAMUSCULAR ONCE
Status: COMPLETED | OUTPATIENT
Start: 2019-06-06 | End: 2019-06-06

## 2019-06-06 RX ADMIN — LORAZEPAM 0.5 MG: 2 INJECTION INTRAMUSCULAR; INTRAVENOUS at 19:34

## 2019-06-06 ASSESSMENT — PAIN DESCRIPTION - PAIN TYPE: TYPE: ACUTE PAIN

## 2019-06-06 ASSESSMENT — PAIN DESCRIPTION - LOCATION: LOCATION: NECK

## 2019-06-06 NOTE — ED NOTES
Bed: 20  Expected date:   Expected time:   Means of arrival:   Comments:  ems     Ger Elaine RN  06/06/19 0413

## 2019-06-06 NOTE — ED PROVIDER NOTES
is not agitated and not aggressive. Patient sees animals that are not present and stated she saw something crawling on her when there was nothing else present. Nursing note and vitals reviewed. Procedures    MDM  Number of Diagnoses or Management Options  Acute cystitis without hematuria:   Chronic subdural hematoma ([facility]):   Hallucinations:   Diagnosis management comments: Patient is a 52-year-old female who presents emergency burn for evaluation of altered mental status, hallucinations. Patient alert and talking on arrival to the emergency department. Vital signs are stable. When evaluating the patient in talking with the patient's mother the patient began hallucinating stating that there is something crawling on her. There is nothing there when observing patient. Blood work ordered which was unremarkable. CT scan of the head was ordered due to patient's history of subdural hematoma but showed an improving subdural hematoma. Spoke with Dr. Zaina Rodriguez who reviewed the patient's CT scan on compared to previous stated hallucinations would not be from the subdural hematoma. He stated there is nothing to be done from neurosurgery at this point in time. Urinalysis showed possible urinary tract infection chest pain and patient was given a prescription for Omnicef. 1 discussed with family that this may or may not improve the hallucinations and they may be related to patient's previous brain injury and possible side effects from the antiseizure medication she is on. Instructed to follow-up with the patient's primary care physician within the next week for further evaluation. Patient discharged back to the rehab facility in stable condition.         Amount and/or Complexity of Data Reviewed  Decide to obtain previous medical records or to obtain history from someone other than the patient: yes             --------------------------------------------- PAST HISTORY ---------------------------------------------  Past Comprehensive Metabolic Panel   Result Value Ref Range    Sodium 138 132 - 146 mmol/L    Potassium 4.0 3.5 - 5.0 mmol/L    Chloride 100 98 - 107 mmol/L    CO2 29 22 - 29 mmol/L    Anion Gap 9 7 - 16 mmol/L    Glucose 116 (H) 74 - 99 mg/dL    BUN 11 6 - 20 mg/dL    CREATININE 0.7 0.5 - 1.0 mg/dL    GFR Non-African American >60 >=60 mL/min/1.73    GFR African American >60     Calcium 8.5 (L) 8.6 - 10.2 mg/dL    Total Protein 6.5 6.4 - 8.3 g/dL    Alb 2.8 (L) 3.5 - 5.2 g/dL    Total Bilirubin 0.4 0.0 - 1.2 mg/dL    Alkaline Phosphatase 104 35 - 104 U/L    ALT 12 0 - 32 U/L    AST 22 0 - 31 U/L   Lactate, Sepsis   Result Value Ref Range    Lactic Acid, Sepsis 1.2 0.5 - 1.9 mmol/L   Urinalysis   Result Value Ref Range    Color, UA Yellow Straw/Yellow    Clarity, UA Clear Clear    Glucose, Ur Negative Negative mg/dL    Bilirubin Urine Negative Negative    Ketones, Urine Negative Negative mg/dL    Specific Gravity, UA 1.025 1.005 - 1.030    Blood, Urine SMALL (A) Negative    pH, UA 6.0 5.0 - 9.0    Protein, UA 30 (A) Negative mg/dL    Urobilinogen, Urine 0.2 <2.0 E.U./dL    Nitrite, Urine Negative Negative    Leukocyte Esterase, Urine SMALL (A) Negative   Microscopic Urinalysis   Result Value Ref Range    WBC, UA 2-5 0 - 5 /HPF    RBC, UA 1-3 0 - 2 /HPF    Bacteria, UA FEW (A) /HPF   POCT Glucose   Result Value Ref Range    Glucose 116 mg/dL    QC OK? yes    EKG 12 Lead   Result Value Ref Range    Ventricular Rate 84 BPM    Atrial Rate 84 BPM    P-R Interval 172 ms    QRS Duration 70 ms    Q-T Interval 350 ms    QTc Calculation (Bazett) 413 ms    P Axis 86 degrees    R Axis 86 degrees    T Axis 64 degrees       Radiology:  CT Head WO Contrast   Final Result   1. Hydrocephalus, worsened since the prior exam.   2. Evolving right subdural hematoma. ALERT:  THIS IS AN ABNORMAL REPORT         XR CHEST PORTABLE   Final Result   No acute cardiopulmonary findings.           ------------------------- NURSING NOTES AND VITALS REVIEWED ---------------------------  Date / Time Roomed:  6/6/2019  5:43 PM  ED Bed Assignment:  20/20    The nursing notes within the ED encounter and vital signs as below have been reviewed. /63   Pulse 86   Temp 98.5 °F (36.9 °C) (Oral)   Resp 18   Ht 5' (1.524 m)   Wt 136 lb (61.7 kg)   SpO2 97%   BMI 26.56 kg/m²   Oxygen Saturation Interpretation: Normal      ------------------------------------------ PROGRESS NOTES ------------------------------------------  11:59 PM  I have spoken with the mother and father and discussed todays results, in addition to providing specific details for the plan of care and counseling regarding the diagnosis and prognosis. Their questions are answered at this time and they are agreeable with the plan. I discussed at length with them reasons for immediate return here for re evaluation. They will followup with their primary care physician by calling their office tomorrow. --------------------------------- ADDITIONAL PROVIDER NOTES ---------------------------------  At this time the patient is without objective evidence of an acute process requiring hospitalization or inpatient management. They have remained hemodynamically stable throughout their entire ED visit and are stable for discharge with outpatient follow-up. The plan has been discussed in detail and they are aware of the specific conditions for emergent return, as well as the importance of follow-up. New Prescriptions    CEFDINIR (OMNICEF) 300 MG CAPSULE    Take 1 capsule by mouth 2 times daily for 10 days       Diagnosis:  1. Acute cystitis without hematuria    2. Hallucinations    3. Chronic subdural hematoma (HCC)        Disposition:  Patient's disposition: Discharge to rehab facility  Patient's condition is stable.        Melville Osgood.,   Resident  06/07/19 0899

## 2019-06-07 NOTE — ED NOTES
[facility] ambulance will pick this pt up.  I was told around one hour for pickup     Evaristo Avitia RN  06/06/19 3444

## 2019-06-09 LAB
EKG ATRIAL RATE: 84 BPM
EKG P AXIS: 86 DEGREES
EKG P-R INTERVAL: 172 MS
EKG Q-T INTERVAL: 350 MS
EKG QRS DURATION: 70 MS
EKG QTC CALCULATION (BAZETT): 413 MS
EKG R AXIS: 86 DEGREES
EKG T AXIS: 64 DEGREES
EKG VENTRICULAR RATE: 84 BPM

## 2019-06-11 ENCOUNTER — HOSPITAL ENCOUNTER (OUTPATIENT)
Dept: CARDIOLOGY | Age: 54
Discharge: HOME | End: 2019-06-11
Payer: COMMERCIAL

## 2019-06-11 ENCOUNTER — OFFICE VISIT (OUTPATIENT)
Dept: NEUROSURGERY | Age: 54
End: 2019-06-11

## 2019-06-11 ENCOUNTER — OFFICE VISIT (OUTPATIENT)
Dept: VASCULAR SURGERY | Age: 54
End: 2019-06-11
Payer: MEDICARE

## 2019-06-11 VITALS — DIASTOLIC BLOOD PRESSURE: 63 MMHG | HEART RATE: 70 BPM | SYSTOLIC BLOOD PRESSURE: 107 MMHG

## 2019-06-11 DIAGNOSIS — R06.02 SHORTNESS OF BREATH: ICD-10-CM

## 2019-06-11 DIAGNOSIS — S06.5XAA SUBDURAL HEMATOMA: Primary | ICD-10-CM

## 2019-06-11 DIAGNOSIS — I82.411 ACUTE DEEP VEIN THROMBOSIS (DVT) OF RIGHT FEMORAL VEIN (HCC): Primary | ICD-10-CM

## 2019-06-11 LAB
BLOOD CULTURE, ROUTINE: NORMAL
CULTURE, BLOOD 2: NORMAL
LV EF: 65 %
LVEF MODALITY: NORMAL

## 2019-06-11 PROCEDURE — G8427 DOCREV CUR MEDS BY ELIG CLIN: HCPCS | Performed by: SURGERY

## 2019-06-11 PROCEDURE — 93306 TTE W/DOPPLER COMPLETE: CPT | Performed by: PSYCHIATRY & NEUROLOGY

## 2019-06-11 PROCEDURE — 99024 POSTOP FOLLOW-UP VISIT: CPT | Performed by: PHYSICIAN ASSISTANT

## 2019-06-11 PROCEDURE — 3017F COLORECTAL CA SCREEN DOC REV: CPT | Performed by: SURGERY

## 2019-06-11 PROCEDURE — G8419 CALC BMI OUT NRM PARAM NOF/U: HCPCS | Performed by: SURGERY

## 2019-06-11 PROCEDURE — 99212 OFFICE O/P EST SF 10 MIN: CPT | Performed by: SURGERY

## 2019-06-11 PROCEDURE — 1036F TOBACCO NON-USER: CPT | Performed by: SURGERY

## 2019-06-11 RX ORDER — LIOTHYRONINE SODIUM 5 UG/1
5 TABLET ORAL DAILY
COMMUNITY
End: 2019-12-19 | Stop reason: SDUPTHER

## 2019-06-11 NOTE — PROGRESS NOTES
Post Operative Follow-up    Patient is status post: right adonis hole craniotomy 4.5 weeks ago. She has been having confusion and hallucinations. Physical Exam  Alert and Oriented to person  DEVYNMARYANA GR 5/5  Wound: C/D/I  No drift    A/P: patient is s/p right adonis hole craniotomy 4.5 weeks ago. She did have a DVT with IVC filter placed. Head CT reveals right chronic SDH measuring 11mm with minimal shift. No anticogulants. F/u 1 month with head CT. Continue all restrictions.

## 2019-06-11 NOTE — PROGRESS NOTES
6/11/2019    Mark MORALES See  1965    Chief Complaint   Patient presents with    Circulatory Problem     legs swollen, (R) > (L)       Patient returns for follow-up of bilateral DVT, history of IVC filter, history of intracranial hemorrhage. The patient's parents state that she continues to have swelling. She has been wearing her support stockings. She is having considerable pain in her right knee and has severe arthritis. She is seeing Dr. Alicja Knowles today for follow-up of her intracranial hemorrhage. Procedure Laterality Date    ABDOMEN SURGERY      duodenal atresia    ABDOMINAL ADHESION SURGERY      BRAIN SURGERY      CRANIOTOMY Right 4/12/2019    RIGHT CRANIOTOMY FRANCISCO JAVIER HOLES performed by Annamaria Torres MD at [facility] ECHO COMPL W DOP COLOR FLOW  5/14/2012         HIP SURGERY      Right    JOINT REPLACEMENT      left knee, right hip-dr Austin Gabriel KNEE SURGERY      Right    TOE SURGERY      Right foot    TOTAL KNEE ARTHROPLASTY Left 08/11/2016    DR. De    TUBAL LIGATION      VENA CAVA FILTER PLACEMENT Bilateral 4/22/2019    VENA CAVA FILTER INSERTION performed by Isabella Huynh MD at [address] [place] [address] Ave:    There were no vitals filed for this visit. CONSTITUTIONAL:  awake, alert, cooperative, no apparent distress  LUNGS:  no increased work of breathing, good air exchange and clear to auscultation  CARDIOVASCULAR:  regular rate and rhythm  ABDOMEN:  soft, non-distended and non-tender  Extremities: Both legs are full but not tense. Tender to squeezing.            Right      Left   Brachial     Radial     Femoral     Popliteal     Dorsalis Pedis     Posterior Tibial     (3=normal, 2=diminished, 1=barely palpable, 4=widened)    Assessment:    Problem List Items Addressed This Visit     None      Visit Diagnoses     Acute deep vein thrombosis (DVT) of right femoral vein (HCC)    -  Primary        I reassured the patient's parents that the filter will protect her from a PE originating from either leg. Again the only treatment at this point is leg elevation and the use of support stockings. I would recommend anticoagulation if this is safe from a neurosurgical standpoint. I reviewed with them that her legs would always be swollen to some degree due to scarring from the DVTs.

## 2019-06-12 ENCOUNTER — TELEPHONE (OUTPATIENT)
Dept: NON INVASIVE DIAGNOSTICS | Age: 54
End: 2019-06-12

## 2019-06-12 NOTE — TELEPHONE ENCOUNTER
----- Message from Mercedez Doyle DO sent at 6/11/2019  7:11 PM EDT -----  Please let her parents know that her echo is normal. Thx.    ALK

## 2019-07-10 ENCOUNTER — HOSPITAL ENCOUNTER (OUTPATIENT)
Dept: CT IMAGING | Age: 54
Discharge: HOME | End: 2019-07-12
Payer: MEDICARE

## 2019-07-10 DIAGNOSIS — S06.5XAA SUBDURAL HEMATOMA: ICD-10-CM

## 2019-07-10 PROCEDURE — 70450 CT HEAD/BRAIN W/O DYE: CPT

## 2019-07-11 ENCOUNTER — OFFICE VISIT (OUTPATIENT)
Dept: NEUROSURGERY | Age: 54
End: 2019-07-11

## 2019-07-11 VITALS — HEART RATE: 68 BPM | SYSTOLIC BLOOD PRESSURE: 99 MMHG | DIASTOLIC BLOOD PRESSURE: 56 MMHG

## 2019-07-11 DIAGNOSIS — S06.5XAA SUBDURAL HEMATOMA: Primary | ICD-10-CM

## 2019-07-11 PROCEDURE — 99024 POSTOP FOLLOW-UP VISIT: CPT | Performed by: PHYSICIAN ASSISTANT

## 2019-08-06 ENCOUNTER — HOSPITAL ENCOUNTER (OUTPATIENT)
Age: 54
Discharge: HOME | End: 2019-08-08
Payer: MEDICARE

## 2019-08-06 LAB
ANION GAP SERPL CALCULATED.3IONS-SCNC: 11 MMOL/L (ref 7–16)
BUN BLDV-MCNC: 9 MG/DL (ref 6–20)
CALCIUM SERPL-MCNC: 9 MG/DL (ref 8.6–10.2)
CHLORIDE BLD-SCNC: 105 MMOL/L (ref 98–107)
CO2: 28 MMOL/L (ref 22–29)
CREAT SERPL-MCNC: 0.8 MG/DL (ref 0.5–1)
GFR AFRICAN AMERICAN: >60
GFR NON-AFRICAN AMERICAN: >60 ML/MIN/1.73
GLUCOSE BLD-MCNC: 101 MG/DL (ref 74–99)
HCT VFR BLD CALC: 40.8 % (ref 34–48)
HEMOGLOBIN: 12.4 G/DL (ref 11.5–15.5)
MCH RBC QN AUTO: 28.6 PG (ref 26–35)
MCHC RBC AUTO-ENTMCNC: 30.4 % (ref 32–34.5)
MCV RBC AUTO: 94.2 FL (ref 80–99.9)
PDW BLD-RTO: 17 FL (ref 11.5–15)
PLATELET # BLD: 202 E9/L (ref 130–450)
PMV BLD AUTO: 10.1 FL (ref 7–12)
POTASSIUM SERPL-SCNC: 4 MMOL/L (ref 3.5–5)
RBC # BLD: 4.33 E12/L (ref 3.5–5.5)
SODIUM BLD-SCNC: 144 MMOL/L (ref 132–146)
WBC # BLD: 4.3 E9/L (ref 4.5–11.5)

## 2019-08-06 PROCEDURE — 80048 BASIC METABOLIC PNL TOTAL CA: CPT

## 2019-08-06 PROCEDURE — 85027 COMPLETE CBC AUTOMATED: CPT

## 2019-08-10 ENCOUNTER — HOSPITAL ENCOUNTER (OUTPATIENT)
Dept: CT IMAGING | Age: 54
Discharge: HOME | End: 2019-08-12
Payer: MEDICARE

## 2019-08-10 DIAGNOSIS — S06.5XAA SUBDURAL HEMATOMA: ICD-10-CM

## 2019-08-10 PROCEDURE — 70450 CT HEAD/BRAIN W/O DYE: CPT

## 2019-08-13 ENCOUNTER — OFFICE VISIT (OUTPATIENT)
Dept: NEUROSURGERY | Age: 54
End: 2019-08-13
Payer: MEDICARE

## 2019-08-13 VITALS — HEART RATE: 60 BPM | DIASTOLIC BLOOD PRESSURE: 66 MMHG | SYSTOLIC BLOOD PRESSURE: 108 MMHG

## 2019-08-13 DIAGNOSIS — S06.5XAA SUBDURAL HEMATOMA: Primary | ICD-10-CM

## 2019-08-13 PROCEDURE — G8419 CALC BMI OUT NRM PARAM NOF/U: HCPCS | Performed by: PHYSICIAN ASSISTANT

## 2019-08-13 PROCEDURE — 3017F COLORECTAL CA SCREEN DOC REV: CPT | Performed by: PHYSICIAN ASSISTANT

## 2019-08-13 PROCEDURE — G8427 DOCREV CUR MEDS BY ELIG CLIN: HCPCS | Performed by: PHYSICIAN ASSISTANT

## 2019-08-13 PROCEDURE — 1036F TOBACCO NON-USER: CPT | Performed by: PHYSICIAN ASSISTANT

## 2019-08-13 PROCEDURE — 99212 OFFICE O/P EST SF 10 MIN: CPT | Performed by: PHYSICIAN ASSISTANT

## 2019-08-15 ENCOUNTER — TELEPHONE (OUTPATIENT)
Dept: PRIMARY CARE CLINIC | Age: 54
End: 2019-08-15

## 2019-08-15 NOTE — TELEPHONE ENCOUNTER
Patient's mother would like to know who you would recommend for a neurologist.  They did get a couple names, but would like your opinion. Please advise. Names they were given:   Genevieve Leone

## 2019-09-03 ENCOUNTER — HOSPITAL ENCOUNTER (OUTPATIENT)
Dept: ULTRASOUND IMAGING | Age: 54
Discharge: HOME | End: 2019-09-05
Payer: MEDICARE

## 2019-09-03 ENCOUNTER — HOSPITAL ENCOUNTER (OUTPATIENT)
Dept: GENERAL RADIOLOGY | Age: 54
Discharge: HOME | End: 2019-09-05
Payer: MEDICARE

## 2019-09-03 DIAGNOSIS — R32 URINARY INCONTINENCE, UNSPECIFIED TYPE: ICD-10-CM

## 2019-09-03 PROCEDURE — 76770 US EXAM ABDO BACK WALL COMP: CPT

## 2019-09-03 PROCEDURE — 74018 RADEX ABDOMEN 1 VIEW: CPT

## 2019-09-04 ENCOUNTER — TELEPHONE (OUTPATIENT)
Dept: NEUROSURGERY | Age: 54
End: 2019-09-04

## 2019-10-02 ENCOUNTER — OFFICE VISIT (OUTPATIENT)
Dept: NEUROSURGERY | Age: 54
End: 2019-10-02
Payer: MEDICARE

## 2019-10-02 VITALS — DIASTOLIC BLOOD PRESSURE: 62 MMHG | SYSTOLIC BLOOD PRESSURE: 99 MMHG | HEART RATE: 73 BPM

## 2019-10-02 DIAGNOSIS — N39.0 URINARY TRACT INFECTION WITHOUT HEMATURIA, SITE UNSPECIFIED: ICD-10-CM

## 2019-10-02 DIAGNOSIS — G91.0 COMMUNICATING HYDROCEPHALUS (HCC): Primary | ICD-10-CM

## 2019-10-02 PROCEDURE — G8419 CALC BMI OUT NRM PARAM NOF/U: HCPCS | Performed by: NEUROLOGICAL SURGERY

## 2019-10-02 PROCEDURE — G8427 DOCREV CUR MEDS BY ELIG CLIN: HCPCS | Performed by: NEUROLOGICAL SURGERY

## 2019-10-02 PROCEDURE — G8484 FLU IMMUNIZE NO ADMIN: HCPCS | Performed by: NEUROLOGICAL SURGERY

## 2019-10-02 PROCEDURE — 1036F TOBACCO NON-USER: CPT | Performed by: NEUROLOGICAL SURGERY

## 2019-10-02 PROCEDURE — 3017F COLORECTAL CA SCREEN DOC REV: CPT | Performed by: NEUROLOGICAL SURGERY

## 2019-10-02 PROCEDURE — 99213 OFFICE O/P EST LOW 20 MIN: CPT | Performed by: NEUROLOGICAL SURGERY

## 2019-10-10 DIAGNOSIS — Z23 NEED FOR INFLUENZA VACCINATION: Primary | ICD-10-CM

## 2019-10-11 ENCOUNTER — NURSE ONLY (OUTPATIENT)
Dept: PRIMARY CARE CLINIC | Age: 54
End: 2019-10-11
Payer: MEDICARE

## 2019-10-11 DIAGNOSIS — Z23 NEED FOR PNEUMOCOCCAL VACCINATION: ICD-10-CM

## 2019-10-11 DIAGNOSIS — Z23 NEED FOR INFLUENZA VACCINATION: Primary | ICD-10-CM

## 2019-10-11 PROCEDURE — 90670 PCV13 VACCINE IM: CPT | Performed by: INTERNAL MEDICINE

## 2019-10-11 PROCEDURE — G0008 ADMIN INFLUENZA VIRUS VAC: HCPCS | Performed by: INTERNAL MEDICINE

## 2019-10-11 PROCEDURE — 90653 IIV ADJUVANT VACCINE IM: CPT | Performed by: INTERNAL MEDICINE

## 2019-10-11 PROCEDURE — G0009 ADMIN PNEUMOCOCCAL VACCINE: HCPCS | Performed by: INTERNAL MEDICINE

## 2019-10-15 ENCOUNTER — PREP FOR PROCEDURE (OUTPATIENT)
Dept: NEUROSURGERY | Age: 54
End: 2019-10-15

## 2019-10-15 DIAGNOSIS — G91.0 COMMUNICATING HYDROCEPHALUS (HCC): Primary | ICD-10-CM

## 2019-10-15 RX ORDER — SODIUM CHLORIDE 9 MG/ML
INJECTION, SOLUTION INTRAVENOUS CONTINUOUS
Status: CANCELLED | OUTPATIENT
Start: 2019-10-15

## 2019-10-15 RX ORDER — SODIUM CHLORIDE 0.9 % (FLUSH) 0.9 %
10 SYRINGE (ML) INJECTION EVERY 12 HOURS SCHEDULED
Status: CANCELLED | OUTPATIENT
Start: 2019-10-15

## 2019-10-15 RX ORDER — SODIUM CHLORIDE 0.9 % (FLUSH) 0.9 %
10 SYRINGE (ML) INJECTION PRN
Status: CANCELLED | OUTPATIENT
Start: 2019-10-15

## 2019-11-12 ENCOUNTER — APPOINTMENT (OUTPATIENT)
Dept: CT IMAGING | Age: 54
DRG: 033 | End: 2019-11-12
Attending: NEUROLOGICAL SURGERY
Payer: MEDICARE

## 2019-11-12 ENCOUNTER — HOSPITAL ENCOUNTER (INPATIENT)
Age: 54
LOS: 2 days | Discharge: NURSING, REHAB OR LONG-TERM CARE | DRG: 033 | End: 2019-11-14
Attending: NEUROLOGICAL SURGERY | Admitting: NEUROLOGICAL SURGERY
Payer: MEDICARE

## 2019-11-12 ENCOUNTER — ANESTHESIA EVENT (OUTPATIENT)
Dept: OPERATING ROOM | Age: 54
DRG: 033 | End: 2019-11-12
Payer: MEDICARE

## 2019-11-12 ENCOUNTER — ANESTHESIA (OUTPATIENT)
Dept: OPERATING ROOM | Age: 54
DRG: 033 | End: 2019-11-12
Payer: MEDICARE

## 2019-11-12 VITALS — SYSTOLIC BLOOD PRESSURE: 141 MMHG | DIASTOLIC BLOOD PRESSURE: 89 MMHG | OXYGEN SATURATION: 100 %

## 2019-11-12 DIAGNOSIS — Z01.818 PREOPERATIVE TESTING: Primary | ICD-10-CM

## 2019-11-12 DIAGNOSIS — G91.0 COMMUNICATING HYDROCEPHALUS (HCC): ICD-10-CM

## 2019-11-12 PROBLEM — N43.2 COMMUNICATING HYDROCELE: Status: ACTIVE | Noted: 2019-11-12

## 2019-11-12 LAB
ABO/RH: NORMAL
AMORPHOUS: ABNORMAL
ANION GAP SERPL CALCULATED.3IONS-SCNC: 4 MMOL/L (ref 7–16)
ANTIBODY SCREEN: NORMAL
BACTERIA: ABNORMAL /HPF
BASOPHILS ABSOLUTE: 0.07 E9/L (ref 0–0.2)
BASOPHILS RELATIVE PERCENT: 2.1 % (ref 0–2)
BILIRUBIN URINE: NEGATIVE
BLOOD, URINE: NEGATIVE
BUN BLDV-MCNC: 19 MG/DL (ref 6–20)
CALCIUM SERPL-MCNC: 9.1 MG/DL (ref 8.6–10.2)
CHLORIDE BLD-SCNC: 105 MMOL/L (ref 98–107)
CLARITY: NORMAL
CO2: 31 MMOL/L (ref 22–29)
COLOR: YELLOW
CREAT SERPL-MCNC: 1 MG/DL (ref 0.5–1)
EOSINOPHILS ABSOLUTE: 0.14 E9/L (ref 0.05–0.5)
EOSINOPHILS RELATIVE PERCENT: 4.1 % (ref 0–6)
GFR AFRICAN AMERICAN: >60
GFR NON-AFRICAN AMERICAN: 58 ML/MIN/1.73
GLUCOSE BLD-MCNC: 84 MG/DL (ref 74–99)
GLUCOSE URINE: NEGATIVE MG/DL
HCT VFR BLD CALC: 45.9 % (ref 34–48)
HEMOGLOBIN: 14.5 G/DL (ref 11.5–15.5)
IMMATURE GRANULOCYTES #: 0.01 E9/L
IMMATURE GRANULOCYTES %: 0.3 % (ref 0–5)
INR BLD: 1
KETONES, URINE: NEGATIVE MG/DL
LEUKOCYTE ESTERASE, URINE: NEGATIVE
LYMPHOCYTES ABSOLUTE: 1.07 E9/L (ref 1.5–4)
LYMPHOCYTES RELATIVE PERCENT: 31.5 % (ref 20–42)
MCH RBC QN AUTO: 29.5 PG (ref 26–35)
MCHC RBC AUTO-ENTMCNC: 31.6 % (ref 32–34.5)
MCV RBC AUTO: 93.5 FL (ref 80–99.9)
MONOCYTES ABSOLUTE: 0.35 E9/L (ref 0.1–0.95)
MONOCYTES RELATIVE PERCENT: 10.3 % (ref 2–12)
NEUTROPHILS ABSOLUTE: 1.76 E9/L (ref 1.8–7.3)
NEUTROPHILS RELATIVE PERCENT: 51.7 % (ref 43–80)
NITRITE, URINE: NEGATIVE
PDW BLD-RTO: 15.3 FL (ref 11.5–15)
PH UA: 7 (ref 5–9)
PLATELET # BLD: 182 E9/L (ref 130–450)
PMV BLD AUTO: 10.1 FL (ref 7–12)
POTASSIUM REFLEX MAGNESIUM: 5.1 MMOL/L (ref 3.5–5)
PROTEIN UA: NEGATIVE MG/DL
PROTHROMBIN TIME: 10.7 SEC (ref 9.3–12.4)
RBC # BLD: 4.91 E12/L (ref 3.5–5.5)
RBC UA: ABNORMAL /HPF (ref 0–2)
SODIUM BLD-SCNC: 140 MMOL/L (ref 132–146)
SPECIFIC GRAVITY UA: 1.01 (ref 1–1.03)
UROBILINOGEN, URINE: 0.2 E.U./DL
WBC # BLD: 3.4 E9/L (ref 4.5–11.5)
WBC UA: ABNORMAL /HPF (ref 0–5)

## 2019-11-12 PROCEDURE — 6370000000 HC RX 637 (ALT 250 FOR IP): Performed by: NEUROLOGICAL SURGERY

## 2019-11-12 PROCEDURE — 2580000003 HC RX 258: Performed by: PHYSICIAN ASSISTANT

## 2019-11-12 PROCEDURE — 7100000001 HC PACU RECOVERY - ADDTL 15 MIN: Performed by: NEUROLOGICAL SURGERY

## 2019-11-12 PROCEDURE — 86900 BLOOD TYPING SEROLOGIC ABO: CPT

## 2019-11-12 PROCEDURE — 2780000010 HC IMPLANT OTHER: Performed by: NEUROLOGICAL SURGERY

## 2019-11-12 PROCEDURE — 2709999900 HC NON-CHARGEABLE SUPPLY: Performed by: NEUROLOGICAL SURGERY

## 2019-11-12 PROCEDURE — 6360000002 HC RX W HCPCS: Performed by: ANESTHESIOLOGY

## 2019-11-12 PROCEDURE — 62223 ESTABLISH BRAIN CAVITY SHUNT: CPT | Performed by: SURGERY

## 2019-11-12 PROCEDURE — 36415 COLL VENOUS BLD VENIPUNCTURE: CPT

## 2019-11-12 PROCEDURE — 86901 BLOOD TYPING SEROLOGIC RH(D): CPT

## 2019-11-12 PROCEDURE — 87081 CULTURE SCREEN ONLY: CPT

## 2019-11-12 PROCEDURE — 2500000003 HC RX 250 WO HCPCS: Performed by: NEUROLOGICAL SURGERY

## 2019-11-12 PROCEDURE — 86850 RBC ANTIBODY SCREEN: CPT

## 2019-11-12 PROCEDURE — 87088 URINE BACTERIA CULTURE: CPT

## 2019-11-12 PROCEDURE — 2720000010 HC SURG SUPPLY STERILE: Performed by: NEUROLOGICAL SURGERY

## 2019-11-12 PROCEDURE — 2580000003 HC RX 258: Performed by: NEUROLOGICAL SURGERY

## 2019-11-12 PROCEDURE — 2500000003 HC RX 250 WO HCPCS

## 2019-11-12 PROCEDURE — 7100000000 HC PACU RECOVERY - FIRST 15 MIN: Performed by: NEUROLOGICAL SURGERY

## 2019-11-12 PROCEDURE — 6360000002 HC RX W HCPCS: Performed by: NEUROLOGICAL SURGERY

## 2019-11-12 PROCEDURE — 3700000001 HC ADD 15 MINUTES (ANESTHESIA): Performed by: NEUROLOGICAL SURGERY

## 2019-11-12 PROCEDURE — 6360000002 HC RX W HCPCS: Performed by: PHYSICIAN ASSISTANT

## 2019-11-12 PROCEDURE — 85610 PROTHROMBIN TIME: CPT

## 2019-11-12 PROCEDURE — 62223 ESTABLISH BRAIN CAVITY SHUNT: CPT | Performed by: NEUROLOGICAL SURGERY

## 2019-11-12 PROCEDURE — 81001 URINALYSIS AUTO W/SCOPE: CPT

## 2019-11-12 PROCEDURE — 6360000002 HC RX W HCPCS

## 2019-11-12 PROCEDURE — 3700000000 HC ANESTHESIA ATTENDED CARE: Performed by: NEUROLOGICAL SURGERY

## 2019-11-12 PROCEDURE — 3600000002 HC SURGERY LEVEL 2 BASE: Performed by: NEUROLOGICAL SURGERY

## 2019-11-12 PROCEDURE — 2000000000 HC ICU R&B

## 2019-11-12 PROCEDURE — 85025 COMPLETE CBC W/AUTO DIFF WBC: CPT

## 2019-11-12 PROCEDURE — 70450 CT HEAD/BRAIN W/O DYE: CPT

## 2019-11-12 PROCEDURE — C1729 CATH, DRAINAGE: HCPCS | Performed by: NEUROLOGICAL SURGERY

## 2019-11-12 PROCEDURE — 3600000012 HC SURGERY LEVEL 2 ADDTL 15MIN: Performed by: NEUROLOGICAL SURGERY

## 2019-11-12 PROCEDURE — 80048 BASIC METABOLIC PNL TOTAL CA: CPT

## 2019-11-12 PROCEDURE — 00160J6 BYPASS CEREBRAL VENTRICLE TO PERITONEAL CAVITY WITH SYNTHETIC SUBSTITUTE, OPEN APPROACH: ICD-10-PCS | Performed by: NEUROLOGICAL SURGERY

## 2019-11-12 PROCEDURE — 0WJG4ZZ INSPECTION OF PERITONEAL CAVITY, PERCUTANEOUS ENDOSCOPIC APPROACH: ICD-10-PCS | Performed by: SURGERY

## 2019-11-12 DEVICE — CODMAN® HAKIM® PROGRAMMABLE VALVE IN-LINE VALVE WITH SIPHONGUARD® DEVICE PROGRAMMABLE IN STEPS OF 10MM H2O (98 PA): 30MM H2O TO 200MM H2O (294 PA - 1960 PA)
Type: IMPLANTABLE DEVICE | Site: CRANIAL | Status: FUNCTIONAL
Brand: CODMAN® HAKIM®

## 2019-11-12 RX ORDER — MEPERIDINE HYDROCHLORIDE 50 MG/ML
12.5 INJECTION INTRAMUSCULAR; INTRAVENOUS; SUBCUTANEOUS EVERY 5 MIN PRN
Status: DISCONTINUED | OUTPATIENT
Start: 2019-11-12 | End: 2019-11-12 | Stop reason: HOSPADM

## 2019-11-12 RX ORDER — SODIUM CHLORIDE 0.9 % (FLUSH) 0.9 %
10 SYRINGE (ML) INJECTION PRN
Status: DISCONTINUED | OUTPATIENT
Start: 2019-11-12 | End: 2019-11-14 | Stop reason: HOSPADM

## 2019-11-12 RX ORDER — MIDAZOLAM HYDROCHLORIDE 1 MG/ML
INJECTION INTRAMUSCULAR; INTRAVENOUS PRN
Status: DISCONTINUED | OUTPATIENT
Start: 2019-11-12 | End: 2019-11-12 | Stop reason: SDUPTHER

## 2019-11-12 RX ORDER — LIDOCAINE HYDROCHLORIDE 20 MG/ML
INJECTION, SOLUTION INTRAVENOUS PRN
Status: DISCONTINUED | OUTPATIENT
Start: 2019-11-12 | End: 2019-11-12 | Stop reason: SDUPTHER

## 2019-11-12 RX ORDER — LORAZEPAM 0.5 MG/1
0.5 TABLET ORAL EVERY 8 HOURS PRN
Status: DISCONTINUED | OUTPATIENT
Start: 2019-11-12 | End: 2019-11-14 | Stop reason: HOSPADM

## 2019-11-12 RX ORDER — SODIUM CHLORIDE 9 MG/ML
INJECTION, SOLUTION INTRAVENOUS CONTINUOUS
Status: DISCONTINUED | OUTPATIENT
Start: 2019-11-12 | End: 2019-11-12

## 2019-11-12 RX ORDER — SULFAMETHOXAZOLE AND TRIMETHOPRIM 800; 160 MG/1; MG/1
1 TABLET ORAL 2 TIMES DAILY
Status: DISCONTINUED | OUTPATIENT
Start: 2019-11-12 | End: 2019-11-14 | Stop reason: HOSPADM

## 2019-11-12 RX ORDER — PHENYLEPHRINE HYDROCHLORIDE 10 MG/ML
INJECTION INTRAVENOUS PRN
Status: DISCONTINUED | OUTPATIENT
Start: 2019-11-12 | End: 2019-11-12 | Stop reason: SDUPTHER

## 2019-11-12 RX ORDER — OXYCODONE HYDROCHLORIDE 5 MG/1
10 TABLET ORAL EVERY 4 HOURS PRN
Status: DISCONTINUED | OUTPATIENT
Start: 2019-11-12 | End: 2019-11-14 | Stop reason: HOSPADM

## 2019-11-12 RX ORDER — LEVOTHYROXINE SODIUM 112 UG/1
112 TABLET ORAL DAILY
Status: DISCONTINUED | OUTPATIENT
Start: 2019-11-12 | End: 2019-11-14 | Stop reason: HOSPADM

## 2019-11-12 RX ORDER — SODIUM CHLORIDE 0.9 % (FLUSH) 0.9 %
10 SYRINGE (ML) INJECTION EVERY 12 HOURS SCHEDULED
Status: DISCONTINUED | OUTPATIENT
Start: 2019-11-12 | End: 2019-11-14 | Stop reason: HOSPADM

## 2019-11-12 RX ORDER — DIMETHICONE, OXYBENZONE, AND PADIMATE O 2; 2.5; 6.6 G/100G; G/100G; G/100G
STICK TOPICAL
Status: DISPENSED
Start: 2019-11-12 | End: 2019-11-13

## 2019-11-12 RX ORDER — SODIUM PHOSPHATE, DIBASIC AND SODIUM PHOSPHATE, MONOBASIC 7; 19 G/133ML; G/133ML
1 ENEMA RECTAL DAILY PRN
Status: DISCONTINUED | OUTPATIENT
Start: 2019-11-12 | End: 2019-11-14 | Stop reason: HOSPADM

## 2019-11-12 RX ORDER — CEFAZOLIN SODIUM 2 G/50ML
2 SOLUTION INTRAVENOUS EVERY 8 HOURS
Status: COMPLETED | OUTPATIENT
Start: 2019-11-12 | End: 2019-11-13

## 2019-11-12 RX ORDER — PROPOFOL 10 MG/ML
INJECTION, EMULSION INTRAVENOUS PRN
Status: DISCONTINUED | OUTPATIENT
Start: 2019-11-12 | End: 2019-11-12 | Stop reason: SDUPTHER

## 2019-11-12 RX ORDER — ONDANSETRON 2 MG/ML
INJECTION INTRAMUSCULAR; INTRAVENOUS PRN
Status: DISCONTINUED | OUTPATIENT
Start: 2019-11-12 | End: 2019-11-12 | Stop reason: SDUPTHER

## 2019-11-12 RX ORDER — SODIUM CHLORIDE 0.9 % (FLUSH) 0.9 %
10 SYRINGE (ML) INJECTION PRN
Status: DISCONTINUED | OUTPATIENT
Start: 2019-11-12 | End: 2019-11-12 | Stop reason: HOSPADM

## 2019-11-12 RX ORDER — GLYCOPYRROLATE 1 MG/5 ML
SYRINGE (ML) INTRAVENOUS PRN
Status: DISCONTINUED | OUTPATIENT
Start: 2019-11-12 | End: 2019-11-12 | Stop reason: SDUPTHER

## 2019-11-12 RX ORDER — CEFAZOLIN SODIUM 2 G/50ML
2 SOLUTION INTRAVENOUS
Status: COMPLETED | OUTPATIENT
Start: 2019-11-12 | End: 2019-11-12

## 2019-11-12 RX ORDER — LIOTHYRONINE SODIUM 5 UG/1
5 TABLET ORAL DAILY
Status: DISCONTINUED | OUTPATIENT
Start: 2019-11-12 | End: 2019-11-14 | Stop reason: HOSPADM

## 2019-11-12 RX ORDER — MORPHINE SULFATE 4 MG/ML
4 INJECTION, SOLUTION INTRAMUSCULAR; INTRAVENOUS
Status: DISCONTINUED | OUTPATIENT
Start: 2019-11-12 | End: 2019-11-14 | Stop reason: HOSPADM

## 2019-11-12 RX ORDER — VANCOMYCIN HYDROCHLORIDE 500 MG/10ML
INJECTION, POWDER, LYOPHILIZED, FOR SOLUTION INTRAVENOUS PRN
Status: DISCONTINUED | OUTPATIENT
Start: 2019-11-12 | End: 2019-11-12 | Stop reason: ALTCHOICE

## 2019-11-12 RX ORDER — DEXAMETHASONE SODIUM PHOSPHATE 10 MG/ML
INJECTION INTRAMUSCULAR; INTRAVENOUS PRN
Status: DISCONTINUED | OUTPATIENT
Start: 2019-11-12 | End: 2019-11-12 | Stop reason: SDUPTHER

## 2019-11-12 RX ORDER — FENTANYL CITRATE 50 UG/ML
INJECTION, SOLUTION INTRAMUSCULAR; INTRAVENOUS PRN
Status: DISCONTINUED | OUTPATIENT
Start: 2019-11-12 | End: 2019-11-12 | Stop reason: SDUPTHER

## 2019-11-12 RX ORDER — ROCURONIUM BROMIDE 10 MG/ML
INJECTION, SOLUTION INTRAVENOUS PRN
Status: DISCONTINUED | OUTPATIENT
Start: 2019-11-12 | End: 2019-11-12 | Stop reason: SDUPTHER

## 2019-11-12 RX ORDER — LABETALOL HYDROCHLORIDE 5 MG/ML
5 INJECTION, SOLUTION INTRAVENOUS EVERY 10 MIN PRN
Status: DISCONTINUED | OUTPATIENT
Start: 2019-11-12 | End: 2019-11-12 | Stop reason: HOSPADM

## 2019-11-12 RX ORDER — SODIUM CHLORIDE 0.9 % (FLUSH) 0.9 %
10 SYRINGE (ML) INJECTION EVERY 12 HOURS SCHEDULED
Status: DISCONTINUED | OUTPATIENT
Start: 2019-11-12 | End: 2019-11-12 | Stop reason: HOSPADM

## 2019-11-12 RX ORDER — ONDANSETRON 2 MG/ML
4 INJECTION INTRAMUSCULAR; INTRAVENOUS EVERY 6 HOURS PRN
Status: DISCONTINUED | OUTPATIENT
Start: 2019-11-12 | End: 2019-11-14 | Stop reason: HOSPADM

## 2019-11-12 RX ORDER — OXYCODONE HYDROCHLORIDE 5 MG/1
5 TABLET ORAL EVERY 4 HOURS PRN
Status: DISCONTINUED | OUTPATIENT
Start: 2019-11-12 | End: 2019-11-14 | Stop reason: HOSPADM

## 2019-11-12 RX ORDER — QUETIAPINE FUMARATE 25 MG/1
25 TABLET, FILM COATED ORAL DAILY
Status: DISCONTINUED | OUTPATIENT
Start: 2019-11-12 | End: 2019-11-14 | Stop reason: HOSPADM

## 2019-11-12 RX ORDER — LEVETIRACETAM 500 MG/1
500 TABLET ORAL 2 TIMES DAILY
Status: DISCONTINUED | OUTPATIENT
Start: 2019-11-12 | End: 2019-11-14 | Stop reason: HOSPADM

## 2019-11-12 RX ORDER — PROMETHAZINE HYDROCHLORIDE 25 MG/ML
25 INJECTION, SOLUTION INTRAMUSCULAR; INTRAVENOUS PRN
Status: DISCONTINUED | OUTPATIENT
Start: 2019-11-12 | End: 2019-11-12 | Stop reason: HOSPADM

## 2019-11-12 RX ORDER — NEOSTIGMINE METHYLSULFATE 1 MG/ML
INJECTION, SOLUTION INTRAVENOUS PRN
Status: DISCONTINUED | OUTPATIENT
Start: 2019-11-12 | End: 2019-11-12 | Stop reason: SDUPTHER

## 2019-11-12 RX ORDER — EPHEDRINE SULFATE/0.9% NACL/PF 50 MG/5 ML
SYRINGE (ML) INTRAVENOUS PRN
Status: DISCONTINUED | OUTPATIENT
Start: 2019-11-12 | End: 2019-11-12 | Stop reason: SDUPTHER

## 2019-11-12 RX ORDER — MORPHINE SULFATE 2 MG/ML
2 INJECTION, SOLUTION INTRAMUSCULAR; INTRAVENOUS
Status: DISCONTINUED | OUTPATIENT
Start: 2019-11-12 | End: 2019-11-14 | Stop reason: HOSPADM

## 2019-11-12 RX ORDER — DOCUSATE SODIUM 100 MG/1
100 CAPSULE, LIQUID FILLED ORAL 2 TIMES DAILY
Status: DISCONTINUED | OUTPATIENT
Start: 2019-11-12 | End: 2019-11-14 | Stop reason: HOSPADM

## 2019-11-12 RX ORDER — LIDOCAINE HYDROCHLORIDE AND EPINEPHRINE 10; 10 MG/ML; UG/ML
INJECTION, SOLUTION INFILTRATION; PERINEURAL PRN
Status: DISCONTINUED | OUTPATIENT
Start: 2019-11-12 | End: 2019-11-12 | Stop reason: ALTCHOICE

## 2019-11-12 RX ADMIN — ONDANSETRON HYDROCHLORIDE 4 MG: 2 INJECTION, SOLUTION INTRAMUSCULAR; INTRAVENOUS at 07:51

## 2019-11-12 RX ADMIN — ROCURONIUM BROMIDE 30 MG: 10 INJECTION, SOLUTION INTRAVENOUS at 06:45

## 2019-11-12 RX ADMIN — ROCURONIUM BROMIDE 10 MG: 10 INJECTION, SOLUTION INTRAVENOUS at 07:02

## 2019-11-12 RX ADMIN — SODIUM CHLORIDE: 9 INJECTION, SOLUTION INTRAVENOUS at 06:15

## 2019-11-12 RX ADMIN — DOCUSATE SODIUM 100 MG: 100 CAPSULE, LIQUID FILLED ORAL at 20:37

## 2019-11-12 RX ADMIN — Medication 0.2 MG: at 06:58

## 2019-11-12 RX ADMIN — CEFAZOLIN SODIUM 2 G: 2 SOLUTION INTRAVENOUS at 18:15

## 2019-11-12 RX ADMIN — Medication 5 MG: at 07:07

## 2019-11-12 RX ADMIN — PHENYLEPHRINE HYDROCHLORIDE 100 MCG: 10 INJECTION INTRAVENOUS at 07:26

## 2019-11-12 RX ADMIN — LEVOTHYROXINE SODIUM 112 MCG: 0.11 TABLET ORAL at 14:18

## 2019-11-12 RX ADMIN — HYDROMORPHONE HYDROCHLORIDE 0.5 MG: 1 INJECTION, SOLUTION INTRAMUSCULAR; INTRAVENOUS; SUBCUTANEOUS at 09:37

## 2019-11-12 RX ADMIN — DEXAMETHASONE SODIUM PHOSPHATE 10 MG: 10 INJECTION INTRAMUSCULAR; INTRAVENOUS at 06:45

## 2019-11-12 RX ADMIN — PHENYLEPHRINE HYDROCHLORIDE 100 MCG: 10 INJECTION INTRAVENOUS at 06:49

## 2019-11-12 RX ADMIN — LEVETIRACETAM 500 MG: 500 TABLET ORAL at 14:17

## 2019-11-12 RX ADMIN — Medication 3 MG: at 08:01

## 2019-11-12 RX ADMIN — LEVETIRACETAM 500 MG: 500 TABLET ORAL at 20:37

## 2019-11-12 RX ADMIN — CEFAZOLIN SODIUM 2 G: 2 SOLUTION INTRAVENOUS at 06:38

## 2019-11-12 RX ADMIN — SULFAMETHOXAZOLE AND TRIMETHOPRIM 1 TABLET: 800; 160 TABLET ORAL at 14:19

## 2019-11-12 RX ADMIN — LIDOCAINE HYDROCHLORIDE 40 MG: 20 INJECTION, SOLUTION INTRAVENOUS at 06:45

## 2019-11-12 RX ADMIN — MIDAZOLAM 2 MG: 1 INJECTION INTRAMUSCULAR; INTRAVENOUS at 06:38

## 2019-11-12 RX ADMIN — SODIUM CHLORIDE: 9 INJECTION, SOLUTION INTRAVENOUS at 07:32

## 2019-11-12 RX ADMIN — PROPOFOL 100 MG: 10 INJECTION, EMULSION INTRAVENOUS at 06:45

## 2019-11-12 RX ADMIN — PHENYLEPHRINE HYDROCHLORIDE 100 MCG: 10 INJECTION INTRAVENOUS at 07:30

## 2019-11-12 RX ADMIN — FENTANYL CITRATE 50 MCG: 50 INJECTION, SOLUTION INTRAMUSCULAR; INTRAVENOUS at 07:11

## 2019-11-12 RX ADMIN — SULFAMETHOXAZOLE AND TRIMETHOPRIM 1 TABLET: 800; 160 TABLET ORAL at 20:37

## 2019-11-12 RX ADMIN — SODIUM CHLORIDE: 9 INJECTION, SOLUTION INTRAVENOUS at 06:38

## 2019-11-12 RX ADMIN — Medication 10 MG: at 07:02

## 2019-11-12 RX ADMIN — FENTANYL CITRATE 50 MCG: 50 INJECTION, SOLUTION INTRAMUSCULAR; INTRAVENOUS at 07:15

## 2019-11-12 RX ADMIN — HYDROMORPHONE HYDROCHLORIDE 0.5 MG: 1 INJECTION, SOLUTION INTRAMUSCULAR; INTRAVENOUS; SUBCUTANEOUS at 09:45

## 2019-11-12 RX ADMIN — LIOTHYRONINE SODIUM 5 MCG: 5 TABLET ORAL at 14:24

## 2019-11-12 RX ADMIN — PHENYLEPHRINE HYDROCHLORIDE 100 MCG: 10 INJECTION INTRAVENOUS at 06:47

## 2019-11-12 RX ADMIN — PHENYLEPHRINE HYDROCHLORIDE 200 MCG: 10 INJECTION INTRAVENOUS at 06:52

## 2019-11-12 RX ADMIN — FENTANYL CITRATE 50 MCG: 50 INJECTION, SOLUTION INTRAMUSCULAR; INTRAVENOUS at 06:45

## 2019-11-12 RX ADMIN — Medication 0.6 MG: at 08:01

## 2019-11-12 RX ADMIN — PHENYLEPHRINE HYDROCHLORIDE 300 MCG: 10 INJECTION INTRAVENOUS at 07:01

## 2019-11-12 ASSESSMENT — PULMONARY FUNCTION TESTS
PIF_VALUE: 2
PIF_VALUE: 0
PIF_VALUE: 20
PIF_VALUE: 28
PIF_VALUE: 20
PIF_VALUE: 11
PIF_VALUE: 19
PIF_VALUE: 28
PIF_VALUE: 31
PIF_VALUE: 27
PIF_VALUE: 26
PIF_VALUE: 30
PIF_VALUE: 27
PIF_VALUE: 1
PIF_VALUE: 26
PIF_VALUE: 18
PIF_VALUE: 30
PIF_VALUE: 29
PIF_VALUE: 15
PIF_VALUE: 21
PIF_VALUE: 22
PIF_VALUE: 35
PIF_VALUE: 26
PIF_VALUE: 20
PIF_VALUE: 35
PIF_VALUE: 12
PIF_VALUE: 13
PIF_VALUE: 30
PIF_VALUE: 33
PIF_VALUE: 10
PIF_VALUE: 22
PIF_VALUE: 20
PIF_VALUE: 30
PIF_VALUE: 29
PIF_VALUE: 29
PIF_VALUE: 25
PIF_VALUE: 31
PIF_VALUE: 29
PIF_VALUE: 26
PIF_VALUE: 19
PIF_VALUE: 31
PIF_VALUE: 36
PIF_VALUE: 21
PIF_VALUE: 3
PIF_VALUE: 0
PIF_VALUE: 30
PIF_VALUE: 21
PIF_VALUE: 25
PIF_VALUE: 17
PIF_VALUE: 35
PIF_VALUE: 23
PIF_VALUE: 21
PIF_VALUE: 36
PIF_VALUE: 36
PIF_VALUE: 31
PIF_VALUE: 28
PIF_VALUE: 20
PIF_VALUE: 20
PIF_VALUE: 35
PIF_VALUE: 26
PIF_VALUE: 20
PIF_VALUE: 23
PIF_VALUE: 0
PIF_VALUE: 33
PIF_VALUE: 28
PIF_VALUE: 36
PIF_VALUE: 30
PIF_VALUE: 18
PIF_VALUE: 1
PIF_VALUE: 20
PIF_VALUE: 20
PIF_VALUE: 22
PIF_VALUE: 25
PIF_VALUE: 27
PIF_VALUE: 20
PIF_VALUE: 13
PIF_VALUE: 26
PIF_VALUE: 20
PIF_VALUE: 28
PIF_VALUE: 19
PIF_VALUE: 21
PIF_VALUE: 21
PIF_VALUE: 35
PIF_VALUE: 34
PIF_VALUE: 2
PIF_VALUE: 3
PIF_VALUE: 36
PIF_VALUE: 3
PIF_VALUE: 20
PIF_VALUE: 24
PIF_VALUE: 21
PIF_VALUE: 20
PIF_VALUE: 28
PIF_VALUE: 25
PIF_VALUE: 27
PIF_VALUE: 21
PIF_VALUE: 20
PIF_VALUE: 35
PIF_VALUE: 22
PIF_VALUE: 29

## 2019-11-12 ASSESSMENT — PAIN DESCRIPTION - FREQUENCY
FREQUENCY: CONTINUOUS
FREQUENCY: CONTINUOUS

## 2019-11-12 ASSESSMENT — PAIN SCALES - GENERAL
PAINLEVEL_OUTOF10: 0
PAINLEVEL_OUTOF10: 7
PAINLEVEL_OUTOF10: 0
PAINLEVEL_OUTOF10: 4
PAINLEVEL_OUTOF10: 0
PAINLEVEL_OUTOF10: 4
PAINLEVEL_OUTOF10: 7
PAINLEVEL_OUTOF10: 3

## 2019-11-12 ASSESSMENT — PAIN DESCRIPTION - LOCATION
LOCATION: HEAD;ABDOMEN
LOCATION: HEAD
LOCATION: ABDOMEN;HEAD
LOCATION: HEAD;ABDOMEN

## 2019-11-12 ASSESSMENT — PAIN DESCRIPTION - DESCRIPTORS
DESCRIPTORS: ACHING;BURNING;CONSTANT
DESCRIPTORS: HEADACHE
DESCRIPTORS: HEADACHE
DESCRIPTORS: DULL;HEADACHE

## 2019-11-12 ASSESSMENT — PAIN - FUNCTIONAL ASSESSMENT: PAIN_FUNCTIONAL_ASSESSMENT: 0-10

## 2019-11-13 ENCOUNTER — APPOINTMENT (OUTPATIENT)
Dept: CT IMAGING | Age: 54
DRG: 033 | End: 2019-11-13
Attending: NEUROLOGICAL SURGERY
Payer: MEDICARE

## 2019-11-13 LAB — MRSA CULTURE ONLY: NORMAL

## 2019-11-13 PROCEDURE — 70450 CT HEAD/BRAIN W/O DYE: CPT

## 2019-11-13 PROCEDURE — 2580000003 HC RX 258: Performed by: NEUROLOGICAL SURGERY

## 2019-11-13 PROCEDURE — 6360000002 HC RX W HCPCS: Performed by: NEUROLOGICAL SURGERY

## 2019-11-13 PROCEDURE — 97535 SELF CARE MNGMENT TRAINING: CPT

## 2019-11-13 PROCEDURE — 6370000000 HC RX 637 (ALT 250 FOR IP): Performed by: NEUROLOGICAL SURGERY

## 2019-11-13 PROCEDURE — 97166 OT EVAL MOD COMPLEX 45 MIN: CPT

## 2019-11-13 PROCEDURE — 97162 PT EVAL MOD COMPLEX 30 MIN: CPT

## 2019-11-13 PROCEDURE — 97530 THERAPEUTIC ACTIVITIES: CPT

## 2019-11-13 PROCEDURE — 1200000000 HC SEMI PRIVATE

## 2019-11-13 RX ADMIN — LEVETIRACETAM 500 MG: 500 TABLET ORAL at 21:13

## 2019-11-13 RX ADMIN — QUETIAPINE FUMARATE 25 MG: 25 TABLET ORAL at 09:19

## 2019-11-13 RX ADMIN — Medication 10 ML: at 21:13

## 2019-11-13 RX ADMIN — LIOTHYRONINE SODIUM 5 MCG: 5 TABLET ORAL at 09:19

## 2019-11-13 RX ADMIN — SULFAMETHOXAZOLE AND TRIMETHOPRIM 1 TABLET: 800; 160 TABLET ORAL at 09:19

## 2019-11-13 RX ADMIN — CEFAZOLIN SODIUM 2 G: 2 SOLUTION INTRAVENOUS at 01:47

## 2019-11-13 RX ADMIN — MORPHINE SULFATE 2 MG: 2 INJECTION, SOLUTION INTRAMUSCULAR; INTRAVENOUS at 09:14

## 2019-11-13 RX ADMIN — LEVOTHYROXINE SODIUM 112 MCG: 0.11 TABLET ORAL at 06:03

## 2019-11-13 RX ADMIN — DOCUSATE SODIUM 100 MG: 100 CAPSULE, LIQUID FILLED ORAL at 09:18

## 2019-11-13 RX ADMIN — OXYCODONE HYDROCHLORIDE 5 MG: 5 TABLET ORAL at 21:57

## 2019-11-13 RX ADMIN — SERTRALINE 50 MG: 50 TABLET, FILM COATED ORAL at 09:19

## 2019-11-13 RX ADMIN — Medication 10 ML: at 09:19

## 2019-11-13 RX ADMIN — SULFAMETHOXAZOLE AND TRIMETHOPRIM 1 TABLET: 800; 160 TABLET ORAL at 21:57

## 2019-11-13 RX ADMIN — LEVETIRACETAM 500 MG: 500 TABLET ORAL at 09:19

## 2019-11-13 RX ADMIN — DOCUSATE SODIUM 100 MG: 100 CAPSULE, LIQUID FILLED ORAL at 21:13

## 2019-11-13 ASSESSMENT — PAIN SCALES - GENERAL
PAINLEVEL_OUTOF10: 6
PAINLEVEL_OUTOF10: 3
PAINLEVEL_OUTOF10: 0
PAINLEVEL_OUTOF10: 0
PAINLEVEL_OUTOF10: 5
PAINLEVEL_OUTOF10: 3

## 2019-11-13 ASSESSMENT — PAIN DESCRIPTION - FREQUENCY
FREQUENCY: INTERMITTENT
FREQUENCY: INTERMITTENT

## 2019-11-13 ASSESSMENT — PAIN - FUNCTIONAL ASSESSMENT: PAIN_FUNCTIONAL_ASSESSMENT: PREVENTS OR INTERFERES SOME ACTIVE ACTIVITIES AND ADLS

## 2019-11-13 ASSESSMENT — PAIN DESCRIPTION - DESCRIPTORS
DESCRIPTORS: SORE
DESCRIPTORS: DISCOMFORT;DULL;SORE

## 2019-11-13 ASSESSMENT — PAIN DESCRIPTION - LOCATION
LOCATION: ABDOMEN;HEAD
LOCATION: ABDOMEN

## 2019-11-13 ASSESSMENT — PAIN DESCRIPTION - PROGRESSION: CLINICAL_PROGRESSION: NOT CHANGED

## 2019-11-13 ASSESSMENT — PAIN DESCRIPTION - ONSET: ONSET: ON-GOING

## 2019-11-13 ASSESSMENT — PAIN DESCRIPTION - PAIN TYPE: TYPE: SURGICAL PAIN

## 2019-11-14 ENCOUNTER — HOSPITAL ENCOUNTER (INPATIENT)
Age: 54
LOS: 13 days | Discharge: HOME HEALTH | DRG: 949 | End: 2019-11-27
Attending: PHYSICAL MEDICINE & REHABILITATION | Admitting: PHYSICAL MEDICINE & REHABILITATION
Payer: MEDICARE

## 2019-11-14 VITALS
WEIGHT: 136 LBS | TEMPERATURE: 98.7 F | BODY MASS INDEX: 26.7 KG/M2 | RESPIRATION RATE: 16 BRPM | HEIGHT: 60 IN | DIASTOLIC BLOOD PRESSURE: 69 MMHG | HEART RATE: 59 BPM | SYSTOLIC BLOOD PRESSURE: 115 MMHG | OXYGEN SATURATION: 96 %

## 2019-11-14 PROBLEM — G91.9 HYDROCEPHALUS (HCC): Status: ACTIVE | Noted: 2019-11-14

## 2019-11-14 LAB — URINE CULTURE, ROUTINE: NORMAL

## 2019-11-14 PROCEDURE — 2580000003 HC RX 258: Performed by: NEUROLOGICAL SURGERY

## 2019-11-14 PROCEDURE — 6370000000 HC RX 637 (ALT 250 FOR IP): Performed by: PHYSICAL MEDICINE & REHABILITATION

## 2019-11-14 PROCEDURE — 6370000000 HC RX 637 (ALT 250 FOR IP): Performed by: NEUROLOGICAL SURGERY

## 2019-11-14 PROCEDURE — 1280000000 HC REHAB R&B

## 2019-11-14 PROCEDURE — 6370000000 HC RX 637 (ALT 250 FOR IP): Performed by: PHYSICIAN ASSISTANT

## 2019-11-14 RX ORDER — SODIUM PHOSPHATE, DIBASIC AND SODIUM PHOSPHATE, MONOBASIC 7; 19 G/133ML; G/133ML
1 ENEMA RECTAL DAILY PRN
Status: DISCONTINUED | OUTPATIENT
Start: 2019-11-14 | End: 2019-11-27 | Stop reason: HOSPADM

## 2019-11-14 RX ORDER — LEVETIRACETAM 500 MG/1
500 TABLET ORAL 2 TIMES DAILY
Status: DISCONTINUED | OUTPATIENT
Start: 2019-11-14 | End: 2019-11-27 | Stop reason: HOSPADM

## 2019-11-14 RX ORDER — LEVOTHYROXINE SODIUM 112 UG/1
112 TABLET ORAL DAILY
Status: CANCELLED | OUTPATIENT
Start: 2019-11-15

## 2019-11-14 RX ORDER — LIOTHYRONINE SODIUM 5 UG/1
5 TABLET ORAL DAILY
Status: DISCONTINUED | OUTPATIENT
Start: 2019-11-15 | End: 2019-11-27 | Stop reason: HOSPADM

## 2019-11-14 RX ORDER — DOCUSATE SODIUM 100 MG/1
100 CAPSULE, LIQUID FILLED ORAL 2 TIMES DAILY
Status: CANCELLED | OUTPATIENT
Start: 2019-11-14

## 2019-11-14 RX ORDER — LEVETIRACETAM 500 MG/1
500 TABLET ORAL 2 TIMES DAILY
Status: CANCELLED | OUTPATIENT
Start: 2019-11-14

## 2019-11-14 RX ORDER — ONDANSETRON 2 MG/ML
4 INJECTION INTRAMUSCULAR; INTRAVENOUS EVERY 6 HOURS PRN
Status: CANCELLED | OUTPATIENT
Start: 2019-11-14

## 2019-11-14 RX ORDER — QUETIAPINE FUMARATE 25 MG/1
25 TABLET, FILM COATED ORAL DAILY
Status: DISCONTINUED | OUTPATIENT
Start: 2019-11-15 | End: 2019-11-18

## 2019-11-14 RX ORDER — QUETIAPINE FUMARATE 25 MG/1
25 TABLET, FILM COATED ORAL DAILY
Status: CANCELLED | OUTPATIENT
Start: 2019-11-15

## 2019-11-14 RX ORDER — LIOTHYRONINE SODIUM 5 UG/1
5 TABLET ORAL DAILY
Status: CANCELLED | OUTPATIENT
Start: 2019-11-15

## 2019-11-14 RX ORDER — SULFAMETHOXAZOLE AND TRIMETHOPRIM 800; 160 MG/1; MG/1
1 TABLET ORAL 2 TIMES DAILY
Status: DISCONTINUED | OUTPATIENT
Start: 2019-11-14 | End: 2019-11-17 | Stop reason: ALTCHOICE

## 2019-11-14 RX ORDER — LORAZEPAM 0.5 MG/1
0.5 TABLET ORAL EVERY 8 HOURS PRN
Status: DISCONTINUED | OUTPATIENT
Start: 2019-11-14 | End: 2019-11-27 | Stop reason: HOSPADM

## 2019-11-14 RX ORDER — SODIUM PHOSPHATE, DIBASIC AND SODIUM PHOSPHATE, MONOBASIC 7; 19 G/133ML; G/133ML
1 ENEMA RECTAL DAILY PRN
Status: CANCELLED | OUTPATIENT
Start: 2019-11-14

## 2019-11-14 RX ORDER — ONDANSETRON 2 MG/ML
4 INJECTION INTRAMUSCULAR; INTRAVENOUS EVERY 6 HOURS PRN
Status: DISCONTINUED | OUTPATIENT
Start: 2019-11-14 | End: 2019-11-15

## 2019-11-14 RX ORDER — SULFAMETHOXAZOLE AND TRIMETHOPRIM 800; 160 MG/1; MG/1
1 TABLET ORAL 2 TIMES DAILY
Status: CANCELLED | OUTPATIENT
Start: 2019-11-14

## 2019-11-14 RX ORDER — ACETAMINOPHEN 325 MG/1
650 TABLET ORAL EVERY 4 HOURS PRN
Status: DISCONTINUED | OUTPATIENT
Start: 2019-11-14 | End: 2019-11-27 | Stop reason: HOSPADM

## 2019-11-14 RX ORDER — DOCUSATE SODIUM 100 MG/1
100 CAPSULE, LIQUID FILLED ORAL 2 TIMES DAILY
Status: DISCONTINUED | OUTPATIENT
Start: 2019-11-14 | End: 2019-11-27 | Stop reason: HOSPADM

## 2019-11-14 RX ORDER — LEVOTHYROXINE SODIUM 112 UG/1
112 TABLET ORAL DAILY
Status: DISCONTINUED | OUTPATIENT
Start: 2019-11-15 | End: 2019-11-27 | Stop reason: HOSPADM

## 2019-11-14 RX ORDER — LORAZEPAM 0.5 MG/1
0.5 TABLET ORAL EVERY 8 HOURS PRN
Status: CANCELLED | OUTPATIENT
Start: 2019-11-14

## 2019-11-14 RX ADMIN — SERTRALINE 50 MG: 50 TABLET, FILM COATED ORAL at 10:34

## 2019-11-14 RX ADMIN — SULFAMETHOXAZOLE AND TRIMETHOPRIM 1 TABLET: 800; 160 TABLET ORAL at 21:05

## 2019-11-14 RX ADMIN — DOCUSATE SODIUM 100 MG: 100 CAPSULE, LIQUID FILLED ORAL at 10:31

## 2019-11-14 RX ADMIN — ACETAMINOPHEN 650 MG: 325 TABLET, FILM COATED ORAL at 21:05

## 2019-11-14 RX ADMIN — LEVETIRACETAM 500 MG: 500 TABLET ORAL at 21:05

## 2019-11-14 RX ADMIN — QUETIAPINE FUMARATE 25 MG: 25 TABLET ORAL at 10:34

## 2019-11-14 RX ADMIN — ACETAMINOPHEN 650 MG: 325 TABLET, FILM COATED ORAL at 16:25

## 2019-11-14 RX ADMIN — OXYCODONE HYDROCHLORIDE 5 MG: 5 TABLET ORAL at 06:22

## 2019-11-14 RX ADMIN — Medication 10 ML: at 10:31

## 2019-11-14 RX ADMIN — LEVETIRACETAM 500 MG: 500 TABLET ORAL at 10:31

## 2019-11-14 RX ADMIN — LEVOTHYROXINE SODIUM 112 MCG: 0.11 TABLET ORAL at 06:21

## 2019-11-14 RX ADMIN — DOCUSATE SODIUM 100 MG: 100 CAPSULE, LIQUID FILLED ORAL at 21:05

## 2019-11-14 RX ADMIN — SULFAMETHOXAZOLE AND TRIMETHOPRIM 1 TABLET: 800; 160 TABLET ORAL at 10:31

## 2019-11-14 RX ADMIN — LIOTHYRONINE SODIUM 5 MCG: 5 TABLET ORAL at 10:31

## 2019-11-14 ASSESSMENT — PAIN SCALES - GENERAL
PAINLEVEL_OUTOF10: 6
PAINLEVEL_OUTOF10: 5
PAINLEVEL_OUTOF10: 0

## 2019-11-14 ASSESSMENT — PAIN DESCRIPTION - FREQUENCY
FREQUENCY: INTERMITTENT
FREQUENCY: INTERMITTENT

## 2019-11-14 ASSESSMENT — PAIN DESCRIPTION - LOCATION
LOCATION: ABDOMEN;HEAD
LOCATION: ABDOMEN;HEAD

## 2019-11-14 ASSESSMENT — PAIN DESCRIPTION - DESCRIPTORS
DESCRIPTORS: ACHING;TENDER;THROBBING;DISCOMFORT
DESCRIPTORS: ACHING;CONSTANT;DISCOMFORT;DULL

## 2019-11-14 ASSESSMENT — PAIN DESCRIPTION - PAIN TYPE
TYPE: ACUTE PAIN;SURGICAL PAIN
TYPE: SURGICAL PAIN

## 2019-11-14 ASSESSMENT — PAIN - FUNCTIONAL ASSESSMENT: PAIN_FUNCTIONAL_ASSESSMENT: PREVENTS OR INTERFERES SOME ACTIVE ACTIVITIES AND ADLS

## 2019-11-14 ASSESSMENT — PAIN DESCRIPTION - ONSET: ONSET: AWAKENED FROM SLEEP

## 2019-11-15 LAB
ANION GAP SERPL CALCULATED.3IONS-SCNC: 16 MMOL/L (ref 7–16)
BASOPHILS ABSOLUTE: 0.04 E9/L (ref 0–0.2)
BASOPHILS RELATIVE PERCENT: 1.1 % (ref 0–2)
BUN BLDV-MCNC: 25 MG/DL (ref 6–20)
CALCIUM SERPL-MCNC: 8.7 MG/DL (ref 8.6–10.2)
CHLORIDE BLD-SCNC: 102 MMOL/L (ref 98–107)
CO2: 23 MMOL/L (ref 22–29)
CREAT SERPL-MCNC: 1.3 MG/DL (ref 0.5–1)
EOSINOPHILS ABSOLUTE: 0.14 E9/L (ref 0.05–0.5)
EOSINOPHILS RELATIVE PERCENT: 3.8 % (ref 0–6)
GFR AFRICAN AMERICAN: 52
GFR NON-AFRICAN AMERICAN: 43 ML/MIN/1.73
GLUCOSE BLD-MCNC: 93 MG/DL (ref 74–99)
HCT VFR BLD CALC: 42.6 % (ref 34–48)
HEMOGLOBIN: 13.5 G/DL (ref 11.5–15.5)
IMMATURE GRANULOCYTES #: 0.01 E9/L
IMMATURE GRANULOCYTES %: 0.3 % (ref 0–5)
LYMPHOCYTES ABSOLUTE: 1.19 E9/L (ref 1.5–4)
LYMPHOCYTES RELATIVE PERCENT: 32.7 % (ref 20–42)
MCH RBC QN AUTO: 29.9 PG (ref 26–35)
MCHC RBC AUTO-ENTMCNC: 31.7 % (ref 32–34.5)
MCV RBC AUTO: 94.2 FL (ref 80–99.9)
MONOCYTES ABSOLUTE: 0.42 E9/L (ref 0.1–0.95)
MONOCYTES RELATIVE PERCENT: 11.5 % (ref 2–12)
NEUTROPHILS ABSOLUTE: 1.84 E9/L (ref 1.8–7.3)
NEUTROPHILS RELATIVE PERCENT: 50.6 % (ref 43–80)
PDW BLD-RTO: 15.7 FL (ref 11.5–15)
PLATELET # BLD: 177 E9/L (ref 130–450)
PMV BLD AUTO: 9.8 FL (ref 7–12)
POTASSIUM REFLEX MAGNESIUM: 5.3 MMOL/L (ref 3.5–5)
RBC # BLD: 4.52 E12/L (ref 3.5–5.5)
SODIUM BLD-SCNC: 141 MMOL/L (ref 132–146)
WBC # BLD: 3.6 E9/L (ref 4.5–11.5)

## 2019-11-15 PROCEDURE — 97166 OT EVAL MOD COMPLEX 45 MIN: CPT

## 2019-11-15 PROCEDURE — 51798 US URINE CAPACITY MEASURE: CPT

## 2019-11-15 PROCEDURE — 97127 HC SP THER IVNTJ W/FOCUS COG FUNCJ: CPT

## 2019-11-15 PROCEDURE — 6370000000 HC RX 637 (ALT 250 FOR IP): Performed by: PHYSICAL MEDICINE & REHABILITATION

## 2019-11-15 PROCEDURE — 97530 THERAPEUTIC ACTIVITIES: CPT

## 2019-11-15 PROCEDURE — 97110 THERAPEUTIC EXERCISES: CPT

## 2019-11-15 PROCEDURE — 1280000000 HC REHAB R&B

## 2019-11-15 PROCEDURE — 92610 EVALUATE SWALLOWING FUNCTION: CPT

## 2019-11-15 PROCEDURE — 80048 BASIC METABOLIC PNL TOTAL CA: CPT

## 2019-11-15 PROCEDURE — 92523 SPEECH SOUND LANG COMPREHEN: CPT

## 2019-11-15 PROCEDURE — 97162 PT EVAL MOD COMPLEX 30 MIN: CPT

## 2019-11-15 PROCEDURE — 36415 COLL VENOUS BLD VENIPUNCTURE: CPT

## 2019-11-15 PROCEDURE — 97535 SELF CARE MNGMENT TRAINING: CPT

## 2019-11-15 PROCEDURE — 85025 COMPLETE CBC W/AUTO DIFF WBC: CPT

## 2019-11-15 PROCEDURE — 6370000000 HC RX 637 (ALT 250 FOR IP): Performed by: PHYSICIAN ASSISTANT

## 2019-11-15 RX ORDER — ONDANSETRON 4 MG/1
4 TABLET, ORALLY DISINTEGRATING ORAL EVERY 6 HOURS PRN
Status: DISCONTINUED | OUTPATIENT
Start: 2019-11-15 | End: 2019-11-27 | Stop reason: HOSPADM

## 2019-11-15 RX ADMIN — ONDANSETRON 4 MG: 4 TABLET, ORALLY DISINTEGRATING ORAL at 14:59

## 2019-11-15 RX ADMIN — LIOTHYRONINE SODIUM 5 MCG: 5 TABLET ORAL at 11:14

## 2019-11-15 RX ADMIN — SERTRALINE 50 MG: 50 TABLET, FILM COATED ORAL at 11:13

## 2019-11-15 RX ADMIN — SULFAMETHOXAZOLE AND TRIMETHOPRIM 1 TABLET: 800; 160 TABLET ORAL at 11:14

## 2019-11-15 RX ADMIN — LEVOTHYROXINE SODIUM 112 MCG: 0.11 TABLET ORAL at 06:24

## 2019-11-15 RX ADMIN — QUETIAPINE FUMARATE 25 MG: 25 TABLET ORAL at 11:14

## 2019-11-15 RX ADMIN — LORAZEPAM 0.5 MG: 0.5 TABLET ORAL at 21:05

## 2019-11-15 RX ADMIN — DOCUSATE SODIUM 100 MG: 100 CAPSULE, LIQUID FILLED ORAL at 11:15

## 2019-11-15 RX ADMIN — LEVETIRACETAM 500 MG: 500 TABLET ORAL at 21:06

## 2019-11-15 RX ADMIN — DOCUSATE SODIUM 100 MG: 100 CAPSULE, LIQUID FILLED ORAL at 21:06

## 2019-11-15 RX ADMIN — LEVETIRACETAM 500 MG: 500 TABLET ORAL at 11:15

## 2019-11-15 RX ADMIN — SULFAMETHOXAZOLE AND TRIMETHOPRIM 1 TABLET: 800; 160 TABLET ORAL at 21:06

## 2019-11-15 ASSESSMENT — PAIN SCALES - GENERAL: PAINLEVEL_OUTOF10: 0

## 2019-11-16 LAB — POTASSIUM SERPL-SCNC: 5 MMOL/L (ref 3.5–5)

## 2019-11-16 PROCEDURE — 6370000000 HC RX 637 (ALT 250 FOR IP): Performed by: PHYSICIAN ASSISTANT

## 2019-11-16 PROCEDURE — 99232 SBSQ HOSP IP/OBS MODERATE 35: CPT | Performed by: PHYSICAL MEDICINE & REHABILITATION

## 2019-11-16 PROCEDURE — 1280000000 HC REHAB R&B

## 2019-11-16 PROCEDURE — 84132 ASSAY OF SERUM POTASSIUM: CPT

## 2019-11-16 PROCEDURE — 36415 COLL VENOUS BLD VENIPUNCTURE: CPT

## 2019-11-16 PROCEDURE — 6370000000 HC RX 637 (ALT 250 FOR IP): Performed by: PHYSICAL MEDICINE & REHABILITATION

## 2019-11-16 PROCEDURE — 97127 HC SP THER IVNTJ W/FOCUS COG FUNCJ: CPT

## 2019-11-16 RX ADMIN — QUETIAPINE FUMARATE 25 MG: 25 TABLET ORAL at 08:34

## 2019-11-16 RX ADMIN — LEVETIRACETAM 500 MG: 500 TABLET ORAL at 08:34

## 2019-11-16 RX ADMIN — SULFAMETHOXAZOLE AND TRIMETHOPRIM 1 TABLET: 800; 160 TABLET ORAL at 21:06

## 2019-11-16 RX ADMIN — ACETAMINOPHEN 650 MG: 325 TABLET, FILM COATED ORAL at 10:10

## 2019-11-16 RX ADMIN — LEVOTHYROXINE SODIUM 112 MCG: 0.11 TABLET ORAL at 07:05

## 2019-11-16 RX ADMIN — LEVETIRACETAM 500 MG: 500 TABLET ORAL at 21:06

## 2019-11-16 RX ADMIN — LIOTHYRONINE SODIUM 5 MCG: 5 TABLET ORAL at 08:34

## 2019-11-16 RX ADMIN — DOCUSATE SODIUM 100 MG: 100 CAPSULE, LIQUID FILLED ORAL at 08:34

## 2019-11-16 RX ADMIN — DOCUSATE SODIUM 100 MG: 100 CAPSULE, LIQUID FILLED ORAL at 21:06

## 2019-11-16 RX ADMIN — ONDANSETRON 4 MG: 4 TABLET, ORALLY DISINTEGRATING ORAL at 10:12

## 2019-11-16 RX ADMIN — SERTRALINE 50 MG: 50 TABLET, FILM COATED ORAL at 08:34

## 2019-11-16 RX ADMIN — SULFAMETHOXAZOLE AND TRIMETHOPRIM 1 TABLET: 800; 160 TABLET ORAL at 08:34

## 2019-11-16 ASSESSMENT — PAIN SCALES - GENERAL
PAINLEVEL_OUTOF10: 10
PAINLEVEL_OUTOF10: 0

## 2019-11-16 ASSESSMENT — PAIN DESCRIPTION - PAIN TYPE: TYPE: ACUTE PAIN

## 2019-11-16 ASSESSMENT — PAIN DESCRIPTION - FREQUENCY: FREQUENCY: INTERMITTENT

## 2019-11-16 ASSESSMENT — PAIN DESCRIPTION - LOCATION: LOCATION: HEAD

## 2019-11-16 ASSESSMENT — PAIN DESCRIPTION - ORIENTATION: ORIENTATION: ANTERIOR

## 2019-11-16 ASSESSMENT — PAIN DESCRIPTION - DESCRIPTORS: DESCRIPTORS: ACHING

## 2019-11-17 PROCEDURE — 1280000000 HC REHAB R&B

## 2019-11-17 PROCEDURE — 6370000000 HC RX 637 (ALT 250 FOR IP): Performed by: PHYSICIAN ASSISTANT

## 2019-11-17 PROCEDURE — 6370000000 HC RX 637 (ALT 250 FOR IP): Performed by: PHYSICAL MEDICINE & REHABILITATION

## 2019-11-17 PROCEDURE — 97535 SELF CARE MNGMENT TRAINING: CPT

## 2019-11-17 PROCEDURE — 51798 US URINE CAPACITY MEASURE: CPT

## 2019-11-17 PROCEDURE — 97530 THERAPEUTIC ACTIVITIES: CPT

## 2019-11-17 RX ORDER — SULFAMETHOXAZOLE AND TRIMETHOPRIM 200; 40 MG/5ML; MG/5ML
160 SUSPENSION ORAL EVERY 12 HOURS
Status: DISCONTINUED | OUTPATIENT
Start: 2019-11-17 | End: 2019-11-19

## 2019-11-17 RX ADMIN — ONDANSETRON 4 MG: 4 TABLET, ORALLY DISINTEGRATING ORAL at 05:55

## 2019-11-17 RX ADMIN — LEVETIRACETAM 500 MG: 500 TABLET ORAL at 08:53

## 2019-11-17 RX ADMIN — LEVOTHYROXINE SODIUM 112 MCG: 0.11 TABLET ORAL at 07:00

## 2019-11-17 RX ADMIN — SULFAMETHOXAZOLE AND TRIMETHOPRIM 20 ML: 200; 40 SUSPENSION ORAL at 08:53

## 2019-11-17 RX ADMIN — QUETIAPINE FUMARATE 25 MG: 25 TABLET ORAL at 08:55

## 2019-11-17 RX ADMIN — DOCUSATE SODIUM 100 MG: 100 CAPSULE, LIQUID FILLED ORAL at 20:44

## 2019-11-17 RX ADMIN — DOCUSATE SODIUM 100 MG: 100 CAPSULE, LIQUID FILLED ORAL at 08:53

## 2019-11-17 RX ADMIN — SULFAMETHOXAZOLE AND TRIMETHOPRIM 20 ML: 200; 40 SUSPENSION ORAL at 20:44

## 2019-11-17 RX ADMIN — LEVETIRACETAM 500 MG: 500 TABLET ORAL at 20:44

## 2019-11-17 RX ADMIN — LIOTHYRONINE SODIUM 5 MCG: 5 TABLET ORAL at 08:54

## 2019-11-17 RX ADMIN — SERTRALINE 50 MG: 50 TABLET, FILM COATED ORAL at 08:55

## 2019-11-17 ASSESSMENT — PAIN SCALES - GENERAL
PAINLEVEL_OUTOF10: 0

## 2019-11-18 LAB
ANION GAP SERPL CALCULATED.3IONS-SCNC: 13 MMOL/L (ref 7–16)
BASOPHILS ABSOLUTE: 0.04 E9/L (ref 0–0.2)
BASOPHILS RELATIVE PERCENT: 1.3 % (ref 0–2)
BUN BLDV-MCNC: 16 MG/DL (ref 6–20)
CALCIUM SERPL-MCNC: 9.4 MG/DL (ref 8.6–10.2)
CHLORIDE BLD-SCNC: 102 MMOL/L (ref 98–107)
CO2: 25 MMOL/L (ref 22–29)
CREAT SERPL-MCNC: 1.1 MG/DL (ref 0.5–1)
EOSINOPHILS ABSOLUTE: 0.12 E9/L (ref 0.05–0.5)
EOSINOPHILS RELATIVE PERCENT: 3.8 % (ref 0–6)
GFR AFRICAN AMERICAN: >60
GFR NON-AFRICAN AMERICAN: 52 ML/MIN/1.73
GLUCOSE BLD-MCNC: 90 MG/DL (ref 74–99)
HCT VFR BLD CALC: 43 % (ref 34–48)
HEMOGLOBIN: 14.1 G/DL (ref 11.5–15.5)
IMMATURE GRANULOCYTES #: 0.01 E9/L
IMMATURE GRANULOCYTES %: 0.3 % (ref 0–5)
LYMPHOCYTES ABSOLUTE: 1.56 E9/L (ref 1.5–4)
LYMPHOCYTES RELATIVE PERCENT: 49.1 % (ref 20–42)
MCH RBC QN AUTO: 30.3 PG (ref 26–35)
MCHC RBC AUTO-ENTMCNC: 32.8 % (ref 32–34.5)
MCV RBC AUTO: 92.3 FL (ref 80–99.9)
MONOCYTES ABSOLUTE: 0.3 E9/L (ref 0.1–0.95)
MONOCYTES RELATIVE PERCENT: 9.4 % (ref 2–12)
NEUTROPHILS ABSOLUTE: 1.15 E9/L (ref 1.8–7.3)
NEUTROPHILS RELATIVE PERCENT: 36.1 % (ref 43–80)
PDW BLD-RTO: 14.9 FL (ref 11.5–15)
PLATELET # BLD: 212 E9/L (ref 130–450)
PMV BLD AUTO: 9.5 FL (ref 7–12)
POTASSIUM REFLEX MAGNESIUM: 4.7 MMOL/L (ref 3.5–5)
RBC # BLD: 4.66 E12/L (ref 3.5–5.5)
SODIUM BLD-SCNC: 140 MMOL/L (ref 132–146)
WBC # BLD: 3.2 E9/L (ref 4.5–11.5)

## 2019-11-18 PROCEDURE — 80048 BASIC METABOLIC PNL TOTAL CA: CPT

## 2019-11-18 PROCEDURE — 6370000000 HC RX 637 (ALT 250 FOR IP): Performed by: PHYSICAL MEDICINE & REHABILITATION

## 2019-11-18 PROCEDURE — 97530 THERAPEUTIC ACTIVITIES: CPT | Performed by: OCCUPATIONAL THERAPY ASSISTANT

## 2019-11-18 PROCEDURE — 97127 HC SP THER IVNTJ W/FOCUS COG FUNCJ: CPT

## 2019-11-18 PROCEDURE — 36415 COLL VENOUS BLD VENIPUNCTURE: CPT

## 2019-11-18 PROCEDURE — 97530 THERAPEUTIC ACTIVITIES: CPT

## 2019-11-18 PROCEDURE — 1280000000 HC REHAB R&B

## 2019-11-18 PROCEDURE — 85025 COMPLETE CBC W/AUTO DIFF WBC: CPT

## 2019-11-18 PROCEDURE — 97535 SELF CARE MNGMENT TRAINING: CPT

## 2019-11-18 PROCEDURE — 6370000000 HC RX 637 (ALT 250 FOR IP): Performed by: PHYSICIAN ASSISTANT

## 2019-11-18 RX ORDER — QUETIAPINE FUMARATE 25 MG/1
25 TABLET, FILM COATED ORAL 2 TIMES DAILY
Status: DISCONTINUED | OUTPATIENT
Start: 2019-11-18 | End: 2019-11-21

## 2019-11-18 RX ADMIN — SULFAMETHOXAZOLE AND TRIMETHOPRIM 20 ML: 200; 40 SUSPENSION ORAL at 20:48

## 2019-11-18 RX ADMIN — LEVETIRACETAM 500 MG: 500 TABLET ORAL at 08:29

## 2019-11-18 RX ADMIN — DOCUSATE SODIUM 100 MG: 100 CAPSULE, LIQUID FILLED ORAL at 08:29

## 2019-11-18 RX ADMIN — SULFAMETHOXAZOLE AND TRIMETHOPRIM 20 ML: 200; 40 SUSPENSION ORAL at 08:29

## 2019-11-18 RX ADMIN — DOCUSATE SODIUM 100 MG: 100 CAPSULE, LIQUID FILLED ORAL at 20:48

## 2019-11-18 RX ADMIN — SERTRALINE 50 MG: 50 TABLET, FILM COATED ORAL at 08:29

## 2019-11-18 RX ADMIN — QUETIAPINE FUMARATE 25 MG: 25 TABLET ORAL at 08:29

## 2019-11-18 RX ADMIN — LEVETIRACETAM 500 MG: 500 TABLET ORAL at 20:47

## 2019-11-18 RX ADMIN — LIOTHYRONINE SODIUM 5 MCG: 5 TABLET ORAL at 08:29

## 2019-11-18 RX ADMIN — QUETIAPINE FUMARATE 25 MG: 25 TABLET ORAL at 20:47

## 2019-11-18 RX ADMIN — LEVOTHYROXINE SODIUM 112 MCG: 0.11 TABLET ORAL at 05:56

## 2019-11-18 ASSESSMENT — PAIN SCALES - GENERAL
PAINLEVEL_OUTOF10: 0

## 2019-11-19 PROCEDURE — 1280000000 HC REHAB R&B

## 2019-11-19 PROCEDURE — 97530 THERAPEUTIC ACTIVITIES: CPT

## 2019-11-19 PROCEDURE — 97535 SELF CARE MNGMENT TRAINING: CPT

## 2019-11-19 PROCEDURE — 97127 HC SP THER IVNTJ W/FOCUS COG FUNCJ: CPT

## 2019-11-19 PROCEDURE — 6370000000 HC RX 637 (ALT 250 FOR IP): Performed by: PHYSICAL MEDICINE & REHABILITATION

## 2019-11-19 PROCEDURE — 97110 THERAPEUTIC EXERCISES: CPT

## 2019-11-19 PROCEDURE — 6370000000 HC RX 637 (ALT 250 FOR IP): Performed by: PHYSICIAN ASSISTANT

## 2019-11-19 RX ADMIN — LEVOTHYROXINE SODIUM 112 MCG: 0.11 TABLET ORAL at 06:53

## 2019-11-19 RX ADMIN — SERTRALINE 50 MG: 50 TABLET, FILM COATED ORAL at 08:32

## 2019-11-19 RX ADMIN — LIOTHYRONINE SODIUM 5 MCG: 5 TABLET ORAL at 08:31

## 2019-11-19 RX ADMIN — DOCUSATE SODIUM 100 MG: 100 CAPSULE, LIQUID FILLED ORAL at 20:05

## 2019-11-19 RX ADMIN — LEVETIRACETAM 500 MG: 500 TABLET ORAL at 08:31

## 2019-11-19 RX ADMIN — QUETIAPINE FUMARATE 25 MG: 25 TABLET ORAL at 20:04

## 2019-11-19 RX ADMIN — DOCUSATE SODIUM 100 MG: 100 CAPSULE, LIQUID FILLED ORAL at 08:31

## 2019-11-19 RX ADMIN — QUETIAPINE FUMARATE 25 MG: 25 TABLET ORAL at 08:31

## 2019-11-19 RX ADMIN — LEVETIRACETAM 500 MG: 500 TABLET ORAL at 20:04

## 2019-11-19 RX ADMIN — SULFAMETHOXAZOLE AND TRIMETHOPRIM 20 ML: 200; 40 SUSPENSION ORAL at 08:31

## 2019-11-19 ASSESSMENT — PAIN SCALES - GENERAL
PAINLEVEL_OUTOF10: 0

## 2019-11-20 PROCEDURE — 1280000000 HC REHAB R&B

## 2019-11-20 PROCEDURE — 97530 THERAPEUTIC ACTIVITIES: CPT

## 2019-11-20 PROCEDURE — 97110 THERAPEUTIC EXERCISES: CPT

## 2019-11-20 PROCEDURE — 97535 SELF CARE MNGMENT TRAINING: CPT

## 2019-11-20 PROCEDURE — 97127 HC SP THER IVNTJ W/FOCUS COG FUNCJ: CPT

## 2019-11-20 PROCEDURE — 6370000000 HC RX 637 (ALT 250 FOR IP): Performed by: PHYSICAL MEDICINE & REHABILITATION

## 2019-11-20 PROCEDURE — 6370000000 HC RX 637 (ALT 250 FOR IP): Performed by: PHYSICIAN ASSISTANT

## 2019-11-20 RX ADMIN — LEVETIRACETAM 500 MG: 500 TABLET ORAL at 20:27

## 2019-11-20 RX ADMIN — LEVETIRACETAM 500 MG: 500 TABLET ORAL at 08:24

## 2019-11-20 RX ADMIN — LEVOTHYROXINE SODIUM 112 MCG: 0.11 TABLET ORAL at 06:31

## 2019-11-20 RX ADMIN — QUETIAPINE FUMARATE 25 MG: 25 TABLET ORAL at 20:27

## 2019-11-20 RX ADMIN — DOCUSATE SODIUM 100 MG: 100 CAPSULE, LIQUID FILLED ORAL at 08:24

## 2019-11-20 RX ADMIN — QUETIAPINE FUMARATE 25 MG: 25 TABLET ORAL at 08:24

## 2019-11-20 RX ADMIN — DOCUSATE SODIUM 100 MG: 100 CAPSULE, LIQUID FILLED ORAL at 20:27

## 2019-11-20 RX ADMIN — LIOTHYRONINE SODIUM 5 MCG: 5 TABLET ORAL at 08:24

## 2019-11-20 ASSESSMENT — PAIN SCALES - GENERAL
PAINLEVEL_OUTOF10: 0

## 2019-11-21 ENCOUNTER — APPOINTMENT (OUTPATIENT)
Dept: CT IMAGING | Age: 54
DRG: 949 | End: 2019-11-21
Attending: PHYSICAL MEDICINE & REHABILITATION
Payer: MEDICARE

## 2019-11-21 PROCEDURE — 6370000000 HC RX 637 (ALT 250 FOR IP): Performed by: PHYSICIAN ASSISTANT

## 2019-11-21 PROCEDURE — 97530 THERAPEUTIC ACTIVITIES: CPT

## 2019-11-21 PROCEDURE — 6370000000 HC RX 637 (ALT 250 FOR IP): Performed by: PHYSICAL MEDICINE & REHABILITATION

## 2019-11-21 PROCEDURE — 70450 CT HEAD/BRAIN W/O DYE: CPT

## 2019-11-21 PROCEDURE — 97110 THERAPEUTIC EXERCISES: CPT

## 2019-11-21 PROCEDURE — 97535 SELF CARE MNGMENT TRAINING: CPT

## 2019-11-21 PROCEDURE — 1280000000 HC REHAB R&B

## 2019-11-21 RX ORDER — QUETIAPINE FUMARATE 25 MG/1
50 TABLET, FILM COATED ORAL 2 TIMES DAILY
Status: DISCONTINUED | OUTPATIENT
Start: 2019-11-21 | End: 2019-11-27 | Stop reason: HOSPADM

## 2019-11-21 RX ADMIN — DOCUSATE SODIUM 100 MG: 100 CAPSULE, LIQUID FILLED ORAL at 20:50

## 2019-11-21 RX ADMIN — DOCUSATE SODIUM 100 MG: 100 CAPSULE, LIQUID FILLED ORAL at 08:35

## 2019-11-21 RX ADMIN — LEVOTHYROXINE SODIUM 112 MCG: 0.11 TABLET ORAL at 07:09

## 2019-11-21 RX ADMIN — QUETIAPINE FUMARATE 50 MG: 25 TABLET ORAL at 20:49

## 2019-11-21 RX ADMIN — LEVETIRACETAM 500 MG: 500 TABLET ORAL at 08:35

## 2019-11-21 RX ADMIN — QUETIAPINE FUMARATE 25 MG: 25 TABLET ORAL at 08:35

## 2019-11-21 RX ADMIN — LIOTHYRONINE SODIUM 5 MCG: 5 TABLET ORAL at 08:35

## 2019-11-21 RX ADMIN — LEVETIRACETAM 500 MG: 500 TABLET ORAL at 20:50

## 2019-11-21 ASSESSMENT — PAIN SCALES - GENERAL
PAINLEVEL_OUTOF10: 0

## 2019-11-22 PROBLEM — E44.0 MODERATE PROTEIN-CALORIE MALNUTRITION (HCC): Chronic | Status: ACTIVE | Noted: 2019-11-22

## 2019-11-22 PROCEDURE — 1280000000 HC REHAB R&B

## 2019-11-22 PROCEDURE — 6370000000 HC RX 637 (ALT 250 FOR IP): Performed by: PHYSICIAN ASSISTANT

## 2019-11-22 PROCEDURE — 97127 HC SP THER IVNTJ W/FOCUS COG FUNCJ: CPT

## 2019-11-22 PROCEDURE — 6370000000 HC RX 637 (ALT 250 FOR IP): Performed by: PHYSICAL MEDICINE & REHABILITATION

## 2019-11-22 PROCEDURE — 97535 SELF CARE MNGMENT TRAINING: CPT

## 2019-11-22 PROCEDURE — 97530 THERAPEUTIC ACTIVITIES: CPT

## 2019-11-22 RX ADMIN — LIOTHYRONINE SODIUM 5 MCG: 5 TABLET ORAL at 09:44

## 2019-11-22 RX ADMIN — DOCUSATE SODIUM 100 MG: 100 CAPSULE, LIQUID FILLED ORAL at 21:13

## 2019-11-22 RX ADMIN — QUETIAPINE FUMARATE 50 MG: 25 TABLET ORAL at 21:13

## 2019-11-22 RX ADMIN — LEVOTHYROXINE SODIUM 112 MCG: 0.11 TABLET ORAL at 06:40

## 2019-11-22 RX ADMIN — ACETAMINOPHEN 650 MG: 325 TABLET, FILM COATED ORAL at 21:13

## 2019-11-22 RX ADMIN — LEVETIRACETAM 500 MG: 500 TABLET ORAL at 21:13

## 2019-11-22 RX ADMIN — LEVETIRACETAM 500 MG: 500 TABLET ORAL at 09:43

## 2019-11-22 RX ADMIN — QUETIAPINE FUMARATE 50 MG: 25 TABLET ORAL at 09:43

## 2019-11-22 RX ADMIN — DOCUSATE SODIUM 100 MG: 100 CAPSULE, LIQUID FILLED ORAL at 09:43

## 2019-11-22 ASSESSMENT — PAIN DESCRIPTION - ONSET: ONSET: ON-GOING

## 2019-11-22 ASSESSMENT — PAIN SCALES - GENERAL
PAINLEVEL_OUTOF10: 10
PAINLEVEL_OUTOF10: 0

## 2019-11-22 ASSESSMENT — PAIN DESCRIPTION - ORIENTATION: ORIENTATION: RIGHT;LEFT

## 2019-11-22 ASSESSMENT — PAIN DESCRIPTION - PAIN TYPE: TYPE: CHRONIC PAIN

## 2019-11-22 ASSESSMENT — PAIN DESCRIPTION - DESCRIPTORS: DESCRIPTORS: ACHING

## 2019-11-22 ASSESSMENT — PAIN DESCRIPTION - LOCATION: LOCATION: KNEE

## 2019-11-22 ASSESSMENT — PAIN DESCRIPTION - FREQUENCY: FREQUENCY: INTERMITTENT

## 2019-11-23 PROCEDURE — 6370000000 HC RX 637 (ALT 250 FOR IP): Performed by: PHYSICAL MEDICINE & REHABILITATION

## 2019-11-23 PROCEDURE — 97127 HC SP THER IVNTJ W/FOCUS COG FUNCJ: CPT | Performed by: SPEECH-LANGUAGE PATHOLOGIST

## 2019-11-23 PROCEDURE — 6370000000 HC RX 637 (ALT 250 FOR IP): Performed by: PHYSICIAN ASSISTANT

## 2019-11-23 PROCEDURE — 1280000000 HC REHAB R&B

## 2019-11-23 PROCEDURE — 97110 THERAPEUTIC EXERCISES: CPT

## 2019-11-23 PROCEDURE — 97535 SELF CARE MNGMENT TRAINING: CPT

## 2019-11-23 RX ADMIN — QUETIAPINE FUMARATE 50 MG: 25 TABLET ORAL at 20:44

## 2019-11-23 RX ADMIN — LIOTHYRONINE SODIUM 5 MCG: 5 TABLET ORAL at 07:49

## 2019-11-23 RX ADMIN — QUETIAPINE FUMARATE 50 MG: 25 TABLET ORAL at 07:49

## 2019-11-23 RX ADMIN — DOCUSATE SODIUM 100 MG: 100 CAPSULE, LIQUID FILLED ORAL at 20:44

## 2019-11-23 RX ADMIN — LEVETIRACETAM 500 MG: 500 TABLET ORAL at 07:49

## 2019-11-23 RX ADMIN — ACETAMINOPHEN 650 MG: 325 TABLET, FILM COATED ORAL at 13:06

## 2019-11-23 RX ADMIN — DOCUSATE SODIUM 100 MG: 100 CAPSULE, LIQUID FILLED ORAL at 07:49

## 2019-11-23 RX ADMIN — LEVETIRACETAM 500 MG: 500 TABLET ORAL at 20:44

## 2019-11-23 RX ADMIN — LEVOTHYROXINE SODIUM 112 MCG: 0.11 TABLET ORAL at 06:57

## 2019-11-23 RX ADMIN — LORAZEPAM 0.5 MG: 0.5 TABLET ORAL at 14:04

## 2019-11-23 ASSESSMENT — PAIN DESCRIPTION - ONSET: ONSET: ON-GOING

## 2019-11-23 ASSESSMENT — PAIN DESCRIPTION - PAIN TYPE: TYPE: ACUTE PAIN

## 2019-11-23 ASSESSMENT — PAIN DESCRIPTION - FREQUENCY: FREQUENCY: INTERMITTENT

## 2019-11-23 ASSESSMENT — PAIN DESCRIPTION - LOCATION: LOCATION: ABDOMEN

## 2019-11-23 ASSESSMENT — PAIN DESCRIPTION - DESCRIPTORS: DESCRIPTORS: ACHING;DISCOMFORT;DULL

## 2019-11-24 PROCEDURE — 6370000000 HC RX 637 (ALT 250 FOR IP): Performed by: PHYSICAL MEDICINE & REHABILITATION

## 2019-11-24 PROCEDURE — 6370000000 HC RX 637 (ALT 250 FOR IP): Performed by: PHYSICIAN ASSISTANT

## 2019-11-24 PROCEDURE — 97530 THERAPEUTIC ACTIVITIES: CPT

## 2019-11-24 PROCEDURE — 1280000000 HC REHAB R&B

## 2019-11-24 RX ADMIN — LIOTHYRONINE SODIUM 5 MCG: 5 TABLET ORAL at 08:37

## 2019-11-24 RX ADMIN — LORAZEPAM 0.5 MG: 0.5 TABLET ORAL at 19:58

## 2019-11-24 RX ADMIN — QUETIAPINE FUMARATE 50 MG: 25 TABLET ORAL at 21:16

## 2019-11-24 RX ADMIN — LEVOTHYROXINE SODIUM 112 MCG: 0.11 TABLET ORAL at 06:26

## 2019-11-24 RX ADMIN — LEVETIRACETAM 500 MG: 500 TABLET ORAL at 08:37

## 2019-11-24 RX ADMIN — DOCUSATE SODIUM 100 MG: 100 CAPSULE, LIQUID FILLED ORAL at 08:37

## 2019-11-24 RX ADMIN — DOCUSATE SODIUM 100 MG: 100 CAPSULE, LIQUID FILLED ORAL at 21:16

## 2019-11-24 RX ADMIN — LEVETIRACETAM 500 MG: 500 TABLET ORAL at 21:16

## 2019-11-24 RX ADMIN — QUETIAPINE FUMARATE 50 MG: 25 TABLET ORAL at 08:37

## 2019-11-24 ASSESSMENT — PAIN SCALES - GENERAL
PAINLEVEL_OUTOF10: 0
PAINLEVEL_OUTOF10: 0

## 2019-11-25 PROCEDURE — 6370000000 HC RX 637 (ALT 250 FOR IP): Performed by: PHYSICAL MEDICINE & REHABILITATION

## 2019-11-25 PROCEDURE — 97530 THERAPEUTIC ACTIVITIES: CPT

## 2019-11-25 PROCEDURE — 97127 HC SP THER IVNTJ W/FOCUS COG FUNCJ: CPT | Performed by: SPEECH-LANGUAGE PATHOLOGIST

## 2019-11-25 PROCEDURE — 6370000000 HC RX 637 (ALT 250 FOR IP): Performed by: PHYSICIAN ASSISTANT

## 2019-11-25 PROCEDURE — 1280000000 HC REHAB R&B

## 2019-11-25 PROCEDURE — 97535 SELF CARE MNGMENT TRAINING: CPT

## 2019-11-25 RX ADMIN — DOCUSATE SODIUM 100 MG: 100 CAPSULE, LIQUID FILLED ORAL at 22:09

## 2019-11-25 RX ADMIN — QUETIAPINE FUMARATE 50 MG: 25 TABLET ORAL at 22:09

## 2019-11-25 RX ADMIN — LEVETIRACETAM 500 MG: 500 TABLET ORAL at 22:10

## 2019-11-25 RX ADMIN — DOCUSATE SODIUM 100 MG: 100 CAPSULE, LIQUID FILLED ORAL at 08:32

## 2019-11-25 RX ADMIN — QUETIAPINE FUMARATE 50 MG: 25 TABLET ORAL at 08:28

## 2019-11-25 RX ADMIN — LEVOTHYROXINE SODIUM 112 MCG: 0.11 TABLET ORAL at 06:19

## 2019-11-25 RX ADMIN — LIOTHYRONINE SODIUM 5 MCG: 5 TABLET ORAL at 08:31

## 2019-11-25 RX ADMIN — LEVETIRACETAM 500 MG: 500 TABLET ORAL at 08:28

## 2019-11-25 ASSESSMENT — PAIN SCALES - GENERAL
PAINLEVEL_OUTOF10: 0
PAINLEVEL_OUTOF10: 0

## 2019-11-26 PROCEDURE — 97530 THERAPEUTIC ACTIVITIES: CPT

## 2019-11-26 PROCEDURE — 97110 THERAPEUTIC EXERCISES: CPT

## 2019-11-26 PROCEDURE — 1280000000 HC REHAB R&B

## 2019-11-26 PROCEDURE — 97127 HC SP THER IVNTJ W/FOCUS COG FUNCJ: CPT | Performed by: SPEECH-LANGUAGE PATHOLOGIST

## 2019-11-26 PROCEDURE — 97535 SELF CARE MNGMENT TRAINING: CPT

## 2019-11-26 PROCEDURE — 6370000000 HC RX 637 (ALT 250 FOR IP): Performed by: PHYSICAL MEDICINE & REHABILITATION

## 2019-11-26 PROCEDURE — 6370000000 HC RX 637 (ALT 250 FOR IP): Performed by: PHYSICIAN ASSISTANT

## 2019-11-26 RX ADMIN — DOCUSATE SODIUM 100 MG: 100 CAPSULE, LIQUID FILLED ORAL at 20:27

## 2019-11-26 RX ADMIN — QUETIAPINE FUMARATE 50 MG: 25 TABLET ORAL at 20:27

## 2019-11-26 RX ADMIN — LEVETIRACETAM 500 MG: 500 TABLET ORAL at 08:47

## 2019-11-26 RX ADMIN — ACETAMINOPHEN 650 MG: 325 TABLET, FILM COATED ORAL at 20:33

## 2019-11-26 RX ADMIN — ACETAMINOPHEN 650 MG: 325 TABLET, FILM COATED ORAL at 08:47

## 2019-11-26 RX ADMIN — DOCUSATE SODIUM 100 MG: 100 CAPSULE, LIQUID FILLED ORAL at 08:47

## 2019-11-26 RX ADMIN — LIOTHYRONINE SODIUM 5 MCG: 5 TABLET ORAL at 08:47

## 2019-11-26 RX ADMIN — LEVOTHYROXINE SODIUM 112 MCG: 0.11 TABLET ORAL at 06:39

## 2019-11-26 RX ADMIN — QUETIAPINE FUMARATE 50 MG: 25 TABLET ORAL at 08:47

## 2019-11-26 RX ADMIN — LEVETIRACETAM 500 MG: 500 TABLET ORAL at 20:27

## 2019-11-26 RX ADMIN — LORAZEPAM 0.5 MG: 0.5 TABLET ORAL at 20:33

## 2019-11-26 ASSESSMENT — PAIN DESCRIPTION - DESCRIPTORS
DESCRIPTORS: ACHING;HEADACHE
DESCRIPTORS: ACHING

## 2019-11-26 ASSESSMENT — PAIN DESCRIPTION - PAIN TYPE
TYPE: SURGICAL PAIN;ACUTE PAIN
TYPE: ACUTE PAIN

## 2019-11-26 ASSESSMENT — PAIN DESCRIPTION - FREQUENCY
FREQUENCY: INTERMITTENT
FREQUENCY: INTERMITTENT

## 2019-11-26 ASSESSMENT — PAIN DESCRIPTION - PROGRESSION: CLINICAL_PROGRESSION: NOT CHANGED

## 2019-11-26 ASSESSMENT — PAIN SCALES - GENERAL
PAINLEVEL_OUTOF10: 0
PAINLEVEL_OUTOF10: 10
PAINLEVEL_OUTOF10: 3
PAINLEVEL_OUTOF10: 0

## 2019-11-26 ASSESSMENT — PAIN DESCRIPTION - LOCATION
LOCATION: ABDOMEN
LOCATION: HEAD

## 2019-11-26 ASSESSMENT — PAIN - FUNCTIONAL ASSESSMENT: PAIN_FUNCTIONAL_ASSESSMENT: ACTIVITIES ARE NOT PREVENTED

## 2019-11-26 ASSESSMENT — PAIN DESCRIPTION - ONSET
ONSET: ON-GOING
ONSET: ON-GOING

## 2019-11-26 ASSESSMENT — PAIN DESCRIPTION - ORIENTATION: ORIENTATION: MID

## 2019-11-27 VITALS
TEMPERATURE: 98 F | WEIGHT: 119.2 LBS | SYSTOLIC BLOOD PRESSURE: 112 MMHG | OXYGEN SATURATION: 98 % | BODY MASS INDEX: 23.4 KG/M2 | HEART RATE: 81 BPM | DIASTOLIC BLOOD PRESSURE: 72 MMHG | HEIGHT: 60 IN | RESPIRATION RATE: 18 BRPM

## 2019-11-27 PROCEDURE — 97530 THERAPEUTIC ACTIVITIES: CPT

## 2019-11-27 PROCEDURE — 6370000000 HC RX 637 (ALT 250 FOR IP): Performed by: PHYSICIAN ASSISTANT

## 2019-11-27 PROCEDURE — 6370000000 HC RX 637 (ALT 250 FOR IP): Performed by: PHYSICAL MEDICINE & REHABILITATION

## 2019-11-27 RX ORDER — QUETIAPINE FUMARATE 50 MG/1
50 TABLET, FILM COATED ORAL 2 TIMES DAILY
Qty: 60 TABLET | Refills: 3 | Status: SHIPPED | OUTPATIENT
Start: 2019-11-27 | End: 2019-12-19 | Stop reason: SDUPTHER

## 2019-11-27 RX ORDER — LEVETIRACETAM 500 MG/1
500 TABLET ORAL 2 TIMES DAILY
Qty: 60 TABLET | Refills: 3 | Status: SHIPPED | OUTPATIENT
Start: 2019-11-27 | End: 2019-12-03

## 2019-11-27 RX ADMIN — QUETIAPINE FUMARATE 50 MG: 25 TABLET ORAL at 08:33

## 2019-11-27 RX ADMIN — LEVOTHYROXINE SODIUM 112 MCG: 0.11 TABLET ORAL at 07:02

## 2019-11-27 RX ADMIN — LIOTHYRONINE SODIUM 5 MCG: 5 TABLET ORAL at 08:33

## 2019-11-27 RX ADMIN — LEVETIRACETAM 500 MG: 500 TABLET ORAL at 08:33

## 2019-11-27 RX ADMIN — DOCUSATE SODIUM 100 MG: 100 CAPSULE, LIQUID FILLED ORAL at 08:33

## 2019-11-27 ASSESSMENT — PAIN SCALES - GENERAL
PAINLEVEL_OUTOF10: 0
PAINLEVEL_OUTOF10: 0

## 2019-11-29 ENCOUNTER — CARE COORDINATION (OUTPATIENT)
Dept: CASE MANAGEMENT | Age: 54
End: 2019-11-29

## 2019-11-30 ENCOUNTER — CARE COORDINATION (OUTPATIENT)
Dept: CASE MANAGEMENT | Age: 54
End: 2019-11-30

## 2019-12-02 ENCOUNTER — OFFICE VISIT (OUTPATIENT)
Dept: NON INVASIVE DIAGNOSTICS | Age: 54
End: 2019-12-02
Payer: MEDICARE

## 2019-12-02 VITALS
DIASTOLIC BLOOD PRESSURE: 70 MMHG | RESPIRATION RATE: 16 BRPM | HEIGHT: 60 IN | SYSTOLIC BLOOD PRESSURE: 90 MMHG | HEART RATE: 80 BPM | BODY MASS INDEX: 24.35 KG/M2 | WEIGHT: 124 LBS

## 2019-12-02 DIAGNOSIS — R55 VASODEPRESSOR SYNCOPE: Primary | Chronic | ICD-10-CM

## 2019-12-02 PROCEDURE — G8427 DOCREV CUR MEDS BY ELIG CLIN: HCPCS | Performed by: NURSE PRACTITIONER

## 2019-12-02 PROCEDURE — 1111F DSCHRG MED/CURRENT MED MERGE: CPT | Performed by: NURSE PRACTITIONER

## 2019-12-02 PROCEDURE — 93000 ELECTROCARDIOGRAM COMPLETE: CPT | Performed by: INTERNAL MEDICINE

## 2019-12-02 PROCEDURE — 99213 OFFICE O/P EST LOW 20 MIN: CPT | Performed by: NURSE PRACTITIONER

## 2019-12-02 PROCEDURE — 1036F TOBACCO NON-USER: CPT | Performed by: NURSE PRACTITIONER

## 2019-12-02 PROCEDURE — G8420 CALC BMI NORM PARAMETERS: HCPCS | Performed by: NURSE PRACTITIONER

## 2019-12-02 PROCEDURE — G8482 FLU IMMUNIZE ORDER/ADMIN: HCPCS | Performed by: NURSE PRACTITIONER

## 2019-12-02 PROCEDURE — 3017F COLORECTAL CA SCREEN DOC REV: CPT | Performed by: NURSE PRACTITIONER

## 2019-12-02 RX ORDER — FLUDROCORTISONE ACETATE 0.1 MG/1
0.2 TABLET ORAL DAILY
COMMUNITY
End: 2020-07-06

## 2019-12-02 ASSESSMENT — ENCOUNTER SYMPTOMS: RESPIRATORY NEGATIVE: 1

## 2019-12-03 ENCOUNTER — OFFICE VISIT (OUTPATIENT)
Dept: PRIMARY CARE CLINIC | Age: 54
End: 2019-12-03
Payer: MEDICARE

## 2019-12-03 VITALS
DIASTOLIC BLOOD PRESSURE: 68 MMHG | SYSTOLIC BLOOD PRESSURE: 94 MMHG | TEMPERATURE: 97.5 F | BODY MASS INDEX: 24.31 KG/M2 | WEIGHT: 124.5 LBS | HEART RATE: 71 BPM | OXYGEN SATURATION: 91 %

## 2019-12-03 DIAGNOSIS — S06.5XAA SUBDURAL HEMATOMA: ICD-10-CM

## 2019-12-03 DIAGNOSIS — R44.3 HALLUCINATIONS: ICD-10-CM

## 2019-12-03 DIAGNOSIS — Q90.9 DOWN'S SYNDROME: Primary | ICD-10-CM

## 2019-12-03 DIAGNOSIS — E44.0 MODERATE PROTEIN-CALORIE MALNUTRITION (HCC): ICD-10-CM

## 2019-12-03 PROCEDURE — 1036F TOBACCO NON-USER: CPT | Performed by: INTERNAL MEDICINE

## 2019-12-03 PROCEDURE — 99213 OFFICE O/P EST LOW 20 MIN: CPT | Performed by: INTERNAL MEDICINE

## 2019-12-03 PROCEDURE — 1111F DSCHRG MED/CURRENT MED MERGE: CPT | Performed by: INTERNAL MEDICINE

## 2019-12-03 PROCEDURE — G8420 CALC BMI NORM PARAMETERS: HCPCS | Performed by: INTERNAL MEDICINE

## 2019-12-03 PROCEDURE — G8427 DOCREV CUR MEDS BY ELIG CLIN: HCPCS | Performed by: INTERNAL MEDICINE

## 2019-12-03 PROCEDURE — 3017F COLORECTAL CA SCREEN DOC REV: CPT | Performed by: INTERNAL MEDICINE

## 2019-12-03 PROCEDURE — G8482 FLU IMMUNIZE ORDER/ADMIN: HCPCS | Performed by: INTERNAL MEDICINE

## 2019-12-03 RX ORDER — LEVOTHYROXINE SODIUM 0.12 MG/1
125 TABLET ORAL DAILY
COMMUNITY
End: 2019-12-19 | Stop reason: SDUPTHER

## 2019-12-09 DIAGNOSIS — G91.9 HYDROCEPHALUS, UNSPECIFIED TYPE (HCC): Primary | ICD-10-CM

## 2019-12-10 ENCOUNTER — HOSPITAL ENCOUNTER (OUTPATIENT)
Dept: CT IMAGING | Age: 54
Discharge: HOME | End: 2019-12-12
Payer: MEDICARE

## 2019-12-10 ENCOUNTER — OFFICE VISIT (OUTPATIENT)
Dept: NEUROSURGERY | Age: 54
End: 2019-12-10

## 2019-12-10 DIAGNOSIS — G91.2 NORMAL PRESSURE HYDROCEPHALUS (HCC): Primary | ICD-10-CM

## 2019-12-10 DIAGNOSIS — G91.9 HYDROCEPHALUS, UNSPECIFIED TYPE (HCC): ICD-10-CM

## 2019-12-10 PROCEDURE — 70450 CT HEAD/BRAIN W/O DYE: CPT

## 2019-12-10 PROCEDURE — 99024 POSTOP FOLLOW-UP VISIT: CPT | Performed by: PHYSICIAN ASSISTANT

## 2019-12-16 ENCOUNTER — TELEPHONE (OUTPATIENT)
Dept: NEUROSURGERY | Age: 54
End: 2019-12-16

## 2019-12-17 ENCOUNTER — TELEPHONE (OUTPATIENT)
Dept: ADMINISTRATIVE | Age: 54
End: 2019-12-17

## 2019-12-17 NOTE — TELEPHONE ENCOUNTER
Deondre Ahumada from [facility] called and stated pt needs all of her Medications refilled and sent to [address] [place] [address] number [phone] and the fax number is [phone].  If you have any questions please contact Miesha Saeed at [phone]

## 2019-12-19 DIAGNOSIS — F41.9 ANXIETY: Primary | ICD-10-CM

## 2019-12-19 RX ORDER — LORAZEPAM 0.5 MG/1
0.5 TABLET ORAL EVERY 8 HOURS PRN
Qty: 90 TABLET | Refills: 0 | Status: SHIPPED | OUTPATIENT
Start: 2019-12-19 | End: 2020-01-07 | Stop reason: SINTOL

## 2019-12-19 RX ORDER — LIOTHYRONINE SODIUM 5 UG/1
TABLET ORAL
Qty: 30 TABLET | Refills: 0 | Status: SHIPPED
Start: 2019-12-19 | End: 2020-04-29 | Stop reason: SDUPTHER

## 2019-12-19 RX ORDER — ZINC OXIDE 13 %
250 CREAM (GRAM) TOPICAL DAILY
Qty: 30 CAPSULE | Refills: 0 | Status: SHIPPED
Start: 2019-12-19 | End: 2020-07-06

## 2019-12-19 RX ORDER — QUETIAPINE FUMARATE 50 MG/1
50 TABLET, FILM COATED ORAL 2 TIMES DAILY
Qty: 60 TABLET | Refills: 0 | Status: SHIPPED | OUTPATIENT
Start: 2019-12-19 | End: 2020-01-07

## 2019-12-19 RX ORDER — LEVOTHYROXINE SODIUM 0.12 MG/1
125 TABLET ORAL DAILY
Qty: 30 TABLET | Refills: 0 | Status: SHIPPED
Start: 2019-12-19 | End: 2020-05-01 | Stop reason: ALTCHOICE

## 2019-12-27 ENCOUNTER — HOSPITAL ENCOUNTER (OUTPATIENT)
Dept: CT IMAGING | Age: 54
Discharge: HOME | End: 2019-12-29
Payer: MEDICARE

## 2019-12-27 DIAGNOSIS — G91.2 NORMAL PRESSURE HYDROCEPHALUS (HCC): ICD-10-CM

## 2019-12-27 DIAGNOSIS — G91.2 NORMAL PRESSURE HYDROCEPHALUS (HCC): Primary | ICD-10-CM

## 2019-12-27 PROCEDURE — 70450 CT HEAD/BRAIN W/O DYE: CPT

## 2019-12-30 ENCOUNTER — OFFICE VISIT (OUTPATIENT)
Dept: NEUROSURGERY | Age: 54
End: 2019-12-30

## 2019-12-30 VITALS
SYSTOLIC BLOOD PRESSURE: 117 MMHG | BODY MASS INDEX: 24.41 KG/M2 | WEIGHT: 125 LBS | DIASTOLIC BLOOD PRESSURE: 68 MMHG | HEART RATE: 56 BPM

## 2019-12-30 DIAGNOSIS — G91.2 NORMAL PRESSURE HYDROCEPHALUS (HCC): Primary | ICD-10-CM

## 2019-12-30 PROCEDURE — 99024 POSTOP FOLLOW-UP VISIT: CPT | Performed by: PHYSICIAN ASSISTANT

## 2020-01-07 ENCOUNTER — TELEPHONE (OUTPATIENT)
Dept: PRIMARY CARE CLINIC | Age: 55
End: 2020-01-07

## 2020-01-07 ENCOUNTER — HOSPITAL ENCOUNTER (OUTPATIENT)
Age: 55
Discharge: HOME | End: 2020-01-09
Payer: MEDICARE

## 2020-01-07 ENCOUNTER — OFFICE VISIT (OUTPATIENT)
Dept: PRIMARY CARE CLINIC | Age: 55
End: 2020-01-07
Payer: MEDICARE

## 2020-01-07 VITALS
HEIGHT: 60 IN | TEMPERATURE: 97.6 F | WEIGHT: 125 LBS | OXYGEN SATURATION: 99 % | SYSTOLIC BLOOD PRESSURE: 106 MMHG | DIASTOLIC BLOOD PRESSURE: 70 MMHG | BODY MASS INDEX: 24.54 KG/M2 | HEART RATE: 80 BPM

## 2020-01-07 PROCEDURE — 85027 COMPLETE CBC AUTOMATED: CPT

## 2020-01-07 PROCEDURE — 1036F TOBACCO NON-USER: CPT | Performed by: INTERNAL MEDICINE

## 2020-01-07 PROCEDURE — G8420 CALC BMI NORM PARAMETERS: HCPCS | Performed by: INTERNAL MEDICINE

## 2020-01-07 PROCEDURE — G8427 DOCREV CUR MEDS BY ELIG CLIN: HCPCS | Performed by: INTERNAL MEDICINE

## 2020-01-07 PROCEDURE — 84443 ASSAY THYROID STIM HORMONE: CPT

## 2020-01-07 PROCEDURE — 3017F COLORECTAL CA SCREEN DOC REV: CPT | Performed by: INTERNAL MEDICINE

## 2020-01-07 PROCEDURE — 80053 COMPREHEN METABOLIC PANEL: CPT

## 2020-01-07 PROCEDURE — G8482 FLU IMMUNIZE ORDER/ADMIN: HCPCS | Performed by: INTERNAL MEDICINE

## 2020-01-07 PROCEDURE — 99214 OFFICE O/P EST MOD 30 MIN: CPT | Performed by: INTERNAL MEDICINE

## 2020-01-07 RX ORDER — QUETIAPINE FUMARATE 50 MG/1
50 TABLET, FILM COATED ORAL NIGHTLY
Qty: 60 TABLET | Refills: 0 | Status: SHIPPED
Start: 2020-01-07 | End: 2020-04-29 | Stop reason: SDUPTHER

## 2020-01-07 ASSESSMENT — PATIENT HEALTH QUESTIONNAIRE - PHQ9: DEPRESSION UNABLE TO ASSESS: FUNCTIONAL CAPACITY MOTIVATION LIMITS ACCURACY
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
## 2020-01-08 LAB
ALBUMIN SERPL-MCNC: 3.7 G/DL (ref 3.5–5.2)
ALP BLD-CCNC: 152 U/L (ref 35–104)
ALT SERPL-CCNC: 58 U/L (ref 0–32)
ANION GAP SERPL CALCULATED.3IONS-SCNC: 12 MMOL/L (ref 7–16)
AST SERPL-CCNC: 47 U/L (ref 0–31)
BILIRUB SERPL-MCNC: 0.3 MG/DL (ref 0–1.2)
BUN BLDV-MCNC: 20 MG/DL (ref 6–20)
CALCIUM SERPL-MCNC: 9.3 MG/DL (ref 8.6–10.2)
CHLORIDE BLD-SCNC: 101 MMOL/L (ref 98–107)
CO2: 26 MMOL/L (ref 22–29)
CREAT SERPL-MCNC: 0.9 MG/DL (ref 0.5–1)
GFR AFRICAN AMERICAN: >60
GFR NON-AFRICAN AMERICAN: >60 ML/MIN/1.73
GLUCOSE BLD-MCNC: 82 MG/DL (ref 74–99)
HCT VFR BLD CALC: 47.6 % (ref 34–48)
HEMOGLOBIN: 14.6 G/DL (ref 11.5–15.5)
MCH RBC QN AUTO: 29.7 PG (ref 26–35)
MCHC RBC AUTO-ENTMCNC: 30.7 % (ref 32–34.5)
MCV RBC AUTO: 96.9 FL (ref 80–99.9)
PDW BLD-RTO: 14 FL (ref 11.5–15)
PLATELET # BLD: 192 E9/L (ref 130–450)
PMV BLD AUTO: 10 FL (ref 7–12)
POTASSIUM SERPL-SCNC: 4.4 MMOL/L (ref 3.5–5)
RBC # BLD: 4.91 E12/L (ref 3.5–5.5)
SODIUM BLD-SCNC: 139 MMOL/L (ref 132–146)
TOTAL PROTEIN: 7.5 G/DL (ref 6.4–8.3)
TSH SERPL DL<=0.05 MIU/L-ACNC: 0.04 UIU/ML (ref 0.27–4.2)
WBC # BLD: 3.5 E9/L (ref 4.5–11.5)

## 2020-01-10 RX ORDER — LEVOTHYROXINE SODIUM 112 UG/1
112 TABLET ORAL DAILY
Qty: 30 TABLET | Refills: 3
Start: 2020-01-10 | End: 2020-02-06

## 2020-01-10 NOTE — TELEPHONE ENCOUNTER
Spoke with Meliton Apple the provider manager at the group home and said she also needs a refill on her prn anxiety medication to.

## 2020-01-13 ENCOUNTER — HOSPITAL ENCOUNTER (OUTPATIENT)
Dept: CT IMAGING | Age: 55
Discharge: HOME | End: 2020-01-15
Payer: MEDICARE

## 2020-01-13 PROCEDURE — 70450 CT HEAD/BRAIN W/O DYE: CPT

## 2020-01-14 ENCOUNTER — OFFICE VISIT (OUTPATIENT)
Dept: NEUROSURGERY | Age: 55
End: 2020-01-14

## 2020-01-14 VITALS
HEIGHT: 60 IN | SYSTOLIC BLOOD PRESSURE: 104 MMHG | HEART RATE: 68 BPM | DIASTOLIC BLOOD PRESSURE: 73 MMHG | BODY MASS INDEX: 24.41 KG/M2

## 2020-01-14 PROCEDURE — 99024 POSTOP FOLLOW-UP VISIT: CPT | Performed by: NEUROLOGICAL SURGERY

## 2020-01-14 RX ORDER — LORAZEPAM 0.5 MG/1
0.5 TABLET ORAL EVERY 8 HOURS PRN
Qty: 90 TABLET | Refills: 0 | OUTPATIENT
Start: 2020-01-14 | End: 2020-02-13

## 2020-01-17 ENCOUNTER — CARE COORDINATION (OUTPATIENT)
Dept: CARE COORDINATION | Age: 55
End: 2020-01-17

## 2020-01-24 ENCOUNTER — NURSE ONLY (OUTPATIENT)
Dept: PRIMARY CARE CLINIC | Age: 55
End: 2020-01-24

## 2020-01-24 ENCOUNTER — HOSPITAL ENCOUNTER (OUTPATIENT)
Age: 55
Discharge: HOME | End: 2020-01-26
Payer: MEDICARE

## 2020-01-24 LAB
ALBUMIN SERPL-MCNC: 3.8 G/DL (ref 3.5–5.2)
ALP BLD-CCNC: 119 U/L (ref 35–104)
ALT SERPL-CCNC: 24 U/L (ref 0–32)
ANION GAP SERPL CALCULATED.3IONS-SCNC: 13 MMOL/L (ref 7–16)
AST SERPL-CCNC: 29 U/L (ref 0–31)
BILIRUB SERPL-MCNC: 0.4 MG/DL (ref 0–1.2)
BUN BLDV-MCNC: 22 MG/DL (ref 6–20)
CALCIUM SERPL-MCNC: 9.4 MG/DL (ref 8.6–10.2)
CHLORIDE BLD-SCNC: 106 MMOL/L (ref 98–107)
CO2: 26 MMOL/L (ref 22–29)
CREAT SERPL-MCNC: 0.8 MG/DL (ref 0.5–1)
GFR AFRICAN AMERICAN: >60
GFR NON-AFRICAN AMERICAN: >60 ML/MIN/1.73
GLUCOSE BLD-MCNC: 60 MG/DL (ref 74–99)
POTASSIUM SERPL-SCNC: 4.3 MMOL/L (ref 3.5–5)
SODIUM BLD-SCNC: 145 MMOL/L (ref 132–146)
TOTAL PROTEIN: 7.2 G/DL (ref 6.4–8.3)
TSH SERPL DL<=0.05 MIU/L-ACNC: 0.03 UIU/ML (ref 0.27–4.2)

## 2020-01-24 PROCEDURE — 80053 COMPREHEN METABOLIC PANEL: CPT

## 2020-01-24 PROCEDURE — 84443 ASSAY THYROID STIM HORMONE: CPT

## 2020-02-06 ENCOUNTER — TELEPHONE (OUTPATIENT)
Dept: PRIMARY CARE CLINIC | Age: 55
End: 2020-02-06

## 2020-02-06 RX ORDER — LEVOTHYROXINE SODIUM 0.1 MG/1
100 TABLET ORAL DAILY
Qty: 30 TABLET | Refills: 0 | Status: SHIPPED
Start: 2020-02-06 | End: 2020-04-29 | Stop reason: SDUPTHER

## 2020-02-07 ENCOUNTER — TELEPHONE (OUTPATIENT)
Dept: PRIMARY CARE CLINIC | Age: 55
End: 2020-02-07

## 2020-02-07 NOTE — TELEPHONE ENCOUNTER
Father called confused does kelle need to take cytomel and levothyroxine?  And does she need to resume her florinef for BP they stopped that when she was in the hospital?

## 2020-02-18 ENCOUNTER — NURSE ONLY (OUTPATIENT)
Dept: PRIMARY CARE CLINIC | Age: 55
End: 2020-02-18

## 2020-02-18 NOTE — PROGRESS NOTES
Upon inspection, bilateral ear canals had wax build up, worse on the Left. Left ear flushed with luke warm water x2, a large amount of hard dark wax successfully expelled. Right ear flushed with luke warm water x1, small amount of soft yellow wax expelled. Bilateral ear canals clear. Pt tolerated well. Mother advised to continue using OTC wax softening drops on patient in bilateral ears per the instructions on the bottle as needed to maintain the ears if she wishes.      Electronically signed by Tony Araya LPN on 5/23/2483 at 8:16 PM

## 2020-02-25 ENCOUNTER — TELEPHONE (OUTPATIENT)
Dept: PRIMARY CARE CLINIC | Age: 55
End: 2020-02-25

## 2020-04-09 ENCOUNTER — TELEPHONE (OUTPATIENT)
Dept: PRIMARY CARE CLINIC | Age: 55
End: 2020-04-09

## 2020-04-09 NOTE — TELEPHONE ENCOUNTER
Patient's mother would like to know if Rosy Melendez still needs to be on D-Mannose. The price has increased to $200 and they state Rosy Melendez is doing better since being released from the nursing home. Please advise.

## 2020-04-29 RX ORDER — LIOTHYRONINE SODIUM 5 UG/1
TABLET ORAL
Qty: 30 TABLET | Refills: 5 | Status: SHIPPED
Start: 2020-04-29 | End: 2020-12-21 | Stop reason: SDUPTHER

## 2020-04-29 RX ORDER — LEVOTHYROXINE SODIUM 0.1 MG/1
100 TABLET ORAL DAILY
Qty: 30 TABLET | Refills: 5 | Status: SHIPPED
Start: 2020-04-29 | End: 2020-07-06

## 2020-04-29 RX ORDER — QUETIAPINE FUMARATE 50 MG/1
50 TABLET, FILM COATED ORAL NIGHTLY
Qty: 60 TABLET | Refills: 5 | Status: SHIPPED
Start: 2020-04-29 | End: 2020-07-06

## 2020-04-29 RX ORDER — CHOLECALCIFEROL (VITAMIN D3) 50 MCG
2000 TABLET ORAL DAILY
Qty: 30 TABLET | Refills: 5 | Status: SHIPPED
Start: 2020-04-29 | End: 2020-12-21 | Stop reason: SDUPTHER

## 2020-05-01 ENCOUNTER — HOSPITAL ENCOUNTER (EMERGENCY)
Age: 55
Discharge: HOME | End: 2020-05-01
Attending: EMERGENCY MEDICINE
Payer: MEDICARE

## 2020-05-01 VITALS
HEART RATE: 78 BPM | RESPIRATION RATE: 16 BRPM | BODY MASS INDEX: 25.39 KG/M2 | DIASTOLIC BLOOD PRESSURE: 62 MMHG | TEMPERATURE: 97.6 F | OXYGEN SATURATION: 98 % | WEIGHT: 130 LBS | SYSTOLIC BLOOD PRESSURE: 98 MMHG

## 2020-05-01 PROCEDURE — 99282 EMERGENCY DEPT VISIT SF MDM: CPT

## 2020-05-01 RX ORDER — MUPIROCIN CALCIUM 20 MG/G
CREAM TOPICAL
Qty: 15 G | Refills: 0 | Status: SHIPPED | OUTPATIENT
Start: 2020-05-01 | End: 2020-05-01 | Stop reason: SDUPTHER

## 2020-05-01 RX ORDER — MUPIROCIN CALCIUM 20 MG/G
CREAM TOPICAL
Qty: 15 G | Refills: 0 | Status: SHIPPED | OUTPATIENT
Start: 2020-05-01 | End: 2020-05-31

## 2020-05-01 ASSESSMENT — PAIN DESCRIPTION - LOCATION: LOCATION: TOE (COMMENT WHICH ONE)

## 2020-05-01 ASSESSMENT — PAIN DESCRIPTION - DESCRIPTORS: DESCRIPTORS: TENDER

## 2020-05-01 ASSESSMENT — PAIN SCALES - WONG BAKER: WONGBAKER_NUMERICALRESPONSE: 8

## 2020-05-01 ASSESSMENT — PAIN DESCRIPTION - PAIN TYPE: TYPE: ACUTE PAIN

## 2020-05-01 ASSESSMENT — PAIN DESCRIPTION - ORIENTATION: ORIENTATION: LEFT

## 2020-05-02 NOTE — ED PROVIDER NOTES
allergies. -------------------------------------------------- RESULTS -------------------------------------------------  All laboratory and radiology results have been personally reviewed by myself   LABS:  No results found for this visit on 05/01/20. RADIOLOGY:  Interpreted by Radiologist.  No orders to display       ------------------------- NURSING NOTES AND VITALS REVIEWED ---------------------------  The nursing notes within the ED encounter and vital signs as below have been reviewed. BP 98/62   Pulse 78   Temp 97.6 °F (36.4 °C) (Temporal)   Resp 16   Wt 130 lb (59 kg)   SpO2 98%   BMI 25.39 kg/m²   Oxygen Saturation Interpretation: Normal    ---------------------------------------------------PHYSICAL EXAM--------------------------------------    Constitutional/General: Alert and oriented x person, well appearing, non toxic in NAD  Head: Normocephalic and atraumatic  Eyes: PERRL, EOMI  Mouth: Oropharynx clear, handling secretions, no trismus  Neck: Supple, full ROM, was normal  Pulmonary: Lungs clear to auscultation bilaterally, no wheezes, rales, or rhonchi. Not in respiratory distress  Cardiovascular:  Regular rate and rhythm, no murmurs, gallops, or rubs. 2+ distal pulses  Abdomen: Soft, non tender, non distended, otherwise normal  Extremities: Significant hallux varus deformity noted which is affecting the left second toe the dorsum of the left second toe also is shows evidence of constriction deformity and flexion overlying abrasion noted with good eschar base no purulence expressible no lymphangitic streaking  Skin: warm and dry without rash; see above  Neurologic: GCS 15, focal deficits otherwise normal  Psych: Normal Affect    ------------------------------ ED COURSE/MEDICAL DECISION MAKING----------------------  Medications - No data to display    ED COURSE:     Medical Decision Making:   Differential Diagnoses:  Wound recheck.   We did advise the group home caregiver regarding application of a nonstick/nonadherent dressing of the basal layer prior to applying gauze for cushioning. I will prescribe Bactroban antibiotic ointment but I do not think that additional oral antibiotics are warranted at this time. Counseling: The emergency provider has spoken with the patient and discussed todays results, in addition to providing specific details for the plan of care and counseling regarding the diagnosis and prognosis. Questions are answered at this time and they are agreeable with the plan.      --------------------------------- IMPRESSION AND DISPOSITION ---------------------------------    IMPRESSION  1. Abrasion of toe of left foot, initial encounter    2. Encounter for medical screening examination        DISPOSITION  Disposition: Discharge to home  Patient condition is stable      NOTE: This report was transcribed using voice recognition software.  Every effort was made to ensure accuracy; however, inadvertent computerized transcription errors may be present        Dave Abdi MD  05/01/20 [id number]

## 2020-05-04 ENCOUNTER — TELEPHONE (OUTPATIENT)
Dept: PRIMARY CARE CLINIC | Age: 55
End: 2020-05-04

## 2020-05-04 NOTE — TELEPHONE ENCOUNTER
Patients mother would like to know if you feel it is necessary for her to follow up with Electrophysiology. Please advise.

## 2020-05-04 NOTE — TELEPHONE ENCOUNTER
[facility] ED Follow Up Call    ED Visit Date:  2020    Patient: Bowen Osullivan See Patient : 1965     Patrizia Ivan DO     Spoke with: Jose Jaycee See (mother/legal guardian)    Interactive Patient Contact:  Was patient able to fill all prescriptions: yes  Is patient taking all medications as directed on the After Visit Summary? yes  Does patient understand their visit instructions: Yes  Does patient have questions or concerns that need addressed?: no    Current patient status and patient narrative:  improved    Self-care instructions reviewed:  yes    Face to face required?  yes, following up with ortho  Able to participate in virtual visit? no    Following with Dr. Rosana Leon (ortho) on 2020. Per the patient's mother she does not wish to make a follow up with PCP or podiatry at this time.     ED summary reviewed, including labs, tests and important procedures:yes        Amy Frost RN   20

## 2020-06-29 ENCOUNTER — OFFICE VISIT (OUTPATIENT)
Dept: PRIMARY CARE CLINIC | Age: 55
End: 2020-06-29
Payer: MEDICARE

## 2020-06-29 ENCOUNTER — HOSPITAL ENCOUNTER (OUTPATIENT)
Age: 55
Discharge: HOME | End: 2020-07-01
Payer: MEDICARE

## 2020-06-29 VITALS
WEIGHT: 129 LBS | HEART RATE: 62 BPM | BODY MASS INDEX: 25.19 KG/M2 | OXYGEN SATURATION: 94 % | DIASTOLIC BLOOD PRESSURE: 64 MMHG | TEMPERATURE: 97.4 F | SYSTOLIC BLOOD PRESSURE: 120 MMHG

## 2020-06-29 PROBLEM — I82.431 ACUTE DEEP VEIN THROMBOSIS (DVT) OF POPLITEAL VEIN OF RIGHT LOWER EXTREMITY (HCC): Status: RESOLVED | Noted: 2019-04-21 | Resolved: 2020-06-29

## 2020-06-29 PROBLEM — E44.0 MODERATE PROTEIN-CALORIE MALNUTRITION (HCC): Chronic | Status: RESOLVED | Noted: 2019-11-22 | Resolved: 2020-06-29

## 2020-06-29 LAB
ALBUMIN SERPL-MCNC: 4 G/DL (ref 3.5–5.2)
ALP BLD-CCNC: 126 U/L (ref 35–104)
ALT SERPL-CCNC: 50 U/L (ref 0–32)
ANION GAP SERPL CALCULATED.3IONS-SCNC: 11 MMOL/L (ref 7–16)
AST SERPL-CCNC: 45 U/L (ref 0–31)
BILIRUB SERPL-MCNC: 0.4 MG/DL (ref 0–1.2)
BUN BLDV-MCNC: 17 MG/DL (ref 6–20)
CALCIUM SERPL-MCNC: 9.8 MG/DL (ref 8.6–10.2)
CHLORIDE BLD-SCNC: 104 MMOL/L (ref 98–107)
CO2: 27 MMOL/L (ref 22–29)
CREAT SERPL-MCNC: 1 MG/DL (ref 0.5–1)
GFR AFRICAN AMERICAN: >60
GFR NON-AFRICAN AMERICAN: 58 ML/MIN/1.73
GLUCOSE BLD-MCNC: 95 MG/DL (ref 74–99)
HCT VFR BLD CALC: 49.5 % (ref 34–48)
HEMOGLOBIN: 16.2 G/DL (ref 11.5–15.5)
MCH RBC QN AUTO: 31.2 PG (ref 26–35)
MCHC RBC AUTO-ENTMCNC: 32.7 % (ref 32–34.5)
MCV RBC AUTO: 95.4 FL (ref 80–99.9)
PDW BLD-RTO: 13.2 FL (ref 11.5–15)
PLATELET # BLD: 168 E9/L (ref 130–450)
PMV BLD AUTO: 9.7 FL (ref 7–12)
POTASSIUM SERPL-SCNC: 4.6 MMOL/L (ref 3.5–5)
RBC # BLD: 5.19 E12/L (ref 3.5–5.5)
SODIUM BLD-SCNC: 142 MMOL/L (ref 132–146)
TOTAL PROTEIN: 7.7 G/DL (ref 6.4–8.3)
TSH SERPL DL<=0.05 MIU/L-ACNC: 0.15 UIU/ML (ref 0.27–4.2)
WBC # BLD: 3 E9/L (ref 4.5–11.5)

## 2020-06-29 PROCEDURE — 80053 COMPREHEN METABOLIC PANEL: CPT

## 2020-06-29 PROCEDURE — 85027 COMPLETE CBC AUTOMATED: CPT

## 2020-06-29 PROCEDURE — 3017F COLORECTAL CA SCREEN DOC REV: CPT | Performed by: INTERNAL MEDICINE

## 2020-06-29 PROCEDURE — 84443 ASSAY THYROID STIM HORMONE: CPT

## 2020-06-29 PROCEDURE — 1036F TOBACCO NON-USER: CPT | Performed by: INTERNAL MEDICINE

## 2020-06-29 PROCEDURE — 81002 URINALYSIS NONAUTO W/O SCOPE: CPT | Performed by: INTERNAL MEDICINE

## 2020-06-29 PROCEDURE — G8427 DOCREV CUR MEDS BY ELIG CLIN: HCPCS | Performed by: INTERNAL MEDICINE

## 2020-06-29 PROCEDURE — 99213 OFFICE O/P EST LOW 20 MIN: CPT | Performed by: INTERNAL MEDICINE

## 2020-06-29 PROCEDURE — G8419 CALC BMI OUT NRM PARAM NOF/U: HCPCS | Performed by: INTERNAL MEDICINE

## 2020-06-29 ASSESSMENT — PATIENT HEALTH QUESTIONNAIRE - PHQ9: DEPRESSION UNABLE TO ASSESS: FUNCTIONAL CAPACITY MOTIVATION LIMITS ACCURACY

## 2020-06-29 NOTE — PROGRESS NOTES
6/29/20    Yue Early, a female of 48 y.o. came to the office     Yue Early presents today for evaluation of a bug bite. Has been having a rash on her arms and legs over the past several days. Her family is also noted a gradual cognitive decline. She has been experiencing hallucinations as described as her seeing people who are not there. She has difficulty remembering names of her brothers and sisters which is unusual for her. Her long-term memory is intact. She also has difficulty writing which was never an issue for her in the past      Patient Active Problem List   Diagnosis    Down's syndrome    Systolic murmur    Vasodepressor syncope    Asymptomatic varicose veins of bilateral lower extremities    Hypothyroidism    Chest pain    SDH (subdural hematoma) (HCC)    Subdural hematoma (HCC)    Communicating hydrocele    Communicating hydrocephalus (HCC)    Hydrocephalus (HCC)      No Known Allergies  Current Outpatient Medications on File Prior to Visit   Medication Sig Dispense Refill    levothyroxine (SYNTHROID) 100 MCG tablet Take 1 tablet by mouth Daily 30 tablet 5    liothyronine (CYTOMEL) 5 MCG tablet Take 1 tab by mouth every morning on Giwypx-Qgopnxecp-Pixaau 30 tablet 5    QUEtiapine (SEROQUEL) 50 MG tablet Take 1 tablet by mouth nightly 60 tablet 5    Cholecalciferol (VITAMIN D-3 SUPER STRENGTH) 50 MCG (2000 UT) TABS Take 1 tablet by mouth daily 30 tablet 5    Acetaminophen 325 MG CAPS Take 650 mg by mouth every 6 hours as needed (pain) 120 capsule 0    D-Mannose 500 MG CAPS Take 2,000 mg by mouth daily 120 capsule 0    Probiotic Product (PROBIOTIC DAILY) CAPS Take 250 mg by mouth daily 30 capsule 0    fludrocortisone (FLORINEF) 0.1 MG tablet Take 0.2 mg by mouth daily       No current facility-administered medications on file prior to visit. Review of Systems  Constitutional:Negative for activity change, appetite change, chills, fatigue and fever.    Respiratory: Negative

## 2020-07-02 ENCOUNTER — HOSPITAL ENCOUNTER (OUTPATIENT)
Age: 55
Discharge: HOME | End: 2020-07-04
Payer: MEDICARE

## 2020-07-02 ENCOUNTER — TELEPHONE (OUTPATIENT)
Dept: PRIMARY CARE CLINIC | Age: 55
End: 2020-07-02

## 2020-07-02 LAB
BILIRUBIN, POC: NORMAL
BLOOD URINE, POC: NORMAL
CLARITY, POC: CLEAR
COLOR, POC: YELLOW
GLUCOSE URINE, POC: NORMAL
KETONES, POC: NORMAL
LEUKOCYTE EST, POC: NORMAL
NITRITE, POC: NORMAL
PH, POC: 5.5
PROTEIN, POC: NORMAL
SPECIFIC GRAVITY, POC: 1.02
UROBILINOGEN, POC: 0.2

## 2020-07-02 PROCEDURE — 87088 URINE BACTERIA CULTURE: CPT

## 2020-07-03 LAB — URINE CULTURE, ROUTINE: NORMAL

## 2020-07-06 ENCOUNTER — TELEPHONE (OUTPATIENT)
Dept: PRIMARY CARE CLINIC | Age: 55
End: 2020-07-06

## 2020-07-06 RX ORDER — QUETIAPINE FUMARATE 25 MG/1
25 TABLET, FILM COATED ORAL NIGHTLY
Qty: 30 TABLET | Refills: 1
Start: 2020-07-06 | End: 2021-01-20

## 2020-07-06 RX ORDER — LEVOTHYROXINE SODIUM 88 UG/1
88 TABLET ORAL DAILY
COMMUNITY
End: 2020-07-06 | Stop reason: SDUPTHER

## 2020-07-06 RX ORDER — LEVOTHYROXINE SODIUM 88 UG/1
88 TABLET ORAL DAILY
Qty: 90 TABLET | Refills: 1 | Status: SHIPPED
Start: 2020-07-06 | End: 2020-12-21 | Stop reason: SDUPTHER

## 2020-07-06 NOTE — TELEPHONE ENCOUNTER
Pt's mother wants to talk to you regarding the Seroquel. Mother was reading a three page list of side effects and feels that Demond Mcginnis is exhibiting some. She wants to discontinue this medication due to this as well as it interfering with thyroid medication. Mother wants adjustments made to patients medication regimen but not before speaking with you. Please call.

## 2020-07-08 ENCOUNTER — HOSPITAL ENCOUNTER (OUTPATIENT)
Dept: CT IMAGING | Age: 55
Discharge: HOME | End: 2020-07-10
Payer: MEDICARE

## 2020-07-08 PROCEDURE — 70450 CT HEAD/BRAIN W/O DYE: CPT

## 2020-07-15 ENCOUNTER — TELEPHONE (OUTPATIENT)
Dept: NEUROSURGERY | Age: 55
End: 2020-07-15

## 2020-07-17 ENCOUNTER — TELEPHONE (OUTPATIENT)
Dept: PRIMARY CARE CLINIC | Age: 55
End: 2020-07-17

## 2020-07-21 ENCOUNTER — OFFICE VISIT (OUTPATIENT)
Dept: VASCULAR SURGERY | Age: 55
End: 2020-07-21
Payer: MEDICARE

## 2020-07-21 PROBLEM — R23.0 TOE CYANOSIS: Status: ACTIVE | Noted: 2020-07-21

## 2020-07-21 PROCEDURE — G8427 DOCREV CUR MEDS BY ELIG CLIN: HCPCS | Performed by: SURGERY

## 2020-07-21 PROCEDURE — G8419 CALC BMI OUT NRM PARAM NOF/U: HCPCS | Performed by: SURGERY

## 2020-07-21 PROCEDURE — 3017F COLORECTAL CA SCREEN DOC REV: CPT | Performed by: SURGERY

## 2020-07-21 PROCEDURE — 1036F TOBACCO NON-USER: CPT | Performed by: SURGERY

## 2020-07-21 PROCEDURE — 99213 OFFICE O/P EST LOW 20 MIN: CPT | Performed by: SURGERY

## 2020-07-21 NOTE — PROGRESS NOTES
Vascular Surgery Outpatient Progress Note      Chief Complaint   Patient presents with    Circulatory Problem     Chronic cyanosis (L) foot. HISTORY OF PRESENT ILLNESS:                The patient is a 48 y.o. female who returns for follow-up evaluation of cyanosis of her feet. The patient has bunions and was being evaluated and her feet were cold and cyanotic. She had an abrasion on her second toe that subsequently healed. She was referred for evaluation. There is no history of nonhealing ulcerations. She is accompanied by an aide from the facility. Past Medical History:        Diagnosis Date    Arthritis     Down syndrome     Hypothyroidism     Osteoarthritis     Syncope     Thyroid disease     UTI (urinary tract infection)         Varicose veins     Wears glasses      Past Surgical History:        Procedure Laterality Date    ABDOMEN SURGERY      duodenal atresia    ABDOMINAL ADHESION SURGERY      BRAIN SURGERY      CRANIOTOMY Right 4/12/2019    RIGHT CRANIOTOMY FRANCISCO JAVIER HOLES performed by Michelle Abebe MD at [place] [address] ECHO COMPL W DOP COLOR FLOW  5/14/2012         HIP SURGERY      Right    JOINT REPLACEMENT      left knee, right hip-dr Griselda Harris KNEE SURGERY      Right    TOE SURGERY      Right foot    TOTAL KNEE ARTHROPLASTY Left 08/11/2016    DR. De    TUBAL LIGATION      VENA CAVA FILTER PLACEMENT Bilateral 4/22/2019    VENA CAVA FILTER INSERTION performed by Quynh Mustafa MD at [place] [address] VENTRICULOPERITONEAL SHUNT N/A 11/12/2019    RIGHT FRONTAL VENTRICULO PERITONEAL SHUNT INSERTION-----FACILITY performed by Michelle Abebe MD at [facility] OR     Current Medications:   Prior to Admission medications    Medication Sig Start Date End Date Taking?  Authorizing Provider   Lactobacillus (ACIDOPHILUS PO) Take by mouth   Yes Historical Provider, MD   levothyroxine (SYNTHROID) 88 MCG tablet Take 1 tablet by mouth Daily  Patient taking differently: Take 100 mcg by mouth Daily  7/6/20  Yes Morris Loera DO   QUEtiapine (SEROQUEL) 25 MG tablet Take 1 tablet by mouth nightly 7/6/20  Yes Morris Loera DO   liothyronine (CYTOMEL) 5 MCG tablet Take 1 tab by mouth every morning on Mlgqoh-Hgowmoexa-Yofuru 4/29/20  Yes Morris Loera DO   Cholecalciferol (VITAMIN D-3 SUPER STRENGTH) 50 MCG (2000 UT) TABS Take 1 tablet by mouth daily 4/29/20  Yes Morris Loera DO   D-Mannose 500 MG CAPS Take 2,000 mg by mouth daily 12/19/19   Andrey Fields,      Allergies:  Patient has no known allergies.     Social History     Socioeconomic History    Marital status: Single     Spouse name: Not on file    Number of children: Not on file    Years of education: Not on file    Highest education level: Not on file   Occupational History    Not on file   Social Needs    Financial resource strain: Not on file    Food insecurity     Worry: Not on file     Inability: Not on file    Transportation needs     Medical: Not on file     Non-medical: Not on file   Tobacco Use    Smoking status: Never Smoker    Smokeless tobacco: Never Used   Substance and Sexual Activity    Alcohol use: No    Drug use: No    Sexual activity: Never   Lifestyle    Physical activity     Days per week: Not on file     Minutes per session: Not on file    Stress: Not on file   Relationships    Social connections     Talks on phone: Not on file     Gets together: Not on file     Attends Buddhism service: Not on file     Active member of club or organization: Not on file     Attends meetings of clubs or organizations: Not on file     Relationship status: Not on file    Intimate partner violence     Fear of current or ex partner: Not on file     Emotionally abused: Not on file     Physically abused: Not on file     Forced sexual activity: Not on file   Other Topics Concern    Not on file   Social History Narrative    Not on file        Family History   Problem Relation Age of Onset    Heart Disease Maternal Grandmother     Heart Disease Maternal Grandfather     Heart Disease Paternal Aunt     Heart Disease Paternal Uncle        REVIEW OF SYSTEMS (New symptoms):    Eyes:      Blurred vision:  No [x]/Yes []               Diplopia:   No [x]/Yes []               Vision loss:       No [x]/Yes []   Ears, nose, throat:             Hearing loss:    No [x]/Yes []      Vertigo:   No [x]/Yes []                       Swallowing problem:  No [x]/Yes []               Nose bleeds:   No [x]/Yes []      Voice hoarseness:  No [x]/Yes []  Respiratory:             Cough:   No [x]/Yes []      Pleuritic chest pain:  No [x]/Yes []                        Dyspnea:   No [x]/Yes []      Wheezing:   No [x]/Yes []  Cardiovascular:             Angina:   No [x]/Yes []      Palpitations:   No [x]/Yes []          Claudication:    No [x]/Yes []      Leg swelling:   No [x]/Yes []  Gastrointestinal:             Nausea or vomiting:  No [x]/Yes []               Abdominal pain:  No [x]/Yes []                     Intestinal bleeding: No [x]/Yes []  Musculoskeletal:             Leg pain:   No [x]/Yes []      Back pain:   No [x]/Yes []                    Weakness:   No [x]/Yes []  Neurologic:             Numbness:   No [x]/Yes []      Paralysis:   No [x]/Yes []                       Headaches:   No [x]/Yes []  Hematologic, lymphatic:   Anemia:   No [x]/Yes []              Bleeding or bruising:  No [x]/Yes []              Fevers or chills: No [x]/Yes []  Endocrine:             Temp intolerance:   No [x]/Yes []                       Polydipsia, polyuria:  No [x]/Yes []  Skin:              Rash:    No [x]/Yes []      Ulcers:   No [x]/Yes []              Abnorm pigment: No [x]/Yes []  :              Frequency/urgency:  No [x]/Yes []      Hematuria:    No [x]/Yes []                      Incontinence:    No [x]/Yes []    PHYSICAL EXAM:  There were no vitals filed for this visit.   General Appearance: alert and oriented to person, place and time, in no acute distress, well developed and well- nourished  Neurologic: no cranial nerve deficit, speech normal  Head: normocephalic and atraumatic  Eyes: extraocular eye movements intact, conjunctivae normal  ENT: external ear and ear canal normal bilaterally, nose without deformity, no carotid bruits  Pulmonary/Chest: normal air movement, no respiratory distress  Cardiovascular: normal rate, regular rhythm, no murmur  Abdomen: non-distended, no masses  Musculoskeletal: no joint deformity or tenderness  Extremities: no leg edema bilaterally  Skin: warm and dry, no rash or erythema, cyanosis of distal feet and toes worse on the left compared to the right. PULSE EXAM      Right      Left   Brachial     Radial     Femoral     Popliteal     Dorsalis Pedis 2 2   Posterior Tibial 3 3   (3=normal, 2=diminished, 1=barely palpable, 4=widened)    RADIOLOGY: SA today    Problem List Items Addressed This Visit     Toe cyanosis - Primary          I reviewed with the patient that the circulation to both feet remains very good and she does not have any arterial insufficiency. She has small vessel spasm causing the low flow to the skin causing cyanosis. This could be due to sciatica or side effect of her medications. I reviewed with them that the most important treatment is to wear good socks and shoes and to avoid cold exposure such as air conditioning. I do not feel that she requires any additional testing or intervention. If her symptoms persist, a low-dose calcium channel blocker such as Procardia XL could be considered to help vasodilate the vessels. I tried to call the patient's parents at [phone] but there was no answer. No follow-ups on file.

## 2020-08-04 ENCOUNTER — TELEPHONE (OUTPATIENT)
Dept: PRIMARY CARE CLINIC | Age: 55
End: 2020-08-04

## 2020-08-05 ENCOUNTER — TELEPHONE (OUTPATIENT)
Dept: PRIMARY CARE CLINIC | Age: 55
End: 2020-08-05

## 2020-08-05 NOTE — TELEPHONE ENCOUNTER
Pt's mother still concerned with mood swings and behaviors. Davie Dugan wants to discuss medication regimen again. Please call mother.

## 2020-08-06 ENCOUNTER — HOSPITAL ENCOUNTER (OUTPATIENT)
Age: 55
Discharge: HOME | End: 2020-08-06
Payer: MEDICARE

## 2020-08-06 LAB
ALBUMIN SERPL-MCNC: 4 G/DL (ref 3.5–5.2)
ALP BLD-CCNC: 114 U/L (ref 35–104)
ALT SERPL-CCNC: 44 U/L (ref 0–32)
ANION GAP SERPL CALCULATED.3IONS-SCNC: 9 MMOL/L (ref 7–16)
AST SERPL-CCNC: 46 U/L (ref 0–31)
BILIRUB SERPL-MCNC: 0.6 MG/DL (ref 0–1.2)
BUN BLDV-MCNC: 21 MG/DL (ref 6–20)
CALCIUM SERPL-MCNC: 9.7 MG/DL (ref 8.6–10.2)
CHLORIDE BLD-SCNC: 106 MMOL/L (ref 98–107)
CO2: 29 MMOL/L (ref 22–29)
CREAT SERPL-MCNC: 1.1 MG/DL (ref 0.5–1)
GFR AFRICAN AMERICAN: >60
GFR NON-AFRICAN AMERICAN: 52 ML/MIN/1.73
GLUCOSE BLD-MCNC: 62 MG/DL (ref 74–99)
POTASSIUM SERPL-SCNC: 4.6 MMOL/L (ref 3.5–5)
SODIUM BLD-SCNC: 144 MMOL/L (ref 132–146)
TOTAL PROTEIN: 8.1 G/DL (ref 6.4–8.3)

## 2020-08-06 PROCEDURE — 80053 COMPREHEN METABOLIC PANEL: CPT

## 2020-08-06 PROCEDURE — 36415 COLL VENOUS BLD VENIPUNCTURE: CPT

## 2020-08-14 ENCOUNTER — TELEPHONE (OUTPATIENT)
Dept: PRIMARY CARE CLINIC | Age: 55
End: 2020-08-14

## 2020-08-20 ENCOUNTER — TELEPHONE (OUTPATIENT)
Dept: PRIMARY CARE CLINIC | Age: 55
End: 2020-08-20

## 2020-08-20 NOTE — TELEPHONE ENCOUNTER
Mother requesting Speech therapy for patient to be added to therapy orders for [facility] Therapy services, attention Le Fernandez.

## 2020-10-22 ENCOUNTER — OFFICE VISIT (OUTPATIENT)
Dept: PRIMARY CARE CLINIC | Age: 55
End: 2020-10-22
Payer: MEDICARE

## 2020-10-22 VITALS
BODY MASS INDEX: 26.17 KG/M2 | WEIGHT: 134 LBS | SYSTOLIC BLOOD PRESSURE: 118 MMHG | DIASTOLIC BLOOD PRESSURE: 62 MMHG | OXYGEN SATURATION: 97 % | TEMPERATURE: 97 F | HEART RATE: 78 BPM

## 2020-10-22 PROCEDURE — 99213 OFFICE O/P EST LOW 20 MIN: CPT | Performed by: INTERNAL MEDICINE

## 2020-10-22 PROCEDURE — G8419 CALC BMI OUT NRM PARAM NOF/U: HCPCS | Performed by: INTERNAL MEDICINE

## 2020-10-22 PROCEDURE — G8484 FLU IMMUNIZE NO ADMIN: HCPCS | Performed by: INTERNAL MEDICINE

## 2020-10-22 PROCEDURE — 3017F COLORECTAL CA SCREEN DOC REV: CPT | Performed by: INTERNAL MEDICINE

## 2020-10-22 PROCEDURE — G8427 DOCREV CUR MEDS BY ELIG CLIN: HCPCS | Performed by: INTERNAL MEDICINE

## 2020-10-22 PROCEDURE — 1036F TOBACCO NON-USER: CPT | Performed by: INTERNAL MEDICINE

## 2020-10-22 ASSESSMENT — ENCOUNTER SYMPTOMS
SHORTNESS OF BREATH: 0
COUGH: 0
SORE THROAT: 0
DIARRHEA: 0
ABDOMINAL PAIN: 0
CONSTIPATION: 0
RHINORRHEA: 0

## 2020-10-22 ASSESSMENT — PATIENT HEALTH QUESTIONNAIRE - PHQ9: DEPRESSION UNABLE TO ASSESS: FUNCTIONAL CAPACITY MOTIVATION LIMITS ACCURACY

## 2020-10-22 NOTE — PROGRESS NOTES
10/22/20    Randy Angélica Early, a female of 48 y.o. came to the office     Randy Early presents today       Patient Active Problem List   Diagnosis    Down's syndrome    Systolic murmur    Vasodepressor syncope    Asymptomatic varicose veins of bilateral lower extremities    Hypothyroidism    Chest pain    SDH (subdural hematoma) (HCC)    Subdural hematoma (HCC)    Communicating hydrocele    Communicating hydrocephalus (HCC)    Hydrocephalus (HCC)    Toe cyanosis      No Known Allergies  Current Outpatient Medications on File Prior to Visit   Medication Sig Dispense Refill    Lactobacillus (ACIDOPHILUS PO) Take by mouth      levothyroxine (SYNTHROID) 88 MCG tablet Take 1 tablet by mouth Daily (Patient taking differently: Take 100 mcg by mouth Daily ) 90 tablet 1    QUEtiapine (SEROQUEL) 25 MG tablet Take 1 tablet by mouth nightly 30 tablet 1    liothyronine (CYTOMEL) 5 MCG tablet Take 1 tab by mouth every morning on Rkrhvb-Umzowwfal-Hobxxp 30 tablet 5    Cholecalciferol (VITAMIN D-3 SUPER STRENGTH) 50 MCG (2000 UT) TABS Take 1 tablet by mouth daily 30 tablet 5    D-Mannose 500 MG CAPS Take 2,000 mg by mouth daily 120 capsule 0     No current facility-administered medications on file prior to visit. Review of Systems  Constitutional:Negative for activity change, appetite change, chills, fatigue and fever. Respiratory: Negative for choking, chest tightness, shortness of breath and wheezing. Cardiovascular: Negative for chest pain, palpitations and leg swelling. Gastrointestinal: Negative for abdominal distention, constipation, diarrhea, nausea and vomiting. Musculoskeletal: Negative for arthralgias, back pain, gait problem and joint swelling. Neurological: Negative for dizziness, weakness,numbness and headaches. There were no vitals taken for this visit. Physical Exam   Constitutional:  Oriented to person, place, and time. Appears well-developed and well-nourished. No acute distress.
Tenderness: There is no abdominal tenderness. Musculoskeletal:      Right lower leg: No edema. Left lower leg: No edema. Neurological:      General: No focal deficit present. Mental Status: She is alert. Comments: +Downs syndrome, patient is able to answer basic questions         ASSESSMENT AND PLAN:    There are no diagnoses linked to this encounter.    -Agree with sleep study to evaluate for JUAN PABLO  -Obtain the CT scan of the head tomorrow per neurology  -Continue to follow with neurology and the [facility] specialist   -Continue to monitor liver enzymes off of the seroquel    No follow-ups on file.     Jane Dc DO

## 2020-10-23 ENCOUNTER — HOSPITAL ENCOUNTER (OUTPATIENT)
Dept: CT IMAGING | Age: 55
Discharge: HOME | End: 2020-10-25
Payer: MEDICARE

## 2020-10-23 PROCEDURE — 70450 CT HEAD/BRAIN W/O DYE: CPT

## 2020-12-21 RX ORDER — LEVOTHYROXINE SODIUM 88 UG/1
88 TABLET ORAL DAILY
Qty: 90 TABLET | Refills: 1 | Status: ON HOLD
Start: 2020-12-21 | End: 2022-06-18 | Stop reason: HOSPADM

## 2020-12-21 RX ORDER — LIOTHYRONINE SODIUM 5 UG/1
TABLET ORAL
Qty: 30 TABLET | Refills: 5 | Status: ON HOLD
Start: 2020-12-21 | End: 2022-06-18 | Stop reason: HOSPADM

## 2020-12-21 RX ORDER — CHOLECALCIFEROL (VITAMIN D3) 50 MCG
2000 TABLET ORAL DAILY
Qty: 30 TABLET | Refills: 5 | Status: ON HOLD
Start: 2020-12-21 | End: 2022-06-18 | Stop reason: HOSPADM

## 2021-01-20 ENCOUNTER — APPOINTMENT (OUTPATIENT)
Dept: CT IMAGING | Age: 56
End: 2021-01-20
Payer: MEDICARE

## 2021-01-20 ENCOUNTER — HOSPITAL ENCOUNTER (EMERGENCY)
Age: 56
Discharge: HOME | End: 2021-01-20
Attending: EMERGENCY MEDICINE
Payer: MEDICARE

## 2021-01-20 VITALS
HEART RATE: 71 BPM | SYSTOLIC BLOOD PRESSURE: 108 MMHG | OXYGEN SATURATION: 99 % | HEIGHT: 60 IN | BODY MASS INDEX: 23.75 KG/M2 | TEMPERATURE: 97.3 F | WEIGHT: 121 LBS | RESPIRATION RATE: 18 BRPM | DIASTOLIC BLOOD PRESSURE: 78 MMHG

## 2021-01-20 DIAGNOSIS — R93.89 ABNORMAL CT SCAN: ICD-10-CM

## 2021-01-20 DIAGNOSIS — R11.2 NAUSEA AND VOMITING, INTRACTABILITY OF VOMITING NOT SPECIFIED, UNSPECIFIED VOMITING TYPE: Primary | ICD-10-CM

## 2021-01-20 DIAGNOSIS — Z98.2 S/P VP SHUNT: ICD-10-CM

## 2021-01-20 LAB
ALBUMIN SERPL-MCNC: 3.8 G/DL (ref 3.5–5.2)
ALP BLD-CCNC: 97 U/L (ref 35–104)
ALT SERPL-CCNC: 44 U/L (ref 0–32)
ANION GAP SERPL CALCULATED.3IONS-SCNC: 16 MMOL/L (ref 7–16)
AST SERPL-CCNC: 37 U/L (ref 0–31)
BASOPHILS ABSOLUTE: 0.06 E9/L (ref 0–0.2)
BASOPHILS RELATIVE PERCENT: 1.2 % (ref 0–2)
BILIRUB SERPL-MCNC: 0.6 MG/DL (ref 0–1.2)
BUN BLDV-MCNC: 18 MG/DL (ref 6–20)
CALCIUM SERPL-MCNC: 9.5 MG/DL (ref 8.6–10.2)
CHLORIDE BLD-SCNC: 104 MMOL/L (ref 98–107)
CO2: 22 MMOL/L (ref 22–29)
CREAT SERPL-MCNC: 1 MG/DL (ref 0.5–1)
EOSINOPHILS ABSOLUTE: 0.03 E9/L (ref 0.05–0.5)
EOSINOPHILS RELATIVE PERCENT: 0.6 % (ref 0–6)
GFR AFRICAN AMERICAN: >60
GFR NON-AFRICAN AMERICAN: 58 ML/MIN/1.73
GLUCOSE BLD-MCNC: 142 MG/DL (ref 74–99)
HCT VFR BLD CALC: 47.2 % (ref 34–48)
HEMOGLOBIN: 16.5 G/DL (ref 11.5–15.5)
IMMATURE GRANULOCYTES #: 0.03 E9/L
IMMATURE GRANULOCYTES %: 0.6 % (ref 0–5)
LIPASE: 22 U/L (ref 13–60)
LYMPHOCYTES ABSOLUTE: 1.67 E9/L (ref 1.5–4)
LYMPHOCYTES RELATIVE PERCENT: 33 % (ref 20–42)
MCH RBC QN AUTO: 32.8 PG (ref 26–35)
MCHC RBC AUTO-ENTMCNC: 35 % (ref 32–34.5)
MCV RBC AUTO: 93.8 FL (ref 80–99.9)
MONOCYTES ABSOLUTE: 0.23 E9/L (ref 0.1–0.95)
MONOCYTES RELATIVE PERCENT: 4.5 % (ref 2–12)
NEUTROPHILS ABSOLUTE: 3.04 E9/L (ref 1.8–7.3)
NEUTROPHILS RELATIVE PERCENT: 60.1 % (ref 43–80)
PDW BLD-RTO: 12.8 FL (ref 11.5–15)
PLATELET # BLD: 203 E9/L (ref 130–450)
PMV BLD AUTO: 9.9 FL (ref 7–12)
POTASSIUM REFLEX MAGNESIUM: 4.3 MMOL/L (ref 3.5–5)
RBC # BLD: 5.03 E12/L (ref 3.5–5.5)
SODIUM BLD-SCNC: 142 MMOL/L (ref 132–146)
TOTAL PROTEIN: 7.9 G/DL (ref 6.4–8.3)
TROPONIN: <0.01 NG/ML (ref 0–0.03)
WBC # BLD: 5.1 E9/L (ref 4.5–11.5)

## 2021-01-20 PROCEDURE — 96372 THER/PROPH/DIAG INJ SC/IM: CPT

## 2021-01-20 PROCEDURE — 84484 ASSAY OF TROPONIN QUANT: CPT

## 2021-01-20 PROCEDURE — 74177 CT ABD & PELVIS W/CONTRAST: CPT

## 2021-01-20 PROCEDURE — 6360000002 HC RX W HCPCS: Performed by: EMERGENCY MEDICINE

## 2021-01-20 PROCEDURE — 83690 ASSAY OF LIPASE: CPT

## 2021-01-20 PROCEDURE — 99282 EMERGENCY DEPT VISIT SF MDM: CPT

## 2021-01-20 PROCEDURE — 2580000003 HC RX 258: Performed by: EMERGENCY MEDICINE

## 2021-01-20 PROCEDURE — 93005 ELECTROCARDIOGRAM TRACING: CPT | Performed by: EMERGENCY MEDICINE

## 2021-01-20 PROCEDURE — 96374 THER/PROPH/DIAG INJ IV PUSH: CPT

## 2021-01-20 PROCEDURE — 70450 CT HEAD/BRAIN W/O DYE: CPT

## 2021-01-20 PROCEDURE — 85025 COMPLETE CBC W/AUTO DIFF WBC: CPT

## 2021-01-20 PROCEDURE — 80053 COMPREHEN METABOLIC PANEL: CPT

## 2021-01-20 PROCEDURE — 6360000004 HC RX CONTRAST MEDICATION: Performed by: RADIOLOGY

## 2021-01-20 PROCEDURE — 36415 COLL VENOUS BLD VENIPUNCTURE: CPT

## 2021-01-20 RX ORDER — ONDANSETRON 2 MG/ML
8 INJECTION INTRAMUSCULAR; INTRAVENOUS ONCE
Status: COMPLETED | OUTPATIENT
Start: 2021-01-20 | End: 2021-01-20

## 2021-01-20 RX ORDER — ONDANSETRON 8 MG/1
8 TABLET, ORALLY DISINTEGRATING ORAL EVERY 8 HOURS PRN
Qty: 12 TABLET | Refills: 0 | Status: SHIPPED | OUTPATIENT
Start: 2021-01-20 | End: 2021-06-28

## 2021-01-20 RX ORDER — 0.9 % SODIUM CHLORIDE 0.9 %
500 INTRAVENOUS SOLUTION INTRAVENOUS ONCE
Status: COMPLETED | OUTPATIENT
Start: 2021-01-20 | End: 2021-01-20

## 2021-01-20 RX ORDER — PROMETHAZINE HYDROCHLORIDE 25 MG/ML
25 INJECTION, SOLUTION INTRAMUSCULAR; INTRAVENOUS ONCE
Status: COMPLETED | OUTPATIENT
Start: 2021-01-20 | End: 2021-01-20

## 2021-01-20 RX ADMIN — IOPAMIDOL 75 ML: 755 INJECTION, SOLUTION INTRAVENOUS at 19:08

## 2021-01-20 RX ADMIN — ONDANSETRON 8 MG: 2 INJECTION INTRAMUSCULAR; INTRAVENOUS at 18:05

## 2021-01-20 RX ADMIN — PROMETHAZINE HYDROCHLORIDE 25 MG: 25 INJECTION INTRAMUSCULAR; INTRAVENOUS at 16:55

## 2021-01-20 RX ADMIN — SODIUM CHLORIDE 500 ML: 9 INJECTION, SOLUTION INTRAVENOUS at 17:03

## 2021-01-20 NOTE — ED PROVIDER NOTES
This is a 48year old female with a PMH of DM and Hydrocephaulus who presents to the ED for evaluation of nausea. Patient is here with her mother who is helping provide history. Patient was seen at [facility] earlier today and had her  shunt taken down from 70 to 40. Patient while in the clinic began to have some nausea and slight vomiting. As they were driving home, the nausea and vomiting became worse. The mother called the clinic and spoke with a nurse who advised them to come to the nearest ED. Patient states that her stomach hurts and her head hurts. She has no mitigating or exascerbating factors. The patient did try and eat some pinapple and these worsened her symptoms. She has no fevers or chills. A complete review of history and symptoms are limited secondary to the patient's clinical condition. The history is provided by the patient, medical records and a relative. No  was used. Review of Systems   Unable to perform ROS: Other       Physical Exam  Vitals signs and nursing note reviewed. Constitutional:       Appearance: She is obese. HENT:      Head: Normocephalic and atraumatic. Mouth/Throat:      Mouth: Mucous membranes are dry. Eyes:      Extraocular Movements: Extraocular movements intact. Pupils: Pupils are equal, round, and reactive to light. Neck:      Musculoskeletal: Normal range of motion and neck supple. Cardiovascular:      Heart sounds: No murmur. Pulmonary:      Effort: Pulmonary effort is normal.      Breath sounds: Normal breath sounds. No wheezing, rhonchi or rales. Chest:      Chest wall: No tenderness. Abdominal:      Palpations: Abdomen is soft. Tenderness: There is no abdominal tenderness. There is no guarding or rebound. Hernia: No hernia is present. Musculoskeletal:      Right lower leg: No edema. Left lower leg: No edema. Skin:     General: Skin is warm and dry. Capillary Refill: Capillary refill takes less than 2 seconds. Neurological:      Mental Status: She is alert. Comments: Awake and alert   Psychiatric:         Behavior: Behavior normal.         Procedures    MDM  Number of Diagnoses or Management Options  Abnormal CT scan  Nausea and vomiting, intractability of vomiting not specified, unspecified vomiting type  S/P  shunt  Diagnosis management comments: Patient is a 48year old female with a PMH of DM who is a poor historian but was accomplained by her parents. Patient had her shunt dropped from 70-40 today by Dr. Itzel Salguero. Patient developed nausea and vomiting at the [facility] and more so on her way home. CT of the patient's head and abdomen showed no acute pathology although somewhat limited due to artifact. Patient was treated with antiemetics and IV fluids with resolution of her symptoms. I made multiple calls to her NS however they were not returned. Patient was hemodynamically stable. While awaiting a call back, mother stated that she wanted to return home and follow up outpatient. I discussed the risks of leaving before talking to NS, she understood and was advised to return to the ED should she change her mind or if the patient develop any new or worsening symptoms. ED Course as of Jan 21 0107 Wed Jan 20, 2021   1715 Rechecked patient, n/v feels much improved    [BP]   1731 EKG shows SR with sinus arrythmia with 1st degree AV block. No st elevation. No St changes. Changes seen from previous    [BP]   2126 Patient sleeping well, family no longer wants to wait for NS to call back. They state that she feels better and they will follow up tomorrow    [BP]   200 Mother against states that want to go home and do not want to wait for NS.  Understand risks of not staying    [BP]      ED Course User Index  [BP] Sean Calero, DO         ED Course as of Jan 21 0107 Wed Jan 20, 2021   1715 Rechecked patient, n/v feels much improved    [BP] 1731 EKG shows SR with sinus arrythmia with 1st degree AV block. No st elevation. No St changes. Changes seen from previous    [BP]   2126 Patient sleeping well, family no longer wants to wait for NS to call back. They state that she feels better and they will follow up tomorrow    [BP]   200 Mother against states that want to go home and do not want to wait for NS. Understand risks of not staying    [BP]      ED Course User Index  [BP] Lizzy Crawford, DO       --------------------------------------------- PAST HISTORY ---------------------------------------------  Past Medical History:  has a past medical history of Arthritis, Down syndrome, Hypothyroidism, Osteoarthritis, Syncope, Thyroid disease, UTI (urinary tract infection), Varicose veins, and Wears glasses. Past Surgical History:  has a past surgical history that includes knee surgery; hip surgery; Toe Surgery; ECHO Compl W Dop Color Flow (5/14/2012); Tubal ligation; Total knee arthroplasty (Left, 08/11/2016); Abdominal adhesion surgery; Abdomen surgery; joint replacement; craniotomy (Right, 4/12/2019); Vena Cava Filter Placement (Bilateral, 4/22/2019); brain surgery; and Ventriculoperitoneal shunt (N/A, 11/12/2019). Social History:  reports that she has never smoked. She has never used smokeless tobacco. She reports that she does not drink alcohol or use drugs. Family History: family history includes Heart Disease in her maternal grandfather, maternal grandmother, paternal aunt, and paternal uncle. The patients home medications have been reviewed. Allergies: Patient has no known allergies.     -------------------------------------------------- RESULTS -------------------------------------------------  Labs:  Results for orders placed or performed during the hospital encounter of 01/20/21   Comprehensive Metabolic Panel w/ Reflex to MG   Result Value Ref Range    Sodium 142 132 - 146 mmol/L Potassium reflex Magnesium 4.3 3.5 - 5.0 mmol/L    Chloride 104 98 - 107 mmol/L    CO2 22 22 - 29 mmol/L    Anion Gap 16 7 - 16 mmol/L    Glucose 142 (H) 74 - 99 mg/dL    BUN 18 6 - 20 mg/dL    CREATININE 1.0 0.5 - 1.0 mg/dL    GFR Non-African American 58 >=60 mL/min/1.73    GFR African American >60     Calcium 9.5 8.6 - 10.2 mg/dL    Total Protein 7.9 6.4 - 8.3 g/dL    Alb 3.8 3.5 - 5.2 g/dL    Total Bilirubin 0.6 0.0 - 1.2 mg/dL    Alkaline Phosphatase 97 35 - 104 U/L    ALT 44 (H) 0 - 32 U/L    AST 37 (H) 0 - 31 U/L   CBC Auto Differential   Result Value Ref Range    WBC 5.1 4.5 - 11.5 E9/L    RBC 5.03 3.50 - 5.50 E12/L    Hemoglobin 16.5 (H) 11.5 - 15.5 g/dL    Hematocrit 47.2 34.0 - 48.0 %    MCV 93.8 80.0 - 99.9 fL    MCH 32.8 26.0 - 35.0 pg    MCHC 35.0 (H) 32.0 - 34.5 %    RDW 12.8 11.5 - 15.0 fL    Platelets 902 317 - 844 E9/L    MPV 9.9 7.0 - 12.0 fL    Neutrophils % 60.1 43.0 - 80.0 %    Immature Granulocytes % 0.6 0.0 - 5.0 %    Lymphocytes % 33.0 20.0 - 42.0 %    Monocytes % 4.5 2.0 - 12.0 %    Eosinophils % 0.6 0.0 - 6.0 %    Basophils % 1.2 0.0 - 2.0 %    Neutrophils Absolute 3.04 1.80 - 7.30 E9/L    Immature Granulocytes # 0.03 E9/L    Lymphocytes Absolute 1.67 1.50 - 4.00 E9/L    Monocytes Absolute 0.23 0.10 - 0.95 E9/L    Eosinophils Absolute 0.03 (L) 0.05 - 0.50 E9/L    Basophils Absolute 0.06 0.00 - 0.20 E9/L   Lipase   Result Value Ref Range    Lipase 22 13 - 60 U/L   Troponin   Result Value Ref Range    Troponin <0.01 0.00 - 0.03 ng/mL   EKG 12 Lead   Result Value Ref Range    Ventricular Rate 69 BPM    Atrial Rate 69 BPM    P-R Interval 210 ms    QRS Duration 76 ms    Q-T Interval 392 ms    QTc Calculation (Bazett) 420 ms    P Axis 85 degrees    R Axis 85 degrees    T Axis 52 degrees       Radiology:  CT HEAD WO CONTRAST   Final Result   Limited study due to motion artifact. Repeat study is recommended. Diffuse atrophy with persistent ventriculomegaly with marginal improvement. There is no gross acute stroke or hemorrhage. CT abdomen and pelvis. Examination limited due to motion artifact. The liver is somewhat fatty   infiltrated. Gallbladder, spleen, pancreas, and adrenals are normal.  There   is dilated renal collecting system without evidence for an obstructive   uropathy. IVC filter is noted.  shunt is noted. Degenerative changes are   noted in the thoracolumbar spine. Pelvis. Bladder is normal.  Colon is tortuous redundant and collapsed with   poor delineation of the colonic anatomy. Consider colonoscopy as clinically   indicated. Appendix cannot be identified. Impression   Limited study due to motion artifact. No gross abnormalities are identified. Poor delineation of the colon. See above. Mild dilated renal collecting system without obstructive uropathy      CT ABDOMEN PELVIS W IV CONTRAST Additional Contrast? None   Final Result   Limited study due to motion artifact. Repeat study is recommended. Diffuse atrophy with persistent ventriculomegaly with marginal improvement. There is no gross acute stroke or hemorrhage. CT abdomen and pelvis. Examination limited due to motion artifact. The liver is somewhat fatty   infiltrated. Gallbladder, spleen, pancreas, and adrenals are normal.  There   is dilated renal collecting system without evidence for an obstructive   uropathy. IVC filter is noted.  shunt is noted. Degenerative changes are   noted in the thoracolumbar spine. Pelvis. Bladder is normal.  Colon is tortuous redundant and collapsed with   poor delineation of the colonic anatomy. Consider colonoscopy as clinically   indicated. Appendix cannot be identified. Impression   Limited study due to motion artifact. No gross abnormalities are identified. Poor delineation of the colon. See above.    Mild dilated renal collecting system without obstructive uropathy ------------------------- NURSING NOTES AND VITALS REVIEWED ---------------------------  Date / Time Roomed:  1/20/2021  4:34 PM  ED Bed Assignment:  10/10    The nursing notes within the ED encounter and vital signs as below have been reviewed. /78   Pulse 71   Temp 97.3 °F (36.3 °C) (Infrared)   Resp 18   Ht 5' (1.524 m)   Wt 121 lb (54.9 kg)   SpO2 99%   BMI 23.63 kg/m²   Oxygen Saturation Interpretation: Normal      ------------------------------------------ PROGRESS NOTES ------------------------------------------  1:07 AM EST  I have spoken with the patient and discussed todays results, in addition to providing specific details for the plan of care and counseling regarding the diagnosis and prognosis. Their questions are answered at this time and they are agreeable with the plan. I discussed at length with them reasons for immediate return here for re evaluation. They will followup with neurosurgery and PCP.      --------------------------------- ADDITIONAL PROVIDER NOTES ---------------------------------  At this time the patient is without objective evidence of an acute process requiring hospitalization or inpatient management. They have remained hemodynamically stable throughout their entire ED visit and are stable for discharge with outpatient follow-up. The plan has been discussed in detail and they are aware of the specific conditions for emergent return, as well as the importance of follow-up. Discharge Medication List as of 1/20/2021  9:37 PM      START taking these medications    Details   ondansetron (ZOFRAN ODT) 8 MG TBDP disintegrating tablet Take 1 tablet by mouth every 8 hours as needed for Nausea, Disp-12 tablet, R-0Print             Diagnosis:  1. Nausea and vomiting, intractability of vomiting not specified, unspecified vomiting type    2. S/P  shunt    3.  Abnormal CT scan        Disposition:  Patient's disposition: Discharge to home Patient's condition is stable.           Pooja Lassiter,   01/21/21 0111

## 2021-01-21 LAB
EKG ATRIAL RATE: 69 BPM
EKG P AXIS: 85 DEGREES
EKG P-R INTERVAL: 210 MS
EKG Q-T INTERVAL: 392 MS
EKG QRS DURATION: 76 MS
EKG QTC CALCULATION (BAZETT): 420 MS
EKG R AXIS: 85 DEGREES
EKG T AXIS: 52 DEGREES
EKG VENTRICULAR RATE: 69 BPM

## 2021-01-21 PROCEDURE — 93010 ELECTROCARDIOGRAM REPORT: CPT | Performed by: INTERNAL MEDICINE

## 2021-02-08 ENCOUNTER — TELEPHONE (OUTPATIENT)
Dept: VASCULAR SURGERY | Age: 56
End: 2021-02-08

## 2021-02-08 NOTE — TELEPHONE ENCOUNTER
Called to confirm appointment for 2/9/21 at 21 [phone], left message with date, time, and phone number for patient.

## 2021-02-09 ENCOUNTER — OFFICE VISIT (OUTPATIENT)
Dept: VASCULAR SURGERY | Age: 56
End: 2021-02-09
Payer: MEDICARE

## 2021-02-09 DIAGNOSIS — R23.0 TOE CYANOSIS: Primary | ICD-10-CM

## 2021-02-09 PROCEDURE — G8427 DOCREV CUR MEDS BY ELIG CLIN: HCPCS | Performed by: SURGERY

## 2021-02-09 PROCEDURE — 1036F TOBACCO NON-USER: CPT | Performed by: SURGERY

## 2021-02-09 PROCEDURE — 99213 OFFICE O/P EST LOW 20 MIN: CPT | Performed by: SURGERY

## 2021-02-09 PROCEDURE — G8420 CALC BMI NORM PARAMETERS: HCPCS | Performed by: SURGERY

## 2021-02-09 PROCEDURE — 3017F COLORECTAL CA SCREEN DOC REV: CPT | Performed by: SURGERY

## 2021-02-09 PROCEDURE — G8484 FLU IMMUNIZE NO ADMIN: HCPCS | Performed by: SURGERY

## 2021-02-09 NOTE — PROGRESS NOTES
every 8 hours as needed for Nausea 1/20/21   Carly Rodarte, DO   liothyronine (CYTOMEL) 5 MCG tablet Take 1 tab by mouth every morning on [id number]-[id number]-Dwzwnv 12/21/20   Martha Loera DO   Lactobacillus (ACIDOPHILUS PO) Take by mouth    Historical Provider, MD   D-Mannose 500 MG CAPS Take 2,000 mg by mouth daily 12/19/19   Jose Randhawa DO     Allergies:  Patient has no known allergies.     Social History     Socioeconomic History    Marital status: Single     Spouse name: Not on file    Number of children: Not on file    Years of education: Not on file    Highest education level: Not on file   Occupational History    Not on file   Social Needs    Financial resource strain: Not on file    Food insecurity     Worry: Not on file     Inability: Not on file    Transportation needs     Medical: Not on file     Non-medical: Not on file   Tobacco Use    Smoking status: Never Smoker    Smokeless tobacco: Never Used   Substance and Sexual Activity    Alcohol use: No    Drug use: No    Sexual activity: Never   Lifestyle    Physical activity     Days per week: Not on file     Minutes per session: Not on file    Stress: Not on file   Relationships    Social connections     Talks on phone: Not on file     Gets together: Not on file     Attends Sabianism service: Not on file     Active member of club or organization: Not on file     Attends meetings of clubs or organizations: Not on file     Relationship status: Not on file    Intimate partner violence     Fear of current or ex partner: Not on file     Emotionally abused: Not on file     Physically abused: Not on file     Forced sexual activity: Not on file   Other Topics Concern    Not on file   Social History Narrative    Not on file        Family History   Problem Relation Age of Onset    Heart Disease Maternal Grandmother     Heart Disease Maternal Grandfather     Heart Disease Paternal Aunt     Heart Disease Paternal Uncle        REVIEW OF SYSTEMS (New symptoms):    Eyes:      Blurred vision:  No [x]/Yes []               Diplopia:   No [x]/Yes []               Vision loss:       No [x]/Yes []   Ears, nose, throat:             Hearing loss:    No [x]/Yes []      Vertigo:   No [x]/Yes []                       Swallowing problem:  No [x]/Yes []               Nose bleeds:   No [x]/Yes []      Voice hoarseness:  No [x]/Yes []  Respiratory:             Cough:   No [x]/Yes []      Pleuritic chest pain:  No [x]/Yes []                        Dyspnea:   No [x]/Yes []      Wheezing:   No [x]/Yes []  Cardiovascular:             Angina:   No [x]/Yes []      Palpitations:   No [x]/Yes []          Claudication:    No [x]/Yes []      Leg swelling:   No []/Yes [x]  Gastrointestinal:             Nausea or vomiting:  No [x]/Yes []               Abdominal pain:  No [x]/Yes []                     Intestinal bleeding: No [x]/Yes []  Musculoskeletal:             Leg pain:   No [x]/Yes []      Back pain:   No [x]/Yes []                    Weakness:   No [x]/Yes []  Neurologic:             Numbness:   No [x]/Yes []      Paralysis:   No [x]/Yes []                       Headaches:   No [x]/Yes []  Hematologic, lymphatic:   Anemia:   No [x]/Yes []              Bleeding or bruising:  No [x]/Yes []              Fevers or chills: No [x]/Yes []  Endocrine:             Temp intolerance:   No [x]/Yes []                       Polydipsia, polyuria:  No [x]/Yes []  Skin:              Rash:    No [x]/Yes []      Ulcers:   No [x]/Yes []              Abnorm pigment: No [x]/Yes []  :              Frequency/urgency:  No [x]/Yes []      Hematuria:    No [x]/Yes []                      Incontinence:    No [x]/Yes []    PHYSICAL EXAM:  There were no vitals filed for this visit.   General Appearance: alert and oriented to person, place and time, in no acute distress, well developed and well- nourished  Neurologic: no cranial nerve deficit, speech normal  Head: normocephalic and atraumatic  Eyes: extraocular eye movements intact, conjunctivae normal  ENT: external ear and ear canal normal bilaterally, nose without deformity, no carotid bruits  Pulmonary/Chest: normal air movement, no respiratory distress  Cardiovascular: normal rate, regular rhythm, no murmur  Abdomen: non-distended, no masses  Musculoskeletal: no joint deformity or tenderness  Extremities: no leg edema bilaterally  Skin: warm and dry, no rash or erythema    PULSE EXAM      Right      Left   Brachial     Radial 2 2   Femoral     Popliteal     Dorsalis Pedis 2 2   Posterior Tibial 2 2   (3=normal, 2=diminished, 1=barely palpable, 4=widened)    RADIOLOGY: [facility] today    Problem List Items Addressed This Visit     Toe cyanosis - Primary          I reassured the parents that the perfusion to Lilo's feet remains very good and the bluish discoloration on areas on her foot are due to pressure. I asked her parents to use corn pads as needed and even look for a wider shoe to help with her bunion deformity to avoid pressure breakdown. I do not feel that she requires any additional testing or intervention. I asked them to call for follow-up with any changes or new problems. No follow-ups on file.

## 2021-02-23 ENCOUNTER — OFFICE VISIT (OUTPATIENT)
Dept: PRIMARY CARE CLINIC | Age: 56
End: 2021-02-23
Payer: MEDICARE

## 2021-02-23 VITALS
WEIGHT: 131 LBS | SYSTOLIC BLOOD PRESSURE: 110 MMHG | DIASTOLIC BLOOD PRESSURE: 80 MMHG | TEMPERATURE: 97.2 F | HEART RATE: 76 BPM | OXYGEN SATURATION: 94 % | BODY MASS INDEX: 25.58 KG/M2

## 2021-02-23 DIAGNOSIS — E03.9 ACQUIRED HYPOTHYROIDISM: ICD-10-CM

## 2021-02-23 DIAGNOSIS — F41.9 ANXIETY: ICD-10-CM

## 2021-02-23 DIAGNOSIS — R74.01 TRANSAMINITIS: ICD-10-CM

## 2021-02-23 DIAGNOSIS — R29.898 MUSCULAR DECONDITIONING: Primary | ICD-10-CM

## 2021-02-23 DIAGNOSIS — R41.82 ALTERED MENTAL STATUS, UNSPECIFIED ALTERED MENTAL STATUS TYPE: ICD-10-CM

## 2021-02-23 DIAGNOSIS — Q90.9 DOWN'S SYNDROME: ICD-10-CM

## 2021-02-23 DIAGNOSIS — E03.9 ACQUIRED HYPOTHYROIDISM: Primary | ICD-10-CM

## 2021-02-23 DIAGNOSIS — L24.9 IRRITANT DERMATITIS: ICD-10-CM

## 2021-02-23 PROCEDURE — G8427 DOCREV CUR MEDS BY ELIG CLIN: HCPCS | Performed by: INTERNAL MEDICINE

## 2021-02-23 PROCEDURE — G8484 FLU IMMUNIZE NO ADMIN: HCPCS | Performed by: INTERNAL MEDICINE

## 2021-02-23 PROCEDURE — 3017F COLORECTAL CA SCREEN DOC REV: CPT | Performed by: INTERNAL MEDICINE

## 2021-02-23 PROCEDURE — 1036F TOBACCO NON-USER: CPT | Performed by: INTERNAL MEDICINE

## 2021-02-23 PROCEDURE — G8419 CALC BMI OUT NRM PARAM NOF/U: HCPCS | Performed by: INTERNAL MEDICINE

## 2021-02-23 PROCEDURE — 99213 OFFICE O/P EST LOW 20 MIN: CPT | Performed by: INTERNAL MEDICINE

## 2021-02-23 ASSESSMENT — PATIENT HEALTH QUESTIONNAIRE - PHQ9
SUM OF ALL RESPONSES TO PHQ QUESTIONS 1-9: 0
SUM OF ALL RESPONSES TO PHQ9 QUESTIONS 1 & 2: 0
SUM OF ALL RESPONSES TO PHQ QUESTIONS 1-9: 0
1. LITTLE INTEREST OR PLEASURE IN DOING THINGS: 0

## 2021-02-23 NOTE — PROGRESS NOTES
 Cholecalciferol (VITAMIN D-3 SUPER STRENGTH) 50 MCG (2000 UT) TABS Take 1 tablet by mouth daily 30 tablet 5    Lactobacillus (ACIDOPHILUS PO) Take by mouth      ondansetron (ZOFRAN ODT) 8 MG TBDP disintegrating tablet Take 1 tablet by mouth every 8 hours as needed for Nausea (Patient not taking: Reported on 2/23/2021) 12 tablet 0     No current facility-administered medications on file prior to visit. Review of Systems  Constitutional:Negative for activity change, appetite change, chills, fatigue and fever. Respiratory: Negative for choking, chest tightness, shortness of breath and wheezing. Cardiovascular: Negative for chest pain, palpitations and leg swelling. Gastrointestinal: Negative for abdominal distention, constipation, diarrhea, nausea and vomiting. Musculoskeletal: Negative for arthralgias, back pain, gait problem and joint swelling. Neurological: Negative for dizziness, weakness,numbness and headaches. /80   Pulse 76   Temp 97.2 °F (36.2 °C)   Wt 131 lb (59.4 kg)   SpO2 94%   BMI 25.58 kg/m²      Physical Exam   Constitutional:  Inattentive  HENT: No sinus tenderness or lymphadenopathy  Head: Normocephalic and atraumatic. Eyes: Eyes exhibits no discharge. No scleral icterus present. Neck: No tracheal deviation present. No thyromegaly present. Cardiovascular: Normal rate, regular rhythm, normal heart sounds and intact distal pulses. Exam reveals no gallop nor friction rub. No murmur heard. Pulmonary: Effort normal and breath sounds normal. No respiratory distress. No wheezes or rales. Abdomen: No signs of rigidity rebound or organomegaly  Musculoskeletal:  No tenderness to palpation  Neurological:Alert and oriented to person, place, and time. Skin: No diaphoresis. Psychiatric: Normal mood and affect. Behavior is Normal.     ASSESSMENT AND PLAN:    Kevin Wilson was seen today for hypothyroidism and other.     Diagnoses and all orders for this visit: Muscular deconditioning    Altered mental status, unspecified altered mental status type    Down's syndrome    Transaminitis    Anxiety    Acquired hypothyroidism    Irritant dermatitis      She is following up with neurosurgery at the [facility] clinic for possible shunt exploration    Notable behavioral changes per parents, Sarah Andreafski dementia versus shut malfunction    Following with neurosurgery, neurology and a Down's syndrome    Nausea has been improving    Transaminitis evident on most recent blood work, will follow serially    We will recheck a TSH given her history of hypothyroidism and is been almost 9 months into this been checked      Electronically signed by Luna Solitario DO on 2/23/2021 at 2:45 PM      Please note that the above documentation was prepared using voice recognition software. Every attempt was made to ensure accuracy but there may be spelling, grammatical, and contextual errors. Electronically signed by Luna Solitario DO on 2/23/2021 at 2:45 PM          Return in about 6 months (around 8/23/2021).     Luna Solitario DO

## 2021-03-07 ENCOUNTER — APPOINTMENT (OUTPATIENT)
Dept: CT IMAGING | Age: 56
End: 2021-03-07
Payer: MEDICARE

## 2021-03-07 ENCOUNTER — HOSPITAL ENCOUNTER (EMERGENCY)
Age: 56
Discharge: ANOTHER HOSPITAL | End: 2021-03-07
Attending: FAMILY MEDICINE
Payer: MEDICARE

## 2021-03-07 VITALS
RESPIRATION RATE: 20 BRPM | HEIGHT: 60 IN | HEART RATE: 70 BPM | BODY MASS INDEX: 25.72 KG/M2 | WEIGHT: 131 LBS | SYSTOLIC BLOOD PRESSURE: 97 MMHG | DIASTOLIC BLOOD PRESSURE: 55 MMHG | TEMPERATURE: 98.1 F | OXYGEN SATURATION: 97 %

## 2021-03-07 DIAGNOSIS — S06.5XAA SUBDURAL HEMATOMA: Primary | ICD-10-CM

## 2021-03-07 DIAGNOSIS — R11.2 NON-INTRACTABLE VOMITING WITH NAUSEA, UNSPECIFIED VOMITING TYPE: ICD-10-CM

## 2021-03-07 LAB
ALBUMIN SERPL-MCNC: 3.8 G/DL (ref 3.5–5.2)
ALP BLD-CCNC: 120 U/L (ref 35–104)
ALT SERPL-CCNC: 43 U/L (ref 0–32)
ANION GAP SERPL CALCULATED.3IONS-SCNC: 12 MMOL/L (ref 7–16)
AST SERPL-CCNC: 37 U/L (ref 0–31)
BASOPHILS ABSOLUTE: 0.04 E9/L (ref 0–0.2)
BASOPHILS RELATIVE PERCENT: 0.3 % (ref 0–2)
BILIRUB SERPL-MCNC: 0.7 MG/DL (ref 0–1.2)
BUN BLDV-MCNC: 17 MG/DL (ref 6–20)
CALCIUM SERPL-MCNC: 9.4 MG/DL (ref 8.6–10.2)
CHLORIDE BLD-SCNC: 105 MMOL/L (ref 98–107)
CO2: 26 MMOL/L (ref 22–29)
CREAT SERPL-MCNC: 0.9 MG/DL (ref 0.5–1)
EOSINOPHILS ABSOLUTE: 0 E9/L (ref 0.05–0.5)
EOSINOPHILS RELATIVE PERCENT: 0 % (ref 0–6)
GFR AFRICAN AMERICAN: >60
GFR NON-AFRICAN AMERICAN: >60 ML/MIN/1.73
GLUCOSE BLD-MCNC: 159 MG/DL (ref 74–99)
HCT VFR BLD CALC: 48.7 % (ref 34–48)
HEMOGLOBIN: 16.7 G/DL (ref 11.5–15.5)
IMMATURE GRANULOCYTES #: 0.08 E9/L
IMMATURE GRANULOCYTES %: 0.6 % (ref 0–5)
LACTIC ACID: 3.9 MMOL/L (ref 0.5–2.2)
LIPASE: 19 U/L (ref 13–60)
LYMPHOCYTES ABSOLUTE: 0.53 E9/L (ref 1.5–4)
LYMPHOCYTES RELATIVE PERCENT: 4 % (ref 20–42)
MCH RBC QN AUTO: 33 PG (ref 26–35)
MCHC RBC AUTO-ENTMCNC: 34.3 % (ref 32–34.5)
MCV RBC AUTO: 96.2 FL (ref 80–99.9)
MONOCYTES ABSOLUTE: 0.73 E9/L (ref 0.1–0.95)
MONOCYTES RELATIVE PERCENT: 5.4 % (ref 2–12)
NEUTROPHILS ABSOLUTE: 12.02 E9/L (ref 1.8–7.3)
NEUTROPHILS RELATIVE PERCENT: 89.7 % (ref 43–80)
PDW BLD-RTO: 13.2 FL (ref 11.5–15)
PLATELET # BLD: 169 E9/L (ref 130–450)
PMV BLD AUTO: 9.6 FL (ref 7–12)
POTASSIUM SERPL-SCNC: 4.4 MMOL/L (ref 3.5–5)
RBC # BLD: 5.06 E12/L (ref 3.5–5.5)
SODIUM BLD-SCNC: 143 MMOL/L (ref 132–146)
TOTAL PROTEIN: 7.9 G/DL (ref 6.4–8.3)
WBC # BLD: 13.4 E9/L (ref 4.5–11.5)

## 2021-03-07 PROCEDURE — 80053 COMPREHEN METABOLIC PANEL: CPT

## 2021-03-07 PROCEDURE — 74176 CT ABD & PELVIS W/O CONTRAST: CPT

## 2021-03-07 PROCEDURE — 99285 EMERGENCY DEPT VISIT HI MDM: CPT

## 2021-03-07 PROCEDURE — 96376 TX/PRO/DX INJ SAME DRUG ADON: CPT

## 2021-03-07 PROCEDURE — 70450 CT HEAD/BRAIN W/O DYE: CPT

## 2021-03-07 PROCEDURE — 36415 COLL VENOUS BLD VENIPUNCTURE: CPT

## 2021-03-07 PROCEDURE — 96374 THER/PROPH/DIAG INJ IV PUSH: CPT

## 2021-03-07 PROCEDURE — 83690 ASSAY OF LIPASE: CPT

## 2021-03-07 PROCEDURE — 2580000003 HC RX 258: Performed by: FAMILY MEDICINE

## 2021-03-07 PROCEDURE — 85025 COMPLETE CBC W/AUTO DIFF WBC: CPT

## 2021-03-07 PROCEDURE — 93005 ELECTROCARDIOGRAM TRACING: CPT | Performed by: FAMILY MEDICINE

## 2021-03-07 PROCEDURE — 6360000002 HC RX W HCPCS: Performed by: FAMILY MEDICINE

## 2021-03-07 PROCEDURE — 83605 ASSAY OF LACTIC ACID: CPT

## 2021-03-07 RX ORDER — ONDANSETRON 2 MG/ML
4 INJECTION INTRAMUSCULAR; INTRAVENOUS ONCE
Status: COMPLETED | OUTPATIENT
Start: 2021-03-07 | End: 2021-03-07

## 2021-03-07 RX ORDER — 0.9 % SODIUM CHLORIDE 0.9 %
500 INTRAVENOUS SOLUTION INTRAVENOUS ONCE
Status: COMPLETED | OUTPATIENT
Start: 2021-03-07 | End: 2021-03-07

## 2021-03-07 RX ADMIN — ONDANSETRON 4 MG: 2 INJECTION INTRAMUSCULAR; INTRAVENOUS at 17:23

## 2021-03-07 RX ADMIN — ONDANSETRON 4 MG: 2 INJECTION INTRAMUSCULAR; INTRAVENOUS at 13:01

## 2021-03-07 RX ADMIN — SODIUM CHLORIDE 500 ML: 9 INJECTION, SOLUTION INTRAVENOUS at 13:01

## 2021-03-07 NOTE — ED PROVIDER NOTES
oriented, well appearing, non toxic in NAD  Head: Normocephalic and atraumatic  Eyes: PERRL, EOMI Ptosis left eye at rest  Mouth: Oropharynx clear, handling secretions, no trismus  Neck: Supple, full ROM, non tender to palpation in the midline, no stridor, no crepitus, no meningeal signs  Pulmonary: Lungs clear to auscultation bilaterally, no wheezes, rales, or rhonchi. Not in respiratory distress  Cardiovascular:  Regular rate. Regular rhythm. 2/6 murmurs, gallops, or rubs. 2+ distal pulses  Chest: no chest wall tenderness  Abdomen: Soft. Non tender. Non distended. +BS. No rebound, guarding, or rigidity. No pulsatile masses appreciated. Musculoskeletal: Moves all extremities x 4. Warm and well perfused, no clubbing, cyanosis, or edema. Capillary refill <3 seconds  Skin: warm and dry. No rashes. Neurologic: GCS 15, CN 2-12 grossly intact other than ptosis left eyelid, no focal deficits, symmetric strength 5/5 in the upper and lower extremities bilaterally  Psych: Normal Affect patient able to follow directions.     -------------------------------------------------- RESULTS -------------------------------------------------  I have personally reviewed all laboratory and imaging results for this patient. Results are listed below.      LABS:  Results for orders placed or performed during the hospital encounter of 03/07/21   CBC Auto Differential   Result Value Ref Range    WBC 13.4 (H) 4.5 - 11.5 E9/L    RBC 5.06 3.50 - 5.50 E12/L    Hemoglobin 16.7 (H) 11.5 - 15.5 g/dL    Hematocrit 48.7 (H) 34.0 - 48.0 %    MCV 96.2 80.0 - 99.9 fL    MCH 33.0 26.0 - 35.0 pg    MCHC 34.3 32.0 - 34.5 %    RDW 13.2 11.5 - 15.0 fL    Platelets 555 338 - 596 E9/L    MPV 9.6 7.0 - 12.0 fL   Comprehensive Metabolic Panel   Result Value Ref Range    Sodium 143 132 - 146 mmol/L    Potassium 4.4 3.5 - 5.0 mmol/L    Chloride 105 98 - 107 mmol/L    CO2 26 22 - 29 mmol/L    Anion Gap 12 7 - 16 mmol/L    Glucose 159 (H) 74 - 99 mg/dL    BUN 17 6 - 20 mg/dL    CREATININE 0.9 0.5 - 1.0 mg/dL    GFR Non-African American >60 >=60 mL/min/1.73    GFR African American >60     Calcium 9.4 8.6 - 10.2 mg/dL    Total Protein 7.9 6.4 - 8.3 g/dL    Albumin 3.8 3.5 - 5.2 g/dL    Total Bilirubin 0.7 0.0 - 1.2 mg/dL    Alkaline Phosphatase 120 (H) 35 - 104 U/L    ALT 43 (H) 0 - 32 U/L    AST 37 (H) 0 - 31 U/L   Lipase   Result Value Ref Range    Lipase 19 13 - 60 U/L   Lactic Acid, Plasma   Result Value Ref Range    Lactic Acid 3.9 (H) 0.5 - 2.2 mmol/L       RADIOLOGY:  Interpreted by Radiologist.  CT HEAD WO CONTRAST   Final Result   1. As new findings since the previous examination of January 20, 2021 is the   presence of bilateral iso/hypodense subdural collections, more likely a   subacute phase for subdural hematomas. These are new development since the   January 20, 2021 examination. 2.  Minimal midline shift to the left of about the 1.4 mm.      3.  No change in the position of the right ventricular shunt catheter. Preliminary report was given to Dr. Loni Julian, ER physician in [facility] at the   time of the interpretation. CT ABDOMEN PELVIS WO CONTRAST Additional Contrast? None   Preliminary Result   1. The study is  mildly limited, as detailed above. 2. No acute process within the abdomen or pelvis within the limitations of a   noncontrast study. ------------------------- NURSING NOTES AND VITALS REVIEWED ---------------------------   The nursing notes within the ED encounter and vital signs as below have been reviewed by myself. BP (!) 97/55   Pulse 70   Temp 98.1 °F (36.7 °C)   Resp 20   Ht 5' (1.524 m)   Wt 131 lb (59.4 kg)   SpO2 97%   BMI 25.58 kg/m²   Oxygen Saturation Interpretation: Normal    The patients available past medical records and past encounters were reviewed.         ------------------------------ ED COURSE/MEDICAL DECISION MAKING----------------------  Medications   ondansetron (ZOFRAN) injection 4 mg (has no administration in time range)   0.9 % sodium chloride bolus (0 mLs Intravenous Stopped 3/7/21 1511)   ondansetron (ZOFRAN) injection 4 mg (4 mg Intravenous Given 3/7/21 1301)             Medical Decision Making:    Patient has new subacute subdural is noted on the CAT scan    Re-Evaluations:             2:30 Re-evaluation. No new focal findings nausea vomiting improved with treatment    Consultations:            Dr. Adelina Zuniga [facility] neurosurgery cussed with Dominga Whalen who accepted patient            This patient's ED course included: complex medical decision making and emergency management and a personal history and physicial eaxmination    This patient has remained hemodynamically stable and been closely monitored during their ED course. Counseling: The emergency provider has spoken with the family member parents and discussed todays results, in addition to providing specific details for the plan of care and counseling regarding the diagnosis and prognosis. Questions are answered at this time and they are agreeable with the plan.       --------------------------------- IMPRESSION AND DISPOSITION ---------------------------------    IMPRESSION  1. Subdural hematoma ([address]    2. Non-intractable vomiting with nausea, unspecified vomiting type        DISPOSITION  Disposition: Transfer to F Stepdown unit  Patient condition is fair        NOTE: This report was transcribed using voice recognition software.  Every effort was made to ensure accuracy; however, inadvertent computerized transcription errors may be present         Denny Fam MD  03/07/21 4233

## 2021-03-08 LAB
EKG ATRIAL RATE: 77 BPM
EKG P AXIS: 84 DEGREES
EKG P-R INTERVAL: 202 MS
EKG Q-T INTERVAL: 376 MS
EKG QRS DURATION: 68 MS
EKG QTC CALCULATION (BAZETT): 425 MS
EKG R AXIS: 74 DEGREES
EKG T AXIS: 20 DEGREES
EKG VENTRICULAR RATE: 77 BPM

## 2021-03-08 PROCEDURE — 93010 ELECTROCARDIOGRAM REPORT: CPT | Performed by: INTERNAL MEDICINE

## 2021-06-28 ENCOUNTER — OFFICE VISIT (OUTPATIENT)
Dept: PRIMARY CARE CLINIC | Age: 56
End: 2021-06-28
Payer: MEDICARE

## 2021-06-28 VITALS
DIASTOLIC BLOOD PRESSURE: 64 MMHG | HEART RATE: 67 BPM | SYSTOLIC BLOOD PRESSURE: 122 MMHG | WEIGHT: 133 LBS | TEMPERATURE: 97.1 F | OXYGEN SATURATION: 98 % | BODY MASS INDEX: 25.97 KG/M2

## 2021-06-28 DIAGNOSIS — G91.0 COMMUNICATING HYDROCEPHALUS (HCC): ICD-10-CM

## 2021-06-28 DIAGNOSIS — R41.89 COGNITIVE IMPAIRMENT: ICD-10-CM

## 2021-06-28 DIAGNOSIS — R30.0 DYSURIA: Primary | ICD-10-CM

## 2021-06-28 PROCEDURE — 3017F COLORECTAL CA SCREEN DOC REV: CPT | Performed by: INTERNAL MEDICINE

## 2021-06-28 PROCEDURE — G8427 DOCREV CUR MEDS BY ELIG CLIN: HCPCS | Performed by: INTERNAL MEDICINE

## 2021-06-28 PROCEDURE — 1036F TOBACCO NON-USER: CPT | Performed by: INTERNAL MEDICINE

## 2021-06-28 PROCEDURE — G8419 CALC BMI OUT NRM PARAM NOF/U: HCPCS | Performed by: INTERNAL MEDICINE

## 2021-06-28 PROCEDURE — 99213 OFFICE O/P EST LOW 20 MIN: CPT | Performed by: INTERNAL MEDICINE

## 2021-06-28 NOTE — PROGRESS NOTES
of breath and wheezing. Cardiovascular: Negative for chest pain, palpitations and leg swelling. Gastrointestinal: Negative for abdominal distention, constipation, diarrhea, nausea and vomiting. Musculoskeletal: Negative for arthralgias, back pain, gait problem and joint swelling. Neurological: Negative for dizziness, weakness,numbness and headaches. /64   Pulse 67   Temp 97.1 °F (36.2 °C)   Wt 133 lb (60.3 kg)   SpO2 98%   BMI 25.97 kg/m²      Physical Exam   Constitutional: Disoriented at times and unable to answer basic healthcare questions  HENT: No sinus tenderness or lymphadenopathy  Head: Normocephalic and atraumatic. Eyes: Eyes exhibits no discharge. No scleral icterus present. Neck: No tracheal deviation present. No thyromegaly present. Cardiovascular: Normal rate, regular rhythm, normal heart sounds and intact distal pulses. Exam reveals no gallop nor friction rub. No murmur heard. Pulmonary: Effort normal and breath sounds normal. No respiratory distress. No wheezes or rales. Abdomen: No signs of rigidity rebound or organomegaly  Musculoskeletal:  No tenderness to palpation  Neurological:Alert and oriented to person, place, and time. Skin: No diaphoresis. Psychiatric: Normal mood and affect. Behavior is Normal.   She is minimally communicative and most of her examination was done with the assistance of her mother [address]:    Mona Jacobs was seen today for urinary retention. Diagnoses and all orders for this visit:    Dysuria    Communicating hydrocephalus [facility])    Cognitive impairment        I feel that her changes in mental status were likely secondary to recent UTI. I question whether donepezil had any impact on her urinary issues she should continue to hold this medication for the next 2 weeks. If she continues to be symptom-free I would recommend reinitiation of donepezil.   I also advised her to follow back up with neurosurgery as needed      No follow-ups on file.         Electronically signed by Madalyn Farah DO on 6/28/2021 at 5:01 PM    Madalyn Farah DO

## 2021-07-01 ENCOUNTER — TELEPHONE (OUTPATIENT)
Dept: PRIMARY CARE CLINIC | Age: 56
End: 2021-07-01

## 2021-07-01 DIAGNOSIS — R35.0 URINARY FREQUENCY: Primary | ICD-10-CM

## 2021-07-01 DIAGNOSIS — R35.0 URINARY FREQUENCY: ICD-10-CM

## 2021-07-01 RX ORDER — CIPROFLOXACIN 250 MG/1
250 TABLET, FILM COATED ORAL 2 TIMES DAILY
Qty: 14 TABLET | Refills: 0 | Status: SHIPPED
Start: 2021-07-01 | End: 2021-07-01 | Stop reason: SDUPTHER

## 2021-07-01 RX ORDER — CIPROFLOXACIN 250 MG/1
250 TABLET, FILM COATED ORAL 2 TIMES DAILY
Qty: 14 TABLET | Refills: 0 | Status: SHIPPED | OUTPATIENT
Start: 2021-07-01 | End: 2021-07-08

## 2021-07-01 NOTE — TELEPHONE ENCOUNTER
Ashley Mata called the  called her Lissy Ramires is having some leaking and urinary freq going five times in a hour please advise?

## 2021-07-01 NOTE — TELEPHONE ENCOUNTER
I would like to start her on another course of therapy, if her symptoms fail to improve by next week I would like to perform a repeat urinalysis

## 2021-07-27 ENCOUNTER — OFFICE VISIT (OUTPATIENT)
Dept: PRIMARY CARE CLINIC | Age: 56
End: 2021-07-27
Payer: MEDICARE

## 2021-07-27 VITALS
DIASTOLIC BLOOD PRESSURE: 80 MMHG | TEMPERATURE: 98 F | HEIGHT: 60 IN | RESPIRATION RATE: 18 BRPM | SYSTOLIC BLOOD PRESSURE: 115 MMHG | WEIGHT: 121 LBS | HEART RATE: 78 BPM | BODY MASS INDEX: 23.75 KG/M2

## 2021-07-27 DIAGNOSIS — N30.00 ACUTE CYSTITIS WITHOUT HEMATURIA: Primary | ICD-10-CM

## 2021-07-27 DIAGNOSIS — R35.0 URINE FREQUENCY: ICD-10-CM

## 2021-07-27 LAB
BILIRUBIN, POC: NORMAL
BLOOD URINE, POC: NORMAL
CLARITY, POC: CLEAR
COLOR, POC: YELLOW
GLUCOSE URINE, POC: NORMAL
KETONES, POC: NORMAL
LEUKOCYTE EST, POC: NORMAL
NITRITE, POC: NORMAL
PH, POC: 5.5
PROTEIN, POC: NORMAL
SPECIFIC GRAVITY, POC: 1.03
UROBILINOGEN, POC: NORMAL

## 2021-07-27 PROCEDURE — 81002 URINALYSIS NONAUTO W/O SCOPE: CPT | Performed by: FAMILY MEDICINE

## 2021-07-27 PROCEDURE — 3017F COLORECTAL CA SCREEN DOC REV: CPT | Performed by: FAMILY MEDICINE

## 2021-07-27 PROCEDURE — 99213 OFFICE O/P EST LOW 20 MIN: CPT | Performed by: FAMILY MEDICINE

## 2021-07-27 PROCEDURE — G8427 DOCREV CUR MEDS BY ELIG CLIN: HCPCS | Performed by: FAMILY MEDICINE

## 2021-07-27 PROCEDURE — 1036F TOBACCO NON-USER: CPT | Performed by: FAMILY MEDICINE

## 2021-07-27 PROCEDURE — G8420 CALC BMI NORM PARAMETERS: HCPCS | Performed by: FAMILY MEDICINE

## 2021-07-27 RX ORDER — NITROFURANTOIN 25; 75 MG/1; MG/1
100 CAPSULE ORAL 2 TIMES DAILY
Qty: 14 CAPSULE | Refills: 0 | Status: SHIPPED | OUTPATIENT
Start: 2021-07-27 | End: 2021-08-03

## 2021-07-27 RX ORDER — DONEPEZIL HYDROCHLORIDE 5 MG/1
5 TABLET, FILM COATED ORAL NIGHTLY
Status: ON HOLD | COMMUNITY
Start: 2021-06-24 | End: 2022-06-18 | Stop reason: HOSPADM

## 2021-07-27 SDOH — ECONOMIC STABILITY: FOOD INSECURITY: WITHIN THE PAST 12 MONTHS, THE FOOD YOU BOUGHT JUST DIDN'T LAST AND YOU DIDN'T HAVE MONEY TO GET MORE.: NEVER TRUE

## 2021-07-27 SDOH — ECONOMIC STABILITY: FOOD INSECURITY: WITHIN THE PAST 12 MONTHS, YOU WORRIED THAT YOUR FOOD WOULD RUN OUT BEFORE YOU GOT MONEY TO BUY MORE.: NEVER TRUE

## 2021-07-27 ASSESSMENT — SOCIAL DETERMINANTS OF HEALTH (SDOH): HOW HARD IS IT FOR YOU TO PAY FOR THE VERY BASICS LIKE FOOD, HOUSING, MEDICAL CARE, AND HEATING?: NOT HARD AT ALL

## 2021-07-27 NOTE — PROGRESS NOTES
Alexsandra MORALES See  : 1965    Chief Complaint:     Chief Complaint   Patient presents with    Urinary Frequency       HPI  Christopher Mercury See 55 y.o. presents for   Chief Complaint   Patient presents with    Urinary Frequency     Patient presents for urinary frequency for the past few days. She denies any abdominal pain, flank pain, fevers or chills. She has been drinking much water. All questions were answered to patients satisfaction. Past Medical History:   Diagnosis Date    Arthritis     Down syndrome     Hypothyroidism     Osteoarthritis     Syncope     Thyroid disease     UTI (urinary tract infection)         Varicose veins     Wears glasses        Past Surgical History:   Procedure Laterality Date    ABDOMEN SURGERY      duodenal atresia    ABDOMINAL ADHESION SURGERY      BRAIN SURGERY      CRANIOTOMY Right 2019    RIGHT CRANIOTOMY FRANCISCO JAVIER HOLES performed by Ada Galaviz MD at [place] [address] ECHO COMPL W DOP COLOR FLOW  2012         HIP SURGERY      Right    JOINT REPLACEMENT      left knee, right hip-dr Vogel Caitlin KNEE SURGERY      Right    TOE SURGERY      Right foot    TOTAL KNEE ARTHROPLASTY Left 2016    DR. De    TUBAL LIGATION      VENA CAVA FILTER PLACEMENT Bilateral 2019    VENA CAVA FILTER INSERTION performed by Danni Banerjee MD at [place] [address] VENTRICULOPERITONEAL SHUNT N/A 2019    RIGHT FRONTAL VENTRICULO PERITONEAL SHUNT INSERTION-----FACILITY performed by [facility] at [facility] History     Socioeconomic History    Marital status: Single     Spouse name: None    Number of children: None    Years of education: None    Highest education level: None   Occupational History    None   Tobacco Use    Smoking status: Never Smoker    Smokeless tobacco: Never Used   Vaping Use    Vaping Use: Never used   Substance and Sexual Activity    Alcohol use: No    Drug use: No    Sexual activity: Never   Other Topics Concern    None   Social History Narrative    None     Social Determinants of Health     Financial Resource Strain: Low Risk     Difficulty of Paying Living Expenses: Not hard at all   Food Insecurity: No Food Insecurity    Worried About Running Out of Food in the Last Year: Never true    Ariane of Food in the Last Year: Never true   Transportation Needs:     Lack of Transportation (Medical):  Lack of Transportation (Non-Medical):    Physical Activity:     Days of Exercise per Week:     Minutes of Exercise per Session:    Stress:     Feeling of Stress :    Social Connections:     Frequency of Communication with Friends and Family:     Frequency of Social Gatherings with Friends and Family:     Attends Confucianism Services:     Active Member of Clubs or Organizations:     Attends Club or Organization Meetings:     Marital Status:    Intimate Partner Violence:     Fear of Current or Ex-Partner:     Emotionally Abused:     Physically Abused:     Sexually Abused:        Family History   Problem Relation Age of Onset    Heart Disease Maternal Grandmother     Heart Disease Maternal Grandfather     Heart Disease Paternal Aunt     Heart Disease Paternal Uncle           Current Outpatient Medications   Medication Sig Dispense Refill    donepezil (ARICEPT) 5 MG tablet       nitrofurantoin, macrocrystal-monohydrate, (MACROBID) 100 MG capsule Take 1 capsule by mouth 2 times daily for 7 days 14 capsule 0    levothyroxine (SYNTHROID) 88 MCG tablet Take 1 tablet by mouth Daily 90 tablet 1    liothyronine (CYTOMEL) 5 MCG tablet Take 1 tab by mouth every morning on [employer] 30 tablet 5    Cholecalciferol (VITAMIN D-3 SUPER STRENGTH) 50 MCG (2000 UT) TABS Take 1 tablet by mouth daily 30 tablet 5    Lactobacillus (ACIDOPHILUS PO) Take by mouth       No current facility-administered medications for this visit.        No Known Allergies    Health Maintenance Due   Topic Date Due    Hepatitis C screen  Never done    HIV screen  Never done    Cervical cancer screen  01/08/2016    Shingles Vaccine (2 of 3) 02/06/2018    Breast cancer screen  03/27/2019    Annual Wellness Visit (AWV)  Never done    TSH testing  06/29/2021           REVIEW OF SYSTEMS  Review of Systems   Constitutional: Negative for fatigue and fever. HENT: Negative for ear pain, sinus pressure, sneezing and sore throat. Eyes: Negative for pain. Respiratory: Negative for cough and shortness of breath. Cardiovascular: Negative for chest pain and leg swelling. Gastrointestinal: Negative for abdominal pain, constipation and diarrhea. Genitourinary: Positive for dysuria and frequency. Negative for urgency. Musculoskeletal: Negative for back pain and myalgias. Skin: Negative for rash. Allergic/Immunologic: Negative for food allergies. Neurological: Negative for light-headedness and headaches. Hematological: Does not bruise/bleed easily. Psychiatric/Behavioral: Negative for behavioral problems and sleep disturbance. PHYSICAL EXAM  /80   Pulse 78   Temp 98 °F (36.7 °C)   Resp 18   Ht 5' (1.524 m)   Wt 121 lb (54.9 kg)   BMI 23.63 kg/m²   Physical Exam  Vitals and nursing note reviewed. Constitutional:       Appearance: Normal appearance. HENT:      Head: Normocephalic and atraumatic. Right Ear: External ear normal.      Left Ear: External ear normal.   Eyes:      Extraocular Movements: Extraocular movements intact. Conjunctiva/sclera: Conjunctivae normal.   Cardiovascular:      Rate and Rhythm: Normal rate and regular rhythm. Pulmonary:      Effort: Pulmonary effort is normal.      Breath sounds: Normal breath sounds. No wheezing. Abdominal:      Palpations: Abdomen is soft. Tenderness: There is no abdominal tenderness. There is no right CVA tenderness or left CVA tenderness. Skin:     General: Skin is warm and dry.    Neurological:      General: No focal deficit present. Mental Status: She is alert. Psychiatric:         Mood and Affect: Mood normal.         Behavior: Behavior normal.              Laboratory: All laboratory and radiology results have been personally reviewed by myself    Lab Results   Component Value Date     03/07/2021    K 4.4 03/07/2021    K 4.3 01/20/2021     03/07/2021    CO2 26 03/07/2021    BUN 17 03/07/2021    CREATININE 0.9 03/07/2021    PROT 7.9 03/07/2021    LABALBU 3.8 03/07/2021    LABALBU 2.8 05/13/2012    CALCIUM 9.4 03/07/2021    GFRAA >60 03/07/2021    LABGLOM >60 03/07/2021    GLUCOSE 159 03/07/2021    GLUCOSE 96 05/15/2012    AST 37 03/07/2021    ALT 43 03/07/2021    ALKPHOS 120 03/07/2021    BILITOT 0.7 03/07/2021    TSH 0.149 06/29/2020    CHOL 174 12/13/2018    TRIG 83 12/13/2018    HDL 59 12/13/2018    LDLCALC 98 12/13/2018        Lab Results   Component Value Date    CHOL 174 12/13/2018    CHOL 172 12/09/2014     Lab Results   Component Value Date    TRIG 83 12/13/2018    TRIG 98 12/09/2014     Lab Results   Component Value Date    HDL 59 12/13/2018    HDL 58 12/09/2014     Lab Results   Component Value Date    LDLCALC 98 12/13/2018    [address] 12/09/2014       No results found for: LABA1C  Lab Results   Component Value Date    LDLCALC 98 12/13/2018    CREATININE 0.9 03/07/2021       ASSESSMENT/PLAN:     Diagnosis Orders   1. Acute cystitis without hematuria     2. Urine frequency  POCT Urinalysis no Micro    Culture, Urine     Urinalysis was positive we will send in [address]. Side effects medication reviewed with patient and mother. Culture sent out as well. If not improved or worsening she will call PCP or return here for reevaluation    Problem list reviewed andsimplified/updated  HM reviewed today and counseled as appropriate    Call or go to ED immediately if symptoms worsen or persist.  No future appointments. Or sooner if necessary.       Educational materials and/or homeexercises printed for patient's review and were included in patient instructions on his/her After Visit Summary and given to patient at the end of visit.       Counseled regarding above diagnosis, including possible risks and complications,  especially if left uncontrolled.     Counseled regarding the possible side effects, risks, benefits and alternatives to treatment; patient and/or guardian verbalizes understanding, agrees,feels comfortable with and wishes to proceed with above treatment plan.     Advised patient to call [facility] new medication issues, and read all Rx info from pharmacy to assure aware of all possible risks and side effects of medication before taking.     Reviewed age and gender appropriate health screening exams and vaccinations. Advised patient regarding importance of keeping up with recommended health maintenance and toschedule as soon as possible if overdue, as this is important in assessing for undiagnosed pathology, especially cancer, as well as protecting against potentially harmful/life threatening disease.          Patient and/or guardian verbalizes understanding and agrees with above counseling, assessment and plan.     All questions answered. Arnold 5, DO  7/27/21    NOTE: This report was transcribed using voice recognition software.  Every effort was made to ensure accuracy; however, inadvertent computerized transcription errors may be present

## 2021-07-28 ASSESSMENT — ENCOUNTER SYMPTOMS
SORE THROAT: 0
SINUS PRESSURE: 0
ABDOMINAL PAIN: 0
BACK PAIN: 0
COUGH: 0
DIARRHEA: 0
CONSTIPATION: 0
SHORTNESS OF BREATH: 0
EYE PAIN: 0

## 2021-07-29 LAB
ORGANISM: ABNORMAL
URINE CULTURE, ROUTINE: ABNORMAL

## 2021-08-13 ENCOUNTER — HOSPITAL ENCOUNTER (EMERGENCY)
Age: 56
Discharge: HOME | End: 2021-08-13
Attending: EMERGENCY MEDICINE
Payer: MEDICARE

## 2021-08-13 ENCOUNTER — APPOINTMENT (OUTPATIENT)
Dept: CT IMAGING | Age: 56
End: 2021-08-13
Payer: MEDICARE

## 2021-08-13 VITALS
HEIGHT: 60 IN | RESPIRATION RATE: 16 BRPM | SYSTOLIC BLOOD PRESSURE: 112 MMHG | OXYGEN SATURATION: 98 % | WEIGHT: 125 LBS | HEART RATE: 70 BPM | TEMPERATURE: 98.3 F | BODY MASS INDEX: 24.54 KG/M2 | DIASTOLIC BLOOD PRESSURE: 66 MMHG

## 2021-08-13 DIAGNOSIS — R45.86 MOOD CHANGE: Primary | ICD-10-CM

## 2021-08-13 LAB
ALBUMIN SERPL-MCNC: 3.4 G/DL (ref 3.5–5.2)
ALP BLD-CCNC: 106 U/L (ref 35–104)
ALT SERPL-CCNC: 31 U/L (ref 0–32)
ANION GAP SERPL CALCULATED.3IONS-SCNC: 7 MMOL/L (ref 7–16)
AST SERPL-CCNC: 30 U/L (ref 0–31)
BASOPHILS ABSOLUTE: 0.05 E9/L (ref 0–0.2)
BASOPHILS RELATIVE PERCENT: 1.3 % (ref 0–2)
BILIRUB SERPL-MCNC: 0.5 MG/DL (ref 0–1.2)
BILIRUBIN URINE: NEGATIVE
BLOOD, URINE: NEGATIVE
BUN BLDV-MCNC: 18 MG/DL (ref 6–20)
CALCIUM SERPL-MCNC: 9.6 MG/DL (ref 8.6–10.2)
CHLORIDE BLD-SCNC: 105 MMOL/L (ref 98–107)
CLARITY: CLEAR
CO2: 29 MMOL/L (ref 22–29)
COLOR: YELLOW
CREAT SERPL-MCNC: 1 MG/DL (ref 0.5–1)
EOSINOPHILS ABSOLUTE: 0.13 E9/L (ref 0.05–0.5)
EOSINOPHILS RELATIVE PERCENT: 3.3 % (ref 0–6)
GFR AFRICAN AMERICAN: >60
GFR NON-AFRICAN AMERICAN: 57 ML/MIN/1.73
GLUCOSE BLD-MCNC: 92 MG/DL (ref 74–99)
GLUCOSE URINE: NEGATIVE MG/DL
HCT VFR BLD CALC: 46.7 % (ref 34–48)
HEMOGLOBIN: 15.9 G/DL (ref 11.5–15.5)
IMMATURE GRANULOCYTES #: 0.01 E9/L
IMMATURE GRANULOCYTES %: 0.3 % (ref 0–5)
KETONES, URINE: NEGATIVE MG/DL
LEUKOCYTE ESTERASE, URINE: NEGATIVE
LYMPHOCYTES ABSOLUTE: 1.73 E9/L (ref 1.5–4)
LYMPHOCYTES RELATIVE PERCENT: 43.4 % (ref 20–42)
MCH RBC QN AUTO: 32.1 PG (ref 26–35)
MCHC RBC AUTO-ENTMCNC: 34 % (ref 32–34.5)
MCV RBC AUTO: 94.2 FL (ref 80–99.9)
MONOCYTES ABSOLUTE: 0.37 E9/L (ref 0.1–0.95)
MONOCYTES RELATIVE PERCENT: 9.3 % (ref 2–12)
NEUTROPHILS ABSOLUTE: 1.7 E9/L (ref 1.8–7.3)
NEUTROPHILS RELATIVE PERCENT: 42.4 % (ref 43–80)
NITRITE, URINE: NEGATIVE
PDW BLD-RTO: 13.1 FL (ref 11.5–15)
PH UA: 7 (ref 5–9)
PLATELET # BLD: 176 E9/L (ref 130–450)
PMV BLD AUTO: 9.3 FL (ref 7–12)
POTASSIUM REFLEX MAGNESIUM: 4.1 MMOL/L (ref 3.5–5)
PROTEIN UA: NEGATIVE MG/DL
RBC # BLD: 4.96 E12/L (ref 3.5–5.5)
SODIUM BLD-SCNC: 141 MMOL/L (ref 132–146)
SPECIFIC GRAVITY UA: 1.01 (ref 1–1.03)
TOTAL PROTEIN: 7.2 G/DL (ref 6.4–8.3)
UROBILINOGEN, URINE: 0.2 E.U./DL
WBC # BLD: 4 E9/L (ref 4.5–11.5)

## 2021-08-13 PROCEDURE — 99283 EMERGENCY DEPT VISIT LOW MDM: CPT

## 2021-08-13 PROCEDURE — 85025 COMPLETE CBC W/AUTO DIFF WBC: CPT

## 2021-08-13 PROCEDURE — 36415 COLL VENOUS BLD VENIPUNCTURE: CPT

## 2021-08-13 PROCEDURE — 80053 COMPREHEN METABOLIC PANEL: CPT

## 2021-08-13 PROCEDURE — 87088 URINE BACTERIA CULTURE: CPT

## 2021-08-13 PROCEDURE — 81003 URINALYSIS AUTO W/O SCOPE: CPT

## 2021-08-13 ASSESSMENT — PAIN DESCRIPTION - FREQUENCY: FREQUENCY: INTERMITTENT

## 2021-08-13 ASSESSMENT — PAIN DESCRIPTION - PROGRESSION: CLINICAL_PROGRESSION: GRADUALLY WORSENING

## 2021-08-13 ASSESSMENT — PAIN DESCRIPTION - ONSET: ONSET: ON-GOING

## 2021-08-13 ASSESSMENT — PAIN SCALES - GENERAL: PAINLEVEL_OUTOF10: 5

## 2021-08-13 ASSESSMENT — PAIN DESCRIPTION - DESCRIPTORS: DESCRIPTORS: BURNING

## 2021-08-13 ASSESSMENT — PAIN DESCRIPTION - PAIN TYPE: TYPE: ACUTE PAIN

## 2021-08-16 LAB — URINE CULTURE, ROUTINE: NORMAL

## 2021-09-07 DIAGNOSIS — Z12.31 BREAST CANCER SCREENING BY MAMMOGRAM: Primary | ICD-10-CM

## 2021-09-13 ENCOUNTER — TELEPHONE (OUTPATIENT)
Dept: PRIMARY CARE CLINIC | Age: 56
End: 2021-09-13

## 2021-09-13 NOTE — TELEPHONE ENCOUNTER
----- Message from Gwinda Apgar sent at 2021 11:47 AM EDT -----  Subject: Appointment Request    Reason for Call: Routine (Patient Request) No Script    QUESTIONS  Type of Appointment? Established Patient  Reason for appointment request? Available appointments did not meet   patient need  Additional Information for Provider? Mother Cheryle Christians called today about a   change in patient's breathing. Mom does not believe she has any SOB or   breathing difficulty but is concerned with patient's breathing change. Stated she would like to her to PCP urgently if possible. Refused Nurse   Triage. prefers to speak with Daina Leong or Marta Melara only because they know the   patient. No urgent appts available. Please follow up.   ---------------------------------------------------------------------------  --------------  CALL BACK INFO  What is the best way for the office to contact you? OK to leave message on   voicemail  Preferred Call Back Phone Number? [phone]  ---------------------------------------------------------------------------  --------------  SCRIPT ANSWERS  Relationship to Patient? Self  (Is the patient requesting to see the provider for a procedure?)? No  (Is the patient requesting to see the provider urgently  today or   tomorrow. )? No  Have you been diagnosed with, awaiting test results for, or told that you   are suspected of having COVID-19 (Coronavirus)? (If patient has tested   negative or was tested as a requirement for work, school, or travel and   not based on symptoms, answer no)? No  Within the past two weeks have you developed any of the following symptoms   (answer no if symptoms have been present longer than 2 weeks or began   more than 2 weeks ago)?  Fever or Chills, Cough, Shortness of breath or   difficulty breathing, Loss of taste or smell, Sore throat, Nasal   congestion, Sneezing or runny nose, Fatigue or generalized body aches   (answer no if pain is specific to a body part e.g. back pain), Diarrhea,   Headache? No  Have you had close contact with someone with COVID-19 in the last 14 days? No  (Service Expert  click yes below to proceed with [employer] As Usual   Scheduling)?  Yes

## 2021-09-22 ENCOUNTER — OFFICE VISIT (OUTPATIENT)
Dept: PRIMARY CARE CLINIC | Age: 56
End: 2021-09-22
Payer: MEDICARE

## 2021-09-22 VITALS — SYSTOLIC BLOOD PRESSURE: 101 MMHG | HEART RATE: 60 BPM | DIASTOLIC BLOOD PRESSURE: 60 MMHG | TEMPERATURE: 96.8 F

## 2021-09-22 DIAGNOSIS — N39.0 URINARY TRACT INFECTION IN FEMALE: Primary | ICD-10-CM

## 2021-09-22 LAB
BILIRUBIN, POC: NEGATIVE
BLOOD URINE, POC: NEGATIVE
CLARITY, POC: NORMAL
COLOR, POC: YELLOW
GLUCOSE URINE, POC: NEGATIVE
KETONES, POC: NEGATIVE
LEUKOCYTE EST, POC: NORMAL
NITRITE, POC: NEGATIVE
PH, POC: 7
PROTEIN, POC: NEGATIVE
SPECIFIC GRAVITY, POC: 1.02
UROBILINOGEN, POC: 0.2

## 2021-09-22 PROCEDURE — 1036F TOBACCO NON-USER: CPT | Performed by: NURSE PRACTITIONER

## 2021-09-22 PROCEDURE — 99213 OFFICE O/P EST LOW 20 MIN: CPT | Performed by: NURSE PRACTITIONER

## 2021-09-22 PROCEDURE — 3017F COLORECTAL CA SCREEN DOC REV: CPT | Performed by: NURSE PRACTITIONER

## 2021-09-22 PROCEDURE — 81002 URINALYSIS NONAUTO W/O SCOPE: CPT | Performed by: NURSE PRACTITIONER

## 2021-09-22 PROCEDURE — G8427 DOCREV CUR MEDS BY ELIG CLIN: HCPCS | Performed by: NURSE PRACTITIONER

## 2021-09-22 PROCEDURE — G8420 CALC BMI NORM PARAMETERS: HCPCS | Performed by: NURSE PRACTITIONER

## 2021-09-22 RX ORDER — NITROFURANTOIN 25; 75 MG/1; MG/1
100 CAPSULE ORAL 2 TIMES DAILY
Qty: 20 CAPSULE | Refills: 0 | Status: SHIPPED | OUTPATIENT
Start: 2021-09-22 | End: 2021-10-02

## 2021-09-22 RX ORDER — NITROFURANTOIN 25; 75 MG/1; MG/1
100 CAPSULE ORAL 2 TIMES DAILY
Qty: 2 CAPSULE | Refills: 0 | Status: SHIPPED | OUTPATIENT
Start: 2021-09-22 | End: 2021-09-23

## 2021-09-22 NOTE — PROGRESS NOTES
Chief Complaint:   Urinary Tract Infection      History of Present Illness   Source of history provided by:  patient. Jean Carlos Early is a 55 y.o. old female who has a past medical history of:   Past Medical History:   Diagnosis Date    Arthritis     Down syndrome     Hypothyroidism     Osteoarthritis     Syncope     Thyroid disease     UTI (urinary tract infection)         Varicose veins     Wears glasses         Mother present w/pt       Pt presents to the [facility] care for dysuria/difficulty urinating. Pt states the symptoms have progressed over the past few days. No flank pain. Denies any vaginal discharge, vaginal bleeding, vomiting, diarrhea, or lethargy. No LMP recorded. Patient is postmenopausal. Pt lives at a [employer] [facility]. Pt does have a h/o UTI's. Pt has followed with Urology in the past, pt is afebrile and tolerating PO well      ROS    Unless otherwise stated in this report or unable to obtain because of the patient's clinical or mental status as evidenced by the medical record, this patients's positive and negative responses for Review of Systems, constitutional, psych, eyes, ENT, cardiovascular, respiratory, gastrointestinal, neurological, genitourinary, musculoskeletal, integument systems and systems related to the presenting problem are either stated in the preceding or were not pertinent or were negative for the symptoms and/or complaints related to the medical problem.     Past Surgical history:   Past Surgical History:   Procedure Laterality Date    ABDOMEN SURGERY      duodenal atresia    ABDOMINAL ADHESION SURGERY      BRAIN SURGERY      CRANIOTOMY Right 4/12/2019    RIGHT CRANIOTOMY FRANCISCO JAVIER HOLES performed by Elisa Gonzales MD at [place] [address] ECHO COMPL W DOP COLOR FLOW  5/14/2012         HIP SURGERY      Right    JOINT REPLACEMENT      left knee, right hip-dr Meaghan Hearn KNEE SURGERY      Right    TOE SURGERY      Right foot    TOTAL KNEE ARTHROPLASTY Left 08/11/2016 results interpreted by Radiologist unless otherwise noted. No orders to display       Medical Decision Making:    Patient is well appearing, non toxic and appropriate for outpatient management. Plan is for symptom management and PCP follow up. Assessment / Plan     Impression(s):  1.  Urinary tract infection in female      Disposition:  Disposition: Start Macrobid today,sent script to local pharmacy ( 2 caps to start today ) and [facility] ( delivered to [address] [place] [address] tomorrow ) utilized by [address] [place] [address]

## 2021-09-24 LAB
ORGANISM: ABNORMAL
URINE CULTURE, ROUTINE: ABNORMAL

## 2021-09-27 ENCOUNTER — OFFICE VISIT (OUTPATIENT)
Dept: PRIMARY CARE CLINIC | Age: 56
End: 2021-09-27
Payer: MEDICARE

## 2021-09-27 VITALS
TEMPERATURE: 95.9 F | BODY MASS INDEX: 26.17 KG/M2 | HEART RATE: 74 BPM | WEIGHT: 134 LBS | OXYGEN SATURATION: 96 % | SYSTOLIC BLOOD PRESSURE: 110 MMHG | DIASTOLIC BLOOD PRESSURE: 60 MMHG

## 2021-09-27 DIAGNOSIS — Q90.9 DOWN'S SYNDROME: ICD-10-CM

## 2021-09-27 DIAGNOSIS — R41.89 COGNITIVE IMPAIRMENT: ICD-10-CM

## 2021-09-27 DIAGNOSIS — F41.9 ANXIETY: ICD-10-CM

## 2021-09-27 DIAGNOSIS — G91.0 COMMUNICATING HYDROCEPHALUS (HCC): Primary | ICD-10-CM

## 2021-09-27 DIAGNOSIS — R29.898 MUSCULAR DECONDITIONING: ICD-10-CM

## 2021-09-27 DIAGNOSIS — E03.9 ACQUIRED HYPOTHYROIDISM: ICD-10-CM

## 2021-09-27 PROCEDURE — G8427 DOCREV CUR MEDS BY ELIG CLIN: HCPCS | Performed by: INTERNAL MEDICINE

## 2021-09-27 PROCEDURE — 99213 OFFICE O/P EST LOW 20 MIN: CPT | Performed by: INTERNAL MEDICINE

## 2021-09-27 PROCEDURE — 3017F COLORECTAL CA SCREEN DOC REV: CPT | Performed by: INTERNAL MEDICINE

## 2021-09-27 PROCEDURE — 1036F TOBACCO NON-USER: CPT | Performed by: INTERNAL MEDICINE

## 2021-09-27 PROCEDURE — G8419 CALC BMI OUT NRM PARAM NOF/U: HCPCS | Performed by: INTERNAL MEDICINE
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
## 2021-09-28 ENCOUNTER — TELEPHONE (OUTPATIENT)
Dept: PRIMARY CARE CLINIC | Age: 56
End: 2021-09-28

## 2021-09-28 RX ORDER — RISPERIDONE 0.25 MG/1
0.25 TABLET, FILM COATED ORAL 2 TIMES DAILY
Qty: 60 TABLET | Refills: 0 | Status: ON HOLD
Start: 2021-09-28 | End: 2022-06-18 | Stop reason: HOSPADM

## 2021-09-28 RX ORDER — RISPERIDONE 0.25 MG/1
0.25 TABLET, FILM COATED ORAL 2 TIMES DAILY
Qty: 4 TABLET | Refills: 0 | Status: SHIPPED
Start: 2021-09-28 | End: 2021-12-03 | Stop reason: SDUPTHER
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
## 2021-11-01 ENCOUNTER — HOSPITAL ENCOUNTER (EMERGENCY)
Age: 56
Discharge: ANOTHER HOSPITAL | End: 2021-11-02
Attending: STUDENT IN AN ORGANIZED HEALTH CARE EDUCATION/TRAINING PROGRAM
Payer: MEDICARE

## 2021-11-01 ENCOUNTER — APPOINTMENT (OUTPATIENT)
Dept: CT IMAGING | Age: 56
End: 2021-11-01
Payer: MEDICARE

## 2021-11-01 ENCOUNTER — APPOINTMENT (OUTPATIENT)
Dept: GENERAL RADIOLOGY | Age: 56
End: 2021-11-01
Payer: MEDICARE

## 2021-11-01 DIAGNOSIS — T85.618A SHUNT MALFUNCTION, INITIAL ENCOUNTER: ICD-10-CM

## 2021-11-01 DIAGNOSIS — G93.40 ENCEPHALOPATHY: Primary | ICD-10-CM

## 2021-11-01 LAB
ALBUMIN SERPL-MCNC: 3.8 G/DL (ref 3.5–5.2)
ALP BLD-CCNC: 126 U/L (ref 35–104)
ALT SERPL-CCNC: 42 U/L (ref 0–32)
AMORPHOUS: PRESENT
AMPHETAMINE SCREEN, URINE: NOT DETECTED
ANION GAP SERPL CALCULATED.3IONS-SCNC: 11 MMOL/L (ref 7–16)
AST SERPL-CCNC: 34 U/L (ref 0–31)
BACTERIA: ABNORMAL /HPF
BARBITURATE SCREEN URINE: NOT DETECTED
BASOPHILS ABSOLUTE: 0.06 E9/L (ref 0–0.2)
BASOPHILS RELATIVE PERCENT: 1.2 % (ref 0–2)
BENZODIAZEPINE SCREEN, URINE: NOT DETECTED
BILIRUB SERPL-MCNC: 0.5 MG/DL (ref 0–1.2)
BILIRUBIN URINE: NEGATIVE
BLOOD, URINE: ABNORMAL
BUN BLDV-MCNC: 16 MG/DL (ref 6–20)
CALCIUM SERPL-MCNC: 9.4 MG/DL (ref 8.6–10.2)
CANNABINOID SCREEN URINE: NOT DETECTED
CHLORIDE BLD-SCNC: 106 MMOL/L (ref 98–107)
CLARITY: CLEAR
CO2: 24 MMOL/L (ref 22–29)
COCAINE METABOLITE SCREEN URINE: NOT DETECTED
COLOR: YELLOW
CREAT SERPL-MCNC: 0.9 MG/DL (ref 0.5–1)
EOSINOPHILS ABSOLUTE: 0.02 E9/L (ref 0.05–0.5)
EOSINOPHILS RELATIVE PERCENT: 0.4 % (ref 0–6)
FENTANYL SCREEN, URINE: NOT DETECTED
GFR AFRICAN AMERICAN: >60
GFR NON-AFRICAN AMERICAN: >60 ML/MIN/1.73
GLUCOSE BLD-MCNC: 115 MG/DL (ref 74–99)
GLUCOSE URINE: NEGATIVE MG/DL
HCT VFR BLD CALC: 48.4 % (ref 34–48)
HEMOGLOBIN: 16 G/DL (ref 11.5–15.5)
IMMATURE GRANULOCYTES #: 0.01 E9/L
IMMATURE GRANULOCYTES %: 0.2 % (ref 0–5)
KETONES, URINE: 15 MG/DL
LACTIC ACID: 1.4 MMOL/L (ref 0.5–2.2)
LEUKOCYTE ESTERASE, URINE: ABNORMAL
LYMPHOCYTES ABSOLUTE: 1.06 E9/L (ref 1.5–4)
LYMPHOCYTES RELATIVE PERCENT: 21.8 % (ref 20–42)
Lab: NORMAL
MCH RBC QN AUTO: 31.6 PG (ref 26–35)
MCHC RBC AUTO-ENTMCNC: 33.1 % (ref 32–34.5)
MCV RBC AUTO: 95.7 FL (ref 80–99.9)
METHADONE SCREEN, URINE: NOT DETECTED
MONOCYTES ABSOLUTE: 0.36 E9/L (ref 0.1–0.95)
MONOCYTES RELATIVE PERCENT: 7.4 % (ref 2–12)
NEUTROPHILS ABSOLUTE: 3.36 E9/L (ref 1.8–7.3)
NEUTROPHILS RELATIVE PERCENT: 69 % (ref 43–80)
NITRITE, URINE: NEGATIVE
OPIATE SCREEN URINE: NOT DETECTED
OXYCODONE URINE: NOT DETECTED
PDW BLD-RTO: 13.1 FL (ref 11.5–15)
PH UA: 7 (ref 5–9)
PHENCYCLIDINE SCREEN URINE: NOT DETECTED
PLATELET # BLD: 207 E9/L (ref 130–450)
PMV BLD AUTO: 9.2 FL (ref 7–12)
POTASSIUM REFLEX MAGNESIUM: 4.4 MMOL/L (ref 3.5–5)
PROTEIN UA: NEGATIVE MG/DL
RBC # BLD: 5.06 E12/L (ref 3.5–5.5)
RBC UA: ABNORMAL /HPF (ref 0–2)
REASON FOR REJECTION: NORMAL
REJECTED TEST: NORMAL
SODIUM BLD-SCNC: 141 MMOL/L (ref 132–146)
SPECIFIC GRAVITY UA: 1.02 (ref 1–1.03)
TOTAL PROTEIN: 7.8 G/DL (ref 6.4–8.3)
TROPONIN, HIGH SENSITIVITY: 7 NG/L (ref 0–9)
UROBILINOGEN, URINE: 0.2 E.U./DL
WBC # BLD: 4.9 E9/L (ref 4.5–11.5)
WBC UA: ABNORMAL /HPF (ref 0–5)

## 2021-11-01 PROCEDURE — 81001 URINALYSIS AUTO W/SCOPE: CPT

## 2021-11-01 PROCEDURE — 85025 COMPLETE CBC W/AUTO DIFF WBC: CPT

## 2021-11-01 PROCEDURE — 70360 X-RAY EXAM OF NECK: CPT

## 2021-11-01 PROCEDURE — 80143 DRUG ASSAY ACETAMINOPHEN: CPT

## 2021-11-01 PROCEDURE — 99283 EMERGENCY DEPT VISIT LOW MDM: CPT

## 2021-11-01 PROCEDURE — 70250 X-RAY EXAM OF SKULL: CPT

## 2021-11-01 PROCEDURE — 82077 ASSAY SPEC XCP UR&BREATH IA: CPT

## 2021-11-01 PROCEDURE — 74019 RADEX ABDOMEN 2 VIEWS: CPT

## 2021-11-01 PROCEDURE — 84484 ASSAY OF TROPONIN QUANT: CPT

## 2021-11-01 PROCEDURE — 87088 URINE BACTERIA CULTURE: CPT

## 2021-11-01 PROCEDURE — 80179 DRUG ASSAY SALICYLATE: CPT

## 2021-11-01 PROCEDURE — 83605 ASSAY OF LACTIC ACID: CPT

## 2021-11-01 PROCEDURE — 93005 ELECTROCARDIOGRAM TRACING: CPT | Performed by: PHYSICIAN ASSISTANT

## 2021-11-01 PROCEDURE — 80053 COMPREHEN METABOLIC PANEL: CPT

## 2021-11-01 PROCEDURE — 71046 X-RAY EXAM CHEST 2 VIEWS: CPT

## 2021-11-01 PROCEDURE — 80307 DRUG TEST PRSMV CHEM ANLYZR: CPT

## 2021-11-01 PROCEDURE — 70450 CT HEAD/BRAIN W/O DYE: CPT

## 2021-11-01 RX ORDER — ONDANSETRON 2 MG/ML
4 INJECTION INTRAMUSCULAR; INTRAVENOUS ONCE
Status: DISCONTINUED | OUTPATIENT
Start: 2021-11-01 | End: 2021-11-02 | Stop reason: HOSPADM

## 2021-11-01 NOTE — ED PROVIDER NOTES
Department of Emergency Medicine   ED  Provider Note  Admit Date/RoomTime: 11/1/2021  3:44 PM  ED Room: 04/04          History of Present Illness:  11/1/21, Time: 4:05 PM EDT  Chief Complaint   Patient presents with    Altered Mental Status     family states she has a shunt in brain that was adjusted 1 1/2 months ago, patient now not communicating per her usual, been combative       Rubens Patiño See is a 55 y.o. female presenting to the ED for altered mental status. The patient has past medical history of  shunt and Down syndrome. She is presenting with her father who is the main historian. He states that the patient lives in a group home. They see her on the weekends. Last weekend he was noting some decline. This weekend starting Friday, 4 days ago she has not been herself. He is concerned that something is wrong with her shunt. She has had a  shunt for over 2 years. She has had multiple revisions most recently 6 weeks ago. This is the way she asked when something is wrong with her  shunt. He is noted that she is less responsive. She is increasingly combative which is not her baseline. No history of trauma. No complaints of pain. She has had some nausea but no vomiting. No shortness of breath, cough or fevers. The patient is still been able to ambulate. Father has not noted any focal weakness. The patient has had all of her revisions done at the [facility] clinic with Dr. Sanjeev Dubois. The patient denies any complaints. She is able to shake her head yes and no. She does feel nauseous. Denies headache. Review of Systems:   Constitutional symptoms: Denies fever  Eyes: Denies eye pain  Ears, Nose, Mouth, Throat: Denies ear pain  Cardiovascular: Denies chest pain  Respiratory: Denies shortness of breath  Gastrointestinal: Denies blood per rectum. Positive for nausea  Genitourinary: Denies hematuria  Skin: Denies rashes  Neurological: Denies headache.   Positive for change in mentation  Musculoskeletal: Denies extremity pain    --------------------------------------------- PAST HISTORY ---------------------------------------------  Past Medical History:  has a past medical history of Arthritis, Down syndrome, Hypothyroidism, Osteoarthritis, Syncope, Thyroid disease, UTI (urinary tract infection), Varicose veins, and Wears glasses. Past Surgical History:  has a past surgical history that includes knee surgery; hip surgery; Toe Surgery; ECHO Compl W Dop Color Flow (5/14/2012); Tubal ligation; Total knee arthroplasty (Left, 08/11/2016); Abdominal adhesion surgery; Abdomen surgery; joint replacement; craniotomy (Right, 4/12/2019); Vena Cava Filter Placement (Bilateral, 4/22/2019); brain surgery; and Ventriculoperitoneal shunt (N/A, 11/12/2019). Social History:  reports that she has never smoked. She has never used smokeless tobacco. She reports that she does not drink alcohol and does not use drugs. Family History: family history includes Heart Disease in her maternal grandfather, maternal grandmother, paternal aunt, and paternal uncle. . Unless otherwise noted, family history is non contributory    The patients home medications have been reviewed. Allergies: Patient has no known allergies. I have reviewed the past medical history, past surgical history, social history, and family history    ---------------------------------------------------PHYSICAL EXAM--------------------------------------    General: The patient is resting comfortably in bed. She is tracking me. She is not speaking but able to answer yes and no by shaking her head. Head: Normocephalic and atraumatic. Palpable shunt on the right parieto-occipital region. Eyes: Sclera non-icteric and no conjunctival injection  ENT: Nasal and oral membranes moist  Neck: Trachea midline. No JVD  Respiratory: Lungs clear to auscultation bilaterally. No tachypnea. Cardiovascular: Regular rate.   No pedal edema. Gastrointestinal:  Abdomen is soft and nondistended. Bowel sounds present. There is no tenderness. There is no guarding or rebound. Musculoskeletal: No deformity  Skin: Skin warm and dry. No rashes. Neurologic: No gross motor deficits. Symmetric upper and lower extremity strength 5 out of 5. The patient is intermittently following commands. She is unable to perform finger-to-nose testing with instruction. Extraocular eye movements intact. Pupils equal and reactive to light. Symmetric facies. Sensation intact. The patient is not speaking so I cannot assess her speech. Psychiatric: Normal affect.    -------------------------------------------------- RESULTS -------------------------------------------------  I have personally reviewed all laboratory and imaging results for this patient. Results are listed below.      LABS: (Lab results interpreted by me)  Results for orders placed or performed during the hospital encounter of 11/01/21   CBC Auto Differential   Result Value Ref Range    WBC 4.9 4.5 - 11.5 E9/L    RBC 5.06 3.50 - 5.50 E12/L    Hemoglobin 16.0 (H) 11.5 - 15.5 g/dL    Hematocrit 48.4 (H) 34.0 - 48.0 %    MCV 95.7 80.0 - 99.9 fL    MCH 31.6 26.0 - 35.0 pg    MCHC 33.1 32.0 - 34.5 %    RDW 13.1 11.5 - 15.0 fL    Platelets 271 418 - 718 E9/L    MPV 9.2 7.0 - 12.0 fL    Neutrophils % 69.0 43.0 - 80.0 %    Immature Granulocytes % 0.2 0.0 - 5.0 %    Lymphocytes % 21.8 20.0 - 42.0 %    Monocytes % 7.4 2.0 - 12.0 %    Eosinophils % 0.4 0.0 - 6.0 %    Basophils % 1.2 0.0 - 2.0 %    Neutrophils Absolute 3.36 1.80 - 7.30 E9/L    Immature Granulocytes # 0.01 E9/L    Lymphocytes Absolute 1.06 (L) 1.50 - 4.00 E9/L    Monocytes Absolute 0.36 0.10 - 0.95 E9/L    Eosinophils Absolute 0.02 (L) 0.05 - 0.50 E9/L    Basophils Absolute 0.06 0.00 - 0.20 E9/L   Comprehensive Metabolic Panel w/ Reflex to MG   Result Value Ref Range    Sodium 141 132 - 146 mmol/L    Potassium reflex Magnesium 4.4 3.5 - 5.0 mmol/L Chloride 106 98 - 107 mmol/L    CO2 24 22 - 29 mmol/L    Anion Gap 11 7 - 16 mmol/L    Glucose 115 (H) 74 - 99 mg/dL    BUN 16 6 - 20 mg/dL    CREATININE 0.9 0.5 - 1.0 mg/dL    GFR Non-African American >60 >=60 mL/min/1.73    GFR African American >60     Calcium 9.4 8.6 - 10.2 mg/dL    Total Protein 7.8 6.4 - 8.3 g/dL    Albumin 3.8 3.5 - 5.2 g/dL    Total Bilirubin 0.5 0.0 - 1.2 mg/dL    Alkaline Phosphatase 126 (H) 35 - 104 U/L    ALT 42 (H) 0 - 32 U/L    AST 34 (H) 0 - 31 U/L   Lactic Acid, Plasma   Result Value Ref Range    Lactic Acid 1.4 0.5 - 2.2 mmol/L   Troponin   Result Value Ref Range    Troponin, High Sensitivity 7 0 - 9 ng/L   Urinalysis, reflex to microscopic   Result Value Ref Range    Color, UA Yellow Straw/Yellow    Clarity, UA Clear Clear    Glucose, Ur Negative Negative mg/dL    Bilirubin Urine Negative Negative    Ketones, Urine 15 (A) Negative mg/dL    Specific Gravity, UA 1.020 1.005 - 1.030    Blood, Urine TRACE-INTACT Negative    pH, UA 7.0 5.0 - 9.0    Protein, UA Negative Negative mg/dL    Urobilinogen, Urine 0.2 <2.0 E.U./dL    Nitrite, Urine Negative Negative    Leukocyte Esterase, Urine TRACE (A) Negative   Urine Drug Screen   Result Value Ref Range    Amphetamine Screen, Urine NOT DETECTED Negative <1000 ng/mL    Barbiturate Screen, Ur NOT DETECTED Negative < 200 ng/mL    Benzodiazepine Screen, Urine NOT DETECTED Negative < 200 ng/mL    Cannabinoid Scrn, Ur NOT DETECTED Negative < 50ng/mL    Cocaine Metabolite Screen, Urine NOT DETECTED Negative < 300 ng/mL    Opiate Scrn, Ur NOT DETECTED Negative < 300ng/mL    PCP Screen, Urine NOT DETECTED Negative < 25 ng/mL    Methadone Screen, Urine NOT DETECTED Negative <300 ng/mL    Oxycodone Urine NOT DETECTED Negative <100 ng/mL    FENTANYL SCREEN, URINE NOT DETECTED Negative <1 ng/mL    Drug Screen Comment: see below    SPECIMEN REJECTION   Result Value Ref Range    Rejected Test sds2     Reason for Rejection see below    Serum Drug Screen Result Value Ref Range    Ethanol Lvl <10 mg/dL    Acetaminophen Level <5.0 (L) 10.0 - 78.0 mcg/mL    Salicylate, Serum <8.6 0.0 - 30.0 mg/dL   Microscopic Urinalysis   Result Value Ref Range    WBC, UA 2-5 0 - 5 /HPF    RBC, UA 1-3 0 - 2 /HPF    Bacteria, UA MODERATE (A) None Seen /HPF    Amorphous, UA PRESENT    EKG 12 Lead   Result Value Ref Range    Ventricular Rate 93 BPM    Atrial Rate 93 BPM    P-R Interval 168 ms    QRS Duration 68 ms    Q-T Interval 350 ms    QTc Calculation (Bazett) 435 ms    P Axis 79 degrees    R Axis 80 degrees    T Axis 44 degrees   ,     RADIOLOGY:  Interpreted by Radiologist unless otherwise specified  CT HEAD WO CONTRAST   Final Result   Stable positioning of right frontal ventriculostomy shunt catheter with   slight increased size of the ventricles that could be secondary to shunt   dysfunction. XR SKULL (<4 VIEWS)   Final Result   Right-sided ventricular shunt as described above. No evidence of abnormal   discontinuity of the shunt in the head or neck. It extends inferiorly in the   chest beyond the field of view. XR ABDOMEN (2 VIEWS)   Final Result    shunt catheter extends from the right upper quadrant to the left upper   pelvis with no gross abnormalities. XR NECK SOFT TISSUE   Final Result   Right-sided shunt has a normal appearance involving the neck and right   hemithorax with no significant kinking or discontinuity. XR CHEST (2 VW)   Final Result   No active process. ------------------------- NURSING NOTES AND VITALS REVIEWED ---------------------------   The nursing notes within the ED encounter and vital signs as below have been reviewed by myself  /65   Pulse 88   Temp 97.7 °F (36.5 °C)   Resp 16   Ht 5' (1.524 m)   Wt 134 lb (60.8 kg)   SpO2 98%   BMI 26.17 kg/m²     Oxygen Saturation Interpretation: Normal    The patients available past medical records and past encounters were reviewed. ------------------------------ ED COURSE/MEDICAL DECISION MAKING----------------------  Medications   ondansetron (ZOFRAN) injection 4 mg (has no administration in time range)       Medical Decision Making: This is a 55 y.o. female presenting to the ED for change in mental status. On initial presentation, the patient's vital signs are interpreted as normotensive, afebrile and saturating on room air. Based on history and physical exam, we have a broad differential.  As the patient has history of  shunts and similar episodes with complications of the shunt we are concerned for dislodgment, hydrocephalus, or further intracranial process. No history of trauma. The patient's neurological exam is nonfocal however she is not speaking at this time. No infectious symptoms. We will evaluate for infection including pneumonia or urinary tract infection. We also set for metabolic causes of her altered mentation including ingestion with urine and serum drug screen and electrolyte abnormalities with basic laboratory studies. Will obtain chest x-ray EKG and troponin for evaluation of ACS or arrhythmia. Shunt series will be obtained with chest, abdomen and skull x-ray. We will obtain CT head. The patient will be given Zofran for her nausea. Her abdominal exam is soft and nonsurgical.    A 12-lead EKG was performed and interpreted by myself. The rate is 93 with normal sinus rhythm and normal axis. The patient has evidence of biatrial enlargement. The ME interval is 168, QRS interval is 68, and QTC interval is 435. T wave inversion in V1. No acute ST elevation or depression. Poor R wave progression. This is sinus rhythm with atrial large minute nonspecific abnormality. Laboratory studies show electrolytes within normal limits. Lactic acid is not elevated. Troponin is within normal limits. Patient does have elevated alkaline phosphatase, ALT and AST. LFTs otherwise within normal limits. No leukocytosis. Mild polycythemia with hemoglobin of 16. Chest x-ray shows no active process. Soft tissue neck shows right-sided shunt and normal appearance of the neck and right hemithorax with no significant kinking or discontinue today. Abdominal x-ray shows  shunt catheter extending to the right upper quadrant with no gross abnormality. Skull x-ray shows right-sided  shunt without abnormality. Extends inferiorly to the chest.  CT of the head shows stable positioning of the right frontal ventriculostomy shunt catheter with slight increased size of the ventricles which could be secondary to dysfunction. This is interpreted by radiology. Because the patient is known to [facility], Dr. Nati Robert we did contact him. He requested patient be admitted to [facility]. Family including mother and father are agreeable to the transfer. Alcohol, urine drug screen, salicylate and Tylenol level negative. Urinalysis shows dehydration and mild hematuria but no evidence of urinary tract infection. The patient is resting comfortably. She is asking for something to eat. As there is a long transfer time awaiting her we will give her something at this time. This patient's ED course included: a personal history and physicial examination and re-evaluation prior to disposition    This patient has remained unchanged during their ED course. Consultations:  [facility] neurosurgery    The cardiac monitor revealed sinus rhythm with a heart rate in the 80s as interpreted by me. The cardiac monitor was ordered secondary to the patient's altered mentation and to monitor the patient for dysrhythmia. CPT [id number]    Oxygen Saturation Interpretation: 98 % on room air. Normal    Counseling:   I have spoken with the patient and discussed todays results, in addition to providing specific details for the plan of care and counseling regarding the diagnosis and prognosis.   Questions are answered at this time and they are agreeable with the plan.     --------------------------------- IMPRESSION AND DISPOSITION ---------------------------------    IMPRESSION  1. Encephalopathy    2. Shunt malfunction, initial encounter        DISPOSITION  Disposition: Transfer to [facility] to be seen by neurosurgery  Patient condition is fair    I, Dr. Ruben Vincent, tae the primary provider of record    NOTE: This report was transcribed using voice recognition software.  Every effort was made to ensure accuracy; however, inadvertent computerized transcription errors may be present        Ruben Vincent MD  11/01/21 2546

## 2021-11-01 NOTE — ED NOTES
FIRST PROVIDER CONTACT ASSESSMENT NOTE      Department of Emergency Medicine   11/1/21  6:08 PM EDT    Chief Complaint: Altered Mental Status (family states she has a shunt in brain that was adjusted 1 1/2 months ago, patient now not communicating per her usual, been combative)      History of Present Illness:   Patricia Early is a 55 y.o. female who presents to the ED for some confusion per family. Patient has a shunt. She had it adjusted about a month ago. Family states for the past couple days she has been more combative, not communicating like she normally does, and not acting like herself. Patient is not complaining of any symptoms. Family is denying any other symptoms. Medical History:  has a past medical history of Arthritis, Down syndrome, Hypothyroidism, Osteoarthritis, Syncope, Thyroid disease, UTI (urinary tract infection), Varicose veins, and Wears glasses. Surgical History:  has a past surgical history that includes knee surgery; hip surgery; Toe Surgery; ECHO Compl W Dop Color Flow (5/14/2012); Tubal ligation; Total knee arthroplasty (Left, 08/11/2016); Abdominal adhesion surgery; Abdomen surgery; joint replacement; craniotomy (Right, 4/12/2019); Vena Cava Filter Placement (Bilateral, 4/22/2019); brain surgery; and Ventriculoperitoneal shunt (N/A, 11/12/2019). Social History:  reports that she has never smoked. She has never used smokeless tobacco. She reports that she does not drink alcohol and does not use drugs. Family History: family history includes Heart Disease in her maternal grandfather, maternal grandmother, paternal aunt, and paternal uncle. *ALLERGIES*     Patient has no known allergies.      Physical Exam:      VS:  /65   Pulse 88   Temp 97.7 °F (36.5 °C)   Resp 16   Ht 5' (1.524 m)   Wt 134 lb (60.8 kg)   SpO2 98%   BMI 26.17 kg/m²      Initial Plan of Care:  Initiate Treatment-Testing, Proceed toTreatment Area When Bed Available for ED Attending/MLP to Continue Care    I spoke with Dr. Jewel Bedolla about the patient. She states that she should come back if she is altered per family. Patient will be put in a room.     -----------------END OF FIRST PROVIDER CONTACT ASSESSMENT NOTE--------------  Electronically signed by Flori Cedillo PA-C   DD: 11/1/21             John Zavala PA-C  11/01/21 1805

## 2021-11-02 VITALS
BODY MASS INDEX: 26.31 KG/M2 | OXYGEN SATURATION: 94 % | RESPIRATION RATE: 16 BRPM | DIASTOLIC BLOOD PRESSURE: 59 MMHG | WEIGHT: 134 LBS | SYSTOLIC BLOOD PRESSURE: 85 MMHG | HEART RATE: 70 BPM | HEIGHT: 60 IN | TEMPERATURE: 97.7 F

## 2021-11-02 LAB
ACETAMINOPHEN LEVEL: <5 MCG/ML (ref 10–30)
EKG ATRIAL RATE: 93 BPM
EKG P AXIS: 79 DEGREES
EKG P-R INTERVAL: 168 MS
EKG Q-T INTERVAL: 350 MS
EKG QRS DURATION: 68 MS
EKG QTC CALCULATION (BAZETT): 435 MS
EKG R AXIS: 80 DEGREES
EKG T AXIS: 44 DEGREES
EKG VENTRICULAR RATE: 93 BPM
ETHANOL: <10 MG/DL (ref 0–0.08)
SALICYLATE, SERUM: <0.3 MG/DL (ref 0–30)
TRICYCLIC ANTIDEPRESSANTS SCREEN SERUM: NEGATIVE NG/ML

## 2021-11-02 PROCEDURE — 93010 ELECTROCARDIOGRAM REPORT: CPT | Performed by: INTERNAL MEDICINE

## 2021-11-02 NOTE — ED NOTES
Mrs. Early contacted and updated on patient transfer time. Questions addressed. Telephone consent given for transfer to [facility] and witness by Aurora Merrill RN.      Leona Diez RN  11/02/21 [id number]

## 2021-11-02 NOTE — ED NOTES
[facility] clinic bed assignment:  [address] [place] [address] [phone]  [facility] to call back with transport information     Nitin Eden RN  99/27/09 9111

## 2021-11-02 NOTE — ED NOTES
Report called to Jodee Hernandez RN at [facility], going to H60 B35. PAS present and updated.      Marla Dowell RN  11/02/21 7268

## 2021-11-04 LAB — URINE CULTURE, ROUTINE: NORMAL

## 2021-12-03 RX ORDER — RISPERIDONE 0.25 MG/1
0.25 TABLET, FILM COATED ORAL 2 TIMES DAILY
Qty: 60 TABLET | Refills: 0 | Status: ON HOLD
Start: 2021-12-03 | End: 2022-05-14 | Stop reason: HOSPADM

## 2021-12-03 NOTE — TELEPHONE ENCOUNTER
----- Message from [facility] [place] sent at 12/3/2021  3:06 PM EST -----  Subject: Refill Request    QUESTIONS  Name of Medication? risperiDONE (RISPERDAL) 0.25 MG tablet  Patient-reported dosage and instructions? Take 1 tablet by mouth 2 times   daily  How many days do you have left? 0  Preferred Pharmacy? [id number]  phone number (if available)? [phone]  Additional Information for Provider? Pt's mother states he medication was   changed and she is not doing well on the new medication and would like for   the pt to resume taking this medication instead. Call pt's mother back to   advise if change was approved or not.  ---------------------------------------------------------------------------  --------------  CALL BACK INFO  What is the best way for the office to contact you? OK to leave message on   voicemail  Preferred Call Back Phone Number?  [phone]

## 2021-12-06 ENCOUNTER — TELEPHONE (OUTPATIENT)
Dept: PRIMARY CARE CLINIC | Age: 56
End: 2021-12-06

## 2021-12-06 RX ORDER — NYSTATIN 100000 U/G
CREAM TOPICAL
Qty: 1 EACH | Refills: 0 | Status: ON HOLD
Start: 2021-12-06 | End: 2022-06-18 | Stop reason: HOSPADM

## 2021-12-06 NOTE — TELEPHONE ENCOUNTER
Says she went to see her daughter and her vaginal area is inflamed is there something you can send in some cream for her?

## 2021-12-07 ENCOUNTER — APPOINTMENT (OUTPATIENT)
Dept: GENERAL RADIOLOGY | Age: 56
End: 2021-12-07
Payer: MEDICARE

## 2021-12-07 ENCOUNTER — APPOINTMENT (OUTPATIENT)
Dept: CT IMAGING | Age: 56
End: 2021-12-07
Payer: MEDICARE

## 2021-12-07 ENCOUNTER — HOSPITAL ENCOUNTER (EMERGENCY)
Age: 56
Discharge: ANOTHER HOSPITAL | End: 2021-12-08
Attending: EMERGENCY MEDICINE
Payer: MEDICARE

## 2021-12-07 DIAGNOSIS — S06.5XAA SUBDURAL HEMATOMA: Primary | ICD-10-CM

## 2021-12-07 LAB
ALBUMIN SERPL-MCNC: 3.6 G/DL (ref 3.5–5.2)
ALP BLD-CCNC: 109 U/L (ref 35–104)
ALT SERPL-CCNC: 38 U/L (ref 0–32)
ANION GAP SERPL CALCULATED.3IONS-SCNC: 15 MMOL/L (ref 7–16)
AST SERPL-CCNC: 43 U/L (ref 0–31)
BASOPHILS ABSOLUTE: 0.05 E9/L (ref 0–0.2)
BASOPHILS RELATIVE PERCENT: 0.7 % (ref 0–2)
BILIRUB SERPL-MCNC: 0.8 MG/DL (ref 0–1.2)
BILIRUBIN URINE: ABNORMAL
BLOOD, URINE: NEGATIVE
BUN BLDV-MCNC: 17 MG/DL (ref 6–20)
CALCIUM SERPL-MCNC: 9.5 MG/DL (ref 8.6–10.2)
CHLORIDE BLD-SCNC: 106 MMOL/L (ref 98–107)
CLARITY: CLEAR
CO2: 23 MMOL/L (ref 22–29)
COLOR: YELLOW
CREAT SERPL-MCNC: 1.1 MG/DL (ref 0.5–1)
EOSINOPHILS ABSOLUTE: 0.05 E9/L (ref 0.05–0.5)
EOSINOPHILS RELATIVE PERCENT: 0.7 % (ref 0–6)
GFR AFRICAN AMERICAN: >60
GFR NON-AFRICAN AMERICAN: 51 ML/MIN/1.73
GLUCOSE BLD-MCNC: 115 MG/DL (ref 74–99)
GLUCOSE URINE: NEGATIVE MG/DL
HCT VFR BLD CALC: 50.5 % (ref 34–48)
HEMOGLOBIN: 17.4 G/DL (ref 11.5–15.5)
IMMATURE GRANULOCYTES #: 0.02 E9/L
IMMATURE GRANULOCYTES %: 0.3 % (ref 0–5)
KETONES, URINE: ABNORMAL MG/DL
LACTIC ACID: 2 MMOL/L (ref 0.5–2.2)
LEUKOCYTE ESTERASE, URINE: NEGATIVE
LYMPHOCYTES ABSOLUTE: 1.99 E9/L (ref 1.5–4)
LYMPHOCYTES RELATIVE PERCENT: 27.6 % (ref 20–42)
MCH RBC QN AUTO: 32.4 PG (ref 26–35)
MCHC RBC AUTO-ENTMCNC: 34.5 % (ref 32–34.5)
MCV RBC AUTO: 94 FL (ref 80–99.9)
MONOCYTES ABSOLUTE: 0.55 E9/L (ref 0.1–0.95)
MONOCYTES RELATIVE PERCENT: 7.6 % (ref 2–12)
NEUTROPHILS ABSOLUTE: 4.54 E9/L (ref 1.8–7.3)
NEUTROPHILS RELATIVE PERCENT: 63.1 % (ref 43–80)
NITRITE, URINE: NEGATIVE
PDW BLD-RTO: 13.3 FL (ref 11.5–15)
PH UA: 6 (ref 5–9)
PLATELET # BLD: 188 E9/L (ref 130–450)
PMV BLD AUTO: 9.3 FL (ref 7–12)
POTASSIUM SERPL-SCNC: 4.1 MMOL/L (ref 3.5–5)
PROTEIN UA: NEGATIVE MG/DL
RBC # BLD: 5.37 E12/L (ref 3.5–5.5)
SODIUM BLD-SCNC: 144 MMOL/L (ref 132–146)
SPECIFIC GRAVITY UA: 1.02 (ref 1–1.03)
TOTAL PROTEIN: 7.3 G/DL (ref 6.4–8.3)
TROPONIN, HIGH SENSITIVITY: 24 NG/L (ref 0–9)
TROPONIN, HIGH SENSITIVITY: 24 NG/L (ref 0–9)
TSH SERPL DL<=0.05 MIU/L-ACNC: 17.65 UIU/ML (ref 0.27–4.2)
UROBILINOGEN, URINE: 0.2 E.U./DL
WBC # BLD: 7.2 E9/L (ref 4.5–11.5)

## 2021-12-07 PROCEDURE — 74019 RADEX ABDOMEN 2 VIEWS: CPT

## 2021-12-07 PROCEDURE — 85025 COMPLETE CBC W/AUTO DIFF WBC: CPT

## 2021-12-07 PROCEDURE — 36415 COLL VENOUS BLD VENIPUNCTURE: CPT

## 2021-12-07 PROCEDURE — 93005 ELECTROCARDIOGRAM TRACING: CPT | Performed by: EMERGENCY MEDICINE

## 2021-12-07 PROCEDURE — 80053 COMPREHEN METABOLIC PANEL: CPT

## 2021-12-07 PROCEDURE — 83605 ASSAY OF LACTIC ACID: CPT

## 2021-12-07 PROCEDURE — 2580000003 HC RX 258: Performed by: STUDENT IN AN ORGANIZED HEALTH CARE EDUCATION/TRAINING PROGRAM

## 2021-12-07 PROCEDURE — 84443 ASSAY THYROID STIM HORMONE: CPT

## 2021-12-07 PROCEDURE — 71045 X-RAY EXAM CHEST 1 VIEW: CPT

## 2021-12-07 PROCEDURE — 70450 CT HEAD/BRAIN W/O DYE: CPT

## 2021-12-07 PROCEDURE — 96365 THER/PROPH/DIAG IV INF INIT: CPT

## 2021-12-07 PROCEDURE — 71046 X-RAY EXAM CHEST 2 VIEWS: CPT

## 2021-12-07 PROCEDURE — 84484 ASSAY OF TROPONIN QUANT: CPT

## 2021-12-07 PROCEDURE — 81003 URINALYSIS AUTO W/O SCOPE: CPT

## 2021-12-07 PROCEDURE — 70260 X-RAY EXAM OF SKULL: CPT

## 2021-12-07 PROCEDURE — 99285 EMERGENCY DEPT VISIT HI MDM: CPT

## 2021-12-07 RX ORDER — 0.9 % SODIUM CHLORIDE 0.9 %
1000 INTRAVENOUS SOLUTION INTRAVENOUS ONCE
Status: COMPLETED | OUTPATIENT
Start: 2021-12-07 | End: 2021-12-07

## 2021-12-07 RX ORDER — LEVETIRACETAM 10 MG/ML
1000 INJECTION INTRAVASCULAR ONCE
Status: COMPLETED | OUTPATIENT
Start: 2021-12-08 | End: 2021-12-08

## 2021-12-07 RX ADMIN — SODIUM CHLORIDE 1000 ML: 9 INJECTION, SOLUTION INTRAVENOUS at 21:23

## 2021-12-08 ENCOUNTER — APPOINTMENT (OUTPATIENT)
Dept: CT IMAGING | Age: 56
End: 2021-12-08
Payer: MEDICARE

## 2021-12-08 VITALS
HEIGHT: 60 IN | RESPIRATION RATE: 16 BRPM | OXYGEN SATURATION: 95 % | SYSTOLIC BLOOD PRESSURE: 96 MMHG | DIASTOLIC BLOOD PRESSURE: 61 MMHG | TEMPERATURE: 98 F | HEART RATE: 44 BPM | WEIGHT: 134 LBS | BODY MASS INDEX: 26.31 KG/M2

## 2021-12-08 LAB
EKG ATRIAL RATE: 93 BPM
EKG P AXIS: 61 DEGREES
EKG P-R INTERVAL: 162 MS
EKG Q-T INTERVAL: 320 MS
EKG QRS DURATION: 56 MS
EKG QTC CALCULATION (BAZETT): 397 MS
EKG R AXIS: 66 DEGREES
EKG T AXIS: 11 DEGREES
EKG VENTRICULAR RATE: 93 BPM
SARS-COV-2, NAAT: ABNORMAL
SARS-COV-2, NAAT: ABNORMAL

## 2021-12-08 PROCEDURE — 87635 SARS-COV-2 COVID-19 AMP PRB: CPT

## 2021-12-08 PROCEDURE — 2580000003 HC RX 258: Performed by: STUDENT IN AN ORGANIZED HEALTH CARE EDUCATION/TRAINING PROGRAM

## 2021-12-08 PROCEDURE — 93010 ELECTROCARDIOGRAM REPORT: CPT | Performed by: INTERNAL MEDICINE

## 2021-12-08 PROCEDURE — 70450 CT HEAD/BRAIN W/O DYE: CPT

## 2021-12-08 PROCEDURE — 6360000002 HC RX W HCPCS: Performed by: STUDENT IN AN ORGANIZED HEALTH CARE EDUCATION/TRAINING PROGRAM

## 2021-12-08 RX ORDER — SODIUM CHLORIDE 9 MG/ML
1000 INJECTION, SOLUTION INTRAVENOUS CONTINUOUS
Status: DISCONTINUED | OUTPATIENT
Start: 2021-12-08 | End: 2021-12-08 | Stop reason: HOSPADM

## 2021-12-08 RX ORDER — 0.9 % SODIUM CHLORIDE 0.9 %
500 INTRAVENOUS SOLUTION INTRAVENOUS ONCE
Status: COMPLETED | OUTPATIENT
Start: 2021-12-08 | End: 2021-12-08

## 2021-12-08 RX ADMIN — LEVETIRACETAM 1000 MG: 10 INJECTION INTRAVENOUS at 01:18

## 2021-12-08 RX ADMIN — SODIUM CHLORIDE 500 ML: 9 INJECTION, SOLUTION INTRAVENOUS at 05:21

## 2021-12-08 NOTE — ED NOTES
Pt mental status has decreased per mother. Mother states,\"Pt has been high functioning, now decreased and will not eat\".       Hayley Ricci RN  12/07/21 5786

## 2021-12-08 NOTE — ED PROVIDER NOTES
[address] [facility] ENCOUNTER      Pt Name: Ranell Closs See  MRN: [id number]  [place] 1965  Date of evaluation: 12/7/2021      CHIEF COMPLAINT       Chief Complaint   Patient presents with    Altered Mental Status     (patient had brain shunt placed 1week ago, altered since)        HPI Ranell Closs See is a 55 y.o. female history of Down syndrome with hydrocephalus with a  shunt in place, who presents to the emergency department from group home with altered mental status. History limited secondary to patient's Down's syndrome nearly nonverbal at baseline. Per her father who is in the room since her discharge from the [facility] several weeks ago she was at the group home. For the last week she has not been eating or drinking. She has been less responsive than normal though her baseline she is fairly interactive apparently over the last 6 months has had significant decline.  shunt was changed with the millimeters of pressure from 40-30 per notes. Apparently neurology saw her at the [facility] and questioned if she had possible Alzheimer's. No reported falls or trauma. Review of Systems   Unable to perform ROS: Dementia        Physical Exam  Vitals and nursing note reviewed. Constitutional:       General: She is not in acute distress. Appearance: She is well-developed. Comments: Awake. Looking around the room   HENT:      Head: Normocephalic and atraumatic. Right Ear: External ear normal.      Left Ear: External ear normal.      Mouth/Throat:      Mouth: Mucous membranes are moist.   Eyes:      General: No scleral icterus. Pupils: Pupils are equal, round, and reactive to light. Cardiovascular:      Rate and Rhythm: Normal rate and regular rhythm. Heart sounds: No murmur heard.        Comments: 2+ radial and dorsal pedis pulses bilaterally  Pulmonary:      Effort: Pulmonary effort is normal. No respiratory distress. Breath sounds: Normal breath sounds. No wheezing. Abdominal:      Palpations: Abdomen is soft. Tenderness: There is no abdominal tenderness. There is no guarding or rebound. Musculoskeletal:         General: No tenderness or deformity. Normal range of motion. Cervical back: Normal range of motion and neck supple. Right lower leg: No edema. Left lower leg: No edema. Skin:     General: Skin is warm and dry. Capillary Refill: Capillary refill takes less than 2 seconds. Neurological:      General: No focal deficit present. Mental Status: She is alert. Cranial Nerves: No cranial nerve deficit. Sensory: No sensory deficit. Motor: No weakness or abnormal muscle tone. Comments: Occasionally shakes head yes and will make sounds. Moving all extremities. Awake. PERRL. Psychiatric:         Behavior: Behavior normal.      Comments: Cooperative. Does not answer questions. At baseline per father. Procedures     MDM     This is a 60-year-old female with a history of Down syndrome who presents to the emergency department from group home with worsening altered mental status and refusing to eat or drink there. In the emergency department the patient is awake and alert. She is close to her baseline but more sleepy per father in the room. She is moving all extremities. Pupils equal round reactive to light. No signs of trauma. CT imaging showed left subdural hemorrhage approximately 8 mm maximal with vomiting no midline shift.  shunt in stable position on CT imaging and shunt series showed no problems with the  shunt itself. Metabolic panel showed normal electrolytes normal renal function. EKG showed no ischemic change troponin delta troponin reassuring less likely acute cardiac etiology such as ACS. No leukocytosis patient afebrile no signs of underlying bacterial etiology of her symptoms.   Discussed with Dr. Marni Burt, neurosurgery at the [facility] recommended transfer for further evaluation. ED Course as of 12/08/21 0231   Wed Dec 08, 2021   0016 Discussed with Dr. Rubio Krueger, [facility], who recommends transfer to [facility] given new subdural hematoma and recent  shunt manipulation for every 2 neurochecks as well as a repeat head CT. They currently do not have a bed but they are going to work to try to expedite getting a bed for this patient. They recommended a repeat head CT without contrast 8 hours after the initial 1. I discussed this at length with the family. Patient remained stable. Keppra IV administered per neurosurgery Dr. Rubio Krueger. [LM]      ED Course User Index  [LM] Kevin Talamantes DO         ED Course as of 12/08/21 0231   Wed Dec 08, 2021   0016 Discussed with Dr. Rubio Krueger, [facility], who recommends transfer to [facility] given new subdural hematoma and recent  shunt manipulation for every 2 neurochecks as well as a repeat head CT. They currently do not have a bed but they are going to work to try to expedite getting a bed for this patient. They recommended a repeat head CT without contrast 8 hours after the initial 1. I discussed this at length with the family. Patient remained stable. Keppra IV administered per neurosurgery Dr. Rubio Krueger. [LM]      ED Course User Index  [LM] Kevin Talamantes DO       --------------------------------------------- PAST HISTORY ---------------------------------------------  Past Medical History:  has a past medical history of Arthritis, Down syndrome, Hypothyroidism, Osteoarthritis, Syncope, Thyroid disease, UTI (urinary tract infection), Varicose veins, and Wears glasses. Past Surgical History:  has a past surgical history that includes knee surgery; hip surgery; Toe Surgery; ECHO Compl W Dop Color Flow (5/14/2012); Tubal ligation; Total knee arthroplasty (Left, 08/11/2016); Abdominal adhesion surgery;  Abdomen surgery; joint replacement; craniotomy (Right, 4/12/2019); Vena Cava Filter Placement (Bilateral, 4/22/2019); brain surgery; and Ventriculoperitoneal shunt (N/A, 11/12/2019). Social History:  reports that she has never smoked. She has never used smokeless tobacco. She reports that she does not drink alcohol and does not use drugs. Family History: family history includes Heart Disease in her maternal grandfather, maternal grandmother, paternal aunt, and paternal uncle. The patients home medications have been reviewed. Allergies: Patient has no known allergies.     -------------------------------------------------- RESULTS -------------------------------------------------    Lab  Results for orders placed or performed during the hospital encounter of 12/07/21   COVID-19, Rapid    Specimen: Nasopharyngeal Swab   Result Value Ref Range    SARS-CoV-2, NAAT Indeterminate (A) Not Detected   CBC auto differential   Result Value Ref Range    WBC 7.2 4.5 - 11.5 E9/L    RBC 5.37 3.50 - 5.50 E12/L    Hemoglobin 17.4 (H) 11.5 - 15.5 g/dL    Hematocrit 50.5 (H) 34.0 - 48.0 %    MCV 94.0 80.0 - 99.9 fL    MCH 32.4 26.0 - 35.0 pg    MCHC 34.5 32.0 - 34.5 %    RDW 13.3 11.5 - 15.0 fL    Platelets 250 372 - 965 E9/L    MPV 9.3 7.0 - 12.0 fL    Neutrophils % 63.1 43.0 - 80.0 %    Immature Granulocytes % 0.3 0.0 - 5.0 %    Lymphocytes % 27.6 20.0 - 42.0 %    Monocytes % 7.6 2.0 - 12.0 %    Eosinophils % 0.7 0.0 - 6.0 %    Basophils % 0.7 0.0 - 2.0 %    Neutrophils Absolute 4.54 1.80 - 7.30 E9/L    Immature Granulocytes # 0.02 E9/L    Lymphocytes Absolute 1.99 1.50 - 4.00 E9/L    Monocytes Absolute 0.55 0.10 - 0.95 E9/L    Eosinophils Absolute 0.05 0.05 - 0.50 E9/L    Basophils Absolute 0.05 0.00 - 0.20 E9/L   Comprehensive Metabolic Panel   Result Value Ref Range    Sodium 144 132 - 146 mmol/L    Potassium 4.1 3.5 - 5.0 mmol/L    Chloride 106 98 - 107 mmol/L    CO2 23 22 - 29 mmol/L    Anion Gap 15 7 - 16 mmol/L    Glucose 115 (H) 74 - 99 mg/dL    BUN 17 6 - 20 mg/dL    CREATININE 1.1 (H) 0.5 - 1.0 mg/dL    GFR Non-African American 51 >=60 mL/min/1.73    GFR African American >60     Calcium 9.5 8.6 - 10.2 mg/dL    Total Protein 7.3 6.4 - 8.3 g/dL    Albumin 3.6 3.5 - 5.2 g/dL    Total Bilirubin 0.8 0.0 - 1.2 mg/dL    Alkaline Phosphatase 109 (H) 35 - 104 U/L    ALT 38 (H) 0 - 32 U/L    AST 43 (H) 0 - 31 U/L   Troponin   Result Value Ref Range    Troponin, High Sensitivity 24 (H) 0 - 9 ng/L   Urinalysis   Result Value Ref Range    Color, UA Yellow Straw/Yellow    Clarity, UA Clear Clear    Glucose, Ur Negative Negative mg/dL    Bilirubin Urine SMALL (A) Negative    Ketones, Urine TRACE (A) Negative mg/dL    Specific Gravity, UA 1.025 1.005 - 1.030    Blood, Urine Negative Negative    pH, UA 6.0 5.0 - 9.0    Protein, UA Negative Negative mg/dL    Urobilinogen, Urine 0.2 <2.0 E.U./dL    Nitrite, Urine Negative Negative    Leukocyte Esterase, Urine Negative Negative   Lactic Acid, Plasma   Result Value Ref Range    Lactic Acid 2.0 0.5 - 2.2 mmol/L   TSH WITHOUT REFLEX   Result Value Ref Range    TSH 17.650 (H) 0.270 - 4.200 uIU/mL   Troponin   Result Value Ref Range    Troponin, High Sensitivity 24 (H) 0 - 9 ng/L   EKG 12 Lead   Result Value Ref Range    Ventricular Rate 93 BPM    Atrial Rate 93 BPM    P-R Interval 162 ms    QRS Duration 56 ms    Q-T Interval 320 ms    QTc Calculation (Bazett) 397 ms    P Axis 61 degrees    R Axis 66 degrees    T Axis 11 degrees       Radiology  CT HEAD WO CONTRAST   Final Result   1. Left SUBDURAL HEMORRHAGE measuring approximately 8 mm in maximal width. It has developed in the interim since 11/01/2021. No midline shift. 2. Persistent ventriculomegaly, with disproportionate dilation of the   temporal horns. Stable position of the right transfrontal ventriculostomy   shunt. XR CHEST (2 VW)   Final Result   1. The visualized  shunt is unremarkable. 2.  No acute cardiopulmonary abnormality. XR ABDOMEN (2 VIEWS)   Final Result   Tip of the  shunt is in the left upper quadrant. No gross abnormality is   evident in the visualized  shunt. XR SKULL (MIN 4 VIEWS)   Final Result   Grossly no visible shunt discontinuity appreciated. XR CHEST PORTABLE   Final Result   Atherosclerotic disease. No additional evidence of active cardiopulmonary   pathology. EKG:  This EKG is signed and interpreted by me. Rate: 87bpm  Rhythm: sinus  Interpretation: normal axis. Artifact. No st segment elevation nor depression. Comparison: stable as compared to patient's most recent EKG      ------------------------- NURSING NOTES AND VITALS REVIEWED ---------------------------  Date / Time Roomed:  12/7/2021  8:38 PM  ED Bed Assignment:  11/11    The nursing notes within the ED encounter and vital signs as below have been reviewed. Patient Vitals for the past 24 hrs:   BP Temp Pulse Resp SpO2 Height Weight   12/08/21 0100 95/66 -- 68 13 97 % -- --   12/08/21 0000 (!) 91/56 -- 66 13 96 % -- --   12/07/21 2300 99/61 -- 67 18 98 % -- --   12/07/21 2200 120/71 -- 76 16 96 % -- --   12/07/21 2042 (!) 113/95 98 °F (36.7 °C) 105 16 96 % 5' (1.524 m) 134 lb (60.8 kg)       Oxygen Saturation Interpretation: Normal      ------------------------------------------ PROGRESS NOTES ------------------------------------------      I have spoken with the father and mother and discussed todays results, in addition to providing specific details for the plan of care and counseling regarding the diagnosis and prognosis. Their questions are answered at this time and they are agreeable with the plan. I have discussed the risks and benefits of transfer and they wish to proceed with the transfer. --------------------------------- ADDITIONAL PROVIDER NOTES ---------------------------------  Consultations:  Spoke with Dr. Kathi Leonard (Neurosurgery). Discussed case.   They will admit this patient and will provide consultation. Reason for transfer: need for higher level of care, need for neurosurgery evaluation. This patient's ED course included: a personal history and physicial examination, re-evaluation prior to disposition, multiple bedside re-evaluations, IV medications, cardiac monitoring and continuous pulse oximetry    This patient has remained hemodynamically stable during their ED course. Please note that the withdrawal or failure to initiate urgent interventions for this patient would likely result in a life threatening deterioration or permanent disability. Clinical Impression  1. Subdural hematoma ([address]          Disposition  Patient's disposition: Transfer to [facility]. Transferred by: [facility], ground. Patient's condition is stable. Layo Fischer DO  Resident  12/08/21 0017  ATTENDING PROVIDER ATTESTATION:     I have personally performed and/or participated in the history, exam, medical decision making, and procedures and agree with all pertinent clinical information. I have also reviewed and agree with the past medical, family and social history unless otherwise noted. I have discussed this patient in detail with the resident, and provided the instruction and education regarding altered mental status. My findings/Plan: I was the primary provider for patient. Patient presenting here because of altered mental status. Patient does have history of  shunt. Patient reportedly having no fever there is no history of vomiting. There is no history of fall or injury. Patient does have history of Down syndrome. Patient unable to speak here. But she is awake alert. Patient heart lung exam normal abdomen is soft and nontender. Patient moving all extremities. Patient labs noted reviewed as well as EKG as well as CT head. CT does show subdural hematoma. We did speak to [facility] where patient had shunt placed. They will accept patient at [facility].   Family at bedside made aware of findings and plan for transfer. Patient's vital signs noted and stable.        Sylvia Diane MD  12/08/21 MD Mark  12/08/21 0896

## 2021-12-08 NOTE — ED NOTES
Pt parents requesting phone call when pt is transferred to [facility]. Explained process to family, that typically the pt has to wait for a bed assignment and then the pt has to wait for transportation and there can be delays in either of those processes. Family expressed understanding. Provided contact number of [phone].   Section nurse made aware     Alvaro Milian RN  12/08/21 0039

## 2022-01-14 LAB
SARS-COV-2: NOT DETECTED
SOURCE: NORMAL

## 2022-02-17 ENCOUNTER — HOSPITAL ENCOUNTER (OUTPATIENT)
Dept: CT IMAGING | Age: 57
Discharge: HOME | End: 2022-02-19
Payer: MEDICARE

## 2022-02-17 DIAGNOSIS — G91.0 HYDROCEPHALUS, COMMUNICATING (HCC): ICD-10-CM

## 2022-02-17 PROCEDURE — 70450 CT HEAD/BRAIN W/O DYE: CPT

## 2022-03-19 ENCOUNTER — APPOINTMENT (OUTPATIENT)
Dept: CT IMAGING | Age: 57
End: 2022-03-19
Payer: MEDICARE

## 2022-03-19 ENCOUNTER — APPOINTMENT (OUTPATIENT)
Dept: GENERAL RADIOLOGY | Age: 57
End: 2022-03-19
Payer: MEDICARE

## 2022-03-19 ENCOUNTER — HOSPITAL ENCOUNTER (EMERGENCY)
Age: 57
Discharge: HOME | End: 2022-03-20
Attending: STUDENT IN AN ORGANIZED HEALTH CARE EDUCATION/TRAINING PROGRAM
Payer: MEDICARE

## 2022-03-19 DIAGNOSIS — J69.0 ASPIRATION PNEUMONITIS (HCC): ICD-10-CM

## 2022-03-19 DIAGNOSIS — R09.02 HYPOXIA: Primary | ICD-10-CM

## 2022-03-19 LAB
ALBUMIN SERPL-MCNC: 3.1 G/DL (ref 3.5–5.2)
ALP BLD-CCNC: 177 U/L (ref 35–104)
ALT SERPL-CCNC: 48 U/L (ref 0–32)
ANION GAP SERPL CALCULATED.3IONS-SCNC: 10 MMOL/L (ref 7–16)
AST SERPL-CCNC: 37 U/L (ref 0–31)
BASOPHILS ABSOLUTE: 0.05 E9/L (ref 0–0.2)
BASOPHILS RELATIVE PERCENT: 1.2 % (ref 0–2)
BILIRUB SERPL-MCNC: 0.3 MG/DL (ref 0–1.2)
BUN BLDV-MCNC: 16 MG/DL (ref 6–20)
CALCIUM SERPL-MCNC: 9.1 MG/DL (ref 8.6–10.2)
CHLORIDE BLD-SCNC: 103 MMOL/L (ref 98–107)
CO2: 28 MMOL/L (ref 22–29)
CREAT SERPL-MCNC: 0.7 MG/DL (ref 0.5–1)
EOSINOPHILS ABSOLUTE: 0.09 E9/L (ref 0.05–0.5)
EOSINOPHILS RELATIVE PERCENT: 2.1 % (ref 0–6)
GFR AFRICAN AMERICAN: >60
GFR NON-AFRICAN AMERICAN: >60 ML/MIN/1.73
GLUCOSE BLD-MCNC: 125 MG/DL (ref 74–99)
HCT VFR BLD CALC: 44.3 % (ref 34–48)
HEMOGLOBIN: 14.5 G/DL (ref 11.5–15.5)
IMMATURE GRANULOCYTES #: 0.02 E9/L
IMMATURE GRANULOCYTES %: 0.5 % (ref 0–5)
LACTIC ACID, SEPSIS: 1.3 MMOL/L (ref 0.5–1.9)
LACTIC ACID, SEPSIS: 1.5 MMOL/L (ref 0.5–1.9)
LYMPHOCYTES ABSOLUTE: 0.96 E9/L (ref 1.5–4)
LYMPHOCYTES RELATIVE PERCENT: 22.7 % (ref 20–42)
MCH RBC QN AUTO: 32.7 PG (ref 26–35)
MCHC RBC AUTO-ENTMCNC: 32.7 % (ref 32–34.5)
MCV RBC AUTO: 99.8 FL (ref 80–99.9)
MONOCYTES ABSOLUTE: 0.34 E9/L (ref 0.1–0.95)
MONOCYTES RELATIVE PERCENT: 8 % (ref 2–12)
NEUTROPHILS ABSOLUTE: 2.77 E9/L (ref 1.8–7.3)
NEUTROPHILS RELATIVE PERCENT: 65.5 % (ref 43–80)
PDW BLD-RTO: 14.1 FL (ref 11.5–15)
PLATELET # BLD: 287 E9/L (ref 130–450)
PMV BLD AUTO: 9.9 FL (ref 7–12)
POTASSIUM REFLEX MAGNESIUM: 4.3 MMOL/L (ref 3.5–5)
PRO-BNP: 74 PG/ML (ref 0–125)
PROCALCITONIN: 0.06 NG/ML (ref 0–0.08)
RBC # BLD: 4.44 E12/L (ref 3.5–5.5)
SARS-COV-2, NAAT: NOT DETECTED
SODIUM BLD-SCNC: 141 MMOL/L (ref 132–146)
TOTAL PROTEIN: 7.6 G/DL (ref 6.4–8.3)
TROPONIN, HIGH SENSITIVITY: 13 NG/L (ref 0–9)
TROPONIN, HIGH SENSITIVITY: 13 NG/L (ref 0–9)
WBC # BLD: 4.2 E9/L (ref 4.5–11.5)

## 2022-03-19 PROCEDURE — 80053 COMPREHEN METABOLIC PANEL: CPT

## 2022-03-19 PROCEDURE — 2580000003 HC RX 258

## 2022-03-19 PROCEDURE — 71275 CT ANGIOGRAPHY CHEST: CPT

## 2022-03-19 PROCEDURE — 93005 ELECTROCARDIOGRAM TRACING: CPT

## 2022-03-19 PROCEDURE — 99285 EMERGENCY DEPT VISIT HI MDM: CPT

## 2022-03-19 PROCEDURE — 83605 ASSAY OF LACTIC ACID: CPT

## 2022-03-19 PROCEDURE — 85025 COMPLETE CBC W/AUTO DIFF WBC: CPT

## 2022-03-19 PROCEDURE — 87040 BLOOD CULTURE FOR BACTERIA: CPT

## 2022-03-19 PROCEDURE — 84484 ASSAY OF TROPONIN QUANT: CPT

## 2022-03-19 PROCEDURE — 87635 SARS-COV-2 COVID-19 AMP PRB: CPT

## 2022-03-19 PROCEDURE — 84145 PROCALCITONIN (PCT): CPT

## 2022-03-19 PROCEDURE — 6360000004 HC RX CONTRAST MEDICATION: Performed by: RADIOLOGY

## 2022-03-19 PROCEDURE — 71045 X-RAY EXAM CHEST 1 VIEW: CPT

## 2022-03-19 PROCEDURE — 83880 ASSAY OF NATRIURETIC PEPTIDE: CPT

## 2022-03-19 RX ORDER — 0.9 % SODIUM CHLORIDE 0.9 %
1000 INTRAVENOUS SOLUTION INTRAVENOUS ONCE
Status: COMPLETED | OUTPATIENT
Start: 2022-03-19 | End: 2022-03-20

## 2022-03-19 RX ORDER — 0.9 % SODIUM CHLORIDE 0.9 %
1000 INTRAVENOUS SOLUTION INTRAVENOUS ONCE
Status: DISCONTINUED | OUTPATIENT
Start: 2022-03-19 | End: 2022-03-20 | Stop reason: HOSPADM

## 2022-03-19 RX ADMIN — SODIUM CHLORIDE 1000 ML: 9 INJECTION, SOLUTION INTRAVENOUS at 20:49

## 2022-03-19 RX ADMIN — IOPAMIDOL 75 ML: 755 INJECTION, SOLUTION INTRAVENOUS at 21:57

## 2022-03-19 ASSESSMENT — ENCOUNTER SYMPTOMS
VOMITING: 0
EYE REDNESS: 0
SHORTNESS OF BREATH: 1
NAUSEA: 0
SORE THROAT: 0
WHEEZING: 0
BACK PAIN: 0
ABDOMINAL PAIN: 0
CHEST TIGHTNESS: 0
CONSTIPATION: 0
EYE ITCHING: 0
DIARRHEA: 0
RHINORRHEA: 0

## 2022-03-19 NOTE — ED NOTES
Oral suction performed.  Copious amount of thick, tan secretions suctioned      Micheline Jacobson RN  03/19/22 1955

## 2022-03-19 NOTE — ED NOTES
Patient's SpO2 90-93% after suctioning.  Pt placed on nasal cannula 4L     Brandee Cruz RN  03/19/22 7102

## 2022-03-20 VITALS
BODY MASS INDEX: 25.92 KG/M2 | SYSTOLIC BLOOD PRESSURE: 109 MMHG | WEIGHT: 128.6 LBS | HEIGHT: 59 IN | DIASTOLIC BLOOD PRESSURE: 69 MMHG | HEART RATE: 85 BPM | RESPIRATION RATE: 18 BRPM | OXYGEN SATURATION: 92 % | TEMPERATURE: 98.6 F

## 2022-03-20 NOTE — ED PROVIDER NOTES
ATTENDING PROVIDER ATTESTATION:     Emily Early presented to the emergency department for evaluation of Shortness of Breath (SOB from [facility]. Concern for aspiration pneumonia. Pt normally on tube feeds, but family encourages oral intake. Pt 86% on 4L NC. Pt placed on NRB by EMS and pulse ox improved to 95%)   and was initially evaluated by the Medical Resident. See Original ED Note for H&P and ED course above. I have reviewed and discussed the case, including pertinent history (medical, surgical, family and social) and exam findings with the Medical Resident assigned to Emily Early. I have personally performed and/or participated in the history, exam, medical decision making, and procedures and agree with all pertinent clinical information and any additional changes or corrections are noted below in my assessment and plan. I have discussed this patient in detail with the resident, and provided the instruction and education,    I have reviewed my findings and recommendations with the assigned Medical Resident, Emily Early and members of family present at the time of disposition. I have performed a history and physical examination of this patient and directly supervised the resident caring for this patient. History of Present Illness: This patient presents to the ED for Hypoxia. Patient lives in a nursing home. She was noted to be hypoxic in the 80s. The patient Is apparently tube fed via PEG tube. Family deviates from the sole liquid diet that she is supposed to be on and I did give her food by mouth. She has Down syndrome and baseline. No history of DVT or PE in the past.  She is not on blood thinners. Effexor symptoms better or worse they are constant duration. They are mild to moderate in severity.     Review of Systems:   A complete review of systems was performed and pertinent positives and negatives are stated within HPI, all other systems reviewed and are negative.    --------------------------------------------- PAST HISTORY ---------------------------------------------  Past Medical History:  has a past medical history of Arthritis, Down syndrome, Hypothyroidism, Osteoarthritis, Syncope, Thyroid disease, UTI (urinary tract infection), Varicose veins, and Wears glasses. Past Surgical History:  has a past surgical history that includes knee surgery; hip surgery; Toe Surgery; ECHO Compl W Dop Color Flow (5/14/2012); Tubal ligation; Total knee arthroplasty (Left, 08/11/2016); Abdominal adhesion surgery; Abdomen surgery; joint replacement; craniotomy (Right, 4/12/2019); Vena Cava Filter Placement (Bilateral, 4/22/2019); brain surgery; and Ventriculoperitoneal shunt (N/A, 11/12/2019). Social History:  reports that she has never smoked. She has never used smokeless tobacco. She reports that she does not drink alcohol and does not use drugs. Family History: family history includes Heart Disease in her maternal grandfather, maternal grandmother, paternal aunt, and paternal uncle. Unless otherwise noted, family history is non contributory    The patients home medications have been reviewed. Allergies: Patient has no known allergies. Physical Exam:  Constitutional: Appears in no distress  Head: Normocephalic, atraumatic  Eyes: Non-icteric slcera, no conjunctival injection  ENT: Dry mucous membranes  Neck: Trachea midline, no JVD  Respiratory: Nonlabored respirations. Lungs clear to auscultation bilaterally, no wheezes, rales, or rhonchi. Cardiovascular: Regular rate. Regular rhythm. No murmurs, no gallops, no rubs. Gastrointestinal: Abdomen Soft, Non tender, Non distended. No rebound tenderness, guarding, or rigidity. Extremities: No lower extremity edema  Genitourinary: No CVA tenderness, no suprapubic tenderness  Musculoskeletal: Moves all extremities, no deformity  Skin: Pink, warm, dry without rash.   Neurologic: Alert, symmetric facies, no aphasia    My Medical Decision Making:   Patient presents emerge department hypoxia work-up was ensued for this. Patient has slight wheeze on her lungs but also no other findings consistent with focal pneumonia at this time. Patient was aggressively suctioned in the back of her throat and her oxygen saturation immediately improves and she is weaned to nasal cannula at 2 L. Patient has evidence of aspiration pneumonitis on her CT. However, no leukocytosis. No left shift, negative pro calcitonin at this time. Is consistent with likely pneumonitis without pneumonia at this time. Antibiotics are not indicated in his case. We watch the patient's oxygen saturation for about an hour off oxygen she did not have any episodes of desaturation below 93%. The episodes that were 93% with the patient was sleeping. When she is aroused and woken she jumped 96 to 90%. Extensive discussion with the family at bedside this and I even called and spoke with the nursing home as well and request that they just monitor the patient's oxygen saturation. The is a do give her soft diet on occasion by mouth. family also is instructed not to give anything by mouth as the patient is supposed to be n.p.o. and slowly PEG tube feeding    IMPRESSION:   1. Hypoxia    2. Aspiration pneumonitis ([address]        I, Dr. Roxana Alvarado, am the primary provider of record. I directly supervised any procedures performed by the resident and was present for the procedure including all critical portions of the procedure. Roxana Alvarado DO  Emergency Medicine    NOTE: This report was transcribed using voice recognition software.  Every effort was made to ensure accuracy; however, inadvertent computerized transcription errors may be present       Zahida Simon DO  03/19/22 5808

## 2022-03-20 NOTE — ED PROVIDER NOTES
Carey Early is a 65 y.o. female    Chief Complaint   Patient presents with    Shortness of Breath     SOB from [facility]. Concern for aspiration pneumonia. Pt normally on tube feeds, but family encourages oral intake. Pt 86% on 4L NC. Pt placed on NRB by EMS and pulse ox improved to 95%         HPI   Carey Early is a 65 y.o. female presenting to the ED for Shortness of Breath (SOB from [facility]. Concern for aspiration pneumonia. Pt normally on tube feeds, but family encourages oral intake. Pt 86% on 4L NC. Pt placed on NRB by EMS and pulse ox improved to 95%)    History comes primarily from [facility] and EMS. Patient presents from a group home called [address] [place] [address]. She is noted to be short of breath and there is concerns for aspiration pneumonia. The patient takes tube feeds but her family encourages her to eat orally as well. EMS noted her to be hypoxic upon arrival and with 4 L was 86%, patient is on no oxygen at baseline. On a nonrebreather, she improved to 95%. Shortness of breath is noted today. Patient possibly aspirated at an unknown time. Patient herself does not verbalize any complaint very well and has difficulty answering questions adequately. Patient has multiple baseline medical issues. 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 Several years ago. He does have a history of Down syndrome as well. Based on my conversation with the mother who is at bedside, patient has been deteriorating recently both physically and mentally. Review of Systems   Constitutional: Negative for activity change, appetite change, fatigue and fever. HENT: Negative for congestion, rhinorrhea and sore throat. Eyes: Negative for redness and itching. Respiratory: Positive for shortness of breath. Negative for chest tightness and wheezing. Cardiovascular: Negative for chest pain and palpitations.    Gastrointestinal: Negative for abdominal pain, constipation, diarrhea, nausea and vomiting. Genitourinary: Negative for difficulty urinating, frequency and urgency. Musculoskeletal: Negative for arthralgias, back pain and myalgias. Skin: Negative for pallor and rash. Neurological: Positive for speech difficulty. Negative for dizziness, weakness, numbness and headaches. Psychiatric/Behavioral: Positive for agitation. Negative for behavioral problems, confusion and decreased concentration. All other systems reviewed and are negative. Physical Exam  Vitals and nursing note reviewed. Constitutional:       General: She is not in acute distress. Appearance: Normal appearance. She is obese. She is not ill-appearing or toxic-appearing. HENT:      Head: Normocephalic and atraumatic. Right Ear: External ear normal.      Left Ear: External ear normal.      Nose: Nose normal. No congestion or rhinorrhea. Mouth/Throat:      Mouth: Mucous membranes are moist.      Pharynx: Oropharynx is clear. No oropharyngeal exudate. Eyes:      General: No scleral icterus. Extraocular Movements: Extraocular movements intact. Conjunctiva/sclera: Conjunctivae normal.      Pupils: Pupils are equal, round, and reactive to light. Cardiovascular:      Rate and Rhythm: Normal rate and regular rhythm. Pulses: Normal pulses. Heart sounds: Normal heart sounds. No murmur heard. Pulmonary:      Effort: Pulmonary effort is normal. No respiratory distress. Breath sounds: Examination of the right-upper field reveals wheezing. Wheezing and rhonchi present. Abdominal:      General: Bowel sounds are normal. There is no distension. Palpations: Abdomen is soft. Tenderness: There is no abdominal tenderness. Musculoskeletal:         General: No swelling or deformity. Normal range of motion. Cervical back: Normal range of motion and neck supple. No rigidity. Skin:     General: Skin is warm and dry.       Coloration: Skin is not jaundiced or pale. Neurological:      General: No focal deficit present. Mental Status: She is alert. Mental status is at baseline. She is confused. Cranial Nerves: Dysarthria present. No cranial nerve deficit. Sensory: No sensory deficit. Motor: Weakness present. Comments: Patient's deficits appear to be at baseline. Psychiatric:         Mood and Affect: Mood normal.         Behavior: Behavior normal.         Thought Content: Thought content normal.         Judgment: Judgment normal.          Procedures     MDM   Patient presented to the Emergency Department for Shortness of Breath (SOB from [address]. Concern for aspiration pneumonia. Pt normally on tube feeds, but family encourages oral intake. Pt 86% on 4L NC. Pt placed on NRB by EMS and pulse ox improved to 95%)    They are clinically stable, vital signs stable, non toxic appearing. Patient presents to the emergency department with shortness of breath and concerns for aspiration pneumonia. Patient has multiple medical issues including previous subdural hemorrhage and communicating hydrocephalus for which she has a shunt currently. Patient lives in a group home and also has Down syndrome at baseline. The patient presented hypoxic but returned to normal oxygen levels after being suctioned multiple times as there was particles and sediment that appeared to be obstructing her airway. While the patient is on tube feeds, apparently her family has been encouraging her to do oral intake. Patient's work-up is significant for baseline LFT abnormalities. She has a WBC of 4.2 but is Covid negative. Chest x-ray shows coarse interstitial markings but no acute cardiopulmonary pathology. There is also CSF shunt noted. CTA pulmonary shows no evidence of pulmonary embolism. There is groundglass opacities in the lower lobes likely atelectasis or motion artifact but possibly due to infection or aspiration pneumonitis.     Based on the findings, patient possibly has aspiration pneumonitis at this time but with no evidence of infection. After multiple reevaluations on room air, the patient is maintaining her oxygen saturation even while snoring during sleep and 93%. Upon being awoken, the patient's oxygen saturation increases above 96. After discussion with the patient's mother who is also her guardian, as well as contacting the nursing facility in which the patient resides, the patient will be discharged back to the nursing facility tonight. Strict return precautions were discussed including but not limited too new or worsening symtpoms. They verbalized understanding and were agreeable with the plan. All questions were answered and patient was discharged. EKG: This EKG is signed and interpreted by me. Rate: 123  Rhythm: sinus tachy  Axis: normal  Interpretation: no acute changes  Comparison: stable as compared to patient's most recent EKG           --------------------------------------------- PAST HISTORY ---------------------------------------------  Past Medical History:  has a past medical history of Arthritis, Down syndrome, Hypothyroidism, Osteoarthritis, Syncope, Thyroid disease, UTI (urinary tract infection), Varicose veins, and Wears glasses. Past Surgical History:  has a past surgical history that includes knee surgery; hip surgery; Toe Surgery; ECHO Compl W Dop Color Flow (5/14/2012); Tubal ligation; Total knee arthroplasty (Left, 08/11/2016); Abdominal adhesion surgery; Abdomen surgery; joint replacement; craniotomy (Right, 4/12/2019); Vena Cava Filter Placement (Bilateral, 4/22/2019); brain surgery; and Ventriculoperitoneal shunt (N/A, 11/12/2019). Social History:  reports that she has never smoked. She has never used smokeless tobacco. She reports that she does not drink alcohol and does not use drugs.     Family History: family history includes Heart Disease in her maternal grandfather, maternal grandmother, paternal aunt, and paternal uncle. The patients home medications have been reviewed. Allergies: Patient has no known allergies.     -------------------------------------------------- RESULTS -------------------------------------------------  Labs:  Results for orders placed or performed during the hospital encounter of 03/19/22   COVID-19, Rapid    Specimen: Nasopharyngeal Swab   Result Value Ref Range    SARS-CoV-2, NAAT Not Detected Not Detected   CBC auto differential   Result Value Ref Range    WBC 4.2 (L) 4.5 - 11.5 E9/L    RBC 4.44 3.50 - 5.50 E12/L    Hemoglobin 14.5 11.5 - 15.5 g/dL    Hematocrit 44.3 34.0 - 48.0 %    MCV 99.8 80.0 - 99.9 fL    MCH 32.7 26.0 - 35.0 pg    MCHC 32.7 32.0 - 34.5 %    RDW 14.1 11.5 - 15.0 fL    Platelets 298 061 - 111 E9/L    MPV 9.9 7.0 - 12.0 fL    Neutrophils % 65.5 43.0 - 80.0 %    Immature Granulocytes % 0.5 0.0 - 5.0 %    Lymphocytes % 22.7 20.0 - 42.0 %    Monocytes % 8.0 2.0 - 12.0 %    Eosinophils % 2.1 0.0 - 6.0 %    Basophils % 1.2 0.0 - 2.0 %    Neutrophils Absolute 2.77 1.80 - 7.30 E9/L    Immature Granulocytes # 0.02 E9/L    Lymphocytes Absolute 0.96 (L) 1.50 - 4.00 E9/L    Monocytes Absolute 0.34 0.10 - 0.95 E9/L    Eosinophils Absolute 0.09 0.05 - 0.50 E9/L    Basophils Absolute 0.05 0.00 - 0.20 E9/L   Comprehensive Metabolic Panel w/ Reflex to MG   Result Value Ref Range    Sodium 141 132 - 146 mmol/L    Potassium reflex Magnesium 4.3 3.5 - 5.0 mmol/L    Chloride 103 98 - 107 mmol/L    CO2 28 22 - 29 mmol/L    Anion Gap 10 7 - 16 mmol/L    Glucose 125 (H) 74 - 99 mg/dL    BUN 16 6 - 20 mg/dL    CREATININE 0.7 0.5 - 1.0 mg/dL    GFR Non-African American >60 >=60 mL/min/1.73    GFR African American >60     Calcium 9.1 8.6 - 10.2 mg/dL    Total Protein 7.6 6.4 - 8.3 g/dL    Albumin 3.1 (L) 3.5 - 5.2 g/dL    Total Bilirubin 0.3 0.0 - 1.2 mg/dL    Alkaline Phosphatase 177 (H) 35 - 104 U/L    ALT 48 (H) 0 - 32 U/L    AST 37 (H) 0 - 31 U/L   Lactate, Sepsis Result Value Ref Range    Lactic Acid, Sepsis 1.5 0.5 - 1.9 mmol/L   Lactate, Sepsis   Result Value Ref Range    Lactic Acid, Sepsis 1.3 0.5 - 1.9 mmol/L   Troponin   Result Value Ref Range    Troponin, High Sensitivity 13 (H) 0 - 9 ng/L   Brain Natriuretic Peptide   Result Value Ref Range    Pro-BNP 74 0 - 125 pg/mL   Procalcitonin   Result Value Ref Range    Procalcitonin 0.06 0.00 - 0.08 ng/mL   Troponin   Result Value Ref Range    Troponin, High Sensitivity 13 (H) 0 - 9 ng/L       Radiology:  CTA PULMONARY W CONTRAST   Final Result   Motion degraded examination. No evidence of central pulmonary embolism. Hazy ground-glass opacities within the bilateral lower lobes, which could   reflect dependent atelectasis and/or respiratory motion artifact. Superimposed infection/aspiration is not entirely excluded. XR CHEST PORTABLE   Final Result   1. Coarse interstitial markings in the lungs. Atherosclerotic disease. No   acute cardiopulmonary pathology. 2.  Partially viewed CSF shunt catheter. ------------------------- NURSING NOTES AND VITALS REVIEWED ---------------------------  Date / Time Roomed:  3/19/2022  7:40 PM  ED Bed Assignment:  11/11    The nursing notes within the ED encounter and vital signs as below have been reviewed. /86   Pulse 75   Temp 98.6 °F (37 °C) (Axillary)   Resp 26   Ht 4' 11\" (1.499 m)   Wt 128 lb 9.6 oz (58.3 kg)   SpO2 93%   BMI 25.97 kg/m²   Oxygen Saturation Interpretation: Normal and Abnormal      ------------------------------------------ PROGRESS NOTES ------------------------------------------  1:00 AM EDT  I have spoken with the patient and discussed todays results, in addition to providing specific details for the plan of care and counseling regarding the diagnosis and prognosis. Their questions are answered at this time and they are agreeable with the plan.  I discussed at length with them reasons for immediate return here for re evaluation. They will followup with their primary care physician by calling their office tomorrow. --------------------------------- ADDITIONAL PROVIDER NOTES ---------------------------------  At this time the patient is without objective evidence of an acute process requiring hospitalization or inpatient management. They have remained hemodynamically stable throughout their entire ED visit and are stable for discharge with outpatient follow-up. The plan has been discussed in detail and they are aware of the specific conditions for emergent return, as well as the importance of follow-up. New Prescriptions    No medications on file       Diagnosis:  1. Hypoxia    2. Aspiration pneumonitis ([facility] [address]        Disposition:  Patient's disposition: Discharge to nursing home  Patient's condition is stable.          Mariola Ruzi MD  Resident  03/20/22 0100

## 2022-03-20 NOTE — ED NOTES
Father at bedside aware patient will be discharged back to [facility] care. [facility] to transport ETA 0430.         Chanda Jones RN  03/20/22 8822

## 2022-03-21 LAB
EKG ATRIAL RATE: 110 BPM
EKG P AXIS: 85 DEGREES
EKG P-R INTERVAL: 152 MS
EKG Q-T INTERVAL: 304 MS
EKG QRS DURATION: 66 MS
EKG QTC CALCULATION (BAZETT): 411 MS
EKG R AXIS: 91 DEGREES
EKG T AXIS: 49 DEGREES
EKG VENTRICULAR RATE: 110 BPM

## 2022-03-21 PROCEDURE — 93010 ELECTROCARDIOGRAM REPORT: CPT | Performed by: INTERNAL MEDICINE

## 2022-03-22 ENCOUNTER — APPOINTMENT (OUTPATIENT)
Dept: GENERAL RADIOLOGY | Age: 57
End: 2022-03-22
Payer: MEDICARE

## 2022-03-22 ENCOUNTER — HOSPITAL ENCOUNTER (EMERGENCY)
Age: 57
Discharge: HOME | End: 2022-03-22
Attending: EMERGENCY MEDICINE
Payer: MEDICARE

## 2022-03-22 VITALS
SYSTOLIC BLOOD PRESSURE: 112 MMHG | BODY MASS INDEX: 25.8 KG/M2 | HEART RATE: 77 BPM | DIASTOLIC BLOOD PRESSURE: 77 MMHG | WEIGHT: 128 LBS | TEMPERATURE: 98.1 F | HEIGHT: 59 IN | RESPIRATION RATE: 20 BRPM | OXYGEN SATURATION: 94 %

## 2022-03-22 DIAGNOSIS — K94.23 MALFUNCTION OF GASTROSTOMY TUBE (HCC): Primary | ICD-10-CM

## 2022-03-22 PROCEDURE — 74018 RADEX ABDOMEN 1 VIEW: CPT

## 2022-03-22 PROCEDURE — 6360000004 HC RX CONTRAST MEDICATION: Performed by: RADIOLOGY

## 2022-03-22 PROCEDURE — 99284 EMERGENCY DEPT VISIT MOD MDM: CPT

## 2022-03-22 PROCEDURE — 43762 RPLC GTUBE NO REVJ TRC: CPT

## 2022-03-22 RX ADMIN — DIATRIZOATE MEGLUMINE AND DIATRIZOATE SODIUM 30 ML: 600; 100 SOLUTION ORAL; RECTAL at 05:19

## 2022-03-22 NOTE — ED NOTES
18f, 15cc balloon peg tube replaced by Dr. Elmo Beltran at bedside, with RN Emeka Galvez assistant.      Wero Gaviria RN  03/22/22 3514

## 2022-03-22 NOTE — ED PROVIDER NOTES
HPI:  3/22/22,   Time: 6:45 PM EDT       Christiano Early is a 65 y.o. female presenting to the ED for G-tube malfunction, beginning 1 day ago. The complaint has been persistent, mild in severity, and worsened by nothing. Patient presents to the emergency department after her G-tube was pulled out. Unknown when this occurred but per nursing home between 11 PM and patient's arrival.  HPI limited by patient's condition    Review of Systems:   Unable to be obtained due to patient's condition        --------------------------------------------- PAST HISTORY ---------------------------------------------  Past Medical History:  has a past medical history of Arthritis, Down syndrome, Hypothyroidism, Osteoarthritis, Syncope, Thyroid disease, UTI (urinary tract infection), Varicose veins, and Wears glasses. Past Surgical History:  has a past surgical history that includes knee surgery; hip surgery; Toe Surgery; ECHO Compl W Dop Color Flow (5/14/2012); Tubal ligation; Total knee arthroplasty (Left, 08/11/2016); Abdominal adhesion surgery; Abdomen surgery; joint replacement; craniotomy (Right, 4/12/2019); Vena Cava Filter Placement (Bilateral, 4/22/2019); brain surgery; and Ventriculoperitoneal shunt (N/A, 11/12/2019). Social History:  reports that she has never smoked. She has never used smokeless tobacco. She reports that she does not drink alcohol and does not use drugs. Family History: family history includes Heart Disease in her maternal grandfather, maternal grandmother, paternal aunt, and paternal uncle. The patients home medications have been reviewed. Allergies: Patient has no known allergies.         ---------------------------------------------------PHYSICAL EXAM--------------------------------------    Constitutional/General: Alert NAD  Head: Normocephalic and atraumatic  Eyes: PERRL,  conjunctive normal, sclera non icteric  Mouth: Oropharynx clear, handling secretions, no trismus, no asymmetry of the posterior oropharynx or uvular edema  Neck:  no stridor, no crepitus, no meningeal signs  Respiratory: Lungs clear to auscultation bilaterally, no wheezes, rales, or rhonchi. Not in respiratory distress  Cardiovascular:  Regular rate. Regular rhythm. No murmurs, gallops, or rubs. 2+ distal pulses  GI:  Abdomen Soft, Non tender, Non distended. No organomegaly, no palpable masses,  No rebound, guarding, or rigidity. Os to the left upper quadrant  Musculoskeletal: Moves all extremities x 4. Warm and well perfused, no clubbing, cyanosis, or edema. Capillary refill <3 seconds  Integument: skin warm and dry. No rashes. Psychiatric: Normal Affect    -------------------------------------------------- RESULTS -------------------------------------------------  I have personally reviewed all laboratory and imaging results for this patient. Results are listed below. LABS:  No results found for this visit on 03/22/22. RADIOLOGY:  Interpreted by Radiologist.  XR ABDOMEN FOR NG/OG/NE TUBE PLACEMENT   Final Result   Gastric tube in satisfactory position.             ------------------------- NURSING NOTES AND VITALS REVIEWED ---------------------------   The nursing notes within the ED encounter and vital signs as below have been reviewed by myself. /77   Pulse 77   Temp 98.1 °F (36.7 °C) (Oral)   Resp 20   Ht 4' 11\" (1.499 m)   Wt 128 lb (58.1 kg)   SpO2 94%   BMI 25.85 kg/m²   Oxygen Saturation Interpretation: Normal    The patients available past medical records and past encounters were reviewed. ------------------------------ ED COURSE/MEDICAL DECISION MAKING----------------------  Medications - No data to display    PROCEDURE  3/22/22           FEEDING TUBE REPLACEMENT  Risks, benefits and alternatives (for applicable procedures below) described. Performed By: EM Attending Physician. Indication: Tube fell out.    Informed consent: Consent unable to be obtained due to patient's condition. .  Local Anesthesia:  not required/indicated. Procedure: A feeding tube was replaced using a 20 Kiswahili gastrostomy tube and the bulb was inflated using 15 cc of sterile water. Tube was secured to skin pre-attached rubber skin bumper device                                                          . Post-Procedure: Tube position confirmed by x-ray with contrast injection. Patient tolerated the procedure well. Complications:  None. ED COURSE:       Medical Decision Making: This is a 55-year-old female presented to the ED for G-tube dislodgment. Patient G-tube replaced in the emergency department. KUB confirmed. Patient be discharged back to nursing home. IDr. Myah, am the primary provider for this encounter    This patient's ED course included: a personal history and physicial examination and re-evaluation prior to disposition    This patient has remained hemodynamically stable during their ED course. Re-Evaluations:             Re-evaluation. Patients symptoms are improving       --------------------------------- IMPRESSION AND DISPOSITION ---------------------------------    IMPRESSION  1. Malfunction of gastrostomy tube ([facility] [address]        DISPOSITION  Disposition: Discharge to nursing home  Patient condition is stable    NOTE: This report was transcribed using voice recognition software.  Every effort was made to ensure accuracy; however, inadvertent computerized transcription errors may be present        Stevan Wang DO  03/22/22 6097

## 2022-03-22 NOTE — ED NOTES
Patient incontinent of large loose stool. Incontinence care provided for discharge. Small amount bloody drainage noted around G-tube insertion site. [address]. Parents & PAS at bedside.      Edd Mack RN  03/22/22 5656

## 2022-03-24 LAB
BLOOD CULTURE, ROUTINE: NORMAL
CULTURE, BLOOD 2: NORMAL

## 2022-04-20 LAB
SARS-COV-2: NOT DETECTED
SOURCE: NORMAL

## 2022-04-23 LAB
SARS-COV-2: NOT DETECTED
SOURCE: NORMAL

## 2022-05-01 LAB — SARS-COV-2, PCR: NOT DETECTED

## 2022-05-04 ENCOUNTER — APPOINTMENT (OUTPATIENT)
Dept: CT IMAGING | Age: 57
DRG: 064 | End: 2022-05-04
Payer: MEDICARE

## 2022-05-04 ENCOUNTER — HOSPITAL ENCOUNTER (INPATIENT)
Age: 57
LOS: 9 days | Discharge: HOME | DRG: 064 | End: 2022-05-15
Attending: STUDENT IN AN ORGANIZED HEALTH CARE EDUCATION/TRAINING PROGRAM | Admitting: HOSPITALIST
Payer: MEDICARE

## 2022-05-04 ENCOUNTER — APPOINTMENT (OUTPATIENT)
Dept: GENERAL RADIOLOGY | Age: 57
DRG: 064 | End: 2022-05-04
Payer: MEDICARE

## 2022-05-04 DIAGNOSIS — R56.9 SEIZURE (HCC): Primary | ICD-10-CM

## 2022-05-04 LAB
ALBUMIN SERPL-MCNC: 3.6 G/DL (ref 3.5–5.2)
ALP BLD-CCNC: 135 U/L (ref 35–104)
ALT SERPL-CCNC: 33 U/L (ref 0–32)
ANION GAP SERPL CALCULATED.3IONS-SCNC: 12 MMOL/L (ref 7–16)
AST SERPL-CCNC: 27 U/L (ref 0–31)
BACTERIA: ABNORMAL /HPF
BASOPHILS ABSOLUTE: 0.06 E9/L (ref 0–0.2)
BASOPHILS RELATIVE PERCENT: 0.8 % (ref 0–2)
BILIRUB SERPL-MCNC: 0.4 MG/DL (ref 0–1.2)
BILIRUBIN URINE: ABNORMAL
BLOOD, URINE: ABNORMAL
BUN BLDV-MCNC: 23 MG/DL (ref 6–20)
CALCIUM SERPL-MCNC: 9.1 MG/DL (ref 8.6–10.2)
CHLORIDE BLD-SCNC: 104 MMOL/L (ref 98–107)
CLARITY: CLEAR
CO2: 26 MMOL/L (ref 22–29)
COLOR: YELLOW
CREAT SERPL-MCNC: 0.7 MG/DL (ref 0.5–1)
EKG ATRIAL RATE: 88 BPM
EKG P AXIS: 75 DEGREES
EKG P-R INTERVAL: 162 MS
EKG Q-T INTERVAL: 338 MS
EKG QRS DURATION: 72 MS
EKG QTC CALCULATION (BAZETT): 408 MS
EKG R AXIS: 53 DEGREES
EKG T AXIS: 35 DEGREES
EKG VENTRICULAR RATE: 88 BPM
EOSINOPHILS ABSOLUTE: 0.02 E9/L (ref 0.05–0.5)
EOSINOPHILS RELATIVE PERCENT: 0.3 % (ref 0–6)
GFR AFRICAN AMERICAN: >60
GFR NON-AFRICAN AMERICAN: >60 ML/MIN/1.73
GLUCOSE BLD-MCNC: 101 MG/DL (ref 74–99)
GLUCOSE URINE: NEGATIVE MG/DL
HCT VFR BLD CALC: 49.5 % (ref 34–48)
HEMOGLOBIN: 15.8 G/DL (ref 11.5–15.5)
IMMATURE GRANULOCYTES #: 0.07 E9/L
IMMATURE GRANULOCYTES %: 0.9 % (ref 0–5)
KETONES, URINE: ABNORMAL MG/DL
LACTIC ACID, SEPSIS: 1.8 MMOL/L (ref 0.5–1.9)
LACTIC ACID, SEPSIS: 1.9 MMOL/L (ref 0.5–1.9)
LEUKOCYTE ESTERASE, URINE: NEGATIVE
LYMPHOCYTES ABSOLUTE: 1.88 E9/L (ref 1.5–4)
LYMPHOCYTES RELATIVE PERCENT: 24.3 % (ref 20–42)
MCH RBC QN AUTO: 30.8 PG (ref 26–35)
MCHC RBC AUTO-ENTMCNC: 31.9 % (ref 32–34.5)
MCV RBC AUTO: 96.5 FL (ref 80–99.9)
MONOCYTES ABSOLUTE: 0.71 E9/L (ref 0.1–0.95)
MONOCYTES RELATIVE PERCENT: 9.2 % (ref 2–12)
NEUTROPHILS ABSOLUTE: 5.01 E9/L (ref 1.8–7.3)
NEUTROPHILS RELATIVE PERCENT: 64.5 % (ref 43–80)
NITRITE, URINE: POSITIVE
PDW BLD-RTO: 15 FL (ref 11.5–15)
PH UA: 6 (ref 5–9)
PLATELET # BLD: 268 E9/L (ref 130–450)
PMV BLD AUTO: 9.7 FL (ref 7–12)
POTASSIUM REFLEX MAGNESIUM: 4.5 MMOL/L (ref 3.5–5)
PROTEIN UA: 30 MG/DL
RBC # BLD: 5.13 E12/L (ref 3.5–5.5)
RBC UA: ABNORMAL /HPF (ref 0–2)
SARS-COV-2, PCR: NOT DETECTED
SODIUM BLD-SCNC: 142 MMOL/L (ref 132–146)
SPECIFIC GRAVITY UA: 1.01 (ref 1–1.03)
TOTAL PROTEIN: 7.8 G/DL (ref 6.4–8.3)
TROPONIN, HIGH SENSITIVITY: 31 NG/L (ref 0–9)
TROPONIN, HIGH SENSITIVITY: 32 NG/L (ref 0–9)
UROBILINOGEN, URINE: 4 E.U./DL
WBC # BLD: 7.8 E9/L (ref 4.5–11.5)
WBC UA: ABNORMAL /HPF (ref 0–5)

## 2022-05-04 PROCEDURE — 71045 X-RAY EXAM CHEST 1 VIEW: CPT

## 2022-05-04 PROCEDURE — 84484 ASSAY OF TROPONIN QUANT: CPT

## 2022-05-04 PROCEDURE — 99285 EMERGENCY DEPT VISIT HI MDM: CPT

## 2022-05-04 PROCEDURE — 83605 ASSAY OF LACTIC ACID: CPT

## 2022-05-04 PROCEDURE — 70450 CT HEAD/BRAIN W/O DYE: CPT

## 2022-05-04 PROCEDURE — 2580000003 HC RX 258: Performed by: STUDENT IN AN ORGANIZED HEALTH CARE EDUCATION/TRAINING PROGRAM

## 2022-05-04 PROCEDURE — G0378 HOSPITAL OBSERVATION PER HR: HCPCS

## 2022-05-04 PROCEDURE — 93005 ELECTROCARDIOGRAM TRACING: CPT | Performed by: STUDENT IN AN ORGANIZED HEALTH CARE EDUCATION/TRAINING PROGRAM

## 2022-05-04 PROCEDURE — 36415 COLL VENOUS BLD VENIPUNCTURE: CPT

## 2022-05-04 PROCEDURE — 96365 THER/PROPH/DIAG IV INF INIT: CPT

## 2022-05-04 PROCEDURE — 6360000002 HC RX W HCPCS: Performed by: STUDENT IN AN ORGANIZED HEALTH CARE EDUCATION/TRAINING PROGRAM

## 2022-05-04 PROCEDURE — 93010 ELECTROCARDIOGRAM REPORT: CPT | Performed by: INTERNAL MEDICINE

## 2022-05-04 PROCEDURE — 81001 URINALYSIS AUTO W/SCOPE: CPT

## 2022-05-04 PROCEDURE — 85025 COMPLETE CBC W/AUTO DIFF WBC: CPT

## 2022-05-04 PROCEDURE — 80053 COMPREHEN METABOLIC PANEL: CPT

## 2022-05-04 PROCEDURE — 87040 BLOOD CULTURE FOR BACTERIA: CPT

## 2022-05-04 RX ORDER — RISPERIDONE 0.25 MG/1
0.25 TABLET, FILM COATED ORAL 2 TIMES DAILY
Status: DISCONTINUED | OUTPATIENT
Start: 2022-05-04 | End: 2022-05-15 | Stop reason: HOSPADM

## 2022-05-04 RX ORDER — ONDANSETRON 2 MG/ML
4 INJECTION INTRAMUSCULAR; INTRAVENOUS EVERY 6 HOURS PRN
Status: DISCONTINUED | OUTPATIENT
Start: 2022-05-04 | End: 2022-05-15 | Stop reason: HOSPADM

## 2022-05-04 RX ORDER — ENOXAPARIN SODIUM 100 MG/ML
40 INJECTION SUBCUTANEOUS DAILY
Status: DISCONTINUED | OUTPATIENT
Start: 2022-05-05 | End: 2022-05-15 | Stop reason: HOSPADM

## 2022-05-04 RX ORDER — ACETAMINOPHEN 325 MG/1
650 TABLET ORAL EVERY 6 HOURS PRN
Status: DISCONTINUED | OUTPATIENT
Start: 2022-05-04 | End: 2022-05-15 | Stop reason: HOSPADM

## 2022-05-04 RX ORDER — ONDANSETRON 4 MG/1
4 TABLET, ORALLY DISINTEGRATING ORAL EVERY 8 HOURS PRN
Status: DISCONTINUED | OUTPATIENT
Start: 2022-05-04 | End: 2022-05-15 | Stop reason: HOSPADM

## 2022-05-04 RX ORDER — LEVETIRACETAM 5 MG/ML
500 INJECTION INTRAVASCULAR EVERY 12 HOURS
Status: DISCONTINUED | OUTPATIENT
Start: 2022-05-05 | End: 2022-05-15 | Stop reason: HOSPADM

## 2022-05-04 RX ORDER — SODIUM CHLORIDE 0.9 % (FLUSH) 0.9 %
5-40 SYRINGE (ML) INJECTION EVERY 12 HOURS SCHEDULED
Status: DISCONTINUED | OUTPATIENT
Start: 2022-05-04 | End: 2022-05-15 | Stop reason: HOSPADM

## 2022-05-04 RX ORDER — CHOLECALCIFEROL (VITAMIN D3) 50 MCG
2000 TABLET ORAL DAILY
Status: DISCONTINUED | OUTPATIENT
Start: 2022-05-05 | End: 2022-05-15 | Stop reason: HOSPADM

## 2022-05-04 RX ORDER — LEVOTHYROXINE SODIUM 88 UG/1
88 TABLET ORAL DAILY
Status: DISCONTINUED | OUTPATIENT
Start: 2022-05-05 | End: 2022-05-13

## 2022-05-04 RX ORDER — SODIUM CHLORIDE 9 MG/ML
INJECTION, SOLUTION INTRAVENOUS PRN
Status: DISCONTINUED | OUTPATIENT
Start: 2022-05-04 | End: 2022-05-15 | Stop reason: HOSPADM

## 2022-05-04 RX ORDER — LEVETIRACETAM 15 MG/ML
1500 INJECTION INTRAVASCULAR ONCE
Status: COMPLETED | OUTPATIENT
Start: 2022-05-04 | End: 2022-05-04

## 2022-05-04 RX ORDER — SODIUM CHLORIDE 0.9 % (FLUSH) 0.9 %
5-40 SYRINGE (ML) INJECTION PRN
Status: DISCONTINUED | OUTPATIENT
Start: 2022-05-04 | End: 2022-05-15 | Stop reason: HOSPADM

## 2022-05-04 RX ORDER — 0.9 % SODIUM CHLORIDE 0.9 %
1000 INTRAVENOUS SOLUTION INTRAVENOUS ONCE
Status: COMPLETED | OUTPATIENT
Start: 2022-05-04 | End: 2022-05-04

## 2022-05-04 RX ORDER — DONEPEZIL HYDROCHLORIDE 5 MG/1
5 TABLET, FILM COATED ORAL NIGHTLY
Status: DISCONTINUED | OUTPATIENT
Start: 2022-05-04 | End: 2022-05-15 | Stop reason: HOSPADM

## 2022-05-04 RX ORDER — LORAZEPAM 2 MG/ML
1 INJECTION INTRAMUSCULAR EVERY 5 MIN PRN
Status: DISCONTINUED | OUTPATIENT
Start: 2022-05-04 | End: 2022-05-15 | Stop reason: HOSPADM

## 2022-05-04 RX ORDER — POLYETHYLENE GLYCOL 3350 17 G/17G
17 POWDER, FOR SOLUTION ORAL DAILY PRN
Status: DISCONTINUED | OUTPATIENT
Start: 2022-05-04 | End: 2022-05-15 | Stop reason: HOSPADM

## 2022-05-04 RX ORDER — ACETAMINOPHEN 650 MG/1
650 SUPPOSITORY RECTAL EVERY 6 HOURS PRN
Status: DISCONTINUED | OUTPATIENT
Start: 2022-05-04 | End: 2022-05-15 | Stop reason: HOSPADM

## 2022-05-04 RX ADMIN — SODIUM CHLORIDE 1000 ML: 9 INJECTION, SOLUTION INTRAVENOUS at 18:06

## 2022-05-04 RX ADMIN — LEVETIRACETAM 1500 MG: 1500 INJECTION, SOLUTION INTRAVENOUS at 13:23

## 2022-05-04 ASSESSMENT — PAIN - FUNCTIONAL ASSESSMENT: PAIN_FUNCTIONAL_ASSESSMENT: WONG-BAKER FACES

## 2022-05-04 ASSESSMENT — PAIN SCALES - WONG BAKER
WONGBAKER_NUMERICALRESPONSE: 0
WONGBAKER_NUMERICALRESPONSE: 0

## 2022-05-04 NOTE — LETTER
[place] Department Medicaid  CERTIFICATION OF NECESSITY  FOR NON-EMERGENCY TRANSPORTATION   BY GROUND AMBULANCE      Individual Information   1. Name: Cedric Ramirez See 2. [place] Medicaid Billing Number:   3. Address: [address] [place] [address]                        Transport to [facility]                                                        [address], [facility], [place] [address]     Transportation Provider Information   4. Provider Name: [id number]   5. [place] Medicaid Provider Number: National Provider Identifier (NPI):     Certification  7. Criteria:  During transport, this individual requires:  [x] Medical treatment or continuous     supervision by an EMT. [] The administration or regulation of oxygen by another person. [] Supervised protective restraint. 8. Period Beginning Date: 05/05/2022   9. Length  [x] Not more than 5 day(s)  [] One Year     Additional Information Relevant to Certification   10. Comments or Explanations, If Necessary or Appropriate     Seizure, Down Syndrome, non-ambulatory, non-verbal, dependent for transfers, unable to safely sit in wheelchair for duration needed for transport, trunk instability     Certifying Practitioner Information   11. Name of Practitioner: Mirza Campbell MD   12. [place] Medicaid Provider Number, If Applicable: [address] Provider Identifier (NPI):     Signature Information   14. Date of Signature: 05/05/2022 15. Name of Person Signing: NURY Manning, RN CM/Discharge Planner   16. Signature and Professional Designation: Electronically signed by Franco Nj RN on 5/5/2022 at 11:30 AM      OD 15967  Rev. 7/2015    [facility] Encounter Date/Time: 5/4/2022 [address] [place] [address] Account: [de-identified]    MRN: [id number]    Patient: Cedric Ramirez See    Contact Serial #: [id number]      ENCOUNTER          Patient Class: Observation Private Enc? No Unit  BD: [address] Service:  FMP   Encounter DX: Seizure ([facility] [address] [R56.9]   ADM Provider: Danielle Houston DO   Procedure:     ATT Provider: Aster Darby MD   REF Provider:        Admission DX: Seizure ([facility] [address] and DX codes: R56.9      PATIENT                 Name: Rhonda Can See : 1965 (57 yrs)   Address: [address] Sex: Female   City: [address] [place] [address]         Marital Status: Single   Employer: DISABLED         Methodist: [employer]   Primary Care Provider: Brando Harris DO         Primary Phone: [phone]   EMERGENCY CONTACT   Contact Name Legal Guardian? Relationship to Patient Home Phone Work Phone   1. SeeCaitlin  2. Zach Early      Legal Guardian  Parent [phone]              GUARANTOR            Guarantor: Rhonda Can See     : 1965   Address: [address] Sex: Female     [place] [address]     Relation to Patient: Self       Home Phone: [phone]   Guarantor ID: [id number]       Work Phone:     Guarantor Employer: DISABLED         Status: DISABLED      COVERAGE        PRIMARY INSURANCE   Payor: MEDICARE Plan: MEDICARE PART A AND B   Payor Address: [facility] [address],  [facility], [facility]       Group Number:   Insurance Type: INDEMNITY   Subscriber Name: Donovan Vázquez : 1965   Subscriber ID: [id number] Pat. Rel. to Sub: Self   SECONDARY INSURANCE   Payor: [place]ICAID [place] Plan: [facility], [facility] DEPT OF*   Payor Address:  [place] [address], [facility], [address]          Group Number:   Insurance Type: INDEMNITY   Subscriber Name: Donovan Vázquez : 1965   Subscriber ID: [id number] Pat.  Rel. to Sub: SELF           CSN: [id number]

## 2022-05-04 NOTE — ED PROVIDER NOTES
Adrienne Early is a 65year old female presents to ED from nursing home due to an episode of seizure-like activity that occurred while at facility and was witnessed. Activity lasted for about 1 minute details about activity were not provided by facility or EMS. Patient was lethargic and confused at time of arrival to emergency department. Patient is nonverbal at her baseline following her brain injury. Patient is not ambulatory at baseline. HPI and ROS limited due to acuity of condition. Patient has not had any additional trauma. Patient follows with Dr. Marline Rajan for neurosurgery. The history is provided by the patient and medical records. Review of Systems   Unable to perform ROS: Patient nonverbal        Physical Exam  Vitals and nursing note reviewed. Constitutional:       General: She is not in acute distress. Appearance: She is ill-appearing. HENT:      Head: Normocephalic and atraumatic. Eyes:      General: No scleral icterus. Conjunctiva/sclera: Conjunctivae normal.      Pupils: Pupils are equal, round, and reactive to light. Cardiovascular:      Rate and Rhythm: Normal rate and regular rhythm. Pulmonary:      Effort: Pulmonary effort is normal.      Breath sounds: Normal breath sounds. Abdominal:      General: Bowel sounds are normal. There is no distension. Palpations: Abdomen is soft. Tenderness: There is no abdominal tenderness. There is no guarding or rebound. Musculoskeletal:      Cervical back: Normal range of motion and neck supple. No rigidity or tenderness. No muscular tenderness. Right lower leg: No edema. Left lower leg: No edema. Comments: Chronic deformity    Skin:     General: Skin is warm and dry. Capillary Refill: Capillary refill takes less than 2 seconds. Coloration: Skin is not pale. Findings: No erythema or rash. Neurological:      Mental Status: She is alert.       Comments: A/o x0   Psychiatric:      Comments: Unable to evaluate due to acuity of condition          Procedures     MDM  Number of Diagnoses or Management Options  Seizure [facility])  Diagnosis management comments: Pauline Early is a 65year old female who presented to ED with concern for seizure. Lab work and CT unremarkable. Patient was given keppra in ED  Patient did not have further seizure activity while in ED  Patient is mostly nonverbal at her baseline but appears altered from her baseline  Patient will be admitted for seizure activity  Discussed results and plan with father at bedside he is agreeable to plan                --------------------------------------------- PAST HISTORY ---------------------------------------------  Past Medical History:  has a past medical history of Arthritis, Down syndrome, Hypothyroidism, Osteoarthritis, Syncope, Thyroid disease, UTI (urinary tract infection), Varicose veins, and Wears glasses. Past Surgical History:  has a past surgical history that includes knee surgery; hip surgery; Toe Surgery; ECHO Compl W Dop Color Flow (5/14/2012); Tubal ligation; Total knee arthroplasty (Left, 08/11/2016); Abdominal adhesion surgery; Abdomen surgery; joint replacement; craniotomy (Right, 4/12/2019); Vena Cava Filter Placement (Bilateral, 4/22/2019); brain surgery; and Ventriculoperitoneal shunt (N/A, 11/12/2019). Social History:  reports that she has never smoked. She has never used smokeless tobacco. She reports that she does not drink alcohol and does not use drugs. Family History: family history includes Heart Disease in her maternal grandfather, maternal grandmother, paternal aunt, and paternal uncle. The patients home medications have been reviewed. Allergies: Patient has no known allergies.     -------------------------------------------------- RESULTS -------------------------------------------------    LABS:  Results for orders placed or performed during the hospital encounter of 05/04/22   CBC with Auto Differential   Result Value Ref Range    WBC 7.8 4.5 - 11.5 E9/L    RBC 5.13 3.50 - 5.50 E12/L    Hemoglobin 15.8 (H) 11.5 - 15.5 g/dL    Hematocrit 49.5 (H) 34.0 - 48.0 %    MCV 96.5 80.0 - 99.9 fL    MCH 30.8 26.0 - 35.0 pg    MCHC 31.9 (L) 32.0 - 34.5 %    RDW 15.0 11.5 - 15.0 fL    Platelets 126 147 - 617 E9/L    MPV 9.7 7.0 - 12.0 fL    Neutrophils % 64.5 43.0 - 80.0 %    Immature Granulocytes % 0.9 0.0 - 5.0 %    Lymphocytes % 24.3 20.0 - 42.0 %    Monocytes % 9.2 2.0 - 12.0 %    Eosinophils % 0.3 0.0 - 6.0 %    Basophils % 0.8 0.0 - 2.0 %    Neutrophils Absolute 5.01 1.80 - 7.30 E9/L    Immature Granulocytes # 0.07 E9/L    Lymphocytes Absolute 1.88 1.50 - 4.00 E9/L    Monocytes Absolute 0.71 0.10 - 0.95 E9/L    Eosinophils Absolute 0.02 (L) 0.05 - 0.50 E9/L    Basophils Absolute 0.06 0.00 - 0.20 E9/L   Comprehensive Metabolic Panel w/ Reflex to MG   Result Value Ref Range    Sodium 142 132 - 146 mmol/L    Potassium reflex Magnesium 4.5 3.5 - 5.0 mmol/L    Chloride 104 98 - 107 mmol/L    CO2 26 22 - 29 mmol/L    Anion Gap 12 7 - 16 mmol/L    Glucose 101 (H) 74 - 99 mg/dL    BUN 23 (H) 6 - 20 mg/dL    CREATININE 0.7 0.5 - 1.0 mg/dL    GFR Non-African American >60 >=60 mL/min/1.73    GFR African American >60     Calcium 9.1 8.6 - 10.2 mg/dL    Total Protein 7.8 6.4 - 8.3 g/dL    Albumin 3.6 3.5 - 5.2 g/dL    Total Bilirubin 0.4 0.0 - 1.2 mg/dL    Alkaline Phosphatase 135 (H) 35 - 104 U/L    ALT 33 (H) 0 - 32 U/L    AST 27 0 - 31 U/L   Troponin   Result Value Ref Range    Troponin, High Sensitivity 32 (H) 0 - 9 ng/L   Lactate, Sepsis   Result Value Ref Range    Lactic Acid, Sepsis 1.9 0.5 - 1.9 mmol/L   Lactate, Sepsis   Result Value Ref Range    Lactic Acid, Sepsis 1.8 0.5 - 1.9 mmol/L   Urinalysis with Microscopic   Result Value Ref Range    Color, UA Yellow Straw/Yellow    Clarity, UA Clear Clear    Glucose, Ur Negative Negative mg/dL    Bilirubin Urine SMALL (A) Negative    Ketones, Urine TRACE (A) Negative mg/dL    Specific Gravity, UA 1.010 1.005 - 1.030    Blood, Urine SMALL (A) Negative    pH, UA 6.0 5.0 - 9.0    Protein, UA 30 (A) Negative mg/dL    Urobilinogen, Urine 4.0 (A) <2.0 E.U./dL    Nitrite, Urine POSITIVE (A) Negative    Leukocyte Esterase, Urine Negative Negative    WBC, UA 1-3 0 - 5 /HPF    RBC, UA 1-3 0 - 2 /HPF    Bacteria, UA FEW (A) None Seen /HPF   Troponin   Result Value Ref Range    Troponin, High Sensitivity 31 (H) 0 - 9 ng/L   EKG 12 Lead   Result Value Ref Range    Ventricular Rate 88 BPM    Atrial Rate 88 BPM    P-R Interval 162 ms    QRS Duration 72 ms    Q-T Interval 338 ms    QTc Calculation (Bazett) 408 ms    P Axis 75 degrees    R Axis 53 degrees    T Axis 35 degrees       RADIOLOGY:  CT HEAD WO CONTRAST   Final Result   1. Decreasing size of bilateral subdural hemorrhages compared to 02/17/2022.   2. Ventriculostomy shunt catheter stable in position. There is slight   increased size of the lateral ventricles which may be partially due to   decreased mass effect from the bilateral subdural hemorrhages. 3. Remote insults in the bilateral frontal lobes and right temporal lobe. 4. Chronic small vessel ischemic disease. XR CHEST PORTABLE   Final Result   Suspect retrocardiac infiltrates. EKG:  This EKG is signed and interpreted by me. Rate: 88  Rhythm: Sinus  Interpretation: non-specific EKG, no ST elevation  Comparison: changes compared to previous EKG      ------------------------- NURSING NOTES AND VITALS REVIEWED ---------------------------  Date / Time Roomed:  5/4/2022 11:47 AM  ED Bed Assignment:  [id number]    The nursing notes within the ED encounter and vital signs as below have been reviewed.      Patient Vitals for the past 24 hrs:   BP Temp Temp src Pulse Resp SpO2 Height Weight   05/04/22 2215 122/71 97.7 °F (36.5 °C) Axillary 68 24 100 %     05/04/22 1957  98.2 °F (36.8 °C) Oral        05/04/22 1955 115/80   79 20 97 %     05/04/22 1400 114/80   78 20 96 %     05/04/22 1330 (!) 117/95   80 20 96 %     05/04/22 1315 121/86   85 18 97 %     05/04/22 1113 (!) 117/98 97.4 °F (36.3 °C) Oral 94 12 97 % 5' 1\" (1.549 m) 130 lb (59 kg)       Oxygen Saturation Interpretation: Normal    ------------------------------------------ PROGRESS NOTES ------------------------------------------  Re-evaluation(s):  Time: 6pm Patients symptoms show no change  Repeat physical examination is not changed    Counseling:  I have spoken with the patient and discussed todays results, in addition to providing specific details for the plan of care and counseling regarding the diagnosis and prognosis. Their questions are answered at this time and they are agreeable with the plan of admission.    --------------------------------- ADDITIONAL PROVIDER NOTES ---------------------------------  Consultations:  . Spoke with Dr. Asher Davis. Discussed case. They will admit the patient. This patient's ED course included: a personal history and physicial examination, re-evaluation prior to disposition, multiple bedside re-evaluations, IV medications, cardiac monitoring and complex medical decision making and emergency management    This patient has remained hemodynamically stable during their ED course. Diagnosis:  1. Seizure ([facility] [address]        Disposition:  Patient's disposition: Admit to telemetry  Patient's condition is stable.             Misael Buckley MD  05/04/22 9472

## 2022-05-04 NOTE — H&P
Hospitalist History & Physical      PCP: Eve Jordan DO    Date of Service: Pt seen/examined on 5/4/2022     Chief Complaint:  had concerns including Seizures (From [facility] - Staff reports 10 second seizure this morning. No Hx of neuro issues. Pt was suctioned for secretions and was \"snoring\" on arrival for EMS, relieved by repositioning. Pt withdrawn on arrival however EMS reports they were told she normally responds with basic communication. On abx for resp infection. Bg 108.). History Of Present Illness:    Ms. Sixto Early, a 65y.o. year old female  who  has a past medical history of Arthritis, Down syndrome, Hypothyroidism, Osteoarthritis, Syncope, Thyroid disease, UTI (urinary tract infection), Varicose veins, and Wears glasses. Patient presented to the emergency department from local nursing home with seizure-like activity. This was witnessed by staff at the nursing home. Patient was lethargic and confused on arrival to the emergency department possibly postictal.  Patient is nonverbal at baseline. She has a history of subdural hemorrhage. Patient has a  shunt and is followed by neurosurgery. Vital signs within norm limits and stable. Laboratory studies were unremarkable. CT of the head shows decreasing size of subdural hemorrhage. Patient was given loading dose of Keppra and medicine was consulted for admission.       Past Medical History:   Diagnosis Date    Arthritis     Down syndrome     Hypothyroidism     Osteoarthritis     Syncope     Thyroid disease     UTI (urinary tract infection)         Varicose veins     Wears glasses        Past Surgical History:   Procedure Laterality Date    ABDOMEN SURGERY      duodenal atresia    ABDOMINAL ADHESION SURGERY      BRAIN SURGERY      CRANIOTOMY Right 4/12/2019    RIGHT CRANIOTOMY FRANCISCO JAVIER HOLES performed by Mackenize Mancera MD at [facility] ECHO COMPL W DOP COLOR FLOW  5/14/2012         HIP SURGERY      Right  JOINT REPLACEMENT      left knee, right hip-dr Vitor Hawkins KNEE SURGERY      Right    TOE SURGERY      Right foot    TOTAL KNEE ARTHROPLASTY Left 08/11/2016    DR. De    TUBAL LIGATION      VENA CAVA FILTER PLACEMENT Bilateral 4/22/2019    VENA CAVA FILTER INSERTION performed by Katherine Baker MD at [facility] VENTRICULOPERITONEAL SHUNT N/A 11/12/2019    RIGHT FRONTAL VENTRICULO PERITONEAL SHUNT INSERTION-----FACILITY performed by Dania Alexandre MD at [address] [place] [address]       Prior to Admission medications    Medication Sig Start Date End Date Taking? Authorizing Provider   nystatin (MYCOSTATIN) 094507 UNIT/GM cream Apply topically 2 times daily. 30g 12/6/21   Rafael Castro, DO   risperiDONE (RISPERDAL) 0.25 MG tablet Take 1 tablet by mouth 2 times daily 12/3/21   Judith Loera DO   risperiDONE (RISPERDAL) 0.25 MG tablet Take 1 tablet by mouth 2 times daily 9/28/21   Judith Loera DO   donepezil (ARICEPT) 5 MG tablet  6/24/21   Historical Provider, MD   levothyroxine (SYNTHROID) 88 MCG tablet Take 1 tablet by mouth Daily 12/21/20   Judith Loera DO   liothyronine (CYTOMEL) 5 MCG tablet Take 1 tab by mouth every morning on Hrsphj-Lrereddnq-Qkyikw 12/21/20   Judith Loera DO   Cholecalciferol (VITAMIN D-3 SUPER STRENGTH) 50 MCG (2000 UT) TABS Take 1 tablet by mouth daily 12/21/20   Judith Loera DO   Lactobacillus (ACIDOPHILUS PO) Take by mouth    Historical Provider, MD         Allergies:  Patient has no known allergies. Social History:    TOBACCO:   reports that she has never smoked. She has never used smokeless tobacco.  ETOH:   reports no history of alcohol use. Family History:    Reviewed in detail and negative for DM, CAD, Cancer, CVA.  Positive as follows\"      Problem Relation Age of Onset    Heart Disease Maternal Grandmother     Heart Disease Maternal Grandfather     Heart Disease Paternal Aunt     Heart Disease Paternal Uncle        REVIEW OF SYSTEMS:   Pertinent positives as noted in the HPI. All other systems reviewed and negative. PHYSICAL EXAM:  /80   Pulse 78   Temp 97.4 °F (36.3 °C) (Oral)   Resp 20   Ht 5' 1\" (1.549 m)   Wt 130 lb (59 kg)   SpO2 96%   BMI 24.56 kg/m²   General appearance: No acute distress, ill-appearing. Nonverbal  HEENT: Atraumatic. No obvious deformities  Neck: Supple, with full range of motion. No jugular venous distention. Trachea midline. Respiratory: Clear to auscultation bilaterally  Cardiovascular: Regular rate and rhythm  Abdomen: Soft, nontender, nondistended  Musculoskeletal: No clubbing, cyanosis, edema of bilateral lower extremities. Brisk capillary refill. Skin: Normal skin color. No rashes or lesions. Neurologic: Nonverbal at baseline. No focal deficits. CBC:   Recent Labs     05/04/22  1244   WBC 7.8   RBC 5.13   HGB 15.8*   HCT 49.5*   MCV 96.5   RDW 15.0        BMP:   Recent Labs     05/04/22  1244      K 4.5      CO2 26   BUN 23*   CREATININE 0.7     LFT:  Recent Labs     05/04/22  1244   PROT 7.8   ALKPHOS 135*   ALT 33*   AST 27   BILITOT 0.4     CE:  No results for input(s): Mary Dickey in the last 72 hours. PT/INR: No results for input(s): INR, APTT in the last 72 hours. BNP: No results for input(s): BNP in the last 72 hours.   ESR:   Lab Results   Component Value Date    SEDRATE 8 07/18/2016     CRP:   Lab Results   Component Value Date    CRP 0.2 07/18/2016     D Dimer:   Lab Results   Component Value Date    DDIMER 3749 11/01/2017      Folate and B12:   Lab Results   Component Value Date    HAURMYRH02 494 03/30/2022   , No results found for: FOLATE  Lactic Acid:   Lab Results   Component Value Date    LACTA 2.0 12/07/2021     Thyroid Studies:   Lab Results   Component Value Date    TSH 8.930 (H) 01/12/2022       Oupatient labs:  Lab Results   Component Value Date    CHOL 173 03/30/2022    TRIG 88 03/30/2022    HDL 62 03/30/2022    LDLCALC 93 03/30/2022    TSH 8.930 (H) 01/12/2022    INR 1.0 11/12/2019    LABA1C 5.2 03/30/2022       Urinalysis:    Lab Results   Component Value Date    NITRU POSITIVE 05/04/2022    WBCUA 1-3 05/04/2022    WBCUA 0-1 05/12/2012    BACTERIA FEW 05/04/2022    RBCUA 1-3 05/04/2022    RBCUA NONE 05/12/2012    BLOODU SMALL 05/04/2022    SPECGRAV 1.010 05/04/2022    GLUCOSEU Negative 05/04/2022    GLUCOSEU NEGATIVE 05/12/2012       Imaging:  CT HEAD WO CONTRAST    Result Date: 5/4/2022  EXAMINATION: CT OF THE HEAD WITHOUT CONTRAST  5/4/2022 4:05 pm TECHNIQUE: CT of the head was performed without the administration of intravenous contrast. Dose modulation, iterative reconstruction, and/or weight based adjustment of the mA/kV was utilized to reduce the radiation dose to as low as reasonably achievable. COMPARISON: CT head 02/17/2022 HISTORY: ORDERING SYSTEM PROVIDED HISTORY: Evaluate intracranial abnormality TECHNOLOGIST PROVIDED HISTORY: Has a \"code stroke\" or \"stroke alert\" been called? ->No Reason for exam:->Evaluate intracranial abnormality Decision Support Exception - unselect if not a suspected or confirmed emergency medical condition->Emergency Medical Condition (MA) What reading provider will be dictating this exam?->CRC FINDINGS: There is patchy hypoattenuation within the right cerebellar hemisphere. There is hypoattenuation of the white matter of the bilateral cerebral hemispheres. There is encephalomalacia within the bilateral frontal lobes and right temporal lobe. There is right ventriculostomy shunt catheter in stable position compared to prior examination. There is slight increased size of the lateral ventricles. There has been slight decreased size of the bilateral subdural hematomas. The left subdural hematoma measures approximately 6 mm in greatest thickness and previously measured 12 mm in greatest thickness. The right subdural hematoma measures approximately 7 mm in greatest thickness and previously measured 11 mm in thickness.   There is no midline shift. There is hypodensity within the anterior aspect of the right ja. There is hypoattenuation adjacent to the tract of the ventriculostomy shunt catheter. There is no evidence of acute cortical ischemia. The visualized portions of the paranasal sinuses and mastoid air cells are clear. 1. Decreasing size of bilateral subdural hemorrhages compared to 02/17/2022. 2. Ventriculostomy shunt catheter stable in position. There is slight increased size of the lateral ventricles which may be partially due to decreased mass effect from the bilateral subdural hemorrhages. 3. Remote insults in the bilateral frontal lobes and right temporal lobe. 4. Chronic small vessel ischemic disease. XR CHEST PORTABLE    Result Date: 5/4/2022  EXAMINATION: ONE XRAY VIEW OF THE CHEST 5/4/2022 1:32 pm COMPARISON: None. HISTORY: ORDERING SYSTEM PROVIDED HISTORY: pna TECHNOLOGIST PROVIDED HISTORY: Reason for exam:->pna What reading provider will be dictating this exam?->CRC FINDINGS: Mild opacity in the retrocardiac region. The right lung is clear. The heart is not enlarged. There is no pneumothorax. The costophrenic angles are clear. Radiopaque catheter over the right hemithorax likely  shunt catheter. Suspect retrocardiac infiltrates.        ASSESSMENT:  -Seizure-like activity  -History of Down syndrome  -Hypothyroidism  -Osteoarthritis      PLAN:  -Admit to medicine  -Consult neurology  -Seizure precautions  -Keppra 500 mg twice daily  -Continue home medications        Diet: No diet orders on file  Code Status: Prior  Surrogate decision maker confirmed with patient:   Extended Emergency Contact Information  Primary Emergency Contact: Caitlin Early  Address: [facility], [address] [place] [address] Phone: [phone]  Relation: Legal Guardian  Secondary Emergency Contact: Zach Early  Address: [address] [facility] Phone: [phone]  Relation: Parent    DVT Prophylaxis: []Lovenox []Heparin []PCD [] [address]  []Encouraged ambulation  Disposition: []Med/Surg [] Intermediate [] ICU/CCU  Admit status: [] Observation [] Inpatient     +++++++++++++++++++++++++++++++++++++++++++++++++  Nupur Quivers, DO  +++++++++++++++++++++++++++++++++++++++++++++++++  NOTE: This report was transcribed using voice recognition software. Every effort was made to ensure accuracy; however, inadvertent computerized transcription errors may be present.

## 2022-05-05 LAB
ANION GAP SERPL CALCULATED.3IONS-SCNC: 9 MMOL/L (ref 7–16)
BASOPHILS ABSOLUTE: 0.05 E9/L (ref 0–0.2)
BASOPHILS RELATIVE PERCENT: 1.2 % (ref 0–2)
BUN BLDV-MCNC: 14 MG/DL (ref 6–20)
CALCIUM SERPL-MCNC: 8.2 MG/DL (ref 8.6–10.2)
CHLORIDE BLD-SCNC: 110 MMOL/L (ref 98–107)
CO2: 25 MMOL/L (ref 22–29)
CREAT SERPL-MCNC: 0.6 MG/DL (ref 0.5–1)
EOSINOPHILS ABSOLUTE: 0.14 E9/L (ref 0.05–0.5)
EOSINOPHILS RELATIVE PERCENT: 3.4 % (ref 0–6)
GFR AFRICAN AMERICAN: >60
GFR NON-AFRICAN AMERICAN: >60 ML/MIN/1.73
GLUCOSE BLD-MCNC: 76 MG/DL (ref 74–99)
HCT VFR BLD CALC: 43.7 % (ref 34–48)
HEMOGLOBIN: 13.9 G/DL (ref 11.5–15.5)
IMMATURE GRANULOCYTES #: 0.02 E9/L
IMMATURE GRANULOCYTES %: 0.5 % (ref 0–5)
LYMPHOCYTES ABSOLUTE: 1.66 E9/L (ref 1.5–4)
LYMPHOCYTES RELATIVE PERCENT: 39.9 % (ref 20–42)
MCH RBC QN AUTO: 31 PG (ref 26–35)
MCHC RBC AUTO-ENTMCNC: 31.8 % (ref 32–34.5)
MCV RBC AUTO: 97.5 FL (ref 80–99.9)
MONOCYTES ABSOLUTE: 0.36 E9/L (ref 0.1–0.95)
MONOCYTES RELATIVE PERCENT: 8.7 % (ref 2–12)
NEUTROPHILS ABSOLUTE: 1.93 E9/L (ref 1.8–7.3)
NEUTROPHILS RELATIVE PERCENT: 46.3 % (ref 43–80)
PDW BLD-RTO: 15.1 FL (ref 11.5–15)
PLATELET # BLD: 223 E9/L (ref 130–450)
PMV BLD AUTO: 10 FL (ref 7–12)
POTASSIUM REFLEX MAGNESIUM: 4 MMOL/L (ref 3.5–5)
RBC # BLD: 4.48 E12/L (ref 3.5–5.5)
SODIUM BLD-SCNC: 144 MMOL/L (ref 132–146)
WBC # BLD: 4.2 E9/L (ref 4.5–11.5)

## 2022-05-05 PROCEDURE — 96376 TX/PRO/DX INJ SAME DRUG ADON: CPT

## 2022-05-05 PROCEDURE — 96375 TX/PRO/DX INJ NEW DRUG ADDON: CPT

## 2022-05-05 PROCEDURE — 96366 THER/PROPH/DIAG IV INF ADDON: CPT

## 2022-05-05 PROCEDURE — 99222 1ST HOSP IP/OBS MODERATE 55: CPT

## 2022-05-05 PROCEDURE — 6370000000 HC RX 637 (ALT 250 FOR IP): Performed by: FAMILY MEDICINE

## 2022-05-05 PROCEDURE — 96372 THER/PROPH/DIAG INJ SC/IM: CPT

## 2022-05-05 PROCEDURE — 99222 1ST HOSP IP/OBS MODERATE 55: CPT | Performed by: PSYCHIATRY & NEUROLOGY

## 2022-05-05 PROCEDURE — G0378 HOSPITAL OBSERVATION PER HR: HCPCS

## 2022-05-05 PROCEDURE — 6360000002 HC RX W HCPCS: Performed by: FAMILY MEDICINE

## 2022-05-05 PROCEDURE — 80048 BASIC METABOLIC PNL TOTAL CA: CPT

## 2022-05-05 PROCEDURE — 2580000003 HC RX 258: Performed by: FAMILY MEDICINE

## 2022-05-05 PROCEDURE — 85025 COMPLETE CBC W/AUTO DIFF WBC: CPT

## 2022-05-05 PROCEDURE — 36415 COLL VENOUS BLD VENIPUNCTURE: CPT

## 2022-05-05 RX ADMIN — SODIUM CHLORIDE, PRESERVATIVE FREE 10 ML: 5 INJECTION INTRAVENOUS at 08:18

## 2022-05-05 RX ADMIN — LEVETIRACETAM 500 MG: 5 INJECTION INTRAVENOUS at 23:44

## 2022-05-05 RX ADMIN — LEVETIRACETAM 500 MG: 5 INJECTION INTRAVENOUS at 12:54

## 2022-05-05 RX ADMIN — ENOXAPARIN SODIUM 40 MG: 100 INJECTION SUBCUTANEOUS at 08:18

## 2022-05-05 RX ADMIN — RISPERIDONE 0.25 MG: 0.5 TABLET, FILM COATED ORAL at 21:59

## 2022-05-05 RX ADMIN — LEVETIRACETAM 500 MG: 5 INJECTION INTRAVENOUS at 00:18

## 2022-05-05 RX ADMIN — SODIUM CHLORIDE, PRESERVATIVE FREE 10 ML: 5 INJECTION INTRAVENOUS at 23:40

## 2022-05-05 RX ADMIN — DONEPEZIL HYDROCHLORIDE 5 MG: 5 TABLET, FILM COATED ORAL at 21:59

## 2022-05-05 ASSESSMENT — PAIN SCALES - WONG BAKER: WONGBAKER_NUMERICALRESPONSE: 0

## 2022-05-05 NOTE — PROGRESS NOTES
Hospitalist Progress Note      PCP: Violeta Coreas DO    Date of Admission: 5/4/2022      Hospital Course: This is a 79-year-old female with past medical history of subdural hemorrhage status post  shunt implantation Down syndrome, hypothyroidism, osteoarthritis who presented from group home with seizure-like activity. CT head showed decreasing size of bilateral subdural hemorrhages, ventriculostomy shunt catheter in stable position. She was loaded on Keppra and started on maintenance dose. Subjective: Pt was seen and examined with parents at bedside. No acute event overnight; unable to complete ROS due to nonverbal state. Medications:  Reviewed    Infusion Medications    sodium chloride       Scheduled Medications    vitamin D  2,000 Units Oral Daily    donepezil  5 mg Oral Nightly    levothyroxine  88 mcg Oral Daily    risperiDONE  0.25 mg Oral BID    sodium chloride flush  5-40 mL IntraVENous 2 times per day    enoxaparin  40 mg SubCUTAneous Daily    levetiracetam  500 mg IntraVENous Q12H     PRN Meds: sodium chloride flush, sodium chloride, LORazepam, ondansetron **OR** ondansetron, polyethylene glycol, acetaminophen **OR** acetaminophen    No intake or output data in the 24 hours ending 05/05/22 0934    Exam:    BP (!) 93/57   Pulse 58   Temp 98.5 °F (36.9 °C) (Oral)   Resp 20   Ht 5' 1\" (1.549 m)   Wt 130 lb (59 kg)   SpO2 92%   BMI 24.56 kg/m²     General appearance: Lying comfortably in bed. No apparent distress. Respiratory: Good air entry bilaterally. Cardiovascular: Normal S1/S2. Regular rhythm and rate. Abdomen: Soft, non-tender, non-distended with normal bowel sounds. Musculoskeletal: No clubbing, cyanosis or edema bilaterally. Neurologic: Nonverbal  Psychiatric: Alert and oriented x 0.   Pleasantly confused, not agitated  Peripheral Pulses: +2 palpable, equal bilaterally       Labs:   Recent Labs     05/04/22  1244 05/05/22  0441   WBC 7.8 4.2*   HGB 15.8* 13.9   HCT 49.5* 43.7    223     Recent Labs     05/04/22  1244 05/05/22  0441    144   K 4.5 4.0    110*   CO2 26 25   BUN 23* 14   CREATININE 0.7 0.6   CALCIUM 9.1 8.2*     Recent Labs     05/04/22  1244   AST 27   ALT 33*   BILITOT 0.4   ALKPHOS 135*     No results for input(s): INR in the last 72 hours. No results for input(s): Connee Matar in the last 72 hours. Radiology:  CT HEAD WO CONTRAST   Final Result   1. Decreasing size of bilateral subdural hemorrhages compared to 02/17/2022.   2. Ventriculostomy shunt catheter stable in position. There is slight   increased size of the lateral ventricles which may be partially due to   decreased mass effect from the bilateral subdural hemorrhages. 3. Remote insults in the bilateral frontal lobes and right temporal lobe. 4. Chronic small vessel ischemic disease. XR CHEST PORTABLE   Final Result   Suspect retrocardiac infiltrates.                    Active Hospital Problems    Diagnosis Date Noted    Seizure [facility]) [R56.9] 05/04/2022     Priority: Medium       Assessment  Suspected seizure  History of subdural hemorrhage  status post  shunt implantation  Hypothyroidism  Down syndrome  Osteoarthritis      Plan:  Continue with IV Keppra  Neurology not been consulted; will follow with recommendation  Will obtain EEG  Seizure precautions      DVT Prophylaxis: Lovenox    Diet: Diet NPO  Code Status: Full Code    PT/OT Eval Status: N/AA    Dispo -       [facility] Shelton Steele MD 5/5/2022 9:34 AM

## 2022-05-05 NOTE — PLAN OF CARE
Problem: Pain  Goal: Verbalizes/displays adequate comfort level or baseline comfort level  Outcome: Progressing     Problem: Skin/Tissue Integrity  Goal: Absence of new skin breakdown  Description: 1. Monitor for areas of redness and/or skin breakdown  2. Assess vascular access sites hourly  3. Every 4-6 hours minimum:  Change oxygen saturation probe site  4. Every 4-6 hours:  If on nasal continuous positive airway pressure, respiratory therapy assess nares and determine need for appliance change or resting period.   Outcome: Progressing     Problem: Safety - Adult  Goal: Free from fall injury  Outcome: Progressing

## 2022-05-05 NOTE — CARE COORDINATION
5/5 Care Coordination. Patient was admit from ED yesterday for seizure-like activity. Patient is non-verbal and non-ambulatory at baseline. Neuro consulted and currently receiving Keppra IV  Q12H. ED documentation states patient is from [facility]? Message sent to Rebeka Barrera to confirm. No visitors were at bedside. Left message with patient's mother/legal guardian Jaime Leyva regarding discharge planning. Will await callback. Javier from Rebeka Barrera at [facility]. Patient is a long term bed hold at [facility] and can return when medically stable. Will need negative COVID test on day of discharge. Completed ambulance paperwork in soft chart along with COVID test. PALMA completed. 36  Met with patient's mother and father and bedside and they stated plan is for patient to return to [facility] when medically stable.      Laura Funes, SHANDRAN, RN  [facility] Case Management   Cell: [phone]

## 2022-05-05 NOTE — CONSULTS
[facility]  Neurology Consult    Date:  5/5/2022  Patient Name:  Kelley Early  YOB: 1965  MRN: [id number]     PCP:  Junior Swanson DO   Referring:  No ref. provider found      Chief Complaint: seizure-like activity    History obtained from: EMR    Assessment  Kelley Early is a 65 y.o. female who presents from [facility], [place], operated by [facility] for seizure-like activity. Unable to obtain history since patient was non-verbal and nursing staff at facility was unable to answer questions about this episode. Will obtain EEG to monitor for seizure like activity. Plan  · F/u EEG  · Seizure precautions  · Will discontinue Keppra 500 BID if EEG is unremarkable. History of Present Illness:  Kelley Early is a 65 y.o. female with PMHx of Down Syndrome, vasodepressor syncope, SDH s/p craniotomy 4/12/2019, hydrocephalus s/p  shunt 11/2019, hypothyroidism, Alzheimer's dementia presenting from [facility], [place], operated by [facility] for evaluation of seizure like activity. Patient is nonverbal at baseline. Per ED notes, patient was witnessed to have seizure-like activity from nursing staff at facility. The activity lasted 1 minutes and details about activity were not provided by facility. Patient was lethargic and confused on arrival.    CT head showed decreased bilateral subdural hemorrhages and remote insults in the bilateral frontal lobe and right temporal lobe. Patient was give 1500 mg of Keppra and started on Keppra BID. Patient was then admitted for further evaluation. Review of Systems:  Unable to obtain review of symptoms due to patient being non-verbal since SDH.     Medical History:   Past Medical History:   Diagnosis Date    Arthritis     Down syndrome     Hypothyroidism     Osteoarthritis     Syncope     Thyroid disease     UTI (urinary tract infection)         Varicose veins     Wears glasses         Surgical History:   Past Surgical History:   Procedure Laterality Date    ABDOMEN SURGERY duodenal atresia    ABDOMINAL ADHESION SURGERY      BRAIN SURGERY      CRANIOTOMY Right 4/12/2019    RIGHT CRANIOTOMY FRANCISCO JAVIER HOLES performed by Princess Willian MD at [facility] ECHO COMPL W DOP COLOR FLOW  5/14/2012         HIP SURGERY      Right    JOINT REPLACEMENT      left knee, right hip-dr Win Watkins KNEE SURGERY      Right    TOE SURGERY      Right foot    TOTAL KNEE ARTHROPLASTY Left 08/11/2016    DR. De    TUBAL LIGATION      VENA CAVA FILTER PLACEMENT Bilateral 4/22/2019    VENA CAVA FILTER INSERTION performed by Tamy Rosenthal MD at [facility] VENTRICULOPERITONEAL SHUNT N/A 11/12/2019    RIGHT FRONTAL VENTRICULO PERITONEAL SHUNT INSERTION-----FACILITY performed by Princess Willian MD at [address] [place] [address] History:  Family History   Problem Relation Age of Onset    Heart Disease Maternal Grandmother     Heart Disease Maternal Grandfather     Heart Disease Paternal Aunt     Heart Disease Paternal Uncle        Social History:  Social History     Tobacco Use    Smoking status: Never Smoker    Smokeless tobacco: Never Used   Vaping Use    Vaping Use: Never used   Substance Use Topics    Alcohol use: No    Drug use: No        Current Medications:      Current Facility-Administered Medications   Medication Dose Route Frequency Provider Last Rate Last Admin    vitamin D (CHOLECALCIFEROL) tablet 2,000 Units  2,000 Units Oral Daily Gersonsusie Robleron, DO        donepezil (ARICEPT) tablet 5 mg  5 mg Oral Nightly Gerson Hampton DO        levothyroxine (SYNTHROID) tablet 88 mcg  88 mcg Oral Daily Gerson Hampton DO        risperiDONE (RISPERDAL) tablet 0.25 mg  0.25 mg Oral BID Gerson Robleron, DO        sodium chloride flush 0.9 % injection 5-40 mL  5-40 mL IntraVENous 2 times per day Gerson Hampton DO   10 mL at 05/05/22 0818    sodium chloride flush 0.9 % injection 5-40 mL  5-40 mL IntraVENous PRN Gerson Hampton, DO        0.9 % sodium chloride infusion   IntraVENous PRN Gerson Hampton, DO        LORazepam (ATIVAN) injection 1 mg  1 mg IntraVENous Q5 Min PRN Sherlyn Ligas, DO        enoxaparin (LOVENOX) injection 40 mg  40 mg SubCUTAneous Daily Sherlyn Ligas, DO   40 mg at 05/05/22 0818    ondansetron (ZOFRAN-ODT) disintegrating tablet 4 mg  4 mg Oral Q8H PRN Sherlyn Ligas, DO        Or    ondansetron [facility]) injection 4 mg  4 mg IntraVENous Q6H PRN Sherlyn Ligas, DO        polyethylene glycol (GLYCOLAX) packet 17 g  17 g Oral Daily PRN Sherlyn Ligas, DO        acetaminophen (TYLENOL) tablet 650 mg  650 mg Oral Q6H PRN Sherlyn Ligas, DO        Or    acetaminophen (TYLENOL) suppository 650 mg  650 mg Rectal Q6H PRN Sherlyn Ligas, DO        levETIRAcetam (KEPPRA) 500 mg/100 mL IVPB  500 mg IntraVENous Q12H Sherlyn Ligas, DO   Stopped at 05/05/22 1309        Allergies:      No Known Allergies     Physical Examination  Vitals   Vitals:    05/04/22 1957 05/04/22 2215 05/05/22 0758 05/05/22 1101   BP:  122/71 (!) 93/57    Pulse:  68 58    Resp:  24 20    Temp: 98.2 °F (36.8 °C) 97.7 °F (36.5 °C) 98.5 °F (36.9 °C)    TempSrc: Oral Axillary Oral    SpO2:  100% 92%    Weight:       Height:    5' 1\" (1.549 m)        General: Patient appears in no acute distress. Somnolent, awakens with constant   HEENT: Normocephalic, atraumatic. Dry mucous membranes  Chest: Clear to auscultation bilaterally  Heart: No murmurs appreciated  Extremities/Peripheral vascular: No edema/swelling noted. No cold limbs noted. Neurologic Examination    Mental Status  Nonverbal at baseline. Not following commands. Cranial Nerves  III, IV, VI: Pupils equally round and reactive to light, 3 to 2 mm bilaterally.    EOMs: tracking movements  V. unable to assess  VII: unable to assess  VIII: Hearing intact to voice  IX,X: unable to assess  XI: unable to assess  XII: unable to assess    Motor  Tone: Spastic in all 4 extremities    Bulk: Normal in all four limbs with no evidence of atrophy    Sensation: Withdraws to pain in all 4

## 2022-05-05 NOTE — PLAN OF CARE
Problem: Pain  Goal: Verbalizes/displays adequate comfort level or baseline comfort level  5/5/2022 1024 by Tatiana Vicente RN  Outcome: Progressing  5/4/2022 2250 by Charlette Lujan RN  Outcome: Progressing     Problem: Skin/Tissue Integrity  Goal: Absence of new skin breakdown  Description: 1. Monitor for areas of redness and/or skin breakdown  2. Assess vascular access sites hourly  3. Every 4-6 hours minimum:  Change oxygen saturation probe site  4. Every 4-6 hours:  If on nasal continuous positive airway pressure, respiratory therapy assess nares and determine need for appliance change or resting period.   5/5/2022 1024 by Tatiana Vicente RN  Outcome: Progressing  5/4/2022 2250 by Charlette Lujan RN  Outcome: Progressing     Problem: Safety - Adult  Goal: Free from fall injury  5/5/2022 1024 by Tatiana Vicente RN  Outcome: Progressing  5/4/2022 2250 by Charlette Lujan RN  Outcome: Progressing

## 2022-05-05 NOTE — DISCHARGE INSTR - COC
Continuity of Care Form    Patient Name: Naz Alston See   :  1965  MRN:  [id number]    Admit date:  2022  Discharge date:  22    Code Status Order: Full Code   Advance Directives:      Admitting Physician:  Giacomo Costello DO  PCP: Derek Ugalde DO    Discharging Nurse: [facility] Unit/Room#: 6404/4431-M  Discharging Unit Phone Number: [phone]    Emergency Contact:   Extended Emergency Contact Information  Primary Emergency Contact: SharathCaitlin  Address: [address], [address]  Phone: [phone]  Relation: Legal Guardian  Secondary Emergency Contact: SharathZach  Address: [address], [address]  Home Phone: [phone]  Relation: Parent    Past Surgical History:  Past Surgical History:   Procedure Laterality Date    ABDOMEN SURGERY      duodenal atresia    ABDOMINAL ADHESION SURGERY      BRAIN SURGERY      CRANIOTOMY Right 2019    RIGHT CRANIOTOMY FRANCISCO JAVIER HOLES performed by Obey Al MD at [address]    ECHO COMPL W DOP COLOR FLOW  2012         HIP SURGERY      Right    JOINT REPLACEMENT      left knee, right hip-dr Charissa Pichardo    KNEE SURGERY      Right    TOE SURGERY      Right foot    TOTAL KNEE ARTHROPLASTY Left 2016    DR. De    TUBAL LIGATION      VENA CAVA FILTER PLACEMENT Bilateral 2019    VENA CAVA FILTER INSERTION performed by Gamal Toro MD at [address]  N/A 2019    RIGHT FRONTAL VENTRICULO PERITONEAL SHUNT INSERTION-----FACILITY performed by Obey Al MD at [address] [place] [address]       Immunization History:   Immunization History   Administered Date(s) Administered    CLARAID-19Beryl, Primary or Immunocompromised, PF, 100mcg/0.5mL 2021    Hepatitis A 2012    Hepatitis B (Recombivax HB) 2012, 2012, 2012    Influenza Vaccine, unspecified formulation 2015    Influenza Virus Vaccine 2020    Influenza, High Dose (Fluzone 65 yrs and older) 2017    Influenza, Quadv, IM, PF (6 mo and older Fluzone, Flulaval, Fluarix, and 3 yrs and older Afluria) 2016, 10/10/2018    Influenza, Triv, inactivated, subunit, adjuvanted, IM (Fluad 65 yrs and older) 10/11/2019    Pneumococcal Conjugate 13-valent (Genette Guise) 10/11/2019    Pneumococcal Polysaccharide (Okehjsgrd84) 2007, 12/10/2008    Tdap (Boostrix, Adacel) 2012    Zoster Live (Zostavax) 2017       Active Problems:  Patient Active Problem List   Diagnosis Code    Down's syndrome G52.9    Systolic murmur W52.9    Vasodepressor syncope R55    Asymptomatic varicose veins of bilateral lower extremities I83.93    Hypothyroidism E03.9    Chest pain R07.9    SDH (subdural hematoma) ([address] S06.5X9A    Subdural hematoma ([address] S06.5X9A    Communicating hydrocele N43.2    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    Hydrocephalus (HCC) G91.9    Toe cyanosis R23.0    Seizure ([address] R56.9       Isolation/Infection:   Isolation            No Isolation          Patient Infection Status       Infection Onset Added Last Indicated Last Indicated By Review Planned Expiration Resolved Resolved By    None active    Resolved    COVID-19 21 COVID-19, Rapid   21     COVID-19 (Rule Out) 21 COVID-19, Rapid (Ordered)   21 Rule-Out Test Resulted            Nurse Assessment:  Last Vital Signs: BP (!) 93/57   Pulse 58   Temp 98.5 °F (36.9 °C) (Oral)   Resp 20   Ht 5' 1\" (1.549 m)   Wt 130 lb (59 kg)   SpO2 92%   BMI 24.56 kg/m²     Last documented pain score (0-10 scale):    Last Weight:   Wt Readings from Last 1 Encounters:   22 130 lb (59 kg)     Mental Status:  disoriented and alert    IV Access:  - None    Nursing Mobility/ADLs:  Walking   Dependent  Transfer  Dependent  Bathing  Dependent  Dressing  Dependent  Toileting  Dependent  Feeding  Dependent  Med Admin  Dependent  Med Delivery    TF - crushed     Wound Care Documentation and Therapy:  Incision 19 Head Right;Upper (Active)   Number of days: 905       Incision 19 Abdomen Right;Upper (Active)   Number of days: 905        Elimination:  Continence: Bowel: No  Bladder: No  Urinary Catheter: None   Colostomy/Ileostomy/Ileal Conduit: No       Date of Last BM: 22    Intake/Output Summary (Last 24 hours) at 2022 1123  Last data filed at 2022 1054  Gross per 24 hour   Intake 0 ml   Output --   Net 0 ml     No intake/output data recorded. Safety Concerns:     History of Seizures and Aspiration Risk    Impairments/Disabilities:      None    Nutrition Therapy:  Current Nutrition Therapy:   - Tube Feedings:  Standard with fiber    Routes of Feeding: Gastrostomy Tube  Liquids: ***  Daily Fluid Restriction: no  Last Modified Barium Swallow with Video (Video Swallowing Test): not done    Treatments at the Time of Hospital Discharge:   Respiratory Treatments: ***  Oxygen Therapy:  is not on home oxygen therapy.   Ventilator:    - No ventilator support    Rehab Therapies: {THERAPEUTIC INTERVENTION:[id number]}  Weight Bearing Status/Restrictions: [address] [place] [address] Weight Bearin}  Other Medical Equipment (for information only, NOT a DME order):  {EQUIPMENT:[id number]}  Other Treatments: ***    Patient's personal belongings (please select all that are sent with patient):  None    RN SIGNATURE:  Electronically signed by Tutor Universe Mouse on 22 at 4:37 PM EDT    CASE MANAGEMENT/SOCIAL WORK SECTION    Inpatient Status Date: 2022    Readmission Risk Assessment Score:  Readmission Risk              Risk of Unplanned Readmission:  0           Discharging to Facility/ Agency   Name: [facility]  Address: [address] [place] [address]  Phone: [phone]  Fax: [phone]    Dialysis Facility (if applicable)   Name:  Address:  Dialysis Schedule:  Phone:  Fax:    / signature: Electronically signed by Coco Prado RN on 5/5/22 at 11:24 AM EDT    PHYSICIAN SECTION    Prognosis: Fair    Condition at Discharge: Stable    Rehab Potential (if transferring to Rehab): Fair    Recommended Labs or Other Treatments After Discharge: CBC and CMP in 3 to 4 days    Physician Certification: I certify the above information and transfer of Mahesh Early  is necessary for the continuing treatment of the diagnosis listed and that she requires Intermediate Nursing Care for greater 30 days.      Update Admission H&P: No change in H&P    PHYSICIAN SIGNATURE:  Electronically signed by Breann Jamil MD on 5/14/22 at 8:30 AM EDT

## 2022-05-05 NOTE — PROGRESS NOTES
Comprehensive Nutrition Assessment    Type and Reason for Visit:  Initial,Consult (TF rec)    Nutrition Recommendations/Plan:   1. TF rec provided. Will monitor for EN tolerance and provide updated TF recs as needed. 2. Recommend Standard Formula with Fiber (Jevity 1.5) @45ml/hr x 23hr (hold 30 mins before and after synthroid) will provide: 1035ml TV 1552kcal , 66g pro , 786ml free water. 3. Note: regimen meets 100% of estimated protein and calorie needs. Malnutrition Assessment:  Malnutrition Status:  No malnutrition (05/05/22 1115)    Context:  Acute Illness     Findings of the 6 clinical characteristics of malnutrition:  Energy Intake:  Mild decrease in energy intake (Comment)  Weight Loss:  Unable to assess (2/2 lack of wt hx on file to assess)     Body Fat Loss:  No significant body fat loss     Muscle Mass Loss:  No significant muscle mass loss    Fluid Accumulation:  No significant fluid accumulation     Strength:  Not Performed    Nutrition Assessment:    Pt. admit from SNF d/t seizure-like activity. PMHx noted for Down syndrome,hypothyroidism. NPO, Will provide TF recs and monitor. Nutrition Related Findings:    Nonverbal, No I/O data, +BS, no edema, hypophosphatemia, PEG Wound Type:  (eschar, old midline incision)       Current Nutrition Intake & Therapies:    Average Meal Intake: NPO  Average Supplements Intake: NPO  Diet NPO    Anthropometric Measures:  Height: 5' 1\" (154.9 cm)  Ideal Body Weight (IBW): 105 lbs (48 kg)    Admission Body Weight: 130 lb (59 kg) (5/04 est)  Current Body Weight: 130 lb (59 kg) (5/04 est), 123.8 % IBW. Weight Source: Other (Comment)  Current BMI (kg/m2): 24.6  Usual Body Weight:  (MARYCHUY d/t lack of measured wt hx to assess)     Weight Adjustment For: No Adjustment                 BMI Categories: Normal Weight (BMI 18.5-24. 9)    Estimated Daily Nutrient Needs:  Energy Requirements Based On: Kcal/kg  Weight Used for Energy Requirements: Current  Energy (kcal/day): 1450-1750kcal (25-30kcal/kgCBW)  Weight Used for Protein Requirements: Current  Protein (g/day): 65-77g (1.1-1.3g/kgCBW)  Method Used for Fluid Requirements: 1 ml/kcal  Fluid (ml/day): 1450-1750ml    Nutrition Diagnosis:   · Inadequate enteral nutrition infusion related to cognitive or neurological impairment as evidenced by NPO or clear liquid status due to medical condition,poor intake prior to admission,nutrition support - enteral nutrition      Nutrition Interventions:   Food and/or Nutrient Delivery: Start Tube Feeding  Nutrition Education/Counseling: No recommendation at this time  Coordination of Nutrition Care: Continue to monitor while inpatient       Goals:     Goals: Initiate nutrition support       Nutrition Monitoring and Evaluation:   Behavioral-Environmental Outcomes: None Identified  Food/Nutrient Intake Outcomes: Enteral Nutrition Intake/Tolerance  Physical Signs/Symptoms Outcomes: Biochemical Data,GI Status,Nutrition Focused Physical Findings,Skin,Weight    Discharge Planning:     Too soon to determine     Martin Malone RD  Contact: ext [phone]

## 2022-05-05 NOTE — CONSULTS
Palliative Care Department  [phone]  Palliative Care Initial Consult  Provider BOGDAN Wolff - CNP      PATIENT: Juliano Early  : 1965  MRN: [id number]  ADMISSION DATE: 2022 11:47 AM  Referring Jane Sauceda MD    Palliative Medicine was consulted on hospital day 0 for assistance with Goals of care, Code Status Discussion. HPI:     Clinical Summary:Lilo Early is a 65 y.o. y/o female with a history of Down syndrome, hypothyroidism, osteoarthritis, thyroid disease, UTI, varicose vein, who presented to [facility]) on 2022 with seizure-like activities. CT of the head decreasing size of bilateral subdural hemorrhage compared to 2022, ventriculostomy shunt catheter stable in position, chronic small vessel ischemia, remote insults in the bilateral frontal lobes and right temporal lobe. She was given a loading dose of Keppra. During the emergency room patient was found to be lethargic and confused. ASSESSMENT/PLAN:     Pertinent Hospital Diagnoses      Seizures      Palliative Care Encounter / Counseling Regarding Goals of Care  Please see detailed goals of care discussion as below   At this time, Juliano Early, Does Not have capacity for medical decision-making. Capacity is time limited and situation/question specific   During encounter Ciro Alexandre was surrogate medical decision-maker   Outcome of goals of care meeting:  o Continue with current medical treatment  o Patient's  Mother/legal guardian wants CODE STATUS to remain a full code   Code status Full Code   Advanced Directives: no POA or living will in epic   Surrogate/Legal NOK:  o Ciro Alexandre See (legal guardian) [phone]    Spiritual assessment: no spiritual distress identified  Bereavement and grief: to be determined  Referrals to: none today    Thank you for the opportunity to participate in the care of Juliano Early.      BOGDAN Coleman CNP  Palliative Medicine     SUBJECTIVE:     Details of Conversation:      Chart reviewed. Met with patient, her father, and her mother/legal guardian at the bedside. Patient alert, nonverbal at baseline. Introduced palliative medicine. They reported to have being the primary care provider for the patient since birth until 3 years ago when she fell and sustained a brain bleed, does not hurt to lose some of her cognitive function, requiring to be placed in a facility. They are eager to speak with neurology, and care team.  They expressed desire to continue with current medical treatment. We discussed CODE STATUS.  they wish for patient's CODE STATUS to remain a full code. Comfort support provided. We will continue to follow. Prognosis: Guarded    OBJECTIVE:     BP (!) 93/57   Pulse 58   Temp 98.5 °F (36.9 °C) (Oral)   Resp 20   Ht 5' 1\" (1.549 m)   Wt 130 lb (59 kg)   SpO2 92%   BMI 24.56 kg/m²     Physical Examination:  Gen: elderly, thin, NAD, awake  HEENT: normocephalic, atraumatic, PERRL, EOMI, dry lips  Neck: trachea midline, no JVD  Lungs: respirations easy and not labored,   Heart: Cardiac  Abdomen: normoactive bowel sounds, soft, non-tender  Extremities  edema  Skin: warm, dry without rashes, lesions, bruising  Neuro: awake, alert, nonverbal,    Objective data reviewed: labs, images, records, medication use, vitals and chart    Time/Communication  Greater than 50% of time spent, total 50 minutes in counseling and coordination of care at the bedside regarding CODE STATUS and goals of care. Thank you for allowing Palliative Medicine to participate in the care of Dania Early. Note: This report was completed using computerIORevolution voiced recognition software. Every effort has been made to ensure accuracy; however, inadvertent computerized transcription errors may be present.

## 2022-05-06 ENCOUNTER — APPOINTMENT (OUTPATIENT)
Dept: NEUROLOGY | Age: 57
DRG: 064 | End: 2022-05-06
Payer: MEDICARE

## 2022-05-06 PROCEDURE — 6370000000 HC RX 637 (ALT 250 FOR IP): Performed by: FAMILY MEDICINE

## 2022-05-06 PROCEDURE — 96372 THER/PROPH/DIAG INJ SC/IM: CPT

## 2022-05-06 PROCEDURE — 95819 EEG AWAKE AND ASLEEP: CPT

## 2022-05-06 PROCEDURE — 96376 TX/PRO/DX INJ SAME DRUG ADON: CPT

## 2022-05-06 PROCEDURE — 1200000000 HC SEMI PRIVATE

## 2022-05-06 PROCEDURE — 99233 SBSQ HOSP IP/OBS HIGH 50: CPT | Performed by: NURSE PRACTITIONER

## 2022-05-06 PROCEDURE — 99232 SBSQ HOSP IP/OBS MODERATE 35: CPT

## 2022-05-06 PROCEDURE — 6360000002 HC RX W HCPCS: Performed by: FAMILY MEDICINE

## 2022-05-06 PROCEDURE — 2580000003 HC RX 258: Performed by: FAMILY MEDICINE

## 2022-05-06 PROCEDURE — 95819 EEG AWAKE AND ASLEEP: CPT | Performed by: STUDENT IN AN ORGANIZED HEALTH CARE EDUCATION/TRAINING PROGRAM

## 2022-05-06 RX ADMIN — LEVETIRACETAM 500 MG: 5 INJECTION INTRAVENOUS at 12:33

## 2022-05-06 RX ADMIN — LEVOTHYROXINE SODIUM 88 MCG: 0.09 TABLET ORAL at 06:21

## 2022-05-06 RX ADMIN — ENOXAPARIN SODIUM 40 MG: 100 INJECTION SUBCUTANEOUS at 08:06

## 2022-05-06 RX ADMIN — DONEPEZIL HYDROCHLORIDE 5 MG: 5 TABLET, FILM COATED ORAL at 20:46

## 2022-05-06 RX ADMIN — LEVETIRACETAM 500 MG: 5 INJECTION INTRAVENOUS at 23:51

## 2022-05-06 RX ADMIN — RISPERIDONE 0.25 MG: 0.5 TABLET, FILM COATED ORAL at 20:43

## 2022-05-06 RX ADMIN — SODIUM CHLORIDE, PRESERVATIVE FREE 10 ML: 5 INJECTION INTRAVENOUS at 20:48

## 2022-05-06 RX ADMIN — SODIUM CHLORIDE, PRESERVATIVE FREE 10 ML: 5 INJECTION INTRAVENOUS at 08:06

## 2022-05-06 ASSESSMENT — PAIN SCALES - WONG BAKER: WONGBAKER_NUMERICALRESPONSE: 0

## 2022-05-06 NOTE — PROGRESS NOTES
Marta Early is a 65 y.o.  female     Neuro is following her for possible seizures    PMH: Down's syndrome, vasodepressor syncope, SDH s/p craniotomy 2019, hydrocephalus s/p  shunt, Alzheimer's dementia, hypothyroidism    Synopsis:  Patient presented from nursing home with seizure-like activity. She is nonverbal at baseline. Patient was witnessed to have seizure-like activity lasting 1 minute--but no description of such  She was started on Keppra. HPI  She is unresponsive and nonverbal.  EEG shows seizure potential in her left frontal region but no active seizures. She has been on Keppra with no further seizure-like activity. Her parents are at the bedside. Her father has multiple concerns about her  shunt and states that she has had numerous shunt adjustments at [facility], most recently in December. He states that he feels that her current debilitated state is due to being \"over drained\" and wants a second opinion. He does not agree that she has Alzheimer's. He states that prior to December she was ambulatory but developed significant leg spasticity following recent shunt adjustment. She was supposed to have appointment with RASHAWN Rm on the 17th of this month.     Unable to complete review of systems as she is unresponsive    Current Facility-Administered Medications   Medication Dose Route Frequency Provider Last Rate Last Admin    vitamin D (CHOLECALCIFEROL) tablet 2,000 Units  2,000 Units Oral Daily Mauri Polanco DO        donepezil (ARICEPT) tablet 5 mg  5 mg Oral Nightly Mauri Polanco DO   5 mg at 05/05/22 2159    levothyroxine (SYNTHROID) tablet 88 mcg  88 mcg Oral Daily Mauri Polanco DO   88 mcg at 05/06/22 2116    risperiDONE (RISPERDAL) tablet 0.25 mg  0.25 mg Oral BID Mauri Polanco DO   0.25 mg at 05/05/22 2159    sodium chloride flush 0.9 % injection 5-40 mL  5-40 mL IntraVENous 2 times per day Mauri Polanco DO   10 mL at 05/06/22 0806    sodium chloride flush 0.9 % injection 5-40 mL  5-40 mL IntraVENous PRN Gaila Geralds, DO        0.9 % sodium chloride infusion   IntraVENous PRN Gaila Geralds, DO        LORazepam (ATIVAN) injection 1 mg  1 mg IntraVENous Q5 Min PRN Gaila Geralds, DO        enoxaparin (LOVENOX) injection 40 mg  40 mg SubCUTAneous Daily Giacomo Geralds, DO   40 mg at 05/06/22 0806    ondansetron (ZOFRAN-ODT) disintegrating tablet 4 mg  4 mg Oral Q8H PRN Giacomo Geralds, DO        Or    ondansetron [facility]) injection 4 mg  4 mg IntraVENous Q6H PRN Gaila Geralds, DO        polyethylene glycol (GLYCOLAX) packet 17 g  17 g Oral Daily PRN Giacomo Geralds, DO        acetaminophen (TYLENOL) tablet 650 mg  650 mg Oral Q6H PRN Giacomo Geralds, DO        Or    acetaminophen (TYLENOL) suppository 650 mg  650 mg Rectal Q6H PRN Giacomo Gallowayalds, DO        levETIRAcetam (KEPPRA) 500 mg/100 mL IVPB  500 mg IntraVENous Q12H Giacomo Gallowayalds, DO   Stopped at 05/06/22 1242       Objective:     /71   Pulse 74   Temp 99.2 °F (37.3 °C) (Axillary)   Resp 20   Ht 5' 1\" (1.549 m)   Wt 130 lb (59 kg)   SpO2 92%   BMI 24.56 kg/m²     General appearance: Eyes closed, appears stated age, in no distress--Down's appearance  Head: normocephalic, without obvious abnormality, atraumatic  Eyes: conjunctivae/corneas clear.  .  ENT: Mucous membranes very dry and cracked; macroglossia  Lungs: clear to auscultation bilaterally  Heart: regular rate and rhythm  Extremities: normal, atraumatic, no cyanosis or edema  Pulses: 2+ and symmetric  Skin: color, texture, turgor normal---no rashes or lesions      Mental Status: Opens eyes to vigorous tactile stimulation and follows no commands, falls back to sleep easily, and is nonverbal    Cranial Nerves:  I: smell NA   II: visual acuity  NA   II: visual fields  threat bilaterally with forced eye opening   II: pupils RD   III,VII: ptosis    III,IV,VI: extraocular muscles   briefly looks around the room but will not track   V: mastication    V: facial light touch sensation  Attends to noxious stim   V,VII: corneal reflex     VII: facial muscle function - upper  Normal   VII: facial muscle function - lower Normal   VIII: hearing normal   IX: soft palate elevation  Normal   IX,X: gag reflex    XI: trapezius strength     XI: sternocleidomastoid strength    XI: neck extension strength     XII: tongue strength  macroglossia     Motor:  Generalized spasticity  Normal bulk  No purposeful movement of any limb  No abnormal movements  Appreciates noxious stim in all limbs    DTR:   Right Brachioradialis reflex 2+  Left Brachioradialis reflex 2+  Right Biceps reflex 2+  Left Biceps reflex 2+  Right Quadriceps reflex 2+  Left Quadriceps reflex 2+    No Babinskis  No Johnson's    No pathological reflexes    Laboratory/Radiology:     CBC with Differential:    Lab Results   Component Value Date    WBC 4.2 05/05/2022    RBC 4.48 05/05/2022    HGB 13.9 05/05/2022    HCT 43.7 05/05/2022     05/05/2022    MCV 97.5 05/05/2022    MCH 31.0 05/05/2022    MCHC 31.8 05/05/2022    RDW 15.1 05/05/2022    SEGSPCT 30 05/15/2012    LYMPHOPCT 39.9 05/05/2022    MONOPCT 8.7 05/05/2022    EOSPCT 3.8 12/13/2018    BASOPCT 1.2 05/05/2022    MONOSABS 0.36 05/05/2022    LYMPHSABS 1.66 05/05/2022    EOSABS 0.14 05/05/2022    BASOSABS 0.05 05/05/2022     CMP:    Lab Results   Component Value Date     05/05/2022    K 4.0 05/05/2022     05/05/2022    CO2 25 05/05/2022    BUN 14 05/05/2022    CREATININE 0.6 05/05/2022    GFRAA >60 05/05/2022    LABGLOM >60 05/05/2022    GLUCOSE 76 05/05/2022    GLUCOSE 96 05/15/2012    PROT 7.8 05/04/2022    LABALBU 3.6 05/04/2022    LABALBU 2.8 05/13/2012    CALCIUM 8.2 05/05/2022    BILITOT 0.4 05/04/2022    ALKPHOS 135 05/04/2022    AST 27 05/04/2022    ALT 33 05/04/2022     CTH 5/4: Decreasing size of bilateral subdural hemorrhages compared to 02/17/2022. 2. Ventriculostomy shunt catheter stable in position.   There is slight increased size of the lateral ventricles which may be partially due to decreased mass effect from the bilateral subdural hemorrhages. 3. Remote insults in the bilateral frontal lobes and right temporal lobe. 4. Chronic small vessel ischemic disease     EEG: This abnormal study showed evidence of:   1. An increased potential for focal seizures from the left frontal region  2. Moderate nonspecific cerebral dysfunction of the left frontotemporal region  3. Mild to moderate nonspecific cerebral dysfunction of the right frontotemporal region  4. A nonspecific mild to moderate encephalopathy   Structural abnormalities should be considered for the findings above and appropriate imaging obtained if clinically indicated. No seizures were noted during this study. All pertinent labs and images personally reviewed today    Assessment:     Seizure-like activity: Increased potential for focal seizures found in her left frontal region, and she should remain on antiseizure medication indefinitely. Unfortunately, she remains unresponsive. B/l subdural hygromas s/p  shunt: With multiple shunt adjustments recently. CT showed decrease subdural hygromas but increase in size of lateral ventricles as well as remote strokes.     Down syndrome with cognitive decline    Plan:     Continue Keppra 500 mg BID     Consult NSGY per parent's request for  shunt eval    Sz precautions    Neuro will follow one more day    BOGDAN Frederick CNP  3:08 PM  5/6/2022

## 2022-05-06 NOTE — PROGRESS NOTES
Palliative care LSW and NP met with pts parents at bedside. Pat(legal guardian) and Art. Pt lying in bed, not interacting. They stats that prior to a fall and brain bleed 3 years ago she was high functioning. Family wish to keep pt a full code and would make a decision if and when she needed cpr.

## 2022-05-06 NOTE — PROGRESS NOTES
Dr Marion Dennis notified, she instructed this nurse to notify Dr Lianne Flores. Dr Lianne Flores notified of consult via perfect serve.

## 2022-05-06 NOTE — CARE COORDINATION
5/6 Update CM note. EEG completed, awaiting results. Keppra IV Q12H continues. Palliative was consulted yesterday and at this time parent's want to keep patient a full code and will make decisions regarding resuscitation if and when the situation occurs. Will await further recommendations from neuro pending EEG results. Plan is to return to [facility] on discharge as patient is a long term bed hold. Completed ambulance paperwork in soft chart along with COVID test which needs completed on day of discharge.      Lissy Luna, SHANDRAN, RN  [facility] Case Management   Cell: [phone]

## 2022-05-06 NOTE — PLAN OF CARE
Problem: Pain  Goal: Verbalizes/displays adequate comfort level or baseline comfort level  Outcome: Progressing     Problem: Skin/Tissue Integrity  Goal: Absence of new skin breakdown  Description: 1. Monitor for areas of redness and/or skin breakdown  2. Assess vascular access sites hourly  3. Every 4-6 hours minimum:  Change oxygen saturation probe site  4. Every 4-6 hours:  If on nasal continuous positive airway pressure, respiratory therapy assess nares and determine need for appliance change or resting period.   Outcome: Progressing     Problem: Safety - Adult  Goal: Free from fall injury  Outcome: Progressing     Problem: Nutrition Deficit:  Goal: Optimize nutritional status  Outcome: Progressing

## 2022-05-06 NOTE — PROGRESS NOTES
Hospitalist Progress Note      PCP: Shamir Pennington DO    Date of Admission: 5/4/2022      Hospital Course: This is a 50-year-old female with past medical history of subdural hemorrhage status post  shunt implantation Down syndrome, hypothyroidism, osteoarthritis who presented from group home with seizure-like activity. CT head showed decreasing size of bilateral subdural hemorrhages, ventriculostomy shunt catheter in stable position. She was loaded on Keppra and started on maintenance dose. Neurologist was consulted, EEG ordered; plan to discontinue Keppra if EEG is unremarkable. Subjective: Pt was seen and examined at bedside. No acute event overnight; unable to complete ROS due to nonverbal state. Medications:  Reviewed    Infusion Medications    sodium chloride       Scheduled Medications    vitamin D  2,000 Units Oral Daily    donepezil  5 mg Oral Nightly    levothyroxine  88 mcg Oral Daily    risperiDONE  0.25 mg Oral BID    sodium chloride flush  5-40 mL IntraVENous 2 times per day    enoxaparin  40 mg SubCUTAneous Daily    levetiracetam  500 mg IntraVENous Q12H     PRN Meds: sodium chloride flush, sodium chloride, LORazepam, ondansetron **OR** ondansetron, polyethylene glycol, acetaminophen **OR** acetaminophen      Intake/Output Summary (Last 24 hours) at 5/6/2022 1053  Last data filed at 5/6/2022 0818  Gross per 24 hour   Intake 0 ml   Output    Net 0 ml       Exam:    /71   Pulse 74   Temp 99.2 °F (37.3 °C) (Axillary)   Resp 20   Ht 5' 1\" (1.549 m)   Wt 130 lb (59 kg)   SpO2 92%   BMI 24.56 kg/m²     General appearance: Lying comfortably in bed. No apparent distress. Respiratory: Good air entry bilaterally. Cardiovascular: Normal S1/S2. Regular rhythm and rate. Abdomen: Soft, non-tender, non-distended with normal bowel sounds. Musculoskeletal: No clubbing, cyanosis or edema bilaterally. Neurologic: Nonverbal  Psychiatric: Alert and oriented x 0. Pleasantly confused, not agitated  Peripheral Pulses: +2 palpable, equal bilaterally       Labs:   Recent Labs     05/04/22  1244 05/05/22  0441   WBC 7.8 4.2*   HGB 15.8* 13.9   HCT 49.5* 43.7    223     Recent Labs     05/04/22  1244 05/05/22  0441    144   K 4.5 4.0    110*   CO2 26 25   BUN 23* 14   CREATININE 0.7 0.6   CALCIUM 9.1 8.2*     Recent Labs     05/04/22  1244   AST 27   ALT 33*   BILITOT 0.4   ALKPHOS 135*     No results for input(s): INR in the last 72 hours. No results for input(s): [place] Wilson in the last 72 hours. Radiology:  CT HEAD WO CONTRAST   Final Result   1. Decreasing size of bilateral subdural hemorrhages compared to 02/17/2022.   2. Ventriculostomy shunt catheter stable in position. There is slight   increased size of the lateral ventricles which may be partially due to   decreased mass effect from the bilateral subdural hemorrhages. 3. Remote insults in the bilateral frontal lobes and right temporal lobe. 4. Chronic small vessel ischemic disease. XR CHEST PORTABLE   Final Result   Suspect retrocardiac infiltrates.                    Active Hospital Problems    Diagnosis Date Noted    Seizure [facility]) [R56.9] 05/04/2022     Priority: Medium     Assessment  Suspected seizure  History of subdural hemorrhage  status post  shunt implantation  Hypothyroidism  Down syndrome  Osteoarthritis      Plan:  Continue with IV Keppra  Neurology not been consulted; will follow with recommendation  Follow up EEG report  Noted plan to discontinue IV Keppra if EEG is unremarkable  Seizure precautions      DVT Prophylaxis: Lovenox    Diet: Diet NPO  Code Status: Full Code    PT/OT Eval Status: N/AA    Dispo -       Margi Quinton Toribio MD 5/6/2022 10:53 AM

## 2022-05-06 NOTE — PROGRESS NOTES
Palliative Care Department  [phone]  Palliative Care Progress Note  Provider BOGDAN Espino - CNP      PATIENT: Mahesh Early  : 1965  MRN: [id number]  ADMISSION DATE: 2022 11:47 AM  Referring Susana Garvin MD    Palliative Medicine was consulted on hospital day 0 for assistance with Goals of care, Code Status Discussion. HPI:     Clinical Summary:Lilo Early is a 65 y.o. y/o female with a history of Down syndrome, hypothyroidism, osteoarthritis, thyroid disease, UTI, varicose vein, who presented to [facility]) on 2022 with seizure-like activities. CT of the head decreasing size of bilateral subdural hemorrhage compared to 2022, ventriculostomy shunt catheter stable in position, chronic small vessel ischemia, remote insults in the bilateral frontal lobes and right temporal lobe. She was given a loading dose of Keppra. During the emergency room patient was found to be lethargic and confused. ASSESSMENT/PLAN:     Pertinent Hospital Diagnoses      Seizures    Palliative Care Encounter / Counseling Regarding Goals of Care  Please see detailed goals of care discussion as below   At this time, Mahesh Early, Does Not have capacity for medical decision-making. Capacity is time limited and situation/question specific   During encounter Leigha Butt was surrogate medical decision-maker   Outcome of goals of care meeting:  o Continue with current medical treatment  o Patient's  Mother/legal guardian wants CODE STATUS to remain a full code   Code status Full Code   Advanced Directives: no POA or living will in epic   Surrogate/Legal NOK:  o Leigha Moss See (legal guardian) [phone]    Spiritual assessment: no spiritual distress identified  Bereavement and grief: to be determined  Referrals to: none today    Thank you for the opportunity to participate in the care of Mahesh Early.      BOGDAN Garcia CNP  Palliative Medicine     SUBJECTIVE:     Details of Conversation:      Chart reviewed. Patient is going to have a EEG today, follow reports, [address] may be discontinued if the EEG is normal.  Spoke with patient's parents at the bedside. Answer the questions. They still wishes for CODE STATUS to remain a full code. Plan moving forward for patient to transition back to [facility] when medically stable. Comfort support was provided. We will continue to follow. Prognosis: Guarded    OBJECTIVE:     /71   Pulse 74   Temp 99.2 °F (37.3 °C) (Axillary)   Resp 20   Ht 5' 1\" (1.549 m)   Wt 130 lb (59 kg)   SpO2 92%   BMI 24.56 kg/m²     Physical Examination:  Gen: elderly, thin, NAD, awake  HEENT: normocephalic, atraumatic, PERRL, EOMI, dry lips  Neck: trachea midline, no JVD  Lungs: respirations easy and not labored,   Heart: Cardiac  Abdomen: normoactive bowel sounds, soft, non-tender  Extremities  edema  Skin: warm, dry without rashes, lesions, bruising  Neuro: awake, alert, nonverbal,    Objective data reviewed: labs, images, records, medication use, vitals and chart    Time/Communication  Greater than 50% of time spent, total 25 minutes in counseling and coordination of care at the bedside regarding CODE STATUS and goals of care. Thank you for allowing Palliative Medicine to participate in the care of Sixto Early. Note: This report was completed using [employer] voiced recognition software. Every effort has been made to ensure accuracy; however, inadvertent computerized transcription errors may be present.

## 2022-05-06 NOTE — PROCEDURES
[address] Report    MRN: [id number]   PATIENT NAME: Mahesh Enriquez See   DATE OF REPORT: 2022    DATE OF SERVICE: 2022    PHYSICIAN NAME: Lance Simon DO  Referring Physician: Dr Hernan Allison      Patient's : 1965   Patient's Age: 65 y.o. Gender: female     PROCEDURE: Routine EEG with video      Clinical Interpretation: This abnormal study showed evidence of:    1. An increased potential for focal seizures from the left frontal region  2. Moderate nonspecific cerebral dysfunction of the left frontotemporal region  3. Mild to moderate nonspecific cerebral dysfunction of the right frontotemporal region  4. A nonspecific mild to moderate encephalopathy    Structural abnormalities should be considered for the findings above and appropriate imaging obtained if clinically indicated. No seizures were noted during this study. ____________________________  Electronically signed by: Lance Simon DO, 2022 10:53 AM      Patient Clinical Information   Reason for Study: Patient undergoing evaluation for seizures  Patient State: Somnolent  Primary neurological diagnosis: Epilepsy   Primary indication for monitoring: Risk stratification    Pertinent Medications and Treatments    donepezil    levothyroxine    risperidone     Lorazepam     levetiracetam     Sedatives administered: No  Intubated: No  Pharmacological paralytic: No    Reporting Period  Start of Study: 1014, 2022   End of Study:  1040, 2022       EEG Description  Digital video and scalp EEG monitoring was performed using the standard protocol for this laboratory. Scalp electrodes were applied in the international 10/20 system. Multiple digital montage arrangements were utilized for evaluation. EKG and video were recorded.      Background:      Occipital rhythm (posterior dominant rhythm or PDR): Present intermittently  Frequency: 7.5 Hz  Voltage: Medium   Organization: poor  Reactivity to eye opening/closure: not tested    Drowsiness: Present - abnormal, mildly excessive generalized irregular delta activity noted  Sleep: Absent    Comments: In the waking state the background is composed primarily of generalized irregular theta more than delta activity. Technical and Activation Procedures:  Hyperventilation: Not done        Photic stimulation: Not done        Reactivity to stimulation: Yes    Abnormalities:    I. Seizures? No    II. Rhythmic or Periodic Patterns? Occasional lateralized rhythmic delta activity noted at Fp1, F3, F7 with an average frequency of 1-1.5 Hz. No significant evolution noted    III. Other Abnormalities?         Rare sharp waves noted at Fp1, F3, F7  Occasional irregular delta activity noted independently at F4, F8, T4 and Fp1, F3, F7>T3

## 2022-05-07 ENCOUNTER — APPOINTMENT (OUTPATIENT)
Dept: GENERAL RADIOLOGY | Age: 57
DRG: 064 | End: 2022-05-07
Payer: MEDICARE

## 2022-05-07 LAB
ALBUMIN SERPL-MCNC: 2.9 G/DL (ref 3.5–5.2)
ALP BLD-CCNC: 104 U/L (ref 35–104)
ALT SERPL-CCNC: 25 U/L (ref 0–32)
ANION GAP SERPL CALCULATED.3IONS-SCNC: 9 MMOL/L (ref 7–16)
AST SERPL-CCNC: 17 U/L (ref 0–31)
BILIRUB SERPL-MCNC: 0.6 MG/DL (ref 0–1.2)
BUN BLDV-MCNC: 20 MG/DL (ref 6–20)
CALCIUM SERPL-MCNC: 8.4 MG/DL (ref 8.6–10.2)
CHLORIDE BLD-SCNC: 107 MMOL/L (ref 98–107)
CO2: 25 MMOL/L (ref 22–29)
CREAT SERPL-MCNC: 0.6 MG/DL (ref 0.5–1)
GFR AFRICAN AMERICAN: >60
GFR NON-AFRICAN AMERICAN: >60 ML/MIN/1.73
GLUCOSE BLD-MCNC: 141 MG/DL (ref 74–99)
HCT VFR BLD CALC: 44.2 % (ref 34–48)
HEMOGLOBIN: 14.4 G/DL (ref 11.5–15.5)
MCH RBC QN AUTO: 31.1 PG (ref 26–35)
MCHC RBC AUTO-ENTMCNC: 32.6 % (ref 32–34.5)
MCV RBC AUTO: 95.5 FL (ref 80–99.9)
PDW BLD-RTO: 15.5 FL (ref 11.5–15)
PLATELET # BLD: 229 E9/L (ref 130–450)
PMV BLD AUTO: 9.8 FL (ref 7–12)
POTASSIUM SERPL-SCNC: 4 MMOL/L (ref 3.5–5)
RBC # BLD: 4.63 E12/L (ref 3.5–5.5)
SODIUM BLD-SCNC: 141 MMOL/L (ref 132–146)
TOTAL PROTEIN: 6.3 G/DL (ref 6.4–8.3)
WBC # BLD: 6.7 E9/L (ref 4.5–11.5)

## 2022-05-07 PROCEDURE — 6370000000 HC RX 637 (ALT 250 FOR IP): Performed by: FAMILY MEDICINE

## 2022-05-07 PROCEDURE — 6360000002 HC RX W HCPCS: Performed by: FAMILY MEDICINE

## 2022-05-07 PROCEDURE — 94664 DEMO&/EVAL PT USE INHALER: CPT

## 2022-05-07 PROCEDURE — 99222 1ST HOSP IP/OBS MODERATE 55: CPT | Performed by: PHYSICIAN ASSISTANT

## 2022-05-07 PROCEDURE — 36415 COLL VENOUS BLD VENIPUNCTURE: CPT

## 2022-05-07 PROCEDURE — 85027 COMPLETE CBC AUTOMATED: CPT

## 2022-05-07 PROCEDURE — 1200000000 HC SEMI PRIVATE

## 2022-05-07 PROCEDURE — 70360 X-RAY EXAM OF NECK: CPT

## 2022-05-07 PROCEDURE — 2580000003 HC RX 258: Performed by: FAMILY MEDICINE

## 2022-05-07 PROCEDURE — 70250 X-RAY EXAM OF SKULL: CPT

## 2022-05-07 PROCEDURE — 74019 RADEX ABDOMEN 2 VIEWS: CPT

## 2022-05-07 PROCEDURE — 94640 AIRWAY INHALATION TREATMENT: CPT

## 2022-05-07 PROCEDURE — 99233 SBSQ HOSP IP/OBS HIGH 50: CPT | Performed by: NURSE PRACTITIONER

## 2022-05-07 PROCEDURE — 80053 COMPREHEN METABOLIC PANEL: CPT

## 2022-05-07 PROCEDURE — 71046 X-RAY EXAM CHEST 2 VIEWS: CPT

## 2022-05-07 RX ORDER — ALBUTEROL SULFATE 2.5 MG/3ML
2.5 SOLUTION RESPIRATORY (INHALATION) EVERY 4 HOURS PRN
Status: DISCONTINUED | OUTPATIENT
Start: 2022-05-07 | End: 2022-05-15 | Stop reason: HOSPADM

## 2022-05-07 RX ORDER — IPRATROPIUM BROMIDE AND ALBUTEROL SULFATE 2.5; .5 MG/3ML; MG/3ML
1 SOLUTION RESPIRATORY (INHALATION)
Status: DISCONTINUED | OUTPATIENT
Start: 2022-05-07 | End: 2022-05-15 | Stop reason: HOSPADM

## 2022-05-07 RX ADMIN — Medication 2000 UNITS: at 08:45

## 2022-05-07 RX ADMIN — SODIUM CHLORIDE, PRESERVATIVE FREE 10 ML: 5 INJECTION INTRAVENOUS at 08:45

## 2022-05-07 RX ADMIN — ALBUTEROL SULFATE 2.5 MG: 2.5 SOLUTION RESPIRATORY (INHALATION) at 01:06

## 2022-05-07 RX ADMIN — IPRATROPIUM BROMIDE AND ALBUTEROL SULFATE 1 AMPULE: .5; 2.5 SOLUTION RESPIRATORY (INHALATION) at 12:36

## 2022-05-07 RX ADMIN — RISPERIDONE 0.25 MG: 0.5 TABLET, FILM COATED ORAL at 21:54

## 2022-05-07 RX ADMIN — LEVETIRACETAM 500 MG: 5 INJECTION INTRAVENOUS at 12:34

## 2022-05-07 RX ADMIN — LEVOTHYROXINE SODIUM 88 MCG: 0.09 TABLET ORAL at 05:32

## 2022-05-07 RX ADMIN — ENOXAPARIN SODIUM 40 MG: 100 INJECTION SUBCUTANEOUS at 08:44

## 2022-05-07 RX ADMIN — SODIUM CHLORIDE, PRESERVATIVE FREE 10 ML: 5 INJECTION INTRAVENOUS at 21:56

## 2022-05-07 RX ADMIN — IPRATROPIUM BROMIDE AND ALBUTEROL SULFATE 1 AMPULE: .5; 2.5 SOLUTION RESPIRATORY (INHALATION) at 16:34

## 2022-05-07 RX ADMIN — RISPERIDONE 0.25 MG: 0.5 TABLET, FILM COATED ORAL at 08:44

## 2022-05-07 RX ADMIN — DONEPEZIL HYDROCHLORIDE 5 MG: 5 TABLET, FILM COATED ORAL at 21:54

## 2022-05-07 RX ADMIN — IPRATROPIUM BROMIDE AND ALBUTEROL SULFATE 1 AMPULE: .5; 2.5 SOLUTION RESPIRATORY (INHALATION) at 08:18

## 2022-05-07 RX ADMIN — IPRATROPIUM BROMIDE AND ALBUTEROL SULFATE 1 AMPULE: .5; 2.5 SOLUTION RESPIRATORY (INHALATION) at 20:04

## 2022-05-07 RX ADMIN — LEVETIRACETAM 500 MG: 5 INJECTION INTRAVENOUS at 23:57

## 2022-05-07 ASSESSMENT — PAIN SCALES - WONG BAKER
WONGBAKER_NUMERICALRESPONSE: 0

## 2022-05-07 NOTE — PLAN OF CARE
Problem: Pain  Goal: Verbalizes/displays adequate comfort level or baseline comfort level  Outcome: Progressing     Problem: Skin/Tissue Integrity  Goal: Absence of new skin breakdown  Description: 1. Monitor for areas of redness and/or skin breakdown  2. Assess vascular access sites hourly  3. Every 4-6 hours minimum:  Change oxygen saturation probe site  4. Every 4-6 hours:  If on nasal continuous positive airway pressure, respiratory therapy assess nares and determine need for appliance change or resting period.   5/7/2022 1043 by Omer Pritchard RN  Outcome: Progressing  5/7/2022 0016 by Nakita Rivera RN  Outcome: Progressing     Problem: Safety - Adult  Goal: Free from fall injury  5/7/2022 1043 by Omer Pritchard RN  Outcome: Progressing  5/7/2022 0016 by Nakita Rivera RN  Outcome: Progressing

## 2022-05-07 NOTE — PROGRESS NOTES
Hospitalist Progress Note      Synopsis: Patient admitted on 5/4/2022     Ms. Jaime Early, a 65y.o. year old female who has a past medical history of Arthritis, Down syndrome, Hypothyroidism, Osteoarthritis, Syncope, Thyroid disease, UTI (urinary tract infection), Varicose veins, and Wears glasses. Patient presented to the emergency department from local nursing home with seizure-like activity. This was witnessed by staff at the nursing home. Patient was lethargic and confused on arrival to the emergency department possibly postictal. Patient is nonverbal at baseline. She has a history of subdural hemorrhage. Patient has a  shunt and is followed by neurosurgery. Vital signs within norm limits and stable. Laboratory studies were unremarkable. CT of the head shows decreasing size of subdural hemorrhage. Patient was given loading dose of Keppra and medicine was consulted for admission. Subjective    Remains somnolent  Exam:  /76   Pulse 91   Temp 97.3 °F (36.3 °C) (Axillary)   Resp 17   Ht 5' 1\" (1.549 m)   Wt 130 lb (59 kg)   SpO2 94%   BMI 24.56 kg/m²   General appearance: No apparent distress, appears stated age and cooperative. HEENT eyes are closed mouth is open  Neck: Supple. No jugular venous distention. Trachea midline. Respiratory:  Normal respiratory effort. decreased to auscultation   cardiovascular: Regular rate and rhythm with normal S1/S2 without murmurs, rubs or gallops. Abdomen: Soft, non-tender, non-distended with normal bowel sounds. Musculoskeletal: No clubbing, cyanosis or edema bilaterally.   Bilateral heels and heel protectors  Neurologically facial contour typical for Down syndrome not really following commands    Medications:  Reviewed    Infusion Medications    sodium chloride       Scheduled Medications    ipratropium-albuterol  1 ampule Inhalation Q4H WA    vitamin D  2,000 Units Oral Daily    donepezil  5 mg Oral Nightly    levothyroxine  88 mcg Oral Daily  risperiDONE  0.25 mg Oral BID    sodium chloride flush  5-40 mL IntraVENous 2 times per day    enoxaparin  40 mg SubCUTAneous Daily    levetiracetam  500 mg IntraVENous Q12H     PRN Meds: albuterol, sodium chloride flush, sodium chloride, LORazepam, ondansetron **OR** ondansetron, polyethylene glycol, acetaminophen **OR** acetaminophen    I/O    Intake/Output Summary (Last 24 hours) at 5/7/2022 1144  Last data filed at 5/7/2022 0532  Gross per 24 hour   Intake 1120 ml   Output 0 ml   Net 1120 ml       Labs:   Recent Labs     05/04/22  1244 05/05/22  0441   WBC 7.8 4.2*   HGB 15.8* 13.9   HCT 49.5* 43.7    223       Recent Labs     05/04/22  1244 05/05/22  0441    144   K 4.5 4.0    110*   CO2 26 25   BUN 23* 14   CREATININE 0.7 0.6   CALCIUM 9.1 8.2*       Recent Labs     05/04/22  1244   PROT 7.8   ALKPHOS 135*   ALT 33*   AST 27   BILITOT 0.4       No results for input(s): INR in the last 72 hours. No results for input(s): Verlena Ez in the last 72 hours. Chronic labs:  Lab Results   Component Value Date    CHOL 173 03/30/2022    TRIG 88 03/30/2022    HDL 62 03/30/2022    LDLCALC 93 03/30/2022    TSH 8.930 (H) 01/12/2022    INR 1.0 11/12/2019    LABA1C 5.2 03/30/2022       Radiology:  CT HEAD WO CONTRAST    Result Date: 5/4/2022  1. Decreasing size of bilateral subdural hemorrhages compared to 02/17/2022. 2. Ventriculostomy shunt catheter stable in position. There is slight increased size of the lateral ventricles which may be partially due to decreased mass effect from the bilateral subdural hemorrhages. 3. Remote insults in the bilateral frontal lobes and right temporal lobe. 4. Chronic small vessel ischemic disease. XR CHEST PORTABLE    Result Date: 5/4/2022  Suspect retrocardiac infiltrates. ASSESSMENT:    Principal Problem:    Seizure ([address]  Resolved Problems:    * No resolved hospital problems.  *  History of  shunt  Sweating  Hypothyroidism  Osteoarthritis PLAN:    1. Neurosurgery and neurology both on board  2. Discussed with family at bedside  3. Is postictal at this time? 4.  Reorder labs    Diet: Diet NPO  ADULT TUBE FEEDING; PEG; Standard with Fiber; Continuous; 45; No; 40; Q 1 hour  Code Status: Full Code  PT/OT Eval Status:   Unable yet  DVT Prophylaxis:   In place  Recommended disposition at discharge: To be determined    +++++++++++++++++++++++++++++++++++++++++++++++++  Sophia Kaur MD   [facility].  +++++++++++++++++++++++++++++++++++++++++++++++++  NOTE: This report was transcribed using voice recognition software. Every effort was made to ensure accuracy; however, inadvertent computerized transcription errors may be present.

## 2022-05-07 NOTE — CONSULTS
NEUROSURGERY CONSULTATION     Chief Complaint: hx  shunt placement; seizure-like activity. HPI:   Rhonda Early is a 65 y.o.  female who has history of Down's syndrome, syncope, hypothyroidism, Alzheimer's dementia, SDH s/p craniotomy in 2019, and  shunt placement in 11/2019 by Dr. Eileen Weathers. Pt is nonverbal at baseline. She presents to the hospital accompanied by her parents from a nursing home with concerns of seizure-like activity. Pt was started on Keppra and no further seizures were noted. Her parents have concerns about her  shunt which is why our services were consulted. Father states that her shunt was adjusted multiple times at [facility] and he feels that has caused a decline in her cognition. Her cognitive decline has been going on for the past 5 months. Neurosurgery at [facility] diagnosed her with Alzheimer's. Head CT scan demonstrates very slight increase in ventricle size which could be due to decreasing size of bilateral hygromas. Remote strokes also noted. Pt is currently nonverbal and non responsive. Past Medical History:   Diagnosis Date    Arthritis     Down syndrome     Hypothyroidism     Osteoarthritis     Syncope     Thyroid disease     UTI (urinary tract infection)         Varicose veins     Wears glasses      Past Surgical History:   Procedure Laterality Date    ABDOMEN SURGERY      duodenal atresia    ABDOMINAL ADHESION SURGERY      BRAIN SURGERY      CRANIOTOMY Right 4/12/2019    RIGHT CRANIOTOMY FRANCISCO JAVIER HOLES performed by Kimmy Gutierrez MD at [facility] 22 ECHO COMPL W DOP COLOR FLOW  5/14/2012         HIP SURGERY      Right    JOINT REPLACEMENT      left knee, right hip- Sunday Abran KNEE SURGERY      Right    TOE SURGERY      Right foot    TOTAL KNEE ARTHROPLASTY Left 08/11/2016    DR. De    TUBAL LIGATION      VENA CAVA FILTER PLACEMENT Bilateral 4/22/2019    VENA CAVA FILTER INSERTION performed by Wes Trujillo MD at [facility] 22 VENTRICULOPERITONEAL SHUNT N/A 11/12/2019    RIGHT FRONTAL VENTRICULO PERITONEAL SHUNT INSERTION-----FACILITY performed by Kalen Acosta MD at [address] [place] [address] Street History   Problem Relation Age of Onset    Heart Disease Maternal Grandmother     Heart Disease Maternal Grandfather     Heart Disease Paternal Aunt     Heart Disease Paternal Uncle       Social History     Socioeconomic History    Marital status: Single     Spouse name: Not on file    Number of children: Not on file    Years of education: Not on file    Highest education level: Not on file   Occupational History    Not on file   Tobacco Use    Smoking status: Never Smoker    Smokeless tobacco: Never Used   Vaping Use    Vaping Use: Never used   Substance and Sexual Activity    Alcohol use: No    Drug use: No    Sexual activity: Never   Other Topics Concern    Not on file   Social History Narrative    Not on file     Social Determinants of Health     Financial Resource Strain: Low Risk     Difficulty of Paying Living Expenses: Not hard at all   Food Insecurity: No Food Insecurity    Worried About Running Out of Food in the Last Year: Never true    [address] in the Last Year: Never true   Transportation Needs:     Lack of Transportation (Medical): Not on file    Lack of Transportation (Non-Medical):  Not on file   Physical Activity:     Days of Exercise per Week: Not on file    Minutes of Exercise per Session: Not on file   Stress:     Feeling of Stress : Not on file   Social Connections:     Frequency of Communication with Friends and Family: Not on file    Frequency of Social Gatherings with Friends and Family: Not on file    Attends Yazidism Services: Not on file    Active Member of Clubs or Organizations: Not on file    Attends Club or Organization Meetings: Not on file    Marital Status: Not on file   Intimate Partner Violence:     Fear of Current or Ex-Partner: Not on file    Emotionally Abused: Not on file    Physically Abused: Not on file  Sexually Abused: Not on file   Housing Stability:     Unable to Pay for Housing in the Last Year: Not on file    Number of Places Lived in the Last Year: Not on file    Unstable Housing in the Last Year: Not on file       Medications:   Current Facility-Administered Medications   Medication Dose Route Frequency Provider Last Rate Last Admin    ipratropium-albuterol (DUONEB) nebulizer solution 1 ampule  1 ampule Inhalation Q4H WA Sherlyn Ligas, DO   1 ampule at 05/07/22 0818    albuterol (PROVENTIL) nebulizer solution 2.5 mg  2.5 mg Nebulization Q4H PRN Sherlyn Ligas, DO   2.5 mg at 05/07/22 0106    vitamin D (CHOLECALCIFEROL) tablet 2,000 Units  2,000 Units Oral Daily Sherlyn Ligas, DO   2,000 Units at 05/07/22 0845    donepezil (ARICEPT) tablet 5 mg  5 mg Oral Nightly Sherlyn Ligas, DO   5 mg at 05/06/22 2046    levothyroxine (SYNTHROID) tablet 88 mcg  88 mcg Oral Daily Sherlyn Ligas, DO   88 mcg at 05/07/22 0532    risperiDONE (RISPERDAL) tablet 0.25 mg  0.25 mg Oral BID Sherlyn Ligas, DO   0.25 mg at 05/07/22 0844    sodium chloride flush 0.9 % injection 5-40 mL  5-40 mL IntraVENous 2 times per day Sherlyn Ligas, DO   10 mL at 05/07/22 0845    sodium chloride flush 0.9 % injection 5-40 mL  5-40 mL IntraVENous PRN Sherlyn Ligas, DO        0.9 % sodium chloride infusion   IntraVENous PRN Sherlyn Ligas, DO        LORazepam (ATIVAN) injection 1 mg  1 mg IntraVENous Q5 Min PRN Sherlyn Ligas, DO        enoxaparin (LOVENOX) injection 40 mg  40 mg SubCUTAneous Daily Sherlyn Ligas, DO   40 mg at 05/07/22 0844    ondansetron (ZOFRAN-ODT) disintegrating tablet 4 mg  4 mg Oral Q8H PRN Sherlyn Ligas, DO        Or    ondansetron [facility]) injection 4 mg  4 mg IntraVENous Q6H PRN Sherlyn Ligas, DO        polyethylene glycol (GLYCOLAX) packet 17 g  17 g Oral Daily PRN Sherlyn Ligas, DO        acetaminophen (TYLENOL) tablet 650 mg  650 mg Oral Q6H PRN Sherlyn Ligas, DO        Or    acetaminophen (TYLENOL) suppository 650 mg  650 mg Rectal Q6H PRN Sinda Covington, DO        levETIRAcetam (KEPPRA) 500 mg/100 mL IVPB  500 mg IntraVENous Q12H Sinda Covington, DO   Stopped at 05/07/22 0010        Allergies:    Patient has no known allergies. Review of Systems   Unable to perform ROS: Patient nonverbal        Physical Exam  Constitutional:       Appearance: Normal appearance. She is well-developed. HENT:      Head: Normocephalic and atraumatic. Eyes:      Pupils: Pupils are equal, round, and reactive to light. Cardiovascular:      Rate and Rhythm: Normal rate. Pulmonary:      Effort: Pulmonary effort is normal.   Abdominal:      General: There is no distension. Skin:     General: Skin is warm and dry. Neurological:      Mental Status: She is alert. Comments: Eyes open  Patient not following commands   Psychiatric:      Comments: Nonverbal          /76   Pulse 91   Temp 97.3 °F (36.3 °C) (Axillary)   Resp 17   Ht 5' 1\" (1.549 m)   Wt 130 lb (59 kg)   SpO2 94%   BMI 24.56 kg/m²        Assessment:   Cognitive decline   NPH s/p  shunt placement 2019  Seizure-like activity    Plan:  -Head CT scan reviewed. Will order shunt series to further evaluate shunt  -Cognitive decline most likely not due to  shunt.  Recommend continued follow up with [facility] especially if they are managing shunt adjustments  -Neurology following       Electronically signed by Adwoa aPblo PA-C on 5/7/2022 at 12:04 PM

## 2022-05-07 NOTE — PROGRESS NOTES
Randy Early is a 65 y.o.  female     Neuro is following her for possible seizures    PMH: Down's syndrome, vasodepressor syncope, SDH s/p craniotomy 2019, hydrocephalus s/p  shunt, Alzheimer's dementia, hypothyroidism    Patient presented from nursing home with seizure-like activity. She is nonverbal at baseline. Patient was witnessed to have seizure-like activity lasting 1 minute--but no description of such  She was started on Keppra. She was previously unresponsive and is nonverbal at baseline. EEG shows seizure potential in her left frontal region but no active seizures. She has been on Keppra with no further seizure-like activity. Spoke to staff no witnessed seizure activity. Patient has been awake more today. Of note, her father has multiple concerns about her  shunt and states that she has had numerous shunt adjustments at [facility], most recently in December. He states that he feels that her current debilitated state is due to being \"over drained\" and wants a second opinion. He does not agree that she has Alzheimer's. He states that prior to December she was ambulatory but developed significant leg spasticity following recent shunt adjustment. She was supposed to have appointment with RASHAWN Friedman on the 17th of this month.     Unable to complete review of systems as she is unresponsive    Vitals are stable at present time on room air    Current Facility-Administered Medications   Medication Dose Route Frequency Provider Last Rate Last Admin    ipratropium-albuterol (DUONEB) nebulizer solution 1 ampule  1 ampule Inhalation Q4H WA Sinda Vienna, DO   1 ampule at 05/07/22 1634    albuterol (PROVENTIL) nebulizer solution 2.5 mg  2.5 mg Nebulization Q4H PRN Sinda Vienna, DO   2.5 mg at 05/07/22 0106    vitamin D (CHOLECALCIFEROL) tablet 2,000 Units  2,000 Units Oral Daily Sinda Vienna, DO   2,000 Units at 05/07/22 0845    donepezil (ARICEPT) tablet 5 mg  5 mg Oral Nightly [place] Lauraia Hank, DO   5 mg at 05/06/22 2046    levothyroxine (SYNTHROID) tablet 88 mcg  88 mcg Oral Daily Benavides Lenora, DO   88 mcg at 05/07/22 0532    risperiDONE (RISPERDAL) tablet 0.25 mg  0.25 mg Oral BID Benavides Lenora, DO   0.25 mg at 05/07/22 0844    sodium chloride flush 0.9 % injection 5-40 mL  5-40 mL IntraVENous 2 times per day Benavides Lenora, DO   10 mL at 05/07/22 0845    sodium chloride flush 0.9 % injection 5-40 mL  5-40 mL IntraVENous PRN Benavides Lenora, DO        0.9 % sodium chloride infusion   IntraVENous PRN Benavides Lenora, DO        LORazepam (ATIVAN) injection 1 mg  1 mg IntraVENous Q5 Min PRN Benavides Lenora, DO        enoxaparin (LOVENOX) injection 40 mg  40 mg SubCUTAneous Daily Benavides Lenora, DO   40 mg at 05/07/22 0844    ondansetron (ZOFRAN-ODT) disintegrating tablet 4 mg  4 mg Oral Q8H PRN Benavides Lenora, DO        Or    ondansetron [facility]) injection 4 mg  4 mg IntraVENous Q6H PRN Benavides Lenora, DO        polyethylene glycol (GLYCOLAX) packet 17 g  17 g Oral Daily PRN Benavides Lenora, DO        acetaminophen (TYLENOL) tablet 650 mg  650 mg Oral Q6H PRN Benavides Lenora, DO        Or    acetaminophen (TYLENOL) suppository 650 mg  650 mg Rectal Q6H PRN Benavides Lenora, DO        levETIRAcetam (KEPPRA) 500 mg/100 mL IVPB  500 mg IntraVENous Q12H Benavides Lenora, DO   Stopped at 05/07/22 1249       Objective:     /76   Pulse 91   Temp 97.2 °F (36.2 °C)   Resp 17   Ht 5' 1\" (1.549 m)   Wt 130 lb (59 kg)   SpO2 94%   BMI 24.56 kg/m²     General appearance: Eyes closed, appears stated age, in no distress--Down's appearance  Head: normocephalic, without obvious abnormality, atraumatic  Eyes: conjunctivae/corneas clear.  .  ENT: Mucous membranes very dry and cracked; macroglossia  Lungs: clear to auscultation bilaterally  Heart: regular rate and rhythm  Extremities: normal, atraumatic, no cyanosis or edema  Pulses: 2+ and symmetric  Skin: color, texture, turgor normal---no rashes or lesions      Mental Status: Keeps eyes open, no clear-cut blink to threat, looks around room but not directly at examiner,  follows no commands, and is nonverbal    Cranial Nerves:  I: smell NA   II: visual acuity  NA   II: visual fields  threat bilaterally with forced eye opening   II: pupils RD   III,VII: ptosis    III,IV,VI: extraocular muscles   briefly looks around the room but will not track   V: mastication    V: facial light touch sensation  Attends to noxious stim   V,VII: corneal reflex     VII: facial muscle function - upper  Normal   VII: facial muscle function - lower Normal   VIII: hearing normal   IX: soft palate elevation  Normal   IX,X: gag reflex    XI: trapezius strength     XI: sternocleidomastoid strength    XI: neck extension strength     XII: tongue strength  macroglossia     Motor:  Generalized spasticity  Normal bulk  No purposeful movement of any limb  No abnormal movements  Appreciates noxious stim in all limbs    DTR:   Right Brachioradialis reflex 2+  Left Brachioradialis reflex 2+  Right Biceps reflex 2+  Left Biceps reflex 2+  Right Quadriceps reflex 2+  Left Quadriceps reflex 2+    No Babinskis  No Johnson's    No pathological reflexes    Laboratory/Radiology:     CBC with Differential:    Lab Results   Component Value Date    WBC 4.2 05/05/2022    RBC 4.48 05/05/2022    HGB 13.9 05/05/2022    HCT 43.7 05/05/2022     05/05/2022    MCV 97.5 05/05/2022    MCH 31.0 05/05/2022    MCHC 31.8 05/05/2022    RDW 15.1 05/05/2022    SEGSPCT 30 05/15/2012    LYMPHOPCT 39.9 05/05/2022    MONOPCT 8.7 05/05/2022    EOSPCT 3.8 12/13/2018    BASOPCT 1.2 05/05/2022    MONOSABS 0.36 05/05/2022    LYMPHSABS 1.66 05/05/2022    EOSABS 0.14 05/05/2022    BASOSABS 0.05 05/05/2022     CMP:    Lab Results   Component Value Date     05/05/2022    K 4.0 05/05/2022     05/05/2022    CO2 25 05/05/2022    BUN 14 05/05/2022    CREATININE 0.6 05/05/2022    GFRAA >60 05/05/2022    LABGLOM >60 05/05/2022    GLUCOSE 76 05/05/2022    GLUCOSE 96 05/15/2012    PROT 7.8 05/04/2022    LABALBU 3.6 05/04/2022    LABALBU 2.8 05/13/2012    CALCIUM 8.2 05/05/2022    BILITOT 0.4 05/04/2022    ALKPHOS 135 05/04/2022    AST 27 05/04/2022    ALT 33 05/04/2022     CTH 5/4: Decreasing size of bilateral subdural hemorrhages compared to 02/17/2022. 2. Ventriculostomy shunt catheter stable in position. There is slight increased size of the lateral ventricles which may be partially due to decreased mass effect from the bilateral subdural hemorrhages. 3. Remote insults in the bilateral frontal lobes and right temporal lobe. 4. Chronic small vessel ischemic disease     EEG: This abnormal study showed evidence of:   1. An increased potential for focal seizures from the left frontal region  2. Moderate nonspecific cerebral dysfunction of the left frontotemporal region  3. Mild to moderate nonspecific cerebral dysfunction of the right frontotemporal region  4. A nonspecific mild to moderate encephalopathy   Structural abnormalities should be considered for the findings above and appropriate imaging obtained if clinically indicated. No seizures were noted during this study. All pertinent labs and images personally reviewed today    Assessment:     Seizure-like activity: Increased potential for focal seizures found in her left frontal region, and she should remain on antiseizure medication indefinitely. Patient is more awake today, withdraws to pain in all and moans to noxious stimuli     B/l subdural hygromas s/p  shunt: With multiple shunt adjustments recently. CT showed decrease subdural hygromas but increase in size of lateral ventricles as well as remote strokes. Down syndrome with cognitive decline    Plan:     Continue supportive care  Continue Keppra 500 mg BID   Consult NSGY per parent's request for  shunt eval  Sz precautions    Neuro will sign off.   Please call with additional questions or concerns.     Jazmin Barnes, BOGDAN - NELIDA  5:04 PM  5/7/2022

## 2022-05-07 NOTE — PLAN OF CARE
Problem: Pain  Goal: Verbalizes/displays adequate comfort level or baseline comfort level  5/6/2022 1116 by Brisa Sesay RN  Outcome: Progressing

## 2022-05-08 LAB
HCT VFR BLD CALC: 43.6 % (ref 34–48)
HEMOGLOBIN: 14.1 G/DL (ref 11.5–15.5)
MCH RBC QN AUTO: 31.1 PG (ref 26–35)
MCHC RBC AUTO-ENTMCNC: 32.3 % (ref 32–34.5)
MCV RBC AUTO: 96.2 FL (ref 80–99.9)
PDW BLD-RTO: 15.7 FL (ref 11.5–15)
PLATELET # BLD: 214 E9/L (ref 130–450)
PMV BLD AUTO: 10.2 FL (ref 7–12)
RBC # BLD: 4.53 E12/L (ref 3.5–5.5)
WBC # BLD: 6 E9/L (ref 4.5–11.5)

## 2022-05-08 PROCEDURE — 1200000000 HC SEMI PRIVATE

## 2022-05-08 PROCEDURE — 99232 SBSQ HOSP IP/OBS MODERATE 35: CPT | Performed by: PHYSICIAN ASSISTANT

## 2022-05-08 PROCEDURE — 36415 COLL VENOUS BLD VENIPUNCTURE: CPT

## 2022-05-08 PROCEDURE — 6360000002 HC RX W HCPCS: Performed by: FAMILY MEDICINE

## 2022-05-08 PROCEDURE — 2580000003 HC RX 258: Performed by: FAMILY MEDICINE

## 2022-05-08 PROCEDURE — 85027 COMPLETE CBC AUTOMATED: CPT

## 2022-05-08 PROCEDURE — 94640 AIRWAY INHALATION TREATMENT: CPT

## 2022-05-08 PROCEDURE — 6370000000 HC RX 637 (ALT 250 FOR IP): Performed by: FAMILY MEDICINE

## 2022-05-08 RX ADMIN — LEVETIRACETAM 500 MG: 5 INJECTION INTRAVENOUS at 23:06

## 2022-05-08 RX ADMIN — IPRATROPIUM BROMIDE AND ALBUTEROL SULFATE 1 AMPULE: .5; 2.5 SOLUTION RESPIRATORY (INHALATION) at 08:12

## 2022-05-08 RX ADMIN — IPRATROPIUM BROMIDE AND ALBUTEROL SULFATE 1 AMPULE: .5; 2.5 SOLUTION RESPIRATORY (INHALATION) at 16:31

## 2022-05-08 RX ADMIN — RISPERIDONE 0.25 MG: 0.5 TABLET, FILM COATED ORAL at 22:07

## 2022-05-08 RX ADMIN — SODIUM CHLORIDE, PRESERVATIVE FREE 10 ML: 5 INJECTION INTRAVENOUS at 22:07

## 2022-05-08 RX ADMIN — IPRATROPIUM BROMIDE AND ALBUTEROL SULFATE 1 AMPULE: .5; 2.5 SOLUTION RESPIRATORY (INHALATION) at 12:34

## 2022-05-08 RX ADMIN — Medication 2000 UNITS: at 09:15

## 2022-05-08 RX ADMIN — RISPERIDONE 0.25 MG: 0.5 TABLET, FILM COATED ORAL at 09:14

## 2022-05-08 RX ADMIN — LEVETIRACETAM 500 MG: 5 INJECTION INTRAVENOUS at 12:06

## 2022-05-08 RX ADMIN — LEVOTHYROXINE SODIUM 88 MCG: 0.09 TABLET ORAL at 05:50

## 2022-05-08 RX ADMIN — SODIUM CHLORIDE, PRESERVATIVE FREE 10 ML: 5 INJECTION INTRAVENOUS at 09:15

## 2022-05-08 RX ADMIN — IPRATROPIUM BROMIDE AND ALBUTEROL SULFATE 1 AMPULE: .5; 2.5 SOLUTION RESPIRATORY (INHALATION) at 20:16

## 2022-05-08 RX ADMIN — ENOXAPARIN SODIUM 40 MG: 100 INJECTION SUBCUTANEOUS at 09:15

## 2022-05-08 RX ADMIN — DONEPEZIL HYDROCHLORIDE 5 MG: 5 TABLET, FILM COATED ORAL at 22:07

## 2022-05-08 ASSESSMENT — PAIN SCALES - WONG BAKER: WONGBAKER_NUMERICALRESPONSE: 0

## 2022-05-08 NOTE — PROGRESS NOTES
Department of Neurosurgery  Progress Note    CHIEF COMPLAINT: hx  shunt placement; seizure-like activity. SUBJECTIVE:  No family in room. Eyes open. No following commands or responding to me. No seizure activity reported overnight. REVIEW OF SYSTEMS :  Unable to obtain    OBJECTIVE:   VITALS:  BP (!) 100/58   Pulse 67   Temp 97.2 °F (36.2 °C) (Temporal)   Resp 18   Ht 5' 1\" (1.549 m)   Wt 130 lb (59 kg)   SpO2 98%   BMI 24.56 kg/m²     PHYSICAL:  Constitutional: Appears well-nourished. Head: Normocephalic and atraumatic. Eyes: EOM are normal. Pupils are equal, round, and reactive to light. Neck: No tracheal deviation present. Cardiovascular: Normal rate. Pulmonary/Chest: No stridor. Abdominal: No distension. Neurological:   Eyes open  Not following commands  Skin: Skin is warm and dry.    Psychiatric: Non verbal at baseline      DATA:  CBC:   Lab Results   Component Value Date    WBC 6.0 05/08/2022    RBC 4.53 05/08/2022    HGB 14.1 05/08/2022    HCT 43.6 05/08/2022    MCV 96.2 05/08/2022    MCH 31.1 05/08/2022    MCHC 32.3 05/08/2022    RDW 15.7 05/08/2022     05/08/2022    MPV 10.2 05/08/2022     BMP:    Lab Results   Component Value Date     05/07/2022    K 4.0 05/07/2022    K 4.0 05/05/2022     05/07/2022    CO2 25 05/07/2022    BUN 20 05/07/2022    LABALBU 2.9 05/07/2022    LABALBU 2.8 05/13/2012    CREATININE 0.6 05/07/2022    CALCIUM 8.4 05/07/2022    GFRAA >60 05/07/2022    LABGLOM >60 05/07/2022    GLUCOSE 141 05/07/2022    GLUCOSE 96 05/15/2012     PT/INR:    Lab Results   Component Value Date    PROTIME 10.7 11/12/2019    PROTIME 11.0 05/12/2012    INR 1.0 11/12/2019     PTT:    Lab Results   Component Value Date    APTT 32.3 04/11/2019   [APTT}    Current Inpatient Medications  Current Facility-Administered Medications: ipratropium-albuterol (DUONEB) nebulizer solution 1 ampule, 1 ampule, Inhalation, Q4H WA  albuterol (PROVENTIL) nebulizer solution 2.5 mg, 2.5 mg, Nebulization, Q4H PRN  vitamin D (CHOLECALCIFEROL) tablet 2,000 Units, 2,000 Units, Oral, Daily  donepezil (ARICEPT) tablet 5 mg, 5 mg, Oral, Nightly  levothyroxine (SYNTHROID) tablet 88 mcg, 88 mcg, Oral, Daily  risperiDONE (RISPERDAL) tablet 0.25 mg, 0.25 mg, Oral, BID  sodium chloride flush 0.9 % injection 5-40 mL, 5-40 mL, IntraVENous, 2 times per day  sodium chloride flush 0.9 % injection 5-40 mL, 5-40 mL, IntraVENous, PRN  0.9 % sodium chloride infusion, , IntraVENous, PRN  LORazepam (ATIVAN) injection 1 mg, 1 mg, IntraVENous, Q5 Min PRN  enoxaparin (LOVENOX) injection 40 mg, 40 mg, SubCUTAneous, Daily  ondansetron (ZOFRAN-ODT) disintegrating tablet 4 mg, 4 mg, Oral, Q8H PRN **OR** ondansetron (ZOFRAN) injection 4 mg, 4 mg, IntraVENous, Q6H PRN  polyethylene glycol (GLYCOLAX) packet 17 g, 17 g, Oral, Daily PRN  acetaminophen (TYLENOL) tablet 650 mg, 650 mg, Oral, Q6H PRN **OR** acetaminophen (TYLENOL) suppository 650 mg, 650 mg, Rectal, Q6H PRN  levETIRAcetam (KEPPRA) 500 mg/100 mL IVPB, 500 mg, IntraVENous, Q12H    ASSESSMENT:   Down Syndrome with cognitive decline   NPH s/p  shunt placement 2019  Seizure-like activity    PLAN:  -Head CT scan reviewed. Shunt series stable. No issues with tubing noted. -Cognitive decline most likely not due to  shunt.  Recommend continued follow up with [facility] especially if they are managing shunt adjustments  -Neurology following - on Bridger Gonzalez      Electronically signed by Flori Cooney PA-C on 5/8/2022 at 11:37 AM    Murray Marroquin MD

## 2022-05-08 NOTE — PROGRESS NOTES
Hospitalist Progress Note      Synopsis: Patient admitted on 5/4/2022     Ms. Robby Early, a 65y.o. year old female who has a past medical history of Arthritis, Down syndrome, Hypothyroidism, Osteoarthritis, Syncope, Thyroid disease, UTI (urinary tract infection), Varicose veins, and Wears glasses. Patient presented to the emergency department from local nursing home with seizure-like activity. This was witnessed by staff at the nursing home. Patient was lethargic and confused on arrival to the emergency department possibly postictal. Patient is nonverbal at baseline. She has a history of subdural hemorrhage. Patient has a  shunt and is followed by neurosurgery. Vital signs within norm limits and stable. Laboratory studies were unremarkable. CT of the head shows decreasing size of subdural hemorrhage. Patient was given loading dose of Keppra and medicine was consulted for admission. Subjective    Remains somnolent  May 8  Not tachycardic. Seems somnolent  Seen by neurology and neurosurgery and no new issues felt to be an issue  Neurosurgery does not feel she is in need of shunt reevaluation and neurology wants to keep her Keppra the same. Exam:  BP (!) 100/58   Pulse 67   Temp 97.2 °F (36.2 °C) (Temporal)   Resp 18   Ht 5' 1\" (1.549 m)   Wt 130 lb (59 kg)   SpO2 98%   BMI 24.56 kg/m²   General appearance: No apparent distress, appears stated age and cooperative. HEENT eyes are closed mouth is open  Neck: Supple. No jugular venous distention. Trachea midline. Respiratory:  Normal respiratory effort. decreased to auscultation   cardiovascular: Regular rate and rhythm with normal S1/S2 without murmurs, rubs or gallops. Abdomen: Soft, non-tender, non-distended with normal bowel sounds. Musculoskeletal: No clubbing, cyanosis or edema bilaterally.   Bilateral heels and heel protectors  Neurologically facial contour typical for Down syndrome not really following commands    Medications: Reviewed    Infusion Medications    sodium chloride       Scheduled Medications    ipratropium-albuterol  1 ampule Inhalation Q4H WA    vitamin D  2,000 Units Oral Daily    donepezil  5 mg Oral Nightly    levothyroxine  88 mcg Oral Daily    risperiDONE  0.25 mg Oral BID    sodium chloride flush  5-40 mL IntraVENous 2 times per day    enoxaparin  40 mg SubCUTAneous Daily    levetiracetam  500 mg IntraVENous Q12H     PRN Meds: albuterol, sodium chloride flush, sodium chloride, LORazepam, ondansetron **OR** ondansetron, polyethylene glycol, acetaminophen **OR** acetaminophen    I/O    Intake/Output Summary (Last 24 hours) at 5/8/2022 1130  Last data filed at 5/8/2022 0550  Gross per 24 hour   Intake 1923 ml   Output    Net 1923 ml       Labs:   Recent Labs     05/07/22  1736 05/08/22  0543   WBC 6.7 6.0   HGB 14.4 14.1   HCT 44.2 43.6    214       Recent Labs     05/07/22  1736      K 4.0      CO2 25   BUN 20   CREATININE 0.6   CALCIUM 8.4*       Recent Labs     05/07/22  1736   PROT 6.3*   ALKPHOS 104   ALT 25   AST 17   BILITOT 0.6       No results for input(s): INR in the last 72 hours. No results for input(s): Lupillo Altes in the last 72 hours. Chronic labs:  Lab Results   Component Value Date    CHOL 173 03/30/2022    TRIG 88 03/30/2022    HDL 62 03/30/2022    LDLCALC 93 03/30/2022    TSH 8.930 (H) 01/12/2022    INR 1.0 11/12/2019    LABA1C 5.2 03/30/2022       Radiology:  CT HEAD WO CONTRAST    Result Date: 5/4/2022  1. Decreasing size of bilateral subdural hemorrhages compared to 02/17/2022. 2. Ventriculostomy shunt catheter stable in position. There is slight increased size of the lateral ventricles which may be partially due to decreased mass effect from the bilateral subdural hemorrhages. 3. Remote insults in the bilateral frontal lobes and right temporal lobe. 4. Chronic small vessel ischemic disease.      XR CHEST PORTABLE    Result Date: 5/4/2022  Suspect retrocardiac infiltrates. ASSESSMENT:    Principal Problem:    Seizure ([address]  Active Problems:    S/P  shunt  Resolved Problems:    * No resolved hospital problems. *  History of  shunt  Sweating  Hypothyroidism  Osteoarthritis     PLAN:    1. Neurosurgery and neurology both on board  2. Discussed with family at bedside  3. Is postictal at this time? 4.  Reorder labs  May 8  Patient is quite sleepy today. PEG tube is in place and it is tolerated well. We can watch her for 1 more day and then decide [facility]. No seizures for now but patient does seem to have had a prolonged postictal phase. At least she is supported by nutrition with tube feeds  Blood pressure slightly low but she is not running a fever    Diet: Diet NPO  ADULT TUBE FEEDING; PEG; Standard with Fiber; Continuous; 45; No; 40; Q 1 hour  Code Status: Full Code  PT/OT Eval Status:   Unable yet  DVT Prophylaxis:   In place  Recommended disposition at discharge: To be determined    +++++++++++++++++++++++++++++++++++++++++++++++++  Evelyn Mcginnis MD   [facility].  +++++++++++++++++++++++++++++++++++++++++++++++++  NOTE: This report was transcribed using voice recognition software. Every effort was made to ensure accuracy; however, inadvertent computerized transcription errors may be present.

## 2022-05-09 LAB
AMMONIA: 148 UMOL/L (ref 11–51)
B.E.: 3.6 MMOL/L (ref -3–3)
BLOOD CULTURE, ROUTINE: NORMAL
COHB: 0.9 % (ref 0–1.5)
CRITICAL: ABNORMAL
CULTURE, BLOOD 2: NORMAL
DATE ANALYZED: ABNORMAL
DATE OF COLLECTION: ABNORMAL
HCO3: 26.8 MMOL/L (ref 22–26)
HCT VFR BLD CALC: 45.7 % (ref 34–48)
HEMOGLOBIN: 14.7 G/DL (ref 11.5–15.5)
HHB: 3.1 % (ref 0–5)
LAB: ABNORMAL
Lab: ABNORMAL
MCH RBC QN AUTO: 30.8 PG (ref 26–35)
MCHC RBC AUTO-ENTMCNC: 32.2 % (ref 32–34.5)
MCV RBC AUTO: 95.8 FL (ref 80–99.9)
METHB: 0.4 % (ref 0–1.5)
MODE: ABNORMAL
O2 CONTENT: 19.7 ML/DL
O2 SATURATION: 96.9 % (ref 92–98.5)
O2HB: 95.6 % (ref 94–97)
OPERATOR ID: 659
PATIENT TEMP: 37 C
PCO2: 36.2 MMHG (ref 35–45)
PDW BLD-RTO: 15.6 FL (ref 11.5–15)
PH BLOOD GAS: 7.49 (ref 7.35–7.45)
PLATELET # BLD: 227 E9/L (ref 130–450)
PMV BLD AUTO: 10.1 FL (ref 7–12)
PO2: 83.7 MMHG (ref 75–100)
RBC # BLD: 4.77 E12/L (ref 3.5–5.5)
SOURCE, BLOOD GAS: ABNORMAL
THB: 14.6 G/DL (ref 11.5–16.5)
TIME ANALYZED: 1133
WBC # BLD: 5.9 E9/L (ref 4.5–11.5)

## 2022-05-09 PROCEDURE — 36415 COLL VENOUS BLD VENIPUNCTURE: CPT

## 2022-05-09 PROCEDURE — 85027 COMPLETE CBC AUTOMATED: CPT

## 2022-05-09 PROCEDURE — 2580000003 HC RX 258: Performed by: FAMILY MEDICINE

## 2022-05-09 PROCEDURE — 6370000000 HC RX 637 (ALT 250 FOR IP): Performed by: INTERNAL MEDICINE

## 2022-05-09 PROCEDURE — 6370000000 HC RX 637 (ALT 250 FOR IP): Performed by: FAMILY MEDICINE

## 2022-05-09 PROCEDURE — 6360000002 HC RX W HCPCS: Performed by: FAMILY MEDICINE

## 2022-05-09 PROCEDURE — 82140 ASSAY OF AMMONIA: CPT

## 2022-05-09 PROCEDURE — 36600 WITHDRAWAL OF ARTERIAL BLOOD: CPT

## 2022-05-09 PROCEDURE — 94640 AIRWAY INHALATION TREATMENT: CPT

## 2022-05-09 PROCEDURE — 82805 BLOOD GASES W/O2 SATURATION: CPT

## 2022-05-09 PROCEDURE — 1200000000 HC SEMI PRIVATE

## 2022-05-09 RX ORDER — LACTULOSE 10 G/15ML
20 SOLUTION ORAL 3 TIMES DAILY
Status: DISCONTINUED | OUTPATIENT
Start: 2022-05-09 | End: 2022-05-15 | Stop reason: HOSPADM

## 2022-05-09 RX ADMIN — ENOXAPARIN SODIUM 40 MG: 100 INJECTION SUBCUTANEOUS at 09:13

## 2022-05-09 RX ADMIN — LEVETIRACETAM 500 MG: 5 INJECTION INTRAVENOUS at 23:31

## 2022-05-09 RX ADMIN — LACTULOSE 20 G: 20 SOLUTION ORAL at 15:14

## 2022-05-09 RX ADMIN — DONEPEZIL HYDROCHLORIDE 5 MG: 5 TABLET, FILM COATED ORAL at 21:14

## 2022-05-09 RX ADMIN — IPRATROPIUM BROMIDE AND ALBUTEROL SULFATE 1 AMPULE: .5; 2.5 SOLUTION RESPIRATORY (INHALATION) at 16:43

## 2022-05-09 RX ADMIN — LEVETIRACETAM 500 MG: 5 INJECTION INTRAVENOUS at 12:53

## 2022-05-09 RX ADMIN — LACTULOSE 20 G: 20 SOLUTION ORAL at 21:14

## 2022-05-09 RX ADMIN — LEVOTHYROXINE SODIUM 88 MCG: 0.09 TABLET ORAL at 05:13

## 2022-05-09 RX ADMIN — ACETAMINOPHEN 650 MG: 325 TABLET ORAL at 21:14

## 2022-05-09 RX ADMIN — IPRATROPIUM BROMIDE AND ALBUTEROL SULFATE 1 AMPULE: .5; 2.5 SOLUTION RESPIRATORY (INHALATION) at 11:43

## 2022-05-09 RX ADMIN — RISPERIDONE 0.25 MG: 0.5 TABLET, FILM COATED ORAL at 09:13

## 2022-05-09 RX ADMIN — SODIUM CHLORIDE, PRESERVATIVE FREE 10 ML: 5 INJECTION INTRAVENOUS at 09:16

## 2022-05-09 RX ADMIN — IPRATROPIUM BROMIDE AND ALBUTEROL SULFATE 1 AMPULE: .5; 2.5 SOLUTION RESPIRATORY (INHALATION) at 20:49

## 2022-05-09 RX ADMIN — Medication 2000 UNITS: at 09:13

## 2022-05-09 RX ADMIN — RISPERIDONE 0.25 MG: 0.5 TABLET, FILM COATED ORAL at 21:15

## 2022-05-09 RX ADMIN — IPRATROPIUM BROMIDE AND ALBUTEROL SULFATE 1 AMPULE: .5; 2.5 SOLUTION RESPIRATORY (INHALATION) at 08:14

## 2022-05-09 RX ADMIN — SODIUM CHLORIDE, PRESERVATIVE FREE 10 ML: 5 INJECTION INTRAVENOUS at 21:15

## 2022-05-09 ASSESSMENT — PAIN SCALES - WONG BAKER
WONGBAKER_NUMERICALRESPONSE: 0
WONGBAKER_NUMERICALRESPONSE: 0

## 2022-05-09 NOTE — PROGRESS NOTES
Department of Neurosurgery  Progress Note    CHIEF COMPLAINT: hx  shunt placement; seizure-like activity. SUBJECTIVE:  aphasic    REVIEW OF SYSTEMS :  Unable to obtain    OBJECTIVE:   VITALS:  BP 91/63   Pulse 105   Temp 98.1 °F (36.7 °C) (Temporal)   Resp 19   Ht 5' 1\" (1.549 m)   Wt 130 lb (59 kg)   SpO2 95%   BMI 24.56 kg/m²     PHYSICAL:  Neurological:   Eyes open to stimuli  Not following commands  Skin: Skin is warm and dry.    Psychiatric: Non verbal at baseline      DATA:  CBC:   Lab Results   Component Value Date    WBC 5.9 05/09/2022    RBC 4.77 05/09/2022    HGB 14.7 05/09/2022    HCT 45.7 05/09/2022    MCV 95.8 05/09/2022    MCH 30.8 05/09/2022    MCHC 32.2 05/09/2022    RDW 15.6 05/09/2022     05/09/2022    MPV 10.1 05/09/2022     BMP:    Lab Results   Component Value Date     05/07/2022    K 4.0 05/07/2022    K 4.0 05/05/2022     05/07/2022    CO2 25 05/07/2022    BUN 20 05/07/2022    LABALBU 2.9 05/07/2022    LABALBU 2.8 05/13/2012    CREATININE 0.6 05/07/2022    CALCIUM 8.4 05/07/2022    GFRAA >60 05/07/2022    LABGLOM >60 05/07/2022    GLUCOSE 141 05/07/2022    GLUCOSE 96 05/15/2012     PT/INR:    Lab Results   Component Value Date    PROTIME 10.7 11/12/2019    PROTIME 11.0 05/12/2012    INR 1.0 11/12/2019     PTT:    Lab Results   Component Value Date    APTT 32.3 04/11/2019   [APTT}    Current Inpatient Medications  Current Facility-Administered Medications: ipratropium-albuterol (DUONEB) nebulizer solution 1 ampule, 1 ampule, Inhalation, Q4H WA  albuterol (PROVENTIL) nebulizer solution 2.5 mg, 2.5 mg, Nebulization, Q4H PRN  vitamin D (CHOLECALCIFEROL) tablet 2,000 Units, 2,000 Units, Oral, Daily  donepezil (ARICEPT) tablet 5 mg, 5 mg, Oral, Nightly  levothyroxine (SYNTHROID) tablet 88 mcg, 88 mcg, Oral, Daily  risperiDONE (RISPERDAL) tablet 0.25 mg, 0.25 mg, Oral, BID  sodium chloride flush 0.9 % injection 5-40 mL, 5-40 mL, IntraVENous, 2 times per day  sodium chloride flush 0.9 % injection 5-40 mL, 5-40 mL, IntraVENous, PRN  0.9 % sodium chloride infusion, , IntraVENous, PRN  LORazepam (ATIVAN) injection 1 mg, 1 mg, IntraVENous, Q5 Min PRN  enoxaparin (LOVENOX) injection 40 mg, 40 mg, SubCUTAneous, Daily  ondansetron (ZOFRAN-ODT) disintegrating tablet 4 mg, 4 mg, Oral, Q8H PRN **OR** ondansetron (ZOFRAN) injection 4 mg, 4 mg, IntraVENous, Q6H PRN  polyethylene glycol (GLYCOLAX) packet 17 g, 17 g, Oral, Daily PRN  acetaminophen (TYLENOL) tablet 650 mg, 650 mg, Oral, Q6H PRN **OR** acetaminophen (TYLENOL) suppository 650 mg, 650 mg, Rectal, Q6H PRN  levETIRAcetam (KEPPRA) 500 mg/100 mL IVPB, 500 mg, IntraVENous, Q12H    ASSESSMENT:   Down Syndrome with cognitive decline   NPH s/p  shunt placement 2019  Seizure-like activity    PLAN:  -Head CT scan reviewed.  Shunt series stable.   -Neurology following - on [facility]      Electronically signed by RASHAWN Tan on 5/9/2022 at 10:36 AM    Nemesio Benitez MD

## 2022-05-09 NOTE — PROGRESS NOTES
Chart reviewed. Pt seen. Minimally responsive-responds at times to voice. Not following commands. Continues on [address] - Neurology following. No visitors present. Spoke with -plan to return to [facility]. . Family wishes to continue full code and transition to facility once medically stable. Palliative Medicine will now sign off. No further needs at this time. Feel free to re consult if needed.

## 2022-05-09 NOTE — PLAN OF CARE
Problem: Pain  Goal: Verbalizes/displays adequate comfort level or baseline comfort level  Outcome: Progressing     Problem: Skin/Tissue Integrity  Goal: Absence of new skin breakdown  Description: 1. Monitor for areas of redness and/or skin breakdown  2. Assess vascular access sites hourly  3. Every 4-6 hours minimum:  Change oxygen saturation probe site  4. Every 4-6 hours:  If on nasal continuous positive airway pressure, respiratory therapy assess nares and determine need for appliance change or resting period.   5/9/2022 1100 by Vu Velasquez RN  Outcome: Progressing  5/9/2022 0107 by Wendi Kevin RN  Outcome: Progressing     Problem: Safety - Adult  Goal: Free from fall injury  5/9/2022 1100 by Vu Velasquez RN  Outcome: Progressing  5/9/2022 0107 by Wendi Kevin RN  Outcome: Progressing     Problem: Nutrition Deficit:  Goal: Optimize nutritional status  Outcome: Progressing

## 2022-05-09 NOTE — PROGRESS NOTES
Hospitalist Progress Note      PCP: Joanna Nolen DO    Date of Admission: 5/4/2022    Hospital Course:  \"65 y.o. year old female history subdural hemorrhage, history of hydrocephalus status post  shunt, arthritis, Down syndrome, Hypothyroidism, Osteoarthritis, Syncope, Thyroid disease, UTI , Varicose veins, and Wears glasses presented from local nursing home with seizure-like activity. This was witnessed by staff at the nursing home. Patient was lethargic and confused on arrival to the emergency department possibly postictal. Vital signs within norm limits and stable. Laboratory studies were unremarkable. CT of the head shows decreasing size of subdural hemorrhage. Patient was given loading dose of Keppra and medicine was consulted for admission. \"       Subjective:   Pt does not wake up to voice. She is snoring. No overnight events. Medications:  Reviewed    Infusion Medications    sodium chloride       Scheduled Medications    ipratropium-albuterol  1 ampule Inhalation Q4H WA    vitamin D  2,000 Units Oral Daily    donepezil  5 mg Oral Nightly    levothyroxine  88 mcg Oral Daily    risperiDONE  0.25 mg Oral BID    sodium chloride flush  5-40 mL IntraVENous 2 times per day    enoxaparin  40 mg SubCUTAneous Daily    levetiracetam  500 mg IntraVENous Q12H     PRN Meds: albuterol, sodium chloride flush, sodium chloride, LORazepam, ondansetron **OR** ondansetron, polyethylene glycol, acetaminophen **OR** acetaminophen      Intake/Output Summary (Last 24 hours) at 5/9/2022 0952  Last data filed at 5/9/2022 0540  Gross per 24 hour   Intake 1637 ml   Output --   Net 1637 ml       Physical Exam Performed:    BP 91/63   Pulse 105   Temp 98.1 °F (36.7 °C) (Temporal)   Resp 19   Ht 5' 1\" (1.549 m)   Wt 130 lb (59 kg)   SpO2 95%   BMI 24.56 kg/m²     General appearance: No apparent distress, appears stated age   HEENT: Pupils equal, round, and reactive to light.  Conjunctivae/corneas clear.  Neck: Supple, with full range of motion. No jugular venous distention. Trachea midline. Respiratory:  Normal respiratory effort. Clear to auscultation, bilaterally without Rales/Wheezes/Rhonchi. Cardiovascular: Regular rate and rhythm with normal S1/S2 without murmurs, rubs or gallops. Abdomen: Soft, non-tender, non-distended with normal bowel sounds. Musculoskeletal: No clubbing, cyanosis or edema bilaterally. Full range of motion without deformity. Skin: Skin color, texture, turgor normal.  No rashes or lesions. Neurologic:  Lethargic,unable to assess  Psychiatric: Lethargic      Labs:   Recent Labs     05/07/22  1736 05/08/22  0543 05/09/22  0724   WBC 6.7 6.0 5.9   HGB 14.4 14.1 14.7   HCT 44.2 43.6 45.7    214 227     Recent Labs     05/07/22  1736      K 4.0      CO2 25   BUN 20   CREATININE 0.6   CALCIUM 8.4*     Recent Labs     05/07/22  1736   AST 17   ALT 25   BILITOT 0.6   ALKPHOS 104     No results for input(s): INR in the last 72 hours. No results for input(s): Connee Matar in the last 72 hours. Urinalysis:      Lab Results   Component Value Date    NITRU POSITIVE 05/04/2022    WBCUA 1-3 05/04/2022    WBCUA 0-1 05/12/2012    BACTERIA FEW 05/04/2022    RBCUA 1-3 05/04/2022    RBCUA NONE 05/12/2012    BLOODU SMALL 05/04/2022    SPECGRAV 1.010 05/04/2022    GLUCOSEU Negative 05/04/2022    GLUCOSEU NEGATIVE 05/12/2012       Radiology:  XR SKULL (<4 VIEWS)   Final Result    shunt tube as described. XR NECK SOFT TISSUE   Final Result    shunt tube as described. XR CHEST (2 VW)   Final Result    shunt tube as described. XR ABDOMEN (2 VIEWS)   Final Result    shunt tube as described. CT HEAD WO CONTRAST   Final Result   1. Decreasing size of bilateral subdural hemorrhages compared to 02/17/2022.   2. Ventriculostomy shunt catheter stable in position.   There is slight   increased size of the lateral ventricles which may be partially due to   decreased mass effect from the bilateral subdural hemorrhages. 3. Remote insults in the bilateral frontal lobes and right temporal lobe. 4. Chronic small vessel ischemic disease. XR CHEST PORTABLE   Final Result   Suspect retrocardiac infiltrates. Assessment/Plan:    Active Hospital Problems    Diagnosis     S/P  shunt [Z98.2]      Priority: Medium    Seizure [facility]) [R56.9]      Priority: Medium     Seizure-due to subdural hematoma  -Neurology consulted. EEG shows treat potential for focal seizures from left frontal region. Continue Keppra. No further work-up. Acute encephalopathy-?post ictal  -Check ABG and ammonia  Addendum:ABG fine. Ammonia 148,start lactulose via PEG tube. Reviewed previous CT abdo/pelvis in 2021. No evidence of liver cirrhosis. Subdural hematoma  -CT shows decreasing size of hemorrhages compared to 2/17/2022. No surgical intervention needed per neurosurgery. History of normal pressure hydrocephalus s/p  shunt 2019  -No issues with shunt series. -Recommend follow-up with leaving clinic for shunt. Hypothyroidism  -Continue levothyroxine    History of Down syndrome with cognitive decline  -Continue Aricept and risperidone    Dysphagia  -PEG tube in place. Tolerating tube feeds. DVT Prophylaxis: Lovenox  Diet: Diet NPO  ADULT TUBE FEEDING; PEG; Standard with Fiber; Continuous; 45; No; 40; Q 1 hour  Code Status: Full Code    PT/OT Eval Status: As needed    Dispo -return to facility when lethargy improves.     Christian Hardy MD

## 2022-05-09 NOTE — CARE COORDINATION
5/9 Update CM note. Neuro re consulted today d/t persistent encephalopathy. No seizures were noted on EEG from 5/6. ABGs completed, pH 7.488, HCO3 26.8. Ammonia was noted to be 148 today, Lactulose BID started. Palliative has signed off d/t patient's family wanting to keep her a full code at this time. Plan remains to return to [facility] on discharge, patient is a long term bed hold. Ambulance paperwork in soft chart along with COVID test which needs completed on day of discharge.     Melinda Hay, BSN, RN  [address] Case Management   Cell: [phone]

## 2022-05-10 LAB — AMMONIA: 24 UMOL/L (ref 11–51)

## 2022-05-10 PROCEDURE — 6370000000 HC RX 637 (ALT 250 FOR IP): Performed by: FAMILY MEDICINE

## 2022-05-10 PROCEDURE — 94640 AIRWAY INHALATION TREATMENT: CPT

## 2022-05-10 PROCEDURE — 6360000002 HC RX W HCPCS: Performed by: FAMILY MEDICINE

## 2022-05-10 PROCEDURE — 6370000000 HC RX 637 (ALT 250 FOR IP): Performed by: INTERNAL MEDICINE

## 2022-05-10 PROCEDURE — 99232 SBSQ HOSP IP/OBS MODERATE 35: CPT

## 2022-05-10 PROCEDURE — 36415 COLL VENOUS BLD VENIPUNCTURE: CPT

## 2022-05-10 PROCEDURE — 82140 ASSAY OF AMMONIA: CPT

## 2022-05-10 PROCEDURE — 2580000003 HC RX 258: Performed by: FAMILY MEDICINE

## 2022-05-10 PROCEDURE — 1200000000 HC SEMI PRIVATE

## 2022-05-10 RX ADMIN — RISPERIDONE 0.25 MG: 0.5 TABLET, FILM COATED ORAL at 10:16

## 2022-05-10 RX ADMIN — IPRATROPIUM BROMIDE AND ALBUTEROL SULFATE 1 AMPULE: .5; 2.5 SOLUTION RESPIRATORY (INHALATION) at 15:43

## 2022-05-10 RX ADMIN — ENOXAPARIN SODIUM 40 MG: 100 INJECTION SUBCUTANEOUS at 10:17

## 2022-05-10 RX ADMIN — IPRATROPIUM BROMIDE AND ALBUTEROL SULFATE 1 AMPULE: .5; 2.5 SOLUTION RESPIRATORY (INHALATION) at 08:31

## 2022-05-10 RX ADMIN — LACTULOSE 20 G: 20 SOLUTION ORAL at 14:19

## 2022-05-10 RX ADMIN — IPRATROPIUM BROMIDE AND ALBUTEROL SULFATE 1 AMPULE: .5; 2.5 SOLUTION RESPIRATORY (INHALATION) at 20:30

## 2022-05-10 RX ADMIN — LEVETIRACETAM 500 MG: 5 INJECTION INTRAVENOUS at 14:19

## 2022-05-10 RX ADMIN — IPRATROPIUM BROMIDE AND ALBUTEROL SULFATE 1 AMPULE: .5; 2.5 SOLUTION RESPIRATORY (INHALATION) at 12:16

## 2022-05-10 RX ADMIN — LEVETIRACETAM 500 MG: 5 INJECTION INTRAVENOUS at 23:11

## 2022-05-10 RX ADMIN — LEVOTHYROXINE SODIUM 88 MCG: 0.09 TABLET ORAL at 05:22

## 2022-05-10 RX ADMIN — SODIUM CHLORIDE, PRESERVATIVE FREE 10 ML: 5 INJECTION INTRAVENOUS at 23:11

## 2022-05-10 RX ADMIN — RISPERIDONE 0.25 MG: 0.5 TABLET, FILM COATED ORAL at 19:48

## 2022-05-10 RX ADMIN — SODIUM CHLORIDE, PRESERVATIVE FREE 10 ML: 5 INJECTION INTRAVENOUS at 19:49

## 2022-05-10 RX ADMIN — LACTULOSE 20 G: 20 SOLUTION ORAL at 19:48

## 2022-05-10 RX ADMIN — Medication 2000 UNITS: at 10:15

## 2022-05-10 RX ADMIN — SODIUM CHLORIDE, PRESERVATIVE FREE 10 ML: 5 INJECTION INTRAVENOUS at 10:18

## 2022-05-10 RX ADMIN — DONEPEZIL HYDROCHLORIDE 5 MG: 5 TABLET, FILM COATED ORAL at 19:48

## 2022-05-10 RX ADMIN — LACTULOSE 20 G: 20 SOLUTION ORAL at 10:17

## 2022-05-10 NOTE — PROGRESS NOTES
Hospitalist Progress Note      PCP: Celso Mahan DO    Date of Admission: 5/4/2022    Hospital Course:  \"65 y.o. year old female history subdural hemorrhage, history of hydrocephalus status post  shunt, arthritis, Down syndrome, Hypothyroidism, Osteoarthritis, Syncope, Thyroid disease, UTI , Varicose veins, and Wears glasses presented from local nursing home with seizure-like activity. This was witnessed by staff at the nursing home. Patient was lethargic and confused on arrival to the emergency department possibly postictal. Vital signs within norm limits and stable. Laboratory studies were unremarkable. CT of the head shows decreasing size of subdural hemorrhage. Patient was given loading dose of Keppra and medicine was consulted for admission. \"       Subjective:   Pt does not wake up to voice. S No overnight events. Medications:  Reviewed    Infusion Medications    sodium chloride       Scheduled Medications    lactulose  20 g Oral TID    ipratropium-albuterol  1 ampule Inhalation Q4H WA    vitamin D  2,000 Units Oral Daily    donepezil  5 mg Oral Nightly    levothyroxine  88 mcg Oral Daily    risperiDONE  0.25 mg Oral BID    sodium chloride flush  5-40 mL IntraVENous 2 times per day    enoxaparin  40 mg SubCUTAneous Daily    levetiracetam  500 mg IntraVENous Q12H     PRN Meds: albuterol, sodium chloride flush, sodium chloride, LORazepam, ondansetron **OR** ondansetron, polyethylene glycol, acetaminophen **OR** acetaminophen      Intake/Output Summary (Last 24 hours) at 5/10/2022 1333  Last data filed at 5/10/2022 0524  Gross per 24 hour   Intake 1738 ml   Output    Net 1738 ml       Physical Exam Performed:    /69   Pulse 79   Temp 98.8 °F (37.1 °C) (Oral)   Resp 20   Ht 5' 1\" (1.549 m)   Wt 130 lb (59 kg)   SpO2 95%   BMI 24.56 kg/m²     General appearance: No apparent distress, appears stated age   HEENT: Pupils equal, round, and reactive to light. Conjunctivae/corneas clear. Neck: Supple, with full range of motion. No jugular venous distention. Trachea midline. Respiratory:  Normal respiratory effort. Clear to auscultation, bilaterally without Rales/Wheezes/Rhonchi. Cardiovascular: Regular rate and rhythm with normal S1/S2 without murmurs, rubs or gallops. Abdomen: Soft, non-tender, non-distended with normal bowel sounds. Musculoskeletal: No clubbing, cyanosis or edema bilaterally. Full range of motion without deformity. Skin: Skin color, texture, turgor normal.  No rashes or lesions. Neurologic:  Lethargic,unable to assess  Psychiatric: Lethargic      Labs:   Recent Labs     05/07/22  1736 05/08/22  0543 05/09/22  0724   WBC 6.7 6.0 5.9   HGB 14.4 14.1 14.7   HCT 44.2 43.6 45.7    214 227     Recent Labs     05/07/22  1736      K 4.0      CO2 25   BUN 20   CREATININE 0.6   CALCIUM 8.4*     Recent Labs     05/07/22  1736   AST 17   ALT 25   BILITOT 0.6   ALKPHOS 104     No results for input(s): INR in the last 72 hours. No results for input(s): Guero Hug in the last 72 hours. Urinalysis:      Lab Results   Component Value Date    NITRU POSITIVE 05/04/2022    WBCUA 1-3 05/04/2022    WBCUA 0-1 05/12/2012    BACTERIA FEW 05/04/2022    RBCUA 1-3 05/04/2022    RBCUA NONE 05/12/2012    BLOODU SMALL 05/04/2022    SPECGRAV 1.010 05/04/2022    GLUCOSEU Negative 05/04/2022    GLUCOSEU NEGATIVE 05/12/2012       Radiology:  XR SKULL (<4 VIEWS)   Final Result    shunt tube as described. XR NECK SOFT TISSUE   Final Result    shunt tube as described. XR CHEST (2 VW)   Final Result    shunt tube as described. XR ABDOMEN (2 VIEWS)   Final Result    shunt tube as described. CT HEAD WO CONTRAST   Final Result   1. Decreasing size of bilateral subdural hemorrhages compared to 02/17/2022.   2. Ventriculostomy shunt catheter stable in position.   There is slight   increased size of the lateral ventricles which may be partially due to   decreased mass effect from the bilateral subdural hemorrhages. 3. Remote insults in the bilateral frontal lobes and right temporal lobe. 4. Chronic small vessel ischemic disease. XR CHEST PORTABLE   Final Result   Suspect retrocardiac infiltrates. Assessment/Plan:    Active Hospital Problems    Diagnosis     S/P  shunt [Z98.2]      Priority: Medium    Seizure [facility]) [R56.9]      Priority: Medium     Seizure-due to subdural hematoma  -Neurology consulted. EEG shows treat potential for focal seizures from left frontal region. Continue Keppra. No further work-up. Chronic encephalopathy  Pt at baseline is not responsive. She doesn't interact or participate in ADLs. -ABG fine. Ammonia 148,start lactulose via PEG tube. Reviewed previous CT abdo/pelvis in 2021. No evidence of liver cirrhosis. Parents would like workup. Check US liver. Subdural hematoma  -CT shows decreasing size of hemorrhages compared to 2/17/2022. No surgical intervention needed per neurosurgery. History of normal pressure hydrocephalus s/p  shunt 2019  -No issues with shunt series. -Recommend follow-up with leaving clinic for shunt. Hypothyroidism  -Continue levothyroxine    History of Down syndrome with cognitive decline  -Continue Aricept and risperidone    Dysphagia  -PEG tube in place. Tolerating tube feeds. Discussed with parents at bedside. They are concerned about care and SNF. They are also concerned about patient's decline over the last 4 months. Palliative care was consulted. Parents are not interested in hospice at this point. Questions and concerns were addressed.     DVT Prophylaxis: Lovenox  Diet: Diet NPO  ADULT TUBE FEEDING; PEG; Standard with Fiber; Continuous; 45; No; 40; Q 1 hour  Code Status: Full Code    PT/OT Eval Status: As needed    Dispo -return to facility tomorrow after workup    Laine Russo MD

## 2022-05-10 NOTE — CARE COORDINATION
5/10 Update CM note. Neuro re consulted yesterday, no new notes today. Ammonia yesterday was 148, Lactulose TID started and repeat today is 24. Keppra IV Q12H continues. Plan remains to return to [facility] on discharge, patient is long term bed hold. Ambulance paperwork in soft chart along with COVID test which needs completed on day of discharge.     Melinda Hay, SHANDRAN, RN  [facility] Case Management   Cell: [phone] c/o SOB, chest tightness and chest pain with cough. Clear lungs.

## 2022-05-11 ENCOUNTER — APPOINTMENT (OUTPATIENT)
Dept: ULTRASOUND IMAGING | Age: 57
DRG: 064 | End: 2022-05-11
Payer: MEDICARE

## 2022-05-11 ENCOUNTER — APPOINTMENT (OUTPATIENT)
Dept: CT IMAGING | Age: 57
DRG: 064 | End: 2022-05-11
Payer: MEDICARE

## 2022-05-11 LAB — AMMONIA: 36 UMOL/L (ref 11–51)

## 2022-05-11 PROCEDURE — 36415 COLL VENOUS BLD VENIPUNCTURE: CPT

## 2022-05-11 PROCEDURE — 6360000002 HC RX W HCPCS: Performed by: FAMILY MEDICINE

## 2022-05-11 PROCEDURE — 1200000000 HC SEMI PRIVATE

## 2022-05-11 PROCEDURE — 6370000000 HC RX 637 (ALT 250 FOR IP): Performed by: FAMILY MEDICINE

## 2022-05-11 PROCEDURE — 76705 ECHO EXAM OF ABDOMEN: CPT

## 2022-05-11 PROCEDURE — 2580000003 HC RX 258: Performed by: FAMILY MEDICINE

## 2022-05-11 PROCEDURE — 74176 CT ABD & PELVIS W/O CONTRAST: CPT

## 2022-05-11 PROCEDURE — 82140 ASSAY OF AMMONIA: CPT

## 2022-05-11 PROCEDURE — 6370000000 HC RX 637 (ALT 250 FOR IP): Performed by: INTERNAL MEDICINE

## 2022-05-11 PROCEDURE — 94640 AIRWAY INHALATION TREATMENT: CPT

## 2022-05-11 PROCEDURE — 2700000000 HC OXYGEN THERAPY PER DAY

## 2022-05-11 PROCEDURE — 76770 US EXAM ABDO BACK WALL COMP: CPT

## 2022-05-11 RX ADMIN — SODIUM CHLORIDE, PRESERVATIVE FREE 10 ML: 5 INJECTION INTRAVENOUS at 21:09

## 2022-05-11 RX ADMIN — RISPERIDONE 0.25 MG: 0.5 TABLET, FILM COATED ORAL at 10:52

## 2022-05-11 RX ADMIN — IPRATROPIUM BROMIDE AND ALBUTEROL SULFATE 1 AMPULE: .5; 2.5 SOLUTION RESPIRATORY (INHALATION) at 19:31

## 2022-05-11 RX ADMIN — LACTULOSE 20 G: 20 SOLUTION ORAL at 10:50

## 2022-05-11 RX ADMIN — ENOXAPARIN SODIUM 40 MG: 100 INJECTION SUBCUTANEOUS at 10:50

## 2022-05-11 RX ADMIN — SODIUM CHLORIDE, PRESERVATIVE FREE 10 ML: 5 INJECTION INTRAVENOUS at 12:24

## 2022-05-11 RX ADMIN — IPRATROPIUM BROMIDE AND ALBUTEROL SULFATE 1 AMPULE: .5; 2.5 SOLUTION RESPIRATORY (INHALATION) at 08:40

## 2022-05-11 RX ADMIN — LEVETIRACETAM 500 MG: 5 INJECTION INTRAVENOUS at 12:24

## 2022-05-11 RX ADMIN — RISPERIDONE 0.25 MG: 0.5 TABLET, FILM COATED ORAL at 21:09

## 2022-05-11 RX ADMIN — LACTULOSE 20 G: 20 SOLUTION ORAL at 21:09

## 2022-05-11 RX ADMIN — LEVETIRACETAM 500 MG: 5 INJECTION INTRAVENOUS at 23:19

## 2022-05-11 RX ADMIN — Medication 2000 UNITS: at 10:50

## 2022-05-11 RX ADMIN — LACTULOSE 20 G: 20 SOLUTION ORAL at 14:51

## 2022-05-11 RX ADMIN — IPRATROPIUM BROMIDE AND ALBUTEROL SULFATE 1 AMPULE: .5; 2.5 SOLUTION RESPIRATORY (INHALATION) at 12:12

## 2022-05-11 RX ADMIN — DONEPEZIL HYDROCHLORIDE 5 MG: 5 TABLET, FILM COATED ORAL at 21:09

## 2022-05-11 NOTE — PROGRESS NOTES
Comprehensive Nutrition Assessment    Type and Reason for Visit:  Initial,Consult (TF rec)    Nutrition Recommendations/Plan:   1. Continue current nutrition regimen. Monitor for EN tolerance. 2. Monitor Ammonia (per 5/11 draw 36.0 WDL)  ; can adjust TF as needed if less protein if desired     Malnutrition Assessment:  Malnutrition Status:  No malnutrition (05/05/22 1115)    Context:  Acute Illness     Findings of the 6 clinical characteristics of malnutrition:  Energy Intake:  Mild decrease in energy intake (Comment)  Weight Loss:  Unable to assess (2/2 lack of wt hx on file to assess)     Body Fat Loss:  No significant body fat loss     Muscle Mass Loss:  No significant muscle mass loss    Fluid Accumulation:  No significant fluid accumulation     Strength:  Not Performed    Nutrition Assessment:    Pt. remains at nutritional risk d/t ongoing EN support. Noted [address] 5/11 showed mild to moderate hydronephrosis. Pt. admit from SNF d/t seizure-like activity. PMHx noted for Down syndrome,hypothyroidism. Noted acute on chronic toxic encephalopathy d/t hyperammonemia (improved). Pt. appears to be tolerating TF @ goal. Will continue to monitor. Nutrition Related Findings:    Nonverbal, +I/O (762ml), +BS, no edema, PEG, NC 3L , Ammonia WDL, Wound Type:  (eschar, old midline incision)       Current Nutrition Intake & Therapies:    Average Meal Intake: NPO  Average Supplements Intake: NPO  Diet NPO  ADULT TUBE FEEDING; PEG; Standard with Fiber; Continuous; 45; No; 40; Q 1 hour  Current Tube Feeding (TF) Orders:  · Feeding Route: PEG  · Formula: Standard with Fiber  · Schedule: Continuous  · Feeding Regimen: @45ml/hr x 23 hrs  · Additives/Modulars: None  · Water Flushes: 40ml Q1  · Current TF & Flush Orders Provides: 1035ml TV 1552kcal , 66g pro , 786ml free water.     Anthropometric Measures:  Height: 5' 1\" (154.9 cm)  Ideal Body Weight (IBW): 105 lbs (48 kg)    Admission Body Weight: 130 lb (59 kg) (5/04 est)  Current Body Weight: 130 lb (59 kg) (5/04 est), 123.8 % IBW. Weight Source: Other (Comment)  Current BMI (kg/m2): 24.6  Usual Body Weight:  (MARYCHUY d/t lack of measured wt hx to assess)     Weight Adjustment For: No Adjustment                 BMI Categories: Normal Weight (BMI 18.5-24. 9)    Estimated Daily Nutrient Needs:  Energy Requirements Based On: Kcal/kg  Weight Used for Energy Requirements: Current  Energy (kcal/day): 1450-1750kcal (25-30kcal/kgCBW)  Weight Used for Protein Requirements: Current  Protein (g/day):  (1.1-1.3g/kgCBW as tolerated; monitor Ammonia)  Method Used for Fluid Requirements: 1 ml/kcal  Fluid (ml/day): 1450-1750ml    Nutrition Diagnosis:   · Inadequate oral intake related to cognitive or neurological impairment as evidenced by NPO or clear liquid status due to medical condition,poor intake prior to admission,nutrition support - enteral nutrition      Nutrition Interventions:   Food and/or Nutrient Delivery: Continue NPO,Continue Current Tube Feeding  Nutrition Education/Counseling: No recommendation at this time  Coordination of Nutrition Care: Continue to monitor while inpatient       Goals:  Previous Goal Met: Goal(s) Achieved  Goals: Tolerate nutrition support at goal rate,by next RD assessment       Nutrition Monitoring and Evaluation:   Behavioral-Environmental Outcomes: None Identified  Food/Nutrient Intake Outcomes: Enteral Nutrition Intake/Tolerance  Physical Signs/Symptoms Outcomes: Biochemical Data,GI Status,Nutrition Focused Physical Findings,Skin,Weight    Discharge Planning:     Too soon to determine     Shasta Bowles RD  Contact: ext [phone]

## 2022-05-11 NOTE — PLAN OF CARE
Problem: Pain  Goal: Verbalizes/displays adequate comfort level or baseline comfort level  Outcome: Progressing     Problem: Skin/Tissue Integrity  Goal: Absence of new skin breakdown  Description: 1. Monitor for areas of redness and/or skin breakdown  2. Assess vascular access sites hourly  3. Every 4-6 hours minimum:  Change oxygen saturation probe site  4. Every 4-6 hours:  If on nasal continuous positive airway pressure, respiratory therapy assess nares and determine need for appliance change or resting period.   5/10/2022 2200 by Chris Hagan RN  Outcome: Progressing  5/10/2022 1620 by Marly Dawson RN  Outcome: Progressing     Problem: Safety - Adult  Goal: Free from fall injury  5/10/2022 2200 by Chris Hagan RN  Outcome: Progressing  5/10/2022 1620 by Marly Dawson RN  Outcome: Progressing     Problem: Nutrition Deficit:  Goal: Optimize nutritional status  Outcome: Progressing     Problem: ABCDS Injury Assessment  Goal: Absence of physical injury  Outcome: Progressing

## 2022-05-11 NOTE — CARE COORDINATION
5/11 Update CM note. Lactulose TID and Keppra IV Q12H continues. Ammonia today, 36. US abdomen completed this AM showed mild to moderate right-sided hydronephrosis. Neuro states no further recommendations at this time and to continue Keppra. Plan remains to return to [facility] on discharge, patient is a long term bed hold. Completed ambulance paperwork in soft chart along with COVID test that needs completed on day of discharge.     SHANDRA FalconN, RN  PHYSICIANS [facility] SURGICAL [facility] Case Management   Cell: [phone]

## 2022-05-11 NOTE — PROGRESS NOTES
Hospitalist Progress Note      SYNOPSIS: Patient admitted on 2022 for Seizure [facility]). She has a history of subdural hemorrhage, history of hydrocephalus status post  shunt, arthritis, Down syndrome, Hypothyroidism, Osteoarthritis, Syncope, Thyroid disease, UTI and Varicose veins. She was admitted from her SNF with a complaint of seizure like activity. She was post ictal on admission. CT of the brain showed decreasing size of subdural hemorrhage. She was loaded with Keppra and admitted for seizure likely due to subdural hematoma. SUBJECTIVE:    Patient seen and examined. She was alert but didn't answer any questions. Per her nurse, this was her baseline. Unable to do review of systems. Records reviewed. Temp (24hrs), Av.3 °F (36.8 °C), Min:97.9 °F (36.6 °C), Max:98.7 °F (37.1 °C)    DIET: Diet NPO  ADULT TUBE FEEDING; PEG; Standard with Fiber; Continuous; 45; No; 40; Q 1 hour  CODE: Full Code    Intake/Output Summary (Last 24 hours) at 2022 1258  Last data filed at 2022 0450  Gross per 24 hour   Intake 767 ml   Output    Net 767 ml       OBJECTIVE:    BP (!) 101/55   Pulse 94   Temp 97.9 °F (36.6 °C)   Resp 18   Ht 5' 1\" (1.549 m)   Wt 130 lb (59 kg)   SpO2 99%   BMI 24.56 kg/m²     General appearance: No apparent distress, appears stated age, flat affect, not answering questions  HEENT:  Conjunctivae/corneas clear. Neck: Supple. No jugular venous distention. Respiratory: Clear to auscultation bilaterally, normal respiratory effort  Cardiovascular: Regular rate rhythm, normal S1-S2  Abdomen: Soft, nontender, nondistended  Musculoskeletal: No clubbing, cyanosis, no bilateral lower extremity edema. Brisk capillary refill.    Skin:  No rashes  on visible skin  Neurologic: awake, not communicative    ASSESSMENT:  #Seizures due to subdural hematoma  -neurology on board  -on [address]  -EEG showed potential for focal seizures from left frontal region  -seizure precautions    #Acute on chronic toxic encephalopathy due to hyperammonemia  -ammonia was elevated at 148. On lactulose  -liver uSG showed normal echogenicity of the liver, with no evidence of intrahepatic biliary ductal dilattion; borderline gallbladder wall prominence at only 3mm, no gallstones. CBD WNL; right kidney showed mild to moderate hydronephrosis. -reason for elevated ammonia level is unclear  -ammonia down to 148  -continue lactulose, aim for 2-3 loose stools daily      #Right hydronephrosis  -as per RUQ usg  -this appears to be new, comparing to previous imaging  -will consult urology  -will get retroperitoneal USG to assess for stones. #History of NPH s/p  shunt  -stable  -follow up with neurosurgery on outpatient basis    #Hypothyroidism: on synthroid    #Dysphagia; PEG tube in place. On tube feeding    #Histoyr of Down's syndrome  -on aricept and risperdal    DVT prophylaxis: lovenox         DISPOSITION: to be determined    Medications:  REVIEWED DAILY    Infusion Medications    sodium chloride       Scheduled Medications    lactulose  20 g Oral TID    ipratropium-albuterol  1 ampule Inhalation Q4H WA    vitamin D  2,000 Units Oral Daily    donepezil  5 mg Oral Nightly    levothyroxine  88 mcg Oral Daily    risperiDONE  0.25 mg Oral BID    sodium chloride flush  5-40 mL IntraVENous 2 times per day    enoxaparin  40 mg SubCUTAneous Daily    levetiracetam  500 mg IntraVENous Q12H     PRN Meds: albuterol, sodium chloride flush, sodium chloride, LORazepam, ondansetron **OR** ondansetron, polyethylene glycol, acetaminophen **OR** acetaminophen    Labs:     Recent Labs     05/09/22  0724   WBC 5.9   HGB 14.7   HCT 45.7          No results for input(s): NA, K, CL, CO2, BUN, CREATININE, CALCIUM, PHOS in the last 72 hours. Invalid input(s): MAGNES    No results for input(s): PROT, ALB, ALKPHOS, ALT, AST, BILITOT, AMYLASE, LIPASE in the last 72 hours.     No results for input(s): INR in the last 72 hours. No results for input(s): Veronica Ball in the last 72 hours. Chronic labs:    Lab Results   Component Value Date    CHOL 173 03/30/2022    TRIG 88 03/30/2022    HDL 62 03/30/2022    LDLCALC 93 03/30/2022    TSH 8.930 (H) 01/12/2022    INR 1.0 11/12/2019    LABA1C 5.2 03/30/2022       Radiology: REVIEWED DAILY    +++++++++++++++++++++++++++++++++++++++++++++++++  Dionne Avendaño MD  [facility] [place] Physician - [address] [facility], [place]  +++++++++++++++++++++++++++++++++++++++++++++++++  NOTE: This report was transcribed using voice recognition software. Every effort was made to ensure accuracy; however, inadvertent computerized transcription errors may be present.

## 2022-05-11 NOTE — PLAN OF CARE
Problem: Nutrition Deficit:  Goal: Optimize nutritional status  Outcome: Progressing  Flowsheets (Taken 5/11/2022 0441)  Nutrient intake appropriate for improving, restoring, or maintaining nutritional needs:   Assess nutritional status and recommend course of action   Monitor oral intake, labs, and treatment plans   Recommend, monitor, and adjust tube feedings and TPN/PPN based on assessed needs

## 2022-05-12 LAB
AMMONIA: 33 UMOL/L (ref 11–51)
B.E.: 2.5 MMOL/L (ref -3–3)
COHB: 1 % (ref 0–1.5)
CRITICAL: ABNORMAL
DATE ANALYZED: ABNORMAL
DATE OF COLLECTION: ABNORMAL
HCO3: 26.5 MMOL/L (ref 22–26)
HHB: 1.6 % (ref 0–5)
LAB: ABNORMAL
Lab: ABNORMAL
METHB: 0.3 % (ref 0–1.5)
MODE: ABNORMAL
O2 SATURATION: 98.4 % (ref 92–98.5)
O2HB: 97.1 % (ref 94–97)
OPERATOR ID: 1222
PATIENT TEMP: 37 C
PCO2: 39.1 MMHG (ref 35–45)
PH BLOOD GAS: 7.45 (ref 7.35–7.45)
PO2: 115.9 MMHG (ref 75–100)
SOURCE, BLOOD GAS: ABNORMAL
THB: 13.4 G/DL (ref 11.5–16.5)
TIME ANALYZED: 1715

## 2022-05-12 PROCEDURE — 6370000000 HC RX 637 (ALT 250 FOR IP): Performed by: FAMILY MEDICINE

## 2022-05-12 PROCEDURE — 36415 COLL VENOUS BLD VENIPUNCTURE: CPT

## 2022-05-12 PROCEDURE — 82140 ASSAY OF AMMONIA: CPT

## 2022-05-12 PROCEDURE — 94640 AIRWAY INHALATION TREATMENT: CPT

## 2022-05-12 PROCEDURE — 82805 BLOOD GASES W/O2 SATURATION: CPT

## 2022-05-12 PROCEDURE — 36600 WITHDRAWAL OF ARTERIAL BLOOD: CPT

## 2022-05-12 PROCEDURE — 2700000000 HC OXYGEN THERAPY PER DAY

## 2022-05-12 PROCEDURE — 6370000000 HC RX 637 (ALT 250 FOR IP): Performed by: INTERNAL MEDICINE

## 2022-05-12 PROCEDURE — 1200000000 HC SEMI PRIVATE

## 2022-05-12 PROCEDURE — 6360000002 HC RX W HCPCS: Performed by: FAMILY MEDICINE

## 2022-05-12 PROCEDURE — 2580000003 HC RX 258: Performed by: FAMILY MEDICINE

## 2022-05-12 RX ADMIN — RISPERIDONE 0.25 MG: 0.5 TABLET, FILM COATED ORAL at 10:28

## 2022-05-12 RX ADMIN — RISPERIDONE 0.25 MG: 0.5 TABLET, FILM COATED ORAL at 22:04

## 2022-05-12 RX ADMIN — IPRATROPIUM BROMIDE AND ALBUTEROL SULFATE 1 AMPULE: .5; 2.5 SOLUTION RESPIRATORY (INHALATION) at 10:46

## 2022-05-12 RX ADMIN — LACTULOSE 20 G: 20 SOLUTION ORAL at 10:26

## 2022-05-12 RX ADMIN — ACETAMINOPHEN 650 MG: 325 TABLET ORAL at 10:28

## 2022-05-12 RX ADMIN — Medication 2000 UNITS: at 10:30

## 2022-05-12 RX ADMIN — ENOXAPARIN SODIUM 40 MG: 100 INJECTION SUBCUTANEOUS at 10:25

## 2022-05-12 RX ADMIN — DONEPEZIL HYDROCHLORIDE 5 MG: 5 TABLET, FILM COATED ORAL at 22:04

## 2022-05-12 RX ADMIN — LEVETIRACETAM 500 MG: 5 INJECTION INTRAVENOUS at 13:42

## 2022-05-12 RX ADMIN — LEVOTHYROXINE SODIUM 88 MCG: 0.09 TABLET ORAL at 05:18

## 2022-05-12 RX ADMIN — IPRATROPIUM BROMIDE AND ALBUTEROL SULFATE 1 AMPULE: .5; 2.5 SOLUTION RESPIRATORY (INHALATION) at 19:06

## 2022-05-12 RX ADMIN — LEVETIRACETAM 500 MG: 5 INJECTION INTRAVENOUS at 23:59

## 2022-05-12 RX ADMIN — LACTULOSE 20 G: 20 SOLUTION ORAL at 13:43

## 2022-05-12 RX ADMIN — SODIUM CHLORIDE, PRESERVATIVE FREE 10 ML: 5 INJECTION INTRAVENOUS at 10:29

## 2022-05-12 RX ADMIN — SODIUM CHLORIDE, PRESERVATIVE FREE 10 ML: 5 INJECTION INTRAVENOUS at 22:04

## 2022-05-12 RX ADMIN — IPRATROPIUM BROMIDE AND ALBUTEROL SULFATE 1 AMPULE: .5; 2.5 SOLUTION RESPIRATORY (INHALATION) at 14:04

## 2022-05-12 RX ADMIN — IPRATROPIUM BROMIDE AND ALBUTEROL SULFATE 1 AMPULE: .5; 2.5 SOLUTION RESPIRATORY (INHALATION) at 15:40

## 2022-05-12 ASSESSMENT — PAIN SCALES - WONG BAKER
WONGBAKER_NUMERICALRESPONSE: 0
WONGBAKER_NUMERICALRESPONSE: 0

## 2022-05-12 NOTE — PROGRESS NOTES
Hospitalist Progress Note      SYNOPSIS: Patient admitted on 2022 for Seizure [facility]). She has a history of subdural hemorrhage, history of hydrocephalus status post  shunt, arthritis, Down syndrome, Hypothyroidism, Osteoarthritis, Syncope, Thyroid disease, UTI and Varicose veins.     She was admitted from her SNF with a complaint of seizure like activity. She was post ictal on admission. CT of the brain showed decreasing size of subdural hemorrhage. She was loaded with Keppra and admitted for seizure likely due to subdural hematoma. SUBJECTIVE:    Patient seen and examined. She was somnolent and didn't answer any questions. Unable to do review of systems. Records reviewed. Temp (24hrs), Av.2 °F (36.8 °C), Min:97.4 °F (36.3 °C), Max:98.9 °F (37.2 °C)    DIET: Diet NPO  ADULT TUBE FEEDING; PEG; Standard with Fiber; Continuous; 45; No; 40; Q 1 hour  CODE: Full Code    Intake/Output Summary (Last 24 hours) at 2022 1101  Last data filed at 2022 0520  Gross per 24 hour   Intake 1251 ml   Output    Net 1251 ml       OBJECTIVE:    /65   Pulse 90   Temp 97.4 °F (36.3 °C)   Resp 18   Ht 5' 1\" (1.549 m)   Wt 130 lb (59 kg)   SpO2 97%   BMI 24.56 kg/m²     General appearance: No apparent distress, somnolent, not answering questions, this is her baseline  HEENT:  Conjunctivae/corneas clear. Neck: Supple. No jugular venous distention. Respiratory: Clear to auscultation bilaterally, normal respiratory effort  Cardiovascular: Regular rate rhythm, normal S1-S2  Abdomen: Soft, nontender, nondistended  Musculoskeletal: No clubbing, cyanosis, no bilateral lower extremity edema. Brisk capillary refill.    Skin:  No rashes  on visible skin  Neurologic: somnolent    ASSESSMENT:  #Seizures due to subdural hematoma  -neurology on board  -on Keppra  -EEG showed potential for focal seizures from left frontal region  -seizure precautions     #Acute on chronic toxic encephalopathy due to hyperammonemia  -ammonia level trended down  -on lactulose  -liver uSG showed normal echogenicity of the liver, with no evidence of intrahepatic biliary ductal dilattion; borderline gallbladder wall prominence at only 3mm, no gallstones. CBD WNL; right kidney showed mild to moderate hydronephrosis. -titrate lactulose to maintain 2-3 loose stools daily      #Right hydronephrosis  -as per RUQ usg  -CT abdomen done showed no acute abdominopelvic abnormality, and showed mild dilation of hte extrarenal pelves (anatomic variant); no evidence of hydronephrosis; 1-2 mm intrarenal calculus in left kidney  -urology on board    #History of NPH s/p  shunt  -stable  -follow up with neurosurgery on outpatient basis     #Hypothyroidism:   -on synthroid. Last TSH was 8.9 in 1/12/2022. -Will check TSH     #Dysphagia; PEG tube in place. On tube feeding     #Histoyr of Down's syndrome  -on aricept and risperdal     DVT prophylaxis: lovenox     DISPOSITION: to be determined    Medications:  REVIEWED DAILY    Infusion Medications    sodium chloride       Scheduled Medications    lactulose  20 g Oral TID    ipratropium-albuterol  1 ampule Inhalation Q4H WA    vitamin D  2,000 Units Oral Daily    donepezil  5 mg Oral Nightly    levothyroxine  88 mcg Oral Daily    risperiDONE  0.25 mg Oral BID    sodium chloride flush  5-40 mL IntraVENous 2 times per day    enoxaparin  40 mg SubCUTAneous Daily    levetiracetam  500 mg IntraVENous Q12H     PRN Meds: albuterol, sodium chloride flush, sodium chloride, LORazepam, ondansetron **OR** ondansetron, polyethylene glycol, acetaminophen **OR** acetaminophen    Labs:     No results for input(s): WBC, HGB, HCT, PLT in the last 72 hours. No results for input(s): NA, K, CL, CO2, BUN, CREATININE, CALCIUM, PHOS in the last 72 hours. Invalid input(s): MAGNES    No results for input(s): PROT, ALB, ALKPHOS, ALT, AST, BILITOT, AMYLASE, LIPASE in the last 72 hours.     No results for input(s): INR in the last 72 hours. No results for input(s): Lupillo Altes in the last 72 hours. Chronic labs:    Lab Results   Component Value Date    CHOL 173 03/30/2022    TRIG 88 03/30/2022    HDL 62 03/30/2022    LDLCALC 93 03/30/2022    TSH 8.930 (H) 01/12/2022    INR 1.0 11/12/2019    LABA1C 5.2 03/30/2022       Radiology: REVIEWED DAILY    +++++++++++++++++++++++++++++++++++++++++++++++++  Isabelle Ivan MD  [facility] [place] Physician - [address] [place] [address]  +++++++++++++++++++++++++++++++++++++++++++++++++  NOTE: This report was transcribed using voice recognition software. Every effort was made to ensure accuracy; however, inadvertent computerized transcription errors may be present.

## 2022-05-12 NOTE — CARE COORDINATION
5/12 Update CM note. Urology consulted yesterday d/t right hydronephrosis noted on US. Lactulose TID and Keppra IV Q12H continues. Ammonia today is 33. Patient on 1L NC. Neuro signed off on 5/10 and states to continue Keppra. NSGY states to f/u at [facility] for  shunt. Plan remains to return to [facility] on discharge, patient is long term bed hold. Completed ambulance paperwork in soft chart with COVID test that needs completed on day of discharge.     Lissy Luna, SHANDRAN, RN  PHYSICIANS [facility] Case Management   Cell: [phone]

## 2022-05-12 NOTE — PROGRESS NOTES
Physician Progress Note      Ronald Quintanilla  CSN #:                  [id number]  :                       1965  ADMIT DATE:       2022 11:47 AM  DISCH DATE:  RESPONDING  PROVIDER #:        Shannon Cochran MD          QUERY TEXT:    Dear Dr. Elle Johnson,    Pt admitted with seizures and has encephalopathy documented. If possible,   please document in progress notes and discharge summary further specificity   regarding the type of encephalopathy:      The medical record reflects the following:  Risk Factors: Dementia, Down syndrome  Clinical Indicators: Pt with NPH by history with  shunt came to ER with c/o   \"seizure like activity. ..confused and lethargic\", Neuro surgery states in   consult note: \"Cognitive decline most likely not due to  shunt. Recommend   continued follow up with [facility] especially if they are managing shunt   adjustments\", EEG showed: \"1. An increased potential for focal seizures from   the left frontal region 2. Moderate nonspecific cerebral dysfunction of the   left frontotemporal region 3. Mild to moderate nonspecific cerebral   dysfunction of the right frontotemporal region 4. A nonspecific m  Treatment: Neurology and neuro surgery consults, CT scan, EEG, close patient   monitoring    Thank you,  Mary Jo ARRINGTON, RN  Clinical Documentation Improvement  [email or web address]  Cell phone: [phone]  Options provided:  -- Anoxic encephalopathy  -- Hypertensive encephalopathy  -- Metabolic encephalopathy  -- Encephalopathy due to  ##(Please provide cause of encephalopathy), (Please   provide cause of encephalopathy). -- Other - I will add my own diagnosis  -- Disagree - Not applicable / Not valid  -- Disagree - Clinically unable to determine / Unknown  -- Refer to Clinical Documentation Reviewer    PROVIDER RESPONSE TEXT:    This patient has metabolic encephalopathy.     Query created by: Alexis Alberto on 5/10/2022 11:14 AM      Electronically signed by:  Tami Evans PACO BUSH 5/12/2022 7:35 AM

## 2022-05-12 NOTE — CONSULTS
5/12/2022 12:12 PM  Service: Urology  Group: [facility] (Alec/Leif/Ezequiel)    [place]rell See  [id number]     Chief Complaint:    Right hydronephrosis    History of Present Illness: The patient is a 65 y.o. female patient who has a history of Down syndrome and is nonverbal and presented to the hospital 8 days ago from a nursing home with seizures. She had a renal ultrasound performed yesterday that showed mild right hydronephrosis for which we were asked to evaluate. A follow-up CT abdomen pelvis showed extra renal pelvis bilaterally. Her creatinine is stable. She has been afebrile throughout her admission. Per her parents who are at her bedside she is incontinent of urine at all times. She has not had problems with recurring UTIs to their knowledge. Past Medical History:   Diagnosis Date    Arthritis     Down syndrome     Hypothyroidism     Osteoarthritis     Syncope     Thyroid disease     UTI (urinary tract infection)         Varicose veins     Wears glasses          Past Surgical History:   Procedure Laterality Date    ABDOMEN SURGERY      duodenal atresia    ABDOMINAL ADHESION SURGERY      BRAIN SURGERY      CRANIOTOMY Right 4/12/2019    RIGHT CRANIOTOMY FRANCISCO JAVIER HOLES performed by Jasson Cervantes MD at [place] [address] ECHO COMPL W DOP COLOR FLOW  5/14/2012         HIP SURGERY      Right    JOINT REPLACEMENT      left knee, right hip-dr Silvia Vargas KNEE SURGERY      Right    TOE SURGERY      Right foot    TOTAL KNEE ARTHROPLASTY Left 08/11/2016    DR. De    TUBAL LIGATION      VENA CAVA FILTER PLACEMENT Bilateral 4/22/2019    VENA CAVA FILTER INSERTION performed by Kevon Velazquez MD at [place] [address] VENTRICULOPERITONEAL SHUNT N/A 11/12/2019    RIGHT FRONTAL VENTRICULO PERITONEAL SHUNT INSERTION-----FACILITY performed by Jasson Cervantes MD at [facility] OR       Medications Prior to Admission:    Medications Prior to Admission: nystatin (MYCOSTATIN) 885669 UNIT/GM cream, Apply topically 2 times daily. 30g  risperiDONE (RISPERDAL) 0.25 MG tablet, Take 1 tablet by mouth 2 times daily  risperiDONE (RISPERDAL) 0.25 MG tablet, Take 1 tablet by mouth 2 times daily  donepezil (ARICEPT) 5 MG tablet,   levothyroxine (SYNTHROID) 88 MCG tablet, Take 1 tablet by mouth Daily  liothyronine (CYTOMEL) 5 MCG tablet, Take 1 tab by mouth every morning on Jwcykn-Lcdpmqerg-Jvjsnn  Cholecalciferol (VITAMIN D-3 SUPER STRENGTH) 50 MCG (2000 UT) TABS, Take 1 tablet by mouth daily  Lactobacillus (ACIDOPHILUS PO), Take by mouth    Allergies:    Patient has no known allergies. Social History:    reports that she has never smoked. She has never used smokeless tobacco. She reports that she does not drink alcohol and does not use drugs. Family History:   Non-contributory to this Urological problem  family history includes Heart Disease in her maternal grandfather, maternal grandmother, paternal aunt, and paternal uncle. Review of Systems:      Physical Exam:     Vitals:  /65   Pulse 90   Temp 97.4 °F (36.3 °C)   Resp 18   Ht 5' 1\" (1.549 m)   Wt 130 lb (59 kg)   SpO2 97%   BMI 24.56 kg/m²     General: Eyes closed, does not follow commands, nonverbal  HEENT:  Normocephalic, atraumatic. Lungs:  Respirations symmetric and non-labored. Abdomen:  soft, nontender, no masses  Extremities: Contracted  Skin:  Warm and dry, no open lesions or rashes  Neuro: There are no motor or sensory deficits in the 4 quadrant extremities   Rectal: deferred  Genitourinary: No Patten    Labs:     No results for input(s): WBC, RBC, HGB, HCT, MCV, MCH, MCHC, RDW, PLT, MPV in the last 72 hours. No results for input(s): CREATININE in the last 72 hours.     No results found for: PSA    Imaging:   Narrative   EXAMINATION:   CT OF THE ABDOMEN AND PELVIS WITHOUT CONTRAST, 3/7/2021 12:57 pm       TECHNIQUE:   CT of the abdomen and pelvis was performed without the administration of   intravenous contrast. Multiplanar reformatted images are provided for review. Dose modulation, iterative reconstruction, and/or weight based adjustment of   the mA/kV was utilized to reduce the radiation dose to as low as reasonably   achievable.       COMPARISON:   01/20/2021       HISTORY:   ORDERING SYSTEM PROVIDED HISTORY:  Nausea and vomiting   TECHNOLOGIST PROVIDED HISTORY:   Reason for exam:  Nausea and vomiting   Additional Contrast?  None   Decision Support Exceptio:  Emergency Medical Condition (MA)       FINDINGS:   The study is limited by the patient's arm overlying the abdomen causing some   streak artifact.       Lower Chest: There is mild bibasilar atelectasis.  The lung bases are   otherwise clear.       Organs: Evaluation of the solid organs is limited by lack of IV contrast.   The liver and spleen are grossly normal.  The pancreas is unremarkable in   noncontrast appearance.  The gallbladder is mildly distended, though   otherwise unremarkable.  The adrenal glands are normal bilaterally.    Noncontrast images of the kidneys and ureters demonstrate stable mildly   dilated bilateral extrarenal pelves.  However, there is no evidence of a   renal or ureteral calculus or hydronephrosis.       GI/Bowel: Evaluation of the hollow GI tract demonstrates no evidence of   abnormal bowel wall thickening, dilatation, or obstruction.       Pelvis: Evaluation of the pelvic structures is limited by streak artifact   from an adjacent right hip arthroplasty.  The urinary bladder is normal.   Tubal ligation clips are noted.  The uterus and bilateral adnexa are   otherwise grossly unremarkable in noncontrast appearance.  There is a mild   amount of free pelvic fluid.  No pathologic pelvic lymphadenopathy is   identified.       Peritoneum/Retroperitoneum: No intraperitoneal free air or free fluid is   identified.  No pathologic lymphadenopathy is seen.  The abdominal aorta is   unremarkable in noncontrast appearance.  An IVC filter is noted.  No significant abdominal wall hernia is evident. Suann Pillar is a   ventriculoperitoneal shunt catheter in place, which courses along the left   lateral abdomen, and terminates within the right hemipelvis.       Bones/Soft Tissues: There is a right hip arthroplasty in place.  There is   mild degenerative change throughout the thoracolumbar spine.  Tarlov cysts   are noted.  No osteolytic or osteoblastic lesion is seen.           Impression   1. Study mildly limited, as detailed above. 2. No acute process within the abdomen or pelvis, within the limitations of a   noncontrast study. Assessment/plan:  Extrarenal pelvis bilaterally      Creatinine stable  No leukocytosis  CT imaging reviewed, extra renal pelvis bilaterally. This is stable when compared to imaging in March 2021 as well   Hemodynamically stable.  No acute  interventions needed at this time  Please call with additional questions or concerns       Electronically signed by BOGDAN Turner CNP on 5/12/2022 at 12:12 PM     Agree with above assessment and plan

## 2022-05-13 ENCOUNTER — APPOINTMENT (OUTPATIENT)
Dept: GENERAL RADIOLOGY | Age: 57
DRG: 064 | End: 2022-05-13
Payer: MEDICARE

## 2022-05-13 LAB
AMMONIA: 20 UMOL/L (ref 11–51)
TSH SERPL DL<=0.05 MIU/L-ACNC: 11.76 UIU/ML (ref 0.27–4.2)

## 2022-05-13 PROCEDURE — 6360000002 HC RX W HCPCS: Performed by: FAMILY MEDICINE

## 2022-05-13 PROCEDURE — 6370000000 HC RX 637 (ALT 250 FOR IP): Performed by: FAMILY MEDICINE

## 2022-05-13 PROCEDURE — 36415 COLL VENOUS BLD VENIPUNCTURE: CPT

## 2022-05-13 PROCEDURE — 94640 AIRWAY INHALATION TREATMENT: CPT

## 2022-05-13 PROCEDURE — 6370000000 HC RX 637 (ALT 250 FOR IP): Performed by: INTERNAL MEDICINE

## 2022-05-13 PROCEDURE — 2580000003 HC RX 258: Performed by: FAMILY MEDICINE

## 2022-05-13 PROCEDURE — 1200000000 HC SEMI PRIVATE

## 2022-05-13 PROCEDURE — 84443 ASSAY THYROID STIM HORMONE: CPT

## 2022-05-13 PROCEDURE — 71045 X-RAY EXAM CHEST 1 VIEW: CPT

## 2022-05-13 PROCEDURE — 82140 ASSAY OF AMMONIA: CPT

## 2022-05-13 RX ORDER — LEVOTHYROXINE SODIUM 0.12 MG/1
125 TABLET ORAL DAILY
Status: DISCONTINUED | OUTPATIENT
Start: 2022-05-14 | End: 2022-05-15 | Stop reason: HOSPADM

## 2022-05-13 RX ADMIN — SODIUM CHLORIDE, PRESERVATIVE FREE 10 ML: 5 INJECTION INTRAVENOUS at 20:55

## 2022-05-13 RX ADMIN — Medication 2000 UNITS: at 09:14

## 2022-05-13 RX ADMIN — ENOXAPARIN SODIUM 40 MG: 100 INJECTION SUBCUTANEOUS at 09:12

## 2022-05-13 RX ADMIN — IPRATROPIUM BROMIDE AND ALBUTEROL SULFATE 1 AMPULE: .5; 2.5 SOLUTION RESPIRATORY (INHALATION) at 07:31

## 2022-05-13 RX ADMIN — LACTULOSE 20 G: 20 SOLUTION ORAL at 14:22

## 2022-05-13 RX ADMIN — IPRATROPIUM BROMIDE AND ALBUTEROL SULFATE 1 AMPULE: .5; 2.5 SOLUTION RESPIRATORY (INHALATION) at 11:13

## 2022-05-13 RX ADMIN — SODIUM CHLORIDE, PRESERVATIVE FREE 10 ML: 5 INJECTION INTRAVENOUS at 09:13

## 2022-05-13 RX ADMIN — DONEPEZIL HYDROCHLORIDE 5 MG: 5 TABLET, FILM COATED ORAL at 20:55

## 2022-05-13 RX ADMIN — LACTULOSE 20 G: 20 SOLUTION ORAL at 20:55

## 2022-05-13 RX ADMIN — IPRATROPIUM BROMIDE AND ALBUTEROL SULFATE 1 AMPULE: .5; 2.5 SOLUTION RESPIRATORY (INHALATION) at 19:12

## 2022-05-13 RX ADMIN — LEVETIRACETAM 500 MG: 5 INJECTION INTRAVENOUS at 12:18

## 2022-05-13 RX ADMIN — RISPERIDONE 0.25 MG: 0.5 TABLET, FILM COATED ORAL at 09:12

## 2022-05-13 RX ADMIN — RISPERIDONE 0.25 MG: 0.5 TABLET, FILM COATED ORAL at 20:55

## 2022-05-13 RX ADMIN — IPRATROPIUM BROMIDE AND ALBUTEROL SULFATE 1 AMPULE: .5; 2.5 SOLUTION RESPIRATORY (INHALATION) at 15:45

## 2022-05-13 RX ADMIN — LEVOTHYROXINE SODIUM 88 MCG: 0.09 TABLET ORAL at 05:57

## 2022-05-13 RX ADMIN — LACTULOSE 20 G: 20 SOLUTION ORAL at 09:13

## 2022-05-13 ASSESSMENT — PAIN SCALES - GENERAL: PAINLEVEL_OUTOF10: 0

## 2022-05-13 NOTE — PROGRESS NOTES
Hospitalist Progress Note      SYNOPSIS:   Patient admitted on 2022 for Seizure [facility]). She has a history of subdural hemorrhage, history of hydrocephalus status post  shunt, arthritis, Down syndrome, Hypothyroidism, Osteoarthritis, Syncope, Thyroid disease, UTI and Varicose veins.     She was admitted from her SNF with a complaint of seizure like activity. She was post ictal on admission. CT of the brain showed decreasing size of subdural hemorrhage. She was loaded with Keppra and admitted for seizure likely due to subdural hematoma. SUBJECTIVE:    Patient seen and examined in her room. Does not follow commands and unable to do review of system. Records reviewed. Stable overnight. No other overnight issues reported. Temp (24hrs), Av.6 °F (36.4 °C), Min:97.5 °F (36.4 °C), Max:97.7 °F (36.5 °C)    DIET: Diet NPO  ADULT TUBE FEEDING; PEG; Standard with Fiber; Continuous; 45; No; 40; Q 1 hour  CODE: Full Code    Intake/Output Summary (Last 24 hours) at 2022 0918  Last data filed at 2022 7285  Gross per 24 hour   Intake 980 ml   Output    Net 980 ml       OBJECTIVE:    BP (!) 92/59   Pulse 83   Temp 97.7 °F (36.5 °C) (Temporal)   Resp 19   Ht 5' 1\" (1.549 m)   Wt 130 lb (59 kg)   SpO2 98%   BMI 24.56 kg/m²     General appearance: No apparent distress, appears stated age and cooperative. HEENT:  Conjunctivae/corneas clear. Neck: Supple. No jugular venous distention. Respiratory: Clear to auscultation bilaterally, normal respiratory effort  Cardiovascular: Regular rate rhythm, normal S1-S2  Abdomen: Soft, nontender, nondistended  Skin:  No rashes  on visible skin  Neurologic: Alert awake but lethargic and not able to follow commands.     ASSESSMENT & PLAN:    #Seizures due to subdural hematoma  -neurology on board  -on Keppra  -EEG showed potential for focal seizures from left frontal region  -seizure precautions     #Acute on chronic toxic encephalopathy due to hyperammonemia  -ammonia level trended down  -on lactulose  -liver uSG showed normal echogenicity of the liver, with no evidence of intrahepatic biliary ductal dilattion; borderline gallbladder wall prominence at only 3mm, no gallstones. CBD WNL; right kidney showed mild to moderate hydronephrosis. -titrate lactulose to maintain 2-3 loose stools daily      #Right hydronephrosis  -as per RUQ usg  -CT abdomen done showed no acute abdominopelvic abnormality, and showed mild dilation of hte extrarenal pelves (anatomic variant); no evidence of hydronephrosis; 1-2 mm intrarenal calculus in left kidney  -urology on board, plans no intervention at the moment     #History of NPH s/p  shunt  -stable  -follow up with neurosurgery on outpatient basis     #Hypothyroidism:   -on synthroid. Last TSH was 8.9 in 1/12/2022. -Will check TSH, 11.6 plan increase the dose of levothyroxine from 88 to 125 mcg via PEG tube daily, reemphasized the importance of holding tube feeds an hour before and after the levothyroxine     #Dysphagia; PEG tube in place. On tube feeding     #Histoyr of Down's syndrome  -on aricept and risperdal     DVT prophylaxis: lovenox    DISPOSITION:     Medications:  REVIEWED DAILY    Infusion Medications    sodium chloride       Scheduled Medications    lactulose  20 g Oral TID    ipratropium-albuterol  1 ampule Inhalation Q4H WA    vitamin D  2,000 Units Oral Daily    donepezil  5 mg Oral Nightly    levothyroxine  88 mcg Oral Daily    risperiDONE  0.25 mg Oral BID    sodium chloride flush  5-40 mL IntraVENous 2 times per day    enoxaparin  40 mg SubCUTAneous Daily    levetiracetam  500 mg IntraVENous Q12H     PRN Meds: albuterol, sodium chloride flush, sodium chloride, LORazepam, ondansetron **OR** ondansetron, polyethylene glycol, acetaminophen **OR** acetaminophen    Labs:     No results for input(s): WBC, HGB, HCT, PLT in the last 72 hours.     No results for input(s): NA, K, CL, CO2, BUN, CREATININE, CALCIUM, PHOS in the last 72 hours. Invalid input(s): MAGNES    No results for input(s): PROT, ALB, ALKPHOS, ALT, AST, BILITOT, AMYLASE, LIPASE in the last 72 hours. No results for input(s): INR in the last 72 hours. No results for input(s): Carlos Manuel Atilio in the last 72 hours. Chronic labs:    Lab Results   Component Value Date    CHOL 173 03/30/2022    TRIG 88 03/30/2022    HDL 62 03/30/2022    LDLCALC 93 03/30/2022    TSH 11.760 (H) 05/13/2022    INR 1.0 11/12/2019    LABA1C 5.2 03/30/2022       Radiology: REVIEWED DAILY    +++++++++++++++++++++++++++++++++++++++++++++++++  Mike Perez MD  [facility] Emergency Department Physician - [address] [place] [address]  +++++++++++++++++++++++++++++++++++++++++++++++++  NOTE: This report was transcribed using voice recognition software. Every effort was made to ensure accuracy; however, inadvertent computerized transcription errors may be present.

## 2022-05-13 NOTE — CARE COORDINATION
5/13 Update CM note. Lactulose TID (Ammonia 20.0 today) and Keppra IV Q12H continues. CXR ordered today d/t moist cough, results negative for acute process. ABG's yesterday WNL. Patient weaned back to RA. Urology was consulted and states no interventions at this time. Patient's parents concerned regarding patient's status stating it is not her normal baseline. Ambulance paperwork updated and in soft chart with COVID test that needs completed on day of discharge.      Aj Bettencourt, BSN, RN  [facility] Case Management   Cell: [phone]

## 2022-05-14 LAB
ANION GAP SERPL CALCULATED.3IONS-SCNC: 8 MMOL/L (ref 7–16)
BUN BLDV-MCNC: 10 MG/DL (ref 6–20)
CALCIUM SERPL-MCNC: 9 MG/DL (ref 8.6–10.2)
CHLORIDE BLD-SCNC: 102 MMOL/L (ref 98–107)
CO2: 30 MMOL/L (ref 22–29)
CREAT SERPL-MCNC: 0.6 MG/DL (ref 0.5–1)
GFR AFRICAN AMERICAN: >60
GFR NON-AFRICAN AMERICAN: >60 ML/MIN/1.73
GLUCOSE BLD-MCNC: 111 MG/DL (ref 74–99)
HCT VFR BLD CALC: 40.2 % (ref 34–48)
HEMOGLOBIN: 12.8 G/DL (ref 11.5–15.5)
MCH RBC QN AUTO: 30.5 PG (ref 26–35)
MCHC RBC AUTO-ENTMCNC: 31.8 % (ref 32–34.5)
MCV RBC AUTO: 95.9 FL (ref 80–99.9)
PDW BLD-RTO: 15.1 FL (ref 11.5–15)
PLATELET # BLD: 216 E9/L (ref 130–450)
PMV BLD AUTO: 9.9 FL (ref 7–12)
POTASSIUM SERPL-SCNC: 3.9 MMOL/L (ref 3.5–5)
RBC # BLD: 4.19 E12/L (ref 3.5–5.5)
SARS-COV-2, NAAT: NOT DETECTED
SODIUM BLD-SCNC: 140 MMOL/L (ref 132–146)
WBC # BLD: 3.5 E9/L (ref 4.5–11.5)

## 2022-05-14 PROCEDURE — 36415 COLL VENOUS BLD VENIPUNCTURE: CPT

## 2022-05-14 PROCEDURE — 6360000002 HC RX W HCPCS: Performed by: FAMILY MEDICINE

## 2022-05-14 PROCEDURE — 87635 SARS-COV-2 COVID-19 AMP PRB: CPT

## 2022-05-14 PROCEDURE — 6370000000 HC RX 637 (ALT 250 FOR IP): Performed by: FAMILY MEDICINE

## 2022-05-14 PROCEDURE — 2580000003 HC RX 258: Performed by: FAMILY MEDICINE

## 2022-05-14 PROCEDURE — 94640 AIRWAY INHALATION TREATMENT: CPT

## 2022-05-14 PROCEDURE — 6370000000 HC RX 637 (ALT 250 FOR IP): Performed by: INTERNAL MEDICINE

## 2022-05-14 PROCEDURE — 80048 BASIC METABOLIC PNL TOTAL CA: CPT

## 2022-05-14 PROCEDURE — 1200000000 HC SEMI PRIVATE

## 2022-05-14 PROCEDURE — 85027 COMPLETE CBC AUTOMATED: CPT

## 2022-05-14 RX ORDER — LEVETIRACETAM 500 MG/1
500 TABLET ORAL 2 TIMES DAILY
Qty: 60 TABLET | Refills: 3 | Status: ON HOLD | OUTPATIENT
Start: 2022-05-14 | End: 2022-06-18 | Stop reason: HOSPADM

## 2022-05-14 RX ORDER — IPRATROPIUM BROMIDE AND ALBUTEROL SULFATE 2.5; .5 MG/3ML; MG/3ML
3 SOLUTION RESPIRATORY (INHALATION) EVERY 4 HOURS PRN
Qty: 360 ML | Refills: 0 | Status: ON HOLD | OUTPATIENT
Start: 2022-05-14 | End: 2022-06-18 | Stop reason: HOSPADM

## 2022-05-14 RX ORDER — LACTULOSE 10 G/15ML
20 SOLUTION ORAL 3 TIMES DAILY
Qty: 2700 ML | Refills: 0 | Status: ON HOLD | OUTPATIENT
Start: 2022-05-14 | End: 2022-06-18 | Stop reason: HOSPADM

## 2022-05-14 RX ADMIN — IPRATROPIUM BROMIDE AND ALBUTEROL SULFATE 1 AMPULE: .5; 2.5 SOLUTION RESPIRATORY (INHALATION) at 12:33

## 2022-05-14 RX ADMIN — LEVETIRACETAM 500 MG: 5 INJECTION INTRAVENOUS at 12:45

## 2022-05-14 RX ADMIN — IPRATROPIUM BROMIDE AND ALBUTEROL SULFATE 1 AMPULE: .5; 2.5 SOLUTION RESPIRATORY (INHALATION) at 20:12

## 2022-05-14 RX ADMIN — ENOXAPARIN SODIUM 40 MG: 100 INJECTION SUBCUTANEOUS at 08:39

## 2022-05-14 RX ADMIN — LEVOTHYROXINE SODIUM 125 MCG: 0.12 TABLET ORAL at 05:44

## 2022-05-14 RX ADMIN — IPRATROPIUM BROMIDE AND ALBUTEROL SULFATE 1 AMPULE: .5; 2.5 SOLUTION RESPIRATORY (INHALATION) at 16:03

## 2022-05-14 RX ADMIN — IPRATROPIUM BROMIDE AND ALBUTEROL SULFATE 1 AMPULE: .5; 2.5 SOLUTION RESPIRATORY (INHALATION) at 08:13

## 2022-05-14 RX ADMIN — LACTULOSE 20 G: 20 SOLUTION ORAL at 20:52

## 2022-05-14 RX ADMIN — SODIUM CHLORIDE, PRESERVATIVE FREE 10 ML: 5 INJECTION INTRAVENOUS at 20:52

## 2022-05-14 RX ADMIN — LEVETIRACETAM 500 MG: 5 INJECTION INTRAVENOUS at 00:36

## 2022-05-14 RX ADMIN — RISPERIDONE 0.25 MG: 0.5 TABLET, FILM COATED ORAL at 08:39

## 2022-05-14 RX ADMIN — DONEPEZIL HYDROCHLORIDE 5 MG: 5 TABLET, FILM COATED ORAL at 20:51

## 2022-05-14 RX ADMIN — LACTULOSE 20 G: 20 SOLUTION ORAL at 13:52

## 2022-05-14 RX ADMIN — SODIUM CHLORIDE, PRESERVATIVE FREE 10 ML: 5 INJECTION INTRAVENOUS at 08:40

## 2022-05-14 RX ADMIN — Medication 2000 UNITS: at 08:41

## 2022-05-14 RX ADMIN — RISPERIDONE 0.25 MG: 0.5 TABLET, FILM COATED ORAL at 20:52

## 2022-05-14 RX ADMIN — LACTULOSE 20 G: 20 SOLUTION ORAL at 08:39

## 2022-05-14 NOTE — PROGRESS NOTES
Notified MD of families concerns about patient being discharged tonight. Okay to hold discharge for tonight and MD will reevaluate tomorrow. [facility] of transport being cancelled.

## 2022-05-14 NOTE — PLAN OF CARE
Problem: Pain  Goal: Verbalizes/displays adequate comfort level or baseline comfort level  Outcome: Completed     Problem: Skin/Tissue Integrity  Goal: Absence of new skin breakdown  Description: 1. Monitor for areas of redness and/or skin breakdown  2. Assess vascular access sites hourly  3. Every 4-6 hours minimum:  Change oxygen saturation probe site  4. Every 4-6 hours:  If on nasal continuous positive airway pressure, respiratory therapy assess nares and determine need for appliance change or resting period.   Outcome: Completed     Problem: Safety - Adult  Goal: Free from fall injury  Outcome: Completed     Problem: Nutrition Deficit:  Goal: Optimize nutritional status  Outcome: Completed     Problem: ABCDS Injury Assessment  Goal: Absence of physical injury  Outcome: Completed

## 2022-05-14 NOTE — DISCHARGE SUMMARY
Hospitalist Discharge Summary    Patient ID: Tosha Brown See   Patient : 1965  Patient's PCP: Marely Cheung DO    Admit Date: 2022   Admitting Physician: Estill Curling, DO    Discharge Date:  2022   Discharge Physician: Marciano Sears MD   Discharge Condition: Stable  Discharge Disposition: [facility] course in brief:  (Please refer to daily progress notes for a comprehensive review of the hospitalization by requesting medical records)    Patient admitted on 2022 for Seizure [facility]). She has a history of subdural hemorrhage, history of hydrocephalus status post  shunt, arthritis, Down syndrome, Hypothyroidism, Osteoarthritis, Syncope, Thyroid disease, UTI and Varicose veins.     She was admitted from her SNF with a complaint of seizure like activity. She was post ictal on admission. CT of the brain showed decreasing size of subdural hemorrhage. She was loaded with Keppra and admitted for seizure likely due to subdural hematoma.  Neurosurgery did not recommend any intervention. Shunt series stable. Neurology recommended continuing Keppra and follow-up outpatient. Patient will be discharged on Keppra 500 mg p.o. twice daily with plan to follow-up with neurology in 1 week. #Acute on chronic toxic encephalopathy due to hyperammonemia  -ammonia level trended down  -on lactulose  -liver uSG showed normal echogenicity of the liver, with no evidence of intrahepatic biliary ductal dilattion; borderline gallbladder wall prominence at only 3mm, no gallstones. CBD WNL; right kidney showed mild to moderate hydronephrosis.   -titrate lactulose to maintain 2-3 loose stools daily     #Right hydronephrosis  -as per RUQ usg  -CT abdomen done showed no acute abdominopelvic abnormality, and showed mild dilation of hte extrarenal pelves (anatomic variant); no evidence of hydronephrosis; 1-2 mm intrarenal calculus in left kidney  -urology on board, plans no intervention at the moment    Consults:   IP CONSULT TO INTERNAL MEDICINE  IP CONSULT TO NEUROLOGY  IP CONSULT TO DIETITIAN  IP CONSULT TO PALLIATIVE CARE  IP CONSULT TO NEUROSURGERY  IP CONSULT TO UROLOGY    Discharge Diagnoses:    #Seizures due to subdural hematoma  -neurology on board  -on Keppra  -EEG showed potential for focal seizures from left frontal region  -seizure precautions     #Acute on chronic toxic encephalopathy due to hyperammonemia  -ammonia level trended down  -on lactulose  -liver uSG showed normal echogenicity of the liver, with no evidence of intrahepatic biliary ductal dilattion; borderline gallbladder wall prominence at only 3mm, no gallstones. CBD WNL; right kidney showed mild to moderate hydronephrosis. -titrate lactulose to maintain 2-3 loose stools daily      #Right hydronephrosis  -as per RUQ usg  -CT abdomen done showed no acute abdominopelvic abnormality, and showed mild dilation of hte extrarenal pelves (anatomic variant); no evidence of hydronephrosis; 1-2 mm intrarenal calculus in left kidney  -urology on board, plans no intervention at the moment     #History of NPH s/p  shunt  -stable  -follow up with neurosurgery on outpatient basis     #Hypothyroidism:   -on synthroid. Last TSH was 8.9 in 1/12/2022. -Will check TSH, 11.6 plan increase the dose of levothyroxine from 88 to 125 mcg via PEG tube daily, reemphasized the importance of holding tube feeds an hour before and after the levothyroxine     #Dysphagia; PEG tube in place. On tube feeding     #Histoyr of Down's syndrome  -on aricept and risperdal     DVT prophylaxis: lovenox    Discharge Instructions / Follow up:    No future appointments.     Continued appropriate risk factor modification of blood pressure, diabetes and serum lipids will remain essential to reducing risk of future atherosclerotic development    Activity: activity as tolerated    Significant labs:  CBC:   Recent Labs     05/14/22  0500   WBC 3.5*   RBC 4.19   HGB 12.8   HCT 40.2   MCV 95.9   RDW 15.1*        BMP:   Recent Labs     05/14/22  0459      K 3.9      CO2 30*   BUN 10   CREATININE 0.6     LFT:  No results for input(s): PROT, ALB, ALKPHOS, ALT, AST, BILITOT, AMYLASE, LIPASE in the last 72 hours. PT/INR: No results for input(s): INR, APTT in the last 72 hours. BNP: No results for input(s): BNP in the last 72 hours. Hgb A1C:   Lab Results   Component Value Date    LABA1C 5.2 03/30/2022     Folate and B12:   Lab Results   Component Value Date    ROAPGARH85 261 03/30/2022   , No results found for: FOLATE  Thyroid Studies:   Lab Results   Component Value Date    TSH 11.760 (H) 05/13/2022       Urinalysis:    Lab Results   Component Value Date    NITRU POSITIVE 05/04/2022    WBCUA 1-3 05/04/2022    WBCUA 0-1 05/12/2012    BACTERIA FEW 05/04/2022    RBCUA 1-3 05/04/2022    RBCUA NONE 05/12/2012    BLOODU SMALL 05/04/2022    SPECGRAV 1.010 05/04/2022    GLUCOSEU Negative 05/04/2022    GLUCOSEU NEGATIVE 05/12/2012       Imaging:  CT ABDOMEN PELVIS WO CONTRAST Additional Contrast? None    Result Date: 5/11/2022  EXAMINATION: CT OF THE ABDOMEN AND PELVIS WITHOUT CONTRAST 5/11/2022 4:47 pm TECHNIQUE: CT of the abdomen and pelvis was performed without the administration of intravenous contrast. Multiplanar reformatted images are provided for review. Automated exposure control, iterative reconstruction, and/or weight based adjustment of the mA/kV was utilized to reduce the radiation dose to as low as reasonably achievable. COMPARISON: CT of the abdomen and pelvis, 03/07/2021. HISTORY: ORDERING SYSTEM PROVIDED HISTORY: right hydronephrosis TECHNOLOGIST PROVIDED HISTORY: Reason for exam:->right hydronephrosis Additional Contrast?->None What reading provider will be dictating this exam?->CRC FINDINGS: Lower Chest: The lung bases are clear. The heart is normal in size. No pleural or pericardial effusion. Organs: Liver: Unremarkable. Gallbladder: Unremarkable.  Pancreas: Unremarkable. Spleen:  Unremarkable. Adrenals: Unremarkable. Kidneys: The kidneys are normal in size and contour. A 1-2 mm calculus is seen in an upper pole calyx of the left kidney. There is mild dilation of the extrarenal pelves bilaterally. No evidence of hydronephrosis. The distal ureters are obscured by beam hardening artifact arising from the right hip arthroplasty. GI/Bowel: Liquid stool in the colon indicates a diarrheal illness. No bowel wall thickening or obstruction noted. A percutaneous gastrostomy tube is seen. Pelvis: The base of the urinary bladder and the lower uterine segment are obscured by beam hardening artifact. The visualized aspects are grossly unremarkable. Peritoneum/Retroperitoneum: No lymphadenopathy. No free air or free fluid is seen. The abdominal aorta and pelvic arteries are unremarkable. IVC filter in situ. Ventriculoperitoneal shunt is also present. Bones/Soft Tissues: The visualized bones are intact without fracture or focal lesion. 1.  No acute abnormality is seen in the abdomen or the pelvis. 2. Mild dilation of the extrarenal pelves bilaterally (anatomic variant). No evidence of hydronephrosis. 3. 1-2 mm intrarenal calculus in the left kidney. RECOMMENDATIONS: Unavailable     XR NECK SOFT TISSUE    Result Date: 5/7/2022  EXAMINATION: TWO XRAY VIEWS OF THE CHEST; TWO XRAY VIEWS OF THE NECK SOFT TISSUES; TWO XRAY VIEWS OF THE ABDOMEN; THREE XRAY VIEWS OF THE SKULL 5/7/2022 1:55 pm COMPARISON: None. HISTORY: ORDERING SYSTEM PROVIDED HISTORY: s/p  shunt TECHNOLOGIST PROVIDED HISTORY: Reason for exam:->s/p  shunt Reason for exam:->shunt series What reading provider will be dictating this exam?->CRC FINDINGS: There is evidence of a right-sided shunt tube. Portions of the catheter near the valve are not opaque. Continuity in this region of the right lateral aspect of the skull cannot be determined.   Remainder of the shunt tube in the neck, chest and abdomen appears unremarkable. There are no infiltrates or effusions. Heart size is normal. In the abdomen the shunt tube traverses in the left side and is than looped in the right-side of the lower abdomen. There are some nonspecific air-fluid levels in the abdomen bowel gas. Bowel loops otherwise appear unremarkable. There is a right hip prosthesis. IVC filter is noted.  shunt tube as described. XR CHEST (2 VW)    Result Date: 5/7/2022  EXAMINATION: TWO XRAY VIEWS OF THE CHEST; TWO XRAY VIEWS OF THE NECK SOFT TISSUES; TWO XRAY VIEWS OF THE ABDOMEN; THREE XRAY VIEWS OF THE SKULL 5/7/2022 1:55 pm COMPARISON: None. HISTORY: ORDERING SYSTEM PROVIDED HISTORY: s/p  shunt TECHNOLOGIST PROVIDED HISTORY: Reason for exam:->s/p  shunt Reason for exam:->shunt series What reading provider will be dictating this exam?->CRC FINDINGS: There is evidence of a right-sided shunt tube. Portions of the catheter near the valve are not opaque. Continuity in this region of the right lateral aspect of the skull cannot be determined. Remainder of the shunt tube in the neck, chest and abdomen appears unremarkable. There are no infiltrates or effusions. Heart size is normal. In the abdomen the shunt tube traverses in the left side and is than looped in the right-side of the lower abdomen. There are some nonspecific air-fluid levels in the abdomen bowel gas. Bowel loops otherwise appear unremarkable. There is a right hip prosthesis. IVC filter is noted.  shunt tube as described. CT HEAD WO CONTRAST    Result Date: 5/4/2022  EXAMINATION: CT OF THE HEAD WITHOUT CONTRAST  5/4/2022 4:05 pm TECHNIQUE: CT of the head was performed without the administration of intravenous contrast. Dose modulation, iterative reconstruction, and/or weight based adjustment of the mA/kV was utilized to reduce the radiation dose to as low as reasonably achievable.  COMPARISON: CT head 02/17/2022 HISTORY: ORDERING SYSTEM PROVIDED HISTORY: Evaluate intracranial abnormality TECHNOLOGIST PROVIDED HISTORY: Has a \"code stroke\" or \"stroke alert\" been called? ->No Reason for exam:->Evaluate intracranial abnormality Decision Support Exception - unselect if not a suspected or confirmed emergency medical condition->Emergency Medical Condition (MA) What reading provider will be dictating this exam?->CRC FINDINGS: There is patchy hypoattenuation within the right cerebellar hemisphere. There is hypoattenuation of the white matter of the bilateral cerebral hemispheres. There is encephalomalacia within the bilateral frontal lobes and right temporal lobe. There is right ventriculostomy shunt catheter in stable position compared to prior examination. There is slight increased size of the lateral ventricles. There has been slight decreased size of the bilateral subdural hematomas. The left subdural hematoma measures approximately 6 mm in greatest thickness and previously measured 12 mm in greatest thickness. The right subdural hematoma measures approximately 7 mm in greatest thickness and previously measured 11 mm in thickness. There is no midline shift. There is hypodensity within the anterior aspect of the right ja. There is hypoattenuation adjacent to the tract of the ventriculostomy shunt catheter. There is no evidence of acute cortical ischemia. The visualized portions of the paranasal sinuses and mastoid air cells are clear. 1. Decreasing size of bilateral subdural hemorrhages compared to 02/17/2022. 2. Ventriculostomy shunt catheter stable in position. There is slight increased size of the lateral ventricles which may be partially due to decreased mass effect from the bilateral subdural hemorrhages. 3. Remote insults in the bilateral frontal lobes and right temporal lobe. 4. Chronic small vessel ischemic disease.      XR CHEST PORTABLE    Result Date: 5/13/2022  EXAMINATION: ONE XRAY VIEW OF THE CHEST 5/13/2022 11:09 am COMPARISON: 05/07/2022 HISTORY: ORDERING SYSTEM PROVIDED HISTORY: Cough TECHNOLOGIST PROVIDED HISTORY: Reason for exam:->Cough What reading provider will be dictating this exam?->CRC FINDINGS: Heart size is normal.  There are no infiltrates or effusions. There is mild tortuosity of the aorta. Suspected shunt tube is noted which appears unchanged. No acute process     XR CHEST PORTABLE    Result Date: 5/4/2022  EXAMINATION: ONE XRAY VIEW OF THE CHEST 5/4/2022 1:32 pm COMPARISON: None. HISTORY: ORDERING SYSTEM PROVIDED HISTORY: pna TECHNOLOGIST PROVIDED HISTORY: Reason for exam:->pna What reading provider will be dictating this exam?->CRC FINDINGS: Mild opacity in the retrocardiac region. The right lung is clear. The heart is not enlarged. There is no pneumothorax. The costophrenic angles are clear. Radiopaque catheter over the right hemithorax likely  shunt catheter. Suspect retrocardiac infiltrates. US ABDOMEN LIMITED    Result Date: 5/11/2022  EXAMINATION: RIGHT UPPER QUADRANT ULTRASOUND 5/11/2022 8:59 am COMPARISON: None. HISTORY: ORDERING SYSTEM PROVIDED HISTORY: hyperammonemia TECHNOLOGIST PROVIDED HISTORY: Reason for exam:->hyperammonemia What reading provider will be dictating this exam?->CRC FINDINGS: LIVER:  The liver demonstrates normal echogenicity without evidence of intrahepatic biliary ductal dilatation. BILIARY SYSTEM:  There is borderline gallbladder wall prominence measuring 3 mm. There is a 3 mm polyp. .  Negative sonographic Dorsey's sign. There are no gallstones. Common bile duct is within normal limits measuring 5 mm. RIGHT KIDNEY: The right kidney measures 9.5 x 3.9 cm. There is mild-to-moderate hydronephrosis. There is a prominent extrarenal pelvis. [place] PANCREAS:  Visualized portions of the pancreas are unremarkable. OTHER: No evidence of right upper quadrant ascites.      Mild to moderate right-sided hydronephrosis     US RETROPERITONEAL COMPLETE    Result Date: 5/11/2022  EXAMINATION: RETROPERITONEAL ULTRASOUND OF THE KIDNEYS AND URINARY BLADDER 5/11/2022 COMPARISON: 03/19/2022 chest CT. HISTORY: ORDERING SYSTEM PROVIDED HISTORY: right hydronephrosis TECHNOLOGIST PROVIDED HISTORY: Reason for exam:->right hydronephrosis What reading provider will be dictating this exam?->CRC FINDINGS: Kidneys: The right kidney measures 8.8 cm in length and the left kidney measures 7.8 cm in length. The ultrasound technologist noted difficulty visualizing the left kidney due to overlying bowel gas and inability of the patient to move. Kidneys demonstrate normal cortical echogenicity. No definite intrarenal stones. There is mild prominence of the right renal pelvis which is not demonstrated on the previous CT suggesting mild right hydronephrosis. Bladder: Unremarkable appearance of the bladder. Bilateral ureteral jets are demonstrated. Mild right hydronephrosis of uncertain etiology. The right ureteral jet is visualized. Suboptimal visualization and evaluation of the left kidney but no definite left renal pathology. XR SKULL (<4 VIEWS)    Result Date: 5/7/2022  EXAMINATION: TWO XRAY VIEWS OF THE CHEST; TWO XRAY VIEWS OF THE NECK SOFT TISSUES; TWO XRAY VIEWS OF THE ABDOMEN; THREE XRAY VIEWS OF THE SKULL 5/7/2022 1:55 pm COMPARISON: None. HISTORY: ORDERING SYSTEM PROVIDED HISTORY: s/p  shunt TECHNOLOGIST PROVIDED HISTORY: Reason for exam:->s/p  shunt Reason for exam:->shunt series What reading provider will be dictating this exam?->CRC FINDINGS: There is evidence of a right-sided shunt tube. Portions of the catheter near the valve are not opaque. Continuity in this region of the right lateral aspect of the skull cannot be determined. Remainder of the shunt tube in the neck, chest and abdomen appears unremarkable. There are no infiltrates or effusions. Heart size is normal. In the abdomen the shunt tube traverses in the left side and is than looped in the right-side of the lower abdomen.   There are some nonspecific air-fluid levels in the abdomen bowel gas. Bowel loops otherwise appear unremarkable. There is a right hip prosthesis. IVC filter is noted.  shunt tube as described. XR ABDOMEN (2 VIEWS)    Result Date: 5/7/2022  EXAMINATION: TWO XRAY VIEWS OF THE CHEST; TWO XRAY VIEWS OF THE NECK SOFT TISSUES; TWO XRAY VIEWS OF THE ABDOMEN; THREE XRAY VIEWS OF THE SKULL 5/7/2022 1:55 pm COMPARISON: None. HISTORY: ORDERING SYSTEM PROVIDED HISTORY: s/p  shunt TECHNOLOGIST PROVIDED HISTORY: Reason for exam:->s/p  shunt Reason for exam:->shunt series What reading provider will be dictating this exam?->CRC FINDINGS: There is evidence of a right-sided shunt tube. Portions of the catheter near the valve are not opaque. Continuity in this region of the right lateral aspect of the skull cannot be determined. Remainder of the shunt tube in the neck, chest and abdomen appears unremarkable. There are no infiltrates or effusions. Heart size is normal. In the abdomen the shunt tube traverses in the left side and is than looped in the right-side of the lower abdomen. There are some nonspecific air-fluid levels in the abdomen bowel gas. Bowel loops otherwise appear unremarkable. There is a right hip prosthesis. IVC filter is noted.  shunt tube as described.        Discharge Medications:      Medication List      START taking these medications    ipratropium-albuterol 0.5-2.5 (3) MG/3ML Soln nebulizer solution  Commonly known as: DUONEB  Inhale 3 mLs into the lungs every 4 hours as needed for Shortness of Breath     lactulose 10 GM/15ML solution  Commonly known as: CHRONULAC  Take 30 mLs by mouth 3 times daily Titrate for 2-3 bowel movements per day     levETIRAcetam 500 MG tablet  Commonly known as: Keppra  Take 1 tablet by mouth 2 times daily        CHANGE how you take these medications    risperiDONE 0.25 MG tablet  Commonly known as: RisperDAL  Take 1 tablet by mouth 2 times daily  What changed: Another medication with the same name was removed. Continue taking this medication, and follow the directions you see here. CONTINUE taking these medications    ACIDOPHILUS PO     donepezil 5 MG tablet  Commonly known as: ARICEPT     levothyroxine 88 MCG tablet  Commonly known as: SYNTHROID  Take 1 tablet by mouth Daily     liothyronine 5 MCG tablet  Commonly known as: CYTOMEL  Take 1 tab by mouth every morning on [place]     nystatin 415694 UNIT/GM cream  Commonly known as: MYCOSTATIN  Apply topically 2 times daily. 30g     Vitamin D-3 Super Strength 50 MCG (2000 UT) Tabs  Generic drug: Cholecalciferol  Take 1 tablet by mouth daily           Where to Get Your Medications      These medications were sent to [facility] - [address] [address]    Phone: [phone]   · ipratropium-albuterol 0.5-2.5 (3) MG/3ML Soln nebulizer solution  · lactulose 10 GM/15ML solution  · levETIRAcetam 500 MG tablet         Time Spent on discharge is more than 45 minutes in the examination, evaluation, counseling and review of medications and discharge plan.    +++++++++++++++++++++++++++++++++++++++++++++++++  Sharifa Davila MD  [address]  +++++++++++++++++++++++++++++++++++++++++++++++++  NOTE: This report was transcribed using voice recognition software. Every effort was made to ensure accuracy; however, inadvertent computerized transcription errors may be present.

## 2022-05-14 NOTE — CARE COORDINATION
5/14, Discharge acknowledged. Patient is a long term bed hold at [facility] and can return per Tari Garza. Discharge paperwork on soft chart. [facility] to pick patient up later today at [facility] test will need done prior to discharge-nursing informed. COVID test on soft chart. Informed Tari Garza from facility, RN charge and Suad Draft (legal guardian vm left) on patient leaving today. SW to follow.       VARGAS De Leon  [facility] Case Management  [phone]

## 2022-05-15 VITALS
HEART RATE: 82 BPM | DIASTOLIC BLOOD PRESSURE: 73 MMHG | OXYGEN SATURATION: 95 % | SYSTOLIC BLOOD PRESSURE: 97 MMHG | TEMPERATURE: 98.9 F | HEIGHT: 61 IN | BODY MASS INDEX: 24.55 KG/M2 | WEIGHT: 130 LBS | RESPIRATION RATE: 18 BRPM

## 2022-05-15 LAB
ANION GAP SERPL CALCULATED.3IONS-SCNC: 10 MMOL/L (ref 7–16)
BUN BLDV-MCNC: 12 MG/DL (ref 6–20)
CALCIUM SERPL-MCNC: 8.5 MG/DL (ref 8.6–10.2)
CHLORIDE BLD-SCNC: 104 MMOL/L (ref 98–107)
CO2: 26 MMOL/L (ref 22–29)
CREAT SERPL-MCNC: 0.5 MG/DL (ref 0.5–1)
GFR AFRICAN AMERICAN: >60
GFR NON-AFRICAN AMERICAN: >60 ML/MIN/1.73
GLUCOSE BLD-MCNC: 98 MG/DL (ref 74–99)
HCT VFR BLD CALC: 41.8 % (ref 34–48)
HEMOGLOBIN: 13.5 G/DL (ref 11.5–15.5)
MCH RBC QN AUTO: 30.8 PG (ref 26–35)
MCHC RBC AUTO-ENTMCNC: 32.3 % (ref 32–34.5)
MCV RBC AUTO: 95.4 FL (ref 80–99.9)
PDW BLD-RTO: 14.9 FL (ref 11.5–15)
PLATELET # BLD: 236 E9/L (ref 130–450)
PMV BLD AUTO: 9.8 FL (ref 7–12)
POTASSIUM SERPL-SCNC: 4.8 MMOL/L (ref 3.5–5)
RBC # BLD: 4.38 E12/L (ref 3.5–5.5)
SODIUM BLD-SCNC: 140 MMOL/L (ref 132–146)
WBC # BLD: 3.5 E9/L (ref 4.5–11.5)

## 2022-05-15 PROCEDURE — 6360000002 HC RX W HCPCS: Performed by: FAMILY MEDICINE

## 2022-05-15 PROCEDURE — 6370000000 HC RX 637 (ALT 250 FOR IP): Performed by: FAMILY MEDICINE

## 2022-05-15 PROCEDURE — 2580000003 HC RX 258: Performed by: FAMILY MEDICINE

## 2022-05-15 PROCEDURE — 80048 BASIC METABOLIC PNL TOTAL CA: CPT

## 2022-05-15 PROCEDURE — 6370000000 HC RX 637 (ALT 250 FOR IP): Performed by: INTERNAL MEDICINE

## 2022-05-15 PROCEDURE — 36415 COLL VENOUS BLD VENIPUNCTURE: CPT

## 2022-05-15 PROCEDURE — 94640 AIRWAY INHALATION TREATMENT: CPT

## 2022-05-15 PROCEDURE — 85027 COMPLETE CBC AUTOMATED: CPT

## 2022-05-15 RX ADMIN — ENOXAPARIN SODIUM 40 MG: 100 INJECTION SUBCUTANEOUS at 08:07

## 2022-05-15 RX ADMIN — LEVOTHYROXINE SODIUM 125 MCG: 0.12 TABLET ORAL at 05:27

## 2022-05-15 RX ADMIN — RISPERIDONE 0.25 MG: 0.5 TABLET, FILM COATED ORAL at 08:07

## 2022-05-15 RX ADMIN — LACTULOSE 20 G: 20 SOLUTION ORAL at 08:07

## 2022-05-15 RX ADMIN — SODIUM CHLORIDE, PRESERVATIVE FREE 10 ML: 5 INJECTION INTRAVENOUS at 08:07

## 2022-05-15 RX ADMIN — LACTULOSE 20 G: 20 SOLUTION ORAL at 14:11

## 2022-05-15 RX ADMIN — Medication 2000 UNITS: at 08:07

## 2022-05-15 RX ADMIN — LEVETIRACETAM 500 MG: 5 INJECTION INTRAVENOUS at 00:20

## 2022-05-15 RX ADMIN — LEVETIRACETAM 500 MG: 5 INJECTION INTRAVENOUS at 12:01

## 2022-05-15 RX ADMIN — IPRATROPIUM BROMIDE AND ALBUTEROL SULFATE 1 AMPULE: .5; 2.5 SOLUTION RESPIRATORY (INHALATION) at 11:15

## 2022-05-15 RX ADMIN — IPRATROPIUM BROMIDE AND ALBUTEROL SULFATE 1 AMPULE: .5; 2.5 SOLUTION RESPIRATORY (INHALATION) at 07:41

## 2022-05-15 RX ADMIN — ACETAMINOPHEN 650 MG: 325 TABLET ORAL at 14:11

## 2022-05-15 NOTE — DISCHARGE SUMMARY
Hospitalist Discharge Summary    Patient ID: Jose Luis See   Patient : 1965  Patient's PCP: Vivek Bejarano DO    Admit Date: 2022   Admitting Physician: Gertrude Jauregui DO    Discharge Date:  5/15/2022   Discharge Physician: Mike Perez MD   Discharge Condition: Stable  [facility] in brief:  (Please refer to daily progress notes for a comprehensive review of the hospitalization by requesting medical records)    Patient admitted on 2022 for Seizure [facility]). She has a history of subdural hemorrhage, history of hydrocephalus status post  shunt, arthritis, Down syndrome, Hypothyroidism, Osteoarthritis, Syncope, Thyroid disease, UTI and Varicose veins.     She was admitted from her SNF with a complaint of seizure like activity. She was post ictal on admission. CT of the brain showed decreasing size of subdural hemorrhage. She was loaded with Keppra and admitted for seizure likely due to subdural hematoma.  Neurosurgery did not recommend any intervention. Shunt series stable. Neurology recommended continuing Keppra and follow-up outpatient. Patient will be discharged on Keppra 500 mg p.o. twice daily with plan to follow-up with neurology in 1 week. #Acute on chronic toxic encephalopathy due to hyperammonemia  -ammonia level trended down  -on lactulose  -liver uSG showed normal echogenicity of the liver, with no evidence of intrahepatic biliary ductal dilattion; borderline gallbladder wall prominence at only 3mm, no gallstones. CBD WNL; right kidney showed mild to moderate hydronephrosis.   -titrate lactulose to maintain 2-3 loose stools daily     #Right hydronephrosis  -as per RUQ usg  -CT abdomen done showed no acute abdominopelvic abnormality, and showed mild dilation of hte extrarenal pelves (anatomic variant); no evidence of hydronephrosis; 1-2 mm intrarenal calculus in left kidney  -urology on board, plans no intervention at the risk factor modification of blood pressure, diabetes and serum lipids will remain essential to reducing risk of future atherosclerotic development    Activity: activity as tolerated    Significant labs:  CBC:   Recent Labs     05/14/22  0500 05/15/22  0452   WBC 3.5* 3.5*   RBC 4.19 4.38   HGB 12.8 13.5   HCT 40.2 41.8   MCV 95.9 95.4   RDW 15.1* 14.9    236     BMP:   Recent Labs     05/14/22  0459 05/15/22  0452    140   K 3.9 4.8    104   CO2 30* 26   BUN 10 12   CREATININE 0.6 0.5     LFT:  No results for input(s): PROT, ALB, ALKPHOS, ALT, AST, BILITOT, AMYLASE, LIPASE in the last 72 hours. PT/INR: No results for input(s): INR, APTT in the last 72 hours. BNP: No results for input(s): BNP in the last 72 hours. Hgb A1C:   Lab Results   Component Value Date    LABA1C 5.2 03/30/2022     Folate and B12:   Lab Results   Component Value Date    ZABFWMJG32 658 03/30/2022   , No results found for: FOLATE  Thyroid Studies:   Lab Results   Component Value Date    TSH 11.760 (H) 05/13/2022       Urinalysis:    Lab Results   Component Value Date    NITRU POSITIVE 05/04/2022    WBCUA 1-3 05/04/2022    WBCUA 0-1 05/12/2012    BACTERIA FEW 05/04/2022    RBCUA 1-3 05/04/2022    RBCUA NONE 05/12/2012    BLOODU SMALL 05/04/2022    SPECGRAV 1.010 05/04/2022    GLUCOSEU Negative 05/04/2022    GLUCOSEU NEGATIVE 05/12/2012       Imaging:  CT ABDOMEN PELVIS WO CONTRAST Additional Contrast? None    Result Date: 5/11/2022  EXAMINATION: CT OF THE ABDOMEN AND PELVIS WITHOUT CONTRAST 5/11/2022 4:47 pm TECHNIQUE: CT of the abdomen and pelvis was performed without the administration of intravenous contrast. Multiplanar reformatted images are provided for review. Automated exposure control, iterative reconstruction, and/or weight based adjustment of the mA/kV was utilized to reduce the radiation dose to as low as reasonably achievable. COMPARISON: CT of the abdomen and pelvis, 03/07/2021.  HISTORY: ORDERING SYSTEM PROVIDED HISTORY: right hydronephrosis TECHNOLOGIST PROVIDED HISTORY: Reason for exam:->right hydronephrosis Additional Contrast?->None What reading provider will be dictating this exam?->CRC FINDINGS: Lower Chest: The lung bases are clear. The heart is normal in size. No pleural or pericardial effusion. Organs: Liver: Unremarkable. Gallbladder: Unremarkable. Pancreas: Unremarkable. Spleen:  Unremarkable. Adrenals: Unremarkable. Kidneys: The kidneys are normal in size and contour. A 1-2 mm calculus is seen in an upper pole calyx of the left kidney. There is mild dilation of the extrarenal pelves bilaterally. No evidence of hydronephrosis. The distal ureters are obscured by beam hardening artifact arising from the right hip arthroplasty. GI/Bowel: Liquid stool in the colon indicates a diarrheal illness. No bowel wall thickening or obstruction noted. A percutaneous gastrostomy tube is seen. Pelvis: The base of the urinary bladder and the lower uterine segment are obscured by beam hardening artifact. The visualized aspects are grossly unremarkable. Peritoneum/Retroperitoneum: No lymphadenopathy. No free air or free fluid is seen. The abdominal aorta and pelvic arteries are unremarkable. IVC filter in situ. Ventriculoperitoneal shunt is also present. Bones/Soft Tissues: The visualized bones are intact without fracture or focal lesion. 1.  No acute abnormality is seen in the abdomen or the pelvis. 2. Mild dilation of the extrarenal pelves bilaterally (anatomic variant). No evidence of hydronephrosis. 3. 1-2 mm intrarenal calculus in the left kidney. RECOMMENDATIONS: Unavailable     XR NECK SOFT TISSUE    Result Date: 5/7/2022  EXAMINATION: TWO XRAY VIEWS OF THE CHEST; TWO XRAY VIEWS OF THE NECK SOFT TISSUES; TWO XRAY VIEWS OF THE ABDOMEN; THREE XRAY VIEWS OF THE SKULL 5/7/2022 1:55 pm COMPARISON: None.  HISTORY: ORDERING SYSTEM PROVIDED HISTORY: s/p  shunt TECHNOLOGIST PROVIDED HISTORY: Reason for exam:->s/p  shunt Reason for exam:->shunt series What reading provider will be dictating this exam?->CRC FINDINGS: There is evidence of a right-sided shunt tube. Portions of the catheter near the valve are not opaque. Continuity in this region of the right lateral aspect of the skull cannot be determined. Remainder of the shunt tube in the neck, chest and abdomen appears unremarkable. There are no infiltrates or effusions. Heart size is normal. In the abdomen the shunt tube traverses in the left side and is than looped in the right-side of the lower abdomen. There are some nonspecific air-fluid levels in the abdomen bowel gas. Bowel loops otherwise appear unremarkable. There is a right hip prosthesis. IVC filter is noted.  shunt tube as described. XR CHEST (2 VW)    Result Date: 5/7/2022  EXAMINATION: TWO XRAY VIEWS OF THE CHEST; TWO XRAY VIEWS OF THE NECK SOFT TISSUES; TWO XRAY VIEWS OF THE ABDOMEN; THREE XRAY VIEWS OF THE SKULL 5/7/2022 1:55 pm COMPARISON: None. HISTORY: ORDERING SYSTEM PROVIDED HISTORY: s/p  shunt TECHNOLOGIST PROVIDED HISTORY: Reason for exam:->s/p  shunt Reason for exam:->shunt series What reading provider will be dictating this exam?->CRC FINDINGS: There is evidence of a right-sided shunt tube. Portions of the catheter near the valve are not opaque. Continuity in this region of the right lateral aspect of the skull cannot be determined. Remainder of the shunt tube in the neck, chest and abdomen appears unremarkable. There are no infiltrates or effusions. Heart size is normal. In the abdomen the shunt tube traverses in the left side and is than looped in the right-side of the lower abdomen. There are some nonspecific air-fluid levels in the abdomen bowel gas. Bowel loops otherwise appear unremarkable. There is a right hip prosthesis. IVC filter is noted.  shunt tube as described.      CT HEAD WO CONTRAST    Result Date: 5/4/2022  EXAMINATION: CT OF THE HEAD WITHOUT CONTRAST 5/4/2022 4:05 pm TECHNIQUE: CT of the head was performed without the administration of intravenous contrast. Dose modulation, iterative reconstruction, and/or weight based adjustment of the mA/kV was utilized to reduce the radiation dose to as low as reasonably achievable. COMPARISON: CT head 02/17/2022 HISTORY: ORDERING SYSTEM PROVIDED HISTORY: Evaluate intracranial abnormality TECHNOLOGIST PROVIDED HISTORY: Has a \"code stroke\" or \"stroke alert\" been called? ->No Reason for exam:->Evaluate intracranial abnormality Decision Support Exception - unselect if not a suspected or confirmed emergency medical condition->Emergency Medical Condition (MA) What reading provider will be dictating this exam?->CRC FINDINGS: There is patchy hypoattenuation within the right cerebellar hemisphere. There is hypoattenuation of the white matter of the bilateral cerebral hemispheres. There is encephalomalacia within the bilateral frontal lobes and right temporal lobe. There is right ventriculostomy shunt catheter in stable position compared to prior examination. There is slight increased size of the lateral ventricles. There has been slight decreased size of the bilateral subdural hematomas. The left subdural hematoma measures approximately 6 mm in greatest thickness and previously measured 12 mm in greatest thickness. The right subdural hematoma measures approximately 7 mm in greatest thickness and previously measured 11 mm in thickness. There is no midline shift. There is hypodensity within the anterior aspect of the right ja. There is hypoattenuation adjacent to the tract of the ventriculostomy shunt catheter. There is no evidence of acute cortical ischemia. The visualized portions of the paranasal sinuses and mastoid air cells are clear. 1. Decreasing size of bilateral subdural hemorrhages compared to 02/17/2022. 2. Ventriculostomy shunt catheter stable in position.   There is slight increased size of the lateral ventricles which may be partially due to decreased mass effect from the bilateral subdural hemorrhages. 3. Remote insults in the bilateral frontal lobes and right temporal lobe. 4. Chronic small vessel ischemic disease. XR CHEST PORTABLE    Result Date: 5/13/2022  EXAMINATION: ONE XRAY VIEW OF THE CHEST 5/13/2022 11:09 am COMPARISON: 05/07/2022 HISTORY: ORDERING SYSTEM PROVIDED HISTORY: Cough TECHNOLOGIST PROVIDED HISTORY: Reason for exam:->Cough What reading provider will be dictating this exam?->CRC FINDINGS: Heart size is normal.  There are no infiltrates or effusions. There is mild tortuosity of the aorta. Suspected shunt tube is noted which appears unchanged. No acute process     XR CHEST PORTABLE    Result Date: 5/4/2022  EXAMINATION: ONE XRAY VIEW OF THE CHEST 5/4/2022 1:32 pm COMPARISON: None. HISTORY: ORDERING SYSTEM PROVIDED HISTORY: pna TECHNOLOGIST PROVIDED HISTORY: Reason for exam:->pna What reading provider will be dictating this exam?->CRC FINDINGS: Mild opacity in the retrocardiac region. The right lung is clear. The heart is not enlarged. There is no pneumothorax. The costophrenic angles are clear. Radiopaque catheter over the right hemithorax likely  shunt catheter. Suspect retrocardiac infiltrates. US ABDOMEN LIMITED    Result Date: 5/11/2022  EXAMINATION: RIGHT UPPER QUADRANT ULTRASOUND 5/11/2022 8:59 am COMPARISON: None. HISTORY: ORDERING SYSTEM PROVIDED HISTORY: hyperammonemia TECHNOLOGIST PROVIDED HISTORY: Reason for exam:->hyperammonemia What reading provider will be dictating this exam?->CRC FINDINGS: LIVER:  The liver demonstrates normal echogenicity without evidence of intrahepatic biliary ductal dilatation. BILIARY SYSTEM:  There is borderline gallbladder wall prominence measuring 3 mm. There is a 3 mm polyp. .  Negative sonographic Dorsey's sign. There are no gallstones. Common bile duct is within normal limits measuring 5 mm.  RIGHT KIDNEY: The right kidney measures 9.5 x 3.9 cm. There is mild-to-moderate hydronephrosis. There is a prominent extrarenal pelvis. Lorean Snare PANCREAS:  Visualized portions of the pancreas are unremarkable. OTHER: No evidence of right upper quadrant ascites. Mild to moderate right-sided hydronephrosis     US RETROPERITONEAL COMPLETE    Result Date: 5/11/2022  EXAMINATION: RETROPERITONEAL ULTRASOUND OF THE KIDNEYS AND URINARY BLADDER 5/11/2022 COMPARISON: 03/19/2022 chest CT. HISTORY: ORDERING SYSTEM PROVIDED HISTORY: right hydronephrosis TECHNOLOGIST PROVIDED HISTORY: Reason for exam:->right hydronephrosis What reading provider will be dictating this exam?->CRC FINDINGS: Kidneys: The right kidney measures 8.8 cm in length and the left kidney measures 7.8 cm in length. The ultrasound technologist noted difficulty visualizing the left kidney due to overlying bowel gas and inability of the patient to move. Kidneys demonstrate normal cortical echogenicity. No definite intrarenal stones. There is mild prominence of the right renal pelvis which is not demonstrated on the previous CT suggesting mild right hydronephrosis. Bladder: Unremarkable appearance of the bladder. Bilateral ureteral jets are demonstrated. Mild right hydronephrosis of uncertain etiology. The right ureteral jet is visualized. Suboptimal visualization and evaluation of the left kidney but no definite left renal pathology. XR SKULL (<4 VIEWS)    Result Date: 5/7/2022  EXAMINATION: TWO XRAY VIEWS OF THE CHEST; TWO XRAY VIEWS OF THE NECK SOFT TISSUES; TWO XRAY VIEWS OF THE ABDOMEN; THREE XRAY VIEWS OF THE SKULL 5/7/2022 1:55 pm COMPARISON: None. HISTORY: ORDERING SYSTEM PROVIDED HISTORY: s/p  shunt TECHNOLOGIST PROVIDED HISTORY: Reason for exam:->s/p  shunt Reason for exam:->shunt series What reading provider will be dictating this exam?->CRC FINDINGS: There is evidence of a right-sided shunt tube. Portions of the catheter near the valve are not opaque.   Continuity in this region of the right lateral aspect of the skull cannot be determined. Remainder of the shunt tube in the neck, chest and abdomen appears unremarkable. There are no infiltrates or effusions. Heart size is normal. In the abdomen the shunt tube traverses in the left side and is than looped in the right-side of the lower abdomen. There are some nonspecific air-fluid levels in the abdomen bowel gas. Bowel loops otherwise appear unremarkable. There is a right hip prosthesis. IVC filter is noted.  shunt tube as described. XR ABDOMEN (2 VIEWS)    Result Date: 5/7/2022  EXAMINATION: TWO XRAY VIEWS OF THE CHEST; TWO XRAY VIEWS OF THE NECK SOFT TISSUES; TWO XRAY VIEWS OF THE ABDOMEN; THREE XRAY VIEWS OF THE SKULL 5/7/2022 1:55 pm COMPARISON: None. HISTORY: ORDERING SYSTEM PROVIDED HISTORY: s/p  shunt TECHNOLOGIST PROVIDED HISTORY: Reason for exam:->s/p  shunt Reason for exam:->shunt series What reading provider will be dictating this exam?->CRC FINDINGS: There is evidence of a right-sided shunt tube. Portions of the catheter near the valve are not opaque. Continuity in this region of the right lateral aspect of the skull cannot be determined. Remainder of the shunt tube in the neck, chest and abdomen appears unremarkable. There are no infiltrates or effusions. Heart size is normal. In the abdomen the shunt tube traverses in the left side and is than looped in the right-side of the lower abdomen. There are some nonspecific air-fluid levels in the abdomen bowel gas. Bowel loops otherwise appear unremarkable. There is a right hip prosthesis. IVC filter is noted.  shunt tube as described.        Discharge Medications:      Medication List      START taking these medications    ipratropium-albuterol 0.5-2.5 (3) MG/3ML Soln nebulizer solution  Commonly known as: DUONEB  Inhale 3 mLs into the lungs every 4 hours as needed for Shortness of Breath     lactulose 10 GM/15ML solution  Commonly known as: CHRONULAC  Take 30 mLs by mouth 3 times daily Titrate for 2-3 bowel movements per day     levETIRAcetam 500 MG tablet  Commonly known as: Keppra  Take 1 tablet by mouth 2 times daily        CHANGE how you take these medications    risperiDONE 0.25 MG tablet  Commonly known as: RisperDAL  Take 1 tablet by mouth 2 times daily  What changed: Another medication with the same name was removed. Continue taking this medication, and follow the directions you see here. CONTINUE taking these medications    ACIDOPHILUS PO     donepezil 5 MG tablet  Commonly known as: ARICEPT     levothyroxine 88 MCG tablet  Commonly known as: SYNTHROID  Take 1 tablet by mouth Daily     liothyronine 5 MCG tablet  Commonly known as: CYTOMEL  Take 1 tab by mouth every morning on Ebfpfd-Xlkumsuma-Tgmasv     nystatin 981986 UNIT/GM cream  Commonly known as: MYCOSTATIN  Apply topically 2 times daily. 30g     Vitamin D-3 Super Strength 50 MCG (2000 UT) Tabs  Generic drug: Cholecalciferol  Take 1 tablet by mouth daily           Where to Get Your Medications      These [facility] to [facility] [address] [address]    Phone: [phone]   · ipratropium-albuterol 0.5-2.5 (3) MG/3ML Soln nebulizer solution  · lactulose 10 GM/15ML solution  · levETIRAcetam 500 MG tablet         Time Spent on discharge is more than 45 minutes in the examination, evaluation, counseling and review of medications and discharge plan.    +++++++++++++++++++++++++++++++++++++++++++++++++  Karan Eason MD  [address]  +++++++++++++++++++++++++++++++++++++++++++++++++  NOTE: This report was transcribed using voice recognition software. Every effort was made to ensure accuracy; however, inadvertent computerized transcription errors may be present.

## 2022-05-15 NOTE — PROGRESS NOTES
Negative Covid test from yesterday 5/14 okay no need to repeat Covid today per facility. Nurse to Nurse called. All questions answered.

## 2022-05-15 NOTE — PROGRESS NOTES
Patient is a long term bed hold at [facility] and can return today. [facility] to pick patient up later today at 4pm. Mother at bedside notified of patient leaving today.

## 2022-05-15 NOTE — CARE COORDINATION
Care Coordination:  Dc order from 5/14/22 noted and transport had been set up for SNF and held. Messaged attending regarding discharge today and checking delay. Noticed nursing has arranged for transport today. Delays entered    Stephan Tao    Addendum: I asked nursing to check with facility regarding need for a repeat covid since they had organized all of the above. They will need to confirm if another needs done prior to dc as protocol is day of discharge. They confirmed they will document once Augie Door RN confirms with facility.  I have messaged the attending regarding discharge that was held    Stephan Tao

## 2022-05-16 ENCOUNTER — TELEPHONE (OUTPATIENT)
Dept: ADMINISTRATIVE | Age: 57
End: 2022-05-16

## 2022-05-16 NOTE — TELEPHONE ENCOUNTER
Rocio Finney from [employer] called to schedule hospital follow up for patient; [facility] discharge 5/15 Seizure;  Unable ot schedule within the requested timeframe; please call her back at [phone]

## 2022-05-17 ENCOUNTER — TELEPHONE (OUTPATIENT)
Dept: NEUROSURGERY | Age: 57
End: 2022-05-17

## 2022-05-17 NOTE — TELEPHONE ENCOUNTER
Pts father called, would like Cecilio Ventura or Raphael Madison to call him back. Pt was seen in the hospital last week. Father wants her shunt adjusted he believes her symptoms are due to it being set too high. Appt for today was cancelled, Per Shalonda's note, pt is to follow up with [facility]. Pts father wanted a second opinion. [employer] can be reached at [phone].

## 2022-05-17 NOTE — TELEPHONE ENCOUNTER
I discussed with patient's father and mother in detail head CT scan and further plan while in the hospital for over 30 minutes. [facility] has been adjusting her shunt. I am unsure what they want. Please advise per Dr. Eileen Weathers.

## 2022-05-24 ENCOUNTER — OFFICE VISIT (OUTPATIENT)
Dept: NEUROSURGERY | Age: 57
End: 2022-05-24
Payer: MEDICARE

## 2022-05-24 DIAGNOSIS — G91.0 COMMUNICATING HYDROCEPHALUS (HCC): Primary | ICD-10-CM

## 2022-05-24 PROCEDURE — G8428 CUR MEDS NOT DOCUMENT: HCPCS | Performed by: NEUROLOGICAL SURGERY

## 2022-05-24 PROCEDURE — G8420 CALC BMI NORM PARAMETERS: HCPCS | Performed by: NEUROLOGICAL SURGERY

## 2022-05-24 PROCEDURE — 99212 OFFICE O/P EST SF 10 MIN: CPT

## 2022-05-24 PROCEDURE — 1111F DSCHRG MED/CURRENT MED MERGE: CPT | Performed by: NEUROLOGICAL SURGERY

## 2022-05-24 PROCEDURE — 99212 OFFICE O/P EST SF 10 MIN: CPT | Performed by: NEUROLOGICAL SURGERY

## 2022-05-24 PROCEDURE — 1036F TOBACCO NON-USER: CPT | Performed by: NEUROLOGICAL SURGERY

## 2022-05-24 PROCEDURE — 3017F COLORECTAL CA SCREEN DOC REV: CPT | Performed by: NEUROLOGICAL SURGERY

## 2022-05-24 NOTE — PROGRESS NOTES
Patient is here for follow up from a hospital consult for: hydrocephalus    Physical exam  Awake  Non-verbal  No commands. A/P: patient is here for follow up for: hydrocephalus.  I will set her valve to 80 cm of water    Rober Mc MD

## 2022-05-30 ENCOUNTER — HOSPITAL ENCOUNTER (INPATIENT)
Age: 57
LOS: 2 days | Discharge: NURSING, REHAB OR LONG-TERM CARE | DRG: 177 | End: 2022-06-01
Attending: STUDENT IN AN ORGANIZED HEALTH CARE EDUCATION/TRAINING PROGRAM | Admitting: INTERNAL MEDICINE
Payer: MEDICARE

## 2022-05-30 ENCOUNTER — APPOINTMENT (OUTPATIENT)
Dept: CT IMAGING | Age: 57
DRG: 177 | End: 2022-05-30
Payer: MEDICARE

## 2022-05-30 DIAGNOSIS — N30.00 ACUTE CYSTITIS WITHOUT HEMATURIA: ICD-10-CM

## 2022-05-30 DIAGNOSIS — I62.01 ACUTE ON CHRONIC INTRACRANIAL SUBDURAL HEMATOMA (HCC): Primary | ICD-10-CM

## 2022-05-30 DIAGNOSIS — J96.01 ACUTE RESPIRATORY FAILURE WITH HYPOXIA (HCC): ICD-10-CM

## 2022-05-30 DIAGNOSIS — I62.03 ACUTE ON CHRONIC INTRACRANIAL SUBDURAL HEMATOMA (HCC): Primary | ICD-10-CM

## 2022-05-30 LAB
ALBUMIN SERPL-MCNC: 3 G/DL (ref 3.5–5.2)
ALP BLD-CCNC: 119 U/L (ref 35–104)
ALT SERPL-CCNC: 35 U/L (ref 0–32)
ANION GAP SERPL CALCULATED.3IONS-SCNC: 8 MMOL/L (ref 7–16)
AST SERPL-CCNC: 49 U/L (ref 0–31)
B.E.: 1.3 MMOL/L (ref -3–3)
BACTERIA: ABNORMAL /HPF
BASOPHILS ABSOLUTE: 0.04 E9/L (ref 0–0.2)
BASOPHILS RELATIVE PERCENT: 0.2 % (ref 0–2)
BILIRUB SERPL-MCNC: 0.4 MG/DL (ref 0–1.2)
BILIRUBIN URINE: NEGATIVE
BLOOD, URINE: NEGATIVE
BUN BLDV-MCNC: 17 MG/DL (ref 6–20)
CALCIUM SERPL-MCNC: 8.7 MG/DL (ref 8.6–10.2)
CHLORIDE BLD-SCNC: 101 MMOL/L (ref 98–107)
CHP ED QC CHECK: NORMAL
CLARITY: ABNORMAL
CO2: 27 MMOL/L (ref 22–29)
COHB: 0.6 % (ref 0–1.5)
COLOR: YELLOW
CREAT SERPL-MCNC: 0.6 MG/DL (ref 0.5–1)
CRITICAL: ABNORMAL
DATE ANALYZED: ABNORMAL
DATE OF COLLECTION: ABNORMAL
EOSINOPHILS ABSOLUTE: 0 E9/L (ref 0.05–0.5)
EOSINOPHILS RELATIVE PERCENT: 0 % (ref 0–6)
EPITHELIAL CELLS, UA: ABNORMAL /HPF
GFR AFRICAN AMERICAN: >60
GFR NON-AFRICAN AMERICAN: >60 ML/MIN/1.73
GLUCOSE BLD-MCNC: 123 MG/DL (ref 74–99)
GLUCOSE URINE: NEGATIVE MG/DL
HCO3: 24.4 MMOL/L (ref 22–26)
HCT VFR BLD CALC: 44.5 % (ref 34–48)
HEMOGLOBIN: 14.4 G/DL (ref 11.5–15.5)
HHB: 3.8 % (ref 0–5)
IMMATURE GRANULOCYTES #: 0.07 E9/L
IMMATURE GRANULOCYTES %: 0.4 % (ref 0–5)
INFLUENZA A: NOT DETECTED
INFLUENZA B: NOT DETECTED
INR BLD: 1.1
KETONES, URINE: NEGATIVE MG/DL
LAB: ABNORMAL
LACTIC ACID: 1.6 MMOL/L (ref 0.5–2.2)
LEUKOCYTE ESTERASE, URINE: ABNORMAL
LYMPHOCYTES ABSOLUTE: 2.01 E9/L (ref 1.5–4)
LYMPHOCYTES RELATIVE PERCENT: 12.4 % (ref 20–42)
Lab: ABNORMAL
MCH RBC QN AUTO: 30.3 PG (ref 26–35)
MCHC RBC AUTO-ENTMCNC: 32.4 % (ref 32–34.5)
MCV RBC AUTO: 93.5 FL (ref 80–99.9)
METHB: 0.3 % (ref 0–1.5)
MODE: ABNORMAL
MONOCYTES ABSOLUTE: 0.62 E9/L (ref 0.1–0.95)
MONOCYTES RELATIVE PERCENT: 3.8 % (ref 2–12)
NEUTROPHILS ABSOLUTE: 13.45 E9/L (ref 1.8–7.3)
NEUTROPHILS RELATIVE PERCENT: 83.2 % (ref 43–80)
NITRITE, URINE: POSITIVE
O2 CONTENT: 20.2 ML/DL
O2 SATURATION: 96.2 % (ref 92–98.5)
O2HB: 95.3 % (ref 94–97)
OPERATOR ID: ABNORMAL
PATIENT TEMP: 37 C
PCO2: 34.1 MMHG (ref 35–45)
PDW BLD-RTO: 14.9 FL (ref 11.5–15)
PH BLOOD GAS: 7.47 (ref 7.35–7.45)
PH UA: 6 (ref 5–9)
PLATELET # BLD: 233 E9/L (ref 130–450)
PMV BLD AUTO: 9.9 FL (ref 7–12)
PO2: 78.4 MMHG (ref 75–100)
POTASSIUM REFLEX MAGNESIUM: 5.8 MMOL/L (ref 3.5–5)
POTASSIUM SERPL-SCNC: 4.2 MMOL/L (ref 3.5–5)
PRO-BNP: 345 PG/ML (ref 0–125)
PROTEIN UA: NEGATIVE MG/DL
PROTHROMBIN TIME: 12.4 SEC (ref 9.3–12.4)
RBC # BLD: 4.76 E12/L (ref 3.5–5.5)
RBC UA: ABNORMAL /HPF (ref 0–2)
REASON FOR REJECTION: NORMAL
REJECTED TEST: NORMAL
SARS-COV-2 RNA, RT PCR: NOT DETECTED
SODIUM BLD-SCNC: 136 MMOL/L (ref 132–146)
SOURCE, BLOOD GAS: ABNORMAL
SPECIFIC GRAVITY UA: 1.02 (ref 1–1.03)
THB: 15.1 G/DL (ref 11.5–16.5)
TIME ANALYZED: 1712
TOTAL PROTEIN: 7.2 G/DL (ref 6.4–8.3)
TROPONIN, HIGH SENSITIVITY: 26 NG/L (ref 0–9)
TROPONIN, HIGH SENSITIVITY: 29 NG/L (ref 0–9)
UROBILINOGEN, URINE: 0.2 E.U./DL
WBC # BLD: 16.2 E9/L (ref 4.5–11.5)
WBC UA: ABNORMAL /HPF (ref 0–5)

## 2022-05-30 PROCEDURE — 81001 URINALYSIS AUTO W/SCOPE: CPT

## 2022-05-30 PROCEDURE — 82805 BLOOD GASES W/O2 SATURATION: CPT

## 2022-05-30 PROCEDURE — 87186 SC STD MICRODIL/AGAR DIL: CPT

## 2022-05-30 PROCEDURE — 83605 ASSAY OF LACTIC ACID: CPT

## 2022-05-30 PROCEDURE — 1200000000 HC SEMI PRIVATE

## 2022-05-30 PROCEDURE — 36415 COLL VENOUS BLD VENIPUNCTURE: CPT

## 2022-05-30 PROCEDURE — 84484 ASSAY OF TROPONIN QUANT: CPT

## 2022-05-30 PROCEDURE — 85610 PROTHROMBIN TIME: CPT

## 2022-05-30 PROCEDURE — 83880 ASSAY OF NATRIURETIC PEPTIDE: CPT

## 2022-05-30 PROCEDURE — 70450 CT HEAD/BRAIN W/O DYE: CPT

## 2022-05-30 PROCEDURE — 93005 ELECTROCARDIOGRAM TRACING: CPT | Performed by: STUDENT IN AN ORGANIZED HEALTH CARE EDUCATION/TRAINING PROGRAM

## 2022-05-30 PROCEDURE — 99285 EMERGENCY DEPT VISIT HI MDM: CPT

## 2022-05-30 PROCEDURE — 80053 COMPREHEN METABOLIC PANEL: CPT

## 2022-05-30 PROCEDURE — 6360000004 HC RX CONTRAST MEDICATION: Performed by: RADIOLOGY

## 2022-05-30 PROCEDURE — 85025 COMPLETE CBC W/AUTO DIFF WBC: CPT

## 2022-05-30 PROCEDURE — 87088 URINE BACTERIA CULTURE: CPT

## 2022-05-30 PROCEDURE — 2580000003 HC RX 258: Performed by: STUDENT IN AN ORGANIZED HEALTH CARE EDUCATION/TRAINING PROGRAM

## 2022-05-30 PROCEDURE — 71275 CT ANGIOGRAPHY CHEST: CPT

## 2022-05-30 PROCEDURE — 87636 SARSCOV2 & INF A&B AMP PRB: CPT

## 2022-05-30 PROCEDURE — 84132 ASSAY OF SERUM POTASSIUM: CPT

## 2022-05-30 PROCEDURE — 87077 CULTURE AEROBIC IDENTIFY: CPT

## 2022-05-30 RX ORDER — SODIUM CHLORIDE 0.9 % (FLUSH) 0.9 %
10 SYRINGE (ML) INJECTION PRN
Status: DISCONTINUED | OUTPATIENT
Start: 2022-05-30 | End: 2022-06-01 | Stop reason: HOSPADM

## 2022-05-30 RX ORDER — LEVETIRACETAM 15 MG/ML
1500 INJECTION INTRAVASCULAR ONCE
Status: COMPLETED | OUTPATIENT
Start: 2022-05-30 | End: 2022-05-31

## 2022-05-30 RX ORDER — 0.9 % SODIUM CHLORIDE 0.9 %
1000 INTRAVENOUS SOLUTION INTRAVENOUS ONCE
Status: COMPLETED | OUTPATIENT
Start: 2022-05-30 | End: 2022-05-30

## 2022-05-30 RX ADMIN — IOPAMIDOL 75 ML: 755 INJECTION, SOLUTION INTRAVENOUS at 21:22

## 2022-05-30 RX ADMIN — SODIUM CHLORIDE 1000 ML: 9 INJECTION, SOLUTION INTRAVENOUS at 17:12

## 2022-05-30 ASSESSMENT — PAIN - FUNCTIONAL ASSESSMENT: PAIN_FUNCTIONAL_ASSESSMENT: ADULT NONVERBAL PAIN SCALE (NPVS)

## 2022-05-30 NOTE — LETTER
[address] [place] [address] Dr Department Medicaid  CERTIFICATION OF NECESSITY  FOR NON-EMERGENCY TRANSPORTATION   BY GROUND AMBULANCE      Individual Information   1. Name: Sixto Faustin See 2. [address] [place] [address] Dr Medicaid Billing Number:   3. Address: [facility] [address] [address]                                      Transport to [facility] [place]   4. Provider Name: [facility]   5. [address] [place] [address] Dr Medicaid Provider Number: National Provider Identifier (NPI):     Certification  7. Criteria:  During transport, this individual requires:  [x] Medical treatment or continuous     supervision by an EMT. [x] The administration or regulation of oxygen by another person. [] Supervised protective restraint. 8. Period Beginning Date: 05/31/2022   9. Length  [x] Not more than 5 day(s)  [] One Year     Additional Information Relevant to Certification   10. Comments or Explanations, If Necessary or Appropriate     Acute on chronic intracranial subdural hematoma, Down syndrome, oxygen @ _____ L NC, non-verbal at baseline and unable to follow commands     Certifying Practitioner Information   11. Name of Practitioner: David Leary MD   12. [address] [place] [address] Dr Medicaid Provider Number, If Applicable: [address] Provider Identifier (NPI):     Signature Information   14. Date of Signature: 05/31/2022 15. Name of Person Signing: NURY Zhu, RN, CM/Discharge Planner   16. Signature and Professional Designation: Electronically signed by Yanira Zamora RN on 5/31/2022 at 12:29 PM      [facility] [address]  Rev. 7/2015    [address] [place] [address] Encounter Date/Time: 5/30/2022 [address] [facility] [place] Account: [de-identified]    MRN: [id number]    Patient: Sixto Faustin See    Contact Serial #: [id number]      ENCOUNTER          Patient Class: I Private Enc?   No Unit RM BD: SEYZ [id number]   Hospital Service: MED   Encounter DX: Encephalopathy [G93.40]   ADM Provider: Danyelle Chaudhari MD   Procedure:     ATT Provider: David Leary MD   REF Provider:        Admission DX: Encephalopathy, Acute cystitis without hematuria, Acute respiratory failure with hypoxia (HCC), Acute on chronic intracranial subdural hematoma (HCC) and DX codes: G93.40, N30.00, J96.01, I62.01, I62.03      PATIENT                 Name: Christopher Michel See : 1965 (57 yrs)   Address: [address] Sex: Female   City: [address] [place] [address]         Marital Status: Single   Employer: DISABLED         Pentecostal: Anabaptist   Primary Care Provider: Fred Garnett DO         Primary Phone: [phone]   EMERGENCY CONTACT   Contact Name Legal Guardian? Relationship to Patient Home Phone Work Phone   1. Caitlin Early  2. Zach Early      Legal Guardian  Parent [phone]              GUARANTOR            Guarantor: Christopher Michel See     : 1965   Address: [address] Sex: Female     [place] [address]     Relation to Patient: Self       Home Phone: [phone]   Guarantor ID: [id number]       Work Phone:     Guarantor Employer: DISABLED         Status: DISABLED      COVERAGE        PRIMARY INSURANCE   Payor: MEDICARE Plan: MEDICARE PART A AND B   Payor Address: [facility] [address],  [facility] [address], [facility] [address]       Group Number:   Insurance Type: INDEMNITY   Subscriber Name: Magno Moran : 1965   Subscriber ID: [id number] Pat. Rel. to Sub: Self   SECONDARY INSURANCE   Payor: [place]ICAID [place] Plan: [facility] DEPT OF*   Payor Address:  [facility] [address], [place] [address] [address]          Group Number:   Insurance Type: INDEMNITY   Subscriber Name: Magno Moran : 1965   Subscriber ID: [id number] Pat.  Rel. to Sub: SELF           CSN: [id number]

## 2022-05-30 NOTE — ED PROVIDER NOTES
Chief Complaint   Patient presents with    Shortness of Breath     From [facility] for concern of possible tube feed aspiration. Facility called 911 for SOB, pt was 83% on RA, increased to 98% on 6L. EMS suctioned tube feed from her mouth. Patient is a 60-year-old female with a history of Down syndrome and subdurals who presents today via EMS for hypoxia. She was noted to be hypoxic with O2 saturation 83% on room air. She was placed on 6 L nasal cannula and brought to the ER for evaluation. History limited due to patient's current condition. Per chart review does not appear that patient is chronically on oxygen. Symptom started earlier today. Symptoms have been progressive and severe in nature. No aggravating alleviating factors. History limited otherwise due to patient's current condition. The history is provided by the patient. No  was used. Review of Systems   Unable to perform ROS: Acuity of condition        Physical Exam  Vitals and nursing note reviewed. Constitutional:       General: She is in acute distress. Appearance: She is ill-appearing. HENT:      Head: Normocephalic and atraumatic. Eyes:      Pupils: Pupils are equal, round, and reactive to light. Cardiovascular:      Rate and Rhythm: Normal rate and regular rhythm. Pulses: Normal pulses. Heart sounds: Normal heart sounds. Pulmonary:      Effort: Respiratory distress present. Breath sounds: Rhonchi present. No wheezing or rales. Abdominal:      General: Bowel sounds are normal.      Palpations: Abdomen is soft. Tenderness: There is no abdominal tenderness. There is no guarding or rebound. Musculoskeletal:         General: Normal range of motion. Cervical back: Normal range of motion and neck supple. No rigidity or tenderness. Right lower leg: No edema. Left lower leg: No edema. Skin:     General: Skin is warm and dry.       Capillary Refill: Capillary refill takes less than 2 seconds. Neurological:      Mental Status: She is alert. She is disoriented. Cranial Nerves: No cranial nerve deficit.       Coordination: Coordination normal.          Procedures     Labs Reviewed   CBC WITH AUTO DIFFERENTIAL - Abnormal; Notable for the following components:       Result Value    WBC 16.2 (*)     Neutrophils % 83.2 (*)     Lymphocytes % 12.4 (*)     Neutrophils Absolute 13.45 (*)     Eosinophils Absolute 0.00 (*)     All other components within normal limits   COMPREHENSIVE METABOLIC PANEL W/ REFLEX TO MG FOR LOW K - Abnormal; Notable for the following components:    Potassium reflex Magnesium 5.8 (*)     Glucose 123 (*)     Albumin 3.0 (*)     Alkaline Phosphatase 119 (*)     ALT 35 (*)     AST 49 (*)     All other components within normal limits   BRAIN NATRIURETIC PEPTIDE - Abnormal; Notable for the following components:    Pro- (*)     All other components within normal limits    Narrative:     CALL  [place]  H34 tel. ,  Rejected Test Name/Called to: TRP5/ DR. Jen Castellon, 05/30/2022 18:08, by [facility]   URINALYSIS WITH MICROSCOPIC - Abnormal; Notable for the following components:    Nitrite, Urine POSITIVE (*)     Leukocyte Esterase, Urine TRACE (*)     Bacteria, UA MODERATE (*)     All other components within normal limits   BLOOD GAS, ARTERIAL - Abnormal; Notable for the following components:    pH, Blood Gas 7.472 (*)     PCO2 34.1 (*)     All other components within normal limits   TROPONIN - Abnormal; Notable for the following components:    Troponin, High Sensitivity 26 (*)     All other components within normal limits   TROPONIN - Abnormal; Notable for the following components:    Troponin, High Sensitivity 29 (*)     All other components within normal limits   POCT GLUCOSE - Normal   COVID-19 & INFLUENZA COMBO   RESPIRATORY PANEL, MOLECULAR, WITH COVID-19   CULTURE, BLOOD 1   CULTURE, BLOOD 2   CULTURE, URINE   LACTIC ACID   PROTIME-INR SPECIMEN REJECTION    Narrative:     CALL  [place]  H34 tel. ,  Rejected Test Name/Called to: TRP5/ DR. Garett Padilla, 05/30/2022 18:08, by [facility]   POTASSIUM   ARTERIAL BLOOD GAS, RESPIRATORY ONLY   POTASSIUM   TROPONIN     CT Head WO Contrast   Final Result   1. Redemonstration of hypoattenuating extra-axial collection overlying left   parietal lobe related to chronic subdural hygroma or chronic subdural   hematoma. Small focus of increased attenuation within this collection could   suggest small acute on chronic subdural hematoma. Results were called by Dr. Geoff Dickinson to Dr. Darion Castañeda on 5/30/2022 at 21:57.   2. Stable areas of encephalomalacia involving bilateral frontal lobes and   bilateral temporal lobes. 3. Stable position of right frontal ventricular shunt catheter. 4. Stable moderate ventriculomegaly. CTA PULMONARY W CONTRAST   Final Result   No evidence of pulmonary embolism or acute pulmonary abnormality. Noted right aberrant subclavian artery of variant anatomic thoracic arch   branching. Subsegmental dependent atelectasis. EKG #1:   I personally interpreted this EKG  Time:  1717    Rate: 92  Rhythm: Sinus. Interpretation: Sinus rhythm, normal axis, no ST elevation. MDM  Number of Diagnoses or Management Options  Acute cystitis without hematuria  Acute on chronic intracranial subdural hematoma (HCC)  Acute respiratory failure with hypoxia (HCC)  Diagnosis management comments: Patient is a 50-year-old female who initially presented today for hypoxia. She was placed on nasal cannula via EMS. On arrival EMS was unable to tell me what patient's baseline is, there is no family present. Per chart review she does have history of previous subdural hematomas. Patient remained on nasal cannula, she was given IV fluids, labs and imaging obtained. Labs and imaging show no reason for patient's hypoxia. ABG shows P O2 78, PCO2 34. COVID and influenza were negative.   CTA obtained which shows no evidence of cardiopulmonary abnormality, no PE noted. CT imaging was obtained due to patient's history, they do note acute on chronic subdural hematoma. Family did show up throughout patient's hospitalization, states she is at her current baseline. She was given Keppra. Consult placed to neurosurgery. She will be admitted to neuro ICU for further evaluation and treatment. Amount and/or Complexity of Data Reviewed  Clinical lab tests: reviewed  Tests in the radiology section of CPT®: reviewed             ED Course as of 05/31/22 0000   Mon May 30, 2022   2340 Neurosurgery consulted, patient was given [employer] []      ED Course User Index  [JH] Ej Shah, DO       --------------------------------------------- PAST HISTORY ---------------------------------------------  Past Medical History:  has a past medical history of Arthritis, Down syndrome, Hypothyroidism, Osteoarthritis, Syncope, Thyroid disease, UTI (urinary tract infection), Varicose veins, and Wears glasses. Past Surgical History:  has a past surgical history that includes knee surgery; hip surgery; Toe Surgery; ECHO Compl W Dop Color Flow (5/14/2012); Tubal ligation; Total knee arthroplasty (Left, 08/11/2016); Abdominal adhesion surgery; Abdomen surgery; joint replacement; craniotomy (Right, 4/12/2019); Vena Cava Filter Placement (Bilateral, 4/22/2019); brain surgery; and Ventriculoperitoneal shunt (N/A, 11/12/2019). Social History:  reports that she has never smoked. She has never used smokeless tobacco. She reports that she does not drink alcohol and does not use drugs. Family History: family history includes Heart Disease in her maternal grandfather, maternal grandmother, paternal aunt, and paternal uncle. The patients home medications have been reviewed. Allergies: Patient has no known allergies.     -------------------------------------------------- RESULTS -------------------------------------------------    LABS:  Results for orders placed or performed during the hospital encounter of 05/30/22   COVID-19 & Influenza Combo    Specimen: Nasopharyngeal Swab   Result Value Ref Range    SARS-CoV-2 RNA, RT PCR NOT DETECTED NOT DETECTED    INFLUENZA A NOT DETECTED NOT DETECTED    INFLUENZA B NOT DETECTED NOT DETECTED   CBC with Auto Differential   Result Value Ref Range    WBC 16.2 (H) 4.5 - 11.5 E9/L    RBC 4.76 3.50 - 5.50 E12/L    Hemoglobin 14.4 11.5 - 15.5 g/dL    Hematocrit 44.5 34.0 - 48.0 %    MCV 93.5 80.0 - 99.9 fL    MCH 30.3 26.0 - 35.0 pg    MCHC 32.4 32.0 - 34.5 %    RDW 14.9 11.5 - 15.0 fL    Platelets 008 705 - 008 E9/L    MPV 9.9 7.0 - 12.0 fL    Neutrophils % 83.2 (H) 43.0 - 80.0 %    Immature Granulocytes % 0.4 0.0 - 5.0 %    Lymphocytes % 12.4 (L) 20.0 - 42.0 %    Monocytes % 3.8 2.0 - 12.0 %    Eosinophils % 0.0 0.0 - 6.0 %    Basophils % 0.2 0.0 - 2.0 %    Neutrophils Absolute 13.45 (H) 1.80 - 7.30 E9/L    Immature Granulocytes # 0.07 E9/L    Lymphocytes Absolute 2.01 1.50 - 4.00 E9/L    Monocytes Absolute 0.62 0.10 - 0.95 E9/L    Eosinophils Absolute 0.00 (L) 0.05 - 0.50 E9/L    Basophils Absolute 0.04 0.00 - 0.20 E9/L   Comprehensive Metabolic Panel w/ Reflex to MG   Result Value Ref Range    Sodium 136 132 - 146 mmol/L    Potassium reflex Magnesium 5.8 (H) 3.5 - 5.0 mmol/L    Chloride 101 98 - 107 mmol/L    CO2 27 22 - 29 mmol/L    Anion Gap 8 7 - 16 mmol/L    Glucose 123 (H) 74 - 99 mg/dL    BUN 17 6 - 20 mg/dL    CREATININE 0.6 0.5 - 1.0 mg/dL    GFR Non-African American >60 >=60 mL/min/1.73    GFR African American >60     Calcium 8.7 8.6 - 10.2 mg/dL    Total Protein 7.2 6.4 - 8.3 g/dL    Albumin 3.0 (L) 3.5 - 5.2 g/dL    Total Bilirubin 0.4 0.0 - 1.2 mg/dL    Alkaline Phosphatase 119 (H) 35 - 104 U/L    ALT 35 (H) 0 - 32 U/L    AST 49 (H) 0 - 31 U/L   Brain Natriuretic Peptide   Result Value Ref Range    Pro- (H) 0 - 125 pg/mL   Lactic Acid   Result Value Ref Range    Lactic Acid 1.6 0.5 - 2.2 mmol/L   Protime-INR   Result Value Ref Range    Protime 12.4 9.3 - 12.4 sec    INR 1.1    Urinalysis with Microscopic   Result Value Ref Range    Color, UA Yellow Straw/Yellow    Clarity, UA SL CLOUDY Clear    Glucose, Ur Negative Negative mg/dL    Bilirubin Urine Negative Negative    Ketones, Urine Negative Negative mg/dL    Specific Gravity, UA 1.020 1.005 - 1.030    Blood, Urine Negative Negative    pH, UA 6.0 5.0 - 9.0    Protein, UA Negative Negative mg/dL    Urobilinogen, Urine 0.2 <2.0 E.U./dL    Nitrite, Urine POSITIVE (A) Negative    Leukocyte Esterase, Urine TRACE (A) Negative    WBC, UA 1-3 0 - 5 /HPF    RBC, UA 2-5 0 - 2 /HPF    Epithelial Cells, UA RARE /HPF    Bacteria, UA MODERATE (A) None Seen /HPF   Blood Gas, Arterial   Result Value Ref Range    Date Analyzed 20220530     Time Analyzed 1712     Source: Blood Arterial     pH, Blood Gas 7.472 (H) 7.350 - 7.450    PCO2 34.1 (L) 35.0 - 45.0 mmHg    PO2 78.4 75.0 - 100.0 mmHg    HCO3 24.4 22.0 - 26.0 mmol/L    B.E. 1.3 -3.0 - 3.0 mmol/L    O2 Sat 96.2 92.0 - 98.5 %    O2Hb 95.3 94.0 - 97.0 %    COHb 0.6 0.0 - 1.5 %    MetHb 0.3 0.0 - 1.5 %    O2 Content 20.2 mL/dL    HHb 3.8 0.0 - 5.0 %    tHb (est) 15.1 11.5 - 16.5 g/dL    Mode NC- 5 L     Date Of Collection      Time Collected      Pt Temp 37.0 C     ID [id number]     Lab [id number]     Critical(s) Notified . No Critical Values    SPECIMEN REJECTION   Result Value Ref Range    Rejected Test TRP5     Reason for Rejection see below    Troponin   Result Value Ref Range    Troponin, High Sensitivity 26 (H) 0 - 9 ng/L   Potassium   Result Value Ref Range    Potassium 4.2 3.5 - 5.0 mmol/L   Troponin   Result Value Ref Range    Troponin, High Sensitivity 29 (H) 0 - 9 ng/L   POCT Glucose   Result Value Ref Range    QC OK? y        RADIOLOGY:  CT Head WO Contrast   Final Result   1.  Redemonstration of hypoattenuating extra-axial collection overlying left   parietal lobe related to chronic subdural hygroma or chronic subdural   hematoma. Small focus of increased attenuation within this collection could   suggest small acute on chronic subdural hematoma. Results were called by Dr. Naveen Murphy to Dr. Zoe Ozuna on 5/30/2022 at 21:57.   2. Stable areas of encephalomalacia involving bilateral frontal lobes and   bilateral temporal lobes. 3. Stable position of right frontal ventricular shunt catheter. 4. Stable moderate ventriculomegaly. CTA PULMONARY W CONTRAST   Final Result   No evidence of pulmonary embolism or acute pulmonary abnormality. Noted right aberrant subclavian artery of variant anatomic thoracic arch   branching. Subsegmental dependent atelectasis.                 ------------------------- NURSING NOTES AND VITALS REVIEWED ---------------------------  Date / Time Roomed:  5/30/2022  4:47 PM  ED Bed Assignment:  12/12    The nursing notes within the ED encounter and vital signs as below have been reviewed. Patient Vitals for the past 24 hrs:   BP Temp Temp src Pulse Resp SpO2 Height Weight   05/30/22 2300 94/65   68 18 99 %     05/30/22 2233 112/66   69 (!) 9 98 %     05/30/22 2032 118/73   78 13 100 %     05/30/22 2002 113/65   72 21 99 %     05/30/22 1932 (!) 119/102   78 16 99 %     05/30/22 1800    79 19 98 %     05/30/22 1730 126/62   92 19 100 %     05/30/22 1715 115/78   89 (!) 35 95 %     05/30/22 1636 89/66 99.5 °F (37.5 °C) Axillary 86 (!) 40 98 % 4' 10\" (1.473 m) 120 lb (54.4 kg)       Oxygen Saturation Interpretation: Abnormal    ------------------------------------------ PROGRESS NOTES ------------------------------------------    Counseling:  I have spoken with the patient and discussed todays results, in addition to providing specific details for the plan of care and counseling regarding the diagnosis and prognosis.   Their questions are answered at this time and they are agreeable with the plan of admission.    --------------------------------- ADDITIONAL PROVIDER NOTES ---------------------------------  Consultations:  Spoke with [facility]. Discussed case. They will admit the patient. This patient's ED course included: a personal history and physicial examination    This patient has remained hemodynamically stable during their ED course. Medications   sodium chloride flush 0.9 % injection 10 mL (has no administration in time range)   cefTRIAXone (ROCEPHIN) 2,000 mg in sterile water 20 mL IV syringe (has no administration in time range)   levETIRAcetam (KEPPRA) 1500 mg/100 mL IVPB (has no administration in time range)   0.9 % sodium chloride bolus (0 mLs IntraVENous Stopped 5/30/22 1821)   iopamidol (ISOVUE-370) 76 % injection 75 mL (75 mLs IntraVENous Given 5/30/22 2122)         Diagnosis:  1. Acute on chronic intracranial subdural hematoma (HCC)    2. Acute respiratory failure with hypoxia (HCC)    3. Acute cystitis without hematuria        Disposition:  Patient's disposition: Admit to NEURO ICU  Patient's condition is stable.              Dwayne Ellington DO  Resident  05/31/22 0000

## 2022-05-30 NOTE — Clinical Note
Discharge Plan[de-identified] Extended Care Facility (e.g. Adult Home, Nursing Home, etc.)   Bed request comments: [facility] ICU- [facility]

## 2022-05-30 NOTE — PROGRESS NOTES
Radiology Procedure Waiver   Name: Terrie Rueda See  : 1965  MRN: [id number]    Date:  22    Time: 4:54 PM EDT    Benefits of immediately proceeding with Radiology exam(s) without pre-testing outweigh the risks or are not indicated as specified below and therefore the following is/are being waived:    [] Pregnancy test   [] Patients LMP on-time and regular.   [] Patient had Tubal Ligation or has other Contraception Device. [] Patient  is Menopausal or Premenarcheal.    [] Patient had Full or Partial Hysterectomy. [] Protocol for Iodine allergy    [] MRI Questionnaire     [x] BUN/Creatinine   [] Patient age w/no hx of renal dysfunction. [] Patient on Dialysis. [] Recent Normal Labs.   Electronically signed by Sylvia Oneal DO on 22 at 4:54 PM EDT

## 2022-05-31 ENCOUNTER — APPOINTMENT (OUTPATIENT)
Dept: CT IMAGING | Age: 57
DRG: 177 | End: 2022-05-31
Payer: MEDICARE

## 2022-05-31 PROBLEM — G93.40 ENCEPHALOPATHY: Status: ACTIVE | Noted: 2022-05-31

## 2022-05-31 LAB
ADENOVIRUS BY PCR: NOT DETECTED
BORDETELLA PARAPERTUSSIS BY PCR: NOT DETECTED
BORDETELLA PERTUSSIS BY PCR: NOT DETECTED
CHLAMYDOPHILIA PNEUMONIAE BY PCR: NOT DETECTED
CORONAVIRUS 229E BY PCR: NOT DETECTED
CORONAVIRUS HKU1 BY PCR: NOT DETECTED
CORONAVIRUS NL63 BY PCR: NOT DETECTED
CORONAVIRUS OC43 BY PCR: NOT DETECTED
EKG ATRIAL RATE: 92 BPM
EKG P AXIS: 77 DEGREES
EKG P-R INTERVAL: 158 MS
EKG Q-T INTERVAL: 342 MS
EKG QRS DURATION: 70 MS
EKG QTC CALCULATION (BAZETT): 422 MS
EKG R AXIS: 33 DEGREES
EKG T AXIS: 29 DEGREES
EKG VENTRICULAR RATE: 92 BPM
HUMAN METAPNEUMOVIRUS BY PCR: NOT DETECTED
HUMAN RHINOVIRUS/ENTEROVIRUS BY PCR: NOT DETECTED
INFLUENZA A BY PCR: NOT DETECTED
INFLUENZA B BY PCR: NOT DETECTED
MYCOPLASMA PNEUMONIAE BY PCR: NOT DETECTED
PARAINFLUENZA VIRUS 1 BY PCR: NOT DETECTED
PARAINFLUENZA VIRUS 2 BY PCR: NOT DETECTED
PARAINFLUENZA VIRUS 3 BY PCR: NOT DETECTED
PARAINFLUENZA VIRUS 4 BY PCR: NOT DETECTED
POTASSIUM SERPL-SCNC: 4.1 MMOL/L (ref 3.5–5)
RESPIRATORY SYNCYTIAL VIRUS BY PCR: NOT DETECTED
SARS-COV-2, PCR: NOT DETECTED
TROPONIN, HIGH SENSITIVITY: 42 NG/L (ref 0–9)

## 2022-05-31 PROCEDURE — 43762 RPLC GTUBE NO REVJ TRC: CPT

## 2022-05-31 PROCEDURE — 6360000002 HC RX W HCPCS: Performed by: STUDENT IN AN ORGANIZED HEALTH CARE EDUCATION/TRAINING PROGRAM

## 2022-05-31 PROCEDURE — 1200000000 HC SEMI PRIVATE

## 2022-05-31 PROCEDURE — 94640 AIRWAY INHALATION TREATMENT: CPT

## 2022-05-31 PROCEDURE — 6370000000 HC RX 637 (ALT 250 FOR IP): Performed by: INTERNAL MEDICINE

## 2022-05-31 PROCEDURE — 84484 ASSAY OF TROPONIN QUANT: CPT

## 2022-05-31 PROCEDURE — 2700000000 HC OXYGEN THERAPY PER DAY

## 2022-05-31 PROCEDURE — 84132 ASSAY OF SERUM POTASSIUM: CPT

## 2022-05-31 PROCEDURE — 0202U NFCT DS 22 TRGT SARS-COV-2: CPT

## 2022-05-31 PROCEDURE — 36415 COLL VENOUS BLD VENIPUNCTURE: CPT

## 2022-05-31 PROCEDURE — 2580000003 HC RX 258: Performed by: STUDENT IN AN ORGANIZED HEALTH CARE EDUCATION/TRAINING PROGRAM

## 2022-05-31 PROCEDURE — 99222 1ST HOSP IP/OBS MODERATE 55: CPT | Performed by: NEUROLOGICAL SURGERY

## 2022-05-31 PROCEDURE — 87040 BLOOD CULTURE FOR BACTERIA: CPT

## 2022-05-31 PROCEDURE — 70450 CT HEAD/BRAIN W/O DYE: CPT

## 2022-05-31 PROCEDURE — 6360000002 HC RX W HCPCS: Performed by: INTERNAL MEDICINE

## 2022-05-31 PROCEDURE — 2580000003 HC RX 258: Performed by: INTERNAL MEDICINE

## 2022-05-31 RX ORDER — LEVOTHYROXINE SODIUM 88 UG/1
88 TABLET ORAL DAILY
Status: DISCONTINUED | OUTPATIENT
Start: 2022-05-31 | End: 2022-06-01 | Stop reason: HOSPADM

## 2022-05-31 RX ORDER — MULTIVIT-MIN/FERROUS GLUCONATE 9 MG/15 ML
5 LIQUID (ML) ORAL DAILY
Status: ON HOLD | COMMUNITY
End: 2022-06-18 | Stop reason: HOSPADM

## 2022-05-31 RX ORDER — ACETAMINOPHEN 650 MG/1
650 SUPPOSITORY RECTAL EVERY 6 HOURS PRN
Status: DISCONTINUED | OUTPATIENT
Start: 2022-05-31 | End: 2022-06-01 | Stop reason: HOSPADM

## 2022-05-31 RX ORDER — LEVETIRACETAM 500 MG/1
500 TABLET ORAL 2 TIMES DAILY
Status: DISCONTINUED | OUTPATIENT
Start: 2022-05-31 | End: 2022-06-01 | Stop reason: HOSPADM

## 2022-05-31 RX ORDER — ACETAMINOPHEN 325 MG/1
650 TABLET ORAL EVERY 6 HOURS PRN
Status: DISCONTINUED | OUTPATIENT
Start: 2022-05-31 | End: 2022-06-01 | Stop reason: HOSPADM

## 2022-05-31 RX ORDER — IPRATROPIUM BROMIDE AND ALBUTEROL SULFATE 2.5; .5 MG/3ML; MG/3ML
1 SOLUTION RESPIRATORY (INHALATION)
Status: DISCONTINUED | OUTPATIENT
Start: 2022-05-31 | End: 2022-06-01 | Stop reason: HOSPADM

## 2022-05-31 RX ORDER — ONDANSETRON 4 MG/1
4 TABLET, ORALLY DISINTEGRATING ORAL EVERY 8 HOURS PRN
Status: DISCONTINUED | OUTPATIENT
Start: 2022-05-31 | End: 2022-06-01 | Stop reason: HOSPADM

## 2022-05-31 RX ORDER — SODIUM CHLORIDE 0.9 % (FLUSH) 0.9 %
10 SYRINGE (ML) INJECTION PRN
Status: DISCONTINUED | OUTPATIENT
Start: 2022-05-31 | End: 2022-06-01 | Stop reason: HOSPADM

## 2022-05-31 RX ORDER — ONDANSETRON 2 MG/ML
4 INJECTION INTRAMUSCULAR; INTRAVENOUS EVERY 6 HOURS PRN
Status: DISCONTINUED | OUTPATIENT
Start: 2022-05-31 | End: 2022-06-01 | Stop reason: HOSPADM

## 2022-05-31 RX ORDER — DONEPEZIL HYDROCHLORIDE 5 MG/1
5 TABLET, FILM COATED ORAL NIGHTLY
Status: DISCONTINUED | OUTPATIENT
Start: 2022-05-31 | End: 2022-06-01 | Stop reason: HOSPADM

## 2022-05-31 RX ORDER — RISPERIDONE 0.25 MG/1
0.25 TABLET, FILM COATED ORAL 2 TIMES DAILY
Status: DISCONTINUED | OUTPATIENT
Start: 2022-05-31 | End: 2022-06-01 | Stop reason: HOSPADM

## 2022-05-31 RX ORDER — SODIUM CHLORIDE 9 MG/ML
INJECTION, SOLUTION INTRAVENOUS PRN
Status: DISCONTINUED | OUTPATIENT
Start: 2022-05-31 | End: 2022-06-01 | Stop reason: HOSPADM

## 2022-05-31 RX ORDER — SODIUM CHLORIDE 0.9 % (FLUSH) 0.9 %
10 SYRINGE (ML) INJECTION EVERY 12 HOURS SCHEDULED
Status: DISCONTINUED | OUTPATIENT
Start: 2022-05-31 | End: 2022-06-01 | Stop reason: HOSPADM

## 2022-05-31 RX ADMIN — LEVOTHYROXINE SODIUM 88 MCG: 0.09 TABLET ORAL at 09:40

## 2022-05-31 RX ADMIN — LEVETIRACETAM 1500 MG: 1500 INJECTION, SOLUTION INTRAVENOUS at 01:17

## 2022-05-31 RX ADMIN — LEVETIRACETAM 500 MG: 500 TABLET, FILM COATED ORAL at 20:37

## 2022-05-31 RX ADMIN — SODIUM CHLORIDE, PRESERVATIVE FREE 10 ML: 5 INJECTION INTRAVENOUS at 20:37

## 2022-05-31 RX ADMIN — IPRATROPIUM BROMIDE AND ALBUTEROL SULFATE 1 AMPULE: 2.5; .5 SOLUTION RESPIRATORY (INHALATION) at 15:05

## 2022-05-31 RX ADMIN — RISPERIDONE 0.25 MG: 0.5 TABLET, FILM COATED ORAL at 20:37

## 2022-05-31 RX ADMIN — SODIUM CHLORIDE, PRESERVATIVE FREE 10 ML: 5 INJECTION INTRAVENOUS at 09:41

## 2022-05-31 RX ADMIN — CEFTRIAXONE 2000 MG: 2 INJECTION, POWDER, FOR SOLUTION INTRAMUSCULAR; INTRAVENOUS at 01:13

## 2022-05-31 RX ADMIN — LEVETIRACETAM 500 MG: 500 TABLET, FILM COATED ORAL at 09:40

## 2022-05-31 RX ADMIN — SODIUM CHLORIDE 3000 MG: 900 INJECTION INTRAVENOUS at 22:18

## 2022-05-31 RX ADMIN — IPRATROPIUM BROMIDE AND ALBUTEROL SULFATE 1 AMPULE: 2.5; .5 SOLUTION RESPIRATORY (INHALATION) at 19:47

## 2022-05-31 RX ADMIN — RISPERIDONE 0.25 MG: 0.5 TABLET, FILM COATED ORAL at 09:40

## 2022-05-31 RX ADMIN — DONEPEZIL HYDROCHLORIDE 5 MG: 5 TABLET, FILM COATED ORAL at 20:37

## 2022-05-31 RX ADMIN — SODIUM CHLORIDE 3000 MG: 900 INJECTION INTRAVENOUS at 16:07

## 2022-05-31 RX ADMIN — IPRATROPIUM BROMIDE AND ALBUTEROL SULFATE 1 AMPULE: 2.5; .5 SOLUTION RESPIRATORY (INHALATION) at 11:34

## 2022-05-31 ASSESSMENT — PAIN SCALES - WONG BAKER: WONGBAKER_NUMERICALRESPONSE: 0

## 2022-05-31 ASSESSMENT — PAIN - FUNCTIONAL ASSESSMENT
PAIN_FUNCTIONAL_ASSESSMENT: ADULT NONVERBAL PAIN SCALE (NPVS)

## 2022-05-31 ASSESSMENT — PAIN SCALES - GENERAL: PAINLEVEL_OUTOF10: 0

## 2022-05-31 NOTE — DISCHARGE INSTR - COC
Continuity of Care Form    Patient Name: Toni Lundy See   :  1965  MRN:  [id number]    Admit date:  2022  Discharge date:  ***    Code Status Order: Full Code   Advance Directives:      Admitting Physician:  Spike Hardy MD  PCP: Mj Lindsay DO    Discharging Nurse: [facility] Unit/Room#: 1295/8403-P  Discharging Unit Phone Number: ***    Emergency Contact:   Extended Emergency Contact Information  Primary Emergency Contact: SharathCaitlin  Address: [place], [address] [facility] [address] Phone: [phone]  Relation: Legal Guardian  Secondary Emergency Contact: SharathZach  Address: [address], [address] [facility] Phone: [phone]  Relation: Parent    Past Surgical History:  Past Surgical History:   Procedure Laterality Date    ABDOMEN SURGERY      duodenal atresia    ABDOMINAL ADHESION SURGERY      BRAIN SURGERY      CRANIOTOMY Right 2019    RIGHT CRANIOTOMY FRANCISCO JAVIER HOLES performed by Jennifer Castelan MD at [address] [place]    ECHO COMPL W DOP COLOR FLOW  2012         HIP SURGERY      Right    JOINT REPLACEMENT      left knee, right hip-dr Cayla Villanueva    KNEE SURGERY      Right    TOE SURGERY      Right foot    TOTAL KNEE ARTHROPLASTY Left 2016    DR. De    TUBAL LIGATION      VENA CAVA FILTER PLACEMENT Bilateral 2019    VENA CAVA FILTER INSERTION performed by Maira Reyna MD at [address]  N/A 2019    RIGHT FRONTAL VENTRICULO PERITONEAL SHUNT INSERTION-----FACILITY performed by Jennifer Castelan MD at [address] [place]       Immunization History:   Immunization History   Administered Date(s) Administered    COVID-19, Dominic Deist, Primary or Immunocompromised, PF, 100mcg/0.5mL 2021    Hepatitis A 2012    Hepatitis B (Recombivax HB) 2012, 2012, 2012    Influenza Vaccine, unspecified formulation 2015    Influenza Virus Vaccine 2020    Influenza, High Dose (Fluzone 72 yrs and older) 2017    Influenza, Quadv, IM, PF (6 mo and older Fluzone, Flulaval, Fluarix, and 3 yrs and older Afluria) 2016, 10/10/2018    Influenza, Triv, inactivated, subunit, adjuvanted, IM (Fluad 65 yrs and older) 10/11/2019    Pneumococcal Conjugate 13-valent (Benjamine Pruitt) 10/11/2019    Pneumococcal Polysaccharide (Pxiazewty24) 2007, 12/10/2008    Tdap (Boostrix, Adacel) 2012    Zoster Live (Zostavax) 2017       Active Problems:  Patient Active Problem List   Diagnosis Code    Down's syndrome T97.2    Systolic murmur E80.7    Vasodepressor syncope R55    Asymptomatic varicose veins of bilateral lower extremities I83.93    Hypothyroidism E03.9    Chest pain R07.9    SDH (subdural hematoma) ([address] S06.5X9A    Subdural hematoma ([address] S06.5X9A    Communicating hydrocele N43.2    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    Hydrocephalus (HCC) G91.9    Toe cyanosis R23.0    Seizure ([address] R56.9    S/P  shunt Z98.2    Acute on chronic intracranial subdural hematoma (HCC) I62.01, I62.03    Encephalopathy G93.40       Isolation/Infection:   Isolation            No Isolation          Patient Infection Status       Infection Onset Added Last Indicated Last Indicated By Review Planned Expiration Resolved Resolved By    None active    Resolved    COVID-19 (Rule Out) 22 Respiratory Panel, Molecular, with COVID-19 (Restricted: peds pts or suitable admitted adults) (Ordered)   22 Rule-Out Test Resulted    COVID-19 (Rule Out) 22 COVID-19 & Influenza Combo (Ordered)   22 Rule-Out Test Resulted    COVID-19 21 COVID-19, Rapid   21     COVID-19 (Rule Out) 21 COVID-19, Rapid (Ordered)   21 Rule-Out Test Resulted            Nurse Assessment:  Last Vital Signs: BP 95/70   Pulse 71   Temp 98.2 °F (36.8 °C) (Axillary)   Resp 15   Ht 4' 10\" (1.473 m)   Wt 125 lb (56.7 kg) SpO2 99%   BMI 26.13 kg/m²     Last documented pain score (0-10 scale):    Last Weight:   Wt Readings from Last 1 Encounters:   22 125 lb (56.7 kg)     Mental Status:  alert    IV Access:  - None    Nursing Mobility/ADLs:  Walking   Dependent  Transfer  Dependent  Bathing  Dependent  Dressing  Dependent  Toileting  Dependent  Feeding  Dependent  Med Admin  Dependent  Med Delivery    peg tube    Wound Care Documentation and Therapy:  Wound 22 Heel Right (Active)   Wound Etiology Deep tissue/Injury 2230   Wound Length (cm) 4.5 cm 22   Wound Width (cm) 3.5 cm 22   Wound Depth (cm) 0.1 cm 22   Wound Surface Area (cm^2) 15.75 cm^2 22   Wound Volume (cm^3) 1.575 cm^3 22   Wound Assessment Purple/maroon 2230   Drainage Amount None 2230   Number of days: 24       Incision 19 Head Right;Upper (Active)   Number of days: 931       Incision 19 Abdomen Right;Upper (Active)   Number of days: 931        Elimination:  Continence: Bowel: {YES / NJ:27896}  Bladder: {YES / D}  Urinary Catheter: None   Colostomy/Ileostomy/Ileal Conduit: {YES / BI:02458}       Date of Last BM: ***    Intake/Output Summary (Last 24 hours) at 2022 1234  Last data filed at 2022  Gross per 24 hour   Intake --   Output 400 ml   Net -400 ml     I/O last 3 completed shifts:  In: -   Out: 400 [Urine:400]    Safety Concerns: At Risk for Falls and Aspiration Risk    Impairments/Disabilities:      Speech    Nutrition Therapy:  Current Nutrition Therapy:   - Tube Feedings:  Standard with fiber    Routes of Feeding: Gastrostomy Tube  Liquids: {Slp liquid thickness:66721}  Daily Fluid Restriction: no  Last Modified Barium Swallow with Video (Video Swallowing Test): not done    Treatments at the Time of Hospital Discharge:   Respiratory Treatments: ***  Oxygen Therapy:  is not on home oxygen therapy.   Ventilator:    - No ventilator support    Rehab Therapies: {THERAPEUTIC INTERVENTION:1963144946}  Weight Bearing Status/Restrictions: No weight bearing restrictions  Other Medical Equipment (for information only, NOT a DME order):  {EQUIPMENT:959483497}  Other Treatments: ***    Patient's personal belongings (please select all that are sent with patient):  None    RN SIGNATURE:  Electronically signed by Peter Serra RN on 6/1/22 at 2:16 PM EDT    CASE MANAGEMENT/SOCIAL WORK SECTION    Inpatient Status Date: 05/30/2022    Readmission Risk Assessment Score:  Readmission Risk              Risk of Unplanned Readmission:  17           Discharging to Facility/ Agency   Name: [facility]  Address: [address] [place] [address]  Phone: [phone]  Fax: [phone]    Dialysis Facility (if applicable)   Name:  Address:  Dialysis Schedule:  Phone:  Fax:    / signature: Electronically signed by Magali Harris RN on 5/31/22 at 12:35 PM EDT    PHYSICIAN SECTION    Prognosis: Fair    Condition at Discharge: Stable    Rehab Potential (if transferring to Rehab): Fair    Recommended Labs or Other Treatments After Discharge: cbc, bmp in 3 days     Physician Certification: I certify the above information and transfer of Jean Carlos Navarro See  is necessary for the continuing treatment of the diagnosis listed and that she requires Intermediate Nursing Care for greater than 30 days.      Update Admission H&P: No change in H&P    PHYSICIAN SIGNATURE:  Electronically signed by Melanie Ramos MD on 6/1/22 at 2:01 PM EDT

## 2022-05-31 NOTE — PROGRESS NOTES
Patient is from [place], admitted to bed 23A, pt is MRDD, nonverbal at this time. VS stable, opens eyes with assessment and responds to care and pain. Bed alarm set for safety.

## 2022-05-31 NOTE — CONSULTS
[address]                  [address] [facility]  [facility]                                  CONSULTATION    PATIENT NAME: Ryan ALEXANDRA                        :        1965  MED REC NO:   [id number]                            ROOM:       [id number]  ACCOUNT NO:   [de-identified]                           ADMIT DATE: 2022  PROVIDER:     Elisa Gonzales MD    CONSULT DATE:  2022    REASON FOR CONSULT:  Subacute on chronic subdural hematoma. HISTORY OF PRESENT ILLNESS:  The patient is a 64-year-old lady,  developmentally delayed, who is well known to my service. She had a   shunt placed several years ago for hydrocephalus. She has had  progressive decline in her cognitive function. She was seen in the  [facility] and it was thought that this might be as a result of  some dementia. She was recently seen in the office last week for a  shunt adjustment. She initially had her Medos valve set at 50 cm of  water; however, her dad hoped that it could be adjusted to 80 cm and it  was raised to 80 cm water. She presented to the emergency room with  hypoxia and was noted to be satting at 83% on room air. She was placed  on nasal cannula and while in the emergency room, she had a CT scan of  her head that showed subacute on chronic subdural hematoma. Neurosurgery Service was consulted. PAST MEDICAL HISTORY:  Positive for arthritis, Down syndrome,  hypothyroidism, osteoarthritis, syncope, thyroid disorder, UTI, varicose  veins. PAST SURGICAL HISTORY:  Positive for abdominal surgery for duodenal  atresia, she had adonis hole drainage of subdural hematomas in 2019, total  knee arthroplasty, tubal ligation, IVC filter placement. FAMILY HISTORY:  Noncontributory. SOCIAL HISTORY:  Negative for tobacco or alcohol use. ALLERGIES:  She has no known drug allergies.     REVIEW OF SYSTEMS:  I am unable to complete a 14-point review of systems  because of the patient's current medical condition. PHYSICAL EXAMINATION:  VITAL SIGNS:  She is currently afebrile with a T-current of 36 degrees  Celsius, pulse 69, blood pressure is 110/70, respiratory rate is 14. GENERAL:  She is resting in bed. She does not appear to be in any acute  distress. Appears her stated age. HEENT:  Her head is normocephalic and atraumatic. Pupils are 4 to 3 mm  and reactive. She has no drainage out of her eyes, ears, nose or  throat. SKIN:  Her skin is warm and dry. MUSCULOSKELETAL:  She has got increased tone in her bilateral upper and  lower extremities. ABDOMEN:  Soft, nontender, nondistended. She does have a PEG tube in  place. NEUROLOGICAL:  On rest of her neurologic exam, she is nonverbal.  Does  not follow any commands. Does have spontaneous movement of her  extremities. LABORATORY VALUES:  Sodium of 136, potassium of 5.8, BUN is 17,  creatinine 0.6. REVIEW OF IMAGING:  She has CT scan of her head that shows small  subacute on chronic subdural hematoma. ASSESSMENT AND PLAN:  The patient is a 60-year-old lady with a  shunt  in place with her Medos valve set at 80 cm of water. From a  neurosurgical standpoint, the subdural hematomas are minimal.  No  intervention is required. She can follow up as an outpatient on an as  needed basis.         Clemente Stark MD    D: 05/31/2022 8:08:58       T: 05/31/2022 9:20:56     [email or web address]  Job#: [id number]     Doc#: [id number]    CC:

## 2022-05-31 NOTE — H&P
Hospitalist History & Physical      PATIENT NAME:  Winsome Early    MRN:  [id number]  SERVICE DATE:  05/30/22    Primary Care Physician: Teressa Robbins DO       SUBJECTIVE  CHIEF COMPLAINT:  had concerns including Shortness of Breath (From [facility] for concern of possible tube feed aspiration. Facility called 911 for SOB, pt was 83% on RA, increased to 98% on 6L. EMS suctioned tube feed from her mouth. ). HPI:  Ms. Winsome Early, a 65y.o. year old female  who  has a past medical history of Arthritis, Down syndrome, Hypothyroidism, Osteoarthritis, Syncope, Thyroid disease, UTI (urinary tract infection), Varicose veins, and Wears glasses. presents with SOB and desaturation. Found to have hypoxia of 83% on RA and improved to 98% on 6L NC. Symptoms noticed earlier today by the group home, and noticed to have tube feed asopiration, suctioned feed from her mouth. No fever or chills, no chest pain no LOC or headache. PAST MEDICAL HISTORY:    Past Medical History:   Diagnosis Date    Arthritis     Down syndrome     Hypothyroidism     Osteoarthritis     Syncope     Thyroid disease     UTI (urinary tract infection)         Varicose veins     Wears glasses      PAST SURGICAL HISTORY:    Past Surgical History:   Procedure Laterality Date    ABDOMEN SURGERY      duodenal atresia    ABDOMINAL ADHESION SURGERY      BRAIN SURGERY      CRANIOTOMY Right 4/12/2019    RIGHT CRANIOTOMY FRANCISCO AJVIER HOLES performed by Carlton Singh MD at [facility] ECHO COMPL W DOP COLOR FLOW  5/14/2012         HIP SURGERY      Right    JOINT REPLACEMENT      left knee, right hip-dr Frantz Wild KNEE SURGERY      Right    TOE SURGERY      Right foot    TOTAL KNEE ARTHROPLASTY Left 08/11/2016    DR. De    TUBAL LIGATION      VENA CAVA FILTER PLACEMENT Bilateral 4/22/2019    VENA CAVA FILTER INSERTION performed by Bernie Espinoza MD at [facility] VENTRICULOPERITONEAL SHUNT N/A 11/12/2019    RIGHT FRONTAL VENTRICULO PERITONEAL SHUNT INSERTION-----FACILITY performed by Francisca Deshpande MD at [facility] HISTORY:    Family History   Problem Relation Age of Onset    Heart Disease Maternal Grandmother     Heart Disease Maternal Grandfather     Heart Disease Paternal Aunt     Heart Disease Paternal Uncle      SOCIAL HISTORY:    Social History     Socioeconomic History    Marital status: Single     Spouse name: Not on file    Number of children: Not on file    Years of education: Not on file    Highest education level: Not on file   Occupational History    Not on file   Tobacco Use    Smoking status: Never Smoker    Smokeless tobacco: Never Used   Vaping Use    Vaping Use: Never used   Substance and Sexual Activity    Alcohol use: No    Drug use: No    Sexual activity: Never   Other Topics Concern    Not on file   Social History Narrative    Not on file     Social Determinants of Health     Financial Resource Strain: Low Risk     Difficulty of Paying Living Expenses: Not hard at all   Food Insecurity: No Food Insecurity    Worried About Running Out of Food in the Last Year: Never true    [address] in the Last Year: Never true   Transportation Needs:     Lack of Transportation (Medical): Not on file    Lack of Transportation (Non-Medical):  Not on file   Physical Activity:     Days of Exercise per Week: Not on file    Minutes of Exercise per Session: Not on file   Stress:     Feeling of Stress : Not on file   Social Connections:     Frequency of Communication with Friends and Family: Not on file    Frequency of Social Gatherings with Friends and Family: Not on file    Attends Mu-ism Services: Not on file    Active Member of Clubs or Organizations: Not on file    Attends Club or Organization Meetings: Not on file    Marital Status: Not on file   Intimate Partner Violence:     Fear of Current or Ex-Partner: Not on file    Emotionally Abused: Not on file    Physically Abused: Not on file    Sexually Abused: Not on file   Housing Stability:     Unable to Pay for Housing in the Last Year: Not on file    Number of Places Lived in the Last Year: Not on file    Unstable Housing in the Last Year: Not on file    TOBACCO:   reports that she has never smoked. She has never used smokeless tobacco.  ETOH:   reports no history of alcohol use. MEDICATIONS:   Prior to Admission medications    Medication Sig Start Date End Date Taking? Authorizing Provider   levETIRAcetam (KEPPRA) 500 MG tablet Take 1 tablet by mouth 2 times daily 5/14/22   Carlene Ugalde MD   lactulose (CHRONULAC) 10 GM/15ML solution Take 30 mLs by mouth 3 times daily Titrate for 2-3 bowel movements per day 5/14/22 6/13/22  Carlene Ugalde MD   ipratropium-albuterol (DUONEB) 0.5-2.5 (3) MG/3ML SOLN nebulizer solution Inhale 3 mLs into the lungs every 4 hours as needed for Shortness of Breath 5/14/22   Carlene Ugalde MD   nystatin (MYCOSTATIN) 289594 UNIT/GM cream Apply topically 2 times daily. 30g 12/6/21   Elijah Bill,    risperiDONE (RISPERDAL) 0.25 MG tablet Take 1 tablet by mouth 2 times daily 9/28/21   Chantel Loera DO   donepezil (ARICEPT) 5 MG tablet  6/24/21   Historical Provider, MD   levothyroxine (SYNTHROID) 88 MCG tablet Take 1 tablet by mouth Daily 12/21/20   Chantel Loera DO   liothyronine (CYTOMEL) 5 MCG tablet Take 1 tab by mouth every morning on Ycgxwm-Mdpcmreks-Pwamfn 12/21/20   Chantel Loera DO   Cholecalciferol (VITAMIN D-3 SUPER STRENGTH) 50 MCG (2000 UT) TABS Take 1 tablet by mouth daily 12/21/20   Chantel Loera DO   Lactobacillus (ACIDOPHILUS PO) Take by mouth    Historical Provider, MD        ALLERGIES: Patient has no known allergies. REVIEW OF SYSTEM:   ROS as noted in HPI, 12 point ROS reviewed and otherwise negative.     OBJECTIVE  PHYSICAL EXAM:   Vitals:    05/30/22 2002 05/30/22 2032 05/30/22 2233 05/30/22 2300   BP: 113/65 118/73 112/66 94/65   Pulse: 72 78 69 68   Resp: 21 13 (!) 9 18   Temp:       TempSrc:       SpO2: 99% 100% 98% 99%   Weight:       Height:           General appearance: alert, appears in distress, ill appearing. CONSTITUTIONAL:  in apparent distress  ENT:  normocepalic, without obvious abnormality, atraumatic  NECK:  supple, symmetrical, trachea midline  Heart: regular rate and rhythm, S1, S2 normal   Lungs: rhonchi, no wheezes  Abdomen: soft lax, not tender, not distended, positive bowel sounds  Extremities: extremities normal, atraumatic, no cyanosis, edema  Skin: Normal skin color. No rashes or lesions. Neurologic:  Disoriented, Neurovascularly intact without any focal sensory/motor deficits. Cranial nerves: II-XII intact, grossly non-focal.  Psychiatric: Alert, thought content appropriate, normal insight      DATA:     Diagnostic tests reviewed for today's visit:    Most recent labs and imaging results reviewed.    Labs:   Recent Results (from the past 72 hour(s))   CBC with Auto Differential    Collection Time: 05/30/22  4:56 PM   Result Value Ref Range    WBC 16.2 (H) 4.5 - 11.5 E9/L    RBC 4.76 3.50 - 5.50 E12/L    Hemoglobin 14.4 11.5 - 15.5 g/dL    Hematocrit 44.5 34.0 - 48.0 %    MCV 93.5 80.0 - 99.9 fL    MCH 30.3 26.0 - 35.0 pg    MCHC 32.4 32.0 - 34.5 %    RDW 14.9 11.5 - 15.0 fL    Platelets 509 790 - 953 E9/L    MPV 9.9 7.0 - 12.0 fL    Neutrophils % 83.2 (H) 43.0 - 80.0 %    Immature Granulocytes % 0.4 0.0 - 5.0 %    Lymphocytes % 12.4 (L) 20.0 - 42.0 %    Monocytes % 3.8 2.0 - 12.0 %    Eosinophils % 0.0 0.0 - 6.0 %    Basophils % 0.2 0.0 - 2.0 %    Neutrophils Absolute 13.45 (H) 1.80 - 7.30 E9/L    Immature Granulocytes # 0.07 E9/L    Lymphocytes Absolute 2.01 1.50 - 4.00 E9/L    Monocytes Absolute 0.62 0.10 - 0.95 E9/L    Eosinophils Absolute 0.00 (L) 0.05 - 0.50 E9/L    Basophils Absolute 0.04 0.00 - 0.20 E9/L   Comprehensive Metabolic Panel w/ Reflex to MG    Collection Time: 05/30/22  4:56 PM   Result Value Ref Range    Sodium 136 132 - 146 mmol/L    Potassium reflex Magnesium 5.8 (H) 3.5 - 5.0 mmol/L    Chloride 101 98 - 107 mmol/L    CO2 27 22 - 29 mmol/L    Anion Gap 8 7 - 16 mmol/L    Glucose 123 (H) 74 - 99 mg/dL    BUN 17 6 - 20 mg/dL    CREATININE 0.6 0.5 - 1.0 mg/dL    GFR Non-African American >60 >=60 mL/min/1.73    GFR African American >60     Calcium 8.7 8.6 - 10.2 mg/dL    Total Protein 7.2 6.4 - 8.3 g/dL    Albumin 3.0 (L) 3.5 - 5.2 g/dL    Total Bilirubin 0.4 0.0 - 1.2 mg/dL    Alkaline Phosphatase 119 (H) 35 - 104 U/L    ALT 35 (H) 0 - 32 U/L    AST 49 (H) 0 - 31 U/L   Brain Natriuretic Peptide    Collection Time: 05/30/22  4:56 PM   Result Value Ref Range    Pro- (H) 0 - 125 pg/mL   Lactic Acid    Collection Time: 05/30/22  4:56 PM   Result Value Ref Range    Lactic Acid 1.6 0.5 - 2.2 mmol/L   Protime-INR    Collection Time: 05/30/22  4:56 PM   Result Value Ref Range    Protime 12.4 9.3 - 12.4 sec    INR 1.1    Urinalysis with Microscopic    Collection Time: 05/30/22  4:56 PM   Result Value Ref Range    Color, UA Yellow Straw/Yellow    Clarity, UA SL CLOUDY Clear    Glucose, Ur Negative Negative mg/dL    Bilirubin Urine Negative Negative    Ketones, Urine Negative Negative mg/dL    Specific Gravity, UA 1.020 1.005 - 1.030    Blood, Urine Negative Negative    pH, UA 6.0 5.0 - 9.0    Protein, UA Negative Negative mg/dL    Urobilinogen, Urine 0.2 <2.0 E.U./dL    Nitrite, Urine POSITIVE (A) Negative    Leukocyte Esterase, Urine TRACE (A) Negative    WBC, UA 1-3 0 - 5 /HPF    RBC, UA 2-5 0 - 2 /HPF    Epithelial Cells, UA RARE /HPF    Bacteria, UA MODERATE (A) None Seen /HPF   COVID-19 & Influenza Combo    Collection Time: 05/30/22  4:56 PM    Specimen: Nasopharyngeal Swab   Result Value Ref Range    SARS-CoV-2 RNA, RT PCR NOT DETECTED NOT DETECTED    INFLUENZA A NOT DETECTED NOT DETECTED    INFLUENZA B NOT DETECTED NOT DETECTED   Blood Gas, Arterial    Collection Time: 05/30/22  5:12 PM   Result Value Ref Range    Date Analyzed [id number]     Time Analyzed 3369     Source: Blood Arterial     pH, Blood Gas 7.472 (H) 7.350 - 7.450    PCO2 34.1 (L) 35.0 - 45.0 mmHg    PO2 78.4 75.0 - 100.0 mmHg    HCO3 24.4 22.0 - 26.0 mmol/L    B.E. 1.3 -3.0 - 3.0 mmol/L    O2 Sat 96.2 92.0 - 98.5 %    O2Hb 95.3 94.0 - 97.0 %    COHb 0.6 0.0 - 1.5 %    MetHb 0.3 0.0 - 1.5 %    O2 Content 20.2 mL/dL    HHb 3.8 0.0 - 5.0 %    tHb (est) 15.1 11.5 - 16.5 g/dL    Mode NC- 5 L     Date Of Collection      Time Collected      Pt Temp 37.0 C     ID [id number]     Lab [id number]     Critical(s) Notified .  No Critical Values    SPECIMEN REJECTION    Collection Time: 05/30/22  6:08 PM   Result Value Ref Range    Rejected Test TRP5     Reason for Rejection see below    POCT Glucose    Collection Time: 05/30/22  6:09 PM   Result Value Ref Range    QC OK? y    Troponin    Collection Time: 05/30/22  6:18 PM   Result Value Ref Range    Troponin, High Sensitivity 26 (H) 0 - 9 ng/L   Potassium    Collection Time: 05/30/22  6:18 PM   Result Value Ref Range    Potassium 4.2 3.5 - 5.0 mmol/L   Troponin    Collection Time: 05/30/22  7:21 PM   Result Value Ref Range    Troponin, High Sensitivity 29 (H) 0 - 9 ng/L     Oupatient labs:  Lab Results   Component Value Date    CHOL 173 03/30/2022    TRIG 88 03/30/2022    HDL 62 03/30/2022    LDLCALC 93 03/30/2022    TSH 12.120 (H) 05/18/2022    INR 1.1 05/30/2022    LABA1C 5.2 03/30/2022       Urinalysis:    Lab Results   Component Value Date    NITRU POSITIVE 05/30/2022    WBCUA 1-3 05/30/2022    WBCUA 0-1 05/12/2012    BACTERIA MODERATE 05/30/2022    RBCUA 2-5 05/30/2022    RBCUA NONE 05/12/2012    BLOODU Negative 05/30/2022    SPECGRAV 1.020 05/30/2022    GLUCOSEU Negative 05/30/2022    GLUCOSEU NEGATIVE 05/12/2012       Imaging:  CT ABDOMEN PELVIS WO CONTRAST Additional Contrast? None    Result Date: 5/11/2022  EXAMINATION: CT OF THE ABDOMEN AND PELVIS WITHOUT CONTRAST 5/11/2022 4:47 pm TECHNIQUE: CT of the abdomen and pelvis was performed without the administration of intravenous contrast. Multiplanar reformatted images are provided for review. Automated exposure control, iterative reconstruction, and/or weight based adjustment of the mA/kV was utilized to reduce the radiation dose to as low as reasonably achievable. COMPARISON: CT of the abdomen and pelvis, 03/07/2021. HISTORY: ORDERING SYSTEM PROVIDED HISTORY: right hydronephrosis TECHNOLOGIST PROVIDED HISTORY: Reason for exam:->right hydronephrosis Additional Contrast?->None What reading provider will be dictating this exam?->CRC FINDINGS: Lower Chest: The lung bases are clear. The heart is normal in size. No pleural or pericardial effusion. Organs: Liver: Unremarkable. Gallbladder: Unremarkable. Pancreas: Unremarkable. Spleen:  Unremarkable. Adrenals: Unremarkable. Kidneys: The kidneys are normal in size and contour. A 1-2 mm calculus is seen in an upper pole calyx of the left kidney. There is mild dilation of the extrarenal pelves bilaterally. No evidence of hydronephrosis. The distal ureters are obscured by beam hardening artifact arising from the right hip arthroplasty. GI/Bowel: Liquid stool in the colon indicates a diarrheal illness. No bowel wall thickening or obstruction noted. A percutaneous gastrostomy tube is seen. Pelvis: The base of the urinary bladder and the lower uterine segment are obscured by beam hardening artifact. The visualized aspects are grossly unremarkable. Peritoneum/Retroperitoneum: No lymphadenopathy. No free air or free fluid is seen. The abdominal aorta and pelvic arteries are unremarkable. IVC filter in situ. Ventriculoperitoneal shunt is also present. Bones/Soft Tissues: The visualized bones are intact without fracture or focal lesion. 1.  No acute abnormality is seen in the abdomen or the pelvis. 2. Mild dilation of the extrarenal pelves bilaterally (anatomic variant). No evidence of hydronephrosis.  3. 1-2 mm intrarenal calculus in the left kidney. RECOMMENDATIONS: Unavailable     XR NECK SOFT TISSUE    Result Date: 5/7/2022  EXAMINATION: TWO XRAY VIEWS OF THE CHEST; TWO XRAY VIEWS OF THE NECK SOFT TISSUES; TWO XRAY VIEWS OF THE ABDOMEN; THREE XRAY VIEWS OF THE SKULL 5/7/2022 1:55 pm COMPARISON: None. HISTORY: ORDERING SYSTEM PROVIDED HISTORY: s/p  shunt TECHNOLOGIST PROVIDED HISTORY: Reason for exam:->s/p  shunt Reason for exam:->shunt series What reading provider will be dictating this exam?->CRC FINDINGS: There is evidence of a right-sided shunt tube. Portions of the catheter near the valve are not opaque. Continuity in this region of the right lateral aspect of the skull cannot be determined. Remainder of the shunt tube in the neck, chest and abdomen appears unremarkable. There are no infiltrates or effusions. Heart size is normal. In the abdomen the shunt tube traverses in the left side and is than looped in the right-side of the lower abdomen. There are some nonspecific air-fluid levels in the abdomen bowel gas. Bowel loops otherwise appear unremarkable. There is a right hip prosthesis. IVC filter is noted.  shunt tube as described. XR CHEST (2 VW)    Result Date: 5/7/2022  EXAMINATION: TWO XRAY VIEWS OF THE CHEST; TWO XRAY VIEWS OF THE NECK SOFT TISSUES; TWO XRAY VIEWS OF THE ABDOMEN; THREE XRAY VIEWS OF THE SKULL 5/7/2022 1:55 pm COMPARISON: None. HISTORY: ORDERING SYSTEM PROVIDED HISTORY: s/p  shunt TECHNOLOGIST PROVIDED HISTORY: Reason for exam:->s/p  shunt Reason for exam:->shunt series What reading provider will be dictating this exam?->CRC FINDINGS: There is evidence of a right-sided shunt tube. Portions of the catheter near the valve are not opaque. Continuity in this region of the right lateral aspect of the skull cannot be determined. Remainder of the shunt tube in the neck, chest and abdomen appears unremarkable. There are no infiltrates or effusions.   Heart size is normal. In the abdomen the shunt tube traverses in the left side and is than looped in the right-side of the lower abdomen. There are some nonspecific air-fluid levels in the abdomen bowel gas. Bowel loops otherwise appear unremarkable. There is a right hip prosthesis. IVC filter is noted.  shunt tube as described. CT Head WO Contrast    Result Date: 5/30/2022  EXAMINATION: CT OF THE HEAD WITHOUT CONTRAST  5/30/2022 9:06 pm TECHNIQUE: CT of the head was performed without the administration of intravenous contrast. Automated exposure control, iterative reconstruction, and/or weight based adjustment of the mA/kV was utilized to reduce the radiation dose to as low as reasonably achievable. COMPARISON: May 4, 2022 HISTORY: ORDERING SYSTEM PROVIDED HISTORY: AMS, hypoxia TECHNOLOGIST PROVIDED HISTORY: Reason for exam:->AMS, hypoxia Has a \"code stroke\" or \"stroke alert\" been called? ->No Decision Support Exception - unselect if not a suspected or confirmed emergency medical condition->Emergency Medical Condition (MA) What reading provider will be dictating this exam?->CRC FINDINGS: Small focus of increased attenuation involving left parietal subdural space. Redemonstration of extra-axial, hypoattenuating collection overlying left parietal lobe. Stable position of right sided ventricular shunt catheter with distal tip abutting septum pellucidum. Stable areas of encephalomalacia involving inferior frontal lobes and stable area of encephalomalacia involving bilateral temporal lobes stable area of encephalomalacia involving right frontal white matter adjacent to ventricular shunt. Basilar cisterns are patent. Paranasal sinuses and mastoid air cells are clear. 1. Redemonstration of hypoattenuating extra-axial collection overlying left parietal lobe related to chronic subdural hygroma or chronic subdural hematoma. Small focus of increased attenuation within this collection could suggest small acute on chronic subdural hematoma.   Results were called by Dr. Esperanza Estevez to Dr. Allan Zhang on 5/30/2022 at 21:57. 2. Stable areas of encephalomalacia involving bilateral frontal lobes and bilateral temporal lobes. 3. Stable position of right frontal ventricular shunt catheter. 4. Stable moderate ventriculomegaly. CT HEAD WO CONTRAST    Result Date: 5/4/2022  EXAMINATION: CT OF THE HEAD WITHOUT CONTRAST  5/4/2022 4:05 pm TECHNIQUE: CT of the head was performed without the administration of intravenous contrast. Dose modulation, iterative reconstruction, and/or weight based adjustment of the mA/kV was utilized to reduce the radiation dose to as low as reasonably achievable. COMPARISON: CT head 02/17/2022 HISTORY: ORDERING SYSTEM PROVIDED HISTORY: Evaluate intracranial abnormality TECHNOLOGIST PROVIDED HISTORY: Has a \"code stroke\" or \"stroke alert\" been called? ->No Reason for exam:->Evaluate intracranial abnormality Decision Support Exception - unselect if not a suspected or confirmed emergency medical condition->Emergency Medical Condition (MA) What reading provider will be dictating this exam?->CRC FINDINGS: There is patchy hypoattenuation within the right cerebellar hemisphere. There is hypoattenuation of the white matter of the bilateral cerebral hemispheres. There is encephalomalacia within the bilateral frontal lobes and right temporal lobe. There is right ventriculostomy shunt catheter in stable position compared to prior examination. There is slight increased size of the lateral ventricles. There has been slight decreased size of the bilateral subdural hematomas. The left subdural hematoma measures approximately 6 mm in greatest thickness and previously measured 12 mm in greatest thickness. The right subdural hematoma measures approximately 7 mm in greatest thickness and previously measured 11 mm in thickness. There is no midline shift. There is hypodensity within the anterior aspect of the right ja.   There is hypoattenuation adjacent to the tract of the ventriculostomy shunt catheter. There is no evidence of acute cortical ischemia. The visualized portions of the paranasal sinuses and mastoid air cells are clear. 1. Decreasing size of bilateral subdural hemorrhages compared to 02/17/2022. 2. Ventriculostomy shunt catheter stable in position. There is slight increased size of the lateral ventricles which may be partially due to decreased mass effect from the bilateral subdural hemorrhages. 3. Remote insults in the bilateral frontal lobes and right temporal lobe. 4. Chronic small vessel ischemic disease. XR CHEST PORTABLE    Result Date: 5/13/2022  EXAMINATION: ONE XRAY VIEW OF THE CHEST 5/13/2022 11:09 am COMPARISON: 05/07/2022 HISTORY: ORDERING SYSTEM PROVIDED HISTORY: Cough TECHNOLOGIST PROVIDED HISTORY: Reason for exam:->Cough What reading provider will be dictating this exam?->CRC FINDINGS: Heart size is normal.  There are no infiltrates or effusions. There is mild tortuosity of the aorta. Suspected shunt tube is noted which appears unchanged. No acute process     XR CHEST PORTABLE    Result Date: 5/4/2022  EXAMINATION: ONE XRAY VIEW OF THE CHEST 5/4/2022 1:32 pm COMPARISON: None. HISTORY: ORDERING SYSTEM PROVIDED HISTORY: pna TECHNOLOGIST PROVIDED HISTORY: Reason for exam:->pna What reading provider will be dictating this exam?->CRC FINDINGS: Mild opacity in the retrocardiac region. The right lung is clear. The heart is not enlarged. There is no pneumothorax. The costophrenic angles are clear. Radiopaque catheter over the right hemithorax likely  shunt catheter. Suspect retrocardiac infiltrates. CTA PULMONARY W CONTRAST    Result Date: 5/30/2022  EXAMINATION: CTA OF THE CHEST 5/30/2022 9:06 pm TECHNIQUE: CTA of the chest was performed after the administration of intravenous contrast.  Multiplanar reformatted images are provided for review. MIP images are provided for review.  Automated exposure control, iterative reconstruction, and/or weight based adjustment of the mA/kV was utilized to reduce the radiation dose to as low as reasonably achievable. COMPARISON: None. HISTORY: ORDERING SYSTEM PROVIDED HISTORY: Hypoxia, evaluate for PE TECHNOLOGIST PROVIDED HISTORY: Reason for exam:->Hypoxia, evaluate for PE Decision Support Exception - unselect if not a suspected or confirmed emergency medical condition->Emergency Medical Condition (MA) What reading provider will be dictating this exam?->CRC FINDINGS: Pulmonary Arteries: Pulmonary arteries are adequately opacified for evaluation. No evidence of intraluminal filling defect to suggest pulmonary embolism. Main pulmonary artery is normal in caliber. Mediastinum: No evidence of mediastinal lymphadenopathy. The heart and pericardium demonstrate no acute abnormality. There is no acute abnormality of the thoracic aorta. Noted right aberrant subclavian artery of variant anatomic thoracic arch branching. Lungs/pleura: The lungs are without acute process. Subsegmental dependent atelectasis. No focal consolidation or pulmonary edema. No evidence of pleural effusion or pneumothorax. Upper Abdomen: Limited images of the upper abdomen are unremarkable. Soft Tissues/Bones: No acute bone or soft tissue abnormality. No evidence of pulmonary embolism or acute pulmonary abnormality. Noted right aberrant subclavian artery of variant anatomic thoracic arch branching. Subsegmental dependent atelectasis. US ABDOMEN LIMITED    Result Date: 5/11/2022  EXAMINATION: RIGHT UPPER QUADRANT ULTRASOUND 5/11/2022 8:59 am COMPARISON: None. HISTORY: ORDERING SYSTEM PROVIDED HISTORY: hyperammonemia TECHNOLOGIST PROVIDED HISTORY: Reason for exam:->hyperammonemia What reading provider will be dictating this exam?->CRC FINDINGS: LIVER:  The liver demonstrates normal echogenicity without evidence of intrahepatic biliary ductal dilatation.  BILIARY SYSTEM:  There is borderline gallbladder wall prominence measuring 3 mm. There is a 3 mm polyp. .  Negative sonographic Dorsey's sign. There are no gallstones. Common bile duct is within normal limits measuring 5 mm. RIGHT KIDNEY: The right kidney measures 9.5 x 3.9 cm. There is mild-to-moderate hydronephrosis. There is a prominent extrarenal pelvis. July Graven PANCREAS:  Visualized portions of the pancreas are unremarkable. OTHER: No evidence of right upper quadrant ascites. Mild to moderate right-sided hydronephrosis     US RETROPERITONEAL COMPLETE    Result Date: 5/11/2022  EXAMINATION: RETROPERITONEAL ULTRASOUND OF THE KIDNEYS AND URINARY BLADDER 5/11/2022 COMPARISON: 03/19/2022 chest CT. HISTORY: ORDERING SYSTEM PROVIDED HISTORY: right hydronephrosis TECHNOLOGIST PROVIDED HISTORY: Reason for exam:->right hydronephrosis What reading provider will be dictating this exam?->CRC FINDINGS: Kidneys: The right kidney measures 8.8 cm in length and the left kidney measures 7.8 cm in length. The ultrasound technologist noted difficulty visualizing the left kidney due to overlying bowel gas and inability of the patient to move. Kidneys demonstrate normal cortical echogenicity. No definite intrarenal stones. There is mild prominence of the right renal pelvis which is not demonstrated on the previous CT suggesting mild right hydronephrosis. Bladder: Unremarkable appearance of the bladder. Bilateral ureteral jets are demonstrated. Mild right hydronephrosis of uncertain etiology. The right ureteral jet is visualized. Suboptimal visualization and evaluation of the left kidney but no definite left renal pathology. XR SKULL (<4 VIEWS)    Result Date: 5/7/2022  EXAMINATION: TWO XRAY VIEWS OF THE CHEST; TWO XRAY VIEWS OF THE NECK SOFT TISSUES; TWO XRAY VIEWS OF THE ABDOMEN; THREE XRAY VIEWS OF THE SKULL 5/7/2022 1:55 pm COMPARISON: None.  HISTORY: ORDERING SYSTEM PROVIDED HISTORY: s/p  shunt TECHNOLOGIST PROVIDED HISTORY: Reason for exam:->s/p  shunt Reason for exam:->shunt series What reading provider will be dictating this exam?->CRC FINDINGS: There is evidence of a right-sided shunt tube. Portions of the catheter near the valve are not opaque. Continuity in this region of the right lateral aspect of the skull cannot be determined. Remainder of the shunt tube in the neck, chest and abdomen appears unremarkable. There are no infiltrates or effusions. Heart size is normal. In the abdomen the shunt tube traverses in the left side and is than looped in the right-side of the lower abdomen. There are some nonspecific air-fluid levels in the abdomen bowel gas. Bowel loops otherwise appear unremarkable. There is a right hip prosthesis. IVC filter is noted.  shunt tube as described. XR ABDOMEN (2 VIEWS)    Result Date: 5/7/2022  EXAMINATION: TWO XRAY VIEWS OF THE CHEST; TWO XRAY VIEWS OF THE NECK SOFT TISSUES; TWO XRAY VIEWS OF THE ABDOMEN; THREE XRAY VIEWS OF THE SKULL 5/7/2022 1:55 pm COMPARISON: None. HISTORY: ORDERING SYSTEM PROVIDED HISTORY: s/p  shunt TECHNOLOGIST PROVIDED HISTORY: Reason for exam:->s/p  shunt Reason for exam:->shunt series What reading provider will be dictating this exam?->CRC FINDINGS: There is evidence of a right-sided shunt tube. Portions of the catheter near the valve are not opaque. Continuity in this region of the right lateral aspect of the skull cannot be determined. Remainder of the shunt tube in the neck, chest and abdomen appears unremarkable. There are no infiltrates or effusions. Heart size is normal. In the abdomen the shunt tube traverses in the left side and is than looped in the right-side of the lower abdomen. There are some nonspecific air-fluid levels in the abdomen bowel gas. Bowel loops otherwise appear unremarkable. There is a right hip prosthesis. IVC filter is noted.  shunt tube as described. CT Head WO Contrast   Final Result   1.  Redemonstration of hypoattenuating extra-axial collection overlying left   parietal lobe related to chronic subdural hygroma or chronic subdural   hematoma. Small focus of increased attenuation within this collection could   suggest small acute on chronic subdural hematoma. Results were called by Dr. Tarsha Carlos to Dr. Karen العلي on 5/30/2022 at 21:57.   2. Stable areas of encephalomalacia involving bilateral frontal lobes and   bilateral temporal lobes. 3. Stable position of right frontal ventricular shunt catheter. 4. Stable moderate ventriculomegaly. CTA PULMONARY W CONTRAST   Final Result   No evidence of pulmonary embolism or acute pulmonary abnormality. Noted right aberrant subclavian artery of variant anatomic thoracic arch   branching. Subsegmental dependent atelectasis. ASSESSMENT AND PLAN  Active Problems:  - SOB and desaturation, acute respiratory failure, hypoxia   - acute on chronic SDH  - Leukocytosis   - abnormal urinalysis   - Hypothyroidism  - Down syndrome    Plan:  - admit to tele monitoring   - pulse oximetry  - duoneb nebulizer as needed   - HOB elevation, concern for aspiration  - neurosurgery consulted, recommending to continue keppra   - repeat CTH in 6hr to assess for stability  - avoid anticoagulants and antiplatelets till stable   - resume home levothyroxine, follow TSH  - continue ceftriaxone, follow urinalysis       VTE Prophylaxis: scd  DVT Prophylaxis: []Lovenox []Heparin [x]PCD [] Warfarin/NOAC [x]Encouraged ambulation    Diet: No diet orders on file  Code Status: Prior  Surrogate decision maker confirmed with patient:  Primary Emergency Contact: SharathCaitlin Home Phone: [phone]      Disposition: [x]Med/Surg [] Intermediate [] ICU/CCU   Admit status: [] Observation [x] Inpatient       Additional work up or/and treatment plan may be added today or thereafter based on clinical progression. I am managing a portion of pt care. Some medical issues are handled by other specialists.  Additional work up and treatment should be done by my colleague hospitalist and at out pt setting by pt PCP and other out pt providers.      Beatriz Dixon MD  DATE: May 30, 2022

## 2022-05-31 NOTE — ED NOTES
Report given to [facility], RN. No further questions at this time.       Austin Hightower RN  05/31/22 [id number]

## 2022-05-31 NOTE — CONSULTS
Palliative Care Department  [phone]  Palliative Care Initial Consult  Provider Marilyn Blizzard, BOGDAN - CNP      PATIENT: Roxie Early  : 1965  MRN: [id number]  ADMISSION DATE: 2022  4:47 PM  Referring Provider: Dr. Eleazar Chavez was consulted on hospital day 1 for assistance with Goals of care, Code Status Discussion. HPI:     Gonzales Early is a 65 y.o. y/o female with a history of Down syndrome, hypothyroidism, osteoarthritis, thyroid disease, UTI, varicose vein, who presented to [facility]) on 2022 with hypoxia. Patient was recently admitted for seizure-like activity. CT of the head showing decreased size of bilateral subdural hemorrhage compared to 2022, ventriculostomy shunt catheter stable in position, chronic small vessel ischemia, remote insults in the bilateral frontal lobes and right temporal lobe. This admission she was hypoxic on presentation to ED with O2 saturation at 83% on room air. She was placed on 6 L via NC and admitted for further medical management. ASSESSMENT/PLAN:     Pertinent Hospital Diagnoses      Acute on chronic intracranial subdural hematoma   Acute respiratory failure with hypoxia    Palliative Care Encounter / Counseling Regarding Goals of Care  Please see detailed goals of care discussion as below   At this time, Roxie Early, Does Not have capacity for medical decision-making.  Capacity is time limited and situation/question specific   During encounter Erendira Levin was surrogate medical decision-maker   Outcome of goals of care meeting:  o Continue with current medical treatment   Code status Full Code   Advanced Directives: no POA or living will in [facility]   Surrogate/Legal NOK:  o Erendira Levin See (mother/legal guardian) [phone]    Spiritual assessment: no spiritual distress identified  Bereavement and grief: to be determined  Referrals to: none today    Thank you for the opportunity to participate in the care of Terrie Rueda See. [facility]OGDAN [facility]  Palliative Medicine     SUBJECTIVE:     Details of Conversation:    Chart reviewed. Patient seen resting in bed in no apparent distress. Mother at bedside. Role of palliative medicine introduced. Mother states patient is alert and nonverbal at baseline. Discussion on current condition and clinical course as well as goals of care and CODE STATUS. Mother states that she would like to have this conversation when her  is present. Meeting set up to speak with both parents tomorrow morning. Mother is very appreciative of the information and states she is considering changing CODE STATUS but is not ready. Emotional support given and all questions addressed. Will follow for ongoing goals of care and CODE STATUS discussion as well as support for the patient and family. Prognosis: Guarded    OBJECTIVE:     BP 95/70   Pulse 71   Temp 98.2 °F (36.8 °C) (Axillary)   Resp 15   Ht 4' 10\" (1.473 m)   Wt 125 lb (56.7 kg)   SpO2 99%   BMI 26.13 kg/m²     Physical Examination:  Gen: elderly, thin, NAD, awake  HEENT: normocephalic, atraumatic, PERRL, EOMI, dry lips  Neck: trachea midline, no JVD  Lungs: respirations easy and not labored,   Heart: Cardiac  Abdomen: normoactive bowel sounds, soft, non-tender  Extremities  edema  Skin: warm, dry without rashes, lesions, bruising  Neuro: awake, alert, nonverbal,    Objective data reviewed: labs, images, records, medication use, vitals and chart    Time/Communication  Greater than 50% of time spent, total 50 minutes in counseling and coordination of care at the bedside regarding CODE STATUS and goals of care. Thank you for allowing Palliative Medicine to participate in the care of Terrie Rueda See. Note: This report was completed using [employer] voiced recognition software. Every effort has been made to ensure accuracy; however, inadvertent computerized transcription errors may be present.

## 2022-05-31 NOTE — PROGRESS NOTES
flush    I/O    Intake/Output Summary (Last 24 hours) at 5/31/2022 1051  Last data filed at 5/30/2022 2025  Gross per 24 hour   Intake    Output 400 ml   Net -400 ml       Labs:   Recent Labs     05/30/22 1656   WBC 16.2*   HGB 14.4   HCT 44.5          Recent Labs     05/30/22  1656 05/30/22  1818 05/31/22  0709     --   --    K 5.8* 4.2 4.1     --   --    CO2 27  --   --    BUN 17  --   --    CREATININE 0.6  --   --    CALCIUM 8.7  --   --        Recent Labs     05/30/22 1656   PROT 7.2   ALKPHOS 119*   ALT 35*   AST 49*   BILITOT 0.4       Recent Labs     05/30/22 1656   INR 1.1       No results for input(s): Towana Ball in the last 72 hours. Chronic labs:  Lab Results   Component Value Date    CHOL 173 03/30/2022    TRIG 88 03/30/2022    HDL 62 03/30/2022    LDLCALC 93 03/30/2022    TSH 12.120 (H) 05/18/2022    INR 1.1 05/30/2022    LABA1C 5.2 03/30/2022       Radiology:  Imaging studies reviewed today. ASSESSMENT:  Acute hypoxic respiratory failure  Acute on chronic SDH  Leukocytosis  Down syndrome  Hypothyroidism  Aspiration pneumonia     PLAN:  Rocephin switched to unasyn   Continue home meds as ordered  Wean oxygen as tolerated  Strict I/O, monitor renal function  Appreciate NSG recs      Diet: Diet NPO  Code Status: Full Code  PT/OT Eval Status: ordered    DVT Prophylaxis:  scd   Recommended disposition at discharge:  back to SNF at [place]     +++++++++++++++++++++++++++++++++++++++++++++++++  Callie Vang MD   [facility].  +++++++++++++++++++++++++++++++++++++++++++++++++  NOTE: This report was transcribed using voice recognition software. Every effort was made to ensure accuracy; however, inadvertent computerized transcription errors may be present.

## 2022-05-31 NOTE — CARE COORDINATION
5/31 Care Coordination. Patient was admit from ED yesterday from [facility] for SOB, 83% on RA. There was concerns for possible TF aspiration, per documentation, EMS suctioned TF from patient's mouth. Patient currently on 4L NC, initially on 6 on admission. NSGY consulted for subacute on chronic SDH, no intervention needed at this time. Spoke with Nicole Ward from [facility], patient is a bed hold and can return when medically stable. Will require negative COVID test on day of discharge. Completed ambulance paperwork in soft chart with COVID test. Confirmed plan is to return to [facility] with patient's father Jossue Logan via phone.     SHANDRA MendozaN, RN  [facility] Case Management   Cell: [phone]

## 2022-06-01 VITALS
OXYGEN SATURATION: 96 % | DIASTOLIC BLOOD PRESSURE: 56 MMHG | BODY MASS INDEX: 26.24 KG/M2 | TEMPERATURE: 97.5 F | HEART RATE: 72 BPM | HEIGHT: 58 IN | WEIGHT: 125 LBS | SYSTOLIC BLOOD PRESSURE: 116 MMHG | RESPIRATION RATE: 22 BRPM

## 2022-06-01 LAB
ALBUMIN SERPL-MCNC: 2.5 G/DL (ref 3.5–5.2)
ALP BLD-CCNC: 100 U/L (ref 35–104)
ALT SERPL-CCNC: 28 U/L (ref 0–32)
ANION GAP SERPL CALCULATED.3IONS-SCNC: 14 MMOL/L (ref 7–16)
AST SERPL-CCNC: 24 U/L (ref 0–31)
BASOPHILS ABSOLUTE: 0.04 E9/L (ref 0–0.2)
BASOPHILS RELATIVE PERCENT: 0.9 % (ref 0–2)
BILIRUB SERPL-MCNC: 0.3 MG/DL (ref 0–1.2)
BUN BLDV-MCNC: 13 MG/DL (ref 6–20)
CALCIUM SERPL-MCNC: 8.2 MG/DL (ref 8.6–10.2)
CHLORIDE BLD-SCNC: 105 MMOL/L (ref 98–107)
CO2: 24 MMOL/L (ref 22–29)
CREAT SERPL-MCNC: 0.5 MG/DL (ref 0.5–1)
EOSINOPHILS ABSOLUTE: 0.13 E9/L (ref 0.05–0.5)
EOSINOPHILS RELATIVE PERCENT: 3 % (ref 0–6)
GFR AFRICAN AMERICAN: >60
GFR NON-AFRICAN AMERICAN: >60 ML/MIN/1.73
GLUCOSE BLD-MCNC: 97 MG/DL (ref 74–99)
HCT VFR BLD CALC: 39.8 % (ref 34–48)
HEMOGLOBIN: 12.6 G/DL (ref 11.5–15.5)
IMMATURE GRANULOCYTES #: 0.02 E9/L
IMMATURE GRANULOCYTES %: 0.5 % (ref 0–5)
LYMPHOCYTES ABSOLUTE: 1.15 E9/L (ref 1.5–4)
LYMPHOCYTES RELATIVE PERCENT: 26.6 % (ref 20–42)
MCH RBC QN AUTO: 30.4 PG (ref 26–35)
MCHC RBC AUTO-ENTMCNC: 31.7 % (ref 32–34.5)
MCV RBC AUTO: 95.9 FL (ref 80–99.9)
MONOCYTES ABSOLUTE: 0.41 E9/L (ref 0.1–0.95)
MONOCYTES RELATIVE PERCENT: 9.5 % (ref 2–12)
NEUTROPHILS ABSOLUTE: 2.57 E9/L (ref 1.8–7.3)
NEUTROPHILS RELATIVE PERCENT: 59.5 % (ref 43–80)
PDW BLD-RTO: 14.9 FL (ref 11.5–15)
PLATELET # BLD: 193 E9/L (ref 130–450)
PMV BLD AUTO: 10 FL (ref 7–12)
POTASSIUM REFLEX MAGNESIUM: 4 MMOL/L (ref 3.5–5)
RBC # BLD: 4.15 E12/L (ref 3.5–5.5)
SARS-COV-2, NAAT: NOT DETECTED
SODIUM BLD-SCNC: 143 MMOL/L (ref 132–146)
TOTAL PROTEIN: 6.1 G/DL (ref 6.4–8.3)
WBC # BLD: 4.3 E9/L (ref 4.5–11.5)

## 2022-06-01 PROCEDURE — 36415 COLL VENOUS BLD VENIPUNCTURE: CPT

## 2022-06-01 PROCEDURE — 87635 SARS-COV-2 COVID-19 AMP PRB: CPT

## 2022-06-01 PROCEDURE — 6370000000 HC RX 637 (ALT 250 FOR IP): Performed by: INTERNAL MEDICINE

## 2022-06-01 PROCEDURE — 2580000003 HC RX 258: Performed by: INTERNAL MEDICINE

## 2022-06-01 PROCEDURE — 94640 AIRWAY INHALATION TREATMENT: CPT

## 2022-06-01 PROCEDURE — 6360000002 HC RX W HCPCS: Performed by: INTERNAL MEDICINE

## 2022-06-01 PROCEDURE — 85025 COMPLETE CBC W/AUTO DIFF WBC: CPT

## 2022-06-01 PROCEDURE — 80053 COMPREHEN METABOLIC PANEL: CPT

## 2022-06-01 RX ORDER — AMOXICILLIN AND CLAVULANATE POTASSIUM 600; 42.9 MG/5ML; MG/5ML
875 POWDER, FOR SUSPENSION ORAL 2 TIMES DAILY
Qty: 102.2 ML | Refills: 0 | Status: ON HOLD
Start: 2022-06-01 | End: 2022-06-18 | Stop reason: HOSPADM

## 2022-06-01 RX ADMIN — LEVETIRACETAM 500 MG: 500 TABLET, FILM COATED ORAL at 09:26

## 2022-06-01 RX ADMIN — IPRATROPIUM BROMIDE AND ALBUTEROL SULFATE 1 AMPULE: 2.5; .5 SOLUTION RESPIRATORY (INHALATION) at 09:39

## 2022-06-01 RX ADMIN — SODIUM CHLORIDE, PRESERVATIVE FREE 10 ML: 5 INJECTION INTRAVENOUS at 09:26

## 2022-06-01 RX ADMIN — SODIUM CHLORIDE 3000 MG: 900 INJECTION INTRAVENOUS at 10:55

## 2022-06-01 RX ADMIN — SODIUM CHLORIDE 3000 MG: 900 INJECTION INTRAVENOUS at 04:29

## 2022-06-01 RX ADMIN — LEVOTHYROXINE SODIUM 88 MCG: 0.09 TABLET ORAL at 05:19

## 2022-06-01 RX ADMIN — IPRATROPIUM BROMIDE AND ALBUTEROL SULFATE 1 AMPULE: 2.5; .5 SOLUTION RESPIRATORY (INHALATION) at 12:48

## 2022-06-01 RX ADMIN — RISPERIDONE 0.25 MG: 0.5 TABLET, FILM COATED ORAL at 09:26

## 2022-06-01 NOTE — CARE COORDINATION
6/1 Update CM note. Discharge order noted. Patient can f/u with NSGY OPT on a PRN basis. Palliative consulted and spoke with patient's mother who \"was considering\" changing code status yesterday. PS message sent to palliative to f/u if any further conversations are needed prior to discharge. Will await response. Message sent to [facility] that patient has discharge order. COVID test ordered and given to bedside RN Norberto Herr. [facility]. [facility], bedside RN and patient's father Jossue Logan notified of pickup time and in agreement. Transport paperwork in soft chart.     SHANDRA MendozaN, RN  [facility] Case Management   Cell: [phone]

## 2022-06-01 NOTE — PROGRESS NOTES
Called Nurse to Nurse to [address] [place] [address] to give discharge instructions. Placed on hold for 13 minutes. Attempted to call back, no answer. [employer] picked up patient.

## 2022-06-01 NOTE — PROGRESS NOTES
Hospitalist Progress Note      Synopsis: Patient with a hx of arthritis, Down syndrome, hypothyroidism, OA, thyroid disease  admitted on 5/30/2022 after persenting ofr SOB and found to be hypoxic in her group home. CTH suggested possible acute on chronic SDH, NSG consulted. CTA pulmonary with some atelectasis, but otherwise no acute changes. Subjective  Weaned to room air  Tolerated tube feeds     Exam:  BP (!) 116/56   Pulse 72   Temp 97.5 °F (36.4 °C) (Temporal)   Resp 22   Ht 4' 10\" (1.473 m)   Wt 125 lb (56.7 kg)   SpO2 96%   BMI 26.13 kg/m²   General appearance: No apparent distress, appears stated age and cooperative. Ill appearing  HEENT: Pupils equal, round, and reactive to light. Conjunctivae/corneas clear. Neck: Supple. No jugular venous distention. Trachea midline. Respiratory:  Normal respiratory effort. Clear to auscultation, bilaterally without Rales/Wheezes/Rhonchi. Coarse transmitted upper respiratory sounds   Cardiovascular: Regular rate and rhythm with normal S1/S2 without murmurs, rubs or gallops. Abdomen: Soft, non-tender, non-distended with normal bowel sounds. Musculoskeletal: No clubbing, cyanosis or edema bilaterally. Brisk capillary refill. 2+ lower extremity pulses (dorsalis pedis).    Skin:  No rashes    Neurologic: awake, alert and following commands     Medications:  Reviewed    Infusion Medications    sodium chloride       Scheduled Medications    ipratropium-albuterol  1 ampule Inhalation Q4H [place]    donepezil  5 mg Oral Nightly    levETIRAcetam  500 mg Oral BID    levothyroxine  88 mcg Oral Daily    risperiDONE  0.25 mg Oral BID    sodium chloride flush  10 mL IntraVENous 2 times per day    ampicillin-sulbactam  3,000 mg IntraVENous Q6H     PRN Meds: sodium chloride flush, sodium chloride, ondansetron **OR** ondansetron, magnesium hydroxide, acetaminophen **OR** acetaminophen, sodium chloride flush    I/O    Intake/Output Summary (Last 24 hours) at 6/1/2022 [address] [facility] [facility] filed at 6/1/2022 0644  Gross per 24 hour   Intake 1013 ml   Output    Net 1013 ml       Labs:   Recent Labs     05/30/22 1656 06/01/22 0447   WBC 16.2* 4.3*   HGB 14.4 12.6   HCT 44.5 39.8    193       Recent Labs     05/30/22  1656 05/30/22  1818 05/31/22  0709 06/01/22 0447     --   --  143   K 5.8* 4.2 4.1 4.0     --   --  105   CO2 27  --   --  24   BUN 17  --   --  13   CREATININE 0.6  --   --  0.5   CALCIUM 8.7  --   --  8.2*       Recent Labs     05/30/22 1656 06/01/22 0447   PROT 7.2 6.1*   ALKPHOS 119* 100   ALT 35* 28   AST 49* 24   BILITOT 0.4 0.3       Recent Labs     05/30/22 1656   INR 1.1       No results for input(s): Yoli Pa in the last 72 hours. Chronic labs:  Lab Results   Component Value Date    CHOL 173 03/30/2022    TRIG 88 03/30/2022    HDL 62 03/30/2022    LDLCALC 93 03/30/2022    TSH 12.120 (H) 05/18/2022    INR 1.1 05/30/2022    LABA1C 5.2 03/30/2022       Radiology:  Imaging studies reviewed today. ASSESSMENT:  Acute hypoxic respiratory failure  Acute on chronic SDH  Leukocytosis  Down syndrome  Hypothyroidism  Aspiration pneumonia     PLAN:  Rocephin switched to unasyn   Continue home meds as ordered  Wean oxygen as tolerated  Strict I/O, monitor renal function  Appreciate NSG recs      Diet: Diet NPO  ADULT TUBE FEEDING; PEG; Standard with Fiber; Continuous; 45; No; 30; Q 1 hour  Code Status: Full Code  PT/OT Eval Status: ordered    DVT Prophylaxis:  scd   Recommended disposition at discharge:  back to SNF today on augmentin    +++++++++++++++++++++++++++++++++++++++++++++++++  Estefani Candelaria MD   [facility].  +++++++++++++++++++++++++++++++++++++++++++++++++  NOTE: This report was transcribed using voice recognition software. Every effort was made to ensure accuracy; however, inadvertent computerized transcription errors may be present.

## 2022-06-03 LAB — URINE CULTURE, ROUTINE: NORMAL

## 2022-06-05 ENCOUNTER — APPOINTMENT (OUTPATIENT)
Dept: GENERAL RADIOLOGY | Age: 57
DRG: 870 | End: 2022-06-05
Payer: MEDICARE

## 2022-06-05 ENCOUNTER — HOSPITAL ENCOUNTER (INPATIENT)
Age: 57
LOS: 13 days | Discharge: HOSPICE | DRG: 870 | End: 2022-06-18
Attending: EMERGENCY MEDICINE | Admitting: FAMILY MEDICINE
Payer: MEDICARE

## 2022-06-05 DIAGNOSIS — J96.01 ACUTE RESPIRATORY FAILURE WITH HYPOXIA (HCC): Primary | ICD-10-CM

## 2022-06-05 DIAGNOSIS — J69.0 ASPIRATION PNEUMONIA, UNSPECIFIED ASPIRATION PNEUMONIA TYPE, UNSPECIFIED LATERALITY, UNSPECIFIED PART OF LUNG (HCC): ICD-10-CM

## 2022-06-05 LAB
AADO2: 430.7 MMHG
ADENOVIRUS BY PCR: NOT DETECTED
ALBUMIN SERPL-MCNC: 3.4 G/DL (ref 3.5–5.2)
ALP BLD-CCNC: 138 U/L (ref 35–104)
ALT SERPL-CCNC: 36 U/L (ref 0–32)
AMMONIA: 24 UMOL/L (ref 11–51)
AMORPHOUS: PRESENT
ANION GAP SERPL CALCULATED.3IONS-SCNC: 11 MMOL/L (ref 7–16)
AST SERPL-CCNC: 26 U/L (ref 0–31)
B.E.: -3.8 MMOL/L (ref -3–3)
B.E.: 0.7 MMOL/L (ref -3–3)
BACTERIA: ABNORMAL /HPF
BASOPHILS ABSOLUTE: 0.07 E9/L (ref 0–0.2)
BASOPHILS RELATIVE PERCENT: 0.9 % (ref 0–2)
BILIRUB SERPL-MCNC: 0.3 MG/DL (ref 0–1.2)
BILIRUBIN URINE: NEGATIVE
BLOOD CULTURE, ROUTINE: NORMAL
BLOOD, URINE: NEGATIVE
BORDETELLA PARAPERTUSSIS BY PCR: NOT DETECTED
BORDETELLA PERTUSSIS BY PCR: NOT DETECTED
BUN BLDV-MCNC: 17 MG/DL (ref 6–20)
C-REACTIVE PROTEIN: 0.7 MG/DL (ref 0–0.4)
CALCIUM SERPL-MCNC: 9.4 MG/DL (ref 8.6–10.2)
CHLAMYDOPHILIA PNEUMONIAE BY PCR: NOT DETECTED
CHLORIDE BLD-SCNC: 100 MMOL/L (ref 98–107)
CLARITY: ABNORMAL
CO2: 26 MMOL/L (ref 22–29)
COHB: 0.8 % (ref 0–1.5)
COHB: 0.9 % (ref 0–1.5)
COLOR: YELLOW
CORONAVIRUS 229E BY PCR: NOT DETECTED
CORONAVIRUS HKU1 BY PCR: NOT DETECTED
CORONAVIRUS NL63 BY PCR: NOT DETECTED
CORONAVIRUS OC43 BY PCR: NOT DETECTED
CORTISOL TOTAL: 2.86 MCG/DL (ref 2.68–18.4)
CREAT SERPL-MCNC: 0.6 MG/DL (ref 0.5–1)
CRITICAL: ABNORMAL
CRITICAL: ABNORMAL
CULTURE, BLOOD 2: NORMAL
DATE ANALYZED: ABNORMAL
DATE ANALYZED: ABNORMAL
DATE OF COLLECTION: ABNORMAL
DATE OF COLLECTION: ABNORMAL
EOSINOPHILS ABSOLUTE: 0.18 E9/L (ref 0.05–0.5)
EOSINOPHILS RELATIVE PERCENT: 2.3 % (ref 0–6)
FIO2: 100 %
GFR AFRICAN AMERICAN: >60
GFR NON-AFRICAN AMERICAN: >60 ML/MIN/1.73
GLUCOSE BLD-MCNC: 128 MG/DL (ref 74–99)
GLUCOSE URINE: NEGATIVE MG/DL
HCO3: 18.3 MMOL/L (ref 22–26)
HCO3: 24.9 MMOL/L (ref 22–26)
HCT VFR BLD CALC: 51.9 % (ref 34–48)
HEMOGLOBIN: 16.5 G/DL (ref 11.5–15.5)
HHB: 0.5 % (ref 0–5)
HHB: 0.7 % (ref 0–5)
HUMAN METAPNEUMOVIRUS BY PCR: NOT DETECTED
HUMAN RHINOVIRUS/ENTEROVIRUS BY PCR: NOT DETECTED
IMMATURE GRANULOCYTES #: 0.04 E9/L
IMMATURE GRANULOCYTES %: 0.5 % (ref 0–5)
INFLUENZA A BY PCR: NOT DETECTED
INFLUENZA A BY PCR: NOT DETECTED
INFLUENZA B BY PCR: NOT DETECTED
INFLUENZA B BY PCR: NOT DETECTED
INR BLD: 1
KETONES, URINE: NEGATIVE MG/DL
LAB: ABNORMAL
LAB: ABNORMAL
LACTIC ACID, SEPSIS: 2.2 MMOL/L (ref 0.5–1.9)
LACTIC ACID: 2.5 MMOL/L (ref 0.5–2.2)
LEUKOCYTE ESTERASE, URINE: NEGATIVE
LIPASE: 33 U/L (ref 13–60)
LYMPHOCYTES ABSOLUTE: 1.57 E9/L (ref 1.5–4)
LYMPHOCYTES RELATIVE PERCENT: 20.2 % (ref 20–42)
Lab: ABNORMAL
Lab: ABNORMAL
MAGNESIUM: 2.3 MG/DL (ref 1.6–2.6)
MCH RBC QN AUTO: 30.1 PG (ref 26–35)
MCHC RBC AUTO-ENTMCNC: 31.8 % (ref 32–34.5)
MCV RBC AUTO: 94.7 FL (ref 80–99.9)
METER GLUCOSE: 88 MG/DL (ref 74–99)
METHB: 0.3 % (ref 0–1.5)
METHB: 0.4 % (ref 0–1.5)
MODE: ABNORMAL
MODE: AC
MONOCYTES ABSOLUTE: 0.6 E9/L (ref 0.1–0.95)
MONOCYTES RELATIVE PERCENT: 7.7 % (ref 2–12)
MYCOPLASMA PNEUMONIAE BY PCR: NOT DETECTED
NEUTROPHILS ABSOLUTE: 5.31 E9/L (ref 1.8–7.3)
NEUTROPHILS RELATIVE PERCENT: 68.4 % (ref 43–80)
NITRITE, URINE: NEGATIVE
O2 CONTENT: 21.4 ML/DL
O2 SATURATION: 99.3 % (ref 92–98.5)
O2 SATURATION: 99.6 % (ref 92–98.5)
O2HB: 98.2 % (ref 94–97)
O2HB: 98.2 % (ref 94–97)
OPERATOR ID: 2593
OPERATOR ID: 8214
PARAINFLUENZA VIRUS 1 BY PCR: NOT DETECTED
PARAINFLUENZA VIRUS 2 BY PCR: NOT DETECTED
PARAINFLUENZA VIRUS 3 BY PCR: NOT DETECTED
PARAINFLUENZA VIRUS 4 BY PCR: NOT DETECTED
PATIENT TEMP: 37 C
PATIENT TEMP: 37 C
PCO2: 25.8 MMHG (ref 35–45)
PCO2: 38.7 MMHG (ref 35–45)
PDW BLD-RTO: 15.1 FL (ref 11.5–15)
PEEP/CPAP: 8 CMH2O
PFO2: 2.32 MMHG/%
PH BLOOD GAS: 7.43 (ref 7.35–7.45)
PH BLOOD GAS: 7.47 (ref 7.35–7.45)
PH UA: 6 (ref 5–9)
PLATELET # BLD: 256 E9/L (ref 130–450)
PMV BLD AUTO: 9.9 FL (ref 7–12)
PO2: 174.4 MMHG (ref 75–100)
PO2: 231.5 MMHG (ref 75–100)
POTASSIUM SERPL-SCNC: 4.7 MMOL/L (ref 3.5–5)
PRO-BNP: 89 PG/ML (ref 0–125)
PROCALCITONIN: 0.06 NG/ML (ref 0–0.08)
PROCALCITONIN: 0.06 NG/ML (ref 0–0.08)
PROTEIN UA: NEGATIVE MG/DL
PROTHROMBIN TIME: 11.5 SEC (ref 9.3–12.4)
RBC # BLD: 5.48 E12/L (ref 3.5–5.5)
RBC UA: ABNORMAL /HPF (ref 0–2)
RESPIRATORY SYNCYTIAL VIRUS BY PCR: NOT DETECTED
RI(T): 1.86
RR MECHANICAL: 18 B/MIN
SARS-COV-2, NAAT: NOT DETECTED
SARS-COV-2, PCR: NOT DETECTED
SEDIMENTATION RATE, ERYTHROCYTE: 32 MM/HR (ref 0–20)
SODIUM BLD-SCNC: 137 MMOL/L (ref 132–146)
SOURCE, BLOOD GAS: ABNORMAL
SOURCE, BLOOD GAS: ABNORMAL
SPECIFIC GRAVITY UA: 1.02 (ref 1–1.03)
T3 TOTAL: 67.53 NG/DL (ref 80–200)
T4 FREE: 1.2 NG/DL (ref 0.93–1.7)
T4 TOTAL: 5.6 MCG/DL (ref 4.5–11.7)
THB: 13.7 G/DL (ref 11.5–16.5)
THB: 15.3 G/DL (ref 11.5–16.5)
TIME ANALYZED: 1047
TIME ANALYZED: 158
TOTAL PROTEIN: 8.3 G/DL (ref 6.4–8.3)
TROPONIN, HIGH SENSITIVITY: 33 NG/L (ref 0–9)
TROPONIN, HIGH SENSITIVITY: 34 NG/L (ref 0–9)
TSH SERPL DL<=0.05 MIU/L-ACNC: 10.33 UIU/ML (ref 0.27–4.2)
UROBILINOGEN, URINE: 0.2 E.U./DL
VT MECHANICAL: 300 ML
WBC # BLD: 7.8 E9/L (ref 4.5–11.5)
WBC UA: ABNORMAL /HPF (ref 0–5)
YEAST: PRESENT /HPF

## 2022-06-05 PROCEDURE — 85025 COMPLETE CBC W/AUTO DIFF WBC: CPT

## 2022-06-05 PROCEDURE — 71045 X-RAY EXAM CHEST 1 VIEW: CPT

## 2022-06-05 PROCEDURE — 6360000002 HC RX W HCPCS: Performed by: EMERGENCY MEDICINE

## 2022-06-05 PROCEDURE — 6360000002 HC RX W HCPCS: Performed by: INTERNAL MEDICINE

## 2022-06-05 PROCEDURE — 0202U NFCT DS 22 TRGT SARS-COV-2: CPT

## 2022-06-05 PROCEDURE — 86140 C-REACTIVE PROTEIN: CPT

## 2022-06-05 PROCEDURE — 82533 TOTAL CORTISOL: CPT

## 2022-06-05 PROCEDURE — 84484 ASSAY OF TROPONIN QUANT: CPT

## 2022-06-05 PROCEDURE — 94002 VENT MGMT INPAT INIT DAY: CPT

## 2022-06-05 PROCEDURE — 84145 PROCALCITONIN (PCT): CPT

## 2022-06-05 PROCEDURE — 74018 RADEX ABDOMEN 1 VIEW: CPT

## 2022-06-05 PROCEDURE — 87502 INFLUENZA DNA AMP PROBE: CPT

## 2022-06-05 PROCEDURE — 2580000003 HC RX 258: Performed by: INTERNAL MEDICINE

## 2022-06-05 PROCEDURE — 2500000003 HC RX 250 WO HCPCS: Performed by: STUDENT IN AN ORGANIZED HEALTH CARE EDUCATION/TRAINING PROGRAM

## 2022-06-05 PROCEDURE — 82962 GLUCOSE BLOOD TEST: CPT

## 2022-06-05 PROCEDURE — 82805 BLOOD GASES W/O2 SATURATION: CPT

## 2022-06-05 PROCEDURE — 6370000000 HC RX 637 (ALT 250 FOR IP): Performed by: EMERGENCY MEDICINE

## 2022-06-05 PROCEDURE — 87040 BLOOD CULTURE FOR BACTERIA: CPT

## 2022-06-05 PROCEDURE — 93005 ELECTROCARDIOGRAM TRACING: CPT | Performed by: STUDENT IN AN ORGANIZED HEALTH CARE EDUCATION/TRAINING PROGRAM

## 2022-06-05 PROCEDURE — 0BH17EZ INSERTION OF ENDOTRACHEAL AIRWAY INTO TRACHEA, VIA NATURAL OR ARTIFICIAL OPENING: ICD-10-PCS | Performed by: STUDENT IN AN ORGANIZED HEALTH CARE EDUCATION/TRAINING PROGRAM

## 2022-06-05 PROCEDURE — 2000000000 HC ICU R&B

## 2022-06-05 PROCEDURE — 2580000003 HC RX 258: Performed by: STUDENT IN AN ORGANIZED HEALTH CARE EDUCATION/TRAINING PROGRAM

## 2022-06-05 PROCEDURE — 02HV33Z INSERTION OF INFUSION DEVICE INTO SUPERIOR VENA CAVA, PERCUTANEOUS APPROACH: ICD-10-PCS | Performed by: INTERNAL MEDICINE

## 2022-06-05 PROCEDURE — 85651 RBC SED RATE NONAUTOMATED: CPT

## 2022-06-05 PROCEDURE — 99285 EMERGENCY DEPT VISIT HI MDM: CPT

## 2022-06-05 PROCEDURE — 94640 AIRWAY INHALATION TREATMENT: CPT

## 2022-06-05 PROCEDURE — 87449 NOS EACH ORGANISM AG IA: CPT

## 2022-06-05 PROCEDURE — 6360000002 HC RX W HCPCS

## 2022-06-05 PROCEDURE — 84443 ASSAY THYROID STIM HORMONE: CPT

## 2022-06-05 PROCEDURE — 36620 INSERTION CATHETER ARTERY: CPT

## 2022-06-05 PROCEDURE — 87205 SMEAR GRAM STAIN: CPT

## 2022-06-05 PROCEDURE — 36556 INSERT NON-TUNNEL CV CATH: CPT

## 2022-06-05 PROCEDURE — 2500000003 HC RX 250 WO HCPCS: Performed by: INTERNAL MEDICINE

## 2022-06-05 PROCEDURE — C9113 INJ PANTOPRAZOLE SODIUM, VIA: HCPCS | Performed by: INTERNAL MEDICINE

## 2022-06-05 PROCEDURE — 81001 URINALYSIS AUTO W/SCOPE: CPT

## 2022-06-05 PROCEDURE — 85610 PROTHROMBIN TIME: CPT

## 2022-06-05 PROCEDURE — 6370000000 HC RX 637 (ALT 250 FOR IP): Performed by: INTERNAL MEDICINE

## 2022-06-05 PROCEDURE — 83605 ASSAY OF LACTIC ACID: CPT

## 2022-06-05 PROCEDURE — 87070 CULTURE OTHR SPECIMN AEROBIC: CPT

## 2022-06-05 PROCEDURE — 83880 ASSAY OF NATRIURETIC PEPTIDE: CPT

## 2022-06-05 PROCEDURE — 87206 SMEAR FLUORESCENT/ACID STAI: CPT

## 2022-06-05 PROCEDURE — 6370000000 HC RX 637 (ALT 250 FOR IP): Performed by: FAMILY MEDICINE

## 2022-06-05 PROCEDURE — 82140 ASSAY OF AMMONIA: CPT

## 2022-06-05 PROCEDURE — 6360000002 HC RX W HCPCS: Performed by: FAMILY MEDICINE

## 2022-06-05 PROCEDURE — 96365 THER/PROPH/DIAG IV INF INIT: CPT

## 2022-06-05 PROCEDURE — 87081 CULTURE SCREEN ONLY: CPT

## 2022-06-05 PROCEDURE — 80053 COMPREHEN METABOLIC PANEL: CPT

## 2022-06-05 PROCEDURE — 51702 INSERT TEMP BLADDER CATH: CPT

## 2022-06-05 PROCEDURE — 6360000002 HC RX W HCPCS: Performed by: STUDENT IN AN ORGANIZED HEALTH CARE EDUCATION/TRAINING PROGRAM

## 2022-06-05 PROCEDURE — 84439 ASSAY OF FREE THYROXINE: CPT

## 2022-06-05 PROCEDURE — 83735 ASSAY OF MAGNESIUM: CPT

## 2022-06-05 PROCEDURE — 36415 COLL VENOUS BLD VENIPUNCTURE: CPT

## 2022-06-05 PROCEDURE — 84480 ASSAY TRIIODOTHYRONINE (T3): CPT

## 2022-06-05 PROCEDURE — 2580000003 HC RX 258: Performed by: FAMILY MEDICINE

## 2022-06-05 PROCEDURE — 2580000003 HC RX 258: Performed by: EMERGENCY MEDICINE

## 2022-06-05 PROCEDURE — 99221 1ST HOSP IP/OBS SF/LOW 40: CPT | Performed by: INTERNAL MEDICINE

## 2022-06-05 PROCEDURE — 5A1955Z RESPIRATORY VENTILATION, GREATER THAN 96 CONSECUTIVE HOURS: ICD-10-PCS | Performed by: STUDENT IN AN ORGANIZED HEALTH CARE EDUCATION/TRAINING PROGRAM

## 2022-06-05 PROCEDURE — 83690 ASSAY OF LIPASE: CPT

## 2022-06-05 PROCEDURE — 31500 INSERT EMERGENCY AIRWAY: CPT

## 2022-06-05 PROCEDURE — 87635 SARS-COV-2 COVID-19 AMP PRB: CPT

## 2022-06-05 RX ORDER — LORAZEPAM 2 MG/ML
2 INJECTION INTRAMUSCULAR ONCE
Status: COMPLETED | OUTPATIENT
Start: 2022-06-05 | End: 2022-06-05

## 2022-06-05 RX ORDER — BUDESONIDE 0.5 MG/2ML
0.5 INHALANT ORAL 2 TIMES DAILY
Status: DISCONTINUED | OUTPATIENT
Start: 2022-06-05 | End: 2022-06-17

## 2022-06-05 RX ORDER — LEVOTHYROXINE SODIUM 88 UG/1
88 TABLET ORAL DAILY
Status: DISCONTINUED | OUTPATIENT
Start: 2022-06-05 | End: 2022-06-17

## 2022-06-05 RX ORDER — LEVETIRACETAM 500 MG/1
500 TABLET ORAL 2 TIMES DAILY
Status: DISCONTINUED | OUTPATIENT
Start: 2022-06-05 | End: 2022-06-07

## 2022-06-05 RX ORDER — IPRATROPIUM BROMIDE AND ALBUTEROL SULFATE 2.5; .5 MG/3ML; MG/3ML
3 SOLUTION RESPIRATORY (INHALATION) EVERY 4 HOURS
Status: DISCONTINUED | OUTPATIENT
Start: 2022-06-05 | End: 2022-06-17

## 2022-06-05 RX ORDER — DONEPEZIL HYDROCHLORIDE 5 MG/1
5 TABLET, FILM COATED ORAL NIGHTLY
Status: DISCONTINUED | OUTPATIENT
Start: 2022-06-05 | End: 2022-06-17

## 2022-06-05 RX ORDER — RISPERIDONE 0.25 MG/1
0.25 TABLET, FILM COATED ORAL 2 TIMES DAILY
Status: DISCONTINUED | OUTPATIENT
Start: 2022-06-05 | End: 2022-06-17

## 2022-06-05 RX ORDER — 0.9 % SODIUM CHLORIDE 0.9 %
1000 INTRAVENOUS SOLUTION INTRAVENOUS ONCE
Status: COMPLETED | OUTPATIENT
Start: 2022-06-05 | End: 2022-06-05

## 2022-06-05 RX ORDER — ACETAMINOPHEN 325 MG/1
650 TABLET ORAL EVERY 6 HOURS PRN
Status: DISCONTINUED | OUTPATIENT
Start: 2022-06-05 | End: 2022-06-17

## 2022-06-05 RX ORDER — SODIUM CHLORIDE 9 MG/ML
INJECTION, SOLUTION INTRAVENOUS PRN
Status: DISCONTINUED | OUTPATIENT
Start: 2022-06-05 | End: 2022-06-14 | Stop reason: SDUPTHER

## 2022-06-05 RX ORDER — ONDANSETRON 2 MG/ML
4 INJECTION INTRAMUSCULAR; INTRAVENOUS EVERY 6 HOURS PRN
Status: DISCONTINUED | OUTPATIENT
Start: 2022-06-05 | End: 2022-06-17

## 2022-06-05 RX ORDER — LORAZEPAM 2 MG/ML
INJECTION INTRAMUSCULAR
Status: COMPLETED
Start: 2022-06-05 | End: 2022-06-05

## 2022-06-05 RX ORDER — FENTANYL CITRATE 50 UG/ML
100 INJECTION, SOLUTION INTRAMUSCULAR; INTRAVENOUS
Status: DISCONTINUED | OUTPATIENT
Start: 2022-06-05 | End: 2022-06-06

## 2022-06-05 RX ORDER — CHOLECALCIFEROL (VITAMIN D3) 50 MCG
2000 TABLET ORAL DAILY
Status: DISCONTINUED | OUTPATIENT
Start: 2022-06-05 | End: 2022-06-17

## 2022-06-05 RX ORDER — ROCURONIUM BROMIDE 10 MG/ML
120 INJECTION, SOLUTION INTRAVENOUS ONCE
Status: COMPLETED | OUTPATIENT
Start: 2022-06-05 | End: 2022-06-05

## 2022-06-05 RX ORDER — SODIUM CHLORIDE 0.9 % (FLUSH) 0.9 %
10 SYRINGE (ML) INJECTION EVERY 12 HOURS SCHEDULED
Status: DISCONTINUED | OUTPATIENT
Start: 2022-06-05 | End: 2022-06-14 | Stop reason: SDUPTHER

## 2022-06-05 RX ORDER — PROMETHAZINE HYDROCHLORIDE 12.5 MG/1
12.5 TABLET ORAL EVERY 6 HOURS PRN
Status: DISCONTINUED | OUTPATIENT
Start: 2022-06-05 | End: 2022-06-17

## 2022-06-05 RX ORDER — IPRATROPIUM BROMIDE AND ALBUTEROL SULFATE 2.5; .5 MG/3ML; MG/3ML
1 SOLUTION RESPIRATORY (INHALATION) ONCE
Status: COMPLETED | OUTPATIENT
Start: 2022-06-05 | End: 2022-06-05

## 2022-06-05 RX ORDER — LIOTHYRONINE SODIUM 5 UG/1
5 TABLET ORAL DAILY
Status: DISCONTINUED | OUTPATIENT
Start: 2022-06-05 | End: 2022-06-07

## 2022-06-05 RX ORDER — ETOMIDATE 2 MG/ML
20 INJECTION INTRAVENOUS ONCE
Status: COMPLETED | OUTPATIENT
Start: 2022-06-05 | End: 2022-06-05

## 2022-06-05 RX ORDER — SODIUM CHLORIDE 0.9 % (FLUSH) 0.9 %
10 SYRINGE (ML) INJECTION PRN
Status: DISCONTINUED | OUTPATIENT
Start: 2022-06-05 | End: 2022-06-14 | Stop reason: SDUPTHER

## 2022-06-05 RX ORDER — FENTANYL CITRATE-0.9 % NACL/PF 20 MCG/2ML
50 SYRINGE (ML) INTRAVENOUS EVERY 30 MIN PRN
Status: DISCONTINUED | OUTPATIENT
Start: 2022-06-05 | End: 2022-06-13

## 2022-06-05 RX ORDER — LACTULOSE 10 G/15ML
20 SOLUTION ORAL 3 TIMES DAILY
Status: DISCONTINUED | OUTPATIENT
Start: 2022-06-05 | End: 2022-06-14

## 2022-06-05 RX ORDER — CHLORHEXIDINE GLUCONATE 0.12 MG/ML
15 RINSE ORAL 2 TIMES DAILY
Status: DISCONTINUED | OUTPATIENT
Start: 2022-06-05 | End: 2022-06-17

## 2022-06-05 RX ORDER — IPRATROPIUM BROMIDE AND ALBUTEROL SULFATE 2.5; .5 MG/3ML; MG/3ML
3 SOLUTION RESPIRATORY (INHALATION) EVERY 4 HOURS PRN
Status: DISCONTINUED | OUTPATIENT
Start: 2022-06-05 | End: 2022-06-05

## 2022-06-05 RX ORDER — SODIUM CHLORIDE 9 MG/ML
INJECTION, SOLUTION INTRAVENOUS CONTINUOUS
Status: DISCONTINUED | OUTPATIENT
Start: 2022-06-05 | End: 2022-06-05

## 2022-06-05 RX ORDER — SODIUM CHLORIDE 9 MG/ML
INJECTION, SOLUTION INTRAVENOUS CONTINUOUS
Status: ACTIVE | OUTPATIENT
Start: 2022-06-05 | End: 2022-06-05

## 2022-06-05 RX ORDER — ENOXAPARIN SODIUM 100 MG/ML
40 INJECTION SUBCUTANEOUS DAILY
Status: DISCONTINUED | OUTPATIENT
Start: 2022-06-05 | End: 2022-06-07 | Stop reason: SDUPTHER

## 2022-06-05 RX ORDER — ACETAMINOPHEN 650 MG/1
650 SUPPOSITORY RECTAL EVERY 6 HOURS PRN
Status: DISCONTINUED | OUTPATIENT
Start: 2022-06-05 | End: 2022-06-17

## 2022-06-05 RX ORDER — POLYETHYLENE GLYCOL 3350 17 G/17G
17 POWDER, FOR SOLUTION ORAL DAILY PRN
Status: DISCONTINUED | OUTPATIENT
Start: 2022-06-05 | End: 2022-06-17

## 2022-06-05 RX ORDER — MIDAZOLAM HYDROCHLORIDE 2 MG/2ML
2 INJECTION, SOLUTION INTRAMUSCULAR; INTRAVENOUS
Status: DISCONTINUED | OUTPATIENT
Start: 2022-06-05 | End: 2022-06-05

## 2022-06-05 RX ADMIN — SODIUM CHLORIDE 1000 ML: 9 INJECTION, SOLUTION INTRAVENOUS at 05:20

## 2022-06-05 RX ADMIN — Medication 75 MCG/HR: at 20:31

## 2022-06-05 RX ADMIN — SODIUM CHLORIDE, PRESERVATIVE FREE 40 MG: 5 INJECTION INTRAVENOUS at 09:58

## 2022-06-05 RX ADMIN — SODIUM CHLORIDE: 9 INJECTION, SOLUTION INTRAVENOUS at 13:39

## 2022-06-05 RX ADMIN — SODIUM CHLORIDE 1000 ML: 9 INJECTION, SOLUTION INTRAVENOUS at 03:21

## 2022-06-05 RX ADMIN — IPRATROPIUM BROMIDE AND ALBUTEROL SULFATE 3 ML: .5; 2.5 SOLUTION RESPIRATORY (INHALATION) at 16:34

## 2022-06-05 RX ADMIN — PIPERACILLIN AND TAZOBACTAM 3375 MG: 3; .375 INJECTION, POWDER, LYOPHILIZED, FOR SOLUTION INTRAVENOUS at 20:35

## 2022-06-05 RX ADMIN — SODIUM CHLORIDE 3000 MG: 900 INJECTION INTRAVENOUS at 03:28

## 2022-06-05 RX ADMIN — IPRATROPIUM BROMIDE AND ALBUTEROL SULFATE 3 ML: .5; 2.5 SOLUTION RESPIRATORY (INHALATION) at 20:45

## 2022-06-05 RX ADMIN — SODIUM CHLORIDE 3000 MG: 900 INJECTION INTRAVENOUS at 10:52

## 2022-06-05 RX ADMIN — LEVOTHYROXINE SODIUM 88 MCG: 0.09 TABLET ORAL at 12:14

## 2022-06-05 RX ADMIN — CHLORHEXIDINE GLUCONATE 15 ML: 1.2 RINSE ORAL at 09:57

## 2022-06-05 RX ADMIN — Medication 50 MCG/HR: at 06:55

## 2022-06-05 RX ADMIN — Medication 2 MCG/MIN: at 12:12

## 2022-06-05 RX ADMIN — LEVETIRACETAM 500 MG: 500 TABLET, FILM COATED ORAL at 09:57

## 2022-06-05 RX ADMIN — RISPERIDONE 0.25 MG: 0.25 TABLET ORAL at 20:29

## 2022-06-05 RX ADMIN — CHLORHEXIDINE GLUCONATE 15 ML: 1.2 RINSE ORAL at 20:29

## 2022-06-05 RX ADMIN — LORAZEPAM 2 MG: 2 INJECTION INTRAMUSCULAR; INTRAVENOUS at 12:46

## 2022-06-05 RX ADMIN — LACTULOSE 20 G: 20 SOLUTION ORAL at 13:43

## 2022-06-05 RX ADMIN — LEVETIRACETAM 500 MG: 500 TABLET, FILM COATED ORAL at 20:29

## 2022-06-05 RX ADMIN — LIOTHYRONINE SODIUM 5 MCG: 5 TABLET ORAL at 10:56

## 2022-06-05 RX ADMIN — LORAZEPAM 2 MG: 2 INJECTION INTRAMUSCULAR at 12:46

## 2022-06-05 RX ADMIN — Medication 2000 UNITS: at 09:57

## 2022-06-05 RX ADMIN — RISPERIDONE 0.25 MG: 0.25 TABLET ORAL at 10:55

## 2022-06-05 RX ADMIN — ETOMIDATE 20 MG: 20 INJECTION, SOLUTION INTRAVENOUS at 05:29

## 2022-06-05 RX ADMIN — ENOXAPARIN SODIUM 40 MG: 100 INJECTION SUBCUTANEOUS at 09:57

## 2022-06-05 RX ADMIN — BUDESONIDE 500 MCG: 0.5 SUSPENSION RESPIRATORY (INHALATION) at 12:16

## 2022-06-05 RX ADMIN — IPRATROPIUM BROMIDE AND ALBUTEROL SULFATE 1 AMPULE: 2.5; .5 SOLUTION RESPIRATORY (INHALATION) at 04:52

## 2022-06-05 RX ADMIN — ROCURONIUM BROMIDE 100 MG: 100 INJECTION INTRAVENOUS at 05:29

## 2022-06-05 RX ADMIN — BUDESONIDE 500 MCG: 0.5 SUSPENSION RESPIRATORY (INHALATION) at 20:45

## 2022-06-05 RX ADMIN — LACTULOSE 20 G: 20 SOLUTION ORAL at 20:29

## 2022-06-05 RX ADMIN — LACTULOSE 20 G: 20 SOLUTION ORAL at 09:57

## 2022-06-05 RX ADMIN — VANCOMYCIN HYDROCHLORIDE 1250 MG: 10 INJECTION, POWDER, LYOPHILIZED, FOR SOLUTION INTRAVENOUS at 16:38

## 2022-06-05 RX ADMIN — PIPERACILLIN AND TAZOBACTAM 3375 MG: 3; .375 INJECTION, POWDER, LYOPHILIZED, FOR SOLUTION INTRAVENOUS at 13:59

## 2022-06-05 RX ADMIN — IPRATROPIUM BROMIDE AND ALBUTEROL SULFATE 3 ML: .5; 2.5 SOLUTION RESPIRATORY (INHALATION) at 12:16

## 2022-06-05 RX ADMIN — SODIUM CHLORIDE, PRESERVATIVE FREE 10 ML: 5 INJECTION INTRAVENOUS at 09:59

## 2022-06-05 RX ADMIN — SODIUM CHLORIDE, PRESERVATIVE FREE 10 ML: 5 INJECTION INTRAVENOUS at 20:18

## 2022-06-05 RX ADMIN — FENTANYL CITRATE 100 MCG: 50 INJECTION, SOLUTION INTRAMUSCULAR; INTRAVENOUS at 05:40

## 2022-06-05 RX ADMIN — SODIUM CHLORIDE: 9 INJECTION, SOLUTION INTRAVENOUS at 05:58

## 2022-06-05 ASSESSMENT — PAIN SCALES - GENERAL
PAINLEVEL_OUTOF10: 0

## 2022-06-05 ASSESSMENT — PULMONARY FUNCTION TESTS
PIF_VALUE: 22
PIF_VALUE: 21
PIF_VALUE: 23
PIF_VALUE: 25
PIF_VALUE: 23
PIF_VALUE: 22
PIF_VALUE: 27
PIF_VALUE: 22
PIF_VALUE: 23
PIF_VALUE: 22
PIF_VALUE: 21
PIF_VALUE: 25
PIF_VALUE: 29
PIF_VALUE: 22

## 2022-06-05 ASSESSMENT — PAIN SCALES - WONG BAKER
WONGBAKER_NUMERICALRESPONSE: 0

## 2022-06-05 NOTE — ED NOTES
ET tube placed by resident size 7.5 22 at the lip confirmed by auscultation and chest xray order placed in computer     Angeline Gotti RN  06/05/22 0450

## 2022-06-05 NOTE — ED PROVIDER NOTES
[address]      Pt Name: Sixto Early  MRN: [id number]  [place] 1965  Date of evaluation: 6/5/2022      CHIEF COMPLAINT       Chief Complaint   Patient presents with    Shortness of Breath        HPI  Sixto Early is a 65 y.o. female history of Down syndrome presents complaints of shortness of breath from facility. Patient is not usually on supplemental oxygen. Was found to be saturating at 70% room air. Placed on 10 L nonrebreather. Per facility patient decompensated in the last 24 hours. No alleviating exacerbating factors. History review systems unable to be obtained from patient as she is nonverbal.      Except as noted above the remainder of the review of systems was reviewed and negative. Review of Systems   Unable to perform ROS: Patient nonverbal        Physical Exam  Vitals and nursing note reviewed. Constitutional:       General: She is not in acute distress. Appearance: Normal appearance. She is ill-appearing. She is not diaphoretic. HENT:      Head: Normocephalic and atraumatic. Nose: Nose normal.      Mouth/Throat:      Comments: Dry oral mucosa. Poor dentition. Eyes:      Extraocular Movements: Extraocular movements intact. Pupils: Pupils are equal, round, and reactive to light. Cardiovascular:      Rate and Rhythm: Normal rate and regular rhythm. Pulses: Normal pulses. Heart sounds: Normal heart sounds. Pulmonary:      Effort: Pulmonary effort is normal.      Breath sounds: Examination of the right-upper field reveals rhonchi. Examination of the left-upper field reveals rhonchi. Examination of the right-middle field reveals rhonchi. Examination of the left-middle field reveals rhonchi. Examination of the right-lower field reveals rhonchi. Examination of the left-lower field reveals rhonchi. Rhonchi present. No decreased breath sounds or wheezing.    Abdominal: General: Bowel sounds are normal.      Palpations: Abdomen is soft. Tenderness: There is abdominal tenderness. There is no right CVA tenderness, left CVA tenderness or rebound. Negative signs include Dorsey's sign, Rovsing's sign and McBurney's sign. Musculoskeletal:         General: Normal range of motion. Cervical back: Normal range of motion. Right lower leg: No edema. Left lower leg: No edema. Skin:     General: Skin is warm and dry. Capillary Refill: Capillary refill takes less than 2 seconds. Neurological:      General: No focal deficit present. Mental Status: She is alert and oriented to person, place, and time. Procedures   Intubation Procedure Note    Indication: impending respiratory failure    Consent: The patient's mother was and patient's father was counseled regarding the procedure, its indications, risks, potential complications and alternatives, and any questions were answered. Consent was obtained to proceed. Medications Used: etomidate intravenously and rocuronium intravenously    Procedure: The patient was placed in the appropriate position. Cricoid pressure was not required. Intubation was performed by direct laryngoscopy using a laryngoscope and a 7.5 cuffed endotracheal tube. The cuff was then inflated and the tube was secured appropriately at a distance of 22 cm to the dental ridge. Initial confirmation of placement included bilateral breath sounds, an end tidal CO2 detector, absence of sounds over the stomach, tube fogging, adequate chest rise and adequate pulse oximetry reading. A chest x-ray to verify correct placement of the tube has been ordered but is still pending. The patient tolerated the procedure well.      Complications: None          MDM  Number of Diagnoses or Management Options  Diagnosis management comments: 81-year-old female history of Down syndrome presents from her assisted living facility by EMS for acute respiratory failure hypoxia. Patient was found to be 70% on room air at her facility. Placed on 10 L nonrebreather now saturating at 98%. Physical exam patient is ill-appearing. She has poor dentition with dry oral mucosa. She has rhonchorous breath sounds throughout all lung fields. Normal S1-S2. Patient has a PEG tube. No signs of infection. No erythema or areas of fluctuance. No bilateral leg edema. Diagnostic labs and imaging interpreted and reviewed. COVID-negative, flu negative. Patient has rhonchorous breath sounds throughout. Concern for aspiration pneumonia. Given Unasyn. During patient's ER stay. Patient became tachypneic. Using accessory muscles to breathe. Patient now intubated. RSI used. Patient has been upgraded to the ICU. ED Course as of 06/05/22 0617   Sun Jun 05, 2022   0535 RSI used. Intubated 22 at the lip. [JV]   7642 ET tube needs to be pulled back to 20cm [JV]      ED Course User Index  [JV] Rodríguez Valle MD          --------------------------------------------- PAST HISTORY ---------------------------------------------  Past Medical History:  has a past medical history of Arthritis, Down syndrome, Hypothyroidism, Osteoarthritis, Syncope, Thyroid disease, UTI (urinary tract infection), Varicose veins, and Wears glasses. Past Surgical History:  has a past surgical history that includes knee surgery; hip surgery; Toe Surgery; ECHO Compl W Dop Color Flow (5/14/2012); Tubal ligation; Total knee arthroplasty (Left, 08/11/2016); Abdominal adhesion surgery; Abdomen surgery; joint replacement; craniotomy (Right, 4/12/2019); Vena Cava Filter Placement (Bilateral, 4/22/2019); brain surgery; and Ventriculoperitoneal shunt (N/A, 11/12/2019). Social History:  reports that she has never smoked. She has never used smokeless tobacco. She reports that she does not drink alcohol and does not use drugs.     Family History: family history includes Heart Disease in her maternal grandfather, maternal grandmother, paternal aunt, and paternal uncle. The patients home medications have been reviewed. Allergies: Patient has no known allergies.     -------------------------------------------------- RESULTS -------------------------------------------------    LABS:  Results for orders placed or performed during the hospital encounter of 06/05/22   COVID-19, Rapid    Specimen: Nasopharyngeal Swab   Result Value Ref Range    SARS-CoV-2, NAAT Not Detected Not Detected   Rapid influenza A/B antigens    Specimen: Nares   Result Value Ref Range    Influenza A by PCR Not Detected Not Detected    Influenza B by PCR Not Detected Not Detected   CBC with Auto Differential   Result Value Ref Range    WBC 7.8 4.5 - 11.5 E9/L    RBC 5.48 3.50 - 5.50 E12/L    Hemoglobin 16.5 (H) 11.5 - 15.5 g/dL    Hematocrit 51.9 (H) 34.0 - 48.0 %    MCV 94.7 80.0 - 99.9 fL    MCH 30.1 26.0 - 35.0 pg    MCHC 31.8 (L) 32.0 - 34.5 %    RDW 15.1 (H) 11.5 - 15.0 fL    Platelets 448 737 - 052 E9/L    MPV 9.9 7.0 - 12.0 fL    Neutrophils % 68.4 43.0 - 80.0 %    Immature Granulocytes % 0.5 0.0 - 5.0 %    Lymphocytes % 20.2 20.0 - 42.0 %    Monocytes % 7.7 2.0 - 12.0 %    Eosinophils % 2.3 0.0 - 6.0 %    Basophils % 0.9 0.0 - 2.0 %    Neutrophils Absolute 5.31 1.80 - 7.30 E9/L    Immature Granulocytes # 0.04 E9/L    Lymphocytes Absolute 1.57 1.50 - 4.00 E9/L    Monocytes Absolute 0.60 0.10 - 0.95 E9/L    Eosinophils Absolute 0.18 0.05 - 0.50 E9/L    Basophils Absolute 0.07 0.00 - 0.20 E9/L   Comprehensive Metabolic Panel   Result Value Ref Range    Sodium 137 132 - 146 mmol/L    Potassium 4.7 3.5 - 5.0 mmol/L    Chloride 100 98 - 107 mmol/L    CO2 26 22 - 29 mmol/L    Anion Gap 11 7 - 16 mmol/L    Glucose 128 (H) 74 - 99 mg/dL    BUN 17 6 - 20 mg/dL    CREATININE 0.6 0.5 - 1.0 mg/dL    GFR Non-African American >60 >=60 mL/min/1.73    GFR African American >60     Calcium 9.4 8.6 - 10.2 mg/dL    Total Protein 8.3 6.4 - 8.3 g/dL    Albumin 3.4 (L) 3.5 - 5.2 g/dL    Total Bilirubin 0.3 0.0 - 1.2 mg/dL    Alkaline Phosphatase 138 (H) 35 - 104 U/L    ALT 36 (H) 0 - 32 U/L    AST 26 0 - 31 U/L   Magnesium   Result Value Ref Range    Magnesium 2.3 1.6 - 2.6 mg/dL   Troponin   Result Value Ref Range    Troponin, High Sensitivity 34 (H) 0 - 9 ng/L   Blood Gas, Arterial   Result Value Ref Range    Date Analyzed [id number]     Time Analyzed 0158     Source: Blood Arterial     pH, Blood Gas 7.426 7.350 - 7.450    PCO2 38.7 35.0 - 45.0 mmHg    PO2 174.4 (H) 75.0 - 100.0 mmHg    HCO3 24.9 22.0 - 26.0 mmol/L    B.E. 0.7 -3.0 - 3.0 mmol/L    O2 Sat 99.3 (H) 92.0 - 98.5 %    O2Hb 98.2 (H) 94.0 - 97.0 %    COHb 0.8 0.0 - 1.5 %    MetHb 0.3 0.0 - 1.5 %    O2 Content 21.4 mL/dL    HHb 0.7 0.0 - 5.0 %    tHb (est) 15.3 11.5 - 16.5 g/dL    Mode HFNC   10L     Date Of Collection      Time Collected      Pt Temp 37.0 C     ID [id number]     Lab [id number]     Critical(s) Notified .  No Critical Values    Brain Natriuretic Peptide   Result Value Ref Range    Pro-BNP 89 0 - 125 pg/mL   Lactate, Sepsis   Result Value Ref Range    Lactic Acid, Sepsis 2.2 (H) 0.5 - 1.9 mmol/L   Urinalysis with Microscopic   Result Value Ref Range    Color, UA Yellow Straw/Yellow    Clarity, UA SL CLOUDY Clear    Glucose, Ur Negative Negative mg/dL    Bilirubin Urine Negative Negative    Ketones, Urine Negative Negative mg/dL    Specific Gravity, UA 1.025 1.005 - 1.030    Blood, Urine Negative Negative    pH, UA 6.0 5.0 - 9.0    Protein, UA Negative Negative mg/dL    Urobilinogen, Urine 0.2 <2.0 E.U./dL    Nitrite, Urine Negative Negative    Leukocyte Esterase, Urine Negative Negative    WBC, UA 2-5 0 - 5 /HPF    RBC, UA 0-1 0 - 2 /HPF    Bacteria, UA RARE (A) None Seen /HPF    Amorphous, UA PRESENT     Yeast, UA Present (A) None Seen /HPF   Lipase   Result Value Ref Range    Lipase 33 13 - 60 U/L   Procalcitonin   Result Value Ref Range    Procalcitonin 0.06 0.00 - 0.08 ng/mL   TSH   Result Value Ref Range    TSH 10.330 (H) 0.270 - 4.200 uIU/mL   EKG 12 Lead   Result Value Ref Range    Ventricular Rate 94 BPM    Atrial Rate 94 BPM    P-R Interval 152 ms    QRS Duration 68 ms    Q-T Interval 338 ms    QTc Calculation (Bazett) 422 ms    P Axis 68 degrees    R Axis 39 degrees    T Axis 23 degrees       RADIOLOGY:  XR CHEST PORTABLE   Final Result   No acute cardiopulmonary process. XR ABDOMEN FOR NG/OG/NE TUBE PLACEMENT    (Results Pending)   XR CHEST PORTABLE    (Results Pending)       EKG: This EKG is signed and interpreted by me. Heart rate 90. Normal sinus rhythm. Normal axis deviation. QTc 420. No ST or STD. Q waves in leads V1 through V3 present previous EKG.      ------------------------- NURSING NOTES AND VITALS REVIEWED ---------------------------  Date / Time Roomed:  6/5/2022  1:26 AM  ED Bed Assignment:  21/21    The nursing notes within the ED encounter and vital signs as below have been reviewed.      Patient Vitals for the past 24 hrs:   BP Temp Temp src Pulse Resp SpO2 Height Weight   06/05/22 0439 130/78 -- -- 92 (!) 46 98 % -- --   06/05/22 0418 (!) 105/48 -- -- 97 (!) 32 98 % -- --   06/05/22 0400 125/88 -- -- 96 (!) 38 100 % -- --   06/05/22 0345 133/85 -- -- (!) 101 29 99 % -- --   06/05/22 0330 123/78 -- -- (!) 102 (!) 33 99 % -- --   06/05/22 0321 (!) 113/96 -- -- (!) 102 20 99 % -- --   06/05/22 0320 -- -- -- -- 26 (!) 87 % -- --   06/05/22 0300 114/79 -- -- 96 (!) 31 (!) 87 % -- --   06/05/22 0230 99/88 -- -- 99 (!) 37 100 % -- --   06/05/22 0215 95/73 -- -- 90 18 100 % -- --   06/05/22 0200 105/86 -- -- (!) 101 26 100 % -- --   06/05/22 0136 103/76 98.2 °F (36.8 °C) Axillary 94 22 98 % 4' 10\" (1.473 m) 125 lb (56.7 kg)       Oxygen Saturation Interpretation: Improved after treatment    ------------------------------------------ PROGRESS NOTES ------------------------------------------    Counseling:  I have spoken with the patient and discussed todays results, in addition to providing specific details for the plan of care and counseling regarding the diagnosis and prognosis. Their questions are answered at this time and they are agreeable with the plan of admission.    --------------------------------- ADDITIONAL PROVIDER NOTES ---------------------------------  Consultations:   Spoke with Dr. Asher Davis. Discussed case. They will admit the patient. Spoke with Dr. Samy Calero critical care who will manage patient    This patient's ED course included: a personal history and physicial examination, re-evaluation prior to disposition, multiple bedside re-evaluations, IV medications, cardiac monitoring and continuous pulse oximetry    This patient has remained hemodynamically stable during their ED course. Diagnosis:  1. Acute respiratory failure with hypoxia ([address]    2. Aspiration pneumonia, unspecified aspiration pneumonia type, unspecified laterality, unspecified part of lung ([address]        Disposition:  Patient's disposition: Admit to ICU  Patient's condition is serious.           Verner Stabile, MD  Resident  06/05/22 1019

## 2022-06-05 NOTE — PROCEDURES
Arterial line placement    Procedure: Left radial arterial line placement. Indications: Continuous monitoring of blood pressure in a patient with hypotension +/- shock, on Levophed. Anesthesia: Local infiltration of 1% lidocaine. Consent: The family members were counseled regarding the procedure, its indications, risks, potential complications and alternatives, and any questions were answered. Consent was obtained to proceed. Technique: Time Out: Immediately prior to the procedure a \"timeout\" was called to verify the correct patient and procedure. Procedure was done using strict aseptic technique. Taj's test was performed and was normal. left radial site was cleaned with chloraprep and draped. Radial artery was identified, then Lidocaine 1% was infiltrated locally. Radial arterial line was inserted, a good blood flow was obtained, after which guidewire was inserted all the way with no resistance. Then the canula was inserted and needle with guidewire was withdrawn. Pulsatile bright red blood flow was observed. The canula was connected to BP monitoring apparatus and a good quality waveform was noted. Then the canula was secured with 2 stay sutures of 3-0 silk after Lidocaine infiltration, following which dressing was applied. Number of sticks: 1. Number of Kits used: 1. Complications: No immediate complication. Estimated blood loss: About 1 ml. Comment: Patient tolerated the procedure well.      Kriss Younger MD PGY-2  6/5/2022 6:17 PM      Attending: Dr. Pancho Walker

## 2022-06-05 NOTE — CONSULTS
[facility]  Internal Medicine Residency Program  History and Physical  MICU    Patient:  Naz Alston See 65 y.o. female MRN: [id number]     Date of Service: 6/5/2022    Hospital Day: 1      Chief complaint: Dyspnea and acute respiratory failure  History of Present Illness   The patient is a 65 y.o. female with past medical history of Down syndrome and hydrocephalus status post  shunt, chronic subdural hematoma, seizure, hypothyroidism, recent admission and discharge for possible aspiration pneumonia leading to acute respiratory failure no more than 7 days ago, presented to ED because of dyspnea and acute respiratory failure. Patient presented from facility, not usually on supplemental oxygen, was found to satting at 70% on room air, EMS transported to our ED. Patient placed on 10 L nonrebreather, saturation normalized. Initial work-up showing rhonchorous breath and sounds of the left, chest x-ray demonstrated opacity of the left lung, concerning for aspiration pneumonia. Lab reviewed, renal function normal, lactic acid elevated on admission 2.2, elevation of alk phos 138, ALT 76, AST normal.  White count is normal, TSH is 10.3, renal function is normal.  ED gave the patient 2 L of bolus and start patient on maintenance fluids. Also Unasyn was started, blood culture was sent. Early this morning, patient started to decompensate again on oxygen supplementation, use accessory muscles to breathe, and decision was made to intubate patient. Patient was subsequently sent to ICU for further management.       Past Medical History:      Diagnosis Date    Arthritis     Down syndrome     Hypothyroidism     Osteoarthritis     Syncope     Thyroid disease     UTI (urinary tract infection)         Varicose veins     Wears glasses        Past Surgical History:        Procedure Laterality Date    ABDOMEN SURGERY      duodenal atresia    ABDOMINAL ADHESION SURGERY      BRAIN SURGERY      CRANIOTOMY Right 4/12/2019    RIGHT CRANIOTOMY FRANCISCO JAVIER HOLES performed by Nadir Chen MD at [place] [address] ECHO COMPL W DOP COLOR FLOW  5/14/2012         HIP SURGERY      Right    JOINT REPLACEMENT      left knee, right hip-dr Tanner Fitzgerald KNEE SURGERY      Right    TOE SURGERY      Right foot    TOTAL KNEE ARTHROPLASTY Left 08/11/2016    DR. De    TUBAL LIGATION      VENA CAVA FILTER PLACEMENT Bilateral 4/22/2019    VENA CAVA FILTER INSERTION performed by Betty Humphreys MD at [place] [address] VENTRICULOPERITONEAL SHUNT N/A 11/12/2019    RIGHT FRONTAL VENTRICULO PERITONEAL SHUNT INSERTION-----FACILITY performed by Nadir Chen MD at [facility] OR       Medications Prior to Admission:    Prior to Admission medications    Medication Sig Start Date End Date Taking? Authorizing Provider   amoxicillin-clavulanate (AUGMENTIN ES-600) 600-42.9 MG/5ML suspension Take 7.3 mLs by mouth 2 times daily for 7 days 6/1/22 6/8/22  Estefani Candelaria MD   Multiple Vitamins-Minerals (CENTRUM/CERTA-CINDY WITH MINERALS ORAL) solution 5 mLs by Per G Tube route daily    Historical Provider, MD   levETIRAcetam (KEPPRA) 500 MG tablet Take 1 tablet by mouth 2 times daily 5/14/22   Jose Wesley MD   lactulose (CHRONULAC) 10 GM/15ML solution Take 30 mLs by mouth 3 times daily Titrate for 2-3 bowel movements per day 5/14/22 6/13/22  Jose Wesley MD   ipratropium-albuterol (DUONEB) 0.5-2.5 (3) MG/3ML SOLN nebulizer solution Inhale 3 mLs into the lungs every 4 hours as needed for Shortness of Breath 5/14/22   Jose Wesley MD   nystatin (MYCOSTATIN) 993396 UNIT/GM cream Apply topically 2 times daily. 30g 12/6/21   Darius Ashley, DO   risperiDONE (RISPERDAL) 0.25 MG tablet Take 1 tablet by mouth 2 times daily 9/28/21   Molly Loera,    donepezil (ARICEPT) 5 MG tablet 5 mg by PEG Tube route nightly  6/24/21   Historical Provider, MD   levothyroxine (SYNTHROID) 88 MCG tablet Take 1 tablet by mouth Daily 12/21/20   Molly Loera DO   liothyronine (CYTOMEL) 5 MCG tablet Take 1 tab by mouth every morning on Jegyss-Yvtnzicle-Dmzhdk 12/21/20   Nile Loera DO   Cholecalciferol (VITAMIN D-3 SUPER STRENGTH) 50 MCG (2000 UT) TABS Take 1 tablet by mouth daily 12/21/20   Nile Loera DO   Lactobacillus (ACIDOPHILUS PO) Take by mouth    Historical Provider, MD       Allergies:  Patient has no known allergies. Social History:   TOBACCO:   reports that she has never smoked. She has never used smokeless tobacco.  ETOH:   reports no history of alcohol use. OCCUPATION:      Family History:       Problem Relation Age of Onset    Heart Disease Maternal Grandmother     Heart Disease Maternal Grandfather     Heart Disease Paternal Aunt     Heart Disease Paternal Uncle        REVIEW OF SYSTEMS:  Limited, patient is intubated  · Constitutional: No fever, no chills, no change in weight;  · HEENT: No blurred vision, no ear problems, no sore throat, no rhinorrhea. · Respiratory: + shortness of breath  · Cardiology: No angina,  no leg swelling. · Gastroenterology: No dysphagia, no reflux; no abdominal pain, no nausea or vomiting; no constipation or diarrhea.  No hematochezia   · Musculoskeletal: no joint pain, no myalgia, no change in range of movement  · Neurology: chronically contracted, neck is stiff   · Endocrinology: no temperature intolerance,  · Hematology: no increased bleeding, no bruising, no lymphadenopathy  · Skin: no skin changes noticed by patient    Physical Exam   · Vitals: /81   Pulse 70   Temp 98.2 °F (36.8 °C) (Axillary)   Resp 18   Ht 4' 10\" (1.473 m)   Wt 125 lb (56.7 kg)   SpO2 100%   BMI 26.13 kg/m²   ·    ·      · General Appearance: sedated and intubated  · Skin: warm and dry, no rash or erythema  · Head: normocephalic and atraumatic  · Eyes: pupils equal, round, and reactive to light, extraocular eye movements intact, conjunctivae normal  · ENT: tympanic membrane, external ear and ear canal normal bilaterally, nose without deformity, nasal mucosa and turbinates normal without polyps  · Neck: nuchal rigidity, no thyromegaly or thyroid nodules, no cervical lymphadenopathy  · Pulmonary/Chest: clear to auscultation bilaterally- no wheezes, + rales or rhonchi,  air movement, no respiratory distress  · Cardiovascular: normal rate, regular rhythm, normal S1 and S2, no murmurs, rubs, clicks, or gallops, distal pulses intact, no carotid bruits  · Abdomen: soft, non-tender, non-distended, normal bowel sounds, no masses or organomegaly  · Extremities: no cyanosis, or edema, chronic contracted  · Musculoskeletal: no joint swelling, tenderness  · Neurologic: sedated, not follow command    Lines     site day    Art line   None    TLC None    PICC None    Hemoaccess None    Oxygen:       Mechanical Ventilation:   Mode: A/CVC  ABG:     Lab Results   Component Value Date    PH 7.426 06/05/2022    PCO2 38.7 06/05/2022    PO2 174.4 06/05/2022    HCO3 24.9 06/05/2022    BE 0.7 06/05/2022    THB 15.3 06/05/2022    O2SAT 99.3 06/05/2022     Labs and Imaging Studies   Basic Labs  CBC:   Lab Results   Component Value Date    WBC 7.8 06/05/2022    RBC 5.48 06/05/2022    HGB 16.5 06/05/2022    HCT 51.9 06/05/2022    MCV 94.7 06/05/2022    RDW 15.1 06/05/2022     06/05/2022     CMP:  Lab Results   Component Value Date     06/05/2022    K 4.7 06/05/2022    K 4.0 06/01/2022     06/05/2022    CO2 26 06/05/2022    BUN 17 06/05/2022    PROT 8.3 06/05/2022       Imaging Studies:     CT ABDOMEN PELVIS WO CONTRAST Additional Contrast? None    Result Date: 5/11/2022  EXAMINATION: CT OF THE ABDOMEN AND PELVIS WITHOUT CONTRAST 5/11/2022 4:47 pm TECHNIQUE: CT of the abdomen and pelvis was performed without the administration of intravenous contrast. Multiplanar reformatted images are provided for review.  Automated exposure control, iterative reconstruction, and/or weight based adjustment of the mA/kV was utilized to reduce the radiation dose to as low as reasonably achievable. COMPARISON: CT of the abdomen and pelvis, 03/07/2021. HISTORY: ORDERING SYSTEM PROVIDED HISTORY: right hydronephrosis TECHNOLOGIST PROVIDED HISTORY: Reason for exam:->right hydronephrosis Additional Contrast?->None What reading provider will be dictating this exam?->CRC FINDINGS: Lower Chest: The lung bases are clear. The heart is normal in size. No pleural or pericardial effusion. Organs: Liver: Unremarkable. Gallbladder: Unremarkable. Pancreas: Unremarkable. Spleen:  Unremarkable. Adrenals: Unremarkable. Kidneys: The kidneys are normal in size and contour. A 1-2 mm calculus is seen in an upper pole calyx of the left kidney. There is mild dilation of the extrarenal pelves bilaterally. No evidence of hydronephrosis. The distal ureters are obscured by beam hardening artifact arising from the right hip arthroplasty. GI/Bowel: Liquid stool in the colon indicates a diarrheal illness. No bowel wall thickening or obstruction noted. A percutaneous gastrostomy tube is seen. Pelvis: The base of the urinary bladder and the lower uterine segment are obscured by beam hardening artifact. The visualized aspects are grossly unremarkable. Peritoneum/Retroperitoneum: No lymphadenopathy. No free air or free fluid is seen. The abdominal aorta and pelvic arteries are unremarkable. IVC filter in situ. Ventriculoperitoneal shunt is also present. Bones/Soft Tissues: The visualized bones are intact without fracture or focal lesion. 1.  No acute abnormality is seen in the abdomen or the pelvis. 2. Mild dilation of the extrarenal pelves bilaterally (anatomic variant). No evidence of hydronephrosis. 3. 1-2 mm intrarenal calculus in the left kidney.  RECOMMENDATIONS: Unavailable     XR NECK SOFT TISSUE    Result Date: 5/7/2022  EXAMINATION: TWO XRAY VIEWS OF THE CHEST; TWO XRAY VIEWS OF THE NECK SOFT TISSUES; TWO XRAY VIEWS OF THE ABDOMEN; THREE XRAY VIEWS OF THE SKULL 5/7/2022 1:55 pm COMPARISON: None. HISTORY: ORDERING SYSTEM PROVIDED HISTORY: s/p  shunt TECHNOLOGIST PROVIDED HISTORY: Reason for exam:->s/p  shunt Reason for exam:->shunt series What reading provider will be dictating this exam?->CRC FINDINGS: There is evidence of a right-sided shunt tube. Portions of the catheter near the valve are not opaque. Continuity in this region of the right lateral aspect of the skull cannot be determined. Remainder of the shunt tube in the neck, chest and abdomen appears unremarkable. There are no infiltrates or effusions. Heart size is normal. In the abdomen the shunt tube traverses in the left side and is than looped in the right-side of the lower abdomen. There are some nonspecific air-fluid levels in the abdomen bowel gas. Bowel loops otherwise appear unremarkable. There is a right hip prosthesis. IVC filter is noted.  shunt tube as described. XR CHEST (2 VW)    Result Date: 5/7/2022  EXAMINATION: TWO XRAY VIEWS OF THE CHEST; TWO XRAY VIEWS OF THE NECK SOFT TISSUES; TWO XRAY VIEWS OF THE ABDOMEN; THREE XRAY VIEWS OF THE SKULL 5/7/2022 1:55 pm COMPARISON: None. HISTORY: ORDERING SYSTEM PROVIDED HISTORY: s/p  shunt TECHNOLOGIST PROVIDED HISTORY: Reason for exam:->s/p  shunt Reason for exam:->shunt series What reading provider will be dictating this exam?->CRC FINDINGS: There is evidence of a right-sided shunt tube. Portions of the catheter near the valve are not opaque. Continuity in this region of the right lateral aspect of the skull cannot be determined. Remainder of the shunt tube in the neck, chest and abdomen appears unremarkable. There are no infiltrates or effusions. Heart size is normal. In the abdomen the shunt tube traverses in the left side and is than looped in the right-side of the lower abdomen. There are some nonspecific air-fluid levels in the abdomen bowel gas. Bowel loops otherwise appear unremarkable. There is a right hip prosthesis.  IVC filter is noted.      shunt tube as described. CT HEAD WO CONTRAST    Result Date: 5/31/2022  EXAMINATION: CT OF THE HEAD WITHOUT CONTRAST  5/31/2022 10:35 am TECHNIQUE: CT of the head was performed without the administration of intravenous contrast. Automated exposure control, iterative reconstruction, and/or weight based adjustment of the mA/kV was utilized to reduce the radiation dose to as low as reasonably achievable. COMPARISON: 05/30/2022 HISTORY: ORDERING SYSTEM PROVIDED HISTORY: follow up subdural hematoma TECHNOLOGIST PROVIDED HISTORY: Reason for exam:->follow up subdural hematoma Has a \"code stroke\" or \"stroke alert\" been called? ->No What reading provider will be dictating this exam?->CRC FINDINGS: BRAIN/VENTRICLES: There is again seen small extra-axial collection felt to represent chronic subdural hematoma over the convexity of the left parietal lobe measuring a thickness of 5 mm. The high density associated with the collection is unchanged. There is hydrocephalus of the lateral to lesser extent 3rd ventricle. 4th ventricle within normal limits. Findings are unchanged. There is a right frontal ventriculostomy shunt catheter in place unchanged. However, the subarachnoid spaces are also prominent suggesting parenchymal volume loss. There is bifrontal lucency as well as lucency at the temporal lobes likely related to prior contusion. There is no new CT evidence for intra-axial or extra-axial fluid collection, no mass, nor hematoma. ORBITS: The visualized portion of the orbits demonstrate no acute abnormality. SINUSES: The visualized paranasal sinuses and mastoid air cells demonstrate no acute abnormality. SOFT TISSUES/SKULL:  No acute abnormality of the visualized skull or soft tissues. There is again seen predominantly chronic small subdural hematoma over the convexity of the left parietal lobe. This measures a thickness of 4.6 mm which is unchanged.   Tiny focus of high density within the collection could represent more acute blood which is also unchanged. No significant mass effect. Ventriculomegaly and ventriculostomy shunt catheter again noted. Lucency related to chronic encephalomalacia and perhaps prior contusion at the anterior frontal lobes and temporal lobes is unchanged. CT Head WO Contrast    Result Date: 5/30/2022  EXAMINATION: CT OF THE HEAD WITHOUT CONTRAST  5/30/2022 9:06 pm TECHNIQUE: CT of the head was performed without the administration of intravenous contrast. Automated exposure control, iterative reconstruction, and/or weight based adjustment of the mA/kV was utilized to reduce the radiation dose to as low as reasonably achievable. COMPARISON: May 4, 2022 HISTORY: ORDERING SYSTEM PROVIDED HISTORY: AMS, hypoxia TECHNOLOGIST PROVIDED HISTORY: Reason for exam:->AMS, hypoxia Has a \"code stroke\" or \"stroke alert\" been called? ->No Decision Support Exception - unselect if not a suspected or confirmed emergency medical condition->Emergency Medical Condition (MA) What reading provider will be dictating this exam?->CRC FINDINGS: Small focus of increased attenuation involving left parietal subdural space. Redemonstration of extra-axial, hypoattenuating collection overlying left parietal lobe. Stable position of right sided ventricular shunt catheter with distal tip abutting septum pellucidum. Stable areas of encephalomalacia involving inferior frontal lobes and stable area of encephalomalacia involving bilateral temporal lobes stable area of encephalomalacia involving right frontal white matter adjacent to ventricular shunt. Basilar cisterns are patent. Paranasal sinuses and mastoid air cells are clear. 1. Redemonstration of hypoattenuating extra-axial collection overlying left parietal lobe related to chronic subdural hygroma or chronic subdural hematoma. Small focus of increased attenuation within this collection could suggest small acute on chronic subdural hematoma.   Results were called by Dr. He Lowe to Dr. Homa Ge on 5/30/2022 at 21:57. 2. Stable areas of encephalomalacia involving bilateral frontal lobes and bilateral temporal lobes. 3. Stable position of right frontal ventricular shunt catheter. 4. Stable moderate ventriculomegaly. XR CHEST PORTABLE    Result Date: 6/5/2022  EXAMINATION: ONE XRAY VIEW OF THE CHEST6/5/2022 TECHNIQUE: Frontal view submitted COMPARISON: None. HISTORY: ORDERING SYSTEM PROVIDED HISTORY: Post tube TECHNOLOGIST PROVIDED HISTORY: Reason for exam:->Post tube What reading provider will be dictating this exam?->CRC FINDINGS: The lungs demonstrate opacification in the left mid lung. The endotracheal tube tip is 2.9 cm above the louann. Nasogastric tube is in good position. The cardiomediastinal silhouette is stable. Opacification in the  left lung, pneumonia cannot be excluded. Endotracheal tube tip 2.9 cm above louann. Nasogastric tube is in good position. XR CHEST PORTABLE    Result Date: 6/5/2022  EXAMINATION: ONE XRAY VIEW OF THE CHEST6/5/2022 TECHNIQUE: Frontal view submitted COMPARISON: None. HISTORY: ORDERING SYSTEM PROVIDED HISTORY: sob TECHNOLOGIST PROVIDED HISTORY: Reason for exam:->sob What reading provider will be dictating this exam?->CRC FINDINGS: The lungs are clear without focal consolidation, large pleural effusion, or pneumothorax. The cardiomediastinal silhouette is stable. No acute cardiopulmonary process. XR CHEST PORTABLE    Result Date: 5/13/2022  EXAMINATION: ONE XRAY VIEW OF THE CHEST 5/13/2022 11:09 am COMPARISON: 05/07/2022 HISTORY: ORDERING SYSTEM PROVIDED HISTORY: Cough TECHNOLOGIST PROVIDED HISTORY: Reason for exam:->Cough What reading provider will be dictating this exam?->CRC FINDINGS: Heart size is normal.  There are no infiltrates or effusions. There is mild tortuosity of the aorta. Suspected shunt tube is noted which appears unchanged.      No acute process     CTA PULMONARY W CONTRAST    Result Date: 5/30/2022  EXAMINATION: CTA OF THE CHEST 5/30/2022 9:06 pm TECHNIQUE: CTA of the chest was performed after the administration of intravenous contrast.  Multiplanar reformatted images are provided for review. MIP images are provided for review. Automated exposure control, iterative reconstruction, and/or weight based adjustment of the mA/kV was utilized to reduce the radiation dose to as low as reasonably achievable. COMPARISON: None. HISTORY: ORDERING SYSTEM PROVIDED HISTORY: Hypoxia, evaluate for PE TECHNOLOGIST PROVIDED HISTORY: Reason for exam:->Hypoxia, evaluate for PE Decision Support Exception - unselect if not a suspected or confirmed emergency medical condition->Emergency Medical Condition (MA) What reading provider will be dictating this exam?->CRC FINDINGS: Pulmonary Arteries: Pulmonary arteries are adequately opacified for evaluation. No evidence of intraluminal filling defect to suggest pulmonary embolism. Main pulmonary artery is normal in caliber. Mediastinum: No evidence of mediastinal lymphadenopathy. The heart and pericardium demonstrate no acute abnormality. There is no acute abnormality of the thoracic aorta. Noted right aberrant subclavian artery of variant anatomic thoracic arch branching. Lungs/pleura: The lungs are without acute process. Subsegmental dependent atelectasis. No focal consolidation or pulmonary edema. No evidence of pleural effusion or pneumothorax. Upper Abdomen: Limited images of the upper abdomen are unremarkable. Soft Tissues/Bones: No acute bone or soft tissue abnormality. No evidence of pulmonary embolism or acute pulmonary abnormality. Noted right aberrant subclavian artery of variant anatomic thoracic arch branching. Subsegmental dependent atelectasis. US ABDOMEN LIMITED    Result Date: 5/11/2022  EXAMINATION: RIGHT UPPER QUADRANT ULTRASOUND 5/11/2022 8:59 am COMPARISON: None.  HISTORY: ORDERING SYSTEM PROVIDED HISTORY: hyperammonemia TECHNOLOGIST PROVIDED HISTORY: Reason for exam:->hyperammonemia What reading provider will be dictating this exam?->CRC FINDINGS: LIVER:  The liver demonstrates normal echogenicity without evidence of intrahepatic biliary ductal dilatation. BILIARY SYSTEM:  There is borderline gallbladder wall prominence measuring 3 mm. There is a 3 mm polyp. .  Negative sonographic Dorsey's sign. There are no gallstones. Common bile duct is within normal limits measuring 5 mm. RIGHT KIDNEY: The right kidney measures 9.5 x 3.9 cm. There is mild-to-moderate hydronephrosis. There is a prominent extrarenal pelvis. Lorean Snare PANCREAS:  Visualized portions of the pancreas are unremarkable. OTHER: No evidence of right upper quadrant ascites. Mild to moderate right-sided hydronephrosis     XR ABDOMEN FOR NG/OG/NE TUBE PLACEMENT    Result Date: 6/5/2022  EXAMINATION: ONE SUPINE XRAY VIEW(S) OF THE ABDOMEN 6/5/2022 5:54 am COMPARISON: None. HISTORY: ORDERING SYSTEM PROVIDED HISTORY: Confirmation of course of NG/OG/NE tube and location of tip of tube TECHNOLOGIST PROVIDED HISTORY: Reason for exam:->Confirmation of course of NG/OG/NE tube and location of tip of tube Portable? ->Yes What reading provider will be dictating this exam?->CRC FINDINGS: The nasogastric tube is in good position. The the IVC is in good position. There is no evidence for bowel obstruction. A PEG tube is visualized. Nasogastric tube is in good position. US RETROPERITONEAL COMPLETE    Result Date: 5/11/2022  EXAMINATION: RETROPERITONEAL ULTRASOUND OF THE KIDNEYS AND URINARY BLADDER 5/11/2022 COMPARISON: 03/19/2022 chest CT. HISTORY: ORDERING SYSTEM PROVIDED HISTORY: right hydronephrosis TECHNOLOGIST PROVIDED HISTORY: Reason for exam:->right hydronephrosis What reading provider will be dictating this exam?->CRC FINDINGS: Kidneys: The right kidney measures 8.8 cm in length and the left kidney measures 7.8 cm in length.   The ultrasound technologist noted difficulty visualizing the left kidney due to overlying bowel gas and inability of the patient to move. Kidneys demonstrate normal cortical echogenicity. No definite intrarenal stones. There is mild prominence of the right renal pelvis which is not demonstrated on the previous CT suggesting mild right hydronephrosis. Bladder: Unremarkable appearance of the bladder. Bilateral ureteral jets are demonstrated. Mild right hydronephrosis of uncertain etiology. The right ureteral jet is visualized. Suboptimal visualization and evaluation of the left kidney but no definite left renal pathology. XR SKULL (<4 VIEWS)    Result Date: 5/7/2022  EXAMINATION: TWO XRAY VIEWS OF THE CHEST; TWO XRAY VIEWS OF THE NECK SOFT TISSUES; TWO XRAY VIEWS OF THE ABDOMEN; THREE XRAY VIEWS OF THE SKULL 5/7/2022 1:55 pm COMPARISON: None. HISTORY: ORDERING SYSTEM PROVIDED HISTORY: s/p  shunt TECHNOLOGIST PROVIDED HISTORY: Reason for exam:->s/p  shunt Reason for exam:->shunt series What reading provider will be dictating this exam?->CRC FINDINGS: There is evidence of a right-sided shunt tube. Portions of the catheter near the valve are not opaque. Continuity in this region of the right lateral aspect of the skull cannot be determined. Remainder of the shunt tube in the neck, chest and abdomen appears unremarkable. There are no infiltrates or effusions. Heart size is normal. In the abdomen the shunt tube traverses in the left side and is than looped in the right-side of the lower abdomen. There are some nonspecific air-fluid levels in the abdomen bowel gas. Bowel loops otherwise appear unremarkable. There is a right hip prosthesis. IVC filter is noted.  shunt tube as described. XR ABDOMEN (2 VIEWS)    Result Date: 5/7/2022  EXAMINATION: TWO XRAY VIEWS OF THE CHEST; TWO XRAY VIEWS OF THE NECK SOFT TISSUES; TWO XRAY VIEWS OF THE ABDOMEN; THREE XRAY VIEWS OF THE SKULL 5/7/2022 1:55 pm COMPARISON: None.  HISTORY: ORDERING SYSTEM PROVIDED HISTORY: s/p  shunt TECHNOLOGIST PROVIDED HISTORY: Reason for exam:->s/p  shunt Reason for exam:->shunt series What reading provider will be dictating this exam?->CRC FINDINGS: There is evidence of a right-sided shunt tube. Portions of the catheter near the valve are not opaque. Continuity in this region of the right lateral aspect of the skull cannot be determined. Remainder of the shunt tube in the neck, chest and abdomen appears unremarkable. There are no infiltrates or effusions. Heart size is normal. In the abdomen the shunt tube traverses in the left side and is than looped in the right-side of the lower abdomen. There are some nonspecific air-fluid levels in the abdomen bowel gas. Bowel loops otherwise appear unremarkable. There is a right hip prosthesis. IVC filter is noted.  shunt tube as described. EKG: normal sinus rhythm, unchanged from previous tracings. Resident's Assessment and Plan     Assessment/Plan  · Acute respiratory failure 2/2 ? asp PNA, s/p intubation  · Hypotension/shock  2/2  sepsis source PNA vs SE of  meds on intubation  · Hx of hypothyroidism  · Hx of down syndrome  · Decondition and chronic contraction  · Hx of hydrocephalous s/p  shunt   · Hx of seizure  · Chronic PEG tube    Plan  · Vent support with sedation, adjust as appropriate, SAT and SBT as appropriate down the road  · Start Zosyn and Vanco for expanded coverage adjust pending Cx, patient has shunt, recent   · Pressor, Levophed, maintain MAP more than 65  · ABX usage, recent hospital admission, currently in shock  · Breathing treatment  · Pan Cx  · infla markers, MRSA screening, urine leg and strep antigen,   · Repeat ABG, monitor cxr and abg daily  · S/p 2.5 L  · Continue ns gtt 75 cc/hr for 6 hrs  · Consider NICOM if pressor is needed  · Repeat T hormones, continue T4 T3 replacement and adjust as needed  · Continue aricept and risperidone  · Continue lactulose for now, dc if ammonia nl, no evidence of liver cirrhosis per documentation  · Obtain ammonia   · POCT BG q6h  · Continue keppra  · Ativan 2 mg 1 dose for likely active seizure,  · Neurology consult, recommendation appreciated  · EEG  · Plan to start TF tomorrow if not contradicted  · Consider NICOM    PT/OT evaluation: not warranted  DVT prophylaxis/ GI prophylaxis: lovenox/PPI  Disposition: admit to ICU    Lemuel Reeder MD, PGY-2   Attending physician: Dr. Clemente Salgado  Department of Pulmonary, Critical Care and [address] [place] [address]  Department of Internal Medicine      During multidisciplinary team rounds Kelvin Pérez See is a 65 y.o. female was seen, examined and discussed. This is confirmation that I have personally seen and examined the patient and that the key elements of the encounter were performed by me (> 85 % time). The medications & laboratory data was discussed and adjusted where necessary. The radiographic images were reviewed or with radiologist or consultant if felt dis-concordant with the exam or history. The above findings were corroborated, plans confirmed and changes made if needed. Family is updated at the bedside as available. Key issues of the case were discussed among consultants.       Aria Fairbanks DO

## 2022-06-05 NOTE — LETTER
aspiration pneumonia type, unspecified laterality, unspecified part of lung ([facility] [address] and DX codes: J69.0, J96.01, J69.0      PATIENT                 Name: Toni Lundy See : 1965 (57 yrs)   Address: [address] Sex: Female   City: [address] [place] [address]         Marital Status: Single   Employer: DISABLED         Amish: Latter-day   Primary Care Provider: Ryan Castillo DO         Primary Phone: [phone]   EMERGENCY CONTACT   Contact Name Legal Guardian? Relationship to Patient Home Phone Work Phone   1. Caitlin Early  2. SharathZach      Legal Guardian  Parent [phone]              GUARANTOR            Guarantor: Toni Lundy See     : 1965   Address: [address] Sex: Female     [place] [address]     Relation to Patient: Self       Home Phone: [phone]   Guarantor ID: [id number]       Work Phone:     Guarantor Employer: DISABLED         Status: DISABLED      COVERAGE        PRIMARY INSURANCE   Payor: MEDICARE Plan: MEDICARE PART A AND B   Payor Address: [facility] [address],  [facility] [address], [facility] [address]       Group Number:   Insurance Type: INDEMNITY   Subscriber Name: Gurwinder Gonzalez : 1965   Subscriber ID: [id number] Pat. Rel. to Sub: Self   SECONDARY INSURANCE   Payor: [place] Plan: [facility] DEPT OF*   Payor Address:  [place] [address], [facility], [address]          Group Number:   Insurance Type: INDEMNITY   Subscriber Name: Gurwinder Gonzalez : 1965   Subscriber ID: [id number] Pat.  Rel. to Sub: SELF           CSN: [id number]

## 2022-06-05 NOTE — ED NOTES
OG placed 55 at lip confirmed by auscultation and will also be confirmed by xray     Ander Ortiz RN  06/05/22 5706

## 2022-06-05 NOTE — ED NOTES
Notified physician of breathing using with all accessory muscles     Addie Timmons RN  06/05/22 [id number]

## 2022-06-05 NOTE — ED NOTES
Respiratory, resident, and physician at bedside with nurse to prepare for intubation; iv fluids infusing, airway cart at bedside, crash cart at bedside, ambu bag and suction prepared and ready; parents escorted to waiting room at this time.      Daryl Medrano RN  06/05/22 7318

## 2022-06-05 NOTE — PROCEDURES
Central Line Insertion     Procedure: right internal jugular vein Triple Lumen Catheter placement. Indications: vascular access    Consent: The family members were counseled regarding the procedure, its indications, risks, potential complications and alternatives, and any questions were answered. Consent was obtained to proceed. Number of sticks: 1    Number of Kits used: 1    Procedure: Time Out: Immediately prior to the procedure a \"timeout\" was called to verify the correct patient and procedure. The patient was place in the trendelenburg position and the skin over the right internal jugular vein was prepped with betadine and draped in a sterile fashion. Local anesthesia was obtained by infiltration using 1% Lidocaine without epinephrine. With Ultrasound guidance a large bore needle was used to identify the vein, dark non pulsatile blood returned. The guide wire was then inserted through the needle with minimal resistance. 2 mm nick was made in the skin beside the guidewire. Then a dilator was inserted and removed. A triple lumen catheter was then inserted into the vessel over the guide wire using the Seldinger technique to the  15 cm norma. All ports showed good, free flowing blood return and were flushed with saline solution. The catheter was then securely fastened to the skin with sutures and covered with a bio patch and sterile dressing. A post procedure X-ray was ordered and showed good line position. Complications: None   The patient tolerated the procedure well. Estimated blood loss: 5 ml.     Roldan Mac MD PGY-2  6/5/2022  4:37 PM      Attending: Dr. Brannon Mays

## 2022-06-05 NOTE — PLAN OF CARE
Problem: Discharge Planning  Goal: Discharge to home or other facility with appropriate resources  Outcome: Progressing     Problem: Pain  Goal: Verbalizes/displays adequate comfort level or baseline comfort level  Outcome: Progressing     Problem: Respiratory - Adult  Goal: Achieves optimal ventilation and oxygenation  6/5/2022 1657 by Pa Gasca  Outcome: Progressing  6/5/2022 1247 by Albin Rodriguez RCP  Outcome: Progressing     Problem: Skin/Tissue Integrity  Goal: Absence of new skin breakdown  Description: 1. Monitor for areas of redness and/or skin breakdown  2. Assess vascular access sites hourly  3. Every 4-6 hours minimum:  Change oxygen saturation probe site  4. Every 4-6 hours:  If on nasal continuous positive airway pressure, respiratory therapy assess nares and determine need for appliance change or resting period.   Outcome: Progressing     Problem: ABCDS Injury Assessment  Goal: Absence of physical injury  Outcome: Progressing     Problem: Safety - Adult  Goal: Free from fall injury  Outcome: Progressing

## 2022-06-05 NOTE — H&P
Hospitalist History & Physical      PCP: Jane Dc DO    Date of Service: Pt seen/examined on 6/5/2022    Chief Complaint:  had concerns including Shortness of Breath. History Of Present Illness:    Ms. Gavi Early, a 65y.o. year old female  who  has a past medical history of Arthritis, Down syndrome, Hypothyroidism, Osteoarthritis, Syncope, Thyroid disease, UTI (urinary tract infection), Varicose veins, and Wears glasses. Patient presented to the emergency department with shortness of breath. Patient has history of Down syndrome. Was found to be 70% on room air. Patient on 10 L nonrebreather. Vital signs reveal the patient to be tachycardic with a rate of 102. Currently 99% on 10 L nonrebreather. Afebrile. Laboratory studies demonstrate lactic acid 2.2, troponin 34, alk phos 138. Chest x-ray is unremarkable. Patient noted to have rhonchorous breath sounds. Patient started on Unasyn for suspected aspiration pneumonia. Medicine consulted for admission. Upon seeing the patient in emergency department patient appears to be in respiratory distress. Using accessory muscles. Gasping for air. Parents are at the bedside. They are wanting the patient be full code and agreeable to intubation.     Past Medical History:   Diagnosis Date    Arthritis     Down syndrome     Hypothyroidism     Osteoarthritis     Syncope     Thyroid disease     UTI (urinary tract infection)         Varicose veins     Wears glasses        Past Surgical History:   Procedure Laterality Date    ABDOMEN SURGERY      duodenal atresia    ABDOMINAL ADHESION SURGERY      BRAIN SURGERY      CRANIOTOMY Right 4/12/2019    RIGHT CRANIOTOMY FRANCISCO JAVIER HOLES performed by Kalen Acosta MD at [facility] 22 ECHO COMPL W DOP COLOR FLOW  5/14/2012         HIP SURGERY      Right    JOINT REPLACEMENT      left knee, right hip-dr Hayden Jose KNEE SURGERY      Right    TOE SURGERY      Right foot    TOTAL KNEE ARTHROPLASTY Left 08/11/2016    DR. De    TUBAL LIGATION      VENA CAVA FILTER PLACEMENT Bilateral 4/22/2019    VENA CAVA FILTER INSERTION performed by Elier Zepeda MD at [facility] VENTRICULOPERITONEAL SHUNT N/A 11/12/2019    RIGHT FRONTAL VENTRICULO PERITONEAL SHUNT INSERTION-----FACILITY performed by Dominique Gilbert MD at [address] [place] [address]       Prior to Admission medications    Medication Sig Start Date End Date Taking? Authorizing Provider   amoxicillin-clavulanate (AUGMENTIN ES-600) 600-42.9 MG/5ML suspension Take 7.3 mLs by mouth 2 times daily for 7 days 6/1/22 6/8/22  Estefani Candelaria MD   Multiple Vitamins-Minerals (CENTRUM/CERTA-CINDY WITH MINERALS ORAL) solution 5 mLs by Per G Tube route daily    Historical Provider, MD   levETIRAcetam (KEPPRA) 500 MG tablet Take 1 tablet by mouth 2 times daily 5/14/22   Norris Castle MD   lactulose (CHRONULAC) 10 GM/15ML solution Take 30 mLs by mouth 3 times daily Titrate for 2-3 bowel movements per day 5/14/22 6/13/22  Norris Castle MD   ipratropium-albuterol (DUONEB) 0.5-2.5 (3) MG/3ML SOLN nebulizer solution Inhale 3 mLs into the lungs every 4 hours as needed for Shortness of Breath 5/14/22   Norris Castle MD   nystatin (MYCOSTATIN) 656206 UNIT/GM cream Apply topically 2 times daily. 30g 12/6/21   Desi Reyna,    risperiDONE (RISPERDAL) 0.25 MG tablet Take 1 tablet by mouth 2 times daily 9/28/21   Edyta Loera DO   donepezil (ARICEPT) 5 MG tablet  6/24/21   Historical Provider, MD   levothyroxine (SYNTHROID) 88 MCG tablet Take 1 tablet by mouth Daily 12/21/20   Edyta Loera DO   liothyronine (CYTOMEL) 5 MCG tablet Take 1 tab by mouth every morning on Aevmjf-Vjmplvohg-Jbaoww 12/21/20   Edyta Loera DO   Cholecalciferol (VITAMIN D-3 SUPER STRENGTH) 50 MCG (2000 UT) TABS Take 1 tablet by mouth daily 12/21/20   Edyta Loera,    Lactobacillus (ACIDOPHILUS PO) Take by mouth    Historical Provider, MD         Allergies:  Patient has no known allergies. Social History:    TOBACCO:   reports that she has never smoked. She has never used smokeless tobacco.  ETOH:   reports no history of alcohol use. Family History:    Reviewed in detail and negative for DM, CAD, Cancer, CVA. Positive as follows\"      Problem Relation Age of Onset    Heart Disease Maternal Grandmother     Heart Disease Maternal Grandfather     Heart Disease Paternal Aunt     Heart Disease Paternal Uncle        REVIEW OF SYSTEMS:   Pertinent positives as noted in the HPI. All other systems reviewed and negative. PHYSICAL EXAM:  BP (!) 113/96   Pulse (!) 102   Temp 98.2 °F (36.8 °C) (Axillary)   Resp 20   Ht 4' 10\" (1.473 m)   Wt 125 lb (56.7 kg)   SpO2 99% Comment: 10 L face mask  BMI 26.13 kg/m²   General appearance: Ill-appearing, nonverbal  HEENT: Normal cephalic, atraumatic without obvious deformity. Pupils equal, round, and reactive to light. Extra ocular muscles intact. Conjunctivae/corneas clear. Dry oral mucosa  Neck: Supple, with full range of motion. No jugular venous distention. Trachea midline. Respiratory: Coarse bilaterally  Cardiovascular: Tachycardic  Abdomen: Soft, diffuse tenderness to palpation, nondistended  Musculoskeletal: No clubbing, cyanosis, edema of bilateral lower extremities. Brisk capillary refill. Skin: Normal skin color. No rashes or lesions. Neurologic:  Neurovascularly intact without any focal sensory/motor deficits.  Cranial nerves: II-XII intact, grossly non-focal.  Nonverbal    Reviewed EKG and CXR personally      CBC:   Recent Labs     06/05/22  0155   WBC 7.8   RBC 5.48   HGB 16.5*   HCT 51.9*   MCV 94.7   RDW 15.1*        BMP:   Recent Labs     06/03/22  0500 06/05/22  0155    137   K 4.5 4.7    100   CO2 23 26   BUN 13 17   CREATININE 0.5 0.6   MG  --  2.3     LFT:  Recent Labs     06/05/22  0155   PROT 8.3   ALKPHOS 138*   ALT 36*   AST 26   BILITOT 0.3   LIPASE 33     CE:  No results for input(s): reading provider will be dictating this exam?->CRC FINDINGS: Lower Chest: The lung bases are clear. The heart is normal in size. No pleural or pericardial effusion. Organs: Liver: Unremarkable. Gallbladder: Unremarkable. Pancreas: Unremarkable. Spleen:  Unremarkable. Adrenals: Unremarkable. Kidneys: The kidneys are normal in size and contour. A 1-2 mm calculus is seen in an upper pole calyx of the left kidney. There is mild dilation of the extrarenal pelves bilaterally. No evidence of hydronephrosis. The distal ureters are obscured by beam hardening artifact arising from the right hip arthroplasty. GI/Bowel: Liquid stool in the colon indicates a diarrheal illness. No bowel wall thickening or obstruction noted. A percutaneous gastrostomy tube is seen. Pelvis: The base of the urinary bladder and the lower uterine segment are obscured by beam hardening artifact. The visualized aspects are grossly unremarkable. Peritoneum/Retroperitoneum: No lymphadenopathy. No free air or free fluid is seen. The abdominal aorta and pelvic arteries are unremarkable. IVC filter in situ. Ventriculoperitoneal shunt is also present. Bones/Soft Tissues: The visualized bones are intact without fracture or focal lesion. 1.  No acute abnormality is seen in the abdomen or the pelvis. 2. Mild dilation of the extrarenal pelves bilaterally (anatomic variant). No evidence of hydronephrosis. 3. 1-2 mm intrarenal calculus in the left kidney. RECOMMENDATIONS: Unavailable     XR NECK SOFT TISSUE    Result Date: 5/7/2022  EXAMINATION: TWO XRAY VIEWS OF THE CHEST; TWO XRAY VIEWS OF THE NECK SOFT TISSUES; TWO XRAY VIEWS OF THE ABDOMEN; THREE XRAY VIEWS OF THE SKULL 5/7/2022 1:55 pm COMPARISON: None.  HISTORY: ORDERING SYSTEM PROVIDED HISTORY: s/p  shunt TECHNOLOGIST PROVIDED HISTORY: Reason for exam:->s/p  shunt Reason for exam:->shunt series What reading provider will be dictating this exam?->CRC FINDINGS: There is evidence of a right-sided shunt tube. Portions of the catheter near the valve are not opaque. Continuity in this region of the right lateral aspect of the skull cannot be determined. Remainder of the shunt tube in the neck, chest and abdomen appears unremarkable. There are no infiltrates or effusions. Heart size is normal. In the abdomen the shunt tube traverses in the left side and is than looped in the right-side of the lower abdomen. There are some nonspecific air-fluid levels in the abdomen bowel gas. Bowel loops otherwise appear unremarkable. There is a right hip prosthesis. IVC filter is noted.  shunt tube as described. XR CHEST (2 VW)    Result Date: 5/7/2022  EXAMINATION: TWO XRAY VIEWS OF THE CHEST; TWO XRAY VIEWS OF THE NECK SOFT TISSUES; TWO XRAY VIEWS OF THE ABDOMEN; THREE XRAY VIEWS OF THE SKULL 5/7/2022 1:55 pm COMPARISON: None. HISTORY: ORDERING SYSTEM PROVIDED HISTORY: s/p  shunt TECHNOLOGIST PROVIDED HISTORY: Reason for exam:->s/p  shunt Reason for exam:->shunt series What reading provider will be dictating this exam?->CRC FINDINGS: There is evidence of a right-sided shunt tube. Portions of the catheter near the valve are not opaque. Continuity in this region of the right lateral aspect of the skull cannot be determined. Remainder of the shunt tube in the neck, chest and abdomen appears unremarkable. There are no infiltrates or effusions. Heart size is normal. In the abdomen the shunt tube traverses in the left side and is than looped in the right-side of the lower abdomen. There are some nonspecific air-fluid levels in the abdomen bowel gas. Bowel loops otherwise appear unremarkable. There is a right hip prosthesis. IVC filter is noted.  shunt tube as described.      CT HEAD WO CONTRAST    Result Date: 5/31/2022  EXAMINATION: CT OF THE HEAD WITHOUT CONTRAST  5/31/2022 10:35 am TECHNIQUE: CT of the head was performed without the administration of intravenous contrast. Automated exposure control, iterative reconstruction, and/or weight based adjustment of the mA/kV was utilized to reduce the radiation dose to as low as reasonably achievable. COMPARISON: 05/30/2022 HISTORY: ORDERING SYSTEM PROVIDED HISTORY: follow up subdural hematoma TECHNOLOGIST PROVIDED HISTORY: Reason for exam:->follow up subdural hematoma Has a \"code stroke\" or \"stroke alert\" been called? ->No What reading provider will be dictating this exam?->CRC FINDINGS: BRAIN/VENTRICLES: There is again seen small extra-axial collection felt to represent chronic subdural hematoma over the convexity of the left parietal lobe measuring a thickness of 5 mm. The high density associated with the collection is unchanged. There is hydrocephalus of the lateral to lesser extent 3rd ventricle. 4th ventricle within normal limits. Findings are unchanged. There is a right frontal ventriculostomy shunt catheter in place unchanged. However, the subarachnoid spaces are also prominent suggesting parenchymal volume loss. There is bifrontal lucency as well as lucency at the temporal lobes likely related to prior contusion. There is no new CT evidence for intra-axial or extra-axial fluid collection, no mass, nor hematoma. ORBITS: The visualized portion of the orbits demonstrate no acute abnormality. SINUSES: The visualized paranasal sinuses and mastoid air cells demonstrate no acute abnormality. SOFT TISSUES/SKULL:  No acute abnormality of the visualized skull or soft tissues. There is again seen predominantly chronic small subdural hematoma over the convexity of the left parietal lobe. This measures a thickness of 4.6 mm which is unchanged. Tiny focus of high density within the collection could represent more acute blood which is also unchanged. No significant mass effect. Ventriculomegaly and ventriculostomy shunt catheter again noted.  Lucency related to chronic encephalomalacia and perhaps prior contusion at the anterior frontal lobes and temporal lobes is unchanged. CT Head WO Contrast    Result Date: 5/30/2022  EXAMINATION: CT OF THE HEAD WITHOUT CONTRAST  5/30/2022 9:06 pm TECHNIQUE: CT of the head was performed without the administration of intravenous contrast. Automated exposure control, iterative reconstruction, and/or weight based adjustment of the mA/kV was utilized to reduce the radiation dose to as low as reasonably achievable. COMPARISON: May 4, 2022 HISTORY: ORDERING SYSTEM PROVIDED HISTORY: AMS, hypoxia TECHNOLOGIST PROVIDED HISTORY: Reason for exam:->AMS, hypoxia Has a \"code stroke\" or \"stroke alert\" been called? ->No Decision Support Exception - unselect if not a suspected or confirmed emergency medical condition->Emergency Medical Condition (MA) What reading provider will be dictating this exam?->CRC FINDINGS: Small focus of increased attenuation involving left parietal subdural space. Redemonstration of extra-axial, hypoattenuating collection overlying left parietal lobe. Stable position of right sided ventricular shunt catheter with distal tip abutting septum pellucidum. Stable areas of encephalomalacia involving inferior frontal lobes and stable area of encephalomalacia involving bilateral temporal lobes stable area of encephalomalacia involving right frontal white matter adjacent to ventricular shunt. Basilar cisterns are patent. Paranasal sinuses and mastoid air cells are clear. 1. Redemonstration of hypoattenuating extra-axial collection overlying left parietal lobe related to chronic subdural hygroma or chronic subdural hematoma. Small focus of increased attenuation within this collection could suggest small acute on chronic subdural hematoma. Results were called by Dr. Luis Desai to Dr. Julianna Prader on 5/30/2022 at 21:57. 2. Stable areas of encephalomalacia involving bilateral frontal lobes and bilateral temporal lobes. 3. Stable position of right frontal ventricular shunt catheter.  4. Stable moderate ventriculomegaly. XR CHEST PORTABLE    Result Date: 6/5/2022  EXAMINATION: ONE XRAY VIEW OF THE CHEST6/5/2022 TECHNIQUE: Frontal view submitted COMPARISON: None. HISTORY: ORDERING SYSTEM PROVIDED HISTORY: sob TECHNOLOGIST PROVIDED HISTORY: Reason for exam:->sob What reading provider will be dictating this exam?->CRC FINDINGS: The lungs are clear without focal consolidation, large pleural effusion, or pneumothorax. The cardiomediastinal silhouette is stable. No acute cardiopulmonary process. XR CHEST PORTABLE    Result Date: 5/13/2022  EXAMINATION: ONE XRAY VIEW OF THE CHEST 5/13/2022 11:09 am COMPARISON: 05/07/2022 HISTORY: ORDERING SYSTEM PROVIDED HISTORY: Cough TECHNOLOGIST PROVIDED HISTORY: Reason for exam:->Cough What reading provider will be dictating this exam?->CRC FINDINGS: Heart size is normal.  There are no infiltrates or effusions. There is mild tortuosity of the aorta. Suspected shunt tube is noted which appears unchanged. No acute process     CTA PULMONARY W CONTRAST    Result Date: 5/30/2022  EXAMINATION: CTA OF THE CHEST 5/30/2022 9:06 pm TECHNIQUE: CTA of the chest was performed after the administration of intravenous contrast.  Multiplanar reformatted images are provided for review. MIP images are provided for review. Automated exposure control, iterative reconstruction, and/or weight based adjustment of the mA/kV was utilized to reduce the radiation dose to as low as reasonably achievable. COMPARISON: None. HISTORY: ORDERING SYSTEM PROVIDED HISTORY: Hypoxia, evaluate for PE TECHNOLOGIST PROVIDED HISTORY: Reason for exam:->Hypoxia, evaluate for PE Decision Support Exception - unselect if not a suspected or confirmed emergency medical condition->Emergency Medical Condition (MA) What reading provider will be dictating this exam?->CRC FINDINGS: Pulmonary Arteries: Pulmonary arteries are adequately opacified for evaluation.   No evidence of intraluminal filling defect to suggest pulmonary embolism. Main pulmonary artery is normal in caliber. Mediastinum: No evidence of mediastinal lymphadenopathy. The heart and pericardium demonstrate no acute abnormality. There is no acute abnormality of the thoracic aorta. Noted right aberrant subclavian artery of variant anatomic thoracic arch branching. Lungs/pleura: The lungs are without acute process. Subsegmental dependent atelectasis. No focal consolidation or pulmonary edema. No evidence of pleural effusion or pneumothorax. Upper Abdomen: Limited images of the upper abdomen are unremarkable. Soft Tissues/Bones: No acute bone or soft tissue abnormality. No evidence of pulmonary embolism or acute pulmonary abnormality. Noted right aberrant subclavian artery of variant anatomic thoracic arch branching. Subsegmental dependent atelectasis. US ABDOMEN LIMITED    Result Date: 5/11/2022  EXAMINATION: RIGHT UPPER QUADRANT ULTRASOUND 5/11/2022 8:59 am COMPARISON: None. HISTORY: ORDERING SYSTEM PROVIDED HISTORY: hyperammonemia TECHNOLOGIST PROVIDED HISTORY: Reason for exam:->hyperammonemia What reading provider will be dictating this exam?->CRC FINDINGS: LIVER:  The liver demonstrates normal echogenicity without evidence of intrahepatic biliary ductal dilatation. BILIARY SYSTEM:  There is borderline gallbladder wall prominence measuring 3 mm. There is a 3 mm polyp. .  Negative sonographic Dorsey's sign. There are no gallstones. Common bile duct is within normal limits measuring 5 mm. RIGHT KIDNEY: The right kidney measures 9.5 x 3.9 cm. There is mild-to-moderate hydronephrosis. There is a prominent extrarenal pelvis. Fadi Hilt PANCREAS:  Visualized portions of the pancreas are unremarkable. OTHER: No evidence of right upper quadrant ascites.      Mild to moderate right-sided hydronephrosis     US RETROPERITONEAL COMPLETE    Result Date: 5/11/2022  EXAMINATION: RETROPERITONEAL ULTRASOUND OF THE KIDNEYS AND URINARY BLADDER 5/11/2022 COMPARISON: 03/19/2022 chest CT. HISTORY: ORDERING SYSTEM PROVIDED HISTORY: right hydronephrosis TECHNOLOGIST PROVIDED HISTORY: Reason for exam:->right hydronephrosis What reading provider will be dictating this exam?->CRC FINDINGS: Kidneys: The right kidney measures 8.8 cm in length and the left kidney measures 7.8 cm in length. The ultrasound technologist noted difficulty visualizing the left kidney due to overlying bowel gas and inability of the patient to move. Kidneys demonstrate normal cortical echogenicity. No definite intrarenal stones. There is mild prominence of the right renal pelvis which is not demonstrated on the previous CT suggesting mild right hydronephrosis. Bladder: Unremarkable appearance of the bladder. Bilateral ureteral jets are demonstrated. Mild right hydronephrosis of uncertain etiology. The right ureteral jet is visualized. Suboptimal visualization and evaluation of the left kidney but no definite left renal pathology. XR SKULL (<4 VIEWS)    Result Date: 5/7/2022  EXAMINATION: TWO XRAY VIEWS OF THE CHEST; TWO XRAY VIEWS OF THE NECK SOFT TISSUES; TWO XRAY VIEWS OF THE ABDOMEN; THREE XRAY VIEWS OF THE SKULL 5/7/2022 1:55 pm COMPARISON: None. HISTORY: ORDERING SYSTEM PROVIDED HISTORY: s/p  shunt TECHNOLOGIST PROVIDED HISTORY: Reason for exam:->s/p  shunt Reason for exam:->shunt series What reading provider will be dictating this exam?->CRC FINDINGS: There is evidence of a right-sided shunt tube. Portions of the catheter near the valve are not opaque. Continuity in this region of the right lateral aspect of the skull cannot be determined. Remainder of the shunt tube in the neck, chest and abdomen appears unremarkable. There are no infiltrates or effusions. Heart size is normal. In the abdomen the shunt tube traverses in the left side and is than looped in the right-side of the lower abdomen. There are some nonspecific air-fluid levels in the abdomen bowel gas.  Bowel loops otherwise appear unremarkable. There is a right hip prosthesis. IVC filter is noted.  shunt tube as described. XR ABDOMEN (2 VIEWS)    Result Date: 5/7/2022  EXAMINATION: TWO XRAY VIEWS OF THE CHEST; TWO XRAY VIEWS OF THE NECK SOFT TISSUES; TWO XRAY VIEWS OF THE ABDOMEN; THREE XRAY VIEWS OF THE SKULL 5/7/2022 1:55 pm COMPARISON: None. HISTORY: ORDERING SYSTEM PROVIDED HISTORY: s/p  shunt TECHNOLOGIST PROVIDED HISTORY: Reason for exam:->s/p  shunt Reason for exam:->shunt series What reading provider will be dictating this exam?->CRC FINDINGS: There is evidence of a right-sided shunt tube. Portions of the catheter near the valve are not opaque. Continuity in this region of the right lateral aspect of the skull cannot be determined. Remainder of the shunt tube in the neck, chest and abdomen appears unremarkable. There are no infiltrates or effusions. Heart size is normal. In the abdomen the shunt tube traverses in the left side and is than looped in the right-side of the lower abdomen. There are some nonspecific air-fluid levels in the abdomen bowel gas. Bowel loops otherwise appear unremarkable. There is a right hip prosthesis. IVC filter is noted.  shunt tube as described.        ASSESSMENT:  -Aspiration pneumonia  -Acute hypoxic respiratory failure  -Lactic acidosis  -Down syndrome  -Hypothyroidism      PLAN:  -Admit to ICU  -Consult critical care  -ER personnel preparing to intubate  -Mechanical ventilation  -Unasyn 3000 mg every 6 hours  -Telemetry  -Repeat lactic acid level  -Respiratory cultures  -Continue home medications        Diet: No diet orders on file  Code Status: Prior  Surrogate decision maker confirmed with patient:   Extended Emergency Contact Information  Primary Emergency Contact: Caitlin Early  Address: [place], [address] [place] [address] Phone: [phone]  Relation: Legal Guardian  Secondary Emergency Contact: Zach Early  Address: [address], [address] [facility]  Home Phone: [phone]  Relation: Parent    DVT Prophylaxis: []Lovenox []Heparin []PCD [] [address] []Encouraged ambulation  Disposition: []Med/Surg [] Intermediate [] ICU/CCU  Admit status: [] Observation [] Inpatient     +++++++++++++++++++++++++++++++++++++++++++++++++  Piatt Curling, DO  +++++++++++++++++++++++++++++++++++++++++++++++++  NOTE: This report was transcribed using voice recognition software. Every effort was made to ensure accuracy; however, inadvertent computerized transcription errors may be present.

## 2022-06-06 ENCOUNTER — APPOINTMENT (OUTPATIENT)
Dept: NEUROLOGY | Age: 57
DRG: 870 | End: 2022-06-06
Payer: MEDICARE

## 2022-06-06 ENCOUNTER — APPOINTMENT (OUTPATIENT)
Dept: GENERAL RADIOLOGY | Age: 57
DRG: 870 | End: 2022-06-06
Payer: MEDICARE

## 2022-06-06 LAB
AADO2: 55.8 MMHG
AADO2: 60 MMHG
AADO2: 84.5 MMHG
AADO2: 98.8 MMHG
ALBUMIN SERPL-MCNC: 2.6 G/DL (ref 3.5–5.2)
ALP BLD-CCNC: 90 U/L (ref 35–104)
ALT SERPL-CCNC: 25 U/L (ref 0–32)
ANION GAP SERPL CALCULATED.3IONS-SCNC: 12 MMOL/L (ref 7–16)
AST SERPL-CCNC: 19 U/L (ref 0–31)
B.E.: -2 MMOL/L (ref -3–3)
B.E.: -3.1 MMOL/L (ref -3–3)
B.E.: -3.1 MMOL/L (ref -3–3)
B.E.: -4.1 MMOL/L (ref -3–3)
BASOPHILS ABSOLUTE: 0.07 E9/L (ref 0–0.2)
BASOPHILS RELATIVE PERCENT: 1.1 % (ref 0–2)
BILIRUB SERPL-MCNC: 0.7 MG/DL (ref 0–1.2)
BUN BLDV-MCNC: 8 MG/DL (ref 6–20)
CALCIUM SERPL-MCNC: 7.9 MG/DL (ref 8.6–10.2)
CHLORIDE BLD-SCNC: 108 MMOL/L (ref 98–107)
CO2: 17 MMOL/L (ref 22–29)
COHB: 0.5 % (ref 0–1.5)
COHB: 0.6 % (ref 0–1.5)
COHB: 0.7 % (ref 0–1.5)
COHB: 0.8 % (ref 0–1.5)
CREAT SERPL-MCNC: 0.6 MG/DL (ref 0.5–1)
CRITICAL: ABNORMAL
DATE ANALYZED: ABNORMAL
DATE OF COLLECTION: ABNORMAL
EOSINOPHILS ABSOLUTE: 0.33 E9/L (ref 0.05–0.5)
EOSINOPHILS RELATIVE PERCENT: 5.3 % (ref 0–6)
FIO2: 40 %
GFR AFRICAN AMERICAN: >60
GFR NON-AFRICAN AMERICAN: >60 ML/MIN/1.73
GLUCOSE BLD-MCNC: 155 MG/DL (ref 74–99)
GRAM STAIN ORDERABLE: NORMAL
HCO3: 18.5 MMOL/L (ref 22–26)
HCO3: 18.9 MMOL/L (ref 22–26)
HCO3: 20.1 MMOL/L (ref 22–26)
HCO3: 20.2 MMOL/L (ref 22–26)
HCT VFR BLD CALC: 36 % (ref 34–48)
HEMOGLOBIN: 11.9 G/DL (ref 11.5–15.5)
HHB: 0.7 % (ref 0–5)
HHB: 0.7 % (ref 0–5)
HHB: 1.1 % (ref 0–5)
HHB: 1.2 % (ref 0–5)
IMMATURE GRANULOCYTES #: 0.07 E9/L
IMMATURE GRANULOCYTES %: 1.1 % (ref 0–5)
L. PNEUMOPHILA SEROGP 1 UR AG: NORMAL
LAB: ABNORMAL
LYMPHOCYTES ABSOLUTE: 1.41 E9/L (ref 1.5–4)
LYMPHOCYTES RELATIVE PERCENT: 22.7 % (ref 20–42)
Lab: ABNORMAL
MAGNESIUM: 1.8 MG/DL (ref 1.6–2.6)
MCH RBC QN AUTO: 30.4 PG (ref 26–35)
MCHC RBC AUTO-ENTMCNC: 33.1 % (ref 32–34.5)
MCV RBC AUTO: 92.1 FL (ref 80–99.9)
METER GLUCOSE: 107 MG/DL (ref 74–99)
METER GLUCOSE: 136 MG/DL (ref 74–99)
METHB: 0.4 % (ref 0–1.5)
METHB: 0.5 % (ref 0–1.5)
MODE: AC
MONOCYTES ABSOLUTE: 0.6 E9/L (ref 0.1–0.95)
MONOCYTES RELATIVE PERCENT: 9.7 % (ref 2–12)
NEUTROPHILS ABSOLUTE: 3.72 E9/L (ref 1.8–7.3)
NEUTROPHILS RELATIVE PERCENT: 60.1 % (ref 43–80)
O2 SATURATION: 98.9 % (ref 92–98.5)
O2 SATURATION: 99 % (ref 92–98.5)
O2 SATURATION: 99.4 % (ref 92–98.5)
O2 SATURATION: 99.4 % (ref 92–98.5)
O2HB: 97.9 % (ref 94–97)
O2HB: 97.9 % (ref 94–97)
O2HB: 98 % (ref 94–97)
O2HB: 98.2 % (ref 94–97)
OPERATOR ID: 1926
OPERATOR ID: 1926
OPERATOR ID: 882
OPERATOR ID: ABNORMAL
PATIENT TEMP: 37 C
PCO2: 24.9 MMHG (ref 35–45)
PCO2: 26.5 MMHG (ref 35–45)
PCO2: 27.2 MMHG (ref 35–45)
PCO2: 34.8 MMHG (ref 35–45)
PDW BLD-RTO: 15.1 FL (ref 11.5–15)
PEEP/CPAP: 5 CMH2O
PEEP/CPAP: 6 CMH2O
PEEP/CPAP: 6 CMH2O
PEEP/CPAP: 8 CMH2O
PFO2: 3.65 MMHG/%
PFO2: 3.77 MMHG/%
PFO2: 4.66 MMHG/%
PFO2: 4.7 MMHG/%
PH BLOOD GAS: 7.38 (ref 7.35–7.45)
PH BLOOD GAS: 7.47 (ref 7.35–7.45)
PH BLOOD GAS: 7.49 (ref 7.35–7.45)
PH BLOOD GAS: 7.49 (ref 7.35–7.45)
PLATELET # BLD: 276 E9/L (ref 130–450)
PMV BLD AUTO: 9.9 FL (ref 7–12)
PO2: 145.9 MMHG (ref 75–100)
PO2: 150.7 MMHG (ref 75–100)
PO2: 186.6 MMHG (ref 75–100)
PO2: 188.1 MMHG (ref 75–100)
POTASSIUM REFLEX MAGNESIUM: 3 MMOL/L (ref 3.5–5)
RBC # BLD: 3.91 E12/L (ref 3.5–5.5)
RI(T): 0.3
RI(T): 0.32
RI(T): 0.56
RI(T): 0.68
RR MECHANICAL: 12 B/MIN
RR MECHANICAL: 14 B/MIN
RR MECHANICAL: 14 B/MIN
RR MECHANICAL: 18 B/MIN
SODIUM BLD-SCNC: 137 MMOL/L (ref 132–146)
SOURCE, BLOOD GAS: ABNORMAL
STREP PNEUMONIAE ANTIGEN, URINE: NORMAL
THB: 13 G/DL (ref 11.5–16.5)
THB: 13.6 G/DL (ref 11.5–16.5)
THB: 13.6 G/DL (ref 11.5–16.5)
THB: 13.8 G/DL (ref 11.5–16.5)
TIME ANALYZED: 1600
TIME ANALYZED: 1850
TIME ANALYZED: 2057
TIME ANALYZED: 427
TOTAL PROTEIN: 5.9 G/DL (ref 6.4–8.3)
VT MECHANICAL: 300 ML
VT MECHANICAL: 300 ML
VT MECHANICAL: 400 ML
VT MECHANICAL: 400 ML
WBC # BLD: 6.2 E9/L (ref 4.5–11.5)

## 2022-06-06 PROCEDURE — 6370000000 HC RX 637 (ALT 250 FOR IP): Performed by: INTERNAL MEDICINE

## 2022-06-06 PROCEDURE — 6360000002 HC RX W HCPCS: Performed by: INTERNAL MEDICINE

## 2022-06-06 PROCEDURE — 2580000003 HC RX 258: Performed by: FAMILY MEDICINE

## 2022-06-06 PROCEDURE — 82024 ASSAY OF ACTH: CPT

## 2022-06-06 PROCEDURE — A4216 STERILE WATER/SALINE, 10 ML: HCPCS | Performed by: INTERNAL MEDICINE

## 2022-06-06 PROCEDURE — 2580000003 HC RX 258: Performed by: INTERNAL MEDICINE

## 2022-06-06 PROCEDURE — 2000000000 HC ICU R&B

## 2022-06-06 PROCEDURE — 82962 GLUCOSE BLOOD TEST: CPT

## 2022-06-06 PROCEDURE — 2500000003 HC RX 250 WO HCPCS: Performed by: STUDENT IN AN ORGANIZED HEALTH CARE EDUCATION/TRAINING PROGRAM

## 2022-06-06 PROCEDURE — 80053 COMPREHEN METABOLIC PANEL: CPT

## 2022-06-06 PROCEDURE — 71045 X-RAY EXAM CHEST 1 VIEW: CPT

## 2022-06-06 PROCEDURE — 94003 VENT MGMT INPAT SUBQ DAY: CPT

## 2022-06-06 PROCEDURE — 94640 AIRWAY INHALATION TREATMENT: CPT

## 2022-06-06 PROCEDURE — 83735 ASSAY OF MAGNESIUM: CPT

## 2022-06-06 PROCEDURE — C9113 INJ PANTOPRAZOLE SODIUM, VIA: HCPCS | Performed by: INTERNAL MEDICINE

## 2022-06-06 PROCEDURE — 36592 COLLECT BLOOD FROM PICC: CPT

## 2022-06-06 PROCEDURE — 6370000000 HC RX 637 (ALT 250 FOR IP): Performed by: FAMILY MEDICINE

## 2022-06-06 PROCEDURE — 99223 1ST HOSP IP/OBS HIGH 75: CPT | Performed by: PSYCHIATRY & NEUROLOGY

## 2022-06-06 PROCEDURE — 82805 BLOOD GASES W/O2 SATURATION: CPT

## 2022-06-06 PROCEDURE — 85025 COMPLETE CBC W/AUTO DIFF WBC: CPT

## 2022-06-06 PROCEDURE — 6360000002 HC RX W HCPCS: Performed by: FAMILY MEDICINE

## 2022-06-06 PROCEDURE — 2500000003 HC RX 250 WO HCPCS: Performed by: INTERNAL MEDICINE

## 2022-06-06 PROCEDURE — 99233 SBSQ HOSP IP/OBS HIGH 50: CPT | Performed by: INTERNAL MEDICINE

## 2022-06-06 PROCEDURE — 95816 EEG AWAKE AND DROWSY: CPT

## 2022-06-06 RX ORDER — POTASSIUM CHLORIDE 29.8 MG/ML
40 INJECTION INTRAVENOUS ONCE
Status: COMPLETED | OUTPATIENT
Start: 2022-06-06 | End: 2022-06-06

## 2022-06-06 RX ORDER — 0.9 % SODIUM CHLORIDE 0.9 %
1000 INTRAVENOUS SOLUTION INTRAVENOUS ONCE
Status: COMPLETED | OUTPATIENT
Start: 2022-06-06 | End: 2022-06-07

## 2022-06-06 RX ORDER — 0.9 % SODIUM CHLORIDE 0.9 %
1000 INTRAVENOUS SOLUTION INTRAVENOUS ONCE
Status: COMPLETED | OUTPATIENT
Start: 2022-06-06 | End: 2022-06-06

## 2022-06-06 RX ORDER — DEXAMETHASONE SODIUM PHOSPHATE 4 MG/ML
4 INJECTION, SOLUTION INTRA-ARTICULAR; INTRALESIONAL; INTRAMUSCULAR; INTRAVENOUS; SOFT TISSUE EVERY 12 HOURS
Status: DISCONTINUED | OUTPATIENT
Start: 2022-06-06 | End: 2022-06-08

## 2022-06-06 RX ORDER — COSYNTROPIN 0.25 MG/ML
250 INJECTION, POWDER, FOR SOLUTION INTRAMUSCULAR; INTRAVENOUS ONCE
Status: COMPLETED | OUTPATIENT
Start: 2022-06-07 | End: 2022-06-07

## 2022-06-06 RX ADMIN — BUDESONIDE 500 MCG: 0.5 SUSPENSION RESPIRATORY (INHALATION) at 19:58

## 2022-06-06 RX ADMIN — Medication 100 MCG/HR: at 17:26

## 2022-06-06 RX ADMIN — IPRATROPIUM BROMIDE AND ALBUTEROL SULFATE 3 ML: .5; 2.5 SOLUTION RESPIRATORY (INHALATION) at 12:29

## 2022-06-06 RX ADMIN — PIPERACILLIN AND TAZOBACTAM 3375 MG: 3; .375 INJECTION, POWDER, LYOPHILIZED, FOR SOLUTION INTRAVENOUS at 05:13

## 2022-06-06 RX ADMIN — CHLORHEXIDINE GLUCONATE 15 ML: 1.2 RINSE ORAL at 08:48

## 2022-06-06 RX ADMIN — LEVETIRACETAM 500 MG: 500 TABLET, FILM COATED ORAL at 20:44

## 2022-06-06 RX ADMIN — LACTULOSE 20 G: 20 SOLUTION ORAL at 20:44

## 2022-06-06 RX ADMIN — Medication 8 MCG/MIN: at 17:22

## 2022-06-06 RX ADMIN — SODIUM CHLORIDE, PRESERVATIVE FREE 10 ML: 5 INJECTION INTRAVENOUS at 20:44

## 2022-06-06 RX ADMIN — SODIUM CHLORIDE 1000 ML: 9 INJECTION, SOLUTION INTRAVENOUS at 15:48

## 2022-06-06 RX ADMIN — LIOTHYRONINE SODIUM 5 MCG: 5 TABLET ORAL at 08:52

## 2022-06-06 RX ADMIN — IPRATROPIUM BROMIDE AND ALBUTEROL SULFATE 3 ML: .5; 2.5 SOLUTION RESPIRATORY (INHALATION) at 04:25

## 2022-06-06 RX ADMIN — DEXAMETHASONE SODIUM PHOSPHATE 4 MG: 4 INJECTION, SOLUTION INTRA-ARTICULAR; INTRALESIONAL; INTRAMUSCULAR; INTRAVENOUS; SOFT TISSUE at 13:05

## 2022-06-06 RX ADMIN — CHLORHEXIDINE GLUCONATE 15 ML: 1.2 RINSE ORAL at 20:44

## 2022-06-06 RX ADMIN — IPRATROPIUM BROMIDE AND ALBUTEROL SULFATE 3 ML: .5; 2.5 SOLUTION RESPIRATORY (INHALATION) at 19:58

## 2022-06-06 RX ADMIN — DONEPEZIL HYDROCHLORIDE 5 MG: 5 TABLET, FILM COATED ORAL at 20:44

## 2022-06-06 RX ADMIN — SODIUM CHLORIDE 1000 ML: 9 INJECTION, SOLUTION INTRAVENOUS at 23:42

## 2022-06-06 RX ADMIN — LACTULOSE 20 G: 20 SOLUTION ORAL at 15:07

## 2022-06-06 RX ADMIN — LEVETIRACETAM 500 MG: 500 TABLET, FILM COATED ORAL at 08:49

## 2022-06-06 RX ADMIN — IPRATROPIUM BROMIDE AND ALBUTEROL SULFATE 3 ML: .5; 2.5 SOLUTION RESPIRATORY (INHALATION) at 08:07

## 2022-06-06 RX ADMIN — SODIUM CHLORIDE, PRESERVATIVE FREE 40 MG: 5 INJECTION INTRAVENOUS at 08:48

## 2022-06-06 RX ADMIN — SODIUM CHLORIDE, PRESERVATIVE FREE 40 MG: 5 INJECTION INTRAVENOUS at 20:45

## 2022-06-06 RX ADMIN — SODIUM CHLORIDE, PRESERVATIVE FREE 10 ML: 5 INJECTION INTRAVENOUS at 08:50

## 2022-06-06 RX ADMIN — IPRATROPIUM BROMIDE AND ALBUTEROL SULFATE 3 ML: .5; 2.5 SOLUTION RESPIRATORY (INHALATION) at 23:55

## 2022-06-06 RX ADMIN — BUDESONIDE 500 MCG: 0.5 SUSPENSION RESPIRATORY (INHALATION) at 08:08

## 2022-06-06 RX ADMIN — RISPERIDONE 0.25 MG: 0.25 TABLET ORAL at 08:48

## 2022-06-06 RX ADMIN — IPRATROPIUM BROMIDE AND ALBUTEROL SULFATE 3 ML: .5; 2.5 SOLUTION RESPIRATORY (INHALATION) at 16:06

## 2022-06-06 RX ADMIN — POTASSIUM CHLORIDE 40 MEQ: 29.8 INJECTION, SOLUTION INTRAVENOUS at 13:15

## 2022-06-06 RX ADMIN — POTASSIUM CHLORIDE 40 MEQ: 29.8 INJECTION, SOLUTION INTRAVENOUS at 09:03

## 2022-06-06 RX ADMIN — Medication 2000 UNITS: at 08:50

## 2022-06-06 RX ADMIN — RISPERIDONE 0.25 MG: 0.25 TABLET ORAL at 21:00

## 2022-06-06 RX ADMIN — IPRATROPIUM BROMIDE AND ALBUTEROL SULFATE 3 ML: .5; 2.5 SOLUTION RESPIRATORY (INHALATION) at 00:30

## 2022-06-06 RX ADMIN — ENOXAPARIN SODIUM 40 MG: 100 INJECTION SUBCUTANEOUS at 08:48

## 2022-06-06 RX ADMIN — LACTULOSE 20 G: 20 SOLUTION ORAL at 08:48

## 2022-06-06 RX ADMIN — VANCOMYCIN HYDROCHLORIDE 1000 MG: 1 INJECTION, POWDER, LYOPHILIZED, FOR SOLUTION INTRAVENOUS at 01:54

## 2022-06-06 ASSESSMENT — PAIN SCALES - GENERAL
PAINLEVEL_OUTOF10: 0
PAINLEVEL_OUTOF10: 2
PAINLEVEL_OUTOF10: 0
PAINLEVEL_OUTOF10: 1
PAINLEVEL_OUTOF10: 0
PAINLEVEL_OUTOF10: 0
PAINLEVEL_OUTOF10: 4

## 2022-06-06 ASSESSMENT — PULMONARY FUNCTION TESTS
PIF_VALUE: 19
PIF_VALUE: 14
PIF_VALUE: 19
PIF_VALUE: 21
PIF_VALUE: 22
PIF_VALUE: 21
PIF_VALUE: 21
PIF_VALUE: 16
PIF_VALUE: 21
PIF_VALUE: 18
PIF_VALUE: 21
PIF_VALUE: 24
PIF_VALUE: 23
PIF_VALUE: 18
PIF_VALUE: 21
PIF_VALUE: 18
PIF_VALUE: 16
PIF_VALUE: 21
PIF_VALUE: 21
PIF_VALUE: 24
PIF_VALUE: 22
PIF_VALUE: 16
PIF_VALUE: 22
PIF_VALUE: 23
PIF_VALUE: 18
PIF_VALUE: 16
PIF_VALUE: 16
PIF_VALUE: 15
PIF_VALUE: 22

## 2022-06-06 ASSESSMENT — PAIN SCALES - WONG BAKER

## 2022-06-06 NOTE — PROGRESS NOTES
Pharmacy Consultation Note  (Antibiotic Dosing and Monitoring)    Initial consult date: 6/5/22  Consulting physician/provider: Patrick Sams MD  Drug: Vancomycin  Indication: HAP    Vancomycin has been discontinued. Clinical pharmacy will sign off, please reconsult if further assistance is needed.        Alyssa Keenan PharmD, BCPS 6/6/2022 10:46 AM

## 2022-06-06 NOTE — PROCEDURES
EEG Report  Joseluis Early is a 65 y.o. female      Appointment Date 6/6/2022   Appointment Time 11:30am   [place] [facility] [id number]   Type of Study Routine portable Floor 4431     Technical Specifications  Technician [id number] [facility] Unresponsive/awake   Sleep deprived? no   Hyperventilation tested? no   Photic stim tested? no   EEG recording Standard 10-20 electrode placement    Duration of recording 25 mins   EEG complete? yes       Clinical History  65 y.o. female  with h/o Down syndrome, Hydrocephalus s/p  shunt, Chronic Subdural Hematoma, Hypothyroidism, Osteoarthritis, Syncope, and recent discharge for possible aspiration pneumonia who was admitted to [facility]  on 6/5/2022 with presentation of shortness of breath. Patient is a resident at a SNF and had been decompensating for about 24 hours prior to presentation per staff. She was having difficulty breathing with SOB at her facility prompting presentation to the ED. On ED arrival he appeared to be in respiratory distress -using accessory muscles and gasping for air. Her O2 saturation of 70% on room air, so she was placed on a 10 L non-rebreather with increase in O2 sat to 99%. Breathing was reported as rhonchorous breath sounds with suspected aspiration pneumonia. Unasyn was started for empiric therapy. In am, patient started to decompensate again on oxygen supplementation, use accessory muscles to breathe, and decision was made to intubate patient. Staff indicates that there has been some twitching in left arm observed along with whole body stiffening. This was more associated with stimulation as opposed to random onset. There has been no report of any GTC type activity. Patient nonverbal at baseline. Management recently done (neurosurgery who is following patient with no recommendation for further intervention at this time.     Medications    Current Facility-Administered Medications:     potassium chloride 40 Q6H PRN **OR** acetaminophen (TYLENOL) suppository 650 mg, 650 mg, Rectal, Q6H PRN, Lj Lepe,     fentaNYL 10 mcg/ml in 0.9%  ml infusion,  mcg/hr, IntraVENous, Continuous, Last Rate: 7.5 mL/hr at 06/06/22 0600, 75 mcg/hr at 06/06/22 0600 **AND** fentaNYL bolus from bag, 50 mcg, IntraVENous, Q30 Min PRN, Kenneth Renner MD    budesonide (PULMICORT) nebulizer suspension 500 mcg, 0.5 mg, Nebulization, BID, Annie Talley MD, 500 mcg at 06/06/22 0808    ipratropium-albuterol (DUONEB) nebulizer solution 3 mL, 3 mL, Inhalation, Q4H, Annie Talley MD, 3 mL at 06/06/22 0807    chlorhexidine (PERIDEX) 0.12 % solution 15 mL, 15 mL, Mouth/Throat, BID, Annie Talley MD, 15 mL at 06/06/22 0848    norepinephrine (LEVOPHED) 16 mg in dextrose 5% 250 mL infusion, 1-100 mcg/min, IntraVENous, Continuous, Annie Talley MD, Last Rate: 9.4 mL/hr at 06/06/22 0600, 10 mcg/min at 06/06/22 0600        Physician Interpretation    General EEG Report  EEG study was performed using the 10-20 electrode placement system in patient who was sedated and poorly responsive at time of study. No abnormal behavior or movements noted during the study. Activation procedures included photic stimulation and hyperventilation. Type of EEG:   Routine Inpatient EEG with video done at bedside    Description   Background: Asymmetric background with presence of greater activity on the left versus right side with diffuse slowing with no waking state. Reactivity to stimuli in form of increased frequency background. Fast Activity: No significant fast activity  Slowing: Diffuse delta/theta slowing with increased sharp slow activity predominantly in left greater than right frontal lobe involving frontocentral region with noted sharp activity with some increased inversion in F3 and less so in F7. Associated spread both posteriorly and to the right side.    Non-Epileptiform Abnormality: Sharply contoured delta theta slowing noted in left greater than right bifrontal region suggestive of epileptogenic foci versus structural abnormality. Epileptiform Discharge: No spike and wave activity observed. Sleep: None appreciated    Photic stimulation: Not performed  Hyperventilation: Not performed      General Impression  Abnormal EEG polymorphic slowing suggestive of epileptiform foci in the right frontal lobe versus structural abnormality. Diffuse slowing also noted consistent with moderate encephalopathy. Clinical correlation is indicated.     Reatha Polka, MD Brynn Moritz

## 2022-06-06 NOTE — PLAN OF CARE
Problem: Discharge Planning  Goal: Discharge to home or other facility with appropriate resources  6/6/2022 0220 by Bushra Mensah RN  Outcome: Progressing  6/5/2022 1657 by Ilya Jason  Outcome: Progressing     Problem: Pain  Goal: Verbalizes/displays adequate comfort level or baseline comfort level  6/6/2022 0220 by Bushra Mensah RN  Outcome: Not Progressing  Flowsheets  Taken 6/6/2022 0200  Verbalizes/displays adequate comfort level or baseline comfort level: Assess pain using appropriate pain scale  Taken 6/6/2022 0000  Verbalizes/displays adequate comfort level or baseline comfort level: Assess pain using appropriate pain scale  Taken 6/5/2022 2200  Verbalizes/displays adequate comfort level or baseline comfort level: Assess pain using appropriate pain scale  Taken 6/5/2022 2000  Verbalizes/displays adequate comfort level or baseline comfort level: Assess pain using appropriate pain scale  6/5/2022 1657 by Ilya Jason  Outcome: Progressing     Problem: Respiratory - Adult  Goal: Achieves optimal ventilation and oxygenation  6/6/2022 0220 by Bushra Mensah RN  Outcome: Progressing  6/5/2022 1657 by Ilya Jason  Outcome: Progressing  6/5/2022 1247 by Ivory Garcia RCP  Outcome: Progressing     Problem: Skin/Tissue Integrity  Goal: Absence of new skin breakdown  Description: 1. Monitor for areas of redness and/or skin breakdown  2. Assess vascular access sites hourly  3. Every 4-6 hours minimum:  Change oxygen saturation probe site  4. Every 4-6 hours:  If on nasal continuous positive airway pressure, respiratory therapy assess nares and determine need for appliance change or resting period.   6/6/2022 0220 by Bushra Mensah RN  Outcome: Progressing  6/5/2022 1657 by Ilya Jason  Outcome: Progressing     Problem: ABCDS Injury Assessment  Goal: Absence of physical injury  6/6/2022 0220 by Bushra Mensah RN  Outcome: Progressing  6/5/2022 1657 by Ilya Jason  Outcome: Progressing     Problem: Safety -

## 2022-06-06 NOTE — PROGRESS NOTES
Stage  CI HR MAP TPRI SVI   Baseline 2.2 80   27   Challenge 2.5 90   31   Result (%Ä) 14.2% 12.6%   12.1%     Nicom Completed successfully  Patient is fluid responsive

## 2022-06-06 NOTE — PLAN OF CARE
Problem: Discharge Planning  Goal: Discharge to home or other facility with appropriate resources  6/6/2022 1300 by Panda Purcell  Outcome: Progressing    Problem: Pain  Goal: Verbalizes/displays adequate comfort level or baseline comfort level  6/6/2022 1300 by Panda Purcell  Outcome: Progressing  Flowsheets  Taken 6/6/2022 0600 by James Trivedi RN  Verbalizes/displays adequate comfort level or baseline comfort level: Assess pain using appropriate pain scale  Taken 6/6/2022 0400 by James Trivedi RN  Verbalizes/displays adequate comfort level or baseline comfort level: Assess pain using appropriate pain scale     Problem: Respiratory - Adult  Goal: Achieves optimal ventilation and oxygenation  6/6/2022 1300 by Panda Purcell  Outcome: Progressing     Problem: Skin/Tissue Integrity  Goal: Absence of new skin breakdown  Description: 1. Monitor for areas of redness and/or skin breakdown  2. Assess vascular access sites hourly  3. Every 4-6 hours minimum:  Change oxygen saturation probe site  4. Every 4-6 hours:  If on nasal continuous positive airway pressure, respiratory therapy assess nares and determine need for appliance change or resting period.   6/6/2022 1300 by Panda Purcell  Outcome: Progressing     Problem: ABCDS Injury Assessment  Goal: Absence of physical injury  6/6/2022 1300 by Panda Purcell  Outcome: Progressing     Problem: Safety - Adult  Goal: Free from fall injury  6/6/2022 1300 by Panda Purcell  Outcome: Progressing  Flowsheets (Taken 6/6/2022 1258)  Free From Fall Injury:   Carroll Szymanski family/caregiver on patient safety   Based on caregiver fall risk screen, instruct family/caregiver to ask for assistance with transferring infant if caregiver noted to have fall risk factors

## 2022-06-06 NOTE — CONSULTS
NEUROLOGY CONSULT NOTE      Requesting Physician: Jimmy Makc MD    Reason for Consult:  Evaluate for active seizure, hx of seizure, down syndrome    History of Present Illness:  Jose Early is a 65 y.o. female  with h/o Down syndrome, Hydrocephalus s/p  shunt, Chronic Subdural Hematoma, Hypothyroidism, Osteoarthritis, Syncope, and recent discharge for possible aspiration pneumonia who was admitted to [facility]  on 6/5/2022 with presentation of shortness of breath. Patient is a resident at a SNF and had been decompensating for about 24 hours prior to presentation per staff. He was having difficulty breathing with SOB at his facility prompting presentation to the ED. On ED arrival he appeared to be in respiratory distress -using accessory muscles and gasping for air. His O2 saturation of 70% on room air, so he was placed on a 10 L non-rebreather with increase in O2 sat to 99%. Breathing was reported as rhonchorous breath sounds with suspected aspiration pneumonia. Unasyn was started for empiric therapy. In am, patient started to decompensate again on oxygen supplementation, use accessory muscles to breathe, and decision was made to intubate patient. Staff indicates that there has been some twitching in left arm observed along with whole body stiffening. This was more associated with stimulation as opposed to random onset. There has been no report of any GTC type activity. Patient nonverbal at baseline. Management recently done (neurosurgery who is following patient with no recommendation for further intervention at this time.       Past Medical History:        Diagnosis Date    Arthritis     Down syndrome     Hypothyroidism     Osteoarthritis     Syncope     Thyroid disease     UTI (urinary tract infection)         Varicose veins     Wears glasses            Procedure Laterality Date    ABDOMEN SURGERY      duodenal atresia    ABDOMINAL ADHESION SURGERY      BRAIN SURGERY      CRANIOTOMY Right 4/12/2019    RIGHT CRANIOTOMY FRANCISCO JAVIER HOLES performed by Elier Xavier MD at [place] [address] ECHO COMPL W DOP COLOR FLOW  5/14/2012         HIP SURGERY      Right    JOINT REPLACEMENT      left knee, right hip-dr Nelda Michelle KNEE SURGERY      Right    TOE SURGERY      Right foot    TOTAL KNEE ARTHROPLASTY Left 08/11/2016    DR. De    TUBAL LIGATION      VENA CAVA FILTER PLACEMENT Bilateral 4/22/2019    VENA CAVA FILTER INSERTION performed by Jd Smith MD at [place] [address] VENTRICULOPERITONEAL SHUNT N/A 11/12/2019    RIGHT FRONTAL VENTRICULO PERITONEAL SHUNT INSERTION-----FACILITY performed by Elier Xavier MD at [address] History:  Social History     Tobacco Use   Smoking Status Never Smoker   Smokeless Tobacco Never Used     Social History     Substance and Sexual Activity   Alcohol Use No     Social History     Substance and Sexual Activity   Drug Use No     Single    Family History:       Problem Relation Age of Onset    Heart Disease Maternal Grandmother     Heart Disease Maternal Grandfather     Heart Disease Paternal Aunt     Heart Disease Paternal Uncle        Review of Systems:  Not available.       Allergies:    No Known Allergies     Current Medications:   potassium chloride 40 mEq in 100 mL IVPB (Central Line), Once   Followed by  potassium chloride 40 mEq in 100 mL IVPB (Central Line), Once  vitamin D (CHOLECALCIFEROL) tablet 2,000 Units, Daily  donepezil (ARICEPT) tablet 5 mg, Nightly  lactulose (CHRONULAC) 10 GM/15ML solution 20 g, TID  levETIRAcetam (KEPPRA) tablet 500 mg, BID  levothyroxine (SYNTHROID) tablet 88 mcg, Daily  liothyronine (CYTOMEL) tablet 5 mcg, Daily  risperiDONE (RISPERDAL) tablet 0.25 mg, BID  sodium chloride flush 0.9 % injection 10 mL, 2 times per day  sodium chloride flush 0.9 % injection 10 mL, PRN  0.9 % sodium chloride infusion, PRN  enoxaparin (LOVENOX) injection 40 mg, Daily  promethazine (PHENERGAN) tablet 12.5 mg, Q6H PRN assess hearing due to poor cooperation;    Remainder of cranial nerve exam not assessed due to poor patient cooperation  Motor Exam:   Patient uncooperative with strength assessment; spasticity noted in bilateral lower extremities that was worse on the right versus left side; extensor contracture with curling of toes bilateral feet with inability to dorsiflex ankle due to spasticity   Sensory Exam:   [place] to noxious stimuli bilaterally     Cerebella Exam:   Intermittent jerking in left arm and neck. Gait:  Gait was not tested. Reflexes: Unable to assess lower extremity reflex due to spasticity with normal 1+ reflex bilateral upper remedies; Babinski could not be assessed. Labs:    CBC:   Recent Labs     06/05/22 0155 06/06/22 0411   WBC 7.8 6.2   HGB 16.5* 11.9    276   MCV 94.7 92.1   MCH 30.1 30.4   MCHC 31.8* 33.1   RDW 15.1* 15.1*     CMP:  Recent Labs     06/05/22 0155 06/06/22 0411    137   K 4.7 3.0*    108*   CO2 26 17*   BUN 17 8   CREATININE 0.6 0.6   GFRAA >60 >60   LABGLOM >60 >60   GLUCOSE 128* 155*   CALCIUM 9.4 7.9*     Liver:   Recent Labs     06/06/22 0411   AST 19   ALT 25   ALKPHOS 90   PROT 5.9*   LABALBU 2.6*   BILITOT 0.7     INR:   Recent Labs     06/05/22  0945   PROTIME 11.5   INR 1.0       ToxicologyNo results for input(s): PHENYTOIN, CARBTOT, PHENOBARB, VALPROATE in the last 72 hours. Invalid input(s): LAMOTRIG,  KEPPRA  No results for input(s): AMPMETHURSCR, BARBTQTU, BDZQTU, CANNABQUANT, COCMETQTU, OPIAU, PCPQUANT in the last 72 hours. Radiology:  CT ABDOMEN PELVIS WO CONTRAST Additional Contrast? None    Result Date: 5/11/2022  EXAMINATION: CT OF THE ABDOMEN AND PELVIS WITHOUT CONTRAST 5/11/2022 4:47 pm TECHNIQUE: CT of the abdomen and pelvis was performed without the administration of intravenous contrast. Multiplanar reformatted images are provided for review.  Automated exposure control, iterative reconstruction, and/or weight based adjustment of the mA/kV was utilized to reduce the radiation dose to as low as reasonably achievable. COMPARISON: CT of the abdomen and pelvis, 03/07/2021. HISTORY: ORDERING SYSTEM PROVIDED HISTORY: right hydronephrosis TECHNOLOGIST PROVIDED HISTORY: Reason for exam:->right hydronephrosis Additional Contrast?->None What reading provider will be dictating this exam?->CRC FINDINGS: Lower Chest: The lung bases are clear. The heart is normal in size. No pleural or pericardial effusion. Organs: Liver: Unremarkable. Gallbladder: Unremarkable. Pancreas: Unremarkable. Spleen:  Unremarkable. Adrenals: Unremarkable. Kidneys: The kidneys are normal in size and contour. A 1-2 mm calculus is seen in an upper pole calyx of the left kidney. There is mild dilation of the extrarenal pelves bilaterally. No evidence of hydronephrosis. The distal ureters are obscured by beam hardening artifact arising from the right hip arthroplasty. GI/Bowel: Liquid stool in the colon indicates a diarrheal illness. No bowel wall thickening or obstruction noted. A percutaneous gastrostomy tube is seen. Pelvis: The base of the urinary bladder and the lower uterine segment are obscured by beam hardening artifact. The visualized aspects are grossly unremarkable. Peritoneum/Retroperitoneum: No lymphadenopathy. No free air or free fluid is seen. The abdominal aorta and pelvic arteries are unremarkable. IVC filter in situ. Ventriculoperitoneal shunt is also present. Bones/Soft Tissues: The visualized bones are intact without fracture or focal lesion. 1.  No acute abnormality is seen in the abdomen or the pelvis. 2. Mild dilation of the extrarenal pelves bilaterally (anatomic variant). No evidence of hydronephrosis. 3. 1-2 mm intrarenal calculus in the left kidney.  RECOMMENDATIONS: Unavailable     XR NECK SOFT TISSUE    Result Date: 5/7/2022  EXAMINATION: TWO XRAY VIEWS OF THE CHEST; TWO XRAY VIEWS OF THE NECK SOFT TISSUES; TWO XRAY VIEWS OF THE ABDOMEN; THREE XRAY VIEWS OF THE SKULL 5/7/2022 1:55 pm COMPARISON: None. HISTORY: ORDERING SYSTEM PROVIDED HISTORY: s/p  shunt TECHNOLOGIST PROVIDED HISTORY: Reason for exam:->s/p  shunt Reason for exam:->shunt series What reading provider will be dictating this exam?->CRC FINDINGS: There is evidence of a right-sided shunt tube. Portions of the catheter near the valve are not opaque. Continuity in this region of the right lateral aspect of the skull cannot be determined. Remainder of the shunt tube in the neck, chest and abdomen appears unremarkable. There are no infiltrates or effusions. Heart size is normal. In the abdomen the shunt tube traverses in the left side and is than looped in the right-side of the lower abdomen. There are some nonspecific air-fluid levels in the abdomen bowel gas. Bowel loops otherwise appear unremarkable. There is a right hip prosthesis. IVC filter is noted.  shunt tube as described. XR CHEST (2 VW)    Result Date: 5/7/2022  EXAMINATION: TWO XRAY VIEWS OF THE CHEST; TWO XRAY VIEWS OF THE NECK SOFT TISSUES; TWO XRAY VIEWS OF THE ABDOMEN; THREE XRAY VIEWS OF THE SKULL 5/7/2022 1:55 pm COMPARISON: None. HISTORY: ORDERING SYSTEM PROVIDED HISTORY: s/p  shunt TECHNOLOGIST PROVIDED HISTORY: Reason for exam:->s/p  shunt Reason for exam:->shunt series What reading provider will be dictating this exam?->CRC FINDINGS: There is evidence of a right-sided shunt tube. Portions of the catheter near the valve are not opaque. Continuity in this region of the right lateral aspect of the skull cannot be determined. Remainder of the shunt tube in the neck, chest and abdomen appears unremarkable. There are no infiltrates or effusions. Heart size is normal. In the abdomen the shunt tube traverses in the left side and is than looped in the right-side of the lower abdomen. There are some nonspecific air-fluid levels in the abdomen bowel gas.  Bowel loops otherwise appear unremarkable. There is a right hip prosthesis. IVC filter is noted.  shunt tube as described. CT HEAD WO CONTRAST    Result Date: 5/31/2022  EXAMINATION: CT OF THE HEAD WITHOUT CONTRAST  5/31/2022 10:35 am TECHNIQUE: CT of the head was performed without the administration of intravenous contrast. Automated exposure control, iterative reconstruction, and/or weight based adjustment of the mA/kV was utilized to reduce the radiation dose to as low as reasonably achievable. COMPARISON: 05/30/2022 HISTORY: ORDERING SYSTEM PROVIDED HISTORY: follow up subdural hematoma TECHNOLOGIST PROVIDED HISTORY: Reason for exam:->follow up subdural hematoma Has a \"code stroke\" or \"stroke alert\" been called? ->No What reading provider will be dictating this exam?->CRC FINDINGS: BRAIN/VENTRICLES: There is again seen small extra-axial collection felt to represent chronic subdural hematoma over the convexity of the left parietal lobe measuring a thickness of 5 mm. The high density associated with the collection is unchanged. There is hydrocephalus of the lateral to lesser extent 3rd ventricle. 4th ventricle within normal limits. Findings are unchanged. There is a right frontal ventriculostomy shunt catheter in place unchanged. However, the subarachnoid spaces are also prominent suggesting parenchymal volume loss. There is bifrontal lucency as well as lucency at the temporal lobes likely related to prior contusion. There is no new CT evidence for intra-axial or extra-axial fluid collection, no mass, nor hematoma. ORBITS: The visualized portion of the orbits demonstrate no acute abnormality. SINUSES: The visualized paranasal sinuses and mastoid air cells demonstrate no acute abnormality. SOFT TISSUES/SKULL:  No acute abnormality of the visualized skull or soft tissues. There is again seen predominantly chronic small subdural hematoma over the convexity of the left parietal lobe.   This measures a thickness of 4.6 mm which is unchanged. Tiny focus of high density within the collection could represent more acute blood which is also unchanged. No significant mass effect. Ventriculomegaly and ventriculostomy shunt catheter again noted. Lucency related to chronic encephalomalacia and perhaps prior contusion at the anterior frontal lobes and temporal lobes is unchanged. CT Head WO Contrast    Result Date: 5/30/2022  EXAMINATION: CT OF THE HEAD WITHOUT CONTRAST  5/30/2022 9:06 pm TECHNIQUE: CT of the head was performed without the administration of intravenous contrast. Automated exposure control, iterative reconstruction, and/or weight based adjustment of the mA/kV was utilized to reduce the radiation dose to as low as reasonably achievable. COMPARISON: May 4, 2022 HISTORY: ORDERING SYSTEM PROVIDED HISTORY: AMS, hypoxia TECHNOLOGIST PROVIDED HISTORY: Reason for exam:->AMS, hypoxia Has a \"code stroke\" or \"stroke alert\" been called? ->No Decision Support Exception - unselect if not a suspected or confirmed emergency medical condition->Emergency Medical Condition (MA) What reading provider will be dictating this exam?->CRC FINDINGS: Small focus of increased attenuation involving left parietal subdural space. Redemonstration of extra-axial, hypoattenuating collection overlying left parietal lobe. Stable position of right sided ventricular shunt catheter with distal tip abutting septum pellucidum. Stable areas of encephalomalacia involving inferior frontal lobes and stable area of encephalomalacia involving bilateral temporal lobes stable area of encephalomalacia involving right frontal white matter adjacent to ventricular shunt. Basilar cisterns are patent. Paranasal sinuses and mastoid air cells are clear. 1. Redemonstration of hypoattenuating extra-axial collection overlying left parietal lobe related to chronic subdural hygroma or chronic subdural hematoma.   Small focus of increased attenuation within this collection could suggest small acute on chronic subdural hematoma. Results were called by Dr. Jono Gunderson to Dr. Gallito Valero on 5/30/2022 at 21:57. 2. Stable areas of encephalomalacia involving bilateral frontal lobes and bilateral temporal lobes. 3. Stable position of right frontal ventricular shunt catheter. 4. Stable moderate ventriculomegaly. XR CHEST PORTABLE    Result Date: 6/6/2022  EXAMINATION: ONE XRAY VIEW OF THE CHEST 6/6/2022 7:27 am COMPARISON: 06/05/2022 HISTORY: ORDERING SYSTEM PROVIDED HISTORY: pna and intubated TECHNOLOGIST PROVIDED HISTORY: Reason for exam:->pna and intubated What reading provider will be dictating this exam?->CRC FINDINGS: The cardiac silhouette is within normals. Note is made of a right internal jugular central venous catheter. The endotracheal tube tip is located 1.5 cm proximal to the louann. Lungs are clear. There are no findings of pneumonia or failure. 1. There are no findings of failure or pneumonia 2. Satisfactory position of the endotracheal tube and right internal jugular central venous catheter     XR CHEST PORTABLE    Result Date: 6/5/2022  EXAMINATION: ONE XRAY VIEW OF THE CHEST 6/5/2022 4:50 pm COMPARISON: The previous study performed earlier in the day at 5:51 a.m. HISTORY: ORDERING SYSTEM PROVIDED HISTORY: s/p Rt IJ TECHNOLOGIST PROVIDED HISTORY: Reason for exam:->s/p Rt IJ What reading provider will be dictating this exam?->CRC FINDINGS: There has been interval placement of a right-sided IJ line. The tip is in the distal SVC. No pneumothorax is appreciated. Again seen is an endotracheal tube, with the tip 1.9 cm above the louann. No longer seen is the NG tube. A  shunt is again seen. No longer identified are the left lung opacities. The left hemidiaphragm is now in a normal position. There has been resolution of the mediastinal shift to the left. There has been a decrease in the right lower lung field atelectasis.   The cardiac silhouette and mediastinal structures are without significant interval change. Interval placement of a right-sided IJ line, when compared to the previous study performed earlier in the day. No pneumothorax. Previously noted left lung opacities have resolved. The left hemidiaphragm is now in a normal position and there has been resolution of the mediastinal shift to the left. Interval decrease in the right lower lung field atelectasis. XR CHEST PORTABLE    Result Date: 6/5/2022  EXAMINATION: ONE XRAY VIEW OF THE CHEST6/5/2022 TECHNIQUE: Frontal view submitted COMPARISON: None. HISTORY: ORDERING SYSTEM PROVIDED HISTORY: Post tube TECHNOLOGIST PROVIDED HISTORY: Reason for exam:->Post tube What reading provider will be dictating this exam?->CRC FINDINGS: The lungs demonstrate opacification in the left mid lung. The endotracheal tube tip is 2.9 cm above the louann. Nasogastric tube is in good position. The cardiomediastinal silhouette is stable. Opacification in the  left lung, pneumonia cannot be excluded. Endotracheal tube tip 2.9 cm above louann. Nasogastric tube is in good position. XR CHEST PORTABLE    Result Date: 6/5/2022  EXAMINATION: ONE XRAY VIEW OF THE CHEST6/5/2022 TECHNIQUE: Frontal view submitted COMPARISON: None. HISTORY: ORDERING SYSTEM PROVIDED HISTORY: sob TECHNOLOGIST PROVIDED HISTORY: Reason for exam:->sob What reading provider will be dictating this exam?->CRC FINDINGS: The lungs are clear without focal consolidation, large pleural effusion, or pneumothorax. The cardiomediastinal silhouette is stable. No acute cardiopulmonary process. XR CHEST PORTABLE    Result Date: 5/13/2022  EXAMINATION: ONE XRAY VIEW OF THE CHEST 5/13/2022 11:09 am COMPARISON: 05/07/2022 HISTORY: ORDERING SYSTEM PROVIDED HISTORY: Cough TECHNOLOGIST PROVIDED HISTORY: Reason for exam:->Cough What reading provider will be dictating this exam?->CRC FINDINGS: Heart size is normal.  There are no infiltrates or effusions.   There is mild tortuosity of the aorta. Suspected shunt tube is noted which appears unchanged. No acute process     CTA PULMONARY W CONTRAST    Result Date: 5/30/2022  EXAMINATION: CTA OF THE CHEST 5/30/2022 9:06 pm TECHNIQUE: CTA of the chest was performed after the administration of intravenous contrast.  Multiplanar reformatted images are provided for review. MIP images are provided for review. Automated exposure control, iterative reconstruction, and/or weight based adjustment of the mA/kV was utilized to reduce the radiation dose to as low as reasonably achievable. COMPARISON: None. HISTORY: ORDERING SYSTEM PROVIDED HISTORY: Hypoxia, evaluate for PE TECHNOLOGIST PROVIDED HISTORY: Reason for exam:->Hypoxia, evaluate for PE Decision Support Exception - unselect if not a suspected or confirmed emergency medical condition->Emergency Medical Condition (MA) What reading provider will be dictating this exam?->CRC FINDINGS: Pulmonary Arteries: Pulmonary arteries are adequately opacified for evaluation. No evidence of intraluminal filling defect to suggest pulmonary embolism. Main pulmonary artery is normal in caliber. Mediastinum: No evidence of mediastinal lymphadenopathy. The heart and pericardium demonstrate no acute abnormality. There is no acute abnormality of the thoracic aorta. Noted right aberrant subclavian artery of variant anatomic thoracic arch branching. Lungs/pleura: The lungs are without acute process. Subsegmental dependent atelectasis. No focal consolidation or pulmonary edema. No evidence of pleural effusion or pneumothorax. Upper Abdomen: Limited images of the upper abdomen are unremarkable. Soft Tissues/Bones: No acute bone or soft tissue abnormality. No evidence of pulmonary embolism or acute pulmonary abnormality. Noted right aberrant subclavian artery of variant anatomic thoracic arch branching. Subsegmental dependent atelectasis.      US ABDOMEN LIMITED    Result Date: 5/11/2022  EXAMINATION: RIGHT UPPER QUADRANT ULTRASOUND 5/11/2022 8:59 am COMPARISON: None. HISTORY: ORDERING SYSTEM PROVIDED HISTORY: hyperammonemia TECHNOLOGIST PROVIDED HISTORY: Reason for exam:->hyperammonemia What reading provider will be dictating this exam?->CRC FINDINGS: LIVER:  The liver demonstrates normal echogenicity without evidence of intrahepatic biliary ductal dilatation. BILIARY SYSTEM:  There is borderline gallbladder wall prominence measuring 3 mm. There is a 3 mm polyp. .  Negative sonographic Dorsey's sign. There are no gallstones. Common bile duct is within normal limits measuring 5 mm. RIGHT KIDNEY: The right kidney measures 9.5 x 3.9 cm. There is mild-to-moderate hydronephrosis. There is a prominent extrarenal pelvis. [place] PANCREAS:  Visualized portions of the pancreas are unremarkable. OTHER: No evidence of right upper quadrant ascites. Mild to moderate right-sided hydronephrosis     XR ABDOMEN FOR NG/OG/NE TUBE PLACEMENT    Result Date: 6/5/2022  EXAMINATION: ONE SUPINE XRAY VIEW(S) OF THE ABDOMEN 6/5/2022 5:54 am COMPARISON: None. HISTORY: ORDERING SYSTEM PROVIDED HISTORY: Confirmation of course of NG/OG/NE tube and location of tip of tube TECHNOLOGIST PROVIDED HISTORY: Reason for exam:->Confirmation of course of NG/OG/NE tube and location of tip of tube Portable? ->Yes What reading provider will be dictating this exam?->CRC FINDINGS: The nasogastric tube is in good position. The the IVC is in good position. There is no evidence for bowel obstruction. A PEG tube is visualized. Nasogastric tube is in good position. US RETROPERITONEAL COMPLETE    Result Date: 5/11/2022  EXAMINATION: RETROPERITONEAL ULTRASOUND OF THE KIDNEYS AND URINARY BLADDER 5/11/2022 COMPARISON: 03/19/2022 chest CT.  HISTORY: ORDERING SYSTEM PROVIDED HISTORY: right hydronephrosis TECHNOLOGIST PROVIDED HISTORY: Reason for exam:->right hydronephrosis What reading provider will be dictating this exam?->CRC VIEWS OF THE NECK SOFT TISSUES; TWO XRAY VIEWS OF THE ABDOMEN; THREE XRAY VIEWS OF THE SKULL 5/7/2022 1:55 pm COMPARISON: None. HISTORY: ORDERING SYSTEM PROVIDED HISTORY: s/p  shunt TECHNOLOGIST PROVIDED HISTORY: Reason for exam:->s/p  shunt Reason for exam:->shunt series What reading provider will be dictating this exam?->CRC FINDINGS: There is evidence of a right-sided shunt tube. Portions of the catheter near the valve are not opaque. Continuity in this region of the right lateral aspect of the skull cannot be determined. Remainder of the shunt tube in the neck, chest and abdomen appears unremarkable. There are no infiltrates or effusions. Heart size is normal. In the abdomen the shunt tube traverses in the left side and is than looped in the right-side of the lower abdomen. There are some nonspecific air-fluid levels in the abdomen bowel gas. Bowel loops otherwise appear unremarkable. There is a right hip prosthesis. IVC filter is noted.  shunt tube as described. The patient's records from referring provider and available information in the EHR was reviewed. Impression:  1. Atypical spells with vocal tremors left arm and possible startle myoclonus  2. Aspiration pneumonia with acute respiratory failure  3. Profound ID/DD  4. Spasticity with plantar flexion contractures in bilateral feet    Principal Problem:    Aspiration pneumonia due to inhalation of vomitus (HCC)  Active Problems:    Acute respiratory failure with hypoxia (HCC)  Resolved Problems:    * No resolved hospital problems. *      Recommendations:                                            1. EEG for further evaluation  2. Continue Keppra at current dose  3. Baclofen titration for treatment of spasticity once extubated and swallowing. 4. Further pending EEG. It was my pleasure to evaluate Chante Early today. Please call with questions.       Electronically signed by Ruben Merrill MD on 6/6/2022 at 10:28 AM

## 2022-06-06 NOTE — PROGRESS NOTES
Hospitalist Progress Note      SYNOPSIS: Patient admitted on 2022 for aspiration pneumonia       SUBJECTIVE:    Patient seen and examined. Ongoing EEG   Records reviewed. Stable overnight. No other overnight issues reported. Temp (24hrs), Av.4 °F (36.3 °C), Min:97.2 °F (36.2 °C), Max:97.5 °F (36.4 °C)    DIET: Diet NPO  CODE: Full Code    Intake/Output Summary (Last 24 hours) at 2022 0813  Last data filed at 2022 0600  Gross per 24 hour   Intake 2108.51 ml   Output 1375 ml   Net 733.51 ml       OBJECTIVE:    BP (!) 130/57   Pulse (!) 46   Temp 97.3 °F (36.3 °C) (Bladder)   Resp 24   Ht 4' 10\" (1.473 m)   Wt 125 lb (56.7 kg)   SpO2 100%   BMI 26.13 kg/m²     General appearance: No apparent distress  HEENT:  Conjunctivae/corneas clear. Down syndrome facial features   Neck: Supple. No jugular venous distention. Respiratory: Oral ETT. Clear to auscultation bilaterally  Cardiovascular: Regular rate rhythm, normal S1-S2  Abdomen: Soft, nontender, nondistended  Musculoskeletal: No clubbing, cyanosis, no bilateral lower extremity edema. Brisk capillary refill. Skin:  No rashes  on visible skin  Neurologic: sedated     ASSESSMENT:    Acute hypoxic respiratory failure requiring endotracheal intubation    Secondary to Aspiration pneumonia  Septic shock. Source pneumonia   Down syndrome  Hypothyroidism   Hx of hydrocephalus s/p  shunts  Hx of seizure   Dysphagia with PEG in situ          PLAN:    - Vent setting as per ICU team  - On fentanyl and levophed  - Abx. Unasyn was DC.  Now on vanco and to start zosyn   - Breathing treatment   - on keppra 500 mg BID  - Ongoing EEG  - Neurology on board  - On Levothyroxine 88 mcg via PEG and liothyroxine 5 mcg     - Aricept and risperdal have been resumed   - Started on dexamethasone 4 mg IVq 12 hr o      DISPOSITION:     Medications:  REVIEWED DAILY    Infusion Medications    sodium chloride      fentaNYL 75 mcg/hr (22 0600)    norepinephrine 10 mcg/min (06/06/22 0600)     Scheduled Medications    potassium chloride  40 mEq IntraVENous Once    Followed by   Blase Liming potassium chloride  40 mEq IntraVENous Once    vitamin D  2,000 Units Oral Daily    donepezil  5 mg Oral Nightly    lactulose  20 g Oral TID    levETIRAcetam  500 mg Oral BID    levothyroxine  88 mcg Oral Daily    liothyronine  5 mcg Oral Daily    risperiDONE  0.25 mg Oral BID    sodium chloride flush  10 mL IntraVENous 2 times per day    enoxaparin  40 mg SubCUTAneous Daily    budesonide  0.5 mg Nebulization BID    ipratropium-albuterol  3 mL Inhalation Q4H    chlorhexidine  15 mL Mouth/Throat BID    pantoprazole (PROTONIX) 40 mg injection  40 mg IntraVENous Daily    piperacillin-tazobactam  3,375 mg IntraVENous Q8H    vancomycin  1,000 mg IntraVENous Q12H     PRN Meds: sodium chloride flush, sodium chloride, promethazine **OR** ondansetron, polyethylene glycol, acetaminophen **OR** acetaminophen, fentaNYL **AND** fentaNYL, fentanNYL    Labs:     Recent Labs     06/05/22  0155 06/06/22 0411   WBC 7.8 6.2   HGB 16.5* 11.9   HCT 51.9* 36.0    276       Recent Labs     06/05/22  0155 06/06/22  0411    137   K 4.7 3.0*    108*   CO2 26 17*   BUN 17 8   CREATININE 0.6 0.6   CALCIUM 9.4 7.9*       Recent Labs     06/05/22  0155 06/06/22  0411   PROT 8.3 5.9*   ALKPHOS 138* 90   ALT 36* 25   AST 26 19   BILITOT 0.3 0.7   LIPASE 33  --        Recent Labs     06/05/22  0945   INR 1.0       No results for input(s): CKTOTAL, TROPONINI in the last 72 hours.     Chronic labs:    Lab Results   Component Value Date    CHOL 173 03/30/2022    TRIG 88 03/30/2022    HDL 62 03/30/2022    LDLCALC 93 03/30/2022    TSH 10.330 (H) 06/05/2022    INR 1.0 06/05/2022    LABA1C 5.2 03/30/2022       Radiology: REVIEWED DAILY    +++++++++++++++++++++++++++++++++++++++++++++++++  Paz Thakkar MD  [facility] - [place] [address] [id number] [place] [address] - [place], [place]  +++++++++++++++++++++++++++++++++++++++++++++++++  NOTE: This report was transcribed using voice recognition software. Every effort was made to ensure accuracy; however, inadvertent computerized transcription errors may be present.

## 2022-06-06 NOTE — PROGRESS NOTES
[facility]  Department of Internal Medicine   Internal Medicine Residency   MICU Progress Note    Patient:  Mahesh Enriquez See 65 y.o. female  MRN: [id number]     Date of Service: 6/6/2022    Allergy: Patient has no known allergies. Subjective     Examined the patient by the bedside. She remains intubated, opening eyes, following some commands. Has bilateral lower extremity contractures, hence no activity assessed there. Intermittently appears to be whole body tremoring. Family by the bedside confirm that this activity has been seen before at the facility. Pressor requirement remains at 8, NICOM assessed the patient to be fluid responsive, gave 1L of fluid. Objective     VS: /64   Pulse (!) 102   Temp 99.1 °F (37.3 °C) (Bladder)   Resp 17   Ht 4' 10\" (1.473 m)   Wt 125 lb (56.7 kg)   SpO2 100%   BMI 26.13 kg/m²   ABP (Arterial line BP): 117/64  ABP Mean (Arterial Line Mean): 83 mmHg    I & O - 24hr:     Intake/Output Summary (Last 24 hours) at 6/6/2022 1721  Last data filed at 6/6/2022 1600  Gross per 24 hour   Intake 2323.85 ml   Output 2011 ml   Net 312.85 ml       Physical Exam:  Physical Exam  Constitutional:       General: She is not in acute distress. Appearance: Normal appearance. She is not toxic-appearing or diaphoretic. HENT:      Head: Normocephalic. Mouth/Throat:      Mouth: Mucous membranes are moist.   Eyes:      General: No scleral icterus. Right eye: No discharge. Left eye: No discharge. Conjunctiva/sclera: Conjunctivae normal.   Cardiovascular:      Rate and Rhythm: Normal rate and regular rhythm. Pulses: Normal pulses. Heart sounds: Murmur heard. Comments: Diastolic murmur heard at apex, left of lower sternal border, and aortic region  Pulmonary:      Breath sounds: Normal breath sounds. Comments: Bilateral equal, vesicular  Abdominal:      Palpations: Abdomen is soft.    Musculoskeletal:      Comments: Contractures in lower extremities--including toes, knee, ankle   Skin:     General: Skin is warm. Neurological:      Comments:  On fentanyl drip, opening eyes, CNII intact, tracking           Access     site day    Art line   L Radial 1   TLC R IJ 1   PICC None    Hemoaccess None    Gastrointestinal - gastrostomy tube LUQ    Genitourinal - lu-temperature probe  1       Mechanical Ventilation:  ETT day 2   Mode: A/C 12/300/5/40%    ABG:     Lab Results   Component Value Date    PH 7.472 06/06/2022    PCO2 26.5 06/06/2022    PO2 145.9 06/06/2022    HCO3 18.9 06/06/2022    BE -3.1 06/06/2022    THB 13.8 06/06/2022    O2SAT 99.0 06/06/2022        Medications     Infusions: (Fluid, Sedation, Vasopressors)    Vasopressors   Levophed at rate 6 mcg/min  Sedation   Fentanyl at 75    Nutrition:   NPO    ATB:   Antibiotics  Days   Vanc-- d/c today                Labs     CBC with Differential:    Lab Results   Component Value Date    WBC 6.2 06/06/2022    RBC 3.91 06/06/2022    HGB 11.9 06/06/2022    HCT 36.0 06/06/2022     06/06/2022    MCV 92.1 06/06/2022    MCH 30.4 06/06/2022    MCHC 33.1 06/06/2022    RDW 15.1 06/06/2022    SEGSPCT 30 05/15/2012    LYMPHOPCT 22.7 06/06/2022    MONOPCT 9.7 06/06/2022    EOSPCT 3.8 12/13/2018    BASOPCT 1.1 06/06/2022    MONOSABS 0.60 06/06/2022    LYMPHSABS 1.41 06/06/2022    EOSABS 0.33 06/06/2022    BASOSABS 0.07 06/06/2022     CMP:    Lab Results   Component Value Date     06/06/2022    K 3.0 06/06/2022     06/06/2022    CO2 17 06/06/2022    BUN 8 06/06/2022    CREATININE 0.6 06/06/2022    GFRAA >60 06/06/2022    LABGLOM >60 06/06/2022    GLUCOSE 155 06/06/2022    GLUCOSE 96 05/15/2012    PROT 5.9 06/06/2022    LABALBU 2.6 06/06/2022    LABALBU 2.8 05/13/2012    CALCIUM 7.9 06/06/2022    BILITOT 0.7 06/06/2022    ALKPHOS 90 06/06/2022    AST 19 06/06/2022    ALT 25 06/06/2022     BMP:    Lab Results   Component Value Date     06/06/2022    K 3.0 06/06/2022     06/06/2022    CO2 17 06/06/2022    BUN 8 06/06/2022    LABALBU 2.6 06/06/2022    LABALBU 2.8 05/13/2012    CREATININE 0.6 06/06/2022    CALCIUM 7.9 06/06/2022    GFRAA >60 06/06/2022    LABGLOM >60 06/06/2022    GLUCOSE 155 06/06/2022    GLUCOSE 96 05/15/2012       Imaging Studies:  CXR: 06/06/22:    Impression   1. There are no findings of failure or pneumonia   2. Satisfactory position of the endotracheal tube and right internal jugular   central venous catheter           Resident's Assessment and Plan     Melanie Early is a 66 yo female with PMH of Down's syndrome, alzheimer's on Donepezil, TBI/SDH (02/2019) s/p evacuation (04/2019), hydrocephalus s/p  shunt (11/2019), seizure disorder on Keppra, postural hypotension, vasodepressor syncope, B/L DVT s/p IVC filter (03/2019), hypothyroidism, duodenal atresia s/p repair, and dysphagia s/p PEG tube placement, presents with shortness of breath and hypoxia from facility. ICU day 2  Hospital day 2    Assessment:    Acute issues:  1. Acute hypoxic respiratory failure 2/2 ? Aspiration pneumonia  2. Shock, likely hypovolemic vs distributive vs combination, r/o adrenal insufficiency  3. ?Adrenal insufficiency, in adrenal crisis, 0945AM Cortisol: 2.86    Chronic issues:  4. H/O Hypothyroidism on levothyroxine and liothyronine  5. H/O Down syndrome  6. H/O alzheimer's disease, on Donepezil  7. 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 H/O Communicating hydrocephalus s/p  shunt (11/2019)  9. H/O seizure disorder on Keppra  10. H/O B/L DVT s/p IVC filer placement    Plan:  · Patient's clinical picture does not fit well with septic shock coming from PNA; CXR appears quite clear, no leukocytosis, CRP/pro-calcitonin: negative, blood cultures: no growth, resp cultures: no organisms.  However, cortisol drawn at 09.45am: 2.86, hence working towards adrenal insufficiency/adrenal crisis  · Currently off antibiotics  · NICOM: fluid responsive: gave 1L, will repeat NICOM and give more fluids if needed. Weaning down pressors as able  · Continue mechanical ventilation, PEEP down to 5, RR down to 12, TV kept at 300  · Adrenal insufficiency work-up: will start on decadron 4mg Q12 -- treatment dose; f/u cosyntropin test to be done tomorrow morning. Based on cortisol response at 30 and 60 minute, subnormal response confirms diagnosis. Also f/u ACTH. Low ACTH+ cortisol: secondary/tertiary; low cortisol+high ACTH: primary. · TSH: 10.33 (high), free T4: 1.2; total T3: 67.53-- continue same dose of levothyroxine at 88ug/day and liothyronine 5ug/day  · Per prior notes from [facility], it appears that the patient has had a steep decline in cognitive functions in the past few months, for which extensive work-up did not result in discovery of any reversible cause. Due to her dx of Down's syndrome and trisomy 21, she is at high risk for alzheimer's disease (and earlier onset). Her decline of mental functions have been ascertained to be due to progressive alzheimer's disease. · Follow daily CBC, less likely there is an acute drop, but held lovenox today. Patient has h/o DVT and has IVC filter, hence re-assess tomorrow for lovenox to be restarted    DVT Px: Lovenox  GI Px: Protonix       Nely Myers MD, PGY-2  Attending physician: Dr. Andres Sherman  Department of Pulmonary, Critical Care and Sleep Medicine  [address] [facility]  Department of Internal Medicine      During multidisciplinary team rounds Bobby Jane See is a 65 y.o. female was seen, examined and discussed. This is confirmation that I have personally seen and examined the patient and that the key elements of the encounter were performed by me (> 85 % time). The medications & laboratory data was discussed and adjusted where necessary. The radiographic images were reviewed or with radiologist or consultant if felt dis-concordant with the exam or history.  The above findings were corroborated, plans confirmed and changes made if needed. Family is updated at the bedside as available. Key issues of the case were discussed among consultants. Critical Care time is documented if appropriate.       Manny Mancilla DO, FACP, FCCP, [place],

## 2022-06-07 ENCOUNTER — APPOINTMENT (OUTPATIENT)
Dept: GENERAL RADIOLOGY | Age: 57
DRG: 870 | End: 2022-06-07
Payer: MEDICARE

## 2022-06-07 LAB
AADO2: ABNORMAL MMHG
ALBUMIN SERPL-MCNC: 2.7 G/DL (ref 3.5–5.2)
ALP BLD-CCNC: 97 U/L (ref 35–104)
ALT SERPL-CCNC: 23 U/L (ref 0–32)
ANION GAP SERPL CALCULATED.3IONS-SCNC: 10 MMOL/L (ref 7–16)
AST SERPL-CCNC: 19 U/L (ref 0–31)
B.E.: -2.8 MMOL/L (ref -3–3)
BASOPHILS ABSOLUTE: 0.01 E9/L (ref 0–0.2)
BASOPHILS RELATIVE PERCENT: 0.2 % (ref 0–2)
BILIRUB SERPL-MCNC: 0.3 MG/DL (ref 0–1.2)
BUN BLDV-MCNC: 5 MG/DL (ref 6–20)
CALCIUM SERPL-MCNC: 8.5 MG/DL (ref 8.6–10.2)
CHLORIDE BLD-SCNC: 113 MMOL/L (ref 98–107)
CO2: 19 MMOL/L (ref 22–29)
COHB: 0.4 % (ref 0–1.5)
CORTISOL TOTAL: 0.34 MCG/DL (ref 2.68–18.4)
CORTISOL TOTAL: 10.82 MCG/DL (ref 2.68–18.4)
CORTISOL TOTAL: 7.69 MCG/DL (ref 2.68–18.4)
CREAT SERPL-MCNC: 0.6 MG/DL (ref 0.5–1)
CRITICAL: ABNORMAL
CULTURE, RESPIRATORY: NORMAL
DATE ANALYZED: ABNORMAL
DATE OF COLLECTION: ABNORMAL
EOSINOPHILS ABSOLUTE: 0 E9/L (ref 0.05–0.5)
EOSINOPHILS RELATIVE PERCENT: 0 % (ref 0–6)
FIO2: 30 %
GFR AFRICAN AMERICAN: >60
GFR NON-AFRICAN AMERICAN: >60 ML/MIN/1.73
GLUCOSE BLD-MCNC: 147 MG/DL (ref 74–99)
HCO3: 21.4 MMOL/L (ref 22–26)
HCT VFR BLD CALC: 37.2 % (ref 34–48)
HEMOGLOBIN: 11.9 G/DL (ref 11.5–15.5)
HHB: 1.1 % (ref 0–5)
IMMATURE GRANULOCYTES #: 0.03 E9/L
IMMATURE GRANULOCYTES %: 0.6 % (ref 0–5)
LAB: ABNORMAL
LACTIC ACID: 1.3 MMOL/L (ref 0.5–2.2)
LYMPHOCYTES ABSOLUTE: 0.82 E9/L (ref 1.5–4)
LYMPHOCYTES RELATIVE PERCENT: 16.4 % (ref 20–42)
Lab: ABNORMAL
MCH RBC QN AUTO: 30.4 PG (ref 26–35)
MCHC RBC AUTO-ENTMCNC: 32 % (ref 32–34.5)
MCV RBC AUTO: 95.1 FL (ref 80–99.9)
METHB: 0.4 % (ref 0–1.5)
MODE: AC
MONOCYTES ABSOLUTE: 0.1 E9/L (ref 0.1–0.95)
MONOCYTES RELATIVE PERCENT: 2 % (ref 2–12)
NEUTROPHILS ABSOLUTE: 4.04 E9/L (ref 1.8–7.3)
NEUTROPHILS RELATIVE PERCENT: 80.8 % (ref 43–80)
O2 SATURATION: 99.1 % (ref 92–98.5)
O2HB: 98.1 % (ref 94–97)
OPERATOR ID: 1721
ORGANISM: ABNORMAL
PATIENT TEMP: 37 C
PCO2: 35.3 MMHG (ref 35–45)
PDW BLD-RTO: 15.5 FL (ref 11.5–15)
PEEP/CPAP: 5 CMH2O
PFO2: 5.51 MMHG/%
PH BLOOD GAS: 7.4 (ref 7.35–7.45)
PLATELET # BLD: 270 E9/L (ref 130–450)
PMV BLD AUTO: 10 FL (ref 7–12)
PO2: 165.2 MMHG (ref 75–100)
POTASSIUM REFLEX MAGNESIUM: 4.4 MMOL/L (ref 3.5–5)
RBC # BLD: 3.91 E12/L (ref 3.5–5.5)
RR MECHANICAL: 12 B/MIN
SMEAR, RESPIRATORY: NORMAL
SODIUM BLD-SCNC: 142 MMOL/L (ref 132–146)
SOURCE, BLOOD GAS: ABNORMAL
THB: 12.8 G/DL (ref 11.5–16.5)
TIME ANALYZED: 540
TOTAL PROTEIN: 6.5 G/DL (ref 6.4–8.3)
VT MECHANICAL: 300 ML
WBC # BLD: 5 E9/L (ref 4.5–11.5)

## 2022-06-07 PROCEDURE — 6360000002 HC RX W HCPCS: Performed by: INTERNAL MEDICINE

## 2022-06-07 PROCEDURE — 82533 TOTAL CORTISOL: CPT

## 2022-06-07 PROCEDURE — 94640 AIRWAY INHALATION TREATMENT: CPT

## 2022-06-07 PROCEDURE — 99233 SBSQ HOSP IP/OBS HIGH 50: CPT | Performed by: PHYSICIAN ASSISTANT

## 2022-06-07 PROCEDURE — 82805 BLOOD GASES W/O2 SATURATION: CPT

## 2022-06-07 PROCEDURE — C9113 INJ PANTOPRAZOLE SODIUM, VIA: HCPCS | Performed by: INTERNAL MEDICINE

## 2022-06-07 PROCEDURE — 6370000000 HC RX 637 (ALT 250 FOR IP): Performed by: INTERNAL MEDICINE

## 2022-06-07 PROCEDURE — 99291 CRITICAL CARE FIRST HOUR: CPT | Performed by: INTERNAL MEDICINE

## 2022-06-07 PROCEDURE — A4216 STERILE WATER/SALINE, 10 ML: HCPCS | Performed by: INTERNAL MEDICINE

## 2022-06-07 PROCEDURE — 6370000000 HC RX 637 (ALT 250 FOR IP): Performed by: STUDENT IN AN ORGANIZED HEALTH CARE EDUCATION/TRAINING PROGRAM

## 2022-06-07 PROCEDURE — 83605 ASSAY OF LACTIC ACID: CPT

## 2022-06-07 PROCEDURE — 6370000000 HC RX 637 (ALT 250 FOR IP): Performed by: FAMILY MEDICINE

## 2022-06-07 PROCEDURE — 80053 COMPREHEN METABOLIC PANEL: CPT

## 2022-06-07 PROCEDURE — 36592 COLLECT BLOOD FROM PICC: CPT

## 2022-06-07 PROCEDURE — 2580000003 HC RX 258: Performed by: INTERNAL MEDICINE

## 2022-06-07 PROCEDURE — 6360000002 HC RX W HCPCS: Performed by: STUDENT IN AN ORGANIZED HEALTH CARE EDUCATION/TRAINING PROGRAM

## 2022-06-07 PROCEDURE — 2000000000 HC ICU R&B

## 2022-06-07 PROCEDURE — 71045 X-RAY EXAM CHEST 1 VIEW: CPT

## 2022-06-07 PROCEDURE — 2500000003 HC RX 250 WO HCPCS: Performed by: STUDENT IN AN ORGANIZED HEALTH CARE EDUCATION/TRAINING PROGRAM

## 2022-06-07 PROCEDURE — 2580000003 HC RX 258: Performed by: FAMILY MEDICINE

## 2022-06-07 PROCEDURE — 94003 VENT MGMT INPAT SUBQ DAY: CPT

## 2022-06-07 PROCEDURE — 85025 COMPLETE CBC W/AUTO DIFF WBC: CPT

## 2022-06-07 RX ORDER — LEVETIRACETAM 10 MG/ML
1000 INJECTION INTRAVASCULAR EVERY 12 HOURS
Status: DISCONTINUED | OUTPATIENT
Start: 2022-06-07 | End: 2022-06-17

## 2022-06-07 RX ORDER — LEVETIRACETAM 500 MG/1
1000 TABLET ORAL EVERY 12 HOURS
Status: DISCONTINUED | OUTPATIENT
Start: 2022-06-07 | End: 2022-06-17

## 2022-06-07 RX ORDER — ENOXAPARIN SODIUM 100 MG/ML
40 INJECTION SUBCUTANEOUS DAILY
Status: DISCONTINUED | OUTPATIENT
Start: 2022-06-07 | End: 2022-06-17

## 2022-06-07 RX ADMIN — Medication 50 MCG: at 21:40

## 2022-06-07 RX ADMIN — DONEPEZIL HYDROCHLORIDE 5 MG: 5 TABLET, FILM COATED ORAL at 20:53

## 2022-06-07 RX ADMIN — MUPIROCIN: 20 OINTMENT TOPICAL at 20:54

## 2022-06-07 RX ADMIN — IPRATROPIUM BROMIDE AND ALBUTEROL SULFATE 3 ML: .5; 2.5 SOLUTION RESPIRATORY (INHALATION) at 07:59

## 2022-06-07 RX ADMIN — SODIUM CHLORIDE, PRESERVATIVE FREE 10 ML: 5 INJECTION INTRAVENOUS at 20:54

## 2022-06-07 RX ADMIN — SODIUM CHLORIDE, PRESERVATIVE FREE 40 MG: 5 INJECTION INTRAVENOUS at 08:58

## 2022-06-07 RX ADMIN — Medication 2000 UNITS: at 08:58

## 2022-06-07 RX ADMIN — CHLORHEXIDINE GLUCONATE 15 ML: 1.2 RINSE ORAL at 20:54

## 2022-06-07 RX ADMIN — IPRATROPIUM BROMIDE AND ALBUTEROL SULFATE 3 ML: .5; 2.5 SOLUTION RESPIRATORY (INHALATION) at 13:46

## 2022-06-07 RX ADMIN — BUDESONIDE 500 MCG: 0.5 SUSPENSION RESPIRATORY (INHALATION) at 07:59

## 2022-06-07 RX ADMIN — DEXAMETHASONE SODIUM PHOSPHATE 4 MG: 4 INJECTION, SOLUTION INTRA-ARTICULAR; INTRALESIONAL; INTRAMUSCULAR; INTRAVENOUS; SOFT TISSUE at 01:38

## 2022-06-07 RX ADMIN — Medication 100 MCG/HR: at 06:02

## 2022-06-07 RX ADMIN — Medication 100 MCG/HR: at 16:13

## 2022-06-07 RX ADMIN — LIOTHYRONINE SODIUM 5 MCG: 5 TABLET ORAL at 08:57

## 2022-06-07 RX ADMIN — CHLORHEXIDINE GLUCONATE 15 ML: 1.2 RINSE ORAL at 08:58

## 2022-06-07 RX ADMIN — IPRATROPIUM BROMIDE AND ALBUTEROL SULFATE 3 ML: .5; 2.5 SOLUTION RESPIRATORY (INHALATION) at 19:59

## 2022-06-07 RX ADMIN — SODIUM CHLORIDE, PRESERVATIVE FREE 10 ML: 5 INJECTION INTRAVENOUS at 08:59

## 2022-06-07 RX ADMIN — COSYNTROPIN 250 MCG: 0.25 INJECTION, POWDER, LYOPHILIZED, FOR SOLUTION INTRAMUSCULAR; INTRAVENOUS at 09:16

## 2022-06-07 RX ADMIN — RISPERIDONE 0.25 MG: 0.25 TABLET ORAL at 08:58

## 2022-06-07 RX ADMIN — ENOXAPARIN SODIUM 40 MG: 100 INJECTION SUBCUTANEOUS at 14:24

## 2022-06-07 RX ADMIN — LEVETIRACETAM 500 MG: 500 TABLET, FILM COATED ORAL at 08:57

## 2022-06-07 RX ADMIN — MUPIROCIN: 20 OINTMENT TOPICAL at 14:24

## 2022-06-07 RX ADMIN — LEVOTHYROXINE SODIUM 88 MCG: 0.09 TABLET ORAL at 05:57

## 2022-06-07 RX ADMIN — DEXAMETHASONE SODIUM PHOSPHATE 4 MG: 4 INJECTION, SOLUTION INTRA-ARTICULAR; INTRALESIONAL; INTRAMUSCULAR; INTRAVENOUS; SOFT TISSUE at 14:24

## 2022-06-07 RX ADMIN — BUDESONIDE 500 MCG: 0.5 SUSPENSION RESPIRATORY (INHALATION) at 19:59

## 2022-06-07 RX ADMIN — RISPERIDONE 0.25 MG: 0.25 TABLET ORAL at 20:53

## 2022-06-07 RX ADMIN — SODIUM CHLORIDE, PRESERVATIVE FREE 40 MG: 5 INJECTION INTRAVENOUS at 20:53

## 2022-06-07 RX ADMIN — IPRATROPIUM BROMIDE AND ALBUTEROL SULFATE 3 ML: .5; 2.5 SOLUTION RESPIRATORY (INHALATION) at 04:12

## 2022-06-07 ASSESSMENT — PULMONARY FUNCTION TESTS
PIF_VALUE: 17
PIF_VALUE: 16
PIF_VALUE: 16
PIF_VALUE: 18
PIF_VALUE: 15
PIF_VALUE: 16
PIF_VALUE: 19
PIF_VALUE: 17
PIF_VALUE: 18
PIF_VALUE: 17
PIF_VALUE: 15
PIF_VALUE: 18
PIF_VALUE: 15
PIF_VALUE: 19
PIF_VALUE: 16
PIF_VALUE: 12
PIF_VALUE: 19

## 2022-06-07 ASSESSMENT — PAIN SCALES - GENERAL
PAINLEVEL_OUTOF10: 0
PAINLEVEL_OUTOF10: 7
PAINLEVEL_OUTOF10: 0

## 2022-06-07 ASSESSMENT — PAIN SCALES - WONG BAKER
WONGBAKER_NUMERICALRESPONSE: 0

## 2022-06-07 NOTE — PROGRESS NOTES
06/07/22 0757   NICU Vent Information   Vent Type 980   Vent Mode AC/VC   Vt (Set, mL) 300 mL   Vt Exhaled 289 mL   Resp Rate (Set) 12 bmp   Rate Measured 12 br/min   Minute Volume 3.43 Liters   Peak Flow 50 L/min   Pressure Support 0 cmH20   FiO2  40 %   PaO2/FiO2 ratio 165   Peak Inspiratory Pressure 17 cmH2O   I:E Ratio 1:6.60   Sensitivity 3   PEEP/CPAP (cmH2O) 5   Mean Airway Pressure 6 cmH20   Plateau Pressure 13 [id number]   Static Compliance 39 mL/cmH2O   Additional Respiratory Assessments   Heart Rate 55   Resp 12   SpO2 100 %   Position Semi-Vera's   Humidification Source Heated wire   Humidification Temp 37   Cuff Pressure (cm H2O) 29 cm H2O   Vent Alarm Settings   High Pressure  50 cmH2O

## 2022-06-07 NOTE — PROGRESS NOTES
[address] [facility]  Department of Internal Medicine   Internal Medicine Residency   MICU Progress Note    Patient:  Oma Loyd See 65 y.o. female  MRN: [id number]     Date of Service: 6/7/2022    Allergy: Patient has no known allergies. Subjective     Examined the patient by the bedside. She remains intubated, opening eyes, following some commands. Overnight, patient received 1 L of bolus Levophed down to 6, trended up to 6. This morning patient was back to 6 of Levophed. Blood glucose has been normal.  Patient has been off Lactulose. EEG has been done but not radiate. Patient remained on Keppra 1 g daily. This morning patient was more awake, was following commands. Patient breathing with minimal vent setting. Electrolytes has been normal.  Patient still on prednisone 4 mg IV twice daily. During morning round, NICOM showed cardiac index of 1.2. Concern for cardiogenic shock. Objective     VS: BP 99/68   Pulse 52   Temp 99.1 °F (37.3 °C)   Resp 15   Ht 4' 10\" (1.473 m)   Wt 125 lb (56.7 kg)   SpO2 100%   BMI 26.13 kg/m²   ABP (Arterial line BP): 102/82  ABP Mean (Arterial Line Mean): 92 mmHg    I & O - 24hr:     Intake/Output Summary (Last 24 hours) at 6/7/2022 1358  Last data filed at 6/7/2022 0600  Gross per 24 hour   Intake 1043.07 ml   Output 3050 ml   Net -2006.93 ml       Physical Exam:  Physical Exam  Constitutional:       General: She is not in acute distress. Appearance: Normal appearance. She is not toxic-appearing or diaphoretic. HENT:      Head: Normocephalic. Mouth/Throat:      Mouth: Mucous membranes are moist.   Eyes:      General: No scleral icterus. Right eye: No discharge. Left eye: No discharge. Conjunctiva/sclera: Conjunctivae normal.   Cardiovascular:      Rate and Rhythm: Normal rate and regular rhythm. Pulses: Normal pulses. Heart sounds: Murmur heard.         Comments: Diastolic murmur heard at apex, left of lower sternal border, and aortic region  Pulmonary:      Breath sounds: Normal breath sounds. Comments: Bilateral equal, vesicular  Abdominal:      Palpations: Abdomen is soft. Musculoskeletal:      Comments: Contractures in lower extremities--including toes, knee, ankle   Skin:     General: Skin is warm. Neurological:      Comments:  On fentanyl drip, opening eyes, CNII intact, tracking           Access     site day    Art line   L Radial 1   TLC R IJ 1   PICC None    Hemoaccess None    Gastrointestinal - gastrostomy tube LUQ    Genitourinal - lu-temperature probe  1       Mechanical Ventilation:  ETT day 2   Mode: A/C 12/300/5/40%    ABG:     Lab Results   Component Value Date    PH 7.400 06/07/2022    PCO2 35.3 06/07/2022    PO2 165.2 06/07/2022    HCO3 21.4 06/07/2022    BE -2.8 06/07/2022    THB 12.8 06/07/2022    O2SAT 99.1 06/07/2022        Medications     Infusions: (Fluid, Sedation, Vasopressors)    Vasopressors   Levophed at rate 6 mcg/min  Sedation   Fentanyl at 75    Nutrition:   NPO    ATB:   Antibiotics  Days   Vanc-- d/c today                Labs     CBC with Differential:    Lab Results   Component Value Date    WBC 5.0 06/07/2022    RBC 3.91 06/07/2022    HGB 11.9 06/07/2022    HCT 37.2 06/07/2022     06/07/2022    MCV 95.1 06/07/2022    MCH 30.4 06/07/2022    MCHC 32.0 06/07/2022    RDW 15.5 06/07/2022    SEGSPCT 30 05/15/2012    LYMPHOPCT 16.4 06/07/2022    MONOPCT 2.0 06/07/2022    EOSPCT 3.8 12/13/2018    BASOPCT 0.2 06/07/2022    MONOSABS 0.10 06/07/2022    LYMPHSABS 0.82 06/07/2022    EOSABS 0.00 06/07/2022    BASOSABS 0.01 06/07/2022     CMP:    Lab Results   Component Value Date     06/07/2022    K 4.4 06/07/2022     06/07/2022    CO2 19 06/07/2022    BUN 5 06/07/2022    CREATININE 0.6 06/07/2022    GFRAA >60 06/07/2022    LABGLOM >60 06/07/2022    GLUCOSE 147 06/07/2022    GLUCOSE 96 05/15/2012    PROT 6.5 06/07/2022    LABALBU 2.7 06/07/2022    LABALBU 2.8 05/13/2012 CALCIUM 8.5 06/07/2022    BILITOT 0.3 06/07/2022    ALKPHOS 97 06/07/2022    AST 19 06/07/2022    ALT 23 06/07/2022     BMP:    Lab Results   Component Value Date     06/07/2022    K 4.4 06/07/2022     06/07/2022    CO2 19 06/07/2022    BUN 5 06/07/2022    LABALBU 2.7 06/07/2022    LABALBU 2.8 05/13/2012    CREATININE 0.6 06/07/2022    CALCIUM 8.5 06/07/2022    GFRAA >60 06/07/2022    LABGLOM >60 06/07/2022    GLUCOSE 147 06/07/2022    GLUCOSE 96 05/15/2012       Imaging Studies:  CXR: 06/06/22:    Impression   1. There are no findings of failure or pneumonia   2. Satisfactory position of the endotracheal tube and right internal jugular   central venous catheter           Resident's Assessment and Plan     Izzy Dates A. See is a 66 yo female with PMH of Down's syndrome, alzheimer's on Donepezil, TBI/SDH (02/2019) s/p evacuation (04/2019), hydrocephalus s/p  shunt (11/2019), seizure disorder on Keppra, postural hypotension, vasodepressor syncope, B/L DVT s/p IVC filter (03/2019), hypothyroidism, duodenal atresia s/p repair, and dysphagia s/p PEG tube placement, presents with shortness of breath and hypoxia from facility. ICU day 2  Hospital day 2    Assessment:    1. Acute hypoxic respiratory failure 2/2 ? Aspiration pneumonia (resolving)  2. Shock, likely hypovolemic vs distributive vs cardiogenic r/o adrenal insufficiency   3. ?Adrenal insufficiency, in adrenal crisis, 0945AM Cortisol: 2.86  4. H/O Hypothyroidism on levothyroxine and liothyronine;TSH: 10.33 (high), free T4: 1.2; total T3: 67.53  5. H/O Down syndrome  6. H/O alzheimer's disease, on Donepezil  7. 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 H/O Communicating hydrocephalus s/p  shunt (11/2019)  9. H/O seizure disorder on Keppra  10. H/O B/L DVT s/p IVC filer placement    Plan:  · Currently off antibiotics  · NICOM showed cardiac index 1.2, EKG, troponin, stat echo ordered need to follow. (Concern for cardiogenic shock.   · Wean off sedation if patient tolerates wean off mechanical ventilation. · Adrenal insufficiency work-up: will start on decadron 4mg Q12 -- treatment dose; f/u cosyntropin test to be done tomorrow morning. Based on cortisol response at 30 and 60 minute, subnormal response confirms diagnosis. · Continue same dose of levothyroxine at 88ug/day and liothyronine 5ug/day. · Follow endocrine recommendation. · Continue donepezil for Alzheimer's  · Hemoglobin stable resumed Lovenox. · Follow EEG. Continue Keppra. · Neurology on board. Follow recommendation. · Per prior notes from [facility], it appears that the patient has had a steep decline in cognitive functions in the past few months, for which extensive work-up did not result in discovery of any reversible cause. Due to her dx of Down's syndrome and trisomy 21, she is at high risk for alzheimer's disease (and earlier onset). Her decline of mental functions have been ascertained to be due to progressive alzheimer's disease. DVT Px: Lovenox  GI Px: Protonix       Edith Villanueva MD, PGY-1  Attending physician: Dr. Charisse Lawrence  Department of Pulmonary, Critical Care and Sleep Medicine  [address] [facility]  Department of Internal Medicine      During multidisciplinary team rounds Amara Worthnigton See is a 65 y.o. female was seen, examined and discussed. This is confirmation that I have personally seen and examined the patient and that the key elements of the encounter were performed by me (> 85 % time). The medications & laboratory data was discussed and adjusted where necessary. The radiographic images were reviewed or with radiologist or consultant if felt dis-concordant with the exam or history. The above findings were corroborated, plans confirmed and changes made if needed. Family is updated at the bedside as available. Key issues of the case were discussed among consultants.   Critical Care time is documented if appropriate.       Elizabeth Thurston DO, FACP, FCCP, [facility],

## 2022-06-07 NOTE — PLAN OF CARE
Patient has Lactulose on board, as it is listed as a home medication, started during in-patient management during the last admission. The patient is currently having multiple loose stools, and excoriation of perianal skin. Hence the requirement for Lactulose was questioned. Patient had 1 time ammonia of 148 on 05/09/22 (last admission 05/04/22-05/15/22), was started on lactulose since then, sent home with 30 days of lactulose supply, supposed to end on 06/13/22. Ammonia has been normal since then. LFTs are WNL, INR: 1.0, CT abdomen/pelvis is unremarkable for liver. No history of hepatic encephalopathy, and is currently not in the differential for her altered mentation. Currently the patient is intubated, on fentanyl drip, last ammonia: 24.0. Will hold Lactulose.

## 2022-06-07 NOTE — PROGRESS NOTES
Comprehensive Nutrition Assessment    Type and Reason for Visit:  Initial,Positive Nutrition Screen (New TF)    Nutrition Recommendations/Plan:     Continue NPO, Modify Tube Feeding Rec Non-fiber containing formula d/t hypotension on pressor. Osmolite 1.2 @ 45 ml/hr. Will provide: 1080 ml tv, 1296 kcals, 60 gm pro, 886 ml free water  Regimen meets 100% est calorie & protein needs         Malnutrition Assessment:  Malnutrition Status:  Insufficient data (06/07/22 1401)    Context:  Acute Illness     Findings of the 6 clinical characteristics of malnutrition:  Energy Intake:  Mild decrease in energy intake (MARYCHUY PTA tube feeding intakes)  Weight Loss:  No significant weight loss (per EMR review of [facility] visits)     Body Fat Loss:  No significant body fat loss     Muscle Mass Loss:  No significant muscle mass loss    Fluid Accumulation:  No significant fluid accumulation     Strength:  Not Performed    Nutrition Assessment:    Pt admit w/ resp failure 2/2 aspiration PNA. Noted seizure activity. PMHx Down's syndrome, SDH s/p craniotomy 2019, hydrocephalus s/p  shunt, & Alzheimer's dementia. Noted multiple wounds. Pt nonverbal at baseline w/ PEG in place. Plans to start EN support, will provide TF recs & monitor.     Nutrition Related Findings:    Pt intubated/sedated, hypotension on pressor x 1, -I/O's, +1 gen edema, hypoactive BS, PEG clamped Wound Type: Multiple,Wound Consult Pending (heels, coccyx, sacrum)       Current Nutrition Intake & Therapies:    Average Meal Intake: NPO     Current Tube Feeding (TF) Orders:  · Feeding Route: PEG (TF not started yet)  · Formula: Standard with Fiber  · Schedule: Continuous  · Feeding Regimen: 40 ml/hr, not running  · Water Flushes: 150 ml q 4 hr= 900 ml free water  · Goal TF & Flush Orders Provides: 960 ml tv, 1440 kcals, 61 gm pro, 730 ml free water, 1630 ml total water      Anthropometric Measures:  Height: 4' 10\" (147.3 cm)  Ideal Body Weight (IBW): 90 lbs (41 kg) Current Body Weight: 125 lb (56.7 kg) (6/5 likely ICU bedscale), 138.9 % IBW. Current BMI (kg/m2): 26.1  Usual Body Weight: 130 lb 1.6 oz (59 kg) (12/8/21 measured wt from [facility] encounter)                    BMI Categories: Overweight (BMI 25.0-29. 9)    Estimated Daily Nutrient Needs:  Energy Requirements Based On: Formula  Weight Used for Energy Requirements: Current  Energy (kcal/day): PS3B 1142; 1776-3580  Weight Used for Protein Requirements: Ideal  Protein (g/day): 1.2-1.4 g/kg IBW; 50-60  Fluid (ml/day): per critical care    Nutrition Diagnosis:   · Inadequate oral intake related to impaired respiratory function as evidenced by NPO or clear liquid status due to medical condition,intubation,nutrition support - enteral nutrition      Nutrition Interventions:   Nutrition Education/Counseling: Education not appropriate  Coordination of Nutrition Care: Continue to monitor while inpatient       Goals:     Goals: Initiate nutrition support,Tolerate nutrition support at goal rate       Nutrition Monitoring and Evaluation:      Food/Nutrient Intake Outcomes: Diet Advancement/Tolerance,Enteral Nutrition Intake/Tolerance  Physical Signs/Symptoms Outcomes: Biochemical Data,Nutrition Focused Physical Findings,Skin,Weight,GI Status,Fluid Status or Edema,Hemodynamic Status    Discharge Planning:     Too soon to determine     Genevieve Posada RD, LD  Contact: Ext [phone]

## 2022-06-07 NOTE — PROGRESS NOTES
Patient did not tolerated ps trial, she never took a breath on her own.  Placed back in ac.     06/07/22 1348   NICU Vent Information   Vent Type 980   Vent Mode AC/VC   Vt (Set, mL) 300 mL   Vt Exhaled 300 mL   Resp Rate (Set) 12 bmp   Rate Measured 12 br/min   Minute Volume 3.54 Liters   Peak Flow 50 L/min   Pressure Support 0 cmH20   FiO2  40 %   Peak Inspiratory Pressure 16 cmH2O   I:E Ratio 1:6.60   Sensitivity 3   PEEP/CPAP (cmH2O) 5   Mean Airway Pressure 6 cmH20   Additional Respiratory Assessments   Heart Rate 52   Resp 15   SpO2 100 %   Position Semi-Vera's   Humidification Source Heated wire   Humidification Temp 37   Cuff Pressure (cm H2O) 29 cm H2O   Vent Alarm Settings   High Pressure  50 cmH2O   Low Exhaled Vt  0 mL

## 2022-06-07 NOTE — PLAN OF CARE
Problem: Discharge Planning  Goal: Discharge to home or other facility with appropriate resources  Recent Flowsheet Documentation  Taken 6/7/2022 0000 by Sparkle Smith RN  Discharge to home or other facility with appropriate resources: Identify barriers to discharge with patient and caregiver     Problem: Pain  Goal: Verbalizes/displays adequate comfort level or baseline comfort level  Outcome: Progressing  Flowsheets (Taken 6/7/2022 0000)  Verbalizes/displays adequate comfort level or baseline comfort level: Assess pain using appropriate pain scale     Problem: Skin/Tissue Integrity  Goal: Absence of new skin breakdown  Description: 1. Monitor for areas of redness and/or skin breakdown  2. Assess vascular access sites hourly  3. Every 4-6 hours minimum:  Change oxygen saturation probe site  4. Every 4-6 hours:  If on nasal continuous positive airway pressure, respiratory therapy assess nares and determine need for appliance change or resting period.   Outcome: Progressing     Problem: Safety - Adult  Goal: Free from fall injury  6/6/2022 2121 by Klaudia Goldman RN  Outcome: Progressing  Flowsheets (Taken 6/6/2022 2120)  Free From Fall Injury: Instruct family/caregiver on patient safety

## 2022-06-07 NOTE — PROGRESS NOTES
Kam Early is a 65 y.o. female     Neurology following for possible seizures     PMH: Down's syndrome, vasodepressor syncope, SDH s/p craniotomy 2019, hydrocephalus s/p  shunt, Alzheimer's dementia, hypothyroidism    Presented with SOB and was noted to have O2 sats 70% on room air. Concern for aspiration pna. She was subsequently intubated for airway protection. On Abx. Also felt to have possible adrenal insufficiency/adrenal crisis. She was noted to have twitching of the L arm and concern for seizure activity. She was placed on Keppra in May 22 due to EEG on 5/6 showing increased potential for seizures from the L frontal region. Repeat EEG this admission shows epileptic focus in the R frontal region- no seizures recorded. At baseline, she is non verbal and has had significant cognitive decline over recent months. Hx of b/l SDH- last seen by NSGY on 5/31- no intervention required. Prior to Visit Medications    Medication Sig Taking? Authorizing Provider   amoxicillin-clavulanate (AUGMENTIN ES-600) 600-42.9 MG/5ML suspension Take 7.3 mLs by mouth 2 times daily for 7 days  Estefani Candelaria MD   Multiple Vitamins-Minerals (CENTRUM/CERTA-CINDY WITH MINERALS ORAL) solution 5 mLs by Per G Tube route daily  Historical Provider, MD   levETIRAcetam (KEPPRA) 500 MG tablet Take 1 tablet by mouth 2 times daily  Jose Wesley MD   lactulose (CHRONULAC) 10 GM/15ML solution Take 30 mLs by mouth 3 times daily Titrate for 2-3 bowel movements per day  Jose Wesley MD   ipratropium-albuterol (DUONEB) 0.5-2.5 (3) MG/3ML SOLN nebulizer solution Inhale 3 mLs into the lungs every 4 hours as needed for Shortness of Breath  Jose Wesley MD   nystatin (MYCOSTATIN) 931388 UNIT/GM cream Apply topically 2 times daily. 30g  Sandria Lighter DO Evaristo   risperiDONE (RISPERDAL) 0.25 MG tablet Take 1 tablet by mouth 2 times daily  Sandria Lighter DO Evaristo   donepezil (ARICEPT) 5 MG tablet 5 mg by PEG Tube route nightly   Historical Provider, MD levothyroxine (SYNTHROID) 88 MCG tablet Take 1 tablet by mouth Daily  Foster Loera,    liothyronine (CYTOMEL) 5 MCG tablet Take 1 tab by mouth every morning on Pcduhi-Diizklizr-Okpqyf  Lacie Later, DO   Cholecalciferol (VITAMIN D-3 SUPER STRENGTH) 50 MCG (2000 UT) TABS Take 1 tablet by mouth daily  Foster Loera,    Lactobacillus (ACIDOPHILUS PO) Take by mouth  Historical Provider, MD       Allergies as of 06/05/2022    (No Known Allergies)       Objective:     BP 99/68   Pulse 74   Temp 99.1 °F (37.3 °C)   Resp 14   Ht 4' 10\" (1.473 m)   Wt 125 lb (56.7 kg)   SpO2 100%   BMI 26.13 kg/m²      General appearance: opens eyes to name and keeps them open during exam.  Head: Normocephalic, without obvious abnormality, atraumatic  Neck: supple, trachea midline  Lungs: On vent   Extremities:  Edema BLE  Pulses: 2+ and symmetric  Skin: no rashes or lesions    Mental Status: Opens eyes to name, nonverbal at baseline. Does not follow commands.  Attends to examiner     Cranial Nerves:  I: smell    II: visual acuity     II: visual fields Bilateral threat    II: pupils RD   III,VII: ptosis    III,IV,VI: extraocular muscles  Dolls present    V: mastication    V: facial light touch sensation     V,VII: corneal reflex  Present   VII: facial muscle function - upper     VII: facial muscle function - lower Appears symmetric around ETT   VIII: hearing Normal   IX: soft palate elevation     IX,X: gag reflex Present   XI: trapezius strength     XI: sternocleidomastoid strength    XI: neck extension strength     XII: tongue strength       Motor:  Spastic paraplegia BLE  Spastic paresis BUE     Stimulation induced myoclonus on the L during my exam     Sensory:  Grimaces to pain in all extremities     DTR:   +2 throughout     Laboratory/Radiology:     CBC with Differential:    Lab Results   Component Value Date    WBC 5.0 06/07/2022    RBC 3.91 06/07/2022    HGB 11.9 06/07/2022    HCT 37.2 06/07/2022     06/07/2022    MCV 95.1 06/07/2022    MCH 30.4 06/07/2022    MCHC 32.0 06/07/2022    RDW 15.5 06/07/2022    SEGSPCT 30 05/15/2012    LYMPHOPCT 16.4 06/07/2022    MONOPCT 2.0 06/07/2022    EOSPCT 3.8 12/13/2018    BASOPCT 0.2 06/07/2022    MONOSABS 0.10 06/07/2022    LYMPHSABS 0.82 06/07/2022    EOSABS 0.00 06/07/2022    BASOSABS 0.01 06/07/2022     BMP:    Lab Results   Component Value Date     06/07/2022    K 4.4 06/07/2022     06/07/2022    CO2 19 06/07/2022    BUN 5 06/07/2022    LABALBU 2.7 06/07/2022    LABALBU 2.8 05/13/2012    CREATININE 0.6 06/07/2022    CALCIUM 8.5 06/07/2022    GFRAA >60 06/07/2022    LABGLOM >60 06/07/2022    GLUCOSE 147 06/07/2022    GLUCOSE 96 05/15/2012     HgBA1c:    Lab Results   Component Value Date    LABA1C 5.2 03/30/2022     FLP:    Lab Results   Component Value Date    TRIG 88 03/30/2022    HDL 62 03/30/2022    LDLCALC 93 03/30/2022    LABVLDL 18 03/30/2022     EEG 6/6  Abnormal EEG polymorphic slowing suggestive of epileptiform foci in the right frontal lobe versus structural abnormality. Diffuse slowing also noted consistent with moderate encephalopathy. Clinical correlation is indicated. I personally reviewed the patient's lab and imaging studies at this time. Assessment:     Seizure like activity  With Increased potential for focal seizures found in her left frontal region that was seen on EEG on 5/6/22. Repeat EEG suggestive of epileptiform foci in the R frontal lobe- no seizures recorded      Alzheimer's dementia given significant cognitive decline and given her hx of Down syndrome, this increases her risk of earlier onset and this will preclude her to development of seizures as well.    - On Aricept 5 mg HS    Bilateral subdural hygromas seen by NSGY on 5/31 and no intervention required     Plan:     Increase Keppra to 1 G BID given EEG results     Consider baclofen for significant spasticity     Will follow     Ruben Merrill PA-C  11:33 AM  6/7/2022

## 2022-06-07 NOTE — PROGRESS NOTES
Hospitalist Progress Note      SYNOPSIS: Patient admitted on 2022 for aspiration pneumonia       SUBJECTIVE:    Patient is intubated, sedated   Records reviewed. Stable overnight. No other overnight issues reported. Temp (24hrs), Av.8 °F (37.1 °C), Min:98.2 °F (36.8 °C), Max:99.3 °F (37.4 °C)    DIET: Diet NPO  CODE: Full Code    Intake/Output Summary (Last 24 hours) at 2022 0824  Last data filed at 2022 0600  Gross per 24 hour   Intake 1325.71 ml   Output 3371 ml   Net -2045.29 ml       OBJECTIVE:    BP 99/68   Pulse 56   Temp 98.4 °F (36.9 °C) (Bladder)   Resp 12   Ht 4' 10\" (1.473 m)   Wt 125 lb (56.7 kg)   SpO2 100%   BMI 26.13 kg/m²     General appearance: No apparent distress  HEENT:  Conjunctivae/corneas clear. Down syndrome facial features   Neck: Supple. No jugular venous distention. Respiratory: Oral ETT. Clear to auscultation bilaterally  Cardiovascular: Regular rate rhythm, normal S1-S2  Abdomen: Soft, nontender, nondistended  Musculoskeletal: No clubbing, cyanosis, no bilateral lower extremity edema. Brisk capillary refill. Skin:  No rashes  on visible skin  Neurologic: sedated     ASSESSMENT:    Acute hypoxic respiratory failure requiring endotracheal intubation    Secondary to Aspiration pneumonia  Septic shock. Source pneumonia   Down syndrome  Hypothyroidism   Hx of hydrocephalus s/p  shunts  Hx of seizure   Dysphagia with PEG in situ          PLAN:    - Vent setting as per ICU team  - On fentanyl and levophed drips   - Abx. Unasyn was DC.  Now on vanco and to start zosyn   - Breathing treatment   - on keppra increased to 1 grm BID IV   - Neurology on board  - On Levothyroxine 88 mcg via PEG and liothyroxine 5 mcg     - Aricept and risperdal have been resumed   - Started on dexamethasone 4 mg IVq 12 hr o      DISPOSITION:     Medications:  REVIEWED DAILY    Infusion Medications    sodium chloride      fentaNYL 100 mcg/hr (22 0602)    norepinephrine 6 mcg/min (06/07/22 0200)     Scheduled Medications    pantoprazole (PROTONIX) 40 mg injection  40 mg IntraVENous Q12H    cosyntropin  250 mcg IntraVENous Once    dexamethasone  4 mg IntraVENous Q12H    vitamin D  2,000 Units Oral Daily    donepezil  5 mg Oral Nightly    [Held by provider] lactulose  20 g Oral TID    levETIRAcetam  500 mg Oral BID    levothyroxine  88 mcg Oral Daily    liothyronine  5 mcg Oral Daily    risperiDONE  0.25 mg Oral BID    sodium chloride flush  10 mL IntraVENous 2 times per day    [Held by provider] enoxaparin  40 mg SubCUTAneous Daily    budesonide  0.5 mg Nebulization BID    ipratropium-albuterol  3 mL Inhalation Q4H    chlorhexidine  15 mL Mouth/Throat BID     PRN Meds: sodium chloride flush, sodium chloride, promethazine **OR** ondansetron, polyethylene glycol, acetaminophen **OR** acetaminophen, fentaNYL **AND** fentaNYL    Labs:     Recent Labs     06/05/22 0155 06/06/22 0411 06/07/22 0451   WBC 7.8 6.2 5.0   HGB 16.5* 11.9 11.9   HCT 51.9* 36.0 37.2    276 270       Recent Labs     06/05/22 0155 06/06/22 0411 06/07/22  0451    137 142   K 4.7 3.0* 4.4    108* 113*   CO2 26 17* 19*   BUN 17 8 5*   CREATININE 0.6 0.6 0.6   CALCIUM 9.4 7.9* 8.5*       Recent Labs     06/05/22 0155 06/06/22 0411 06/07/22  0451   PROT 8.3 5.9* 6.5   ALKPHOS 138* 90 97   ALT 36* 25 23   AST 26 19 19   BILITOT 0.3 0.7 0.3   LIPASE 33  --   --        Recent Labs     06/05/22  0945   INR 1.0       No results for input(s): CKTOTAL, TROPONINI in the last 72 hours.     Chronic labs:    Lab Results   Component Value Date    CHOL 173 03/30/2022    TRIG 88 03/30/2022    HDL 62 03/30/2022    LDLCALC 93 03/30/2022    TSH 10.330 (H) 06/05/2022    INR 1.0 06/05/2022    LABA1C 5.2 03/30/2022       Radiology: REVIEWED DAILY    +++++++++++++++++++++++++++++++++++++++++++++++++  Gunner Campbell MD  [facility] - [facility] [address] [place] [address] - [facility]  +++++++++++++++++++++++++++++++++++++++++++++++++  NOTE: This report was transcribed using voice recognition software. Every effort was made to ensure accuracy; however, inadvertent computerized transcription errors may be present.

## 2022-06-07 NOTE — PLAN OF CARE
Problem: Respiratory - Adult  Goal: Achieves optimal ventilation and oxygenation  6/7/2022 1129 by Celso Taylor RCP  Outcome: Progressing

## 2022-06-07 NOTE — PLAN OF CARE
Problem: Pain  Goal: Verbalizes/displays adequate comfort level or baseline comfort level  6/7/2022 0954 by Deloris Rosa RN  Outcome: Not Progressing  6/7/2022 0449 by José Luis Phipps RN  Outcome: Progressing  Flowsheets (Taken 6/7/2022 0000)  Verbalizes/displays adequate comfort level or baseline comfort level: Assess pain using appropriate pain scale

## 2022-06-07 NOTE — CONSULTS
ENDOCRINOLOGY INITIAL CONSULTATION NOTE  Via Tele-Health Service     Date of Admission: 6/5/2022  Date of Service: 6/8/2022  Admitting Physician: Giacomo Costello DO   Primary Care Physician: Derek Ugalde DO  Consultant physician: Machelle Peñaloza MD     Reason for the consultation:  Evaluation for possible adrenal insufficiency     History of Present Illness:  History was obtain from medical records   Naz Early is a 65 y.o. old female with PMH of h/o Down syndrome, Hydrocephalus s/p  shunt, Chronic Subdural Hematoma, Hypothyroidism and other listed below admitted to [facility] on 6/5/2022 because of SOB, endocrine service was consulted for evaluation for possible adrenal insufficiency   The patient is a resident at a SNF and was in her usual state of health until one day before admission when she was noted to have difficulty breathing by nursing staff at the nursing home.    Up on arrival to ER, the patient was found to be hypotensive, hypoxic with O2 saturation of 70% on room air and has aspiration PNA      AM cortisol on admission 6/5/2022 was found to be 2.86   6/5/2022 09:45   CORTISOL 2.86     Cosyntropin stim test 6/7/2022 6/7/2022 09:06 6/7/2022 09:06 6/7/2022 09:06   CORTISOL 10.82 0.34 (L) 7.69     Cosyntropin stim test was done while patient on Decadron as summarized below   ministration Action Time Recorded Time   Given : 4 mg :   : IntraVENous 06/07/22 1424 06/07/22 1424   Given : 4 mg :   : IntraVENous 06/07/22 0138 06/07/22 0138   Given : 4 mg :   : IntraVENous 06/06/22 1305 06/06/22 1305       Past Medical History   Past Medical History:   Diagnosis Date    Arthritis     Down syndrome     Hypothyroidism     Osteoarthritis     Syncope     Thyroid disease     UTI (urinary tract infection)         Varicose veins     Wears glasses        Past Surgical History   Past Surgical History:   Procedure Laterality Date    ABDOMINAL ADHESION SURGERY      ABDOMINAL SURGERY      duodenal atresia    BRAIN SURGERY      CRANIOTOMY Right 4/12/2019    RIGHT CRANIOTOMY FRANCISCO JAVIER HOLES performed by Salvador Field MD at [facility] ECHO COMPL W DOP COLOR FLOW  5/14/2012         HIP SURGERY      Right    JOINT REPLACEMENT      left knee, right hip-dr Milagros Guerrero KNEE SURGERY      Right    TOE SURGERY      Right foot    TOTAL KNEE ARTHROPLASTY Left 08/11/2016    DR. De    TUBAL LIGATION      VENA CAVA FILTER PLACEMENT Bilateral 4/22/2019    VENA CAVA FILTER INSERTION performed by Amanda Burgess MD at [facility] VENTRICULOPERITONEAL SHUNT N/A 11/12/2019    RIGHT FRONTAL VENTRICULO PERITONEAL SHUNT INSERTION-----FACILITY performed by Salvador Field MD at [address] history   Tobacco:   reports that she has never smoked. She has never used smokeless tobacco.  Alcohol:   reports no history of alcohol use. Drugs:   reports no history of drug use.   Family history   Family History   Problem Relation Age of Onset    Heart Disease Maternal Grandmother     Heart Disease Maternal Grandfather     Heart Disease Paternal Aunt     Heart Disease Paternal Uncle        Allergies and Drug reactions  No Known Allergies    Scheduled Meds:   potassium chloride  40 mEq IntraVENous Once    levETIRAcetam  1,000 mg IntraVENous Q12H    Or    levETIRAcetam  1,000 mg Oral Q12H    mupirocin   Topical BID    enoxaparin  40 mg SubCUTAneous Daily    pantoprazole (PROTONIX) 40 mg injection  40 mg IntraVENous Q12H    dexamethasone  4 mg IntraVENous Q12H    vitamin D  2,000 Units Oral Daily    donepezil  5 mg Oral Nightly    [Held by provider] lactulose  20 g Oral TID    levothyroxine  88 mcg Oral Daily    risperiDONE  0.25 mg Oral BID    sodium chloride flush  10 mL IntraVENous 2 times per day    budesonide  0.5 mg Nebulization BID    ipratropium-albuterol  3 mL Inhalation Q4H    chlorhexidine  15 mL Mouth/Throat BID     PRN Meds:   perflutren lipid microspheres, 1.5 mL, ONCE PRN  sodium chloride flush, 10 mL, PRN  sodium chloride, , PRN  promethazine, 12.5 mg, Q6H PRN   Or  ondansetron, 4 mg, Q6H PRN  polyethylene glycol, 17 g, Daily PRN  acetaminophen, 650 mg, Q6H PRN   Or  acetaminophen, 650 mg, Q6H PRN  fentaNYL, 50 mcg, Q30 Min PRN      Continuous Infusions:   sodium chloride      fentaNYL 25 mcg/hr (06/08/22 0657)    norepinephrine 4 mcg/min (06/08/22 0657)       Review of Systems  All systems reviewed. All negative except for symptoms mentioned in HPI     OBJECTIVE    BP (!) 95/47   Pulse 52   Temp 97.9 °F (36.6 °C) (Bladder)   Resp 12   Ht 4' 10\" (1.473 m)   Wt 127 lb 9.6 oz (57.9 kg)   SpO2 100%   BMI 26.67 kg/m²   Wt Readings from Last 6 Encounters:   06/07/22 127 lb 9.6 oz (57.9 kg)   05/31/22 125 lb (56.7 kg)   05/04/22 130 lb (59 kg)   03/22/22 128 lb (58.1 kg)   03/19/22 128 lb 9.6 oz (58.3 kg)   12/07/21 134 lb (60.8 kg)     Physical examination:  Due to this being a TeleHealth encounter, evaluation of the following organ systems is limited: Vitals/Constitutional/EENT/Resp/CV/GI//MS/Neuro/Skin/Heme-Lymph-Imm. Modified physical exam through Telemedicine camera    Neck: no obvious neck mass. No obvious neck deformity     CVS: no distress   Chest: no distress.  Chest is moving with respiration    Extremities:  no visible tremor  Skin: No visible rashes as seen from camera   Musculoskeletal: no visible deformity    Review of Laboratory Data:  I personally reviewed the following labs:  Recent Labs     06/06/22  0411 06/07/22  0451 06/08/22  0300   WBC 6.2 5.0 4.4*   RBC 3.91 3.91 3.30*   HGB 11.9 11.9 10.0*   HCT 36.0 37.2 31.4*   MCV 92.1 95.1 95.2   MCH 30.4 30.4 30.3   MCHC 33.1 32.0 31.8*   RDW 15.1* 15.5* 15.4*    270 215   MPV 9.9 10.0 9.5     Recent Labs     06/06/22  0411 06/07/22  0451 06/08/22  0300    142 144   K 3.0* 4.4 3.5   * 113* 115*   CO2 17* 19* 22   BUN 8 5* 9   CREATININE 0.6 0.6 0.8   GLUCOSE 155* 147* 144*   CALCIUM 7.9* 8.5* 7.8*   PROT 5.9* 6.5 5.6*   LABALBU 2.6* 2.7* 2.4*   BILITOT 0.7 0.3 0.3   ALKPHOS 90 97 73   AST 19 19 15   ALT 25 23 19     No results found for: BHYDRXBUT  Lab Results   Component Value Date    LABA1C 5.2 03/30/2022     Lab Results   Component Value Date/Time    TSH 10.330 (H) 06/05/2022 01:55 AM    T4FREE 1.20 06/05/2022 09:45 AM    A0FSKVV 5.6 06/05/2022 09:45 AM    G1SFWUE 67.53 (L) 06/05/2022 09:45 AM     Lab Results   Component Value Date    LABA1C 5.2 03/30/2022    GLUCOSE 144 06/08/2022    GLUCOSE 96 05/15/2012     Lab Results   Component Value Date    TRIG 88 03/30/2022    HDL 62 03/30/2022    LDLCALC 93 03/30/2022    CHOL 173 03/30/2022       Blood culture   Lab Results   Component Value Date    BC 24 Hours no growth 06/05/2022    BC 5 Days no growth 05/31/2022    BC 5 Days no growth 05/04/2022    BC 5 Days no growth 03/19/2022    BC 5 Days- no growth 06/06/2019    BC 5 Days- no growth 04/15/2019       Radiology:  XR CHEST PORTABLE   Final Result   Slight linear subsegmental atelectasis on the left. Support tubes and catheters in good position. XR CHEST PORTABLE   Final Result   1. There are no findings of failure or pneumonia   2. Satisfactory position of the endotracheal tube and right internal jugular   central venous catheter         XR CHEST PORTABLE   Final Result   Interval placement of a right-sided IJ line, when compared to the previous   study performed earlier in the day. No pneumothorax. Previously noted left   lung opacities have resolved. The left hemidiaphragm is now in a normal   position and there has been resolution of the mediastinal shift to the left. Interval decrease in the right lower lung field atelectasis. XR ABDOMEN FOR NG/OG/NE TUBE PLACEMENT   Final Result   Nasogastric tube is in good position. XR CHEST PORTABLE   Final Result   Opacification in the  left lung, pneumonia cannot be excluded. Endotracheal   tube tip 2.9 cm above louann.   Nasogastric tube is in good position. XR CHEST PORTABLE   Final Result   No acute cardiopulmonary process. XR CHEST PORTABLE    (Results Pending)   XR CHEST PORTABLE    (Results Pending)       Medical Records/Labs/Images Review:   I personally reviewed and summarized previous records   All labs and imaging were reviewed independently    Cosyntropin stim test was done while patient on Decadron as summarized below   ministration Action Time Recorded Time   Given : 4 mg :   : IntraVENous 06/07/22 1424 06/07/22 1424   Given : 4 mg :   : IntraVENous 06/07/22 0138 06/07/22 0138   Given : 4 mg :   : IntraVENous 06/06/22 1305 06/06/22 6701 Rei MORALES See, a 65 y.o.-old female seen in for evaluation of possible adrenal insufficiency     Evaluation for possible adrenal insufficiency   · AM cortisol was low (2.8) on admission but it's not clear if pt received any steroids at nursing home   · Unfortunately, Cosyntrpin stim test from this morning is not going to help because the test was done while pt on Decadron   · With current symptoms of AI we agree with starting steroids until we get full assessment of her HPA-axis as an outpatient   · Will stop Decadron and switch to hydrocortisone 50 mg iv BID x 2 days then 25 mg iv BID until her general condition improve     Respiratory failure   · Managed by critical care service    Primary hypothyroidism   · Continue levothyroxine 88 mcg daily  · Stop Cytomel     Thank you for allowing us to participate in the care of this patient. Please do not hesitate to contact us with any additional questions. Yoni Downey MD  Endocrinologist, [facility]   [address] [facility] [address]   Phone: [phone]  Fax: [phone]  ---------------------------------  An electronic signature was used to authenticate this note.  Madhavi Zuluaga MD on 6/8/2022 at 8:52 AM

## 2022-06-08 ENCOUNTER — APPOINTMENT (OUTPATIENT)
Dept: GENERAL RADIOLOGY | Age: 57
DRG: 870 | End: 2022-06-08
Payer: MEDICARE

## 2022-06-08 LAB
AADO2: 79.9 MMHG
ADRENOCORTICOTROPIC HORMONE: <1.5 PG/ML (ref 7.2–63.3)
ALBUMIN SERPL-MCNC: 2.4 G/DL (ref 3.5–5.2)
ALP BLD-CCNC: 73 U/L (ref 35–104)
ALT SERPL-CCNC: 19 U/L (ref 0–32)
ANION GAP SERPL CALCULATED.3IONS-SCNC: 7 MMOL/L (ref 7–16)
AST SERPL-CCNC: 15 U/L (ref 0–31)
B.E.: -0.8 MMOL/L (ref -3–3)
BASOPHILIC STIPPLING: ABNORMAL
BASOPHILS ABSOLUTE: 0 E9/L (ref 0–0.2)
BASOPHILS RELATIVE PERCENT: 0 % (ref 0–2)
BILIRUB SERPL-MCNC: 0.3 MG/DL (ref 0–1.2)
BUN BLDV-MCNC: 9 MG/DL (ref 6–20)
CALCIUM SERPL-MCNC: 7.8 MG/DL (ref 8.6–10.2)
CHLORIDE BLD-SCNC: 115 MMOL/L (ref 98–107)
CO2: 22 MMOL/L (ref 22–29)
COHB: 0.3 % (ref 0–1.5)
CREAT SERPL-MCNC: 0.8 MG/DL (ref 0.5–1)
CRITICAL: ABNORMAL
DATE ANALYZED: ABNORMAL
DATE OF COLLECTION: ABNORMAL
EOSINOPHILS ABSOLUTE: 0 E9/L (ref 0.05–0.5)
EOSINOPHILS RELATIVE PERCENT: 0 % (ref 0–6)
FIO2: 40 %
GFR AFRICAN AMERICAN: >60
GFR NON-AFRICAN AMERICAN: >60 ML/MIN/1.73
GLUCOSE BLD-MCNC: 144 MG/DL (ref 74–99)
HCO3: 23.2 MMOL/L (ref 22–26)
HCT VFR BLD CALC: 31.4 % (ref 34–48)
HEMOGLOBIN: 10 G/DL (ref 11.5–15.5)
HHB: 1.2 % (ref 0–5)
IMMATURE GRANULOCYTES #: 0.05 E9/L
IMMATURE GRANULOCYTES %: 1.1 % (ref 0–5)
LAB: ABNORMAL
LV EF: 63 %
LVEF MODALITY: NORMAL
LYMPHOCYTES ABSOLUTE: 0.47 E9/L (ref 1.5–4)
LYMPHOCYTES RELATIVE PERCENT: 10.6 % (ref 20–42)
Lab: ABNORMAL
MAGNESIUM: 1.7 MG/DL (ref 1.6–2.6)
MCH RBC QN AUTO: 30.3 PG (ref 26–35)
MCHC RBC AUTO-ENTMCNC: 31.8 % (ref 32–34.5)
MCV RBC AUTO: 95.2 FL (ref 80–99.9)
METER GLUCOSE: 153 MG/DL (ref 74–99)
METHB: 0.4 % (ref 0–1.5)
MODE: AC
MONOCYTES ABSOLUTE: 0.15 E9/L (ref 0.1–0.95)
MONOCYTES RELATIVE PERCENT: 3.4 % (ref 2–12)
NEUTROPHILS ABSOLUTE: 3.77 E9/L (ref 1.8–7.3)
NEUTROPHILS RELATIVE PERCENT: 84.9 % (ref 43–80)
O2 SATURATION: 99 % (ref 92–98.5)
O2HB: 98.1 % (ref 94–97)
OPERATOR ID: ABNORMAL
PATIENT TEMP: 37 C
PCO2: 36.4 MMHG (ref 35–45)
PDW BLD-RTO: 15.4 FL (ref 11.5–15)
PEEP/CPAP: 5 CMH2O
PFO2: 3.83 MMHG/%
PH BLOOD GAS: 7.42 (ref 7.35–7.45)
PLATELET # BLD: 215 E9/L (ref 130–450)
PMV BLD AUTO: 9.5 FL (ref 7–12)
PO2: 153.4 MMHG (ref 75–100)
POLYCHROMASIA: ABNORMAL
POTASSIUM REFLEX MAGNESIUM: 3.5 MMOL/L (ref 3.5–5)
RBC # BLD: 3.3 E12/L (ref 3.5–5.5)
RI(T): 0.52
RR MECHANICAL: 12 B/MIN
SODIUM BLD-SCNC: 144 MMOL/L (ref 132–146)
SOURCE, BLOOD GAS: ABNORMAL
THB: 12.3 G/DL (ref 11.5–16.5)
TIME ANALYZED: 352
TOTAL PROTEIN: 5.6 G/DL (ref 6.4–8.3)
VT MECHANICAL: 300 ML
WBC # BLD: 4.4 E9/L (ref 4.5–11.5)

## 2022-06-08 PROCEDURE — 6360000002 HC RX W HCPCS: Performed by: STUDENT IN AN ORGANIZED HEALTH CARE EDUCATION/TRAINING PROGRAM

## 2022-06-08 PROCEDURE — 85025 COMPLETE CBC W/AUTO DIFF WBC: CPT

## 2022-06-08 PROCEDURE — 2580000003 HC RX 258: Performed by: INTERNAL MEDICINE

## 2022-06-08 PROCEDURE — 6360000002 HC RX W HCPCS: Performed by: INTERNAL MEDICINE

## 2022-06-08 PROCEDURE — 99232 SBSQ HOSP IP/OBS MODERATE 35: CPT | Performed by: PHYSICIAN ASSISTANT

## 2022-06-08 PROCEDURE — A4216 STERILE WATER/SALINE, 10 ML: HCPCS | Performed by: INTERNAL MEDICINE

## 2022-06-08 PROCEDURE — 6370000000 HC RX 637 (ALT 250 FOR IP): Performed by: INTERNAL MEDICINE

## 2022-06-08 PROCEDURE — 82805 BLOOD GASES W/O2 SATURATION: CPT

## 2022-06-08 PROCEDURE — 80053 COMPREHEN METABOLIC PANEL: CPT

## 2022-06-08 PROCEDURE — 2500000003 HC RX 250 WO HCPCS: Performed by: STUDENT IN AN ORGANIZED HEALTH CARE EDUCATION/TRAINING PROGRAM

## 2022-06-08 PROCEDURE — 71045 X-RAY EXAM CHEST 1 VIEW: CPT

## 2022-06-08 PROCEDURE — 6360000002 HC RX W HCPCS: Performed by: PHYSICIAN ASSISTANT

## 2022-06-08 PROCEDURE — 99233 SBSQ HOSP IP/OBS HIGH 50: CPT | Performed by: INTERNAL MEDICINE

## 2022-06-08 PROCEDURE — 2000000000 HC ICU R&B

## 2022-06-08 PROCEDURE — 6370000000 HC RX 637 (ALT 250 FOR IP): Performed by: FAMILY MEDICINE

## 2022-06-08 PROCEDURE — 94640 AIRWAY INHALATION TREATMENT: CPT

## 2022-06-08 PROCEDURE — 99222 1ST HOSP IP/OBS MODERATE 55: CPT | Performed by: INTERNAL MEDICINE

## 2022-06-08 PROCEDURE — 36415 COLL VENOUS BLD VENIPUNCTURE: CPT

## 2022-06-08 PROCEDURE — 94644 CONT INHLJ TX 1ST HOUR: CPT

## 2022-06-08 PROCEDURE — 93306 TTE W/DOPPLER COMPLETE: CPT

## 2022-06-08 PROCEDURE — C9113 INJ PANTOPRAZOLE SODIUM, VIA: HCPCS | Performed by: INTERNAL MEDICINE

## 2022-06-08 PROCEDURE — 83735 ASSAY OF MAGNESIUM: CPT

## 2022-06-08 PROCEDURE — 2500000003 HC RX 250 WO HCPCS: Performed by: INTERNAL MEDICINE

## 2022-06-08 PROCEDURE — 2580000003 HC RX 258: Performed by: FAMILY MEDICINE

## 2022-06-08 PROCEDURE — 94003 VENT MGMT INPAT SUBQ DAY: CPT

## 2022-06-08 PROCEDURE — 82962 GLUCOSE BLOOD TEST: CPT

## 2022-06-08 RX ORDER — POTASSIUM CHLORIDE 29.8 MG/ML
40 INJECTION INTRAVENOUS ONCE
Status: COMPLETED | OUTPATIENT
Start: 2022-06-08 | End: 2022-06-08

## 2022-06-08 RX ORDER — 0.9 % SODIUM CHLORIDE 0.9 %
1000 INTRAVENOUS SOLUTION INTRAVENOUS ONCE
Status: COMPLETED | OUTPATIENT
Start: 2022-06-08 | End: 2022-06-08

## 2022-06-08 RX ORDER — MAGNESIUM SULFATE 1 G/100ML
1000 INJECTION INTRAVENOUS ONCE
Status: COMPLETED | OUTPATIENT
Start: 2022-06-08 | End: 2022-06-08

## 2022-06-08 RX ADMIN — IPRATROPIUM BROMIDE AND ALBUTEROL SULFATE 3 ML: .5; 2.5 SOLUTION RESPIRATORY (INHALATION) at 11:27

## 2022-06-08 RX ADMIN — SODIUM CHLORIDE, PRESERVATIVE FREE 40 MG: 5 INJECTION INTRAVENOUS at 20:05

## 2022-06-08 RX ADMIN — CHLORHEXIDINE GLUCONATE 15 ML: 1.2 RINSE ORAL at 20:05

## 2022-06-08 RX ADMIN — DEXAMETHASONE SODIUM PHOSPHATE 4 MG: 4 INJECTION, SOLUTION INTRA-ARTICULAR; INTRALESIONAL; INTRAMUSCULAR; INTRAVENOUS; SOFT TISSUE at 14:59

## 2022-06-08 RX ADMIN — MAGNESIUM SULFATE 1000 MG: 1 INJECTION INTRAVENOUS at 06:17

## 2022-06-08 RX ADMIN — Medication 50 MCG/HR: at 21:12

## 2022-06-08 RX ADMIN — IPRATROPIUM BROMIDE AND ALBUTEROL SULFATE 3 ML: .5; 2.5 SOLUTION RESPIRATORY (INHALATION) at 08:16

## 2022-06-08 RX ADMIN — SODIUM CHLORIDE, PRESERVATIVE FREE 10 ML: 5 INJECTION INTRAVENOUS at 20:06

## 2022-06-08 RX ADMIN — Medication 2000 UNITS: at 09:06

## 2022-06-08 RX ADMIN — RISPERIDONE 0.25 MG: 0.25 TABLET ORAL at 09:06

## 2022-06-08 RX ADMIN — Medication 5 MCG/MIN: at 21:13

## 2022-06-08 RX ADMIN — ENOXAPARIN SODIUM 40 MG: 100 INJECTION SUBCUTANEOUS at 09:04

## 2022-06-08 RX ADMIN — IPRATROPIUM BROMIDE AND ALBUTEROL SULFATE 3 ML: .5; 2.5 SOLUTION RESPIRATORY (INHALATION) at 20:20

## 2022-06-08 RX ADMIN — DEXAMETHASONE SODIUM PHOSPHATE 4 MG: 4 INJECTION, SOLUTION INTRA-ARTICULAR; INTRALESIONAL; INTRAMUSCULAR; INTRAVENOUS; SOFT TISSUE at 00:09

## 2022-06-08 RX ADMIN — MUPIROCIN: 20 OINTMENT TOPICAL at 20:05

## 2022-06-08 RX ADMIN — LEVOTHYROXINE SODIUM 88 MCG: 0.09 TABLET ORAL at 09:07

## 2022-06-08 RX ADMIN — LEVETIRACETAM 1000 MG: 10 INJECTION, SOLUTION INTRAVENOUS at 00:07

## 2022-06-08 RX ADMIN — HYDROCORTISONE SODIUM SUCCINATE 50 MG: 100 INJECTION, POWDER, FOR SOLUTION INTRAMUSCULAR; INTRAVENOUS at 20:15

## 2022-06-08 RX ADMIN — LEVETIRACETAM 1000 MG: 10 INJECTION, SOLUTION INTRAVENOUS at 09:10

## 2022-06-08 RX ADMIN — SODIUM CHLORIDE, PRESERVATIVE FREE 40 MG: 5 INJECTION INTRAVENOUS at 09:04

## 2022-06-08 RX ADMIN — LEVETIRACETAM 1000 MG: 10 INJECTION, SOLUTION INTRAVENOUS at 23:33

## 2022-06-08 RX ADMIN — IPRATROPIUM BROMIDE AND ALBUTEROL SULFATE 3 ML: .5; 2.5 SOLUTION RESPIRATORY (INHALATION) at 04:03

## 2022-06-08 RX ADMIN — Medication 50 MCG/HR: at 03:57

## 2022-06-08 RX ADMIN — DONEPEZIL HYDROCHLORIDE 5 MG: 5 TABLET, FILM COATED ORAL at 20:05

## 2022-06-08 RX ADMIN — SODIUM CHLORIDE, PRESERVATIVE FREE 10 ML: 5 INJECTION INTRAVENOUS at 09:08

## 2022-06-08 RX ADMIN — CALCIUM GLUCONATE 1000 MG: 98 INJECTION, SOLUTION INTRAVENOUS at 09:33

## 2022-06-08 RX ADMIN — POTASSIUM CHLORIDE 40 MEQ: 29.8 INJECTION, SOLUTION INTRAVENOUS at 07:12

## 2022-06-08 RX ADMIN — CHLORHEXIDINE GLUCONATE 15 ML: 1.2 RINSE ORAL at 09:04

## 2022-06-08 RX ADMIN — SODIUM CHLORIDE 1000 ML: 9 INJECTION, SOLUTION INTRAVENOUS at 02:15

## 2022-06-08 RX ADMIN — IPRATROPIUM BROMIDE AND ALBUTEROL SULFATE 3 ML: .5; 2.5 SOLUTION RESPIRATORY (INHALATION) at 16:18

## 2022-06-08 RX ADMIN — BUDESONIDE 500 MCG: 0.5 SUSPENSION RESPIRATORY (INHALATION) at 08:16

## 2022-06-08 RX ADMIN — BUDESONIDE 500 MCG: 0.5 SUSPENSION RESPIRATORY (INHALATION) at 20:20

## 2022-06-08 RX ADMIN — RISPERIDONE 0.25 MG: 0.25 TABLET ORAL at 20:05

## 2022-06-08 RX ADMIN — MUPIROCIN: 20 OINTMENT TOPICAL at 09:05

## 2022-06-08 RX ADMIN — IPRATROPIUM BROMIDE AND ALBUTEROL SULFATE 3 ML: .5; 2.5 SOLUTION RESPIRATORY (INHALATION) at 00:49

## 2022-06-08 ASSESSMENT — PAIN SCALES - GENERAL
PAINLEVEL_OUTOF10: 0

## 2022-06-08 ASSESSMENT — PULMONARY FUNCTION TESTS
PIF_VALUE: 17
PIF_VALUE: 15
PIF_VALUE: 17
PIF_VALUE: 16
PIF_VALUE: 18
PIF_VALUE: 17
PIF_VALUE: 16
PIF_VALUE: 18
PIF_VALUE: 18
PIF_VALUE: 16
PIF_VALUE: 16
PIF_VALUE: 17
PIF_VALUE: 16
PIF_VALUE: 15
PIF_VALUE: 16
PIF_VALUE: 18

## 2022-06-08 ASSESSMENT — PAIN SCALES - WONG BAKER
WONGBAKER_NUMERICALRESPONSE: 0

## 2022-06-08 NOTE — PROGRESS NOTES
Hospitalist Progress Note      SYNOPSIS: Patient admitted on 2022 for aspiration pneumonia       SUBJECTIVE:    Patient remains intubated, sedated     Records reviewed. Stable overnight. No other overnight issues reported. Temp (24hrs), Av.6 °F (37 °C), Min:97.9 °F (36.6 °C), Max:99.5 °F (37.5 °C)    DIET: Diet NPO  ADULT TUBE FEEDING; PEG; Standard without Fiber; Continuous; 20; Yes; 10; Q 4 hours; 45; 150; Q 4 hours  CODE: Full Code    Intake/Output Summary (Last 24 hours) at 2022 0163  Last data filed at 2022 0700  Gross per 24 hour   Intake 2097.31 ml   Output 1586 ml   Net 511.31 ml       OBJECTIVE:    BP (!) 95/47   Pulse 70   Temp 97.9 °F (36.6 °C) (Bladder)   Resp 12   Ht 4' 10\" (1.473 m)   Wt 127 lb 9.6 oz (57.9 kg)   SpO2 99%   BMI 26.67 kg/m²     General appearance: No apparent distress  HEENT:  Conjunctivae/corneas clear. Down syndrome facial features   Neck: Supple. No jugular venous distention. Respiratory: Oral ETT. Clear to auscultation bilaterally  Cardiovascular: Regular rate rhythm, normal S1-S2  Abdomen: Soft, nontender, nondistended  Musculoskeletal: No clubbing, cyanosis, no bilateral lower extremity edema. Brisk capillary refill. Skin:  No rashes  on visible skin  Neurologic: sedated     ASSESSMENT:    Acute hypoxic respiratory failure requiring endotracheal intubation    Secondary to Aspiration pneumonia  Septic shock. Source pneumonia   Down syndrome  Hypothyroidism   Hx of hydrocephalus s/p  shunts  Hx of seizure   Dysphagia with PEG in situ          PLAN:    - Vent setting as per ICU team  - On fentanyl and levophed drips   - Abx. Unasyn was DC.  Now on vanco and to start zosyn   - Breathing treatment   - on keppra increased to 1 grm BID IV   - Neurology on board  - On Levothyroxine 88 mcg via PEG and liothyroxine 5 mcg     - Aricept and risperdal have been resumed   - On dexamethasone 4 mg IVq 12 hr    DISPOSITION:     Medications:  REVIEWED DAILY    Infusion Medications    sodium chloride      fentaNYL 25 mcg/hr (06/08/22 0657)    norepinephrine 4 mcg/min (06/08/22 0657)     Scheduled Medications    potassium chloride  40 mEq IntraVENous Once    levETIRAcetam  1,000 mg IntraVENous Q12H    Or    levETIRAcetam  1,000 mg Oral Q12H    mupirocin   Topical BID    enoxaparin  40 mg SubCUTAneous Daily    pantoprazole (PROTONIX) 40 mg injection  40 mg IntraVENous Q12H    dexamethasone  4 mg IntraVENous Q12H    vitamin D  2,000 Units Oral Daily    donepezil  5 mg Oral Nightly    [Held by provider] lactulose  20 g Oral TID    levothyroxine  88 mcg Oral Daily    risperiDONE  0.25 mg Oral BID    sodium chloride flush  10 mL IntraVENous 2 times per day    budesonide  0.5 mg Nebulization BID    ipratropium-albuterol  3 mL Inhalation Q4H    chlorhexidine  15 mL Mouth/Throat BID     PRN Meds: perflutren lipid microspheres, sodium chloride flush, sodium chloride, promethazine **OR** ondansetron, polyethylene glycol, acetaminophen **OR** acetaminophen, fentaNYL **AND** fentaNYL    Labs:     Recent Labs     06/06/22  0411 06/07/22  0451 06/08/22  0300   WBC 6.2 5.0 4.4*   HGB 11.9 11.9 10.0*   HCT 36.0 37.2 31.4*    270 215       Recent Labs     06/06/22  0411 06/07/22  0451 06/08/22  0300    142 144   K 3.0* 4.4 3.5   * 113* 115*   CO2 17* 19* 22   BUN 8 5* 9   CREATININE 0.6 0.6 0.8   CALCIUM 7.9* 8.5* 7.8*       Recent Labs     06/06/22  0411 06/07/22  0451 06/08/22  0300   PROT 5.9* 6.5 5.6*   ALKPHOS 90 97 73   ALT 25 23 19   AST 19 19 15   BILITOT 0.7 0.3 0.3       Recent Labs     06/05/22  0945   INR 1.0       No results for input(s): CKTOTAL, TROPONINI in the last 72 hours.     Chronic labs:    Lab Results   Component Value Date    CHOL 173 03/30/2022    TRIG 88 03/30/2022    HDL 62 03/30/2022    LDLCALC 93 03/30/2022    TSH 10.330 (H) 06/05/2022    INR 1.0 06/05/2022    LABA1C 5.2 03/30/2022       Radiology: REVIEWED DAILY    +++++++++++++++++++++++++++++++++++++++++++++++++  Evan Charlton MD  [facility] Physician - [address] [place] [address]  +++++++++++++++++++++++++++++++++++++++++++++++++  NOTE: This report was transcribed using voice recognition software. Every effort was made to ensure accuracy; however, inadvertent computerized transcription errors may be present.

## 2022-06-08 NOTE — PLAN OF CARE
Problem: Respiratory - Adult  Goal: Achieves optimal ventilation and oxygenation  6/8/2022 0235 by Lalitha Johnson RCP  Outcome: Progressing

## 2022-06-08 NOTE — PLAN OF CARE
Problem: Respiratory - Adult  Goal: Achieves optimal ventilation and oxygenation  6/8/2022 1445 by Lisy Jones RCP  Outcome: Progressing

## 2022-06-08 NOTE — PLAN OF CARE
Problem: Discharge Planning  Goal: Discharge to home or other facility with appropriate resources  Outcome: Progressing     Problem: Pain  Goal: Verbalizes/displays adequate comfort level or baseline comfort level  6/8/2022 1132 by Nikolai Mahmood RN  Outcome: Progressing  6/7/2022 2148 by Estefani Randall  Outcome: Progressing  [address] (Taken 6/7/2022 2000)  Verbalizes/displays adequate comfort level or baseline comfort level:   Encourage patient to monitor pain and request assistance   Assess pain using appropriate pain scale   Administer analgesics based on type and severity of pain and evaluate response   Implement non-pharmacological measures as appropriate and evaluate response   Consider cultural and social influences on pain and pain management   Notify Licensed Independent Practitioner if interventions unsuccessful or patient reports new pain     Problem: Respiratory - Adult  Goal: Achieves optimal ventilation and oxygenation  6/8/2022 1132 by Nikolai Mahmood RN  Outcome: Progressing  6/8/2022 0235 by Kapil Travis RCP  Outcome: Progressing  6/7/2022 2148 by Estefani Randall  Outcome: Progressing  Flowsheets (Taken 6/7/2022 2000)  Achieves optimal ventilation and oxygenation:   Assess for changes in respiratory status   Assess for changes in mentation and behavior   Position to facilitate oxygenation and minimize respiratory effort   Oxygen supplementation based on oxygen saturation or arterial blood gases   Encourage broncho-pulmonary hygiene including cough, deep breathe, incentive spirometry   Assess the need for suctioning and aspirate as needed   Assess and instruct to report shortness of breath or any respiratory difficulty   Respiratory therapy support as indicated     Problem: Skin/Tissue Integrity  Goal: Absence of new skin breakdown  Description: 1. Monitor for areas of redness and/or skin breakdown  2. Assess vascular access sites hourly  3.   Every 4-6 hours minimum:  Change oxygen

## 2022-06-08 NOTE — PROGRESS NOTES
Changed back to ac due to fentanyl. Patient was agitated.       06/08/22 1442   NICU Vent Information   Vent Type 980   Vent Mode AC/VC   Vt (Set, mL) 300 mL   Vt Exhaled 310 mL   Resp Rate (Set) 12 bmp   Rate Measured 12 br/min   Minute Volume 3.71 Liters   Peak Flow 50 L/min   Pressure Support 0 cmH20   FiO2  40 %   Peak Inspiratory Pressure 16 cmH2O   I:E Ratio 1:6.60   Sensitivity 3   PEEP/CPAP (cmH2O) 5   Mean Airway Pressure 6 cmH20   Additional Respiratory Assessments   Heart Rate 63   Resp 12   Position Semi-Vera's   Humidification Source Heated wire   Humidification Temp 37   Cuff Pressure (cm H2O) 29 cm H2O   Vent Alarm Settings   High Pressure  50 cmH2O   Low Exhaled Vt  0 mL

## 2022-06-08 NOTE — PROGRESS NOTES
[address] [facility]  Department of Internal Medicine   Internal Medicine Residency   MICU Progress Note    Patient:  Joseluis Chappell See 65 y.o. female  MRN: [id number]     Date of Service: 6/8/2022    Allergy: Patient has no known allergies. Subjective     Examined the patient by the bedside. She remains intubated, opening eyes spontaneous, following some commands. Still on levo, 6, NICOM fluid responsive, received ~ 6L IVF, will repeat NICOM  ECHO pending reading, trop moderately elevated and EKG unremarkable for MI    Endo is on the case for HPA axis evaluation, possible AI, on decad will switch to hydro by endo, thyroid hormone replacement managed by endo too    Objective     VS: BP (!) 143/80   Pulse 52   Temp 98.6 °F (37 °C)   Resp 12   Ht 4' 10\" (1.473 m)   Wt 127 lb 9.6 oz (57.9 kg)   SpO2 100%   BMI 26.67 kg/m²   ABP (Arterial line BP): (!) 80/60  ABP Mean (Arterial Line Mean): 68 mmHg    I & O - 24hr:     Intake/Output Summary (Last 24 hours) at 6/8/2022 0911  Last data filed at 6/8/2022 0700  Gross per 24 hour   Intake 2097.31 ml   Output 1586 ml   Net 511.31 ml       Physical Exam:  Physical Exam  Constitutional:       General: She is not in acute distress. Appearance: Normal appearance. She is not toxic-appearing or diaphoretic. HENT:      Head: Normocephalic. Mouth/Throat:      Mouth: Mucous membranes are moist.   Eyes:      General: No scleral icterus. Right eye: No discharge. Left eye: No discharge. Conjunctiva/sclera: Conjunctivae normal.   Cardiovascular:      Rate and Rhythm: Normal rate and regular rhythm. Pulses: Normal pulses. Heart sounds: Murmur heard. Comments: Diastolic murmur heard at apex, left of lower sternal border, and aortic region  Pulmonary:      Breath sounds: Normal breath sounds. Comments: Bilateral equal, vesicular  Abdominal:      Palpations: Abdomen is soft.    Musculoskeletal:      Comments: Contractures in lower extremities--including toes, knee, ankle   Skin:     General: Skin is warm. Neurological:      Comments:  On fentanyl drip, opening eyes, CNII intact, tracking           Access     site day    Art line   L Radial 1   TLC R IJ 1   PICC None    Hemoaccess None    Gastrointestinal - gastrostomy tube LUQ    Genitourinal - lu-temperature probe  1       Mechanical Ventilation:  ETT   Mode: A/CVC    SBT today    ABG:     Lab Results   Component Value Date    PH 7.423 06/08/2022    PCO2 36.4 06/08/2022    PO2 153.4 06/08/2022    HCO3 23.2 06/08/2022    BE -0.8 06/08/2022    THB 12.3 06/08/2022    O2SAT 99.0 06/08/2022        Medications     Infusions: (Fluid, Sedation, Vasopressors)    Vasopressors   Levophed at rate 6 mcg/min  Sedation   Fentanyl at 25    Nutrition:   NPO    ATB:   Antibiotics  Days   Off abx                Labs     CBC with Differential:    Lab Results   Component Value Date    WBC 4.4 06/08/2022    RBC 3.30 06/08/2022    HGB 10.0 06/08/2022    HCT 31.4 06/08/2022     06/08/2022    MCV 95.2 06/08/2022    MCH 30.3 06/08/2022    MCHC 31.8 06/08/2022    RDW 15.4 06/08/2022    SEGSPCT 30 05/15/2012    LYMPHOPCT 10.6 06/08/2022    MONOPCT 3.4 06/08/2022    EOSPCT 3.8 12/13/2018    BASOPCT 0.0 06/08/2022    MONOSABS 0.15 06/08/2022    LYMPHSABS 0.47 06/08/2022    EOSABS 0.00 06/08/2022    BASOSABS 0.00 06/08/2022     CMP:    Lab Results   Component Value Date     06/08/2022    K 3.5 06/08/2022     06/08/2022    CO2 22 06/08/2022    BUN 9 06/08/2022    CREATININE 0.8 06/08/2022    GFRAA >60 06/08/2022    LABGLOM >60 06/08/2022    GLUCOSE 144 06/08/2022    GLUCOSE 96 05/15/2012    PROT 5.6 06/08/2022    LABALBU 2.4 06/08/2022    LABALBU 2.8 05/13/2012    CALCIUM 7.8 06/08/2022    BILITOT 0.3 06/08/2022    ALKPHOS 73 06/08/2022    AST 15 06/08/2022    ALT 19 06/08/2022     BMP:    Lab Results   Component Value Date     06/08/2022    K 3.5 06/08/2022     06/08/2022    CO2 22 06/08/2022    BUN 9 06/08/2022    LABALBU 2.4 06/08/2022    LABALBU 2.8 05/13/2012    CREATININE 0.8 06/08/2022    CALCIUM 7.8 06/08/2022    GFRAA >60 06/08/2022    LABGLOM >60 06/08/2022    GLUCOSE 144 06/08/2022    GLUCOSE 96 05/15/2012       Imaging Studies:  CXR: 06/06/22:    Impression   1. There are no findings of failure or pneumonia   2. Satisfactory position of the endotracheal tube and right internal jugular   central venous catheter           Resident's Assessment and Plan     Sunni MORALES. See is a 64 yo female with PMH of Down's syndrome, alzheimer's on Donepezil, TBI/SDH (02/2019) s/p evacuation (04/2019), hydrocephalus s/p  shunt (11/2019), seizure disorder on Keppra, postural hypotension, vasodepressor syncope, B/L DVT s/p IVC filter (03/2019), hypothyroidism, duodenal atresia s/p repair, and dysphagia s/p PEG tube placement, presents with shortness of breath and hypoxia from facility. ICU day 2  Hospital day 2    Assessment:    1. Acute hypoxic respiratory failure 2/2 ? Aspiration pneumonia (resolving)  2. Shock, likely hypovolemic vs distributive vs cardiogenic vs adrenal insufficiency   3. Adrenal insufficiency, in adrenal crisis, 0945AM Cortisol: 2.86  4. H/O Hypothyroidism on levothyroxine replacement;TSH: 10.33 (high), free T4: 1.2; total T3: 67.53  5. H/O Down syndrome  6. H/O alzheimer's disease, on Donepezil   Per prior notes from [facility], it appears that the patient has had a steep decline in cognitive functions in the past few months, for which extensive work-up did not result in discovery of any reversible cause. Due to her dx of Down's syndrome and trisomy 21, she is at high risk for alzheimer's disease (and earlier onset). Her decline of mental functions have been ascertained to be due to progressive alzheimer's disease. 7. 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 H/O Communicating hydrocephalus s/p  shunt (11/2019)  9. H/O seizure disorder on Keppra  10.  H/O B/L DVT s/p IVC

## 2022-06-08 NOTE — CARE COORDINATION
Care Coordination  The patient was admitted due to being sob. The patient has a pmh od downs syndrome. The patient was found to be 70% on room air and was placed on 10 liter nrb. The patient was tachycardic at a rate of 102. La was 2.2, troponin was 34. Her chest film was unremarkable. The patient had rhonchi breath sounds and was started on Iv Unasyn 3000 mg iv q6 hrs. She received an iv bolus of 1000 cc this am. She was placed on a ventilator sedated and in on levophed. The patient is on Iv decadron 4 mg iv q12 hrs. The patient is on iv keppra iv 1000 mg q12 hrs. The patient is from [address] Per Pretty Jain the liason there. The patient is a long term bed hold and can return once medically stable. She will need a covid test done on day of discharge. Return envelope is done.

## 2022-06-08 NOTE — PROGRESS NOTES
Cedric Early is a 65 y.o. female     Neurology following for possible seizures     PMH: Down's syndrome, vasodepressor syncope, SDH s/p craniotomy 2019, hydrocephalus s/p  shunt, Alzheimer's dementia, hypothyroidism    Presented with SOB and was noted to have O2 sats 70% on room air. Concern for aspiration pna. She was subsequently intubated for airway protection. On Abx. Also felt to have possible adrenal insufficiency/adrenal crisis. She was noted to have twitching of the L arm and concern for seizure activity. She was placed on Keppra in May 22 due to EEG on 5/6 showing increased potential for seizures from the L frontal region. Repeat EEG this admission shows epileptic focus in the R frontal region- no seizures recorded. At baseline, she is non verbal and has had significant cognitive decline over recent months. Hx of b/l SDH- last seen by NSGY on 5/31- no intervention required. Still having some stimulus induced shaking of the LUE. No seizures. Failed vent wean. Prior to Visit Medications    Medication Sig Taking? Authorizing Provider   amoxicillin-clavulanate (AUGMENTIN ES-600) 600-42.9 MG/5ML suspension Take 7.3 mLs by mouth 2 times daily for 7 days  Estefani Candelaria MD   Multiple Vitamins-Minerals (CENTRUM/CERTA-CINDY WITH MINERALS ORAL) solution 5 mLs by Per G Tube route daily  Historical Provider, MD   levETIRAcetam (KEPPRA) 500 MG tablet Take 1 tablet by mouth 2 times daily  Moira Ordoñez MD   lactulose (CHRONULAC) 10 GM/15ML solution Take 30 mLs by mouth 3 times daily Titrate for 2-3 bowel movements per day  Moira Ordoñez MD   ipratropium-albuterol (DUONEB) 0.5-2.5 (3) MG/3ML SOLN nebulizer solution Inhale 3 mLs into the lungs every 4 hours as needed for Shortness of Breath  Moira Ordoñez MD   nystatin (MYCOSTATIN) 223177 UNIT/GM cream Apply topically 2 times daily. 30g  Edward Loera DO   risperiDONE (RISPERDAL) 0.25 MG tablet Take 1 tablet by mouth 2 times daily  Siobhan Urrutia, DO   donepezil (ARICEPT) 5 MG tablet 5 mg by PEG Tube route nightly   Historical Provider, MD   levothyroxine (SYNTHROID) 88 MCG tablet Take 1 tablet by mouth Daily  Santino Loera DO   liothyronine (CYTOMEL) 5 MCG tablet Take 1 tab by mouth every morning on [employer]-Xbvdofjfr-Zgydfi  Sameera Pool,    Cholecalciferol (VITAMIN D-3 SUPER STRENGTH) 50 MCG (2000 UT) TABS Take 1 tablet by mouth daily  Sameera Pool DO   Lactobacillus (ACIDOPHILUS PO) Take by mouth  Historical Provider, MD       Allergies as of 06/05/2022    (No Known Allergies)       Objective:     /75   Pulse 63   Temp 99.5 °F (37.5 °C)   Resp 12   Ht 4' 10\" (1.473 m)   Wt 127 lb 9.6 oz (57.9 kg)   SpO2 (!) 79%   BMI 26.67 kg/m²      General appearance: opens eyes to name and keeps them open during exam.  Head: Normocephalic, without obvious abnormality, atraumatic  Neck: supple, trachea midline  Lungs: On vent   Extremities:  Edema BLE  Pulses: 2+ and symmetric  Skin: no rashes or lesions    Mental Status: Opens eyes to name, nonverbal at baseline. Does not follow commands.  Attends to examiner     Cranial Nerves:  I: smell    II: visual acuity     II: visual fields Bilateral threat    II: pupils RD   III,VII: ptosis    III,IV,VI: extraocular muscles  Dolls present    V: mastication    V: facial light touch sensation     V,VII: corneal reflex  Present   VII: facial muscle function - upper     VII: facial muscle function - lower Appears symmetric around ETT   VIII: hearing Normal   IX: soft palate elevation     IX,X: gag reflex Present   XI: trapezius strength     XI: sternocleidomastoid strength    XI: neck extension strength     XII: tongue strength       Motor:  Spastic paraplegia BLE  Spastic paresis BUE     Stimulation induced myoclonus on the L during my exam     Sensory:  Grimaces to pain in all extremities     DTR:   +2 throughout     Laboratory/Radiology:     CBC with Differential:    Lab Results   Component Value Date    WBC 4.4 06/08/2022    RBC 3.30 06/08/2022    HGB 10.0 06/08/2022    HCT 31.4 06/08/2022     06/08/2022    MCV 95.2 06/08/2022    MCH 30.3 06/08/2022    MCHC 31.8 06/08/2022    RDW 15.4 06/08/2022    SEGSPCT 30 05/15/2012    LYMPHOPCT 10.6 06/08/2022    MONOPCT 3.4 06/08/2022    EOSPCT 3.8 12/13/2018    BASOPCT 0.0 06/08/2022    MONOSABS 0.15 06/08/2022    LYMPHSABS 0.47 06/08/2022    EOSABS 0.00 06/08/2022    BASOSABS 0.00 06/08/2022     BMP:    Lab Results   Component Value Date     06/08/2022    K 3.5 06/08/2022     06/08/2022    CO2 22 06/08/2022    BUN 9 06/08/2022    LABALBU 2.4 06/08/2022    LABALBU 2.8 05/13/2012    CREATININE 0.8 06/08/2022    CALCIUM 7.8 06/08/2022    GFRAA >60 06/08/2022    LABGLOM >60 06/08/2022    GLUCOSE 144 06/08/2022    GLUCOSE 96 05/15/2012     HgBA1c:    Lab Results   Component Value Date    LABA1C 5.2 03/30/2022     FLP:    Lab Results   Component Value Date    TRIG 88 03/30/2022    HDL 62 03/30/2022    LDLCALC 93 03/30/2022    LABVLDL 18 03/30/2022     EEG 6/6  Abnormal EEG polymorphic slowing suggestive of epileptiform foci in the right frontal lobe versus structural abnormality. Diffuse slowing also noted consistent with moderate encephalopathy. Clinical correlation is indicated. I personally reviewed the patient's lab and imaging studies at this time. Assessment:     Seizure like activity  With Increased potential for focal seizures found in her left frontal region that was seen on EEG on 5/6/22. Repeat EEG suggestive of epileptiform foci in the R frontal lobe- no seizures recorded      Suspected Alzheimer's dementia given significant cognitive decline and given her hx of Down syndrome, this increases her risk of earlier onset and this will preclude her to development of seizures as well.    - On Aricept 5 mg HS    Bilateral subdural hygromas seen by NSGY on 5/31 and no intervention required     Plan:     Continue Keppra to 1 G BID given EEG results     Consider baclofen for significant spasticity     Will follow     David Abreu PA-C  3:22 PM  6/8/2022

## 2022-06-09 ENCOUNTER — APPOINTMENT (OUTPATIENT)
Dept: CT IMAGING | Age: 57
DRG: 870 | End: 2022-06-09
Payer: MEDICARE

## 2022-06-09 ENCOUNTER — APPOINTMENT (OUTPATIENT)
Dept: GENERAL RADIOLOGY | Age: 57
DRG: 870 | End: 2022-06-09
Payer: MEDICARE

## 2022-06-09 LAB
AADO2: 94.7 MMHG
ALBUMIN SERPL-MCNC: 3 G/DL (ref 3.5–5.2)
ALP BLD-CCNC: 77 U/L (ref 35–104)
ALT SERPL-CCNC: 17 U/L (ref 0–32)
ANION GAP SERPL CALCULATED.3IONS-SCNC: 10 MMOL/L (ref 7–16)
ANION GAP SERPL CALCULATED.3IONS-SCNC: 7 MMOL/L (ref 7–16)
AST SERPL-CCNC: 15 U/L (ref 0–31)
B.E.: 4.8 MMOL/L (ref -3–3)
BASOPHILS ABSOLUTE: 0.01 E9/L (ref 0–0.2)
BASOPHILS RELATIVE PERCENT: 0.2 % (ref 0–2)
BILIRUB SERPL-MCNC: 0.3 MG/DL (ref 0–1.2)
BUN BLDV-MCNC: 10 MG/DL (ref 6–20)
BUN BLDV-MCNC: 12 MG/DL (ref 6–20)
CALCIUM IONIZED: 1.24 MMOL/L (ref 1.15–1.33)
CALCIUM SERPL-MCNC: 7.9 MG/DL (ref 8.6–10.2)
CALCIUM SERPL-MCNC: 8.5 MG/DL (ref 8.6–10.2)
CHLORIDE BLD-SCNC: 109 MMOL/L (ref 98–107)
CHLORIDE BLD-SCNC: 112 MMOL/L (ref 98–107)
CO2: 25 MMOL/L (ref 22–29)
CO2: 26 MMOL/L (ref 22–29)
COHB: 0.3 % (ref 0–1.5)
CREAT SERPL-MCNC: 0.5 MG/DL (ref 0.5–1)
CREAT SERPL-MCNC: 0.5 MG/DL (ref 0.5–1)
CRITICAL: ABNORMAL
DATE ANALYZED: ABNORMAL
DATE OF COLLECTION: ABNORMAL
EOSINOPHILS ABSOLUTE: 0 E9/L (ref 0.05–0.5)
EOSINOPHILS RELATIVE PERCENT: 0 % (ref 0–6)
FIO2: 40 %
GFR AFRICAN AMERICAN: >60
GFR AFRICAN AMERICAN: >60
GFR NON-AFRICAN AMERICAN: >60 ML/MIN/1.73
GFR NON-AFRICAN AMERICAN: >60 ML/MIN/1.73
GLUCOSE BLD-MCNC: 121 MG/DL (ref 74–99)
GLUCOSE BLD-MCNC: 140 MG/DL (ref 74–99)
HCO3: 27.7 MMOL/L (ref 22–26)
HCT VFR BLD CALC: 34.2 % (ref 34–48)
HEMOGLOBIN: 10.9 G/DL (ref 11.5–15.5)
HHB: 1.2 % (ref 0–5)
IMMATURE GRANULOCYTES #: 0.13 E9/L
IMMATURE GRANULOCYTES %: 2.9 % (ref 0–5)
LAB: ABNORMAL
LYMPHOCYTES ABSOLUTE: 0.78 E9/L (ref 1.5–4)
LYMPHOCYTES RELATIVE PERCENT: 17.3 % (ref 20–42)
Lab: ABNORMAL
MAGNESIUM: 2.2 MG/DL (ref 1.6–2.6)
MCH RBC QN AUTO: 30.3 PG (ref 26–35)
MCHC RBC AUTO-ENTMCNC: 31.9 % (ref 32–34.5)
MCV RBC AUTO: 95 FL (ref 80–99.9)
METER GLUCOSE: 112 MG/DL (ref 74–99)
METHB: 0.2 % (ref 0–1.5)
MODE: AC
MONOCYTES ABSOLUTE: 0.15 E9/L (ref 0.1–0.95)
MONOCYTES RELATIVE PERCENT: 3.3 % (ref 2–12)
NEUTROPHILS ABSOLUTE: 3.43 E9/L (ref 1.8–7.3)
NEUTROPHILS RELATIVE PERCENT: 76.3 % (ref 43–80)
O2 SATURATION: 99 % (ref 92–98.5)
O2HB: 98.3 % (ref 94–97)
OPERATOR ID: 1721
PATIENT TEMP: 37 C
PCO2: 35.2 MMHG (ref 35–45)
PDW BLD-RTO: 15.4 FL (ref 11.5–15)
PEEP/CPAP: 5 CMH2O
PFO2: 3.5 MMHG/%
PH BLOOD GAS: 7.51 (ref 7.35–7.45)
PHOSPHORUS: 2 MG/DL (ref 2.5–4.5)
PLATELET # BLD: 234 E9/L (ref 130–450)
PMV BLD AUTO: 9.9 FL (ref 7–12)
PO2: 140 MMHG (ref 75–100)
POTASSIUM REFLEX MAGNESIUM: 3.7 MMOL/L (ref 3.5–5)
POTASSIUM SERPL-SCNC: 3.8 MMOL/L (ref 3.5–5)
RBC # BLD: 3.6 E12/L (ref 3.5–5.5)
RI(T): 0.68
RR MECHANICAL: 12 B/MIN
SODIUM BLD-SCNC: 144 MMOL/L (ref 132–146)
SODIUM BLD-SCNC: 145 MMOL/L (ref 132–146)
SOURCE, BLOOD GAS: ABNORMAL
THB: 12 G/DL (ref 11.5–16.5)
TIME ANALYZED: 441
TOTAL PROTEIN: 6.4 G/DL (ref 6.4–8.3)
VT MECHANICAL: 300 ML
WBC # BLD: 4.5 E9/L (ref 4.5–11.5)

## 2022-06-09 PROCEDURE — 85025 COMPLETE CBC W/AUTO DIFF WBC: CPT

## 2022-06-09 PROCEDURE — 80048 BASIC METABOLIC PNL TOTAL CA: CPT

## 2022-06-09 PROCEDURE — 36592 COLLECT BLOOD FROM PICC: CPT

## 2022-06-09 PROCEDURE — 99233 SBSQ HOSP IP/OBS HIGH 50: CPT | Performed by: INTERNAL MEDICINE

## 2022-06-09 PROCEDURE — 70450 CT HEAD/BRAIN W/O DYE: CPT

## 2022-06-09 PROCEDURE — 2580000003 HC RX 258: Performed by: FAMILY MEDICINE

## 2022-06-09 PROCEDURE — C9113 INJ PANTOPRAZOLE SODIUM, VIA: HCPCS | Performed by: INTERNAL MEDICINE

## 2022-06-09 PROCEDURE — 6360000002 HC RX W HCPCS: Performed by: INTERNAL MEDICINE

## 2022-06-09 PROCEDURE — 2580000003 HC RX 258: Performed by: INTERNAL MEDICINE

## 2022-06-09 PROCEDURE — 82330 ASSAY OF CALCIUM: CPT

## 2022-06-09 PROCEDURE — 6370000000 HC RX 637 (ALT 250 FOR IP): Performed by: PHYSICIAN ASSISTANT

## 2022-06-09 PROCEDURE — 6360000002 HC RX W HCPCS: Performed by: STUDENT IN AN ORGANIZED HEALTH CARE EDUCATION/TRAINING PROGRAM

## 2022-06-09 PROCEDURE — 82805 BLOOD GASES W/O2 SATURATION: CPT

## 2022-06-09 PROCEDURE — 83735 ASSAY OF MAGNESIUM: CPT

## 2022-06-09 PROCEDURE — 94640 AIRWAY INHALATION TREATMENT: CPT

## 2022-06-09 PROCEDURE — 99232 SBSQ HOSP IP/OBS MODERATE 35: CPT | Performed by: PHYSICIAN ASSISTANT

## 2022-06-09 PROCEDURE — 82962 GLUCOSE BLOOD TEST: CPT

## 2022-06-09 PROCEDURE — 84100 ASSAY OF PHOSPHORUS: CPT

## 2022-06-09 PROCEDURE — 6370000000 HC RX 637 (ALT 250 FOR IP): Performed by: FAMILY MEDICINE

## 2022-06-09 PROCEDURE — 6360000002 HC RX W HCPCS: Performed by: PHYSICIAN ASSISTANT

## 2022-06-09 PROCEDURE — 80053 COMPREHEN METABOLIC PANEL: CPT

## 2022-06-09 PROCEDURE — 94003 VENT MGMT INPAT SUBQ DAY: CPT

## 2022-06-09 PROCEDURE — 6370000000 HC RX 637 (ALT 250 FOR IP): Performed by: INTERNAL MEDICINE

## 2022-06-09 PROCEDURE — 2500000003 HC RX 250 WO HCPCS: Performed by: STUDENT IN AN ORGANIZED HEALTH CARE EDUCATION/TRAINING PROGRAM

## 2022-06-09 PROCEDURE — 2500000003 HC RX 250 WO HCPCS: Performed by: INTERNAL MEDICINE

## 2022-06-09 PROCEDURE — 71045 X-RAY EXAM CHEST 1 VIEW: CPT

## 2022-06-09 PROCEDURE — 6370000000 HC RX 637 (ALT 250 FOR IP): Performed by: STUDENT IN AN ORGANIZED HEALTH CARE EDUCATION/TRAINING PROGRAM

## 2022-06-09 PROCEDURE — A4216 STERILE WATER/SALINE, 10 ML: HCPCS | Performed by: INTERNAL MEDICINE

## 2022-06-09 PROCEDURE — 99232 SBSQ HOSP IP/OBS MODERATE 35: CPT | Performed by: INTERNAL MEDICINE

## 2022-06-09 PROCEDURE — 2000000000 HC ICU R&B

## 2022-06-09 RX ORDER — CLONAZEPAM 0.5 MG/1
0.5 TABLET ORAL EVERY 8 HOURS
Status: DISCONTINUED | OUTPATIENT
Start: 2022-06-09 | End: 2022-06-17

## 2022-06-09 RX ORDER — POTASSIUM CHLORIDE 7.45 MG/ML
10 INJECTION INTRAVENOUS ONCE
Status: COMPLETED | OUTPATIENT
Start: 2022-06-09 | End: 2022-06-09

## 2022-06-09 RX ORDER — POTASSIUM CHLORIDE 29.8 MG/ML
40 INJECTION INTRAVENOUS ONCE
Status: COMPLETED | OUTPATIENT
Start: 2022-06-09 | End: 2022-06-09

## 2022-06-09 RX ORDER — MIDODRINE HYDROCHLORIDE 10 MG/1
10 TABLET ORAL
Status: DISCONTINUED | OUTPATIENT
Start: 2022-06-09 | End: 2022-06-14

## 2022-06-09 RX ADMIN — POTASSIUM PHOSPHATE, MONOBASIC AND POTASSIUM PHOSPHATE, DIBASIC 15 MMOL: 224; 236 INJECTION, SOLUTION, CONCENTRATE INTRAVENOUS at 20:20

## 2022-06-09 RX ADMIN — Medication: at 20:03

## 2022-06-09 RX ADMIN — IPRATROPIUM BROMIDE AND ALBUTEROL SULFATE 3 ML: .5; 2.5 SOLUTION RESPIRATORY (INHALATION) at 11:55

## 2022-06-09 RX ADMIN — MUPIROCIN: 20 OINTMENT TOPICAL at 20:04

## 2022-06-09 RX ADMIN — IPRATROPIUM BROMIDE AND ALBUTEROL SULFATE 3 ML: .5; 2.5 SOLUTION RESPIRATORY (INHALATION) at 15:54

## 2022-06-09 RX ADMIN — IPRATROPIUM BROMIDE AND ALBUTEROL SULFATE 3 ML: .5; 2.5 SOLUTION RESPIRATORY (INHALATION) at 07:52

## 2022-06-09 RX ADMIN — RISPERIDONE 0.25 MG: 0.25 TABLET ORAL at 20:03

## 2022-06-09 RX ADMIN — Medication 150 MCG/HR: at 08:43

## 2022-06-09 RX ADMIN — CLONAZEPAM 0.5 MG: 0.5 TABLET ORAL at 20:03

## 2022-06-09 RX ADMIN — LEVOTHYROXINE SODIUM 88 MCG: 0.09 TABLET ORAL at 05:04

## 2022-06-09 RX ADMIN — DONEPEZIL HYDROCHLORIDE 5 MG: 5 TABLET, FILM COATED ORAL at 20:03

## 2022-06-09 RX ADMIN — MIDODRINE HYDROCHLORIDE 10 MG: 10 TABLET ORAL at 16:57

## 2022-06-09 RX ADMIN — IPRATROPIUM BROMIDE AND ALBUTEROL SULFATE 3 ML: .5; 2.5 SOLUTION RESPIRATORY (INHALATION) at 04:25

## 2022-06-09 RX ADMIN — MUPIROCIN: 20 OINTMENT TOPICAL at 09:02

## 2022-06-09 RX ADMIN — HYDROCORTISONE SODIUM SUCCINATE 50 MG: 100 INJECTION, POWDER, FOR SOLUTION INTRAMUSCULAR; INTRAVENOUS at 16:57

## 2022-06-09 RX ADMIN — CHLORHEXIDINE GLUCONATE 15 ML: 1.2 RINSE ORAL at 08:44

## 2022-06-09 RX ADMIN — RISPERIDONE 0.25 MG: 0.25 TABLET ORAL at 08:44

## 2022-06-09 RX ADMIN — LEVETIRACETAM 1000 MG: 10 INJECTION, SOLUTION INTRAVENOUS at 12:15

## 2022-06-09 RX ADMIN — BUDESONIDE 500 MCG: 0.5 SUSPENSION RESPIRATORY (INHALATION) at 07:52

## 2022-06-09 RX ADMIN — IPRATROPIUM BROMIDE AND ALBUTEROL SULFATE 3 ML: .5; 2.5 SOLUTION RESPIRATORY (INHALATION) at 00:14

## 2022-06-09 RX ADMIN — POTASSIUM CHLORIDE 40 MEQ: 29.8 INJECTION, SOLUTION INTRAVENOUS at 09:09

## 2022-06-09 RX ADMIN — POTASSIUM CHLORIDE 10 MEQ: 7.46 INJECTION, SOLUTION INTRAVENOUS at 20:02

## 2022-06-09 RX ADMIN — SODIUM CHLORIDE, PRESERVATIVE FREE 10 ML: 5 INJECTION INTRAVENOUS at 08:44

## 2022-06-09 RX ADMIN — IPRATROPIUM BROMIDE AND ALBUTEROL SULFATE 3 ML: .5; 2.5 SOLUTION RESPIRATORY (INHALATION) at 20:06

## 2022-06-09 RX ADMIN — Medication 2000 UNITS: at 08:44

## 2022-06-09 RX ADMIN — CHLORHEXIDINE GLUCONATE 15 ML: 1.2 RINSE ORAL at 20:03

## 2022-06-09 RX ADMIN — HYDROCORTISONE SODIUM SUCCINATE 50 MG: 100 INJECTION, POWDER, FOR SOLUTION INTRAMUSCULAR; INTRAVENOUS at 08:44

## 2022-06-09 RX ADMIN — CLONAZEPAM 0.5 MG: 0.5 TABLET ORAL at 12:16

## 2022-06-09 RX ADMIN — MIDODRINE HYDROCHLORIDE 10 MG: 10 TABLET ORAL at 12:16

## 2022-06-09 RX ADMIN — SODIUM CHLORIDE, PRESERVATIVE FREE 40 MG: 5 INJECTION INTRAVENOUS at 20:03

## 2022-06-09 RX ADMIN — SODIUM CHLORIDE, PRESERVATIVE FREE 40 MG: 5 INJECTION INTRAVENOUS at 08:44

## 2022-06-09 RX ADMIN — MICONAZOLE NITRATE: 20.6 POWDER TOPICAL at 15:29

## 2022-06-09 RX ADMIN — SODIUM CHLORIDE, PRESERVATIVE FREE 10 ML: 5 INJECTION INTRAVENOUS at 20:04

## 2022-06-09 RX ADMIN — ENOXAPARIN SODIUM 40 MG: 100 INJECTION SUBCUTANEOUS at 08:43

## 2022-06-09 RX ADMIN — MICONAZOLE NITRATE: 20.6 POWDER TOPICAL at 20:03

## 2022-06-09 RX ADMIN — Medication: at 14:30

## 2022-06-09 RX ADMIN — BUDESONIDE 500 MCG: 0.5 SUSPENSION RESPIRATORY (INHALATION) at 20:06

## 2022-06-09 ASSESSMENT — PULMONARY FUNCTION TESTS
PIF_VALUE: 18
PIF_VALUE: 19
PIF_VALUE: 17
PIF_VALUE: 20
PIF_VALUE: 20
PIF_VALUE: 18
PIF_VALUE: 19
PIF_VALUE: 0
PIF_VALUE: 20
PIF_VALUE: 11
PIF_VALUE: 20
PIF_VALUE: 12
PIF_VALUE: 19
PIF_VALUE: 19
PIF_VALUE: 20
PIF_VALUE: 19
PIF_VALUE: 18
PIF_VALUE: 19
PIF_VALUE: 17
PIF_VALUE: 18
PIF_VALUE: 21
PIF_VALUE: 19
PIF_VALUE: 19
PIF_VALUE: 18
PIF_VALUE: 18
PIF_VALUE: 19
PIF_VALUE: 18
PIF_VALUE: 18
PIF_VALUE: 21
PIF_VALUE: 18
PIF_VALUE: 18
PIF_VALUE: 20
PIF_VALUE: 19
PIF_VALUE: 25
PIF_VALUE: 20
PIF_VALUE: 13
PIF_VALUE: 18
PIF_VALUE: 18
PIF_VALUE: 20
PIF_VALUE: 19
PIF_VALUE: 20
PIF_VALUE: 18
PIF_VALUE: 23
PIF_VALUE: 20
PIF_VALUE: 12
PIF_VALUE: 20
PIF_VALUE: 20
PIF_VALUE: 19
PIF_VALUE: 18

## 2022-06-09 ASSESSMENT — PAIN SCALES - GENERAL
PAINLEVEL_OUTOF10: 0

## 2022-06-09 ASSESSMENT — PAIN SCALES - WONG BAKER
WONGBAKER_NUMERICALRESPONSE: 0

## 2022-06-09 NOTE — PROGRESS NOTES
Prudence Early is a 65 y.o. female     Neurology following for possible seizures     PMH: Down's syndrome, vasodepressor syncope, SDH s/p craniotomy 2019, hydrocephalus s/p  shunt, Alzheimer's dementia, hypothyroidism    Presented with SOB and was noted to have O2 sats 70% on room air. Concern for aspiration pna. She was subsequently intubated for airway protection. On Abx. Also felt to have possible adrenal insufficiency/adrenal crisis. She was noted to have twitching of the L arm and concern for seizure activity. She was placed on Keppra in May 22 due to EEG on 5/6 showing increased potential for seizures from the L frontal region. Repeat EEG this admission shows epileptic focus in the R frontal region- no seizures recorded. At baseline, she is non verbal and has had significant cognitive decline over recent months. Hx of b/l SDH- last seen by NSGY on 5/31- no intervention required. Still having some stimulus induced shaking on exam     Failed vent wean. Prior to Visit Medications    Medication Sig Taking? Authorizing Provider   Multiple Vitamins-Minerals (CENTRUM/CERTA-CINDY WITH MINERALS ORAL) solution 5 mLs by Per G Tube route daily  Historical Provider, MD   levETIRAcetam (KEPPRA) 500 MG tablet Take 1 tablet by mouth 2 times daily  Lul Stauffer MD   lactulose (CHRONULAC) 10 GM/15ML solution Take 30 mLs by mouth 3 times daily Titrate for 2-3 bowel movements per day  Lul Stauffer MD   ipratropium-albuterol (DUONEB) 0.5-2.5 (3) MG/3ML SOLN nebulizer solution Inhale 3 mLs into the lungs every 4 hours as needed for Shortness of Breath  Lul Stauffer MD   nystatin (MYCOSTATIN) 907832 UNIT/GM cream Apply topically 2 times daily. 30g  Santino Loera DO   risperiDONE (RISPERDAL) 0.25 MG tablet Take 1 tablet by mouth 2 times daily  Santino Loera DO   donepezil (ARICEPT) 5 MG tablet 5 mg by PEG Tube route nightly   Historical Provider, MD   levothyroxine (SYNTHROID) 88 MCG tablet Take 1 tablet by mouth Daily  Sandria Lighter Evaristo, DO   liothyronine (CYTOMEL) 5 MCG tablet Take 1 tab by mouth every morning on Vchsdj-Lceambyma-Dmfosx  Dariusedgard Ramirez, DO   Cholecalciferol (VITAMIN D-3 SUPER STRENGTH) 50 MCG (2000 UT) TABS Take 1 tablet by mouth daily  Sandria Lighter Evaristo, DO   Lactobacillus (ACIDOPHILUS PO) Take by mouth  Historical Provider, MD       Allergies as of 06/05/2022    (No Known Allergies)       Objective:     /78   Pulse 74   Temp 98.4 °F (36.9 °C) (Bladder)   Resp 19   Ht 4' 10\" (1.473 m)   Wt 127 lb 9.6 oz (57.9 kg)   SpO2 100%   BMI 26.67 kg/m²      General appearance: opens eyes to name and keeps them open during exam.  Head: Normocephalic, without obvious abnormality, atraumatic  Neck: supple, trachea midline  Lungs: On vent   Extremities:  Edema BLE  Pulses: 2+ and symmetric  Skin: no rashes or lesions    Mental Status: Opens eyes to name, nonverbal at baseline. Does not follow commands. Attends to examiner.  More alert today     Cranial Nerves:  I: smell    II: visual acuity     II: visual fields Bilateral threat    II: pupils RD   III,VII: ptosis    III,IV,VI: extraocular muscles  Dolls present    V: mastication    V: facial light touch sensation     V,VII: corneal reflex  Present   VII: facial muscle function - upper     VII: facial muscle function - lower Appears symmetric around ETT   VIII: hearing Normal   IX: soft palate elevation     IX,X: gag reflex Present   XI: trapezius strength     XI: sternocleidomastoid strength    XI: neck extension strength     XII: tongue strength       Motor:  Spastic paraplegia BLE  Spastic paresis BUE     Stimulation induced myoclonus  during my exam     Sensory:  Grimaces to pain in all extremities     DTR:   +2 throughout     Laboratory/Radiology:     CBC with Differential:    Lab Results   Component Value Date    WBC 4.5 06/09/2022    RBC 3.60 06/09/2022    HGB 10.9 06/09/2022    HCT 34.2 06/09/2022     06/09/2022    MCV 95.0 06/09/2022    MCH 30.3 06/09/2022    MCHC 31.9 06/09/2022    RDW 15.4 06/09/2022    SEGSPCT 30 05/15/2012    LYMPHOPCT 17.3 06/09/2022    MONOPCT 3.3 06/09/2022    EOSPCT 3.8 12/13/2018    BASOPCT 0.2 06/09/2022    MONOSABS 0.15 06/09/2022    LYMPHSABS 0.78 06/09/2022    EOSABS 0.00 06/09/2022    BASOSABS 0.01 06/09/2022     BMP:    Lab Results   Component Value Date     06/09/2022    K 3.7 06/09/2022     06/09/2022    CO2 25 06/09/2022    BUN 10 06/09/2022    LABALBU 3.0 06/09/2022    LABALBU 2.8 05/13/2012    CREATININE 0.5 06/09/2022    CALCIUM 8.5 06/09/2022    GFRAA >60 06/09/2022    LABGLOM >60 06/09/2022    GLUCOSE 140 06/09/2022    GLUCOSE 96 05/15/2012     HgBA1c:    Lab Results   Component Value Date    LABA1C 5.2 03/30/2022     FLP:    Lab Results   Component Value Date    TRIG 88 03/30/2022    HDL 62 03/30/2022    LDLCALC 93 03/30/2022    LABVLDL 18 03/30/2022     EEG 6/6  Abnormal EEG polymorphic slowing suggestive of epileptiform foci in the right frontal lobe versus structural abnormality. Diffuse slowing also noted consistent with moderate encephalopathy. Clinical correlation is indicated. I personally reviewed the patient's lab and imaging studies at this time. Assessment:     Seizure like activity  With Increased potential for focal seizures found in her left frontal region that was seen on EEG on 5/6/22. Repeat EEG suggestive of epileptiform foci in the R frontal lobe- no seizures recorded      Currently, appears to still have stimulus induced myoclonus- discussed this with Dr. Zia Qureshi    Suspected Alzheimer's dementia given significant cognitive decline and given her hx of Down syndrome, this increases her risk of earlier onset and this will preclude her to development of seizures as well.    - On Aricept 5 mg HS    Bilateral subdural hygromas seen by NSGY on 5/31 and no intervention required     Plan:     Continue Keppra to 1 G BID given EEG results     Discussed with  Carlos - will add Klonopin 0.5 mg TID for stimulus induced myoclonic activity     Will follow     Gaby Rivera PA-C  11:21 AM  6/9/2022

## 2022-06-09 NOTE — PROGRESS NOTES
[address] [facility]   Department of Internal Medicine   Internal Medicine Residency  MICU Progress Note    Patient:  Kam Solitario See 65 y.o. female   MRN: [id number]       Date of Service: 2022    Allergy: Patient has no known allergies. Subjective     Overnight, levophed was restarted due to hypotension. BP remains labile requiring levophed at 1 mcg. Patient was seen and examined this morning at bedside in no acute distress. TF was paused and sedation was weaned down in preparation for SBT but patient became tachypneic and SBT was held for today. TF resumed. Will try SBT again tomorrow. Objective     TEMPERATURE:  Current - Temp: 98.4 °F (36.9 °C); Max - Temp  Av.2 °F (36.8 °C)  Min: 98.2 °F (36.8 °C)  Max: 98.4 °F (36.9 °C)  RESPIRATIONS RANGE: Resp  Av.1  Min: 12  Max: 28  PULSE RANGE: Pulse  Av.4  Min: 46  Max: 120  BLOOD PRESSURE RANGE:  Systolic (06MSQ), FNF:363 , Min:67 , FEX:685   ; Diastolic (12EFZ), ZCL:52, Min:40, Max:97    PULSE OXIMETRY RANGE: SpO2  Av.6 %  Min: 98 %  Max: 100 %    I & O - 24hr:    Intake/Output Summary (Last 24 hours) at 2022 1527  Last data filed at 2022 1400  Gross per 24 hour   Intake 773.07 ml   Output 1865 ml   Net -1091.93 ml     I/O last 3 completed shifts: In: 3683.5 [I.V.:838.5; NG/GT:1345; IV Piggyback:1500]  Out: 9971 [Urine:3210; Stool:1] I/O this shift: In: 454.9 [I.V.:106.2; NG/GT:253; IV Piggyback:95.7]  Out: 515 [Urine:515]   Weight change:     Physical Exam:  General Appearance:    Awake but sedated and intubated, no acute distress. HEENT:    NC/AT, mucous membranes are moist   Neck:   Supple, no jugular venous distention. Resp:     CTAB, No wheezes, No rhonchi, no use of accessory muscles   Heart:    RRR, S1 and S2 normal, +Grade II systolic murmur in left lower sternal border, no rub or gallop.     Abdomen:     Soft, non-tender, non-distended with normal bowel sounds   Extremities:   Atraumatic, no cyanosis or edema, +contraction of all toes bilaterally   Pulses:  Radial and pedal pulses are intact bilaterally   Neurologic:   Awake, unable to follow commands, nonverbal at baseline       Medications     Continuous Infusions:   sodium chloride      fentaNYL 100 mcg/hr (06/09/22 1318)    norepinephrine 1 mcg/min (06/09/22 1415)     Scheduled Meds:   midodrine  10 mg Oral TID WC    hydrocortisone sodium succinate PF  50 mg IntraVENous Q8H    clonazePAM  0.5 mg Oral Q8H    miconazole nitrate   Topical BID    miconazole   Topical BID    [Held by provider] hydrocortisone sodium succinate PF  50 mg IntraVENous BID    [Held by provider] hydrocortisone sodium succinate PF  25 mg IntraVENous BID    levETIRAcetam  1,000 mg IntraVENous Q12H    Or    levETIRAcetam  1,000 mg Oral Q12H    mupirocin   Topical BID    enoxaparin  40 mg SubCUTAneous Daily    pantoprazole (PROTONIX) 40 mg injection  40 mg IntraVENous Q12H    vitamin D  2,000 Units Oral Daily    donepezil  5 mg Oral Nightly    [Held by provider] lactulose  20 g Oral TID    levothyroxine  88 mcg Oral Daily    risperiDONE  0.25 mg Oral BID    sodium chloride flush  10 mL IntraVENous 2 times per day    budesonide  0.5 mg Nebulization BID    ipratropium-albuterol  3 mL Inhalation Q4H    chlorhexidine  15 mL Mouth/Throat BID     PRN Meds: miconazole nitrate **AND** miconazole nitrate, perflutren lipid microspheres, sodium chloride flush, sodium chloride, promethazine **OR** ondansetron, polyethylene glycol, acetaminophen **OR** acetaminophen, fentaNYL **AND** fentaNYL  Nutrition:   NG/OG tube TF type: Pulmocare/Nephro/Glucerna/Jevity        At rate: 40 ml/h    Labs and Imaging Studies     CBC:   Recent Labs     06/07/22  0451 06/08/22  0300 06/09/22  0417   WBC 5.0 4.4* 4.5   HGB 11.9 10.0* 10.9*   HCT 37.2 31.4* 34.2   MCV 95.1 95.2 95.0    215 234       BMP:    Recent Labs     06/07/22  0451 06/08/22  0300 06/09/22  0417    144 144   K 4.4 3.5 3.7 * 115* 109*   CO2 19* 22 25   BUN 5* 9 10   CREATININE 0.6 0.8 0.5   GLUCOSE 147* 144* 140*       LIVER PROFILE:   Recent Labs     06/07/22  0451 06/08/22  0300 06/09/22  0417   AST 19 15 15   ALT 23 19 17   BILITOT 0.3 0.3 0.3   ALKPHOS 97 73 77       PT/INR:   No results for input(s): PROTIME, INR in the last 72 hours. APTT:   No results for input(s): APTT in the last 72 hours. Fasting Lipid Panel:    Lab Results   Component Value Date    CHOL 173 03/30/2022    TRIG 88 03/30/2022    HDL 62 03/30/2022       Cardiac Enzymes:    Lab Results   Component Value Date    CKTOTAL 82 03/19/2017    CKTOTAL 100 05/12/2012    CKMB 2.0 05/13/2012    CKMB 1.6 05/13/2012    CKMB 3.1 05/12/2012    TROPONINI <0.01 01/20/2021    TROPONINI <0.01 11/01/2017    TROPONINI <0.01 11/01/2017       Notable Cultures:      Blood cultures   Blood Culture, Routine   Date Value Ref Range Status   06/05/2022 24 Hours no growth  Preliminary     Respiratory cultures No results found for: RESPCULTURE No results found for: LABGRAM  Urine   Urine Culture, Routine   Date Value Ref Range Status   05/30/2022   Final    10 to 100,000 CFU/mL  Mixed denise isolated. Further workup and sensitivity testing  is not routinely indicated and will not be performed. Mixed denise isolated includes:  Gram negative rods  Proteus species  Gram positive organism       Legionella No results found for: LABLEGI  C Diff PCR No results found for: CDIFPCR  Wound culture/abscess: No results for input(s): WNDABS in the last 72 hours. Tip culture:No results for input(s): CXCATHTIP in the last 72 hours. Oxygen:     Vent Information  Ventilator ID: [id number]  Vent Mode: AC/VC  Additional Respiratory Assessments  Heart Rate: (!) 48  Resp: 16  SpO2: 100 %  Position: Semi-Vera's  Humidification Source: Heated wire  Humidification Temp: 37  Circuit Condensation: Drained  Airway Type: ET  Airway Size: 7.5  Cuff Pressure (cm H2O): 29 cm H2O  Skin Barrier Applied:  Yes Urinary Catheter Jzspd-Krgiumgrofv-Ivgkqv (mL): 40 mL    Imaging Studies:  XR CHEST PORTABLE   Final Result   New left lower lobe atelectasis. XR CHEST PORTABLE   Final Result   No acute process. XR CHEST PORTABLE   Final Result   Slight linear subsegmental atelectasis on the left. Support tubes and catheters in good position. XR CHEST PORTABLE   Final Result   1. There are no findings of failure or pneumonia   2. Satisfactory position of the endotracheal tube and right internal jugular   central venous catheter         XR CHEST PORTABLE   Final Result   Interval placement of a right-sided IJ line, when compared to the previous   study performed earlier in the day. No pneumothorax. Previously noted left   lung opacities have resolved. The left hemidiaphragm is now in a normal   position and there has been resolution of the mediastinal shift to the left. Interval decrease in the right lower lung field atelectasis. XR ABDOMEN FOR NG/OG/NE TUBE PLACEMENT   Final Result   Nasogastric tube is in good position. XR CHEST PORTABLE   Final Result   Opacification in the  left lung, pneumonia cannot be excluded. Endotracheal   tube tip 2.9 cm above louann. Nasogastric tube is in good position. XR CHEST PORTABLE   Final Result   No acute cardiopulmonary process. XR CHEST PORTABLE    (Results Pending)   MRI BRAIN W WO CONTRAST    (Results Pending)        Resident's Assessment and Plan     Assessment:  1. Acute hypoxic respiratory failure 2/2 ? Aspiration pneumonia (resolving)  2. Shock, likely hypovolemic vs distributive vs cardiogenic vs adrenal insufficiency   3. Adrenal insufficiency, in adrenal crisis, 0945AM Cortisol: 2.86  4. H/O Hypothyroidism on levothyroxine replacement;TSH: 10.33 (high), free T4: 1.2; total T3: 67.53  5. H/O Down syndrome  6. H/O alzheimer's disease, on Donepezil  7. 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  H/O Communicating hydrocephalus s/p  shunt (11/2019)  9. H/O seizure disorder on Keppra  10. H/O B/L DVT s/p IVC filer placement    PLAN:  1. Wean off vent and sedation as able  2. Daily SBT, hold TF in AM prior to SBT, resume TF if SBT not successful  3. Started midodrine, wean off levophed as able  4. Started hydrocortisone 50 mg TID as patient is still on levophed, after weaned off levophed will transition to hydrocortisone 50 mg BID x 2 days followed by 25 mg BID per endo recs  5. Ordered NICOM, give fluids if fluid responsive  6. Ordered MRI brain for AI workup  7. Follow endo recs  8. Continue synthroid  9. Follow neuro: continue keppra  10. Daily CBC and BMP  11. Daily ABG and CXR while intubated      # Peptic ulcer prophylaxis: PPI BID  # DVT Prophylaxis: lovenox  # Disposition: Cont current care    Carlos Bernardo MD, PGY-1    [place]  Department of Pulmonary, Critical Care and Sleep Medicine  [address] [facility]  Department of Internal Medicine      During multidisciplinary team rounds Prudence Serrato See is a 65 y.o. female was seen, examined and discussed. This is confirmation that I have personally seen and examined the patient and that the key elements of the encounter were performed by me (> 85 % time). The medications & laboratory data was discussed and adjusted where necessary. The radiographic images were reviewed or with radiologist or consultant if felt dis-concordant with the exam or history. The above findings were corroborated, plans confirmed and changes made if needed. Family is updated at the bedside as available. Key issues of the case were discussed among consultants. Critical Care time is documented if appropriate.       Khushi Cain DO, FACP, Aria Crook,    Attending Physician: Dr. Suhas Ceballos

## 2022-06-09 NOTE — PROGRESS NOTES
Hospitalist Progress Note      SYNOPSIS: Patient admitted on 2022 for acute respiratory failure requiring intubation, aspiration pneumonia       SUBJECTIVE:    Patient seen and examined. Remains intubated, sedated  Records reviewed. Stable overnight. No other overnight issues reported. Temp (24hrs), Av.4 °F (36.9 °C), Min:98.2 °F (36.8 °C), Max:99.5 °F (37.5 °C)    DIET: Diet NPO  ADULT TUBE FEEDING; PEG; Standard without Fiber; Continuous; 20; Yes; 10; Q 4 hours; 45; 150; Q 4 hours  CODE: Full Code    Intake/Output Summary (Last 24 hours) at 2022 0726  Last data filed at 2022 0600  Gross per 24 hour   Intake 1586.18 ml   Output 2000 ml   Net -413.82 ml       OBJECTIVE:    /68   Pulse (!) 49   Temp 98.2 °F (36.8 °C) (Bladder)   Resp 24   Ht 4' 10\" (1.473 m)   Wt 127 lb 9.6 oz (57.9 kg)   SpO2 100%   BMI 26.67 kg/m²     General appearance: No apparent distress  HEENT:  Conjunctivae/corneas clear. Down syndrome facial features   Neck: Supple. No jugular venous distention. Respiratory: Oral ETT. Clear to auscultation bilaterally  Cardiovascular: Regular rate rhythm, normal S1-S2  Abdomen: Soft, nontender, nondistended  Musculoskeletal: No clubbing, cyanosis, no bilateral lower extremity edema. Brisk capillary refill. Skin:  No rashes  on visible skin  Neurologic: sedated     ASSESSMENT:    Acute hypoxic respiratory failure requiring endotracheal intubation    Secondary to Aspiration pneumonia  Septic shock. Source pneumonia   Down syndrome  Hypothyroidism   Hx of hydrocephalus s/p  shunts  Hx of seizure. EEG 6/ Abnormal EEG polymorphic slowing suggestive of epileptiform foci in the right frontal lobe versus structural abnormality.  Diffuse slowing also noted consistent with moderate encephalopathy.  Clinical correlation is indicated  Dysphagia with PEG in situ   Diagnosed with Alzheimer's.  On Aricept  Hx of DVT s/p IVC filter           PLAN:     - Vent setting as per ICU Recent Labs     06/07/22  0451 06/08/22  0300 06/09/22  0417   PROT 6.5 5.6* 6.4   ALKPHOS 97 73 77   ALT 23 19 17   AST 19 15 15   BILITOT 0.3 0.3 0.3       No results for input(s): INR in the last 72 hours. No results for input(s): Isabella Comment in the last 72 hours. Chronic labs:    Lab Results   Component Value Date    CHOL 173 03/30/2022    TRIG 88 03/30/2022    HDL 62 03/30/2022    LDLCALC 93 03/30/2022    TSH 10.330 (H) 06/05/2022    INR 1.0 06/05/2022    LABA1C 5.2 03/30/2022       Radiology: REVIEWED DAILY    +++++++++++++++++++++++++++++++++++++++++++++++++  Brandy Cloud MD  [facility] Physician - [address] [place] [address]  +++++++++++++++++++++++++++++++++++++++++++++++++  NOTE: This report was transcribed using voice recognition software. Every effort was made to ensure accuracy; however, inadvertent computerized transcription errors may be present.

## 2022-06-09 NOTE — PLAN OF CARE
Problem: Discharge Planning  Goal: Discharge to home or other facility with appropriate resources  6/9/2022 0240 by Berenice Fine RN  Outcome: Progressing  6/9/2022 0240 by Berenice Fine RN  Outcome: Progressing     Problem: Pain  Goal: Verbalizes/displays adequate comfort level or baseline comfort level  6/9/2022 0240 by Berenice Fine RN  Outcome: Progressing  6/9/2022 0240 by Berenice Fine RN  Outcome: Progressing  Flowsheets  Taken 6/9/2022 0200  Verbalizes/displays adequate comfort level or baseline comfort level: Encourage patient to monitor pain and request assistance  Taken 6/9/2022 0000  Verbalizes/displays adequate comfort level or baseline comfort level: Encourage patient to monitor pain and request assistance  Taken 6/8/2022 2200  Verbalizes/displays adequate comfort level or baseline comfort level: Encourage patient to monitor pain and request assistance  Taken 6/8/2022 2000  Verbalizes/displays adequate comfort level or baseline comfort level: Encourage patient to monitor pain and request assistance     Problem: Respiratory - Adult  Goal: Achieves optimal ventilation and oxygenation  6/9/2022 0240 by Berenice Fine RN  Outcome: Progressing  6/9/2022 0240 by Berenice Fine RN  Outcome: Progressing  6/8/2022 1445 by Chapman Kussmaul, RCP  Outcome: Progressing     Problem: Skin/Tissue Integrity  Goal: Absence of new skin breakdown  Description: 1. Monitor for areas of redness and/or skin breakdown  2. Assess vascular access sites hourly  3. Every 4-6 hours minimum:  Change oxygen saturation probe site  4. Every 4-6 hours:  If on nasal continuous positive airway pressure, respiratory therapy assess nares and determine need for appliance change or resting period.   6/9/2022 0240 by Berenice Fine RN  Outcome: Progressing  6/9/2022 0240 by Berenice Fine RN  Outcome: Progressing     Problem: ABCDS Injury Assessment  Goal: Absence of physical injury  6/9/2022 0240 by Berenice Fine RN  Outcome: Progressing  6/9/2022 0240 by Harlan Gonzalez RN  Outcome: Progressing     Problem: Safety - Adult  Goal: Free from fall injury  6/9/2022 0240 by Harlan Gonzalez RN  Outcome: Progressing  6/9/2022 0240 by Harlan Gonzalez RN  Outcome: Progressing     Problem: Nutrition Deficit:  Goal: Optimize nutritional status  6/9/2022 0240 by Harlan Gonzalez RN  Outcome: Progressing  6/9/2022 0240 by Harlan Gonzalez RN  Outcome: Progressing     Problem: Safety - Medical Restraint  Goal: Remains free of injury from restraints (Restraint for Interference with Medical Device)  Description: INTERVENTIONS:  1. Determine that other, less restrictive measures have been tried or would not be effective before applying the restraint  2. Evaluate the patient's condition at the time of restraint application  3. Inform patient/family regarding the reason for restraint  4.  Q2H: Monitor safety, psychosocial status, comfort, nutrition and hydration  6/9/2022 0240 by Harlan Gonzalez RN  Outcome: Progressing  6/9/2022 0240 by Harlan Gonzalez RN  Outcome: Progressing  Flowsheets  Taken 6/9/2022 0200  Remains free of injury from restraints (restraint for interference with medical device): Every 2 hours: Monitor safety, psychosocial status, comfort, nutrition and hydration  Taken 6/9/2022 0000  Remains free of injury from restraints (restraint for interference with medical device): Every 2 hours: Monitor safety, psychosocial status, comfort, nutrition and hydration  Taken 6/8/2022 2200  Remains free of injury from restraints (restraint for interference with medical device): Every 2 hours: Monitor safety, psychosocial status, comfort, nutrition and hydration  Taken 6/8/2022 2000  Remains free of injury from restraints (restraint for interference with medical device): Every 2 hours: Monitor safety, psychosocial status, comfort, nutrition and hydration

## 2022-06-09 NOTE — FLOWSHEET NOTE
Inpatient Wound Care(Initial consult) 4431    Admit Date: 6/5/2022  1:26 AM    Reason for consult:  Back    Significant history: Per H&P    Chief Complaint:  had concerns including Shortness of Breath.     History Of Present Illness:    Ms. Zeferino Early, a 65y.o. year old female  who  has a past medical history of Arthritis, Down syndrome, Hypothyroidism, Osteoarthritis, Syncope, Thyroid disease, UTI (urinary tract infection), Varicose veins, and Wears glasses.      Findings:      06/09/22 1045   Rhythm Interpretation   Heart Rate 93   Skin Integumentary    Skin Integrity   (dry flaky)   Location   (bilateral feet)   Skin Integrity Site 2   Skin Integrity Location 2 Rash;Redness   Location 2   (javi-area, groin)   Skin Integrity Site 3   Skin Integrity Location 3   (redness, rash, dry flaky)    Location 3   (bilateral buttocks)   Wound 05/06/22 Heel Right   Date First Assessed/Time First Assessed: 05/06/22 2222   Present on Hospital Admission: Yes  Primary Wound Type: Pressure Injury  Location: Heel  Wound Location Orientation: Right   Wound Image    Wound Etiology Pressure Unstageable   Dressing/Treatment Open to air   Wound Length (cm) 4 cm   Wound Width (cm) 4 cm   Wound Depth (cm)   (unable to determine)   Wound Surface Area (cm^2) 16 cm^2   Change in Wound Size % (l*w) -1.59   Wound Assessment Eschar dry   Drainage Amount None   Odor None   Javi-wound Assessment Dry/flaky   Wound 06/05/22 Foot Left;Lateral   Date First Assessed/Time First Assessed: 06/05/22 0847   Present on Hospital Admission: Yes  Primary Wound Type: Pressure Injury  Location: Foot  Wound Location Orientation: Left;Lateral   Wound Image    Wound Etiology Pressure Unstageable   Dressing/Treatment Open to air   Wound Length (cm) 0.3 cm   Wound Width (cm) 0.8 cm   Wound Depth (cm)   (unable to determine)   Wound Surface Area (cm^2) 0.24 cm^2   Change in Wound Size % (l*w) 76   Wound Assessment Eschar dry   Drainage Amount None   Odor None Alize-wound Assessment Dry/flaky      **Informed Consent**     photos taken of wounds and inserted into their chart as part of their permanent medical record for purposes of documentation, treatment management and/or medical review. All Images taken on 6/9/22 of patient name: Zeferino Early were transmitted and stored on secured [employer] located within AP[employer] Tab by a registered [employer] Mobile Application Device. Impression:  Left lateral foot, right heel: unstageable    Plan: Aloe vesta ointment   Antifungal powder  Heel protectors  TAPS  Patient will need continued preventative care.       Arlet Norton RN 6/9/2022 1:52 PM

## 2022-06-09 NOTE — PROGRESS NOTES
Pt bradycardiac in 40's with occasional junctional rhythm. BP labile, unable to wean off levophed and maintain a MAP >60. Sedation being weaned for RASS -2. Pt responds to painful stimuli. Family updated at bedside. Dr. Twyla Michael informed, plan to continue to wean fentanyl and attempt to wean levophed after midodrine administered. Will continue to monitor.

## 2022-06-09 NOTE — PROGRESS NOTES
Stage  CI HR  TFC SVI   Baseline 2.0 48  83.5 42   Challenge 2.1 49  84.1 44   Result (%Ä) 6.3% 1.2%  0.8 5%     250 cc bolus

## 2022-06-10 ENCOUNTER — APPOINTMENT (OUTPATIENT)
Dept: GENERAL RADIOLOGY | Age: 57
DRG: 870 | End: 2022-06-10
Payer: MEDICARE

## 2022-06-10 LAB
AADO2: 74.9 MMHG
AADO2: 89.2 MMHG
ALBUMIN SERPL-MCNC: 3.1 G/DL (ref 3.5–5.2)
ALP BLD-CCNC: 90 U/L (ref 35–104)
ALT SERPL-CCNC: 18 U/L (ref 0–32)
ANION GAP SERPL CALCULATED.3IONS-SCNC: 11 MMOL/L (ref 7–16)
AST SERPL-CCNC: 14 U/L (ref 0–31)
B.E.: 1.1 MMOL/L (ref -3–3)
B.E.: 6.7 MMOL/L (ref -3–3)
BASOPHILS ABSOLUTE: 0.01 E9/L (ref 0–0.2)
BASOPHILS RELATIVE PERCENT: 0.1 % (ref 0–2)
BILIRUB SERPL-MCNC: 0.2 MG/DL (ref 0–1.2)
BLOOD CULTURE, ROUTINE: NORMAL
BUN BLDV-MCNC: 11 MG/DL (ref 6–20)
CALCIUM SERPL-MCNC: 8.2 MG/DL (ref 8.6–10.2)
CHLORIDE BLD-SCNC: 106 MMOL/L (ref 98–107)
CO2: 25 MMOL/L (ref 22–29)
COHB: 0.5 % (ref 0–1.5)
COHB: 0.7 % (ref 0–1.5)
CREAT SERPL-MCNC: 0.5 MG/DL (ref 0.5–1)
CRITICAL: ABNORMAL
CRITICAL: ABNORMAL
CULTURE, BLOOD 2: NORMAL
DATE ANALYZED: ABNORMAL
DATE ANALYZED: ABNORMAL
DATE OF COLLECTION: ABNORMAL
DATE OF COLLECTION: ABNORMAL
EOSINOPHILS ABSOLUTE: 0 E9/L (ref 0.05–0.5)
EOSINOPHILS RELATIVE PERCENT: 0 % (ref 0–6)
FIO2: 40 %
FIO2: 40 %
GFR AFRICAN AMERICAN: >60
GFR NON-AFRICAN AMERICAN: >60 ML/MIN/1.73
GLUCOSE BLD-MCNC: 141 MG/DL (ref 74–99)
HCO3: 25.1 MMOL/L (ref 22–26)
HCO3: 28.9 MMOL/L (ref 22–26)
HCT VFR BLD CALC: 37.6 % (ref 34–48)
HEMOGLOBIN: 12.1 G/DL (ref 11.5–15.5)
HHB: 1 % (ref 0–5)
HHB: 1 % (ref 0–5)
IMMATURE GRANULOCYTES #: 0.2 E9/L
IMMATURE GRANULOCYTES %: 2.9 % (ref 0–5)
LAB: ABNORMAL
LAB: ABNORMAL
LYMPHOCYTES ABSOLUTE: 0.7 E9/L (ref 1.5–4)
LYMPHOCYTES RELATIVE PERCENT: 10.2 % (ref 20–42)
Lab: ABNORMAL
Lab: ABNORMAL
MCH RBC QN AUTO: 30.3 PG (ref 26–35)
MCHC RBC AUTO-ENTMCNC: 32.2 % (ref 32–34.5)
MCV RBC AUTO: 94 FL (ref 80–99.9)
METHB: 0.3 % (ref 0–1.5)
METHB: 0.5 % (ref 0–1.5)
MODE: ABNORMAL
MODE: AC
MONOCYTES ABSOLUTE: 0.28 E9/L (ref 0.1–0.95)
MONOCYTES RELATIVE PERCENT: 4.1 % (ref 2–12)
NEUTROPHILS ABSOLUTE: 5.67 E9/L (ref 1.8–7.3)
NEUTROPHILS RELATIVE PERCENT: 82.7 % (ref 43–80)
O2 SATURATION: 99.1 % (ref 92–98.5)
O2 SATURATION: 99.2 % (ref 92–98.5)
O2HB: 97.8 % (ref 94–97)
O2HB: 98.2 % (ref 94–97)
OPERATOR ID: 1926
OPERATOR ID: 2593
PATIENT TEMP: 37 C
PATIENT TEMP: 37 C
PCO2: 33.2 MMHG (ref 35–45)
PCO2: 37.7 MMHG (ref 35–45)
PDW BLD-RTO: 15.5 FL (ref 11.5–15)
PEEP/CPAP: 5 CMH2O
PEEP/CPAP: 5 CMH2O
PFO2: 3.7 MMHG/%
PFO2: 3.92 MMHG/%
PH BLOOD GAS: 7.44 (ref 7.35–7.45)
PH BLOOD GAS: 7.56 (ref 7.35–7.45)
PLATELET # BLD: 225 E9/L (ref 130–450)
PMV BLD AUTO: 9.9 FL (ref 7–12)
PO2: 147.8 MMHG (ref 75–100)
PO2: 156.9 MMHG (ref 75–100)
POTASSIUM REFLEX MAGNESIUM: 3.6 MMOL/L (ref 3.5–5)
PS: 10 CMH20
RBC # BLD: 4 E12/L (ref 3.5–5.5)
RI(T): 0.48
RI(T): 0.6
RR MECHANICAL: 12 B/MIN
SODIUM BLD-SCNC: 142 MMOL/L (ref 132–146)
SOURCE, BLOOD GAS: ABNORMAL
SOURCE, BLOOD GAS: ABNORMAL
THB: 12.6 G/DL (ref 11.5–16.5)
THB: 13.2 G/DL (ref 11.5–16.5)
TIME ANALYZED: 1027
TIME ANALYZED: 425
TOTAL PROTEIN: 6.7 G/DL (ref 6.4–8.3)
VT MECHANICAL: 300 ML
WBC # BLD: 6.9 E9/L (ref 4.5–11.5)

## 2022-06-10 PROCEDURE — 6370000000 HC RX 637 (ALT 250 FOR IP): Performed by: INTERNAL MEDICINE

## 2022-06-10 PROCEDURE — 94640 AIRWAY INHALATION TREATMENT: CPT

## 2022-06-10 PROCEDURE — 2580000003 HC RX 258: Performed by: FAMILY MEDICINE

## 2022-06-10 PROCEDURE — 6360000002 HC RX W HCPCS: Performed by: INTERNAL MEDICINE

## 2022-06-10 PROCEDURE — 85025 COMPLETE CBC W/AUTO DIFF WBC: CPT

## 2022-06-10 PROCEDURE — 99232 SBSQ HOSP IP/OBS MODERATE 35: CPT | Performed by: PHYSICIAN ASSISTANT

## 2022-06-10 PROCEDURE — 6370000000 HC RX 637 (ALT 250 FOR IP): Performed by: FAMILY MEDICINE

## 2022-06-10 PROCEDURE — 94003 VENT MGMT INPAT SUBQ DAY: CPT

## 2022-06-10 PROCEDURE — 2000000000 HC ICU R&B

## 2022-06-10 PROCEDURE — 82805 BLOOD GASES W/O2 SATURATION: CPT

## 2022-06-10 PROCEDURE — 80053 COMPREHEN METABOLIC PANEL: CPT

## 2022-06-10 PROCEDURE — 2580000003 HC RX 258: Performed by: INTERNAL MEDICINE

## 2022-06-10 PROCEDURE — 71045 X-RAY EXAM CHEST 1 VIEW: CPT

## 2022-06-10 PROCEDURE — 6360000002 HC RX W HCPCS: Performed by: PHYSICIAN ASSISTANT

## 2022-06-10 PROCEDURE — C9113 INJ PANTOPRAZOLE SODIUM, VIA: HCPCS | Performed by: INTERNAL MEDICINE

## 2022-06-10 PROCEDURE — 36600 WITHDRAWAL OF ARTERIAL BLOOD: CPT

## 2022-06-10 PROCEDURE — 36592 COLLECT BLOOD FROM PICC: CPT

## 2022-06-10 PROCEDURE — A4216 STERILE WATER/SALINE, 10 ML: HCPCS | Performed by: INTERNAL MEDICINE

## 2022-06-10 PROCEDURE — 6360000002 HC RX W HCPCS: Performed by: STUDENT IN AN ORGANIZED HEALTH CARE EDUCATION/TRAINING PROGRAM

## 2022-06-10 PROCEDURE — 6370000000 HC RX 637 (ALT 250 FOR IP): Performed by: PHYSICIAN ASSISTANT

## 2022-06-10 RX ADMIN — IPRATROPIUM BROMIDE AND ALBUTEROL SULFATE 3 ML: .5; 2.5 SOLUTION RESPIRATORY (INHALATION) at 04:44

## 2022-06-10 RX ADMIN — HYDROCORTISONE SODIUM SUCCINATE 50 MG: 100 INJECTION, POWDER, FOR SOLUTION INTRAMUSCULAR; INTRAVENOUS at 00:42

## 2022-06-10 RX ADMIN — CLONAZEPAM 0.5 MG: 0.5 TABLET ORAL at 04:18

## 2022-06-10 RX ADMIN — CHLORHEXIDINE GLUCONATE 15 ML: 1.2 RINSE ORAL at 20:49

## 2022-06-10 RX ADMIN — SODIUM CHLORIDE, PRESERVATIVE FREE 40 MG: 5 INJECTION INTRAVENOUS at 20:47

## 2022-06-10 RX ADMIN — HYDROCORTISONE SODIUM SUCCINATE 50 MG: 100 INJECTION, POWDER, FOR SOLUTION INTRAMUSCULAR; INTRAVENOUS at 17:43

## 2022-06-10 RX ADMIN — Medication 2000 UNITS: at 08:25

## 2022-06-10 RX ADMIN — MIDODRINE HYDROCHLORIDE 10 MG: 10 TABLET ORAL at 08:25

## 2022-06-10 RX ADMIN — LEVETIRACETAM 1000 MG: 10 INJECTION, SOLUTION INTRAVENOUS at 00:42

## 2022-06-10 RX ADMIN — CHLORHEXIDINE GLUCONATE 15 ML: 1.2 RINSE ORAL at 08:25

## 2022-06-10 RX ADMIN — BUDESONIDE 500 MCG: 0.5 SUSPENSION RESPIRATORY (INHALATION) at 08:01

## 2022-06-10 RX ADMIN — LEVETIRACETAM 1000 MG: 10 INJECTION, SOLUTION INTRAVENOUS at 11:39

## 2022-06-10 RX ADMIN — LEVOTHYROXINE SODIUM 88 MCG: 0.09 TABLET ORAL at 05:50

## 2022-06-10 RX ADMIN — RISPERIDONE 0.25 MG: 0.25 TABLET ORAL at 20:47

## 2022-06-10 RX ADMIN — MUPIROCIN: 20 OINTMENT TOPICAL at 08:25

## 2022-06-10 RX ADMIN — IPRATROPIUM BROMIDE AND ALBUTEROL SULFATE 3 ML: .5; 2.5 SOLUTION RESPIRATORY (INHALATION) at 20:40

## 2022-06-10 RX ADMIN — Medication: at 20:46

## 2022-06-10 RX ADMIN — IPRATROPIUM BROMIDE AND ALBUTEROL SULFATE 3 ML: .5; 2.5 SOLUTION RESPIRATORY (INHALATION) at 08:01

## 2022-06-10 RX ADMIN — SODIUM CHLORIDE, PRESERVATIVE FREE 40 MG: 5 INJECTION INTRAVENOUS at 08:25

## 2022-06-10 RX ADMIN — HYDROCORTISONE SODIUM SUCCINATE 50 MG: 100 INJECTION, POWDER, FOR SOLUTION INTRAMUSCULAR; INTRAVENOUS at 08:25

## 2022-06-10 RX ADMIN — BUDESONIDE 500 MCG: 0.5 SUSPENSION RESPIRATORY (INHALATION) at 20:40

## 2022-06-10 RX ADMIN — IPRATROPIUM BROMIDE AND ALBUTEROL SULFATE 3 ML: .5; 2.5 SOLUTION RESPIRATORY (INHALATION) at 01:33

## 2022-06-10 RX ADMIN — SODIUM CHLORIDE, PRESERVATIVE FREE 10 ML: 5 INJECTION INTRAVENOUS at 20:49

## 2022-06-10 RX ADMIN — RISPERIDONE 0.25 MG: 0.25 TABLET ORAL at 08:25

## 2022-06-10 RX ADMIN — CLONAZEPAM 0.5 MG: 0.5 TABLET ORAL at 20:45

## 2022-06-10 RX ADMIN — Medication: at 08:26

## 2022-06-10 RX ADMIN — MICONAZOLE NITRATE: 20.6 POWDER TOPICAL at 20:46

## 2022-06-10 RX ADMIN — ENOXAPARIN SODIUM 40 MG: 100 INJECTION SUBCUTANEOUS at 08:24

## 2022-06-10 RX ADMIN — SODIUM CHLORIDE, PRESERVATIVE FREE 10 ML: 5 INJECTION INTRAVENOUS at 08:35

## 2022-06-10 RX ADMIN — MUPIROCIN: 20 OINTMENT TOPICAL at 20:46

## 2022-06-10 RX ADMIN — POTASSIUM BICARBONATE 40 MEQ: 782 TABLET, EFFERVESCENT ORAL at 08:25

## 2022-06-10 RX ADMIN — DONEPEZIL HYDROCHLORIDE 5 MG: 5 TABLET, FILM COATED ORAL at 20:45

## 2022-06-10 RX ADMIN — CLONAZEPAM 0.5 MG: 0.5 TABLET ORAL at 11:40

## 2022-06-10 RX ADMIN — IPRATROPIUM BROMIDE AND ALBUTEROL SULFATE 3 ML: .5; 2.5 SOLUTION RESPIRATORY (INHALATION) at 12:43

## 2022-06-10 RX ADMIN — IPRATROPIUM BROMIDE AND ALBUTEROL SULFATE 3 ML: .5; 2.5 SOLUTION RESPIRATORY (INHALATION) at 16:24

## 2022-06-10 RX ADMIN — MICONAZOLE NITRATE: 20.6 POWDER TOPICAL at 08:26

## 2022-06-10 RX ADMIN — MIDODRINE HYDROCHLORIDE 10 MG: 10 TABLET ORAL at 17:44

## 2022-06-10 RX ADMIN — MIDODRINE HYDROCHLORIDE 10 MG: 10 TABLET ORAL at 11:40

## 2022-06-10 ASSESSMENT — PULMONARY FUNCTION TESTS
PIF_VALUE: 15
PIF_VALUE: 12
PIF_VALUE: 19
PIF_VALUE: 16
PIF_VALUE: 23
PIF_VALUE: 16
PIF_VALUE: 19
PIF_VALUE: 16
PIF_VALUE: 18
PIF_VALUE: 22
PIF_VALUE: 18
PIF_VALUE: 20
PIF_VALUE: 33
PIF_VALUE: 22
PIF_VALUE: 18
PIF_VALUE: 16
PIF_VALUE: 18
PIF_VALUE: 21
PIF_VALUE: 18
PIF_VALUE: 18
PIF_VALUE: 16
PIF_VALUE: 18
PIF_VALUE: 18
PIF_VALUE: 22
PIF_VALUE: 18
PIF_VALUE: 18
PIF_VALUE: 13
PIF_VALUE: 17
PIF_VALUE: 17
PIF_VALUE: 16
PIF_VALUE: 18
PIF_VALUE: 20
PIF_VALUE: 18
PIF_VALUE: 16
PIF_VALUE: 18
PIF_VALUE: 17
PIF_VALUE: 18
PIF_VALUE: 19
PIF_VALUE: 21
PIF_VALUE: 18
PIF_VALUE: 22
PIF_VALUE: 17
PIF_VALUE: 21
PIF_VALUE: 14
PIF_VALUE: 20
PIF_VALUE: 16
PIF_VALUE: 19
PIF_VALUE: 21
PIF_VALUE: 16

## 2022-06-10 ASSESSMENT — PAIN SCALES - GENERAL
PAINLEVEL_OUTOF10: 0
PAINLEVEL_OUTOF10: 0
PAINLEVEL_OUTOF10: 1
PAINLEVEL_OUTOF10: 0
PAINLEVEL_OUTOF10: 1
PAINLEVEL_OUTOF10: 0

## 2022-06-10 ASSESSMENT — PAIN SCALES - WONG BAKER
WONGBAKER_NUMERICALRESPONSE: 0

## 2022-06-10 NOTE — PLAN OF CARE
Problem: Respiratory - Adult  Goal: Achieves optimal ventilation and oxygenation  6/10/2022 1419 by Mray Recinos RCP  Flowsheets (Taken 6/10/2022 1419)  Achieves optimal ventilation and oxygenation:   Assess for changes in respiratory status   Position to facilitate oxygenation and minimize respiratory effort   Assess the need for suctioning and aspirate as needed   Respiratory therapy support as indicated   Oxygen supplementation based on oxygen saturation or arterial blood gases

## 2022-06-10 NOTE — PROGRESS NOTES
Physician Progress Note      Maddie Dennison  CSN #:                  [id number]  :                       1965  ADMIT DATE:       2022 1:26 AM  DISCH DATE:  RESPONDING  PROVIDER #:        Suzy Aleman [address] [facility]          QUERY TEXT:    Patient admitted with Sepsis; Aspiration Pneumonia. Noted documentation of   Septic Shock source pneumonia in Internal medicine progress note 22  and   Shock likely hypovolemic Vs distributive VS combination in critical care note   on 22. If possible, please document in progress notes and discharge summary if you   are evaluating and /or treating any of the following: The medical record reflects the following:  Risk Factors: Aspiration pneumonia; Respiratory failure; chronic Illness  Clinical Indicators:  22: Critical Care progress consult: Hypotension/shock 2/2 sepsis source   pneumonia VS SE of meds on intubation  22: Internal med progress note: Septic shock source pneumonia  22: Critical care progress note: Shock, likely hypovolemic VS distributive   VS combination R/O adrenal insufficiency  Treatment: IVF bolus; IV antibtiocs; Ventilator support. IV fluids;   Levophed   infusion; Critical Care consult    Thank Rosamaria ARRINGTON, R.N.  Clinical Documentation Improvement  [phone]  Options provided:  -- Septic shock confirmed and hypovolemic shock ruled out  -- Hypovolemic Shock  confirmed and Septic Shock ruled out  -- Septic shock  and Hypovolemic shock confirmed  -- Other - I will add my own diagnosis  -- Disagree - Not applicable / Not valid  -- Disagree - Clinically unable to determine / Unknown  -- Refer to Clinical Documentation Reviewer    PROVIDER RESPONSE TEXT:    Shock, likely hypovolemic vs distributive vs cardiogenic?vs?adrenal   insufficiency (per ICU)    Query created by: Nehemias Dooley on 2022 1:10 PM      Electronically signed by:  Amber Gillette 6/10/2022 4:54 PM

## 2022-06-10 NOTE — PROGRESS NOTES
Nursing communication order by Sheila Heart was put in to change Levophed to 0.5 mcg/min. Levo is currently running at 0.5mcg/min.

## 2022-06-10 NOTE — PROGRESS NOTES
ENDOCRINOLOGY PROGRESS NOTE    Date of Admission: 6/5/2022  Date of Service: 6/9/2022  Admitting Physician: Jacob Knapp DO   Primary Care Physician: Kait Harris DO  Consultant physician: Cuca Veronica MD     Reason for the consultation:  Evaluation for possible adrenal insufficiency     History of Present Illness:  History was obtain from medical records   [place] K[place] Oar See is a 65 y.o. old female with PMH of h/o Down syndrome, Hydrocephalus s/p  shunt, Chronic Subdural Hematoma, Hypothyroidism and other listed below admitted to [facility] on 6/5/2022 because of SOB, endocrine service was consulted for evaluation for possible adrenal insufficiency     Subjective   Seen and examined , intubated, family at bedsite     Scheduled Meds:   midodrine  10 mg Oral TID WC    hydrocortisone sodium succinate PF  50 mg IntraVENous Q8H    clonazePAM  0.5 mg Oral Q8H    miconazole nitrate   Topical BID    miconazole   Topical BID    potassium phosphate IVPB  15 mmol IntraVENous Once    [Held by provider] hydrocortisone sodium succinate PF  50 mg IntraVENous BID    [Held by provider] hydrocortisone sodium succinate PF  25 mg IntraVENous BID    levETIRAcetam  1,000 mg IntraVENous Q12H    Or    levETIRAcetam  1,000 mg Oral Q12H    mupirocin   Topical BID    enoxaparin  40 mg SubCUTAneous Daily    pantoprazole (PROTONIX) 40 mg injection  40 mg IntraVENous Q12H    vitamin D  2,000 Units Oral Daily    donepezil  5 mg Oral Nightly    [Held by provider] lactulose  20 g Oral TID    levothyroxine  88 mcg Oral Daily    risperiDONE  0.25 mg Oral BID    sodium chloride flush  10 mL IntraVENous 2 times per day    budesonide  0.5 mg Nebulization BID    ipratropium-albuterol  3 mL Inhalation Q4H    chlorhexidine  15 mL Mouth/Throat BID     PRN Meds:   miconazole nitrate, , TID PRN  perflutren lipid microspheres, 1.5 mL, ONCE PRN  sodium chloride flush, 10 mL, PRN  sodium chloride, , PRN  promethazine, 12.5 mg, Q6H PRN Or  ondansetron, 4 mg, Q6H PRN  polyethylene glycol, 17 g, Daily PRN  acetaminophen, 650 mg, Q6H PRN   Or  acetaminophen, 650 mg, Q6H PRN  fentaNYL, 50 mcg, Q30 Min PRN      Continuous Infusions:   sodium chloride      fentaNYL 25 mcg/hr (06/09/22 1800)    norepinephrine 1 mcg/min (06/09/22 2119)       Review of Systems  All systems reviewed. All negative except for symptoms mentioned in HPI     OBJECTIVE    /65   Pulse 54   Temp 97.5 °F (36.4 °C) (Bladder)   Resp 24   Ht 4' 10\" (1.473 m)   Wt 127 lb 9.6 oz (57.9 kg)   SpO2 99%   BMI 26.67 kg/m²   Wt Readings from Last 6 Encounters:   06/07/22 127 lb 9.6 oz (57.9 kg)   06/09/22 127 lb 10.3 oz (57.9 kg)   05/31/22 125 lb (56.7 kg)   05/04/22 130 lb (59 kg)   03/22/22 128 lb (58.1 kg)   03/19/22 128 lb 9.6 oz (58.3 kg)     Physical examination:  Due to this being a TeleHealth encounter, evaluation of the following organ systems is limited: Vitals/Constitutional/EENT/Resp/CV/GI//MS/Neuro/Skin/Heme-Lymph-Imm. Modified physical exam through Telemedicine camera    Neck: no obvious neck mass. No obvious neck deformity     CVS: no distress   Chest: no distress.  Chest is moving with respiration    Extremities:  no visible tremor  Skin: No visible rashes as seen from camera   Musculoskeletal: no visible deformity    Review of Laboratory Data:  I personally reviewed the following labs:  Recent Labs     06/07/22  0451 06/08/22  0300 06/09/22  0417   WBC 5.0 4.4* 4.5   RBC 3.91 3.30* 3.60   HGB 11.9 10.0* 10.9*   HCT 37.2 31.4* 34.2   MCV 95.1 95.2 95.0   MCH 30.4 30.3 30.3   MCHC 32.0 31.8* 31.9*   RDW 15.5* 15.4* 15.4*    215 234   MPV 10.0 9.5 9.9     Recent Labs     06/07/22  0451 06/07/22  0451 06/08/22  0300 06/09/22  0417 06/09/22  1653      < > 144 144 145   K 4.4   < > 3.5 3.7 3.8   *   < > 115* 109* 112*   CO2 19*   < > 22 25 26   BUN 5*   < > 9 10 12   CREATININE 0.6   < > 0.8 0.5 0.5   GLUCOSE 147*   < > 144* 140* 121* CALCIUM 8.5*   < > 7.8* 8.5* 7.9*   PROT 6.5  --  5.6* 6.4  --    LABALBU 2.7*  --  2.4* 3.0*  --    BILITOT 0.3  --  0.3 0.3  --    ALKPHOS 97  --  73 77  --    AST 19  --  15 15  --    ALT 23  --  19 17  --     < > = values in this interval not displayed. No results found for: BHYDRXBUT  Lab Results   Component Value Date    LABA1C 5.2 03/30/2022     Lab Results   Component Value Date/Time    TSH 10.330 (H) 06/05/2022 01:55 AM    T4FREE 1.20 06/05/2022 09:45 AM    Q0YNVZL 5.6 06/05/2022 09:45 AM    L3QQUFG 67.53 (L) 06/05/2022 09:45 AM     Lab Results   Component Value Date    LABA1C 5.2 03/30/2022    GLUCOSE 121 06/09/2022    GLUCOSE 96 05/15/2012     Lab Results   Component Value Date    TRIG 88 03/30/2022    HDL 62 03/30/2022    LDLCALC 93 03/30/2022    CHOL 173 03/30/2022       Blood culture   Lab Results   Component Value Date    BC 24 Hours no growth 06/05/2022    BC 5 Days no growth 05/31/2022    BC 5 Days no growth 05/04/2022    BC 5 Days no growth 03/19/2022    BC 5 Days- no growth 06/06/2019    BC 5 Days- no growth 04/15/2019       Radiology:  XR CHEST PORTABLE   Final Result   New left lower lobe atelectasis. XR CHEST PORTABLE   Final Result   No acute process. XR CHEST PORTABLE   Final Result   Slight linear subsegmental atelectasis on the left. Support tubes and catheters in good position. XR CHEST PORTABLE   Final Result   1. There are no findings of failure or pneumonia   2. Satisfactory position of the endotracheal tube and right internal jugular   central venous catheter         XR CHEST PORTABLE   Final Result   Interval placement of a right-sided IJ line, when compared to the previous   study performed earlier in the day. No pneumothorax. Previously noted left   lung opacities have resolved. The left hemidiaphragm is now in a normal   position and there has been resolution of the mediastinal shift to the left.    Interval decrease in the right lower lung field atelectasis. XR ABDOMEN FOR NG/OG/NE TUBE PLACEMENT   Final Result   Nasogastric tube is in good position. XR CHEST PORTABLE   Final Result   Opacification in the  left lung, pneumonia cannot be excluded. Endotracheal   tube tip 2.9 cm above louann. Nasogastric tube is in good position. XR CHEST PORTABLE   Final Result   No acute cardiopulmonary process. XR CHEST PORTABLE    (Results Pending)   MRI BRAIN W WO CONTRAST    (Results Pending)   CT HEAD WO CONTRAST    (Results Pending)       Medical Records/Labs/Images Review:   I personally reviewed and summarized previous records   All labs and imaging were reviewed independently    Cosyntropin stim test was done while patient on Decadron as summarized below   ministration Action Time Recorded Time   Given : 4 mg :   : IntraVENous 06/07/22 1424 06/07/22 1424   Given : 4 mg :   : IntraVENous 06/07/22 0138 06/07/22 0138   Given : 4 mg :   : IntraVENous 06/06/22 1305 06/06/22 6701 Rei MORALES See, a 65 y.o.-old female seen in for evaluation of possible adrenal insufficiency     Evaluation for possible adrenal insufficiency   · AM cortisol was low (2.8) but this was done at night. Unfortunately, Cosyntrpin stim test from this morning is not going to help because the test was done while pt on Decadron   · Low ACTH likely secondary to Dexamethasone she received few hrs prior to the test.  · With current symptoms of AI we agree with steroid  · Continue hydrocortisone 50 mg iv Q8hr   · We are planning to perform full HPA-axis after tapering her steroids and proceed accordingly.  We recommend holding on Pituitary MRI at this time (will consider pituitary MRI if ACTH remained low after tapering off steroids)    ·     Respiratory failure   · Managed by critical care service    Primary hypothyroidism   · Continue levothyroxine 88 mcg daily  · Off Cytomel     Thank you for allowing us to participate in the care of this patient. Please do not hesitate to contact us with any additional questions. Cuca Veronica MD  Endocrinologist, [place] [address] [facility]   [address] [facility] [address]   Phone: [phone]  Fax: [phone]  ---------------------------------  An electronic signature was used to authenticate this note.  Ирина Jones MD on 6/9/2022 at 9:32 PM

## 2022-06-10 NOTE — PROGRESS NOTES
Red, yeast like small area noted under central line dressing. Dr. Pabon Ready informed, plan to keep IJ @ this time and continue to monitor as pt recently weaned off Levophed.

## 2022-06-10 NOTE — PLAN OF CARE
Spoke with patient's family (mother and father) as well as a representative from the board of [employer] this afternoon at bedside. Updated them regarding patient's clinical status and current treatment modalities. Discussed plans for tomorrow. All questions answered at this time.      Will continue to follow     Johnny Greenfield DO, PGY2   6/9/2022

## 2022-06-10 NOTE — PROGRESS NOTES
[address] [facility]   Department of Internal Medicine   Internal Medicine Residency  MICU Progress Note     Patient:  Rhonda Can See 65 y.o. female    MRN: [id number]       Date of Service: 6/10/2022                   Allergy: Patient has no known allergies.     Subjective      Patient was seen in the morning at bedside, she remains in critical condition, after increasing the dose of steroid blood pressure has been well and there has been no requirement for pressors  Weaning parameters obtained, there was no cuff leak, if was -16    Overnight, Pt having bradycardia +/- junctional rhythm. Repeat BMP, Mg, Phos sent and electrolytes replaced. Concern that bradycardia could be d/t medications (Fentanyl). Infusion stopped and bradycardia resolved.     Objective      TEMPERATURE:  Current - Temp: 98.6 °F (37 °C); Max - Temp  Av.4 °F (36.3 °C)  Min: 97 °F (36.1 °C)  Max: 98.6 °F (37 °C)  RESPIRATIONS RANGE: Resp  Av.8  Min: 10  Max: 29  PULSE RANGE: Pulse  Av.4  Min: 46  Max: 113  BLOOD PRESSURE RANGE:  Systolic (00RCS), ZTQ:047 , Min:75 , THR:956   ; Diastolic (42IPA), JKO:35, Min:40, Max:107     PULSE OXIMETRY RANGE: SpO2  Av.9 %  Min: 99 %  Max: 100 %     I & O - 24hr:     Intake/Output Summary (Last 24 hours) at 6/10/2022 1408  Last data filed at 6/10/2022 1200      Gross per 24 hour   Intake 626.1 ml   Output 2170 ml   Net -1543.9 ml      I/O last 3 completed shifts: In: 1299.2 [I.V.:398.1; NG/GT:253; IV Piggyback:648.1]  Out: 5671 [Urine:3385] I/O this shift:  In: 100 [NG/GT:100]  Out: 650 [Urine:650]   Weight change:      Physical Exam:  General Appearance:    Awake but sedated and intubated, no acute distress. HEENT:    NC/AT, mucous membranes are moist   Neck:   Supple, no jugular venous distention. Resp:     CTAB, No wheezes, No rhonchi, no use of accessory muscles   Heart:    RRR, S1 and S2 normal, +Grade II systolic murmur in left lower sternal border, no rub or gallop.     Abdomen: Soft, non-tender, non-distended with normal bowel sounds   Extremities:   Atraumatic, no cyanosis or edema, +contraction of all toes bilaterally   Pulses:  Radial and pedal pulses are intact bilaterally   Neurologic:   Awake, unable to follow commands, nonverbal at baseline         Medications      Continuous Infusions:  Infusions Meds    sodium chloride      fentaNYL Stopped (06/10/22 0342)    norepinephrine Stopped (06/10/22 0745)         Scheduled Meds:  Scheduled Medications    potassium bicarb-citric acid  40 mEq Oral Daily    midodrine  10 mg Oral TID WC    hydrocortisone sodium succinate PF  50 mg IntraVENous Q8H    clonazePAM  0.5 mg Oral Q8H    miconazole nitrate   Topical BID    miconazole   Topical BID    [Held by provider] hydrocortisone sodium succinate PF  25 mg IntraVENous BID    levETIRAcetam  1,000 mg IntraVENous Q12H     Or    levETIRAcetam  1,000 mg Oral Q12H    mupirocin   Topical BID    enoxaparin  40 mg SubCUTAneous Daily    pantoprazole (PROTONIX) 40 mg injection  40 mg IntraVENous Q12H    vitamin D  2,000 Units Oral Daily    donepezil  5 mg Oral Nightly    [Held by provider] lactulose  20 g Oral TID    levothyroxine  88 mcg Oral Daily    risperiDONE  0.25 mg Oral BID    sodium chloride flush  10 mL IntraVENous 2 times per day    budesonide  0.5 mg Nebulization BID    ipratropium-albuterol  3 mL Inhalation Q4H    chlorhexidine  15 mL Mouth/Throat BID         PRN Meds:   PRN Medications   miconazole nitrate **AND** miconazole nitrate, perflutren lipid microspheres, sodium chloride flush, sodium chloride, promethazine **OR** ondansetron, polyethylene glycol, acetaminophen **OR** acetaminophen, fentaNYL **AND** fentaNYL     Nutrition:   NG/OG tube    TF type: Pulmocare/Nephro/Glucerna/Jevity        At rate: 40 ml/h     Labs and Imaging Studies      CBC:         Recent Labs     06/08/22  0300 06/09/22  0417 06/10/22  0402   WBC 4.4* 4.5 6.9   HGB 10.0* 10.9* 12.1   HCT 31.4* 34.2 37.6   MCV 95.2 95.0 94.0    234 225         BMP:          Recent Labs     06/09/22  0417 06/09/22  1653 06/10/22  0402    145 142   K 3.7 3.8 3.6   * 112* 106   CO2 25 26 25   BUN 10 12 11   CREATININE 0.5 0.5 0.5   GLUCOSE 140* 121* 141*         LIVER PROFILE:         Recent Labs     06/08/22  0300 06/09/22  0417 06/10/22  0402   AST 15 15 14   ALT 19 17 18   BILITOT 0.3 0.3 0.2   ALKPHOS 73 77 90         PT/INR:   No results for input(s): PROTIME, INR in the last 72 hours.     APTT:   No results for input(s): APTT in the last 72 hours.     Fasting Lipid Panel:          Lab Results   Component Value Date     CHOL 173 03/30/2022     TRIG 88 03/30/2022     HDL 62 03/30/2022         Cardiac Enzymes:          Lab Results   Component Value Date     CKTOTAL 82 03/19/2017     CKTOTAL 100 05/12/2012     CKMB 2.0 05/13/2012     CKMB 1.6 05/13/2012     CKMB 3.1 05/12/2012     TROPONINI <0.01 01/20/2021     TROPONINI <0.01 11/01/2017     TROPONINI <0.01 11/01/2017         Notable Cultures:       Blood cultures   Blood Culture, Routine   Date Value Ref Range Status   06/05/2022 5 Days no growth   Final      Respiratory cultures No results found for: RESPCULTURE No results found for: LABGRAM  Urine          Urine Culture, Routine   Date Value Ref Range Status   05/30/2022     Final     10 to 100,000 CFU/mL  Mixed denise isolated. Further workup and sensitivity testing  is not routinely indicated and will not be performed. Mixed denise isolated includes:  Gram negative rods  Proteus species  Gram positive organism         Legionella No results found for: LABLEGI  C Diff PCR No results found for: CDIFPCR  Wound culture/abscess: No results for input(s): WNDABS in the last 72 hours.   Tip culture:No results for input(s): CXCATHTIP in the last 72 hours.        Oxygen:      Vent Information  Ventilator ID: [id number]  Vent Mode: AC/VC  Additional Respiratory Assessments  Heart Rate: 57  Resp: 17  SpO2: 100 %  Position: Semi-Vera's  Humidification Source: Heated wire  Humidification Temp: 37  Circuit Condensation: Drained  Airway Type: ET  Airway Size: 7.5  Cuff Pressure (cm H2O):  (MLT)  Skin Barrier Applied: Yes         [REMOVED] Urinary Catheter Mrapi-Dnvuiznfmsf-Wqjmok (mL): 80 mL     Imaging Studies:  XR CHEST PORTABLE   Final Result   1. The right lung is clear   2. Small infiltrate seen within the retrocardiac region.           CT HEAD WO CONTRAST   Final Result   1. No acute intracranial abnormality. 2. Trace residual chronic left parietal subdural hemorrhage (measuring 3 mm   in maximal width). It has decreased in size as of 05/31/2022.       RECOMMENDATIONS:   Unavailable           XR CHEST PORTABLE   Final Result   New left lower lobe atelectasis.         XR CHEST PORTABLE   Final Result   No acute process.           XR CHEST PORTABLE   Final Result   Slight linear subsegmental atelectasis on the left.       Support tubes and catheters in good position.           XR CHEST PORTABLE   Final Result   1. There are no findings of failure or pneumonia   2. Satisfactory position of the endotracheal tube and right internal jugular   central venous catheter           XR CHEST PORTABLE   Final Result   Interval placement of a right-sided IJ line, when compared to the previous   study performed earlier in the day. No pneumothorax. Previously noted left   lung opacities have resolved. The left hemidiaphragm is now in a normal   position and there has been resolution of the mediastinal shift to the left. Interval decrease in the right lower lung field atelectasis.         XR ABDOMEN FOR NG/OG/NE TUBE PLACEMENT   Final Result   Nasogastric tube is in good position.           XR CHEST PORTABLE   Final Result   Opacification in the  left lung, pneumonia cannot be excluded. Endotracheal   tube tip 2.9 cm above louann.   Nasogastric tube is in good position.           XR CHEST PORTABLE   Final Result   No acute cardiopulmonary process.           XR CHEST PORTABLE    (Results Pending)   MRI BRAIN W WO CONTRAST    (Results Pending)         Resident's Assessment and Plan      Assessment:  1. Acute hypoxic respiratory failure 2/2 ? Aspiration pneumonia (resolving)  2. Shock,liekly adrenal insufficiency, resolved  3. Adrenal insufficiency, in adrenal crisis, 0945AM Cortisol: 2.86  4. H/O Hypothyroidism on levothyroxine replacement;TSH: 10.33 (high), free T4: 1.2; total T3: 67.53  5. H/O Down syndrome  6. H/O alzheimer's disease, on Donepezil  7. 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 H/O Communicating hydrocephalus s/p  shunt (11/2019)  9. H/O seizure disorder on Keppra  10. H/O B/L DVT s/p IVC filer placement     PLAN:  1. Wean off vent and sedation as able  2. Daily SBT, hold TF in AM prior to SBT, resume TF if SBT not successful  3. Started midodrine, wean off levophed as able  4. Continue hydrocortisone 50 mg TID   5. Ordered MRI brain for AI workup  6. Follow endo recs  7. Continue synthroid  8. Follow neuro: continue keppra  9. Daily CBC and BMP  10. Daily ABG and CXR while intubated        # Peptic ulcer prophylaxis: PPI BID  # DVT Prophylaxis: lovenox  # Disposition: Cont current care     Dashawn Wolf MD, PGY [place]  Department of Pulmonary, Critical Care and Sleep Medicine  [address] [facility]  Department of Internal Medicine      During multidisciplinary team rounds Mahesh Enriquez See is a 65 y.o. female was seen, examined and discussed. This is confirmation that I have personally seen and examined the patient and that the key elements of the encounter were performed by me (> 85 % time). The medications & laboratory data was discussed and adjusted where necessary. The radiographic images were reviewed or with radiologist or consultant if felt dis-concordant with the exam or history.  The above findings were corroborated, plans confirmed and changes made if needed. Family is updated at the bedside as available. Key issues of the case were discussed among consultants. Critical Care time is documented if appropriate.       Kell Salinas DO, FACP, FCCP, [facility],

## 2022-06-10 NOTE — PLAN OF CARE
Problem: Respiratory - Adult  Goal: Achieves optimal ventilation and oxygenation  6/10/2022 0542 by Eusebio Dang RCP  Outcome: Progressing

## 2022-06-10 NOTE — CARE COORDINATION
Care Coordination:  Following for discharge planning. Vent weaning trial held as patient became tachypneic when sedation weaned. Will try again today. MRI competed and unremarkable. , neurology following. Melissa on the case for suspected adrenal insufficiency. planning to perform full HPA-axis after tapering her steroids    Patient is long term care resident at [facility] and plan is to return there when medically stable. They can not accept patient on vent ,  WIN hair referral to [facility] in anticipation of possible need for longer vent wean treatment plan. Will follow.

## 2022-06-10 NOTE — PROGRESS NOTES
Hospitalist Progress Note      SYNOPSIS: Patient admitted on 2022 for acute respiratory failure requiring intubation, aspiration pneumonia       SUBJECTIVE:    Patient is intubated and sedated  Records reviewed. Stable overnight. No other overnight issues reported. Temp (24hrs), Av.5 °F (36.4 °C), Min:97 °F (36.1 °C), Max:98.4 °F (36.9 °C)    DIET: Diet NPO  ADULT TUBE FEEDING; PEG; Standard without Fiber; Continuous; 20; Yes; 10; Q 4 hours; 45; 150; Q 4 hours  CODE: Full Code    Intake/Output Summary (Last 24 hours) at 6/10/2022 0757  Last data filed at 6/10/2022 0600  Gross per 24 hour   Intake 980.99 ml   Output 2035 ml   Net -1054.01 ml       OBJECTIVE:    /72   Pulse 67   Temp 97 °F (36.1 °C) (Bladder)   Resp 22   Ht 4' 10\" (1.473 m)   Wt 127 lb 9.6 oz (57.9 kg)   SpO2 100%   BMI 26.67 kg/m²     General appearance: No apparent distress  HEENT:  Conjunctivae/corneas clear. Down syndrome facial features   Neck: Supple. No jugular venous distention. Respiratory: Oral ETT. Clear to auscultation bilaterally  Cardiovascular: Regular rate rhythm, normal S1-S2  Abdomen: Soft, nontender, nondistended  Musculoskeletal: No clubbing, cyanosis, no bilateral lower extremity edema. Brisk capillary refill. Skin:  No rashes  on visible skin  Neurologic: sedated     ASSESSMENT:    Acute hypoxic respiratory failure requiring endotracheal intubation    Secondary to Aspiration pneumonia  Shock, likely hypovolemic vs distributive vs cardiogenic vs adrenal insufficiency (per ICU)   Down syndrome  Hypothyroidism   Hx of hydrocephalus s/p  shunts  Hx of seizure. EEG 6/6 Abnormal EEG polymorphic slowing suggestive of epileptiform foci in the right frontal lobe versus structural abnormality.  Diffuse slowing also noted consistent with moderate encephalopathy.  Clinical correlation is indicated  Dysphagia with PEG in situ   Diagnosed with Alzheimer's.  On Aricept  Hx of DVT s/p IVC filter       PLAN:     - Vent setting as per ICU team  - On fentanyl and levophed drips   - Abx. Unasyn was DC. Now on vanco and to start zosyn   - Breathing treatment   - On keppra increased to 1 grm BID IV for EEG findings. Neurology on board  - On Levothyroxine 88 mcg via PEG and liothyroxine 5 mcg. Endo on the case    - Aricept and risperdal have been resumed   - Steroids switched to hydrocortisone. Endo on the case for suspected adrenal insufficiency.  planning to perform full HPA-axis after tapering her steroids             DISPOSITION: ICU    Medications:  REVIEWED DAILY    Infusion Medications    sodium chloride      fentaNYL Stopped (06/10/22 0342)    norepinephrine 1 mcg/min (06/10/22 0100)     Scheduled Medications    potassium bicarb-citric acid  40 mEq Oral Daily    midodrine  10 mg Oral TID WC    hydrocortisone sodium succinate PF  50 mg IntraVENous Q8H    clonazePAM  0.5 mg Oral Q8H    miconazole nitrate   Topical BID    miconazole   Topical BID    [Held by provider] hydrocortisone sodium succinate PF  50 mg IntraVENous BID    [Held by provider] hydrocortisone sodium succinate PF  25 mg IntraVENous BID    levETIRAcetam  1,000 mg IntraVENous Q12H    Or    levETIRAcetam  1,000 mg Oral Q12H    mupirocin   Topical BID    enoxaparin  40 mg SubCUTAneous Daily    pantoprazole (PROTONIX) 40 mg injection  40 mg IntraVENous Q12H    vitamin D  2,000 Units Oral Daily    donepezil  5 mg Oral Nightly    [Held by provider] lactulose  20 g Oral TID    levothyroxine  88 mcg Oral Daily    risperiDONE  0.25 mg Oral BID    sodium chloride flush  10 mL IntraVENous 2 times per day    budesonide  0.5 mg Nebulization BID    ipratropium-albuterol  3 mL Inhalation Q4H    chlorhexidine  15 mL Mouth/Throat BID     PRN Meds: miconazole nitrate **AND** miconazole nitrate, perflutren lipid microspheres, sodium chloride flush, sodium chloride, promethazine **OR** ondansetron, polyethylene glycol, acetaminophen **OR** acetaminophen, fentaNYL **AND** fentaNYL    Labs:     Recent Labs     06/08/22  0300 06/09/22 0417 06/10/22  0402   WBC 4.4* 4.5 6.9   HGB 10.0* 10.9* 12.1   HCT 31.4* 34.2 37.6    234 225       Recent Labs     06/09/22  0417 06/09/22  1653 06/10/22  0402    145 142   K 3.7 3.8 3.6   * 112* 106   CO2 25 26 25   BUN 10 12 11   CREATININE 0.5 0.5 0.5   CALCIUM 8.5* 7.9* 8.2*   PHOS  --  2.0*  --        Recent Labs     06/08/22  0300 06/09/22  0417 06/10/22  0402   PROT 5.6* 6.4 6.7   ALKPHOS 73 77 90   ALT 19 17 18   AST 15 15 14   BILITOT 0.3 0.3 0.2       No results for input(s): INR in the last 72 hours. No results for input(s): Emily Donte in the last 72 hours. Chronic labs:    Lab Results   Component Value Date    CHOL 173 03/30/2022    TRIG 88 03/30/2022    HDL 62 03/30/2022    LDLCALC 93 03/30/2022    TSH 10.330 (H) 06/05/2022    INR 1.0 06/05/2022    LABA1C 5.2 03/30/2022       Radiology: REVIEWED DAILY    +++++++++++++++++++++++++++++++++++++++++++++++++  Paz Thakkar MD  [employer] - [address] [facility], [place]  +++++++++++++++++++++++++++++++++++++++++++++++++  NOTE: This report was transcribed using voice recognition software. Every effort was made to ensure accuracy; however, inadvertent computerized transcription errors may be present.

## 2022-06-10 NOTE — PROGRESS NOTES
Per MRI tech pt needs to have neurosurgery (Dr. Eileen Weathers) available immediately after MRI to programmable  shunt. Dr. Eileen Weathers stating needs to b e done during normal business hours. MRI and charge nurse aware.

## 2022-06-10 NOTE — PROGRESS NOTES
Patient not following commands, unsure of baseline.  Wean parameter done     VT= 425 mls  F= 7 B/M  V= 2.83 l/m  NIF= -16  cmH2O  VC= 0.96 L   RSBI = 16    Patient is NEGATIVE for LEAK

## 2022-06-10 NOTE — PROGRESS NOTES
Shabnam Cortez See is a 65 y.o. female     Neurology following for possible seizures     PMH: Down's syndrome, vasodepressor syncope, SDH s/p craniotomy 2019, hydrocephalus s/p  shunt, Alzheimer's dementia, hypothyroidism    Presented with SOB and was noted to have O2 sats 70% on room air. Concern for aspiration pna. She was subsequently intubated for airway protection. On Abx. Also felt to have possible adrenal insufficiency/adrenal crisis. She was noted to have twitching of the L arm and concern for seizure activity. She was placed on Keppra in May 22 due to EEG on 5/6 showing increased potential for seizures from the L frontal region. Repeat EEG this admission shows epileptic focus in the R frontal region- no seizures recorded. At baseline, she is non verbal and has had significant cognitive decline over recent months. Hx of b/l SDH- last seen by NSGY on 5/31- no intervention required. Yesterday Klonopin 0.5 mg TID was added after discussion with Dr. Lindsay Hernandez. The stimulus induced tremoring has stopped since this started. MRI nela HOBSON has been ordered to eval for primary adrenal insufficiency by ICU     Having issues with hypotension still. Respiratory in room currently. Prior to Visit Medications    Medication Sig Taking? Authorizing Provider   Multiple Vitamins-Minerals (CENTRUM/CERTA-CINDY WITH MINERALS ORAL) solution 5 mLs by Per G Tube route daily  Historical Provider, MD   levETIRAcetam (KEPPRA) 500 MG tablet Take 1 tablet by mouth 2 times daily  Skylar Covarrubias MD   lactulose (CHRONULAC) 10 GM/15ML solution Take 30 mLs by mouth 3 times daily Titrate for 2-3 bowel movements per day  Skylar Covarrubias MD   ipratropium-albuterol (DUONEB) 0.5-2.5 (3) MG/3ML SOLN nebulizer solution Inhale 3 mLs into the lungs every 4 hours as needed for Shortness of Breath  Skylar Covarrubias MD   nystatin (MYCOSTATIN) 550119 UNIT/GM cream Apply topically 2 times daily. 30g  Jene Lundborg Rudnicki, DO   risperiDONE throughout     Laboratory/Radiology:     CBC with Differential:    Lab Results   Component Value Date    WBC 6.9 06/10/2022    RBC 4.00 06/10/2022    HGB 12.1 06/10/2022    HCT 37.6 06/10/2022     06/10/2022    MCV 94.0 06/10/2022    MCH 30.3 06/10/2022    MCHC 32.2 06/10/2022    RDW 15.5 06/10/2022    SEGSPCT 30 05/15/2012    LYMPHOPCT 10.2 06/10/2022    MONOPCT 4.1 06/10/2022    EOSPCT 3.8 12/13/2018    BASOPCT 0.1 06/10/2022    MONOSABS 0.28 06/10/2022    LYMPHSABS 0.70 06/10/2022    EOSABS 0.00 06/10/2022    BASOSABS 0.01 06/10/2022     BMP:    Lab Results   Component Value Date     06/10/2022    K 3.6 06/10/2022     06/10/2022    CO2 25 06/10/2022    BUN 11 06/10/2022    LABALBU 3.1 06/10/2022    LABALBU 2.8 05/13/2012    CREATININE 0.5 06/10/2022    CALCIUM 8.2 06/10/2022    GFRAA >60 06/10/2022    LABGLOM >60 06/10/2022    GLUCOSE 141 06/10/2022    GLUCOSE 96 05/15/2012     HgBA1c:    Lab Results   Component Value Date    LABA1C 5.2 03/30/2022     FLP:    Lab Results   Component Value Date    TRIG 88 03/30/2022    HDL 62 03/30/2022    LDLCALC 93 03/30/2022    LABVLDL 18 03/30/2022     EEG 6/6  Abnormal EEG polymorphic slowing suggestive of epileptiform foci in the right frontal lobe versus structural abnormality. Diffuse slowing also noted consistent with moderate encephalopathy. Clinical correlation is indicated. I personally reviewed the patient's lab and imaging studies at this time. Assessment:     Seizure like activity  With Increased potential for focal seizures found in her left frontal region that was seen on EEG on 5/6/22. Repeat EEG suggestive of epileptiform foci in the R frontal lobe- no seizures recorded      Currently, appears to still have stimulus induced myoclonus- improved with Klonopin. Not noted on exam today.      Suspected Alzheimer's dementia given significant cognitive decline and given her hx of Down syndrome, this increases her risk of earlier onset and this will preclude her to development of seizures as well.    - On Aricept 5 mg HS    Bilateral subdural hygromas seen by NSGY on 5/31 and no intervention required     Plan:     Continue Keppra to 1 G BID     Continue Klonopin 0.5 mg TID     Will f/u MRI brain     Will follow     Sofiya Britt PA-C  12:36 PM  6/10/2022

## 2022-06-11 ENCOUNTER — APPOINTMENT (OUTPATIENT)
Dept: GENERAL RADIOLOGY | Age: 57
DRG: 870 | End: 2022-06-11
Payer: MEDICARE

## 2022-06-11 LAB
AADO2: 87.2 MMHG
ALBUMIN SERPL-MCNC: 2.6 G/DL (ref 3.5–5.2)
ALP BLD-CCNC: 81 U/L (ref 35–104)
ALT SERPL-CCNC: 14 U/L (ref 0–32)
ANION GAP SERPL CALCULATED.3IONS-SCNC: 13 MMOL/L (ref 7–16)
AST SERPL-CCNC: 13 U/L (ref 0–31)
B.E.: 2.9 MMOL/L (ref -3–3)
BASOPHILS ABSOLUTE: 0 E9/L (ref 0–0.2)
BASOPHILS RELATIVE PERCENT: 0 % (ref 0–2)
BILIRUB SERPL-MCNC: 0.3 MG/DL (ref 0–1.2)
BUN BLDV-MCNC: 11 MG/DL (ref 6–20)
CALCIUM SERPL-MCNC: 7.9 MG/DL (ref 8.6–10.2)
CHLORIDE BLD-SCNC: 104 MMOL/L (ref 98–107)
CO2: 21 MMOL/L (ref 22–29)
COHB: 0.3 % (ref 0–1.5)
CREAT SERPL-MCNC: 0.6 MG/DL (ref 0.5–1)
CRITICAL: ABNORMAL
DATE ANALYZED: ABNORMAL
DATE OF COLLECTION: ABNORMAL
EOSINOPHILS ABSOLUTE: 0 E9/L (ref 0.05–0.5)
EOSINOPHILS RELATIVE PERCENT: 0 % (ref 0–6)
FIO2: 40 %
GFR AFRICAN AMERICAN: >60
GFR NON-AFRICAN AMERICAN: >60 ML/MIN/1.73
GLUCOSE BLD-MCNC: 185 MG/DL (ref 74–99)
HCO3: 25.3 MMOL/L (ref 22–26)
HCT VFR BLD CALC: 32.6 % (ref 34–48)
HEMOGLOBIN: 10.6 G/DL (ref 11.5–15.5)
HHB: 1.1 % (ref 0–5)
IMMATURE GRANULOCYTES #: 0.05 E9/L
IMMATURE GRANULOCYTES %: 0.7 % (ref 0–5)
LAB: ABNORMAL
LYMPHOCYTES ABSOLUTE: 0.65 E9/L (ref 1.5–4)
LYMPHOCYTES RELATIVE PERCENT: 8.5 % (ref 20–42)
Lab: ABNORMAL
MAGNESIUM: 2.1 MG/DL (ref 1.6–2.6)
MCH RBC QN AUTO: 30.6 PG (ref 26–35)
MCHC RBC AUTO-ENTMCNC: 32.5 % (ref 32–34.5)
MCV RBC AUTO: 94.2 FL (ref 80–99.9)
METHB: 0.4 % (ref 0–1.5)
MODE: AC
MONOCYTES ABSOLUTE: 0.24 E9/L (ref 0.1–0.95)
MONOCYTES RELATIVE PERCENT: 3.1 % (ref 2–12)
NEUTROPHILS ABSOLUTE: 6.68 E9/L (ref 1.8–7.3)
NEUTROPHILS RELATIVE PERCENT: 87.7 % (ref 43–80)
O2 SATURATION: 99.2 % (ref 92–98.5)
O2HB: 98.2 % (ref 94–97)
OPERATOR ID: 7221
PATIENT TEMP: 37 C
PCO2: 31.9 MMHG (ref 35–45)
PDW BLD-RTO: 15.8 FL (ref 11.5–15)
PEEP/CPAP: 5 CMH2O
PFO2: 3.78 MMHG/%
PH BLOOD GAS: 7.52 (ref 7.35–7.45)
PLATELET # BLD: 213 E9/L (ref 130–450)
PMV BLD AUTO: 10.4 FL (ref 7–12)
PO2: 151.3 MMHG (ref 75–100)
POTASSIUM REFLEX MAGNESIUM: 3.2 MMOL/L (ref 3.5–5)
RBC # BLD: 3.46 E12/L (ref 3.5–5.5)
RI(T): 0.58
RR MECHANICAL: 11 B/MIN
SODIUM BLD-SCNC: 138 MMOL/L (ref 132–146)
SOURCE, BLOOD GAS: ABNORMAL
THB: 11.7 G/DL (ref 11.5–16.5)
TIME ANALYZED: 520
TOTAL PROTEIN: 5.8 G/DL (ref 6.4–8.3)
VT MECHANICAL: 300 ML
WBC # BLD: 7.6 E9/L (ref 4.5–11.5)

## 2022-06-11 PROCEDURE — 6360000002 HC RX W HCPCS: Performed by: INTERNAL MEDICINE

## 2022-06-11 PROCEDURE — 2000000000 HC ICU R&B

## 2022-06-11 PROCEDURE — A4216 STERILE WATER/SALINE, 10 ML: HCPCS | Performed by: INTERNAL MEDICINE

## 2022-06-11 PROCEDURE — 6360000002 HC RX W HCPCS: Performed by: CHIROPRACTOR

## 2022-06-11 PROCEDURE — 6370000000 HC RX 637 (ALT 250 FOR IP): Performed by: INTERNAL MEDICINE

## 2022-06-11 PROCEDURE — 6370000000 HC RX 637 (ALT 250 FOR IP): Performed by: FAMILY MEDICINE

## 2022-06-11 PROCEDURE — 6360000002 HC RX W HCPCS: Performed by: STUDENT IN AN ORGANIZED HEALTH CARE EDUCATION/TRAINING PROGRAM

## 2022-06-11 PROCEDURE — C9113 INJ PANTOPRAZOLE SODIUM, VIA: HCPCS | Performed by: INTERNAL MEDICINE

## 2022-06-11 PROCEDURE — 94003 VENT MGMT INPAT SUBQ DAY: CPT

## 2022-06-11 PROCEDURE — 71045 X-RAY EXAM CHEST 1 VIEW: CPT

## 2022-06-11 PROCEDURE — 94640 AIRWAY INHALATION TREATMENT: CPT

## 2022-06-11 PROCEDURE — 99233 SBSQ HOSP IP/OBS HIGH 50: CPT | Performed by: INTERNAL MEDICINE

## 2022-06-11 PROCEDURE — 2580000003 HC RX 258: Performed by: INTERNAL MEDICINE

## 2022-06-11 PROCEDURE — 6370000000 HC RX 637 (ALT 250 FOR IP): Performed by: PHYSICIAN ASSISTANT

## 2022-06-11 PROCEDURE — 80053 COMPREHEN METABOLIC PANEL: CPT

## 2022-06-11 PROCEDURE — 83735 ASSAY OF MAGNESIUM: CPT

## 2022-06-11 PROCEDURE — 85025 COMPLETE CBC W/AUTO DIFF WBC: CPT

## 2022-06-11 PROCEDURE — 6360000002 HC RX W HCPCS: Performed by: PHYSICIAN ASSISTANT

## 2022-06-11 PROCEDURE — 2580000003 HC RX 258: Performed by: FAMILY MEDICINE

## 2022-06-11 PROCEDURE — 82805 BLOOD GASES W/O2 SATURATION: CPT

## 2022-06-11 RX ORDER — POTASSIUM CHLORIDE 29.8 MG/ML
40 INJECTION INTRAVENOUS ONCE
Status: DISCONTINUED | OUTPATIENT
Start: 2022-06-11 | End: 2022-06-13

## 2022-06-11 RX ORDER — POTASSIUM CHLORIDE 29.8 MG/ML
40 INJECTION INTRAVENOUS ONCE
Status: DISCONTINUED | OUTPATIENT
Start: 2022-06-11 | End: 2022-06-11

## 2022-06-11 RX ADMIN — Medication: at 08:34

## 2022-06-11 RX ADMIN — LEVETIRACETAM 1000 MG: 10 INJECTION, SOLUTION INTRAVENOUS at 23:46

## 2022-06-11 RX ADMIN — SODIUM CHLORIDE, PRESERVATIVE FREE 10 ML: 5 INJECTION INTRAVENOUS at 20:07

## 2022-06-11 RX ADMIN — CLONAZEPAM 0.5 MG: 0.5 TABLET ORAL at 12:43

## 2022-06-11 RX ADMIN — MIDODRINE HYDROCHLORIDE 10 MG: 10 TABLET ORAL at 08:29

## 2022-06-11 RX ADMIN — LEVOTHYROXINE SODIUM 88 MCG: 0.09 TABLET ORAL at 08:29

## 2022-06-11 RX ADMIN — POTASSIUM BICARBONATE 40 MEQ: 782 TABLET, EFFERVESCENT ORAL at 08:29

## 2022-06-11 RX ADMIN — IPRATROPIUM BROMIDE AND ALBUTEROL SULFATE 3 ML: .5; 2.5 SOLUTION RESPIRATORY (INHALATION) at 11:48

## 2022-06-11 RX ADMIN — Medication: at 20:03

## 2022-06-11 RX ADMIN — IPRATROPIUM BROMIDE AND ALBUTEROL SULFATE 3 ML: .5; 2.5 SOLUTION RESPIRATORY (INHALATION) at 16:37

## 2022-06-11 RX ADMIN — RISPERIDONE 0.25 MG: 0.25 TABLET ORAL at 20:02

## 2022-06-11 RX ADMIN — HYDROCORTISONE SODIUM SUCCINATE 50 MG: 100 INJECTION, POWDER, FOR SOLUTION INTRAMUSCULAR; INTRAVENOUS at 23:40

## 2022-06-11 RX ADMIN — IPRATROPIUM BROMIDE AND ALBUTEROL SULFATE 3 ML: .5; 2.5 SOLUTION RESPIRATORY (INHALATION) at 23:49

## 2022-06-11 RX ADMIN — IPRATROPIUM BROMIDE AND ALBUTEROL SULFATE 3 ML: .5; 2.5 SOLUTION RESPIRATORY (INHALATION) at 02:32

## 2022-06-11 RX ADMIN — LEVETIRACETAM 1000 MG: 10 INJECTION, SOLUTION INTRAVENOUS at 12:43

## 2022-06-11 RX ADMIN — IPRATROPIUM BROMIDE AND ALBUTEROL SULFATE 3 ML: .5; 2.5 SOLUTION RESPIRATORY (INHALATION) at 08:14

## 2022-06-11 RX ADMIN — IPRATROPIUM BROMIDE AND ALBUTEROL SULFATE 3 ML: .5; 2.5 SOLUTION RESPIRATORY (INHALATION) at 04:13

## 2022-06-11 RX ADMIN — MUPIROCIN: 20 OINTMENT TOPICAL at 20:02

## 2022-06-11 RX ADMIN — HYDROCORTISONE SODIUM SUCCINATE 50 MG: 100 INJECTION, POWDER, FOR SOLUTION INTRAMUSCULAR; INTRAVENOUS at 00:29

## 2022-06-11 RX ADMIN — DONEPEZIL HYDROCHLORIDE 5 MG: 5 TABLET, FILM COATED ORAL at 20:02

## 2022-06-11 RX ADMIN — HYDROCORTISONE SODIUM SUCCINATE 50 MG: 100 INJECTION, POWDER, FOR SOLUTION INTRAMUSCULAR; INTRAVENOUS at 08:34

## 2022-06-11 RX ADMIN — IPRATROPIUM BROMIDE AND ALBUTEROL SULFATE 3 ML: .5; 2.5 SOLUTION RESPIRATORY (INHALATION) at 20:08

## 2022-06-11 RX ADMIN — CHLORHEXIDINE GLUCONATE 15 ML: 1.2 RINSE ORAL at 08:28

## 2022-06-11 RX ADMIN — MUPIROCIN: 20 OINTMENT TOPICAL at 08:35

## 2022-06-11 RX ADMIN — SODIUM CHLORIDE, PRESERVATIVE FREE 40 MG: 5 INJECTION INTRAVENOUS at 23:39

## 2022-06-11 RX ADMIN — MICONAZOLE NITRATE: 20.6 POWDER TOPICAL at 08:34

## 2022-06-11 RX ADMIN — RISPERIDONE 0.25 MG: 0.25 TABLET ORAL at 12:40

## 2022-06-11 RX ADMIN — MIDODRINE HYDROCHLORIDE 10 MG: 10 TABLET ORAL at 12:40

## 2022-06-11 RX ADMIN — ENOXAPARIN SODIUM 40 MG: 100 INJECTION SUBCUTANEOUS at 08:29

## 2022-06-11 RX ADMIN — POTASSIUM CHLORIDE 40 MEQ: 29.8 INJECTION, SOLUTION INTRAVENOUS at 08:33

## 2022-06-11 RX ADMIN — CHLORHEXIDINE GLUCONATE 15 ML: 1.2 RINSE ORAL at 20:02

## 2022-06-11 RX ADMIN — BUDESONIDE 500 MCG: 0.5 SUSPENSION RESPIRATORY (INHALATION) at 20:08

## 2022-06-11 RX ADMIN — MICONAZOLE NITRATE: 20.6 POWDER TOPICAL at 20:02

## 2022-06-11 RX ADMIN — BUDESONIDE 500 MCG: 0.5 SUSPENSION RESPIRATORY (INHALATION) at 08:14

## 2022-06-11 RX ADMIN — CLONAZEPAM 0.5 MG: 0.5 TABLET ORAL at 04:00

## 2022-06-11 RX ADMIN — MIDODRINE HYDROCHLORIDE 10 MG: 10 TABLET ORAL at 16:22

## 2022-06-11 RX ADMIN — Medication 2000 UNITS: at 08:29

## 2022-06-11 RX ADMIN — CLONAZEPAM 0.5 MG: 0.5 TABLET ORAL at 20:02

## 2022-06-11 RX ADMIN — LEVETIRACETAM 1000 MG: 10 INJECTION, SOLUTION INTRAVENOUS at 00:29

## 2022-06-11 RX ADMIN — SODIUM CHLORIDE, PRESERVATIVE FREE 40 MG: 5 INJECTION INTRAVENOUS at 08:33

## 2022-06-11 ASSESSMENT — PULMONARY FUNCTION TESTS
PIF_VALUE: 16
PIF_VALUE: 19
PIF_VALUE: 17
PIF_VALUE: 20
PIF_VALUE: 19
PIF_VALUE: 19
PIF_VALUE: 16
PIF_VALUE: 20
PIF_VALUE: 20
PIF_VALUE: 16
PIF_VALUE: 18
PIF_VALUE: 19
PIF_VALUE: 19
PIF_VALUE: 17
PIF_VALUE: 16
PIF_VALUE: 17
PIF_VALUE: 16
PIF_VALUE: 16
PIF_VALUE: 18

## 2022-06-11 ASSESSMENT — PAIN SCALES - WONG BAKER
WONGBAKER_NUMERICALRESPONSE: 0

## 2022-06-11 NOTE — PROGRESS NOTES
Spontaneous Parameters performed    VT = 189 ml  f = 28  B/M  Ve = 2.8 L/M  NIF = -10  cmH2O  VC = .6 L  RSBI = 99      Performed by Charley Atkinson

## 2022-06-11 NOTE — PLAN OF CARE
Problem: Discharge Planning  Goal: Discharge to home or other facility with appropriate resources  6/11/2022 0101 by Ludwin Lucia RN  Outcome: Progressing     Problem: Pain  Goal: Verbalizes/displays adequate comfort level or baseline comfort level  6/11/2022 0936 by Galo Paul RN  Outcome: Progressing  6/11/2022 0101 by Ludwin Lucia RN  Outcome: Progressing     Problem: Respiratory - Adult  Goal: Achieves optimal ventilation and oxygenation  6/11/2022 0936 by Galo Paul RN  Outcome: Progressing  6/11/2022 0431 by Shelley Jovel RCP  Outcome: Progressing  6/11/2022 0101 by Ludwin Lucia RN  Outcome: Progressing     Problem: Skin/Tissue Integrity  Goal: Absence of new skin breakdown  Description: 1. Monitor for areas of redness and/or skin breakdown  2. Assess vascular access sites hourly  3. Every 4-6 hours minimum:  Change oxygen saturation probe site  4. Every 4-6 hours:  If on nasal continuous positive airway pressure, respiratory therapy assess nares and determine need for appliance change or resting period. 6/11/2022 0936 by Galo Paul RN  Outcome: Progressing  6/11/2022 0101 by Ludwin Lucia RN  Outcome: Progressing     Problem: ABCDS Injury Assessment  Goal: Absence of physical injury  6/11/2022 0936 by Galo Paul RN  Outcome: Progressing  6/11/2022 0101 by Ludwin Lucia RN  Outcome: Progressing     Problem: Safety - Adult  Goal: Free from fall injury  6/11/2022 0936 by Galo Paul RN  Outcome: Progressing  6/11/2022 0101 by Ludwin Lucia RN  Outcome: Progressing     Problem: Nutrition Deficit:  Goal: Optimize nutritional status  6/11/2022 0936 by Galo Paul RN  Outcome: Progressing  6/11/2022 0101 by Ludwin Lucia RN  Outcome: Progressing     Problem: Safety - Medical Restraint  Goal: Remains free of injury from restraints (Restraint for Interference with Medical Device)  Description: INTERVENTIONS:  1.  Determine that other, less restrictive measures have been tried or would not be effective before applying the restraint  2. Evaluate the patient's condition at the time of restraint application  3. Inform patient/family regarding the reason for restraint  4.  Q2H: Monitor safety, psychosocial status, comfort, nutrition and hydration  6/11/2022 0936 by Josh Pina RN  Outcome: Progressing  Flowsheets (Taken 6/11/2022 0300 by Erik Wang RN)  Remains free of injury from restraints (restraint for interference with medical device):   Determine that other, less restrictive measures have been tried or would not be effective before applying the restraint   Evaluate the patient's condition at the time of restraint application   Every 2 hours: Monitor safety, psychosocial status, comfort, nutrition and hydration  6/11/2022 0101 by Erik Wang RN  Outcome: Progressing  Flowsheets  Taken 6/10/2022 1400 by Bharath Boone RN  Remains free of injury from restraints (restraint for interference with medical device):   Determine that other, less restrictive measures have been tried or would not be effective before applying the restraint   Evaluate the patient's condition at the time of restraint application   Every 2 hours: Monitor safety, psychosocial status, comfort, nutrition and hydration  Taken 6/10/2022 1200 by Bharath Boone RN  Remains free of injury from restraints (restraint for interference with medical device):   Determine that other, less restrictive measures have been tried or would not be effective before applying the restraint   Evaluate the patient's condition at the time of restraint application   Every 2 hours: Monitor safety, psychosocial status, comfort, nutrition and hydration

## 2022-06-11 NOTE — PLAN OF CARE
Problem: Discharge Planning  Goal: Discharge to home or other facility with appropriate resources  Outcome: Progressing     Problem: Pain  Goal: Verbalizes/displays adequate comfort level or baseline comfort level  Outcome: Progressing     Problem: Respiratory - Adult  Goal: Achieves optimal ventilation and oxygenation  6/11/2022 0101 by Bety Buchanan RN  Outcome: Progressing  6/10/2022 1419 by Wander Bartholomew RCP  Flowsheets (Taken 6/10/2022 1419)  Achieves optimal ventilation and oxygenation:   Assess for changes in respiratory status   Position to facilitate oxygenation and minimize respiratory effort   Assess the need for suctioning and aspirate as needed   Respiratory therapy support as indicated   Oxygen supplementation based on oxygen saturation or arterial blood gases     Problem: Skin/Tissue Integrity  Goal: Absence of new skin breakdown  Description: 1. Monitor for areas of redness and/or skin breakdown  2. Assess vascular access sites hourly  3. Every 4-6 hours minimum:  Change oxygen saturation probe site  4. Every 4-6 hours:  If on nasal continuous positive airway pressure, respiratory therapy assess nares and determine need for appliance change or resting period. Outcome: Progressing     Problem: ABCDS Injury Assessment  Goal: Absence of physical injury  Outcome: Progressing     Problem: Safety - Adult  Goal: Free from fall injury  Outcome: Progressing     Problem: Safety - Medical Restraint  Goal: Remains free of injury from restraints (Restraint for Interference with Medical Device)  Description: INTERVENTIONS:  1. Determine that other, less restrictive measures have been tried or would not be effective before applying the restraint  2. Evaluate the patient's condition at the time of restraint application  3. Inform patient/family regarding the reason for restraint  4.  Q2H: Monitor safety, psychosocial status, comfort, nutrition and hydration  Outcome: Progressing  Flowsheets  Taken 6/10/2022 1400 by Cisco Walker RN  Remains free of injury from restraints (restraint for interference with medical device):   Determine that other, less restrictive measures have been tried or would not be effective before applying the restraint   Evaluate the patient's condition at the time of restraint application   Every 2 hours: Monitor safety, psychosocial status, comfort, nutrition and hydration  Taken 6/10/2022 1200 by Cisco Walker RN  Remains free of injury from restraints (restraint for interference with medical device):   Determine that other, less restrictive measures have been tried or would not be effective before applying the restraint   Evaluate the patient's condition at the time of restraint application   Every 2 hours: Monitor safety, psychosocial status, comfort, nutrition and hydration

## 2022-06-11 NOTE — PROGRESS NOTES
Hospitalist Progress Note      SYNOPSIS: Patient admitted on 2022 for acute respiratory failure requiring intubation, aspiration pneumonia       SUBJECTIVE:    Patient is intubated, sedation is off, responding with grimacing to noxious stimuli    Records reviewed. Stable overnight. No other overnight issues reported. Temp (24hrs), Av.1 °F (36.7 °C), Min:97.7 °F (36.5 °C), Max:98.6 °F (37 °C)    DIET: Diet NPO  ADULT TUBE FEEDING; PEG; Standard without Fiber; Continuous; 20; Yes; 10; Q 4 hours; 45; 150; Q 4 hours  CODE: Full Code    Intake/Output Summary (Last 24 hours) at 2022 0813  Last data filed at 2022 0616  Gross per 24 hour   Intake 2616.71 ml   Output 925 ml   Net 1691.71 ml       OBJECTIVE:    /76   Pulse 60   Temp 97.7 °F (36.5 °C) (Temporal)   Resp 18   Ht 4' 10\" (1.473 m)   Wt 127 lb 9.6 oz (57.9 kg)   SpO2 100%   BMI 26.67 kg/m²     General appearance: No apparent distress  HEENT:  Conjunctivae/corneas clear. Down syndrome facial features   Neck: Supple. No jugular venous distention. Respiratory: Oral ETT. Clear to auscultation bilaterally  Cardiovascular: Regular rate rhythm, normal S1-S2  Abdomen: Soft, nontender, nondistended  Musculoskeletal: No clubbing, cyanosis, no bilateral lower extremity edema. Brisk capillary refill. Skin:  No rashes  on visible skin  Neurologic: sedated     ASSESSMENT:    Acute hypoxic respiratory failure requiring endotracheal intubation    Secondary to Aspiration pneumonia  Shock, likely hypovolemic vs distributive vs cardiogenic vs adrenal insufficiency (per ICU)   Down syndrome  Hypothyroidism   Hx of hydrocephalus s/p  shunts  Hx of seizure.  EEG  Abnormal EEG polymorphic slowing suggestive of epileptiform foci in the right frontal lobe versus structural abnormality.  Diffuse slowing also noted consistent with moderate encephalopathy.  Clinical correlation is indicated  Dysphagia with PEG in situ   Diagnosed with Alzheimer's. On Aricept  Hx of DVT s/p IVC filter           PLAN:     - Vent setting as per ICU team. Plan for daily SBT  - Fentanyl and levophed drips held  - Abx. Unasyn was DC. Now on vanco and to start zosyn   - Breathing treatment   - On keppra increased to 1 grm BID IV for EEG findings. Neurology on board  - On Levothyroxine 88 mcg via PEG and liothyroxine 5 mcg. Endo on the case    - Aricept and risperdal have been resumed   - Steroids switched to hydrocortisone. Endo on the case for suspected adrenal insufficiency.  planning to perform full HPA-axis after tapering her steroids             DISPOSITION: ICU    Medications:  REVIEWED DAILY    Infusion Medications    sodium chloride      fentaNYL Stopped (06/10/22 0342)    norepinephrine Stopped (06/10/22 0745)     Scheduled Medications    potassium chloride  40 mEq IntraVENous Once    Followed by    potassium chloride  40 mEq IntraVENous Once    potassium bicarb-citric acid  40 mEq Oral Daily    midodrine  10 mg Oral TID WC    hydrocortisone sodium succinate PF  50 mg IntraVENous Q8H    clonazePAM  0.5 mg Oral Q8H    miconazole nitrate   Topical BID    miconazole   Topical BID    [Held by provider] hydrocortisone sodium succinate PF  25 mg IntraVENous BID    levETIRAcetam  1,000 mg IntraVENous Q12H    Or    levETIRAcetam  1,000 mg Oral Q12H    mupirocin   Topical BID    enoxaparin  40 mg SubCUTAneous Daily    pantoprazole (PROTONIX) 40 mg injection  40 mg IntraVENous Q12H    vitamin D  2,000 Units Oral Daily    donepezil  5 mg Oral Nightly    [Held by provider] lactulose  20 g Oral TID    levothyroxine  88 mcg Oral Daily    risperiDONE  0.25 mg Oral BID    sodium chloride flush  10 mL IntraVENous 2 times per day    budesonide  0.5 mg Nebulization BID    ipratropium-albuterol  3 mL Inhalation Q4H    chlorhexidine  15 mL Mouth/Throat BID     PRN Meds: miconazole nitrate **AND** miconazole nitrate, perflutren lipid microspheres, sodium chloride flush, sodium chloride, promethazine **OR** ondansetron, polyethylene glycol, acetaminophen **OR** acetaminophen, fentaNYL **AND** fentaNYL    Labs:     Recent Labs     06/09/22  0417 06/10/22  0402 06/11/22  0504   WBC 4.5 6.9 7.6   HGB 10.9* 12.1 10.6*   HCT 34.2 37.6 32.6*    225 213       Recent Labs     06/09/22  1653 06/10/22  0402 06/11/22  0504    142 138   K 3.8 3.6 3.2*   * 106 104   CO2 26 25 21*   BUN 12 11 11   CREATININE 0.5 0.5 0.6   CALCIUM 7.9* 8.2* 7.9*   PHOS 2.0*  --   --        Recent Labs     06/09/22  0417 06/10/22  0402 06/11/22  0504   PROT 6.4 6.7 5.8*   ALKPHOS 77 90 81   ALT 17 18 14   AST 15 14 13   BILITOT 0.3 0.2 0.3       No results for input(s): INR in the last 72 hours. No results for input(s): Lupillo Altes in the last 72 hours. Chronic labs:    Lab Results   Component Value Date    CHOL 173 03/30/2022    TRIG 88 03/30/2022    HDL 62 03/30/2022    LDLCALC 93 03/30/2022    TSH 10.330 (H) 06/05/2022    INR 1.0 06/05/2022    LABA1C 5.2 03/30/2022       Radiology: REVIEWED DAILY    +++++++++++++++++++++++++++++++++++++++++++++++++  Aric Dixon MD  [facility] Physician - [address] [facility] [place]  +++++++++++++++++++++++++++++++++++++++++++++++++  NOTE: This report was transcribed using voice recognition software. Every effort was made to ensure accuracy; however, inadvertent computerized transcription errors may be present.

## 2022-06-11 NOTE — FLOWSHEET NOTE
Patient reaches for endo tube and lines, unable to follow commands at this time, restraints continued for patient safety

## 2022-06-11 NOTE — FLOWSHEET NOTE
Patient reaches for endo tube and lines, unable to follow commands at this time.  Restraints continued

## 2022-06-11 NOTE — PROGRESS NOTES
[address] [facility]   Department of Internal Medicine   Internal Medicine Residency  MICU Progress Note     Patient:  Terrie Rueda See 65 y.o. female    MRN: [id number]       Date of Service: 6/10/2022                   Allergy: Patient has no known allergies.     Subjective      Patient was seen in the morning at bedside, she remains in critical condition,   Overnight no acute event reported.        Objective      TEMPERATURE:  Current - Temp: 98.6 °F (37 °C); Max - Temp  Av.4 °F (36.3 °C)  Min: 97 °F (36.1 °C)  Max: 98.6 °F (37 °C)  RESPIRATIONS RANGE: Resp  Av.8  Min: 10  Max: 29  PULSE RANGE: Pulse  Av.4  Min: 46  Max: 113  BLOOD PRESSURE RANGE:  Systolic (63TMZ), ZUP:894 , Min:75 , ZJR:243   ; Diastolic (89GGZ), SOI:59, Min:40, Max:107     PULSE OXIMETRY RANGE: SpO2  Av.9 %  Min: 99 %  Max: 100 %     I & O - 24hr:     Intake/Output Summary (Last 24 hours) at 6/10/2022 1408  Last data filed at 6/10/2022 1200      Gross per 24 hour   Intake 626.1 ml   Output 2170 ml   Net -1543.9 ml      I/O last 3 completed shifts: In: 1299.2 [I.V.:398.1; NG/GT:253; IV Piggyback:648.1]  Out: 2539 [Urine:3385] I/O this shift:  In: 100 [NG/GT:100]  Out: 650 [Urine:650]   Weight change:      Physical Exam:  General Appearance:    Awake but sedated and intubated, no acute distress. HEENT:    NC/AT, mucous membranes are moist   Neck:   Supple, no jugular venous distention. Resp:     CTAB, No wheezes, No rhonchi, no use of accessory muscles   Heart:    RRR, S1 and S2 normal, +Grade II systolic murmur in left lower sternal border, no rub or gallop.     Abdomen:     Soft, non-tender, non-distended with normal bowel sounds   Extremities:   Atraumatic, no cyanosis or edema, +contraction of all toes bilaterally   Pulses:  Radial and pedal pulses are intact bilaterally   Neurologic:   Awake, unable to follow commands, nonverbal at baseline         Medications      Continuous Infusions:  Infusions Meds    sodium chloride      fentaNYL Stopped (06/10/22 0342)    norepinephrine Stopped (06/10/22 0745)         Scheduled Meds:  Scheduled Medications    potassium bicarb-citric acid  40 mEq Oral Daily    midodrine  10 mg Oral TID WC    hydrocortisone sodium succinate PF  50 mg IntraVENous Q8H    clonazePAM  0.5 mg Oral Q8H    miconazole nitrate   Topical BID    miconazole   Topical BID    [Held by provider] hydrocortisone sodium succinate PF  25 mg IntraVENous BID    levETIRAcetam  1,000 mg IntraVENous Q12H     Or    levETIRAcetam  1,000 mg Oral Q12H    mupirocin   Topical BID    enoxaparin  40 mg SubCUTAneous Daily    pantoprazole (PROTONIX) 40 mg injection  40 mg IntraVENous Q12H    vitamin D  2,000 Units Oral Daily    donepezil  5 mg Oral Nightly    [Held by provider] lactulose  20 g Oral TID    levothyroxine  88 mcg Oral Daily    risperiDONE  0.25 mg Oral BID    sodium chloride flush  10 mL IntraVENous 2 times per day    budesonide  0.5 mg Nebulization BID    ipratropium-albuterol  3 mL Inhalation Q4H    chlorhexidine  15 mL Mouth/Throat BID         PRN Meds:   PRN Medications   miconazole nitrate **AND** miconazole nitrate, perflutren lipid microspheres, sodium chloride flush, sodium chloride, promethazine **OR** ondansetron, polyethylene glycol, acetaminophen **OR** acetaminophen, fentaNYL **AND** fentaNYL     Nutrition:   NG/OG tube    TF type: Pulmocare/Nephro/Glucerna/Jevity        At rate: 40 ml/h     Labs and Imaging Studies      CBC:         Recent Labs     06/08/22  0300 06/09/22  0417 06/10/22  0402   WBC 4.4* 4.5 6.9   HGB 10.0* 10.9* 12.1   HCT 31.4* 34.2 37.6   MCV 95.2 95.0 94.0    234 225         BMP:          Recent Labs     06/09/22  0417 06/09/22  1653 06/10/22  0402    145 142   K 3.7 3.8 3.6   * 112* 106   CO2 25 26 25   BUN 10 12 11   CREATININE 0.5 0.5 0.5   GLUCOSE 140* 121* 141*         LIVER PROFILE:         Recent Labs     06/08/22  0300 06/09/22 0417 06/10/22  0402   AST 15 15 14   ALT 19 17 18   BILITOT 0.3 0.3 0.2   ALKPHOS 73 77 90         PT/INR:   No results for input(s): PROTIME, INR in the last 72 hours.     APTT:   No results for input(s): APTT in the last 72 hours.     Fasting Lipid Panel:          Lab Results   Component Value Date     CHOL 173 03/30/2022     TRIG 88 03/30/2022     HDL 62 03/30/2022         Cardiac Enzymes:          Lab Results   Component Value Date     CKTOTAL 82 03/19/2017     CKTOTAL 100 05/12/2012     CKMB 2.0 05/13/2012     CKMB 1.6 05/13/2012     CKMB 3.1 05/12/2012     TROPONINI <0.01 01/20/2021     TROPONINI <0.01 11/01/2017     TROPONINI <0.01 11/01/2017         Notable Cultures:       Blood cultures         Blood Culture, Routine   Date Value Ref Range Status   06/05/2022 5 Days no growth   Final      Respiratory cultures No results found for: RESPCULTURE No results found for: LABGRAM  Urine          Urine Culture, Routine   Date Value Ref Range Status   05/30/2022     Final     10 to 100,000 CFU/mL  Mixed denise isolated. Further workup and sensitivity testing  is not routinely indicated and will not be performed. Mixed denise isolated includes:  Gram negative rods  Proteus species  Gram positive organism         Legionella No results found for: LABLEGI  C Diff PCR No results found for: CDIFPCR  Wound culture/abscess: No results for input(s): WNDABS in the last 72 hours. Tip culture:No results for input(s): CXCATHTIP in the last 72 hours.        Oxygen:      Vent Information  Ventilator ID: [id number]  Vent Mode: AC/VC  Additional Respiratory Assessments  Heart Rate: 57  Resp: 17  SpO2: 100 %  Position: Semi-Vera's  Humidification Source: Heated wire  Humidification Temp: 37  Circuit Condensation: Drained  Airway Type: ET  Airway Size: 7.5  Cuff Pressure (cm H2O):  (MLT)  Skin Barrier Applied:  Yes         [REMOVED] Urinary Catheter Olabw-Bnruexkbdzy-Prfhne (mL): 80 mL     Imaging Studies:  XR CHEST PORTABLE   Final Result 1. The right lung is clear   2. Small infiltrate seen within the retrocardiac region.           CT HEAD WO CONTRAST   Final Result   1. No acute intracranial abnormality. 2. Trace residual chronic left parietal subdural hemorrhage (measuring 3 mm   in maximal width). It has decreased in size as of 05/31/2022.       RECOMMENDATIONS:   Unavailable           XR CHEST PORTABLE   Final Result   New left lower lobe atelectasis.         XR CHEST PORTABLE   Final Result   No acute process.           XR CHEST PORTABLE   Final Result   Slight linear subsegmental atelectasis on the left.       Support tubes and catheters in good position.           XR CHEST PORTABLE   Final Result   1. There are no findings of failure or pneumonia   2. Satisfactory position of the endotracheal tube and right internal jugular   central venous catheter           XR CHEST PORTABLE   Final Result   Interval placement of a right-sided IJ line, when compared to the previous   study performed earlier in the day. No pneumothorax. Previously noted left   lung opacities have resolved. The left hemidiaphragm is now in a normal   position and there has been resolution of the mediastinal shift to the left. Interval decrease in the right lower lung field atelectasis.         XR ABDOMEN FOR NG/OG/NE TUBE PLACEMENT   Final Result   Nasogastric tube is in good position.           XR CHEST PORTABLE   Final Result   Opacification in the  left lung, pneumonia cannot be excluded. Endotracheal   tube tip 2.9 cm above louann. Nasogastric tube is in good position.           XR CHEST PORTABLE   Final Result   No acute cardiopulmonary process.           XR CHEST PORTABLE    (Results Pending)   MRI BRAIN W WO CONTRAST    (Results Pending)         Resident's Assessment and Plan      Assessment:  1. Acute hypoxic respiratory failure 2/2 ? Aspiration pneumonia (resolving)  2. Shock,liekly adrenal insufficiency, resolved  3.  Adrenal insufficiency, in adrenal crisis, 0945AM Cortisol: 2.86  4. H/O Hypothyroidism on levothyroxine replacement;TSH: 10.33 (high), free T4: 1.2; total T3: 67.53  5. H/O Down syndrome  6. H/O alzheimer's disease, on Donepezil  7. 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 H/O Communicating hydrocephalus s/p  shunt (11/2019)  9. H/O seizure disorder on Keppra  10. H/O B/L DVT s/p IVC filer placement     PLAN:  1. Wean off vent and sedation as able  2. Daily SBT, hold TF in AM prior to SBT, resume TF if SBT not successful  3. Continue midodrine,   4. Switch hydrocortisone 50 mg BID (if BP dropped change it to TID)  5. Follow up the brain MRI (AI workup)  6. Follow endo recs  7. Continue synthroid  8. Follow neuro: continue keppra  9. Daily CBC and BMP  10. Daily ABG and CXR while intubated        # Peptic ulcer prophylaxis: PPI BID  # DVT Prophylaxis: lovenox  # Disposition: Cont current care     Chucho Parr MD, PGY [facility]  Department of Pulmonary, Critical Care and Sleep Medicine  [address] [facility]  Department of Internal Medicine      During multidisciplinary team rounds Roxie Early is a 65 y.o. female was seen, examined and discussed. This is confirmation that I have personally seen and examined the patient and that the key elements of the encounter were performed by me (> 85 % time). The medications & laboratory data was discussed and adjusted where necessary. The radiographic images were reviewed or with radiologist or consultant if felt dis-concordant with the exam or history. The above findings were corroborated, plans confirmed and changes made if needed. Family is updated at the bedside as available. Key issues of the case were discussed among consultants. Critical Care time is documented if appropriate.       Mary Jeffries DO, FACP, FCCP, [facility],

## 2022-06-11 NOTE — PLAN OF CARE
Problem: Respiratory - Adult  Goal: Achieves optimal ventilation and oxygenation  6/11/2022 0431 by Lora Odonnell RCP  Outcome: Progressing

## 2022-06-11 NOTE — PROGRESS NOTES
Physician Progress Note      Emigdio Neal  [facility] #:                  [id number]  :                       1965  ADMIT DATE:       2022 4:47 PM  [address] DATE:        2022 4:51 PM  RESPONDING  PROVIDER #:        Sj Kennedy MD          QUERY TEXT:    Patient admitted with acute respiratory failure, concern for tube feed   aspiration. ED provider noted no pneumonia on imaging. CTA chest shows no   evidence of cardiopulmonary abnormality. Aspiration pneumonia noted by   attending on -. If possible, please document if you were evaluating   and/or treating any of the following: The medical record reflects the following:  Risk Factors: possible tube feed aspiration  Clinical Indicators: Pt with sob and desaturation, RR 36-40 on arrival, SpO2   83% on RA, improved to 98% on 6L. Concern for tube feed aspiration; EMS   suctioned tube feed from pt mouth. LS rhonchi. CTA shows no evidence of   cardiopulmonary abnormality. WBC 16.2/4.3. No Procalcitonin  Treatment: Rocephin initially, Unasyn started , supplemental O2, nebs,   imaging, elevated HOB, d/c on Augmentin    Thank you, Rosey Boas RN BSN CDS  [phone]  Options provided:  -- This patient was treated for aspiration pneumonia present on admission  -- This patient was treated for aspiration pneumonia not present on admission  -- Aspiration pneumonia was ruled out  -- Other - I will add my own diagnosis  -- Disagree - Not applicable / Not valid  -- Disagree - Clinically unable to determine / Unknown  -- Refer to Clinical Documentation Reviewer    PROVIDER RESPONSE TEXT:    This patient was treated for aspiration pneumonia present on admission.     Query created by: Penelope Meyers on 2022 2:24 PM      Electronically signed by:  jS Kennedy MD 2022 9:47 AM

## 2022-06-12 ENCOUNTER — APPOINTMENT (OUTPATIENT)
Dept: GENERAL RADIOLOGY | Age: 57
DRG: 870 | End: 2022-06-12
Payer: MEDICARE

## 2022-06-12 LAB
AADO2: 68.2 MMHG
ALBUMIN SERPL-MCNC: 2.5 G/DL (ref 3.5–5.2)
ALP BLD-CCNC: 74 U/L (ref 35–104)
ALT SERPL-CCNC: 12 U/L (ref 0–32)
ANION GAP SERPL CALCULATED.3IONS-SCNC: 12 MMOL/L (ref 7–16)
AST SERPL-CCNC: 10 U/L (ref 0–31)
B.E.: 3.3 MMOL/L (ref -3–3)
BASOPHILS ABSOLUTE: 0 E9/L (ref 0–0.2)
BASOPHILS RELATIVE PERCENT: 0.1 % (ref 0–2)
BILIRUB SERPL-MCNC: 0.3 MG/DL (ref 0–1.2)
BUN BLDV-MCNC: 9 MG/DL (ref 6–20)
CALCIUM SERPL-MCNC: 7.8 MG/DL (ref 8.6–10.2)
CHLORIDE BLD-SCNC: 106 MMOL/L (ref 98–107)
CO2: 23 MMOL/L (ref 22–29)
COHB: 0.3 % (ref 0–1.5)
CREAT SERPL-MCNC: 0.5 MG/DL (ref 0.5–1)
CRITICAL: ABNORMAL
DATE ANALYZED: ABNORMAL
DATE OF COLLECTION: ABNORMAL
EOSINOPHILS ABSOLUTE: 0 E9/L (ref 0.05–0.5)
EOSINOPHILS RELATIVE PERCENT: 0 % (ref 0–6)
FIO2: 40 %
GFR AFRICAN AMERICAN: >60
GFR NON-AFRICAN AMERICAN: >60 ML/MIN/1.73
GLUCOSE BLD-MCNC: 155 MG/DL (ref 74–99)
HCO3: 25.2 MMOL/L (ref 22–26)
HCT VFR BLD CALC: 34.1 % (ref 34–48)
HEMOGLOBIN: 11 G/DL (ref 11.5–15.5)
HHB: 0.9 % (ref 0–5)
LAB: ABNORMAL
LYMPHOCYTES ABSOLUTE: 0.74 E9/L (ref 1.5–4)
LYMPHOCYTES RELATIVE PERCENT: 9.6 % (ref 20–42)
Lab: ABNORMAL
MAGNESIUM: 2.1 MG/DL (ref 1.6–2.6)
MCH RBC QN AUTO: 30.5 PG (ref 26–35)
MCHC RBC AUTO-ENTMCNC: 32.3 % (ref 32–34.5)
MCV RBC AUTO: 94.5 FL (ref 80–99.9)
METHB: 0.3 % (ref 0–1.5)
MODE: AC
MONOCYTES ABSOLUTE: 0 E9/L (ref 0.1–0.95)
MONOCYTES RELATIVE PERCENT: 2.8 % (ref 2–12)
MYELOCYTE PERCENT: 1.7 % (ref 0–0)
NEUTROPHILS ABSOLUTE: 6.66 E9/L (ref 1.8–7.3)
NEUTROPHILS RELATIVE PERCENT: 88.7 % (ref 43–80)
O2 SATURATION: 99.4 % (ref 92–98.5)
O2HB: 98.5 % (ref 94–97)
OPERATOR ID: 2245
PATIENT TEMP: 37 C
PCO2: 29.1 MMHG (ref 35–45)
PDW BLD-RTO: 16 FL (ref 11.5–15)
PEEP/CPAP: 5 CMH2O
PFO2: 4.34 MMHG/%
PH BLOOD GAS: 7.55 (ref 7.35–7.45)
PLATELET # BLD: 208 E9/L (ref 130–450)
PMV BLD AUTO: 10.2 FL (ref 7–12)
PO2: 173.5 MMHG (ref 75–100)
POLYCHROMASIA: ABNORMAL
POTASSIUM REFLEX MAGNESIUM: 3.5 MMOL/L (ref 3.5–5)
RBC # BLD: 3.61 E12/L (ref 3.5–5.5)
RI(T): 0.39
RR MECHANICAL: 12 B/MIN
SODIUM BLD-SCNC: 141 MMOL/L (ref 132–146)
SOURCE, BLOOD GAS: ABNORMAL
THB: 10.4 G/DL (ref 11.5–16.5)
TIME ANALYZED: 513
TOTAL PROTEIN: 5.7 G/DL (ref 6.4–8.3)
VT MECHANICAL: 300 ML
WBC # BLD: 7.4 E9/L (ref 4.5–11.5)

## 2022-06-12 PROCEDURE — 94003 VENT MGMT INPAT SUBQ DAY: CPT

## 2022-06-12 PROCEDURE — 6360000002 HC RX W HCPCS: Performed by: CHIROPRACTOR

## 2022-06-12 PROCEDURE — 6360000002 HC RX W HCPCS: Performed by: STUDENT IN AN ORGANIZED HEALTH CARE EDUCATION/TRAINING PROGRAM

## 2022-06-12 PROCEDURE — C9113 INJ PANTOPRAZOLE SODIUM, VIA: HCPCS | Performed by: INTERNAL MEDICINE

## 2022-06-12 PROCEDURE — 71045 X-RAY EXAM CHEST 1 VIEW: CPT

## 2022-06-12 PROCEDURE — 6370000000 HC RX 637 (ALT 250 FOR IP): Performed by: PHYSICIAN ASSISTANT

## 2022-06-12 PROCEDURE — 99232 SBSQ HOSP IP/OBS MODERATE 35: CPT | Performed by: PHYSICIAN ASSISTANT

## 2022-06-12 PROCEDURE — 6360000002 HC RX W HCPCS: Performed by: INTERNAL MEDICINE

## 2022-06-12 PROCEDURE — 6370000000 HC RX 637 (ALT 250 FOR IP): Performed by: FAMILY MEDICINE

## 2022-06-12 PROCEDURE — 6370000000 HC RX 637 (ALT 250 FOR IP): Performed by: INTERNAL MEDICINE

## 2022-06-12 PROCEDURE — 2000000000 HC ICU R&B

## 2022-06-12 PROCEDURE — 85025 COMPLETE CBC W/AUTO DIFF WBC: CPT

## 2022-06-12 PROCEDURE — 6360000002 HC RX W HCPCS: Performed by: PHYSICIAN ASSISTANT

## 2022-06-12 PROCEDURE — 2580000003 HC RX 258: Performed by: FAMILY MEDICINE

## 2022-06-12 PROCEDURE — 83735 ASSAY OF MAGNESIUM: CPT

## 2022-06-12 PROCEDURE — 2580000003 HC RX 258: Performed by: INTERNAL MEDICINE

## 2022-06-12 PROCEDURE — 94640 AIRWAY INHALATION TREATMENT: CPT

## 2022-06-12 PROCEDURE — 82805 BLOOD GASES W/O2 SATURATION: CPT

## 2022-06-12 PROCEDURE — A4216 STERILE WATER/SALINE, 10 ML: HCPCS | Performed by: INTERNAL MEDICINE

## 2022-06-12 PROCEDURE — 80053 COMPREHEN METABOLIC PANEL: CPT

## 2022-06-12 RX ORDER — ACETYLCYSTEINE 100 MG/ML
4 SOLUTION ORAL; RESPIRATORY (INHALATION) 2 TIMES DAILY
Status: DISCONTINUED | OUTPATIENT
Start: 2022-06-12 | End: 2022-06-17

## 2022-06-12 RX ORDER — THIAMINE HYDROCHLORIDE 100 MG/ML
250 INJECTION, SOLUTION INTRAMUSCULAR; INTRAVENOUS DAILY
Status: DISCONTINUED | OUTPATIENT
Start: 2022-06-12 | End: 2022-06-12 | Stop reason: SDUPTHER

## 2022-06-12 RX ORDER — POTASSIUM CHLORIDE 29.8 MG/ML
40 INJECTION INTRAVENOUS ONCE
Status: DISCONTINUED | OUTPATIENT
Start: 2022-06-12 | End: 2022-06-12

## 2022-06-12 RX ORDER — POTASSIUM CHLORIDE 29.8 MG/ML
20 INJECTION INTRAVENOUS ONCE
Status: COMPLETED | OUTPATIENT
Start: 2022-06-12 | End: 2022-06-12

## 2022-06-12 RX ORDER — MODAFINIL 100 MG/1
100 TABLET ORAL DAILY
Status: COMPLETED | OUTPATIENT
Start: 2022-06-12 | End: 2022-06-14

## 2022-06-12 RX ADMIN — SODIUM CHLORIDE, PRESERVATIVE FREE 40 MG: 5 INJECTION INTRAVENOUS at 20:09

## 2022-06-12 RX ADMIN — CLONAZEPAM 0.5 MG: 0.5 TABLET ORAL at 04:43

## 2022-06-12 RX ADMIN — Medication: at 08:53

## 2022-06-12 RX ADMIN — ACETYLCYSTEINE 400 MG: 100 SOLUTION ORAL; RESPIRATORY (INHALATION) at 20:44

## 2022-06-12 RX ADMIN — CHLORHEXIDINE GLUCONATE 15 ML: 1.2 RINSE ORAL at 20:08

## 2022-06-12 RX ADMIN — SODIUM CHLORIDE, PRESERVATIVE FREE 10 ML: 5 INJECTION INTRAVENOUS at 08:56

## 2022-06-12 RX ADMIN — THIAMINE HYDROCHLORIDE 250 MG: 100 INJECTION, SOLUTION INTRAMUSCULAR; INTRAVENOUS at 11:43

## 2022-06-12 RX ADMIN — MUPIROCIN: 20 OINTMENT TOPICAL at 20:10

## 2022-06-12 RX ADMIN — RISPERIDONE 0.25 MG: 0.25 TABLET ORAL at 22:09

## 2022-06-12 RX ADMIN — HYDROCORTISONE SODIUM SUCCINATE 50 MG: 100 INJECTION, POWDER, FOR SOLUTION INTRAMUSCULAR; INTRAVENOUS at 20:09

## 2022-06-12 RX ADMIN — LEVOTHYROXINE SODIUM 88 MCG: 0.09 TABLET ORAL at 09:14

## 2022-06-12 RX ADMIN — HYDROCORTISONE SODIUM SUCCINATE 50 MG: 100 INJECTION, POWDER, FOR SOLUTION INTRAMUSCULAR; INTRAVENOUS at 08:45

## 2022-06-12 RX ADMIN — POTASSIUM CHLORIDE 20 MEQ: 29.8 INJECTION, SOLUTION INTRAVENOUS at 08:45

## 2022-06-12 RX ADMIN — CLONAZEPAM 0.5 MG: 0.5 TABLET ORAL at 20:10

## 2022-06-12 RX ADMIN — MICONAZOLE NITRATE: 20.6 POWDER TOPICAL at 20:09

## 2022-06-12 RX ADMIN — IPRATROPIUM BROMIDE AND ALBUTEROL SULFATE 3 ML: .5; 2.5 SOLUTION RESPIRATORY (INHALATION) at 08:10

## 2022-06-12 RX ADMIN — RISPERIDONE 0.25 MG: 0.25 TABLET ORAL at 08:46

## 2022-06-12 RX ADMIN — IPRATROPIUM BROMIDE AND ALBUTEROL SULFATE 3 ML: .5; 2.5 SOLUTION RESPIRATORY (INHALATION) at 03:49

## 2022-06-12 RX ADMIN — MIDODRINE HYDROCHLORIDE 10 MG: 10 TABLET ORAL at 08:46

## 2022-06-12 RX ADMIN — IPRATROPIUM BROMIDE AND ALBUTEROL SULFATE 3 ML: .5; 2.5 SOLUTION RESPIRATORY (INHALATION) at 20:45

## 2022-06-12 RX ADMIN — MIDODRINE HYDROCHLORIDE 10 MG: 10 TABLET ORAL at 11:36

## 2022-06-12 RX ADMIN — CLONAZEPAM 0.5 MG: 0.5 TABLET ORAL at 11:36

## 2022-06-12 RX ADMIN — CHLORHEXIDINE GLUCONATE 15 ML: 1.2 RINSE ORAL at 08:45

## 2022-06-12 RX ADMIN — ENOXAPARIN SODIUM 40 MG: 100 INJECTION SUBCUTANEOUS at 08:45

## 2022-06-12 RX ADMIN — MICONAZOLE NITRATE: 20.6 POWDER TOPICAL at 08:54

## 2022-06-12 RX ADMIN — IPRATROPIUM BROMIDE AND ALBUTEROL SULFATE 3 ML: .5; 2.5 SOLUTION RESPIRATORY (INHALATION) at 16:47

## 2022-06-12 RX ADMIN — MODAFINIL 100 MG: 100 TABLET ORAL at 11:36

## 2022-06-12 RX ADMIN — Medication 2000 UNITS: at 08:46

## 2022-06-12 RX ADMIN — LEVETIRACETAM 1000 MG: 10 INJECTION, SOLUTION INTRAVENOUS at 11:16

## 2022-06-12 RX ADMIN — BUDESONIDE 500 MCG: 0.5 SUSPENSION RESPIRATORY (INHALATION) at 20:44

## 2022-06-12 RX ADMIN — DONEPEZIL HYDROCHLORIDE 5 MG: 5 TABLET, FILM COATED ORAL at 20:09

## 2022-06-12 RX ADMIN — Medication: at 20:09

## 2022-06-12 RX ADMIN — POTASSIUM BICARBONATE 40 MEQ: 782 TABLET, EFFERVESCENT ORAL at 08:45

## 2022-06-12 RX ADMIN — MUPIROCIN: 20 OINTMENT TOPICAL at 08:56

## 2022-06-12 RX ADMIN — IPRATROPIUM BROMIDE AND ALBUTEROL SULFATE 3 ML: .5; 2.5 SOLUTION RESPIRATORY (INHALATION) at 11:28

## 2022-06-12 RX ADMIN — SODIUM CHLORIDE, PRESERVATIVE FREE 10 ML: 5 INJECTION INTRAVENOUS at 20:11

## 2022-06-12 RX ADMIN — IPRATROPIUM BROMIDE AND ALBUTEROL SULFATE 3 ML: .5; 2.5 SOLUTION RESPIRATORY (INHALATION) at 23:26

## 2022-06-12 RX ADMIN — SODIUM CHLORIDE, PRESERVATIVE FREE 40 MG: 5 INJECTION INTRAVENOUS at 08:53

## 2022-06-12 RX ADMIN — MIDODRINE HYDROCHLORIDE 10 MG: 10 TABLET ORAL at 17:18

## 2022-06-12 RX ADMIN — BUDESONIDE 500 MCG: 0.5 SUSPENSION RESPIRATORY (INHALATION) at 08:10

## 2022-06-12 ASSESSMENT — PULMONARY FUNCTION TESTS
PIF_VALUE: 17
PIF_VALUE: 18
PIF_VALUE: 17
PIF_VALUE: 18
PIF_VALUE: 18
PIF_VALUE: 19
PIF_VALUE: 18
PIF_VALUE: 17
PIF_VALUE: 19
PIF_VALUE: 18
PIF_VALUE: 18
PIF_VALUE: 17
PIF_VALUE: 18
PIF_VALUE: 20
PIF_VALUE: 18
PIF_VALUE: 18
PIF_VALUE: 24
PIF_VALUE: 21
PIF_VALUE: 18
PIF_VALUE: 20
PIF_VALUE: 20
PIF_VALUE: 17
PIF_VALUE: 18
PIF_VALUE: 21
PIF_VALUE: 18
PIF_VALUE: 16

## 2022-06-12 ASSESSMENT — PAIN SCALES - WONG BAKER
WONGBAKER_NUMERICALRESPONSE: 0

## 2022-06-12 ASSESSMENT — PAIN SCALES - GENERAL
PAINLEVEL_OUTOF10: 0
PAINLEVEL_OUTOF10: 0

## 2022-06-12 NOTE — FLOWSHEET NOTE
Patient unable to follow commands. Attempts to reach for endo tube and lines.  Restraints continued for patient safety

## 2022-06-12 NOTE — PROGRESS NOTES
[address] [facility]   Department of Internal Medicine   Internal Medicine Residency  MICU Progress Note    Patient:  Amara Worthington See 65 y.o. female   MRN: [id number]       Date of Service: 2022    Allergy: Patient has no known allergies. Subjective     Overnight, patient failed SBT. Patient was seen and examined this morning at bedside intubated but not sedated. During SBT, patient had low tidal volume. Will try SBT again tomorrow. There is concern that patient is not awake enough to have spontaneous breathing. Will check ammonia level, start provigil and thiamine supplement. Objective     TEMPERATURE:  Current - Temp: 98.7 °F (37.1 °C); Max - Temp  Av.4 °F (36.9 °C)  Min: 97.2 °F (36.2 °C)  Max: 98.7 °F (37.1 °C)  RESPIRATIONS RANGE: Resp  Av.5  Min: 15  Max: 34  PULSE RANGE: Pulse  Av.8  Min: 58  Max: 104  BLOOD PRESSURE RANGE:  Systolic (81DZR), NMU:841 , Min:78 , WVF:223   ; Diastolic (01SRX), IJE:24, Min:45, Max:89    PULSE OXIMETRY RANGE: SpO2  Av.8 %  Min: 97 %  Max: 100 %    I & O - 24hr:    Intake/Output Summary (Last 24 hours) at 2022 1813  Last data filed at 2022 1720  Gross per 24 hour   Intake 2587.37 ml   Output --   Net 2587.37 ml     I/O last 3 completed shifts: In: 3074.4 [I.V.:51; NG/GT:2686; IV Piggyback:337.4]  Out: 0290 [SOOAN:6489] I/O this shift: In: 556 [NG/GT:556]  Out: -    Weight change:     Physical Exam:  General Appearance:    Awake, not sedated, intubated   HEENT:    NC/AT, mucous membranes are moist   Neck:   Supple, no jugular venous distention.     Resp:     +Course breath sounds bilaterally, No wheezes or crackles, no use of accessory muscles   Heart:    RRR, S1 and S2 normal, could not evaluate for murmur due to course breath sounds   Abdomen:     Soft, non-distended with normal bowel sounds, no guarding, no masses   Extremities:   Atraumatic, no cyanosis or edema, +contraction of all toes bilaterally   Pulses:  Radial and pedal pulses are intact bilaterally +1   Neurologic:   Lethargic, not following commands        Medications     Continuous Infusions:   sodium chloride      fentaNYL Stopped (06/10/22 0342)    norepinephrine Stopped (06/10/22 0745)     Scheduled Meds:   modafinil  100 mg Oral Daily    thiamine (VITAMIN B1) IVPB  250 mg IntraVENous Q24H    acetylcysteine  4 mL Inhalation BID    potassium chloride  40 mEq IntraVENous Once    hydrocortisone sodium succinate PF  50 mg IntraVENous BID    potassium bicarb-citric acid  40 mEq Oral Daily    midodrine  10 mg Oral TID WC    clonazePAM  0.5 mg Oral Q8H    miconazole nitrate   Topical BID    miconazole   Topical BID    levETIRAcetam  1,000 mg IntraVENous Q12H    Or    levETIRAcetam  1,000 mg Oral Q12H    mupirocin   Topical BID    enoxaparin  40 mg SubCUTAneous Daily    pantoprazole (PROTONIX) 40 mg injection  40 mg IntraVENous Q12H    vitamin D  2,000 Units Oral Daily    donepezil  5 mg Oral Nightly    [Held by provider] lactulose  20 g Oral TID    levothyroxine  88 mcg Oral Daily    risperiDONE  0.25 mg Oral BID    sodium chloride flush  10 mL IntraVENous 2 times per day    budesonide  0.5 mg Nebulization BID    ipratropium-albuterol  3 mL Inhalation Q4H    chlorhexidine  15 mL Mouth/Throat BID     PRN Meds: miconazole nitrate **AND** miconazole nitrate, perflutren lipid microspheres, sodium chloride flush, sodium chloride, promethazine **OR** ondansetron, polyethylene glycol, acetaminophen **OR** acetaminophen, fentaNYL **AND** fentaNYL  Nutrition:   NG/OG tube TF type: Pulmocare/Nephro/Glucerna/Jevity        At rate: 40 ml/h    Labs and Imaging Studies     CBC:   Recent Labs     06/10/22  0402 06/11/22  0504 06/12/22  0441   WBC 6.9 7.6 7.4   HGB 12.1 10.6* 11.0*   HCT 37.6 32.6* 34.1   MCV 94.0 94.2 94.5    213 208       BMP:    Recent Labs     06/10/22  0402 06/11/22  0504 06/12/22  0441    138 141   K 3.6 3.2* 3.5    104 106   CO2 25 21* 23   BUN 11 11 9   CREATININE 0.5 0.6 0.5   GLUCOSE 141* 185* 155*       LIVER PROFILE:   Recent Labs     06/10/22  0402 06/11/22  0504 06/12/22  0441   AST 14 13 10   ALT 18 14 12   BILITOT 0.2 0.3 0.3   ALKPHOS 90 81 74       PT/INR:   No results for input(s): PROTIME, INR in the last 72 hours. APTT:   No results for input(s): APTT in the last 72 hours. Fasting Lipid Panel:    Lab Results   Component Value Date    CHOL 173 03/30/2022    TRIG 88 03/30/2022    HDL 62 03/30/2022       Cardiac Enzymes:    Lab Results   Component Value Date    CKTOTAL 82 03/19/2017    CKTOTAL 100 05/12/2012    CKMB 2.0 05/13/2012    CKMB 1.6 05/13/2012    CKMB 3.1 05/12/2012    TROPONINI <0.01 01/20/2021    TROPONINI <0.01 11/01/2017    TROPONINI <0.01 11/01/2017       Notable Cultures:      Blood cultures   Blood Culture, Routine   Date Value Ref Range Status   06/05/2022 5 Days no growth  Final     Respiratory cultures No results found for: RESPCULTURE No results found for: LABGRAM  Urine   Urine Culture, Routine   Date Value Ref Range Status   05/30/2022   Final    10 to 100,000 CFU/mL  Mixed denise isolated. Further workup and sensitivity testing  is not routinely indicated and will not be performed. Mixed denise isolated includes:  Gram negative rods  Proteus species  Gram positive organism       Legionella No results found for: LABLEGI  C Diff PCR No results found for: CDIFPCR  Wound culture/abscess: No results for input(s): WNDABS in the last 72 hours. Tip culture:No results for input(s): CXCATHTIP in the last 72 hours.       Oxygen:     Vent Information  Ventilator ID: [id number]  Vent Mode: AC/VC  Additional Respiratory Assessments  Heart Rate: 84  Resp: 26  SpO2: 100 %  Position: Semi-Vera's  Humidification Source: Heated wire  Humidification Temp: 36.9  Circuit Condensation: Drained  Airway Type: ET  Airway Size: 7.5  Cuff Pressure (cm H2O): 29 cm H2O  Skin Barrier Applied: Yes       [REMOVED] External Urinary Catheter-Output (mL):  (incontinent, pad wet)  [REMOVED] Urinary Catheter Yahva-Nvreikctcox-Qvrbpk (mL): 80 mL    Imaging Studies:  XR CHEST PORTABLE   Final Result   1. Medial left lower lobe atelectasis or pneumonia. No interval change. 2.  No evidence of pneumothorax. 3.  Endotracheal tube tip is approximately 2 cm above the louann. XR CHEST PORTABLE   Final Result   Endotracheal tube with the tip less than 1.5 cm from the louann. Recommend   retraction 2 cm. The findings were sent to the Radiology Results [address] at 7:33   pm on 6/11/2022 to be communicated to a licensed caregiver. XR CHEST PORTABLE   Final Result   Persistent medial left lower lobe opacity suggestive of atelectasis or   pneumonia. Support tubes and catheters are stable. XR CHEST PORTABLE   Final Result   1. The right lung is clear   2. Small infiltrate seen within the retrocardiac region. CT HEAD WO CONTRAST   Final Result   1. No acute intracranial abnormality. 2. Trace residual chronic left parietal subdural hemorrhage (measuring 3 mm   in maximal width). It has decreased in size as of 05/31/2022. RECOMMENDATIONS:   Unavailable         XR CHEST PORTABLE   Final Result   New left lower lobe atelectasis. XR CHEST PORTABLE   Final Result   No acute process. XR CHEST PORTABLE   Final Result   Slight linear subsegmental atelectasis on the left. Support tubes and catheters in good position. XR CHEST PORTABLE   Final Result   1. There are no findings of failure or pneumonia   2. Satisfactory position of the endotracheal tube and right internal jugular   central venous catheter         XR CHEST PORTABLE   Final Result   Interval placement of a right-sided IJ line, when compared to the previous   study performed earlier in the day. No pneumothorax. Previously noted left   lung opacities have resolved.   The left hemidiaphragm is now in a normal   position and there has been resolution of the mediastinal shift to the left. Interval decrease in the right lower lung field atelectasis. XR ABDOMEN FOR NG/OG/NE TUBE PLACEMENT   Final Result   Nasogastric tube is in good position. XR CHEST PORTABLE   Final Result   Opacification in the  left lung, pneumonia cannot be excluded. Endotracheal   tube tip 2.9 cm above louann. Nasogastric tube is in good position. XR CHEST PORTABLE   Final Result   No acute cardiopulmonary process. MRI BRAIN W WO CONTRAST    (Results Pending)   XR CHEST PORTABLE    (Results Pending)        Resident's Assessment and Plan     Assessment:  1. Acute metabolic encephalopathy possibly 2/2 dementia  2. Acute hypoxic respiratory failure 2/2 ? Aspiration pneumonia (resolving)  3. Shock, likely hypovolemic vs distributive vs cardiogenic vs adrenal insufficiency   4. Adrenal insufficiency, in adrenal crisis, 0945AM Cortisol: 2.86  5. H/O Hypothyroidism on levothyroxine replacement;TSH: 10.33 (high), free T4: 1.2; total T3: 67.53  6. H/O Down syndrome  7. H/O alzheimer's disease, on Donepezil  8. 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 H/O Communicating hydrocephalus s/p  shunt (11/2019)  10. H/O seizure disorder on Keppra  11. H/O B/L DVT s/p IVC filer placement    PLAN:  1. Wean off vent as able  2. Daily SBT, hold TF in AM prior to SBT, resume TF if SBT not successful  3. Continue midodrine  4. Started thiamine   5. Started provigil  6. Ordered ammonia level  7. Continue hydrocortisone 50 mg BID   8. Ordered MRI brain for AI workup  9. Follow endo recs  10. Continue synthroid  11. Follow neuro: continue keppra and klonopin  12.  Daily CBC and BMP  13. Daily ABG and CXR while intubated       # Peptic ulcer prophylaxis: PPI BID  # DVT Prophylaxis: lovenox  # Disposition: Cont current care    Clarisa Strickland MD, PGY-1  Attending Physician: Dr. Davida Avendaño  Department of Pulmonary, Critical Care and Sleep Medicine  [address] [facility]  Department of Internal Medicine      During multidisciplinary team rounds Lori Oar See is a 65 y.o. female was seen, examined and discussed. This is confirmation that I have personally seen and examined the patient and that the key elements of the encounter were performed by me (> 85 % time). The medications & laboratory data was discussed and adjusted where necessary. The radiographic images were reviewed or with radiologist or consultant if felt dis-concordant with the exam or history. The above findings were corroborated, plans confirmed and changes made if needed. Family is updated at the bedside as available. Key issues of the case were discussed among consultants. Critical Care time is documented if appropriate.       Demi Reynoso DO, FACP, FCCP, [facility],

## 2022-06-12 NOTE — PLAN OF CARE
Problem: Discharge Planning  Goal: Discharge to home or other facility with appropriate resources  Outcome: Progressing     Problem: Pain  Goal: Verbalizes/displays adequate comfort level or baseline comfort level  Outcome: Progressing     Problem: Respiratory - Adult  Goal: Achieves optimal ventilation and oxygenation  6/12/2022 0028 by Mihaela Junior RN  Outcome: Progressing  6/11/2022 1916 by Lex Rm  Outcome: Not Progressing     Problem: Skin/Tissue Integrity  Goal: Absence of new skin breakdown  Description: 1. Monitor for areas of redness and/or skin breakdown  2. Assess vascular access sites hourly  3. Every 4-6 hours minimum:  Change oxygen saturation probe site  4. Every 4-6 hours:  If on nasal continuous positive airway pressure, respiratory therapy assess nares and determine need for appliance change or resting period. Outcome: Progressing     Problem: ABCDS Injury Assessment  Goal: Absence of physical injury  Outcome: Progressing     Problem: Safety - Adult  Goal: Free from fall injury  Outcome: Progressing     Problem: Nutrition Deficit:  Goal: Optimize nutritional status  Outcome: Progressing     Problem: Safety - Medical Restraint  Goal: Remains free of injury from restraints (Restraint for Interference with Medical Device)  Description: INTERVENTIONS:  1. Determine that other, less restrictive measures have been tried or would not be effective before applying the restraint  2. Evaluate the patient's condition at the time of restraint application  3. Inform patient/family regarding the reason for restraint  4.  Q2H: Monitor safety, psychosocial status, comfort, nutrition and hydration  Outcome: Progressing  Flowsheets (Taken 6/11/2022 1600 by Cathy Nelson RN)  Remains free of injury from restraints (restraint for interference with medical device): Determine that other, less restrictive measures have been tried or would not be effective before applying the restraint

## 2022-06-12 NOTE — PROGRESS NOTES
Attempted to send for patient for her mri but according to the Rn, it needs to wait until Monday when Dr Jess Pizarro can check her shunt. Mri on hold until then.

## 2022-06-12 NOTE — PLAN OF CARE
Problem: Respiratory - Adult  Goal: Achieves optimal ventilation and oxygenation  6/12/2022 0110 by Kapil Travis RCP  Outcome: Progressing

## 2022-06-12 NOTE — PROGRESS NOTES
Hospitalist Progress Note      SYNOPSIS: Patient admitted on 2022 for acute respiratory failure requiring intubation, aspiration pneumonia       SUBJECTIVE:    Patient remains intubated and sedated. Failed SBT  Records reviewed. Stable overnight. No other overnight issues reported. Temp (24hrs), Av.7 °F (37.1 °C), Min:98.6 °F (37 °C), Max:98.7 °F (37.1 °C)    DIET: Diet NPO  ADULT TUBE FEEDING; PEG; Standard without Fiber; Continuous; 20; Yes; 10; Q 4 hours; 45; 150; Q 4 hours  CODE: Full Code    Intake/Output Summary (Last 24 hours) at 2022 0728  Last data filed at 2022 0636  Gross per 24 hour   Intake 2031.37 ml   Output 1100 ml   Net 931.37 ml       OBJECTIVE:    BP (!) 82/49   Pulse 78   Temp 98.6 °F (37 °C) (Axillary)   Resp 27   Ht 4' 10\" (1.473 m)   Wt 127 lb 9.6 oz (57.9 kg)   SpO2 100%   BMI 26.67 kg/m²     General appearance: No apparent distress  HEENT:  Conjunctivae/corneas clear. Down syndrome facial features   Neck: Supple. No jugular venous distention. Respiratory: Oral ETT. Clear to auscultation bilaterally  Cardiovascular: Regular rate rhythm, normal S1-S2  Abdomen: Soft, nontender, nondistended  Musculoskeletal: No clubbing, cyanosis, no bilateral lower extremity edema. Brisk capillary refill. Skin:  No rashes  on visible skin  Neurologic: sedated     ASSESSMENT:    Acute hypoxic respiratory failure requiring endotracheal intubation    Secondary to Aspiration pneumonia  Shock, likely hypovolemic vs distributive vs cardiogenic vs adrenal insufficiency (per ICU)   Down syndrome  Hypothyroidism   Hx of hydrocephalus s/p  shunts  Hx of seizure. EEG / Abnormal EEG polymorphic slowing suggestive of epileptiform foci in the right frontal lobe versus structural abnormality.  Diffuse slowing also noted consistent with moderate encephalopathy.  Clinical correlation is indicated  Dysphagia with PEG in situ   Diagnosed with Alzheimer's.  On Aricept  Hx of DVT s/p IVC filter           PLAN:     - Vent setting as per ICU team. Plan for daily SBT  - Fentanyl and levophed drips  - Off abx  - Breathing treatment   - On keppra increased to 1 grm BID IV for EEG findings. Neurology on board  - On Levothyroxine 88 mcg via PEG and liothyroxine 5 mcg. Endo on the case    - Aricept and risperdal have been resumed   - Steroids switched to hydrocortisone. Endo on the case for suspected adrenal insufficiency.  planning to perform full HPA-axis after tapering her steroids             DISPOSITION: ICU    Medications:  REVIEWED DAILY    Infusion Medications    sodium chloride      fentaNYL Stopped (06/10/22 0342)    norepinephrine Stopped (06/10/22 0745)     Scheduled Medications    potassium chloride  40 mEq IntraVENous Once    hydrocortisone sodium succinate PF  50 mg IntraVENous BID    potassium bicarb-citric acid  40 mEq Oral Daily    midodrine  10 mg Oral TID WC    clonazePAM  0.5 mg Oral Q8H    miconazole nitrate   Topical BID    miconazole   Topical BID    levETIRAcetam  1,000 mg IntraVENous Q12H    Or    levETIRAcetam  1,000 mg Oral Q12H    mupirocin   Topical BID    enoxaparin  40 mg SubCUTAneous Daily    pantoprazole (PROTONIX) 40 mg injection  40 mg IntraVENous Q12H    vitamin D  2,000 Units Oral Daily    donepezil  5 mg Oral Nightly    [Held by provider] lactulose  20 g Oral TID    levothyroxine  88 mcg Oral Daily    risperiDONE  0.25 mg Oral BID    sodium chloride flush  10 mL IntraVENous 2 times per day    budesonide  0.5 mg Nebulization BID    ipratropium-albuterol  3 mL Inhalation Q4H    chlorhexidine  15 mL Mouth/Throat BID     PRN Meds: miconazole nitrate **AND** miconazole nitrate, perflutren lipid microspheres, sodium chloride flush, sodium chloride, promethazine **OR** ondansetron, polyethylene glycol, acetaminophen **OR** acetaminophen, fentaNYL **AND** fentaNYL    Labs:     Recent Labs     06/10/22  0402 06/11/22  0504 06/12/22  0441   WBC 6.9 7.6 7.4 HGB 12.1 10.6* 11.0*   HCT 37.6 32.6* 34.1    213 208       Recent Labs     06/09/22  1653 06/09/22  1653 06/10/22  0402 06/11/22  0504 06/12/22 0441      < > 142 138 141   K 3.8  --  3.6 3.2* 3.5   *   < > 106 104 106   CO2 26   < > 25 21* 23   BUN 12   < > 11 11 9   CREATININE 0.5   < > 0.5 0.6 0.5   CALCIUM 7.9*   < > 8.2* 7.9* 7.8*   PHOS 2.0*  --   --   --   --     < > = values in this interval not displayed. Recent Labs     06/10/22  0402 06/11/22  0504 06/12/22 0441   PROT 6.7 5.8* 5.7*   ALKPHOS 90 81 74   ALT 18 14 12   AST 14 13 10   BILITOT 0.2 0.3 0.3       No results for input(s): INR in the last 72 hours. No results for input(s): Lupillo Altes in the last 72 hours. Chronic labs:    Lab Results   Component Value Date    CHOL 173 03/30/2022    TRIG 88 03/30/2022    HDL 62 03/30/2022    LDLCALC 93 03/30/2022    TSH 10.330 (H) 06/05/2022    INR 1.0 06/05/2022    LABA1C 5.2 03/30/2022       Radiology: REVIEWED DAILY    +++++++++++++++++++++++++++++++++++++++++++++++++  Aric Dixon MD  [facility] - [address], [place]  +++++++++++++++++++++++++++++++++++++++++++++++++  NOTE: This report was transcribed using voice recognition software. Every effort was made to ensure accuracy; however, inadvertent computerized transcription errors may be present.

## 2022-06-12 NOTE — PROGRESS NOTES
Attempted patient on PS 10 but  patient kept going apneic. Placed patient back on [address]. Will attempt PS again later.

## 2022-06-12 NOTE — PROGRESS NOTES
Oma Early is a 65 y.o. female     Neurology following for possible seizures     PMH: Down's syndrome, vasodepressor syncope, SDH s/p craniotomy 2019, hydrocephalus s/p  shunt, Alzheimer's dementia, hypothyroidism    Presented with SOB and was noted to have O2 sats 70% on room air. Concern for aspiration pna. She was subsequently intubated for airway protection. On Abx. Also felt to have possible adrenal insufficiency/adrenal crisis. She was noted to have twitching of the L arm and concern for seizure activity. She was placed on Keppra in May 22 due to EEG on 5/6 showing increased potential for seizures from the L frontal region. Repeat EEG this admission shows epileptic focus in the R frontal region- no seizures recorded. At baseline, she is non verbal and has had significant cognitive decline over recent months. Hx of b/l SDH- last seen by NSGY on 5/31- no intervention required. Yesterday Klonopin 0.5 mg TID was added after discussion with Dr. Magy Harper. The stimulus induced tremoring has stopped since this started. MRI nela HOBSON has been ordered to eval for primary adrenal insufficiency by ICU     No changes overnight. Not tolerating vent wean. No seizures reported      Prior to Visit Medications    Medication Sig Taking? Authorizing Provider   Multiple Vitamins-Minerals (CENTRUM/CERTA-CINDY WITH MINERALS ORAL) solution 5 mLs by Per G Tube route daily  Historical Provider, MD   levETIRAcetam (KEPPRA) 500 MG tablet Take 1 tablet by mouth 2 times daily  Burgess Harjit MD   lactulose (CHRONULAC) 10 GM/15ML solution Take 30 mLs by mouth 3 times daily Titrate for 2-3 bowel movements per day  Burgess Harjit MD   ipratropium-albuterol (DUONEB) 0.5-2.5 (3) MG/3ML SOLN nebulizer solution Inhale 3 mLs into the lungs every 4 hours as needed for Shortness of Breath  Burgess Harjit MD   nystatin (MYCOSTATIN) 177510 UNIT/GM cream Apply topically 2 times daily. 30g  Rivera Loera, DO   risperiDONE (RISPERDAL) 0.25 MG tablet Take 1 tablet by mouth 2 times daily  Peggye Heart Evaristo, DO   donepezil (ARICEPT) 5 MG tablet 5 mg by PEG Tube route nightly   Historical Provider, MD   levothyroxine (SYNTHROID) 88 MCG tablet Take 1 tablet by mouth Daily  Peggye Heart Evaristo, DO   liothyronine (CYTOMEL) 5 MCG tablet Take 1 tab by mouth every morning on Dxbpkl-Rvorukdhw-Tsdvup  Maxine Payne, DO   Cholecalciferol (VITAMIN D-3 SUPER STRENGTH) 50 MCG (2000 UT) TABS Take 1 tablet by mouth daily  Peggye Heart Evaristo, DO   Lactobacillus (ACIDOPHILUS PO) Take by mouth  Historical Provider, MD       Allergies as of 06/05/2022    (No Known Allergies)       Objective:     /70   Pulse 96   Temp 97.2 °F (36.2 °C) (Axillary)   Resp 27   Ht 4' 10\" (1.473 m)   Wt 127 lb 9.6 oz (57.9 kg)   SpO2 100%   BMI 26.67 kg/m²      General appearance: opens eyes to name and keeps them open during exam.  Head: Normocephalic, without obvious abnormality, atraumatic  Neck: supple, trachea midline  Lungs: On vent   Extremities:  Edema BLE  Pulses: 2+ and symmetric  Skin: no rashes or lesions    Mental Status: Opens eyes to name, nonverbal at baseline. Does not follow commands. Attends to examiner.  More alert today     Cranial Nerves:  I: smell    II: visual acuity     II: visual fields Bilateral threat    II: pupils RD   III,VII: ptosis    III,IV,VI: extraocular muscles  Dolls present    V: mastication    V: facial light touch sensation     V,VII: corneal reflex  Present   VII: facial muscle function - upper     VII: facial muscle function - lower Appears symmetric around ETT   VIII: hearing Normal   IX: soft palate elevation     IX,X: gag reflex Present   XI: trapezius strength     XI: sternocleidomastoid strength    XI: neck extension strength     XII: tongue strength       Motor:  Spastic paraplegia BLE  Spastic paresis BUE     No abnormal movements on exam today     Sensory:  Grimaces to pain in all extremities     DTR:   +2 throughout Laboratory/Radiology:     CBC with Differential:    Lab Results   Component Value Date    WBC 7.4 06/12/2022    RBC 3.61 06/12/2022    HGB 11.0 06/12/2022    HCT 34.1 06/12/2022     06/12/2022    MCV 94.5 06/12/2022    MCH 30.5 06/12/2022    MCHC 32.3 06/12/2022    RDW 16.0 06/12/2022    SEGSPCT 30 05/15/2012    LYMPHOPCT 9.6 06/12/2022    MONOPCT 2.8 06/12/2022    MYELOPCT 1.7 06/12/2022    EOSPCT 3.8 12/13/2018    BASOPCT 0.1 06/12/2022    MONOSABS 0.00 06/12/2022    LYMPHSABS 0.74 06/12/2022    EOSABS 0.00 06/12/2022    BASOSABS 0.00 06/12/2022     BMP:    Lab Results   Component Value Date     06/12/2022    K 3.5 06/12/2022     06/12/2022    CO2 23 06/12/2022    BUN 9 06/12/2022    LABALBU 2.5 06/12/2022    LABALBU 2.8 05/13/2012    CREATININE 0.5 06/12/2022    CALCIUM 7.8 06/12/2022    GFRAA >60 06/12/2022    LABGLOM >60 06/12/2022    GLUCOSE 155 06/12/2022    GLUCOSE 96 05/15/2012     HgBA1c:    Lab Results   Component Value Date    LABA1C 5.2 03/30/2022     FLP:    Lab Results   Component Value Date    TRIG 88 03/30/2022    HDL 62 03/30/2022    LDLCALC 93 03/30/2022    LABVLDL 18 03/30/2022     EEG 6/6  Abnormal EEG polymorphic slowing suggestive of epileptiform foci in the right frontal lobe versus structural abnormality. Diffuse slowing also noted consistent with moderate encephalopathy. Clinical correlation is indicated. I personally reviewed the patient's lab and imaging studies at this time. Assessment:     Seizure like activity  With Increased potential for focal seizures found in her left frontal region that was seen on EEG on 5/6/22. Repeat EEG suggestive of epileptiform foci in the R frontal lobe- no seizures recorded      stimulus induced myoclonus- improved with Klonopin. Not noted on exam today.      Suspected Alzheimer's dementia given significant cognitive decline and given her hx of Down syndrome, this increases her risk of earlier onset and this will preclude her to development of seizures as well.    - On Aricept 5 mg HS    Bilateral subdural hygromas seen by NSGY on 5/31 and no intervention required     Plan:     Continue Keppra to 1 G BID     Continue Klonopin 0.5 mg TID     Will f/u MRI brain     Will follow peripherally     John Espinal PA-C  1:03 PM  6/12/2022

## 2022-06-12 NOTE — FLOWSHEET NOTE
Patient reaches for endo tube and lines, unable to follow commands at this time, restraints continued for patient safety.

## 2022-06-13 ENCOUNTER — APPOINTMENT (OUTPATIENT)
Dept: GENERAL RADIOLOGY | Age: 57
DRG: 870 | End: 2022-06-13
Payer: MEDICARE

## 2022-06-13 ENCOUNTER — APPOINTMENT (OUTPATIENT)
Dept: MRI IMAGING | Age: 57
DRG: 870 | End: 2022-06-13
Payer: MEDICARE

## 2022-06-13 LAB
AADO2: 157.2 MMHG
ALBUMIN SERPL-MCNC: 2.4 G/DL (ref 3.5–5.2)
ALP BLD-CCNC: 66 U/L (ref 35–104)
ALT SERPL-CCNC: 11 U/L (ref 0–32)
AMMONIA: 14 UMOL/L (ref 11–51)
ANION GAP SERPL CALCULATED.3IONS-SCNC: 12 MMOL/L (ref 7–16)
AST SERPL-CCNC: 8 U/L (ref 0–31)
B.E.: 4.8 MMOL/L (ref -3–3)
BASOPHILS ABSOLUTE: 0.01 E9/L (ref 0–0.2)
BASOPHILS RELATIVE PERCENT: 0.1 % (ref 0–2)
BILIRUB SERPL-MCNC: 0.3 MG/DL (ref 0–1.2)
BUN BLDV-MCNC: 9 MG/DL (ref 6–20)
CALCIUM SERPL-MCNC: 7.8 MG/DL (ref 8.6–10.2)
CHLORIDE BLD-SCNC: 104 MMOL/L (ref 98–107)
CO2: 24 MMOL/L (ref 22–29)
COHB: 0.3 % (ref 0–1.5)
CREAT SERPL-MCNC: 0.5 MG/DL (ref 0.5–1)
CRITICAL: ABNORMAL
DATE ANALYZED: ABNORMAL
DATE OF COLLECTION: ABNORMAL
EOSINOPHILS ABSOLUTE: 0 E9/L (ref 0.05–0.5)
EOSINOPHILS RELATIVE PERCENT: 0 % (ref 0–6)
FIO2: 40 %
FOLATE: 11.2 NG/ML (ref 4.8–24.2)
GFR AFRICAN AMERICAN: >60
GFR NON-AFRICAN AMERICAN: >60 ML/MIN/1.73
GLUCOSE BLD-MCNC: 137 MG/DL (ref 74–99)
HCO3: 27.3 MMOL/L (ref 22–26)
HCT VFR BLD CALC: 31.5 % (ref 34–48)
HEMOGLOBIN: 10.1 G/DL (ref 11.5–15.5)
HHB: 4 % (ref 0–5)
IMMATURE GRANULOCYTES #: 0.03 E9/L
IMMATURE GRANULOCYTES %: 0.4 % (ref 0–5)
LAB: ABNORMAL
LYMPHOCYTES ABSOLUTE: 0.69 E9/L (ref 1.5–4)
LYMPHOCYTES RELATIVE PERCENT: 10.2 % (ref 20–42)
Lab: ABNORMAL
MAGNESIUM: 2.2 MG/DL (ref 1.6–2.6)
MCH RBC QN AUTO: 30.4 PG (ref 26–35)
MCHC RBC AUTO-ENTMCNC: 32.1 % (ref 32–34.5)
MCV RBC AUTO: 94.9 FL (ref 80–99.9)
METER GLUCOSE: 105 MG/DL (ref 74–99)
METER GLUCOSE: 111 MG/DL (ref 74–99)
METER GLUCOSE: 97 MG/DL (ref 74–99)
METHB: 0.4 % (ref 0–1.5)
MODE: AC
MONOCYTES ABSOLUTE: 0.3 E9/L (ref 0.1–0.95)
MONOCYTES RELATIVE PERCENT: 4.4 % (ref 2–12)
NEUTROPHILS ABSOLUTE: 5.74 E9/L (ref 1.8–7.3)
NEUTROPHILS RELATIVE PERCENT: 84.9 % (ref 43–80)
O2 SATURATION: 97 % (ref 92–98.5)
O2HB: 95.3 % (ref 94–97)
OPERATOR ID: ABNORMAL
PATIENT TEMP: 37 C
PCO2: 33.3 MMHG (ref 35–45)
PDW BLD-RTO: 15.9 FL (ref 11.5–15)
PEEP/CPAP: 5 CMH2O
PFO2: 1.99 MMHG/%
PH BLOOD GAS: 7.53 (ref 7.35–7.45)
PLATELET # BLD: 203 E9/L (ref 130–450)
PMV BLD AUTO: 10 FL (ref 7–12)
PO2: 79.7 MMHG (ref 75–100)
POTASSIUM REFLEX MAGNESIUM: 3.4 MMOL/L (ref 3.5–5)
PROCALCITONIN: 0.07 NG/ML (ref 0–0.08)
RBC # BLD: 3.32 E12/L (ref 3.5–5.5)
RI(T): 1.97
RR MECHANICAL: 12 B/MIN
SODIUM BLD-SCNC: 140 MMOL/L (ref 132–146)
SOURCE, BLOOD GAS: ABNORMAL
THB: 11.4 G/DL (ref 11.5–16.5)
TIME ANALYZED: 516
TOTAL PROTEIN: 5.3 G/DL (ref 6.4–8.3)
VITAMIN B-12: 415 PG/ML (ref 211–946)
VT MECHANICAL: 300 ML
WBC # BLD: 6.8 E9/L (ref 4.5–11.5)

## 2022-06-13 PROCEDURE — 2580000003 HC RX 258: Performed by: INTERNAL MEDICINE

## 2022-06-13 PROCEDURE — 83735 ASSAY OF MAGNESIUM: CPT

## 2022-06-13 PROCEDURE — 6360000002 HC RX W HCPCS

## 2022-06-13 PROCEDURE — 70553 MRI BRAIN STEM W/O & W/DYE: CPT

## 2022-06-13 PROCEDURE — 6370000000 HC RX 637 (ALT 250 FOR IP): Performed by: INTERNAL MEDICINE

## 2022-06-13 PROCEDURE — A9579 GAD-BASE MR CONTRAST NOS,1ML: HCPCS | Performed by: RADIOLOGY

## 2022-06-13 PROCEDURE — 6370000000 HC RX 637 (ALT 250 FOR IP): Performed by: PHYSICIAN ASSISTANT

## 2022-06-13 PROCEDURE — 84145 PROCALCITONIN (PCT): CPT

## 2022-06-13 PROCEDURE — 82607 VITAMIN B-12: CPT

## 2022-06-13 PROCEDURE — 82805 BLOOD GASES W/O2 SATURATION: CPT

## 2022-06-13 PROCEDURE — 94003 VENT MGMT INPAT SUBQ DAY: CPT

## 2022-06-13 PROCEDURE — 6360000002 HC RX W HCPCS: Performed by: STUDENT IN AN ORGANIZED HEALTH CARE EDUCATION/TRAINING PROGRAM

## 2022-06-13 PROCEDURE — 80053 COMPREHEN METABOLIC PANEL: CPT

## 2022-06-13 PROCEDURE — 82962 GLUCOSE BLOOD TEST: CPT

## 2022-06-13 PROCEDURE — 94640 AIRWAY INHALATION TREATMENT: CPT

## 2022-06-13 PROCEDURE — 6360000002 HC RX W HCPCS: Performed by: INTERNAL MEDICINE

## 2022-06-13 PROCEDURE — 99291 CRITICAL CARE FIRST HOUR: CPT | Performed by: INTERNAL MEDICINE

## 2022-06-13 PROCEDURE — 71045 X-RAY EXAM CHEST 1 VIEW: CPT

## 2022-06-13 PROCEDURE — 85025 COMPLETE CBC W/AUTO DIFF WBC: CPT

## 2022-06-13 PROCEDURE — 6360000002 HC RX W HCPCS: Performed by: PHYSICIAN ASSISTANT

## 2022-06-13 PROCEDURE — C9113 INJ PANTOPRAZOLE SODIUM, VIA: HCPCS | Performed by: INTERNAL MEDICINE

## 2022-06-13 PROCEDURE — 36415 COLL VENOUS BLD VENIPUNCTURE: CPT

## 2022-06-13 PROCEDURE — 82746 ASSAY OF FOLIC ACID SERUM: CPT

## 2022-06-13 PROCEDURE — 2580000003 HC RX 258: Performed by: FAMILY MEDICINE

## 2022-06-13 PROCEDURE — 82140 ASSAY OF AMMONIA: CPT

## 2022-06-13 PROCEDURE — A4216 STERILE WATER/SALINE, 10 ML: HCPCS | Performed by: INTERNAL MEDICINE

## 2022-06-13 PROCEDURE — 6370000000 HC RX 637 (ALT 250 FOR IP): Performed by: FAMILY MEDICINE

## 2022-06-13 PROCEDURE — 2000000000 HC ICU R&B

## 2022-06-13 PROCEDURE — 6360000002 HC RX W HCPCS: Performed by: CHIROPRACTOR

## 2022-06-13 PROCEDURE — 6360000004 HC RX CONTRAST MEDICATION: Performed by: RADIOLOGY

## 2022-06-13 RX ORDER — POTASSIUM CHLORIDE 29.8 MG/ML
40 INJECTION INTRAVENOUS ONCE
Status: COMPLETED | OUTPATIENT
Start: 2022-06-13 | End: 2022-06-13

## 2022-06-13 RX ADMIN — Medication: at 20:17

## 2022-06-13 RX ADMIN — IPRATROPIUM BROMIDE AND ALBUTEROL SULFATE 3 ML: .5; 2.5 SOLUTION RESPIRATORY (INHALATION) at 09:27

## 2022-06-13 RX ADMIN — LEVETIRACETAM 1000 MG: 10 INJECTION, SOLUTION INTRAVENOUS at 00:57

## 2022-06-13 RX ADMIN — IPRATROPIUM BROMIDE AND ALBUTEROL SULFATE 3 ML: .5; 2.5 SOLUTION RESPIRATORY (INHALATION) at 20:21

## 2022-06-13 RX ADMIN — LEVETIRACETAM 1000 MG: 500 TABLET, FILM COATED ORAL at 13:06

## 2022-06-13 RX ADMIN — Medication: at 08:57

## 2022-06-13 RX ADMIN — IPRATROPIUM BROMIDE AND ALBUTEROL SULFATE 3 ML: .5; 2.5 SOLUTION RESPIRATORY (INHALATION) at 05:20

## 2022-06-13 RX ADMIN — GADOTERIDOL 10 ML: 279.3 INJECTION, SOLUTION INTRAVENOUS at 11:49

## 2022-06-13 RX ADMIN — MUPIROCIN: 20 OINTMENT TOPICAL at 20:19

## 2022-06-13 RX ADMIN — MUPIROCIN: 20 OINTMENT TOPICAL at 08:56

## 2022-06-13 RX ADMIN — MICONAZOLE NITRATE: 20.6 POWDER TOPICAL at 08:56

## 2022-06-13 RX ADMIN — RISPERIDONE 0.25 MG: 0.25 TABLET ORAL at 20:03

## 2022-06-13 RX ADMIN — SODIUM CHLORIDE, PRESERVATIVE FREE 40 MG: 5 INJECTION INTRAVENOUS at 20:17

## 2022-06-13 RX ADMIN — MIDODRINE HYDROCHLORIDE 10 MG: 10 TABLET ORAL at 13:06

## 2022-06-13 RX ADMIN — CHLORHEXIDINE GLUCONATE 15 ML: 1.2 RINSE ORAL at 20:17

## 2022-06-13 RX ADMIN — HYDROCORTISONE SODIUM SUCCINATE 50 MG: 100 INJECTION, POWDER, FOR SOLUTION INTRAMUSCULAR; INTRAVENOUS at 20:17

## 2022-06-13 RX ADMIN — THIAMINE HYDROCHLORIDE 250 MG: 100 INJECTION, SOLUTION INTRAMUSCULAR; INTRAVENOUS at 13:16

## 2022-06-13 RX ADMIN — MODAFINIL 100 MG: 100 TABLET ORAL at 08:48

## 2022-06-13 RX ADMIN — IPRATROPIUM BROMIDE AND ALBUTEROL SULFATE 3 ML: .5; 2.5 SOLUTION RESPIRATORY (INHALATION) at 12:34

## 2022-06-13 RX ADMIN — CLONAZEPAM 0.5 MG: 0.5 TABLET ORAL at 04:28

## 2022-06-13 RX ADMIN — RISPERIDONE 0.25 MG: 0.25 TABLET ORAL at 08:48

## 2022-06-13 RX ADMIN — BUDESONIDE 500 MCG: 0.5 SUSPENSION RESPIRATORY (INHALATION) at 09:27

## 2022-06-13 RX ADMIN — SODIUM CHLORIDE, PRESERVATIVE FREE 10 ML: 5 INJECTION INTRAVENOUS at 08:54

## 2022-06-13 RX ADMIN — POTASSIUM CHLORIDE 40 MEQ: 29.8 INJECTION, SOLUTION INTRAVENOUS at 06:54

## 2022-06-13 RX ADMIN — SODIUM CHLORIDE, PRESERVATIVE FREE 40 MG: 5 INJECTION INTRAVENOUS at 08:55

## 2022-06-13 RX ADMIN — BUDESONIDE 500 MCG: 0.5 SUSPENSION RESPIRATORY (INHALATION) at 20:21

## 2022-06-13 RX ADMIN — DONEPEZIL HYDROCHLORIDE 5 MG: 5 TABLET, FILM COATED ORAL at 20:03

## 2022-06-13 RX ADMIN — Medication 2000 UNITS: at 08:48

## 2022-06-13 RX ADMIN — LEVOTHYROXINE SODIUM 88 MCG: 0.09 TABLET ORAL at 08:48

## 2022-06-13 RX ADMIN — MICONAZOLE NITRATE: 20.6 POWDER TOPICAL at 20:18

## 2022-06-13 RX ADMIN — CLONAZEPAM 0.5 MG: 0.5 TABLET ORAL at 13:06

## 2022-06-13 RX ADMIN — IPRATROPIUM BROMIDE AND ALBUTEROL SULFATE 3 ML: .5; 2.5 SOLUTION RESPIRATORY (INHALATION) at 16:06

## 2022-06-13 RX ADMIN — ENOXAPARIN SODIUM 40 MG: 100 INJECTION SUBCUTANEOUS at 08:55

## 2022-06-13 RX ADMIN — CHLORHEXIDINE GLUCONATE 15 ML: 1.2 RINSE ORAL at 08:54

## 2022-06-13 RX ADMIN — POTASSIUM BICARBONATE 40 MEQ: 782 TABLET, EFFERVESCENT ORAL at 08:58

## 2022-06-13 RX ADMIN — CLONAZEPAM 0.5 MG: 0.5 TABLET ORAL at 20:03

## 2022-06-13 RX ADMIN — HYDROCORTISONE SODIUM SUCCINATE 50 MG: 100 INJECTION, POWDER, FOR SOLUTION INTRAMUSCULAR; INTRAVENOUS at 08:55

## 2022-06-13 RX ADMIN — SODIUM CHLORIDE, PRESERVATIVE FREE 10 ML: 5 INJECTION INTRAVENOUS at 20:17

## 2022-06-13 RX ADMIN — ACETYLCYSTEINE 400 MG: 100 SOLUTION ORAL; RESPIRATORY (INHALATION) at 09:27

## 2022-06-13 RX ADMIN — MIDODRINE HYDROCHLORIDE 10 MG: 10 TABLET ORAL at 08:48

## 2022-06-13 RX ADMIN — ACETYLCYSTEINE 400 MG: 100 SOLUTION ORAL; RESPIRATORY (INHALATION) at 20:21

## 2022-06-13 ASSESSMENT — PAIN SCALES - WONG BAKER
WONGBAKER_NUMERICALRESPONSE: 0

## 2022-06-13 ASSESSMENT — PULMONARY FUNCTION TESTS
PIF_VALUE: 18
PIF_VALUE: 19
PIF_VALUE: 19
PIF_VALUE: 16
PIF_VALUE: 20
PIF_VALUE: 20
PIF_VALUE: 29
PIF_VALUE: 20
PIF_VALUE: 19
PIF_VALUE: 17
PIF_VALUE: 19
PIF_VALUE: 27
PIF_VALUE: 21
PIF_VALUE: 24
PIF_VALUE: 18
PIF_VALUE: 19
PIF_VALUE: 21
PIF_VALUE: 26
PIF_VALUE: 18
PIF_VALUE: 22
PIF_VALUE: 19
PIF_VALUE: 18
PIF_VALUE: 22
PIF_VALUE: 18
PIF_VALUE: 23
PIF_VALUE: 19

## 2022-06-13 NOTE — PLAN OF CARE
Problem: Discharge Planning  Goal: Discharge to home or other facility with appropriate resources  Outcome: Progressing  Flowsheets (Taken 6/12/2022 1600 by Deric Gutiérrez, JESSICA)  Discharge to home or other facility with appropriate resources: Identify barriers to discharge with patient and caregiver     Problem: Pain  Goal: Verbalizes/displays adequate comfort level or baseline comfort level  Outcome: Progressing     Problem: Respiratory - Adult  Goal: Achieves optimal ventilation and oxygenation  6/12/2022 2112 by Alexandro Sandifer, RN  Outcome: Progressing  Flowsheets (Taken 6/12/2022 1600 by Deric Gutiérrez RN)  Achieves optimal ventilation and oxygenation: Assess for changes in respiratory status  6/12/2022 0819 by Kimberly Huynh RCP  Outcome: Progressing     Problem: Skin/Tissue Integrity  Goal: Absence of new skin breakdown  Description: 1. Monitor for areas of redness and/or skin breakdown  2. Assess vascular access sites hourly  3. Every 4-6 hours minimum:  Change oxygen saturation probe site  4. Every 4-6 hours:  If on nasal continuous positive airway pressure, respiratory therapy assess nares and determine need for appliance change or resting period. Outcome: Progressing     Problem: ABCDS Injury Assessment  Goal: Absence of physical injury  Outcome: Progressing     Problem: Safety - Adult  Goal: Free from fall injury  Outcome: Progressing     Problem: Nutrition Deficit:  Goal: Optimize nutritional status  Outcome: Progressing     Problem: Safety - Medical Restraint  Goal: Remains free of injury from restraints (Restraint for Interference with Medical Device)  Description: INTERVENTIONS:  1. Determine that other, less restrictive measures have been tried or would not be effective before applying the restraint  2. Evaluate the patient's condition at the time of restraint application  3. Inform patient/family regarding the reason for restraint  4.  Q2H: Monitor safety, psychosocial status, comfort, nutrition and hydration  Outcome: Progressing  Flowsheets  Taken 6/12/2022 2000 by Mihaela Junior RN  Remains free of injury from restraints (restraint for interference with medical device):   Determine that other, less restrictive measures have been tried or would not be effective before applying the restraint   Evaluate the patient's condition at the time of restraint application   Inform patient/family regarding the reason for restraint   Every 2 hours: Monitor safety, psychosocial status, comfort, nutrition and hydration  Taken 6/12/2022 1800 by Araceli Ya RN  Remains free of injury from restraints (restraint for interference with medical device): Every 2 hours: Monitor safety, psychosocial status, comfort, nutrition and hydration  Taken 6/12/2022 1600 by Araceli Ya RN  Remains free of injury from restraints (restraint for interference with medical device): Every 2 hours: Monitor safety, psychosocial status, comfort, nutrition and hydration

## 2022-06-13 NOTE — DISCHARGE INSTR - COC
Continuity of Care Form    Patient Name: Amara Worthington See   :  1965  MRN:  [id number]    Admit date:  2022  Discharge date:  ***    Code Status Order: Full Code   Advance Directives:      Admitting Physician:  Mely Jaime MD  PCP: Deanna Donohue DO    Discharging Nurse: [facility] Unit/Room#: 1961/8684-R  Discharging Unit Phone Number: ***    Emergency Contact:   Extended Emergency Contact Information  Primary Emergency Contact: Caitlin Early  Address: [place], [address]  Phone: [phone]  Relation: Legal Guardian  Secondary Emergency Contact: SharathZach  Address: [address], [address]  Home Phone: [phone]  Relation: Parent    Past Surgical History:  Past Surgical History:   Procedure Laterality Date    ABDOMINAL ADHESION SURGERY      ABDOMINAL SURGERY      duodenal atresia    BRAIN SURGERY      CRANIOTOMY Right 2019    RIGHT CRANIOTOMY FRANCISCO JAVIER HOLES performed by Dania Alexandre MD at [address] [place] [address]    ECHO COMPL W DOP COLOR FLOW  2012         HIP SURGERY      Right    JOINT REPLACEMENT      left knee, right hip-dr Rodrigo Tucker    KNEE SURGERY      Right    TOE SURGERY      Right foot    TOTAL KNEE ARTHROPLASTY Left 2016    DR. De    TUBAL LIGATION      VENA CAVA FILTER PLACEMENT Bilateral 2019    VENA CAVA FILTER INSERTION performed by Katherine Baker MD at [address] N/A 2019    RIGHT FRONTAL VENTRICULO PERITONEAL SHUNT INSERTION-----FACILITY performed by Dania Alexandre MD at [address] [place] [address]       Immunization History:   Immunization History   Administered Date(s) Administered    COVID-19, Jameson Banister, Primary or Immunocompromised, PF, 100mcg/0.5mL 2021    Hepatitis A 2012    Hepatitis B (Recombivax HB) 2012, 2012, 2012    Influenza Vaccine, unspecified formulation 2015    Influenza Virus Vaccine 2020    Influenza, High Dose (Fluzone 65 yrs and older) 09/28/2017    Influenza, Quadv, IM, PF (6 mo and older Fluzone, Flulaval, Fluarix, and 3 yrs and older Afluria) 09/20/2016, 10/10/2018    Influenza, Triv, inactivated, subunit, adjuvanted, IM (Fluad 65 yrs and older) 10/11/2019    Pneumococcal Conjugate 13-valent ([id number]) 10/11/2019    Pneumococcal Polysaccharide ([id number]) 12/06/2007, 12/10/2008    Tdap (Boostrix, Adacel) 04/11/2012    Zoster Live (Zostavax) 12/12/2017       Active Problems:  Patient Active Problem List   Diagnosis Code    Down's syndrome T14.0    Systolic murmur I90.6    Vasodepressor syncope R55    Asymptomatic varicose veins of bilateral lower extremities I83.93    Hypothyroidism E03.9    Chest pain R07.9    SDH (subdural hematoma) ([address] S06.5X9A    Subdural hematoma ([address] S06.5X9A    Communicating hydrocele N43.2    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    Hydrocephalus (HCC) G91.9    Toe cyanosis R23.0    Seizure ([address] R56.9    S/P  shunt Z98.2    Acute on chronic intracranial subdural hematoma (HCC) I62.01, I62.03    Encephalopathy G93.40    Aspiration pneumonia due to inhalation of vomitus ([address] J69.0    Acute respiratory failure with hypoxia (HCC) J96.01    Adrenal insufficiency ([facility]) E27.40       Isolation/Infection:   Isolation            No Isolation          Patient Infection Status       Infection Onset Added Last Indicated Last Indicated By Review Planned Expiration Resolved Resolved By    MRSA 06/05/22 06/07/22 06/05/22 Culture, MRSA, Screening        Nares 6/5/22    Resolved    COVID-19 (Rule Out) 06/05/22 06/05/22 06/05/22 Respiratory Panel, Molecular, with COVID-19 (Restricted: peds pts or suitable admitted adults) (Ordered)   06/05/22 Rule-Out Test Resulted    COVID-19 (Rule Out) 06/05/22 06/05/22 06/05/22 COVID-19, Rapid (Ordered)   06/05/22 Rule-Out Test Resulted    COVID-19 (Rule Out) 06/01/22 06/01/22 06/01/22 COVID-19, Rapid (Ordered)   06/01/22 Rule-Out Test Resulted    COVID-19 (Rule Out) 22 Respiratory Panel, Molecular, with COVID-19 (Restricted: peds pts or suitable admitted adults) (Ordered)   22 Rule-Out Test Resulted    COVID-19 (Rule Out) 22 COVID-19 & Influenza Combo (Ordered)   22 Rule-Out Test Resulted    COVID-19 21 COVID-19, Rapid   21     COVID-19 (Rule Out) 21 COVID-19, Rapid (Ordered)   21 Rule-Out Test Resulted            Nurse Assessment:  Last Vital Signs: /72   Pulse 90   Temp 98.4 °F (36.9 °C) (Axillary)   Resp 25   Ht 4' 10\" (1.473 m)   Wt 127 lb 9.6 oz (57.9 kg)   SpO2 100%   BMI 26.67 kg/m²     Last documented pain score (0-10 scale): Pain Level: 0  Last Weight:   Wt Readings from Last 1 Encounters:   22 127 lb 9.6 oz (57.9 kg)     Mental Status:  {IP PT MENTAL STATUS:89554}    IV Access:  { PALMA IV ACCESS:811874680}    Nursing Mobility/ADLs:  Walking   {CHP DME JIIF:752839094}  Transfer  {P DME JVKA:115852417}  Bathing  {CHP DME CYST:549437080}  Dressing  {CHP DME HQRZ:692198886}  Toileting  {P DME DIRF:562579004}  Feeding  {P DME MIUV:776302216}  Med Admin  {P DME ZTHF:450085409}  Med Delivery   { PALMA MED Delivery:101932359}    Wound Care Documentation and Therapy:  Wound 22 Heel Right (Active)   Wound Image   22 1045   Wound Etiology Pressure Unstageable 22 0600   Dressing/Treatment Open to air 22 0600   Wound Length (cm) 4 cm 22 1045   Wound Width (cm) 4 cm 22 1045   Wound Depth (cm) 0 cm 22 0847   Wound Surface Area (cm^2) 16 cm^2 22 1045   Change in Wound Size % (l*w) -1.59 22 1045   Wound Volume (cm^3) 0 cm^3 22 0847   Wound Healing % 100 22 0847   Wound Assessment Dry 22 0600   Drainage Amount None 22 06   Odor None 22 06   Alize-wound Assessment Dry/flaky;Fragile 22 06   Number of days: 37       Wound 22 Foot Left;Lateral (Active)   Wound Image   22 1045   Wound Etiology Pressure Unstageable 06/10/22 0600   Dressing/Treatment Open to air 22 1200   Wound Length (cm) 0.3 cm 22 1045   Wound Width (cm) 0.8 cm 22 1045   Wound Depth (cm) 0.01 cm 22 0847   Wound Surface Area (cm^2) 0.24 cm^2 22 1045   Change in Wound Size % (l*w) 76 22 1045   Wound Volume (cm^3) 0.01 cm^3 22 0847   Wound Assessment Non-blanchable erythema 22 1200   Drainage Amount None 22 1200   Odor None 22 1200   Alize-wound Assessment Fragile;Dry/flaky 22 1200   Number of days: 8        Elimination:  Continence: Bowel: {YES / ZZ:14605}  Bladder: {YES / JU:30667}  Urinary Catheter: {Urinary Catheter:686544692}   Colostomy/Ileostomy/Ileal Conduit: {YES / IB:53415}       Date of Last BM: ***    Intake/Output Summary (Last 24 hours) at 2022 1241  Last data filed at 2022 3381  Gross per 24 hour   Intake 2030.99 ml   Output --   Net 2030.99 ml     I/O last 3 completed shifts:   In: 4062.4 [I.V.:181; NG/GT:3230; IV Piggyback:651.4]  Out: -     Safety Concerns:     [address] Safety Concerns:971495309}    Impairments/Disabilities:      [address] Impairments/Disabilities:861028285}    Nutrition Therapy:  Current Nutrition Therapy:   [address] Des[address] Diet List:827140017}    Routes of Feeding: {CHP DME Other Feedings:434726955}  Liquids: {Slp liquid thickness:50018}  Daily Fluid Restriction: {CHP DME Yes amt example:118078203}  Last Modified Barium Swallow with Video (Video Swallowing Test): {Done Not Done MQAE:942121850}    Treatments at the Time of Hospital Discharge:   Respiratory Treatments: ***  Oxygen Therapy:  {Therapy; copd oxygen:47195}  Ventilator:    { CC Vent YJQK:198828363}    Rehab Therapies: {THERAPEUTIC INTERVENTION:1323143495}  Weight Bearing Status/Restrictions: [address] Thelma CHUNG Weight Bearin}  Other Medical Equipment (for information only, NOT a DME order): {EQUIPMENT:079975795}  Other Treatments: ***    Patient's personal belongings (please select all that are sent with patient):  {CHP DME Belongings:305607953}    RN SIGNATURE:  {Esignature:438780810}    CASE MANAGEMENT/SOCIAL WORK SECTION    Inpatient Status Date: 6/5/2020  Readmission Risk Assessment Score:  Readmission Risk              Risk of Unplanned Readmission:  36           Discharging to Facility/ Agency   Name: [facility]  Address:  Phone:  Fax:    Dialysis Facility (if applicable)   Name:  Address:  Dialysis Schedule:  Phone:  Fax:    / signature: Electronically signed by Zenaida Sims RN on 6/13/2022 at 12:42 PM      PHYSICIAN SECTION    Prognosis: {Prognosis:1000244289}    Condition at Discharge: [facility] Thelma Pasha Patient Condition:991107405}    Rehab Potential (if transferring to Rehab): {Prognosis:8054785529}    Recommended Labs or Other Treatments After Discharge: ***    Physician Certification: I certify the above information and transfer of Roxie Alba See  is necessary for the continuing treatment of the diagnosis listed and that she requires [facility] for greater 30 days.      Update Admission H&P: {CHP DME Changes in TFEWL:590938409}    PHYSICIAN SIGNATURE:  {Esignature:524560503}

## 2022-06-13 NOTE — PLAN OF CARE
Notes reviewed. No changes, neuro decline or seizure activity reported overnight. Continue Keppra 1 g twice daily and Klonopin 0.5 mg 3 times daily. Neurology will follow-up after MRI completed if able. Call with any questions or concerns. Neurology will continue to follow peripherally.

## 2022-06-13 NOTE — PROGRESS NOTES
Oral ETT. Clear to auscultation bilaterally  Cardiovascular: Regular rate rhythm, normal S1-S2  Abdomen: Soft, nontender, nondistended  Musculoskeletal: No clubbing, cyanosis, no bilateral lower extremity edema. Brisk capillary refill. Skin:  No rashes  on visible skin  Neurologic: sedated     ASSESSMENT:    Acute hypoxic respiratory failure requiring endotracheal intubation    Secondary to Aspiration pneumonia  Shock, likely hypovolemic vs distributive vs cardiogenic vs adrenal insufficiency (per ICU)   Down syndrome  Hypothyroidism   Hx of hydrocephalus s/p  shunts  Hx of seizure. EEG 6/6 Abnormal EEG polymorphic slowing suggestive of epileptiform foci in the right frontal lobe versus structural abnormality.  Diffuse slowing also noted consistent with moderate encephalopathy.  Clinical correlation is indicated  Dysphagia with PEG in situ   Diagnosed with Alzheimer's. On Aricept  Hx of DVT s/p IVC filter           PLAN:     - Vent setting as per ICU team. Plan for daily SBT  - Fentanyl and levophed drips  - Off abx  - Breathing treatment   - On keppra increased to 1 grm BID IV for EEG findings. Neurology on board  - On Levothyroxine 88 mcg via PEG. Endo on the case    - Aricept and risperdal have been resumed   - Steroids switched to hydrocortisone. Endo on the case for suspected adrenal insufficiency. planning to perform full HPA-axis after tapering her steroids   - Palliative care consult note from 5/31 stated will follow up with family meeting regarding code status.  May request to follow up            DISPOSITION: ICU    Medications:  REVIEWED DAILY    Infusion Medications    sodium chloride      fentaNYL Stopped (06/10/22 5012)    norepinephrine Stopped (06/10/22 0093)     Scheduled Medications    potassium chloride  40 mEq IntraVENous Once    modafinil  100 mg Oral Daily    thiamine (VITAMIN B1) IVPB  250 mg IntraVENous Q24H    acetylcysteine  4 mL Inhalation BID    hydrocortisone sodium succinate PF  50 mg IntraVENous BID    potassium bicarb-citric acid  40 mEq Oral Daily    midodrine  10 mg Oral TID WC    clonazePAM  0.5 mg Oral Q8H    miconazole nitrate   Topical BID    miconazole   Topical BID    levETIRAcetam  1,000 mg IntraVENous Q12H    Or    levETIRAcetam  1,000 mg Oral Q12H    mupirocin   Topical BID    enoxaparin  40 mg SubCUTAneous Daily    pantoprazole (PROTONIX) 40 mg injection  40 mg IntraVENous Q12H    vitamin D  2,000 Units Oral Daily    donepezil  5 mg Oral Nightly    [Held by provider] lactulose  20 g Oral TID    levothyroxine  88 mcg Oral Daily    risperiDONE  0.25 mg Oral BID    sodium chloride flush  10 mL IntraVENous 2 times per day    budesonide  0.5 mg Nebulization BID    ipratropium-albuterol  3 mL Inhalation Q4H    chlorhexidine  15 mL Mouth/Throat BID     PRN Meds: miconazole nitrate **AND** miconazole nitrate, perflutren lipid microspheres, sodium chloride flush, sodium chloride, promethazine **OR** ondansetron, polyethylene glycol, acetaminophen **OR** acetaminophen, fentaNYL **AND** fentaNYL    Labs:     Recent Labs     06/11/22  0504 06/12/22  0441 06/13/22  0502   WBC 7.6 7.4 6.8   HGB 10.6* 11.0* 10.1*   HCT 32.6* 34.1 31.5*    208 203       Recent Labs     06/11/22  0504 06/12/22  0441 06/13/22  0502    141 140   K 3.2* 3.5 3.4*    106 104   CO2 21* 23 24   BUN 11 9 9   CREATININE 0.6 0.5 0.5   CALCIUM 7.9* 7.8* 7.8*       Recent Labs     06/11/22  0504 06/12/22  0441 06/13/22  0502   PROT 5.8* 5.7* 5.3*   ALKPHOS 81 74 66   ALT 14 12 11   AST 13 10 8   BILITOT 0.3 0.3 0.3       No results for input(s): INR in the last 72 hours. No results for input(s): Caprice Ivy in the last 72 hours.     Chronic labs:    Lab Results   Component Value Date    CHOL 173 03/30/2022    TRIG 88 03/30/2022    HDL 62 03/30/2022    LDLCALC 93 03/30/2022    TSH 10.330 (H) 06/05/2022    INR 1.0 06/05/2022    LABA1C 5.2 03/30/2022       Radiology: REVIEWED DAILY    +++++++++++++++++++++++++++++++++++++++++++++++++  Ruchi Crowell MD  [facility] - [address] [place] [address]  +++++++++++++++++++++++++++++++++++++++++++++++++  NOTE: This report was transcribed using voice recognition software. Every effort was made to ensure accuracy; however, inadvertent computerized transcription errors may be present.

## 2022-06-13 NOTE — PROGRESS NOTES
Left a  mail with Dr. He Jose concerning Nayeli Douglass See having completed her MRI today. Dr. He Jose returned my call and stated he will come by to adjust the pts shunt.

## 2022-06-13 NOTE — CARE COORDINATION
6/13:  Update CM Note:  Pt presented to the ER for SOB from [facility]. Pt was intubated & transferred to MICU. Pt remains intubated in bilateral wrist restraints. Pt is on Iv Thiamine, Iv Solu-cortef, Iv Keppra & Iv Protonix. Neurology is following. Pt is a long term resident at [facility]. Pt is a bed hold & can return once medically stable. Will need a Covid test done on day of d/c. Return envelope is done in in chart. Select following. Sw/CM will continue to follow for dc planning.  Electronically signed by Brandon Bunn RN on 6/13/2022 at 12:40 PM

## 2022-06-13 NOTE — PLAN OF CARE
Problem: Respiratory - Adult  Goal: Achieves optimal ventilation and oxygenation  6/13/2022 0309 by Washington Castaneda RCP  Outcome: Progressing

## 2022-06-13 NOTE — PROGRESS NOTES
06/13/2022    O2SAT 97.0 06/13/2022        Medications     Infusions: (Fluid, Sedation, Vasopressors)  IVF:    None  Vasopressors   None  Sedation   None    Nutrition:   PEG tube TF type: Jevity        At rate: ml/h    ATB:   Antibiotics  Days   None              Skin issues: wound on right heel and lateral left foot  Patient currently has   Urinary cath  Restraints  DVT prophylaxis/ GI prophylaxis, lovenox / protonix  SNF/NH placement  Labs     CBC with Differential:    Lab Results   Component Value Date    WBC 6.8 06/13/2022    RBC 3.32 06/13/2022    HGB 10.1 06/13/2022    HCT 31.5 06/13/2022     06/13/2022    MCV 94.9 06/13/2022    MCH 30.4 06/13/2022    MCHC 32.1 06/13/2022    RDW 15.9 06/13/2022    SEGSPCT 30 05/15/2012    LYMPHOPCT 10.2 06/13/2022    MONOPCT 4.4 06/13/2022    MYELOPCT 1.7 06/12/2022    EOSPCT 3.8 12/13/2018    BASOPCT 0.1 06/13/2022    MONOSABS 0.30 06/13/2022    LYMPHSABS 0.69 06/13/2022    EOSABS 0.00 06/13/2022    BASOSABS 0.01 06/13/2022     CMP:    Lab Results   Component Value Date     06/13/2022    K 3.4 06/13/2022     06/13/2022    CO2 24 06/13/2022    BUN 9 06/13/2022    CREATININE 0.5 06/13/2022    GFRAA >60 06/13/2022    LABGLOM >60 06/13/2022    GLUCOSE 137 06/13/2022    GLUCOSE 96 05/15/2012    PROT 5.3 06/13/2022    LABALBU 2.4 06/13/2022    LABALBU 2.8 05/13/2012    CALCIUM 7.8 06/13/2022    BILITOT 0.3 06/13/2022    ALKPHOS 66 06/13/2022    AST 8 06/13/2022    ALT 11 06/13/2022     Magnesium:    Lab Results   Component Value Date    MG 2.2 06/13/2022     Phosphorus:    Lab Results   Component Value Date    PHOS 2.0 06/09/2022       Imaging Studies:  CXR:     6/13/22   Unchanged left lower lobe infiltrate and or atelectasis.           CT scan:   CT head 6/9/22   1.  No acute intracranial abnormality.    2. Trace residual chronic left parietal subdural hemorrhage (measuring 3 mm   in maximal width).  It has decreased in size as of 05/31/2022.     RECOMMENDATIONS:   Unavailable         Resident's Assessment and Plan   Patient is a 64-year old female with a past medical histroy of Down syndrome and hydrocephalus s/p  shunt, chronic subdural hematoma, seizure, hypothyroidism, was recently admitted on 5/4-5-15 for seizures 2/2 subdural hematoma, then presented again <1 week PTA  For SOB and hypoxia. Currently being managed for the followin. Acute metabolic encephalopathy possibly 2/2 dementia vs seizure vs other causes  2. Acute hypoxic respiratory failure 2/2 ?aspiration pneumonia  3. Shock, likely hypovolemic vs distributive vs cardiogenic vs adrenal insufficiency - resolved  4. Adrenal insufficiency  5. Hypothyroidism, on levothyroxine; TSH: 10.33 (high), free T4: 1.2; total T3: 67.53  6. History of Down's syndrome  7. History of early-onset alzheimer's disease, on Donepezil  8. History of TBI/SDH s/p adonis hole evacuation (2019)  9. History of communication hydrocephalus s/p  shunt (2019)  10. History of seizure disorder, on Keppra  11. History of bilateral DVT s/p IV filter placement    Plan:  · Neurology following, follow recommendations  · Consider EEG?  · Continue breathing treatments  · Continue thiamine and provigil  · Wean off vent as able  · Daily SBT  · Continue breathing treatment  · Continue hydrocortisone 50mg BID  · Follow MRI brain  · Continue synthroid  · Continue Keppra and Klonipin  · Daily CBC and BMP  · Daily CXR and ABG      David Velazco MD, PGY-1    Attending physician: Dr. Sujata Melendez  Department of Pulmonary, Critical Care and Sleep Medicine  [facility]  Department of Internal Medicine      During multidisciplinary team rounds [place]let Early is a 65 y.o. female was seen, examined and discussed.  This is confirmation that I have personally seen and examined the patient and that the key elements of the encounter were performed by me (> 85 % time). The medications & laboratory data was discussed and adjusted where necessary. The radiographic images were reviewed or with radiologist or consultant if felt dis-concordant with the exam or history. The above findings were corroborated, plans confirmed and changes made if needed. Family is updated at the bedside as available. Key issues of the case were discussed among consultants. Critical Care time is documented if appropriate.       Critical Care time: 34 minutes    Aria Fairbanks DO

## 2022-06-13 NOTE — FLOWSHEET NOTE
Patient reaches for endo tube and lines, unable to follow commands at this time restraints continued for patient safety

## 2022-06-14 ENCOUNTER — APPOINTMENT (OUTPATIENT)
Dept: GENERAL RADIOLOGY | Age: 57
DRG: 870 | End: 2022-06-14
Payer: MEDICARE

## 2022-06-14 PROBLEM — G25.3: Status: ACTIVE | Noted: 2022-06-14

## 2022-06-14 PROBLEM — G40.919 BREAKTHROUGH SEIZURE (HCC): Status: ACTIVE | Noted: 2022-06-14

## 2022-06-14 LAB
AADO2: 93.9 MMHG
ALBUMIN SERPL-MCNC: 2.4 G/DL (ref 3.5–5.2)
ALP BLD-CCNC: 66 U/L (ref 35–104)
ALT SERPL-CCNC: 10 U/L (ref 0–32)
ANION GAP SERPL CALCULATED.3IONS-SCNC: 9 MMOL/L (ref 7–16)
AST SERPL-CCNC: 9 U/L (ref 0–31)
B.E.: 1.3 MMOL/L (ref -3–3)
BASOPHILS ABSOLUTE: 0.01 E9/L (ref 0–0.2)
BASOPHILS RELATIVE PERCENT: 0.2 % (ref 0–2)
BILIRUB SERPL-MCNC: 0.3 MG/DL (ref 0–1.2)
BUN BLDV-MCNC: 9 MG/DL (ref 6–20)
CALCIUM SERPL-MCNC: 7.8 MG/DL (ref 8.6–10.2)
CHLORIDE BLD-SCNC: 104 MMOL/L (ref 98–107)
CO2: 24 MMOL/L (ref 22–29)
COHB: 0.6 % (ref 0–1.5)
CREAT SERPL-MCNC: 0.5 MG/DL (ref 0.5–1)
CRITICAL: ABNORMAL
DATE ANALYZED: ABNORMAL
DATE OF COLLECTION: ABNORMAL
EOSINOPHILS ABSOLUTE: 0 E9/L (ref 0.05–0.5)
EOSINOPHILS RELATIVE PERCENT: 0 % (ref 0–6)
FIO2: 40 %
GFR AFRICAN AMERICAN: >60
GFR NON-AFRICAN AMERICAN: >60 ML/MIN/1.73
GLUCOSE BLD-MCNC: 134 MG/DL (ref 74–99)
HCO3: 24.7 MMOL/L (ref 22–26)
HCT VFR BLD CALC: 31.7 % (ref 34–48)
HEMOGLOBIN: 10 G/DL (ref 11.5–15.5)
HHB: 1.1 % (ref 0–5)
IMMATURE GRANULOCYTES #: 0.03 E9/L
IMMATURE GRANULOCYTES %: 0.5 % (ref 0–5)
LAB: ABNORMAL
LYMPHOCYTES ABSOLUTE: 1.02 E9/L (ref 1.5–4)
LYMPHOCYTES RELATIVE PERCENT: 15.5 % (ref 20–42)
Lab: ABNORMAL
MCH RBC QN AUTO: 30.2 PG (ref 26–35)
MCHC RBC AUTO-ENTMCNC: 31.5 % (ref 32–34.5)
MCV RBC AUTO: 95.8 FL (ref 80–99.9)
METER GLUCOSE: 100 MG/DL (ref 74–99)
METER GLUCOSE: 105 MG/DL (ref 74–99)
METHB: 0.3 % (ref 0–1.5)
MODE: AC
MONOCYTES ABSOLUTE: 0.35 E9/L (ref 0.1–0.95)
MONOCYTES RELATIVE PERCENT: 5.3 % (ref 2–12)
NEUTROPHILS ABSOLUTE: 5.15 E9/L (ref 1.8–7.3)
NEUTROPHILS RELATIVE PERCENT: 78.5 % (ref 43–80)
O2 SATURATION: 98.9 % (ref 92–98.5)
O2HB: 98 % (ref 94–97)
OPERATOR ID: 7221
PATIENT TEMP: 37 C
PCO2: 35 MMHG (ref 35–45)
PDW BLD-RTO: 16.1 FL (ref 11.5–15)
PEEP/CPAP: 5 CMH2O
PFO2: 3.53 MMHG/%
PH BLOOD GAS: 7.47 (ref 7.35–7.45)
PLATELET # BLD: 202 E9/L (ref 130–450)
PMV BLD AUTO: 10.1 FL (ref 7–12)
PO2: 141.1 MMHG (ref 75–100)
POTASSIUM REFLEX MAGNESIUM: 3.7 MMOL/L (ref 3.5–5)
RBC # BLD: 3.31 E12/L (ref 3.5–5.5)
RI(T): 0.67
RR MECHANICAL: 12 B/MIN
SODIUM BLD-SCNC: 137 MMOL/L (ref 132–146)
SOURCE, BLOOD GAS: ABNORMAL
THB: 11.9 G/DL (ref 11.5–16.5)
TIME ANALYZED: 519
TOTAL PROTEIN: 5.6 G/DL (ref 6.4–8.3)
VT MECHANICAL: 300 ML
WBC # BLD: 6.6 E9/L (ref 4.5–11.5)

## 2022-06-14 PROCEDURE — 2000000000 HC ICU R&B

## 2022-06-14 PROCEDURE — 6360000002 HC RX W HCPCS

## 2022-06-14 PROCEDURE — 2580000003 HC RX 258: Performed by: FAMILY MEDICINE

## 2022-06-14 PROCEDURE — 6360000002 HC RX W HCPCS: Performed by: CHIROPRACTOR

## 2022-06-14 PROCEDURE — 94003 VENT MGMT INPAT SUBQ DAY: CPT

## 2022-06-14 PROCEDURE — 2580000003 HC RX 258: Performed by: INTERNAL MEDICINE

## 2022-06-14 PROCEDURE — 80053 COMPREHEN METABOLIC PANEL: CPT

## 2022-06-14 PROCEDURE — 6370000000 HC RX 637 (ALT 250 FOR IP): Performed by: INTERNAL MEDICINE

## 2022-06-14 PROCEDURE — 6370000000 HC RX 637 (ALT 250 FOR IP): Performed by: PHYSICIAN ASSISTANT

## 2022-06-14 PROCEDURE — 6360000002 HC RX W HCPCS: Performed by: INTERNAL MEDICINE

## 2022-06-14 PROCEDURE — 94640 AIRWAY INHALATION TREATMENT: CPT

## 2022-06-14 PROCEDURE — 2580000003 HC RX 258: Performed by: STUDENT IN AN ORGANIZED HEALTH CARE EDUCATION/TRAINING PROGRAM

## 2022-06-14 PROCEDURE — 71045 X-RAY EXAM CHEST 1 VIEW: CPT

## 2022-06-14 PROCEDURE — 99291 CRITICAL CARE FIRST HOUR: CPT | Performed by: INTERNAL MEDICINE

## 2022-06-14 PROCEDURE — 6360000002 HC RX W HCPCS: Performed by: STUDENT IN AN ORGANIZED HEALTH CARE EDUCATION/TRAINING PROGRAM

## 2022-06-14 PROCEDURE — 6360000002 HC RX W HCPCS: Performed by: PHYSICIAN ASSISTANT

## 2022-06-14 PROCEDURE — 82805 BLOOD GASES W/O2 SATURATION: CPT

## 2022-06-14 PROCEDURE — 6370000000 HC RX 637 (ALT 250 FOR IP): Performed by: NURSE PRACTITIONER

## 2022-06-14 PROCEDURE — A4216 STERILE WATER/SALINE, 10 ML: HCPCS | Performed by: INTERNAL MEDICINE

## 2022-06-14 PROCEDURE — 6370000000 HC RX 637 (ALT 250 FOR IP): Performed by: STUDENT IN AN ORGANIZED HEALTH CARE EDUCATION/TRAINING PROGRAM

## 2022-06-14 PROCEDURE — 85025 COMPLETE CBC W/AUTO DIFF WBC: CPT

## 2022-06-14 PROCEDURE — 6370000000 HC RX 637 (ALT 250 FOR IP): Performed by: FAMILY MEDICINE

## 2022-06-14 PROCEDURE — C9113 INJ PANTOPRAZOLE SODIUM, VIA: HCPCS | Performed by: INTERNAL MEDICINE

## 2022-06-14 PROCEDURE — 82962 GLUCOSE BLOOD TEST: CPT

## 2022-06-14 PROCEDURE — 99233 SBSQ HOSP IP/OBS HIGH 50: CPT | Performed by: NURSE PRACTITIONER

## 2022-06-14 RX ORDER — POTASSIUM CHLORIDE 29.8 MG/ML
20 INJECTION INTRAVENOUS ONCE
Status: COMPLETED | OUTPATIENT
Start: 2022-06-14 | End: 2022-06-14

## 2022-06-14 RX ORDER — MIDODRINE HYDROCHLORIDE 5 MG/1
5 TABLET ORAL
Status: DISCONTINUED | OUTPATIENT
Start: 2022-06-14 | End: 2022-06-15

## 2022-06-14 RX ORDER — SODIUM CHLORIDE 9 MG/ML
INJECTION, SOLUTION INTRAVENOUS PRN
Status: DISCONTINUED | OUTPATIENT
Start: 2022-06-14 | End: 2022-06-17

## 2022-06-14 RX ORDER — HYDROCORTISONE 5 MG/1
10 TABLET ORAL 2 TIMES DAILY
Status: DISCONTINUED | OUTPATIENT
Start: 2022-06-18 | End: 2022-06-17

## 2022-06-14 RX ORDER — SODIUM CHLORIDE 0.9 % (FLUSH) 0.9 %
5-40 SYRINGE (ML) INJECTION EVERY 12 HOURS SCHEDULED
Status: DISCONTINUED | OUTPATIENT
Start: 2022-06-14 | End: 2022-06-17

## 2022-06-14 RX ORDER — HEPARIN SODIUM (PORCINE) LOCK FLUSH IV SOLN 100 UNIT/ML 100 UNIT/ML
1 SOLUTION INTRAVENOUS EVERY 12 HOURS SCHEDULED
Status: DISCONTINUED | OUTPATIENT
Start: 2022-06-14 | End: 2022-06-17

## 2022-06-14 RX ORDER — SODIUM CHLORIDE 0.9 % (FLUSH) 0.9 %
5-40 SYRINGE (ML) INJECTION PRN
Status: DISCONTINUED | OUTPATIENT
Start: 2022-06-14 | End: 2022-06-17

## 2022-06-14 RX ORDER — HEPARIN SODIUM (PORCINE) LOCK FLUSH IV SOLN 100 UNIT/ML 100 UNIT/ML
1 SOLUTION INTRAVENOUS PRN
Status: DISCONTINUED | OUTPATIENT
Start: 2022-06-14 | End: 2022-06-17

## 2022-06-14 RX ADMIN — DONEPEZIL HYDROCHLORIDE 5 MG: 5 TABLET, FILM COATED ORAL at 20:21

## 2022-06-14 RX ADMIN — IPRATROPIUM BROMIDE AND ALBUTEROL SULFATE 3 ML: .5; 2.5 SOLUTION RESPIRATORY (INHALATION) at 00:37

## 2022-06-14 RX ADMIN — LEVOTHYROXINE SODIUM 88 MCG: 0.09 TABLET ORAL at 05:28

## 2022-06-14 RX ADMIN — SODIUM CHLORIDE, PRESERVATIVE FREE 40 MG: 5 INJECTION INTRAVENOUS at 08:27

## 2022-06-14 RX ADMIN — MICONAZOLE NITRATE: 20.6 POWDER TOPICAL at 08:31

## 2022-06-14 RX ADMIN — CHLORHEXIDINE GLUCONATE 15 ML: 1.2 RINSE ORAL at 20:28

## 2022-06-14 RX ADMIN — CHLORHEXIDINE GLUCONATE 15 ML: 1.2 RINSE ORAL at 08:27

## 2022-06-14 RX ADMIN — IPRATROPIUM BROMIDE AND ALBUTEROL SULFATE 3 ML: .5; 2.5 SOLUTION RESPIRATORY (INHALATION) at 07:47

## 2022-06-14 RX ADMIN — Medication: at 08:30

## 2022-06-14 RX ADMIN — LEVETIRACETAM 1000 MG: 500 TABLET, FILM COATED ORAL at 11:41

## 2022-06-14 RX ADMIN — MICONAZOLE NITRATE: 20.6 POWDER TOPICAL at 20:27

## 2022-06-14 RX ADMIN — BUDESONIDE 500 MCG: 0.5 SUSPENSION RESPIRATORY (INHALATION) at 19:46

## 2022-06-14 RX ADMIN — IPRATROPIUM BROMIDE AND ALBUTEROL SULFATE 3 ML: .5; 2.5 SOLUTION RESPIRATORY (INHALATION) at 19:46

## 2022-06-14 RX ADMIN — MIDODRINE HYDROCHLORIDE 10 MG: 10 TABLET ORAL at 08:21

## 2022-06-14 RX ADMIN — Medication 2000 UNITS: at 08:21

## 2022-06-14 RX ADMIN — MIDODRINE HYDROCHLORIDE 10 MG: 10 TABLET ORAL at 11:40

## 2022-06-14 RX ADMIN — CLONAZEPAM 0.5 MG: 0.5 TABLET ORAL at 11:41

## 2022-06-14 RX ADMIN — MIDODRINE HYDROCHLORIDE 5 MG: 5 TABLET ORAL at 18:09

## 2022-06-14 RX ADMIN — SODIUM CHLORIDE, PRESERVATIVE FREE 10 ML: 5 INJECTION INTRAVENOUS at 20:28

## 2022-06-14 RX ADMIN — ACETYLCYSTEINE 400 MG: 100 SOLUTION ORAL; RESPIRATORY (INHALATION) at 19:46

## 2022-06-14 RX ADMIN — CLONAZEPAM 0.5 MG: 0.5 TABLET ORAL at 20:21

## 2022-06-14 RX ADMIN — ENOXAPARIN SODIUM 40 MG: 100 INJECTION SUBCUTANEOUS at 08:28

## 2022-06-14 RX ADMIN — LEVETIRACETAM 1000 MG: 10 INJECTION, SOLUTION INTRAVENOUS at 00:11

## 2022-06-14 RX ADMIN — LEVETIRACETAM 1000 MG: 10 INJECTION, SOLUTION INTRAVENOUS at 23:30

## 2022-06-14 RX ADMIN — POTASSIUM BICARBONATE 40 MEQ: 782 TABLET, EFFERVESCENT ORAL at 08:28

## 2022-06-14 RX ADMIN — IPRATROPIUM BROMIDE AND ALBUTEROL SULFATE 3 ML: .5; 2.5 SOLUTION RESPIRATORY (INHALATION) at 15:23

## 2022-06-14 RX ADMIN — SODIUM CHLORIDE, PRESERVATIVE FREE 40 MG: 5 INJECTION INTRAVENOUS at 20:28

## 2022-06-14 RX ADMIN — CLONAZEPAM 0.5 MG: 0.5 TABLET ORAL at 03:48

## 2022-06-14 RX ADMIN — THIAMINE HYDROCHLORIDE 250 MG: 100 INJECTION, SOLUTION INTRAMUSCULAR; INTRAVENOUS at 11:41

## 2022-06-14 RX ADMIN — RISPERIDONE 0.25 MG: 0.25 TABLET ORAL at 20:21

## 2022-06-14 RX ADMIN — ACETYLCYSTEINE 400 MG: 100 SOLUTION ORAL; RESPIRATORY (INHALATION) at 07:47

## 2022-06-14 RX ADMIN — HYDROCORTISONE SODIUM SUCCINATE 50 MG: 100 INJECTION, POWDER, FOR SOLUTION INTRAMUSCULAR; INTRAVENOUS at 08:28

## 2022-06-14 RX ADMIN — MODAFINIL 100 MG: 100 TABLET ORAL at 08:21

## 2022-06-14 RX ADMIN — RISPERIDONE 0.25 MG: 0.25 TABLET ORAL at 08:21

## 2022-06-14 RX ADMIN — IPRATROPIUM BROMIDE AND ALBUTEROL SULFATE 3 ML: .5; 2.5 SOLUTION RESPIRATORY (INHALATION) at 11:34

## 2022-06-14 RX ADMIN — SODIUM CHLORIDE, PRESERVATIVE FREE 30 ML: 5 INJECTION INTRAVENOUS at 10:25

## 2022-06-14 RX ADMIN — Medication: at 20:27

## 2022-06-14 RX ADMIN — SODIUM CHLORIDE, PRESERVATIVE FREE 10 ML: 5 INJECTION INTRAVENOUS at 08:30

## 2022-06-14 RX ADMIN — IPRATROPIUM BROMIDE AND ALBUTEROL SULFATE 3 ML: .5; 2.5 SOLUTION RESPIRATORY (INHALATION) at 04:25

## 2022-06-14 RX ADMIN — POTASSIUM CHLORIDE 20 MEQ: 29.8 INJECTION, SOLUTION INTRAVENOUS at 07:57

## 2022-06-14 RX ADMIN — BUDESONIDE 500 MCG: 0.5 SUSPENSION RESPIRATORY (INHALATION) at 07:47

## 2022-06-14 ASSESSMENT — PULMONARY FUNCTION TESTS
PIF_VALUE: 18
PIF_VALUE: 18
PIF_VALUE: 19
PIF_VALUE: 19
PIF_VALUE: 18
PIF_VALUE: 17
PIF_VALUE: 18
PIF_VALUE: 21
PIF_VALUE: 13
PIF_VALUE: 18
PIF_VALUE: 17
PIF_VALUE: 12
PIF_VALUE: 18
PIF_VALUE: 17
PIF_VALUE: 18
PIF_VALUE: 17
PIF_VALUE: 20
PIF_VALUE: 20
PIF_VALUE: 16
PIF_VALUE: 17
PIF_VALUE: 18
PIF_VALUE: 18
PIF_VALUE: 20
PIF_VALUE: 18

## 2022-06-14 ASSESSMENT — PAIN SCALES - WONG BAKER
WONGBAKER_NUMERICALRESPONSE: 0

## 2022-06-14 NOTE — PROGRESS NOTES
[address] [facility]  Department of Internal Medicine   Internal Medicine Residency   MICU Progress Note    Patient:  K[place]ley [place] See 65 y.o. female  MRN: [id number]     Date of Service: 6/14/2022    Allergy: Patient has no known allergies. Subjective     Patient seen and examined in no distress. Remains intubated, not on any sedation. No changes in mentation still. Overnight, no acute event noted. 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 Neurosurgery reprogrammed the valve for the  shunt yesterday. Objective     VS: /62   Pulse 71   Temp 97.4 °F (36.3 °C) (Temporal)   Resp 16   Ht 4' 10\" (1.473 m)   Wt 127 lb 9.6 oz (57.9 kg)   SpO2 100%   BMI 26.67 kg/m²   ABP (Arterial line BP): (!) 80/60  ABP Mean (Arterial Line Mean): 68 mmHg    I & O - 24hr:     Intake/Output Summary (Last 24 hours) at 6/14/2022 1120  Last data filed at 6/14/2022 0659  Gross per 24 hour   Intake 1295.9 ml   Output 475 ml   Net 820.9 ml       Physical Exam:  · General Appearance: intubated, not sedated  · Neck: no adenopathy, no carotid bruit, no JVD, supple, symmetrical, trachea midline and thyroid not enlarged, symmetric, no tenderness/mass/nodules  · Lung: coarse breath sounds bilaterally  · Heart: regular rate and rhythm, S1, S2 normal, no murmur, click, rub or gallop  · Abdomen: soft, non-tender; bowel sounds normal; no masses,  no organomegaly  · Extremities:  extremities normal, atraumatic, no cyanosis or edema and + contraction of toes bilaterally  · Musculoskeletal: No joint swelling, no muscle tenderness. ROM normal in all joints of extremities.    · Neurologic: Mental status: not following commands, responds to pain    Lines     site day    Art line   None    TLC R IJ 8   PICC None    Hemoaccess None      Mechanical Ventilation:   Mode: A/C   TV:300 ml RR: 12  PEEP 5cmH2O  FiO2 40%   Pplat:   Cs:   RSBI   ABG:     Lab Results   Component Value Date    PH 7.466 06/14/2022    PCO2 35.0 06/14/2022    PO2 141.1 06/14/2022    HCO3 24.7 06/14/2022    BE 1.3 06/14/2022    THB 11.9 06/14/2022    O2SAT 98.9 06/14/2022        Medications     Infusions: (Fluid, Sedation, Vasopressors)  IVF:    None  Vasopressors   None  Sedation   None    Nutrition:   PEG tube TF type: Jevity        At rate: ml/h    ATB:   Antibiotics  Days   None              Skin issues: wound on right heel and lateral left foot  Patient currently has   Urinary cath  Restraints  DVT prophylaxis/ GI prophylaxis, lovenox / protonix  SNF/NH placement  Labs     CBC with Differential:    Lab Results   Component Value Date    WBC 6.6 06/14/2022    RBC 3.31 06/14/2022    HGB 10.0 06/14/2022    HCT 31.7 06/14/2022     06/14/2022    MCV 95.8 06/14/2022    MCH 30.2 06/14/2022    MCHC 31.5 06/14/2022    RDW 16.1 06/14/2022    SEGSPCT 30 05/15/2012    LYMPHOPCT 15.5 06/14/2022    MONOPCT 5.3 06/14/2022    MYELOPCT 1.7 06/12/2022    EOSPCT 3.8 12/13/2018    BASOPCT 0.2 06/14/2022    MONOSABS 0.35 06/14/2022    LYMPHSABS 1.02 06/14/2022    EOSABS 0.00 06/14/2022    BASOSABS 0.01 06/14/2022     CMP:    Lab Results   Component Value Date     06/14/2022    K 3.7 06/14/2022     06/14/2022    CO2 24 06/14/2022    BUN 9 06/14/2022    CREATININE 0.5 06/14/2022    GFRAA >60 06/14/2022    LABGLOM >60 06/14/2022    GLUCOSE 134 06/14/2022    GLUCOSE 96 05/15/2012    PROT 5.6 06/14/2022    LABALBU 2.4 06/14/2022    LABALBU 2.8 05/13/2012    CALCIUM 7.8 06/14/2022    BILITOT 0.3 06/14/2022    ALKPHOS 66 06/14/2022    AST 9 06/14/2022    ALT 10 06/14/2022     Magnesium:    Lab Results   Component Value Date    MG 2.2 06/13/2022     Phosphorus:    Lab Results   Component Value Date    PHOS 2.0 06/09/2022       Imaging Studies:  CXR:     6/14/22   No significant change over the past 23 hours.        CT scan:   CT head 6/9/22 1.  No acute intracranial abnormality. 2. Trace residual chronic left parietal subdural hemorrhage (measuring 3 mm   in maximal width).  It has decreased in size as of 2022.       RECOMMENDATIONS:   Unavailable         Resident's Assessment and Plan   Patient is a 64-year old female with a past medical histroy of Down syndrome and hydrocephalus s/p  shunt, chronic subdural hematoma, seizure, hypothyroidism, was recently admitted on 5/4-5-15 for seizures 2/2 subdural hematoma, then presented again <1 week PTA  For SOB and hypoxia. Currently being managed for the followin. Acute metabolic encephalopathy possibly 2/2 dementia vs seizure vs ventriculomegaly vs other causes  2. Acute hypoxic respiratory failure 2/2 ?aspiration pneumonia  3. Shock, likely hypovolemic vs distributive vs cardiogenic vs adrenal insufficiency - resolved  4. Adrenal insufficiency; cosyntropin test not accurate as done while patient on steroids  5. Hypothyroidism, on levothyroxine; TSH: 10.33 (high), free T4: 1.2; total T3: 67.53  6. 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 History of Down's syndrome  8. History of early-onset alzheimer's disease, on Donepezil  9. History of TBI/SDH s/p adonis hole evacuation (2019)  10. History of communication hydrocephalus s/p  shunt (2019)  11. History of seizure disorder, on Keppra  12.  History of bilateral DVT s/p IV filter placement    Plan:  · S/p reprogramming of valve of  shunt yesterday  · Neurology and neurosurgery following, follow recommendations  · Consider EEG?  · Continue breathing treatments  · Continue thiamine and provigil  · Wean off vent as able  · For SBT today  · Continue breathing treatment  · Decrease hydrocortisone 50mg to daily for 3 more days  · Endocrinology following, appreciate recommendation  · Continue synthroid  · Ophthalmology consulted, will see patient tomorrow  · Continue Keppra and Klonipin  · Daily CBC and BMP  · Daily CXR and ABG  · For midline placement if unable to get peripherals  · Remove CVC once midline has been placed      Bozena Choudhury MD, PGY-1    Attending physician: Dr. Linard Gosselin  Department of Pulmonary, Critical Care and Sleep Medicine  [address] [facility]  Department of Internal Medicine      During multidisciplinary team rounds Rhonda Early is a 65 y.o. female was seen, examined and discussed. This is confirmation that I have personally seen and examined the patient and that the key elements of the encounter were performed by me (> 85 % time). The medications & laboratory data was discussed and adjusted where necessary. The radiographic images were reviewed or with radiologist or consultant if felt dis-concordant with the exam or history. The above findings were corroborated, plans confirmed and changes made if needed. Family is updated at the bedside as available. Key issues of the case were discussed among consultants. Critical Care time is documented if appropriate.       Critical Care time: 33 minutes    Aria Fairbanks DO

## 2022-06-14 NOTE — PROGRESS NOTES
Kam Early is a 65 y.o. female     Neurology following for possible seizures     Significant PMH: Down's syndrome, vasodepressor syncope, SDH s/p craniotomy 2019, hydrocephalus s/p  shunt, Alzheimer's dementia, hypothyroidism    Synopsis:  Presented with SOB and was noted to have O2 sats 70% on room air. Concern for aspiration pna requiring intubation. Also felt to have possible adrenal insufficiency/adrenal crisis. Witnessed twitching of the L arm and concern for seizure activity--increased sz potential L frontal and R frontal . At baseline, she is non verbal and has had significant cognitive decline over recent months. She was just seen by our service last month for seizure-like activity and was placed on Keppra. HPI:  She remains in ICU. She is intubated and not sedated. She follows no commands MRI showed no acute abnormalities to explain her persistent seizures. No further seizures or myoclonic twitches on Keppra and clonazepam (per Dr. Benjamin Masterson) for over 24 hours. Endocrinology and ophthalmology have been consulted by ICU team.She was just seen in the neurosurgery office on 5/24 and shunt was adjusted. There is no  family present she is otherwise medically stable. Palliative talked to the family on 5/30 and there were discussions about possible CODE STATUS change, however she remains a full code at this time    Unable to complete ROS due to mental status    Prior to Visit Medications    Medication Sig Taking? Authorizing Provider   Multiple Vitamins-Minerals (CENTRUM/CERTA-CINDY WITH MINERALS ORAL) solution 5 mLs by Per G Tube route daily  Historical Provider, MD   levETIRAcetam (KEPPRA) 500 MG tablet Take 1 tablet by mouth 2 times daily  Jose Wesley MD   ipratropium-albuterol (DUONEB) 0.5-2.5 (3) MG/3ML SOLN nebulizer solution Inhale 3 mLs into the lungs every 4 hours as needed for Shortness of Breath  Jose Wesley MD   nystatin (MYCOSTATIN) 455137 UNIT/GM cream Apply topically 2 times daily. 30g Irma Loera DO   risperiDONE (RISPERDAL) 0.25 MG tablet Take 1 tablet by mouth 2 times daily  Irma Loera DO   donepezil (ARICEPT) 5 MG tablet 5 mg by PEG Tube route nightly   Historical Provider, MD   levothyroxine (SYNTHROID) 88 MCG tablet Take 1 tablet by mouth Daily  Irma Loera DO   liothyronine (CYTOMEL) 5 MCG tablet Take 1 tab by mouth every morning on Y[employer]-Zuuvmxlpo-Fhwfni  Jared Loera DO   Cholecalciferol (VITAMIN D-3 SUPER STRENGTH) 50 MCG (2000 UT) TABS Take 1 tablet by mouth daily  Irma Loera DO   Lactobacillus (ACIDOPHILUS PO) Take by mouth  Historical Provider, MD     Allergies as of 06/05/2022    (No Known Allergies)     Objective:     BP 96/65   Pulse 66   Temp 97.4 °F (36.3 °C) (Temporal)   Resp 15   Ht 4' 10\" (1.473 m)   Wt 127 lb 9.6 oz (57.9 kg)   SpO2 100%   BMI 26.67 kg/m²      General appearance: Drowsy, intubated but not sedated; in no distress on vent--Down's appearance  Head: Normocephalic, without obvious abnormality, atraumatic--ETT in place  Eyes: sclera clear  Lungs:  respirations non-labored; breathing over vent  Heart: NSR  Extremities: Slight edema BLE, hammer toes BLE  Pulses: 2+ and symmetric  Skin: no rashes or lesions    Mental Status: Drowsy, opens eyes to voice but is nonverbal at baseline and follows no commands.     Cranial Nerves:  I: smell NA   II: visual acuity  NA   II: visual fields Bilateral threat    II: pupils RD   III,VII: ptosis None   III,IV,VI: extraocular muscles  Left eye adducts, disconjugate gaze; does not track   V: mastication    V: facial light touch sensation     V,VII: corneal reflex  Present   VII: facial muscle function - upper     VII: facial muscle function - lower Appears symmetric around ETT   VIII: hearing Normal   IX: soft palate elevation     IX,X: gag reflex Present   XI: trapezius strength     XI: sternocleidomastoid strength    XI: neck extension strength     XII: tongue strength Motor:  Spastic paraparesis bilateral upper extremities--moves both minimally  Spastic paraplegia BLE  No abnormal movements    Sensory:  Grimaces and withdraws to noxious stimuli in all extremities    DTR:   +2 throughout     Triple flexion BLE  No Johnson's    Laboratory/Radiology:     CBC with Differential:    Lab Results   Component Value Date    WBC 6.6 06/14/2022    RBC 3.31 06/14/2022    HGB 10.0 06/14/2022    HCT 31.7 06/14/2022     06/14/2022    MCV 95.8 06/14/2022    MCH 30.2 06/14/2022    MCHC 31.5 06/14/2022    RDW 16.1 06/14/2022    SEGSPCT 30 05/15/2012    LYMPHOPCT 15.5 06/14/2022    MONOPCT 5.3 06/14/2022    MYELOPCT 1.7 06/12/2022    EOSPCT 3.8 12/13/2018    BASOPCT 0.2 06/14/2022    MONOSABS 0.35 06/14/2022    LYMPHSABS 1.02 06/14/2022    EOSABS 0.00 06/14/2022    BASOSABS 0.01 06/14/2022     BMP:    Lab Results   Component Value Date     06/14/2022    K 3.7 06/14/2022     06/14/2022    CO2 24 06/14/2022    BUN 9 06/14/2022    LABALBU 2.4 06/14/2022    LABALBU 2.8 05/13/2012    CREATININE 0.5 06/14/2022    CALCIUM 7.8 06/14/2022    GFRAA >60 06/14/2022    LABGLOM >60 06/14/2022    GLUCOSE 134 06/14/2022    GLUCOSE 96 05/15/2012     HgBA1c:    Lab Results   Component Value Date    LABA1C 5.2 03/30/2022     FLP:    Lab Results   Component Value Date    TRIG 88 03/30/2022    HDL 62 03/30/2022    LDLCALC 93 03/30/2022    LABVLDL 18 03/30/2022     CT Head:  1.  No acute intracranial abnormality. 2. Trace residual chronic left parietal subdural hemorrhage (measuring 3 mm   in maximal width).  It has decreased in size as of 05/31/2022. EEG 6/6:  Abnormal EEG polymorphic slowing suggestive of epileptiform foci in the right frontal lobe versus structural abnormality. Diffuse slowing also noted consistent with moderate encephalopathy. Clinical correlation is indicated. MRI:  1. No focal pituitary lesion is identified to indicate microadenoma.    Apparent signal abnormality in the left lateral aspect of the gland on the   sagittal T1 post-contrast enhanced sequence is likely artifactual.   2.  No acute intracranial abnormality. 3. Bilateral mastoid effusions, which may be due to eustachian tube   dysfunction or otomastoiditis.  Clinical correlation is needed. 4. Encephalomalacia in the anterior aspect of the frontal and temporal lobes   bilaterally, likely result of remote trauma. 5. Disproportionately prominent volume loss in the mesial temporal lobes, as   may be seen with Alzheimer's disease and other dementias. Clinical   correlation is needed. 6. Ventriculomegaly.  Right transfrontal ventriculostomy shunt in situ. 7. Thinning of the posterior sclera of the left globe along the temporal   aspect concerning for staphyloma.  Ophthalmology consultation is recommended. I personally reviewed the patient's lab and imaging studies at this time. Assessment:     Probable breakthrough seizure: In the setting of adrenal insufficiency, possible aspiration pneumonia, and history of TBI/SDH. EEG suggestion of epileptiform foci in her right frontal lobe as well as potential for focal seizures in her left frontal region. No seizures since Keppra adjusted and no acute abnormalities on MRI brain. 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 She follows no commands despite no sedation.     History of TBI/SDH s/p adonis hole    History of hydrocephalus s/p  shunt: with recent adjustment     Plan:     Continue Keppra 1000 mg BID    Continue clonazepam 0.5 mg every 8 hours--hold for sedation    Palliative is following for goals of care    Sz precautions    Neuro will follow peripherally    BOGDAN Holguin CNP  8:33 AM  6/14/2022

## 2022-06-14 NOTE — PROGRESS NOTES
Comprehensive Nutrition Assessment    Type and Reason for Visit:  Reassess,Consult    Nutrition Recommendations/Plan:   1. Tube feeding recommendation per physician consult. Recommend Standard with Fiber (Jevity 1.5) @30/hr plus 1 protein modular daily to provide 720ml, 1080 calories, 46g protein, 442ml water (1184 calories and 72g protein w/ protein modular).        Malnutrition Assessment:  Malnutrition Status:  Insufficient data (06/14/22 1255)    Context:  Acute Illness     Findings of the 6 clinical characteristics of malnutrition:  Energy Intake:  No significant decrease in energy intake (TF meeting needs)  Weight Loss:  Unable to assess (d/t lack of long term weight history)     Body Fat Loss:  Unable to assess     Muscle Mass Loss:  Unable to assess    Fluid Accumulation:  No significant fluid accumulation     Strength:  Not Performed    Nutrition Assessment:    Patient remains NPO and receiving tube feedings ; pt also remains intubated ; adm w/ acute hypoxic respiratory failure 2/2 suspected aspiration PNA ; adm w/ acute metabolic encephalopathy (ammonia levels WNL) ; noted shock ; hx of seizures and Down's Syndrome ; hx of TBI/SDH/ shunt/crani ; hx of Alzheimers ; will provide updated recommendations    Nutrition Related Findings:    +I&Os (+4.0 L), 1-2+ edema, MAP WNL, intubated, ETT, PEG w/ TF, active BS ; Wound Type: Multiple,Open Wounds,Unstageable (wounds x 2)       Current Nutrition Intake & Therapies:    Average Meal Intake: NPO     Current Tube Feeding (TF) Orders:  · Feeding Route: PEG  · Formula: Standard without Fiber  · Schedule: Continuous (@45/hr)  · Feeding Regimen: @45/hr  · Additives/Modulars: None  · Water Flushes: 150ml q 4 hours  · Current TF & Flush Orders Provides: 1080ml, 1296 calories, 60g protein, 886ml water  · Goal TF & Flush Orders Provides: 960 ml tv, 1440 kcals, 61 gm pro, 730 ml free water, 1630 ml total water      Anthropometric Measures:  Height: 4' 10\" (147.3 cm)  Ideal Body Weight (IBW): 90 lbs (41 kg)    Admission Body Weight: 125 lb (56.7 kg) (6/5, no method)  Current Body Weight: 127 lb (57.6 kg) (6/7, bedscale), 141.1 % IBW. Weight Source: Bed Scale  Current BMI (kg/m2): 26.6  Usual Body Weight: 130 lb 1.6 oz (59 kg) (12/8/21 measured wt from [facility] encounter)  % Weight Change (Calculated): -3.9                    BMI Categories: Overweight (BMI 25.0-29. 9)    Estimated Daily Nutrient Needs:  Energy Requirements Based On: Formula  Weight Used for Energy Requirements: Current  Energy (kcal/day): PS3B 6043-7641 (minute volume 3.71 ; Tmax 98.4)  Weight Used for Protein Requirements: Ideal  Protein (g/day): 60-75 (1.5-1.8g/kg IBW)  Method Used for Fluid Requirements: 1 ml/kcal  Fluid (ml/day): per critical care    Nutrition Diagnosis:   · Inadequate oral intake related to impaired respiratory function as evidenced by NPO or clear liquid status due to medical condition,intubation,nutrition support - enteral nutrition      Nutrition Interventions:   Food and/or Nutrient Delivery: Continue NPO,Modify Tube Feeding  Nutrition Education/Counseling: Education not indicated  Coordination of Nutrition Care: Continue to monitor while inpatient       Goals:  Previous Goal Met: Progressing toward Goal(s)  Goals: Tolerate nutrition support at goal rate,by next RD assessment       Nutrition Monitoring and Evaluation:   Behavioral-Environmental Outcomes: None Identified  Food/Nutrient Intake Outcomes: Enteral Nutrition Intake/Tolerance  Physical Signs/Symptoms Outcomes: Biochemical Data,GI Status,Fluid Status or Edema,Hemodynamic Status,Nutrition Focused Physical Findings,Skin,Weight    Discharge Planning:     Too soon to determine     Isauro Conrad RD, LD  Contact: [phone]

## 2022-06-14 NOTE — PROGRESS NOTES
Patient tried on SBT, PS 15. Patient set off apnea alarm. Much encouragement to try to get patient to take slow, deep breaths. Patient was unable. RN aware.  Will continue to monitor

## 2022-06-14 NOTE — PLAN OF CARE
Problem: Discharge Planning  Goal: Discharge to home or other facility with appropriate resources  Outcome: Progressing     Problem: Pain  Goal: Verbalizes/displays adequate comfort level or baseline comfort level  Outcome: Progressing     Problem: Skin/Tissue Integrity  Goal: Absence of new skin breakdown  Description: 1. Monitor for areas of redness and/or skin breakdown  2. Assess vascular access sites hourly  3. Every 4-6 hours minimum:  Change oxygen saturation probe site  4. Every 4-6 hours:  If on nasal continuous positive airway pressure, respiratory therapy assess nares and determine need for appliance change or resting period. Outcome: Progressing     Problem: ABCDS Injury Assessment  Goal: Absence of physical injury  Outcome: Progressing     Problem: Safety - Adult  Goal: Free from fall injury  Outcome: Progressing     Problem: Nutrition Deficit:  Goal: Optimize nutritional status  Outcome: Progressing     Problem: Safety - Medical Restraint  Goal: Remains free of injury from restraints (Restraint for Interference with Medical Device)  Description: INTERVENTIONS:  1. Determine that other, less restrictive measures have been tried or would not be effective before applying the restraint  2. Evaluate the patient's condition at the time of restraint application  3. Inform patient/family regarding the reason for restraint  4.  Q2H: Monitor safety, psychosocial status, comfort, nutrition and hydration  Outcome: Progressing

## 2022-06-14 NOTE — PLAN OF CARE
Problem: Respiratory - Adult  Goal: Achieves optimal ventilation and oxygenation  6/14/2022 1352 by Cheikh Montana RCP  Outcome: Progressing

## 2022-06-14 NOTE — PROGRESS NOTES
Hospitalist Progress Note      SYNOPSIS: Jorge Luis Early is a 65year old female with a PMH of Down syndrome and hydrocephalus s/p  shunt, chronic subdural hematoma, Alzheimer's dementia, seizures and hypothyroidism. She was recently admitted in May 2022 with seizures 2/2 subdural hematoma. She presented to the ER on 2022 with shortness of breath. She was found to be hypoxic and required intubation. She was admitted to ICU with diagnosis of acute hypoxic respiratory failure 2/2 possible aspiration pneumonia, hemodynamic shock, acute metabolic encephalopathy and adrenal insufficiency. Neurology was consulted and an EEG was performed on  which revealed epileptiform foci in the right frontal lobe and her Keppra was increased. Neurosurgery was consulted for subacute on chronic subdural hematoma. Intervention was planned. An MRI was performed on  which revealed ventriculomegaly and thinning of the posterior sclera of the left globe along the posterior aspect concerning for staphyloma. Opthamology was consulted. Neurosurgery reprogrammed the valve for the  shunt on . SUBJECTIVE:    Patient remains intubated but not sedated. Withdraws to painful stimuli but not following commands. Records reviewed. Temp (24hrs), Av.4 °F (36.3 °C), Min:97.2 °F (36.2 °C), Max:97.6 °F (36.4 °C)    DIET: Diet NPO  ADULT TUBE FEEDING; PEG; Standard without Fiber; Continuous; 20; Yes; 10; Q 4 hours; 45; 150; Q 4 hours  CODE: Full Code    Intake/Output Summary (Last 24 hours) at 2022 0806  Last data filed at 2022 0659  Gross per 24 hour   Intake 1295.9 ml   Output 475 ml   Net 820.9 ml       OBJECTIVE:    BP 96/65   Pulse 66   Temp 97.4 °F (36.3 °C) (Temporal)   Resp 15   Ht 4' 10\" (1.473 m)   Wt 127 lb 9.6 oz (57.9 kg)   SpO2 100%   BMI 26.67 kg/m²     General appearance:  Intubated, not sedated  HEENT:  Conjunctivae/corneas clear. Down syndrome facial features   Neck: Supple.  No jugular venous distention. Respiratory: Oral ETT. Clear to auscultation bilaterally. Cardiovascular: Regular rate rhythm, normal S1-S2  Abdomen: Soft, nontender, nondistended  Musculoskeletal: No clubbing, cyanosis, no bilateral lower extremity edema. Brisk capillary refill. Skin:  No rashes  on visible skin  Neurologic:  Not following commands, withdraws to painful stimuli in all 4 extremities    ASSESSMENT:    1. Acute hypoxic respiratory failure-2/2 ? aspiration pneumonia. S/p intubation on admission. Remains intubated with FiO2 40%. 2. Possible aspiration pneumonia-s/p Zosyn and vancomycin course  3. Hemodynamic shock-  likely hypovolemic vs distributive vs cardiogenic vs adrenal insufficiency (per ICU); currently off pressors however requiring midodrine 3 times daily with meals  4. Adrenal insufficiency-endocrine following, on Solu-Cortef- to change to hydrocortisone  5. Down syndrome  6. Hypothyroidism-for continued supplementation  7. Hx of hydrocephalus s/p  shunt-with ventriculomegaly on most recent imaging s/p reprogramming of shunt valve on 6/13 per neurosurgery  8. Seizures -neurology following, EEG 6/6 with polymorphic slowing suggestive of epileptiform foci in the right frontal lobe versus structural abnormality.  Diffuse slowing also noted consistent with moderate encephalopathy. MRI of the brain with no acute findings. No further seizure activity after increasing Keppra and clonazepam  9. 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 Ophthalmology has been consulted per ICU team  10. Normocytic anemia-likely 2/2 phlebotomy  11. Dysphagia with PEG in situ   12. Alzheimer's dementia-on Aricept  13. Hx of DVT s/p IVC filter     14.  History of subdural hematoma s/p bur hole evacuation (4/2019)        PLAN:     Per ICU team      Medications:  REVIEWED DAILY    Infusion Medications    sodium chloride       Scheduled Medications    modafinil  100 mg Oral Daily    thiamine (VITAMIN B1) IVPB 250 mg IntraVENous Q24H    acetylcysteine  4 mL Inhalation BID    hydrocortisone sodium succinate PF  50 mg IntraVENous BID    potassium bicarb-citric acid  40 mEq Oral Daily    midodrine  10 mg Oral TID WC    clonazePAM  0.5 mg Oral Q8H    miconazole nitrate   Topical BID    miconazole   Topical BID    levETIRAcetam  1,000 mg IntraVENous Q12H    Or    levETIRAcetam  1,000 mg Oral Q12H    enoxaparin  40 mg SubCUTAneous Daily    pantoprazole (PROTONIX) 40 mg injection  40 mg IntraVENous Q12H    vitamin D  2,000 Units Oral Daily    donepezil  5 mg Oral Nightly    [Held by provider] lactulose  20 g Oral TID    levothyroxine  88 mcg Oral Daily    risperiDONE  0.25 mg Oral BID    sodium chloride flush  10 mL IntraVENous 2 times per day    budesonide  0.5 mg Nebulization BID    ipratropium-albuterol  3 mL Inhalation Q4H    chlorhexidine  15 mL Mouth/Throat BID     PRN Meds: miconazole nitrate **AND** miconazole nitrate, perflutren lipid microspheres, sodium chloride flush, sodium chloride, promethazine **OR** ondansetron, polyethylene glycol, acetaminophen **OR** acetaminophen    Labs:     Recent Labs     06/12/22  0441 06/13/22  0502 06/14/22  0448   WBC 7.4 6.8 6.6   HGB 11.0* 10.1* 10.0*   HCT 34.1 31.5* 31.7*    203 202       Recent Labs     06/12/22  0441 06/13/22  0502 06/14/22  0448    140 137   K 3.5 3.4* 3.7    104 104   CO2 23 24 24   BUN 9 9 9   CREATININE 0.5 0.5 0.5   CALCIUM 7.8* 7.8* 7.8*       Recent Labs     06/12/22  0441 06/13/22  0502 06/14/22  0448   PROT 5.7* 5.3* 5.6*   ALKPHOS 74 66 66   ALT 12 11 10   AST 10 8 9   BILITOT 0.3 0.3 0.3       No results for input(s): INR in the last 72 hours. No results for input(s): Lee Thompson in the last 72 hours.     Chronic labs:    Lab Results   Component Value Date    CHOL 173 03/30/2022    TRIG 88 03/30/2022    HDL 62 03/30/2022    LDLCALC 93 03/30/2022    TSH 10.330 (H) 06/05/2022    INR 1.0 06/05/2022 LABA1C 5.2 03/30/2022       Radiology: REVIEWED DAILY    +++++++++++++++++++++++++++++++++++++++++++++++++  BOGDAN Spivey - CNP  [facility] Physician - Hospitalist  [address], [place]  +++++++++++++++++++++++++++++++++++++++++++++++++  NOTE: This report was transcribed using voice recognition software. Every effort was made to ensure accuracy; however, inadvertent computerized transcription errors may be present.

## 2022-06-14 NOTE — PLAN OF CARE
Problem: Discharge Planning  Goal: Discharge to home or other facility with appropriate resources  Outcome: Progressing     Problem: Pain  Goal: Verbalizes/displays adequate comfort level or baseline comfort level  Outcome: Progressing     Problem: Respiratory - Adult  Goal: Achieves optimal ventilation and oxygenation  6/14/2022 1940 by Monisha Perdue RN  Outcome: Progressing     Problem: Skin/Tissue Integrity  Goal: Absence of new skin breakdown  Description: 1. Monitor for areas of redness and/or skin breakdown  2. Assess vascular access sites hourly  3. Every 4-6 hours minimum:  Change oxygen saturation probe site  4. Every 4-6 hours:  If on nasal continuous positive airway pressure, respiratory therapy assess nares and determine need for appliance change or resting period. Outcome: Progressing     Problem: ABCDS Injury Assessment  Goal: Absence of physical injury  Outcome: Progressing     Problem: Safety - Adult  Goal: Free from fall injury  Outcome: Progressing     Problem: Nutrition Deficit:  Goal: Optimize nutritional status  6/14/2022 1940 by Monisha Perdue RN  Outcome: Progressing     Problem: Safety - Medical Restraint  Goal: Remains free of injury from restraints (Restraint for Interference with Medical Device)  Description: INTERVENTIONS:  1. Determine that other, less restrictive measures have been tried or would not be effective before applying the restraint  2. Evaluate the patient's condition at the time of restraint application  3. Inform patient/family regarding the reason for restraint  4.  Q2H: Monitor safety, psychosocial status, comfort, nutrition and hydration  Outcome: Progressing

## 2022-06-15 ENCOUNTER — APPOINTMENT (OUTPATIENT)
Dept: GENERAL RADIOLOGY | Age: 57
DRG: 870 | End: 2022-06-15
Payer: MEDICARE

## 2022-06-15 LAB
AADO2: 126.4 MMHG
ALBUMIN SERPL-MCNC: 2.5 G/DL (ref 3.5–5.2)
ALP BLD-CCNC: 70 U/L (ref 35–104)
ALT SERPL-CCNC: 11 U/L (ref 0–32)
ANION GAP SERPL CALCULATED.3IONS-SCNC: 11 MMOL/L (ref 7–16)
AST SERPL-CCNC: 9 U/L (ref 0–31)
B.E.: 1.8 MMOL/L (ref -3–3)
BASOPHILS ABSOLUTE: 0.01 E9/L (ref 0–0.2)
BASOPHILS RELATIVE PERCENT: 0.2 % (ref 0–2)
BILIRUB SERPL-MCNC: 0.3 MG/DL (ref 0–1.2)
BUN BLDV-MCNC: 11 MG/DL (ref 6–20)
CALCIUM SERPL-MCNC: 8.1 MG/DL (ref 8.6–10.2)
CHLORIDE BLD-SCNC: 102 MMOL/L (ref 98–107)
CO2: 24 MMOL/L (ref 22–29)
COHB: 0.7 % (ref 0–1.5)
CREAT SERPL-MCNC: 0.5 MG/DL (ref 0.5–1)
CRITICAL: ABNORMAL
DATE ANALYZED: ABNORMAL
DATE OF COLLECTION: ABNORMAL
EOSINOPHILS ABSOLUTE: 0.08 E9/L (ref 0.05–0.5)
EOSINOPHILS RELATIVE PERCENT: 1.3 % (ref 0–6)
FIO2: 40 %
GFR AFRICAN AMERICAN: >60
GFR NON-AFRICAN AMERICAN: >60 ML/MIN/1.73
GLUCOSE BLD-MCNC: 109 MG/DL (ref 74–99)
HCO3: 25.1 MMOL/L (ref 22–26)
HCT VFR BLD CALC: 32.9 % (ref 34–48)
HEMOGLOBIN: 10.4 G/DL (ref 11.5–15.5)
HHB: 1.7 % (ref 0–5)
IMMATURE GRANULOCYTES #: 0.04 E9/L
IMMATURE GRANULOCYTES %: 0.7 % (ref 0–5)
LAB: ABNORMAL
LACTIC ACID: 1.5 MMOL/L (ref 0.5–2.2)
LACTIC ACID: 3.8 MMOL/L (ref 0.5–2.2)
LYMPHOCYTES ABSOLUTE: 1.57 E9/L (ref 1.5–4)
LYMPHOCYTES RELATIVE PERCENT: 26.2 % (ref 20–42)
Lab: ABNORMAL
MCH RBC QN AUTO: 30.6 PG (ref 26–35)
MCHC RBC AUTO-ENTMCNC: 31.6 % (ref 32–34.5)
MCV RBC AUTO: 96.8 FL (ref 80–99.9)
METER GLUCOSE: 120 MG/DL (ref 74–99)
METHB: 0.3 % (ref 0–1.5)
MODE: AC
MONOCYTES ABSOLUTE: 0.59 E9/L (ref 0.1–0.95)
MONOCYTES RELATIVE PERCENT: 9.8 % (ref 2–12)
NEUTROPHILS ABSOLUTE: 3.71 E9/L (ref 1.8–7.3)
NEUTROPHILS RELATIVE PERCENT: 61.8 % (ref 43–80)
O2 SATURATION: 98.3 % (ref 92–98.5)
O2HB: 97.3 % (ref 94–97)
OPERATOR ID: 7221
PATIENT TEMP: 37 C
PCO2: 35 MMHG (ref 35–45)
PDW BLD-RTO: 16.4 FL (ref 11.5–15)
PEEP/CPAP: 5 CMH2O
PFO2: 2.71 MMHG/%
PH BLOOD GAS: 7.47 (ref 7.35–7.45)
PLATELET # BLD: 199 E9/L (ref 130–450)
PMV BLD AUTO: 10.1 FL (ref 7–12)
PO2: 108.6 MMHG (ref 75–100)
POTASSIUM REFLEX MAGNESIUM: 4 MMOL/L (ref 3.5–5)
PROCALCITONIN: 0.05 NG/ML (ref 0–0.08)
RBC # BLD: 3.4 E12/L (ref 3.5–5.5)
RI(T): 1.16
RR MECHANICAL: 12 B/MIN
SODIUM BLD-SCNC: 137 MMOL/L (ref 132–146)
SOURCE, BLOOD GAS: ABNORMAL
THB: 12 G/DL (ref 11.5–16.5)
TIME ANALYZED: 457
TOTAL PROTEIN: 5.8 G/DL (ref 6.4–8.3)
VT MECHANICAL: 300 ML
WBC # BLD: 6 E9/L (ref 4.5–11.5)

## 2022-06-15 PROCEDURE — 80053 COMPREHEN METABOLIC PANEL: CPT

## 2022-06-15 PROCEDURE — 94003 VENT MGMT INPAT SUBQ DAY: CPT

## 2022-06-15 PROCEDURE — 87186 SC STD MICRODIL/AGAR DIL: CPT

## 2022-06-15 PROCEDURE — 6370000000 HC RX 637 (ALT 250 FOR IP): Performed by: INTERNAL MEDICINE

## 2022-06-15 PROCEDURE — 2000000000 HC ICU R&B

## 2022-06-15 PROCEDURE — 6360000002 HC RX W HCPCS: Performed by: PHYSICIAN ASSISTANT

## 2022-06-15 PROCEDURE — 6370000000 HC RX 637 (ALT 250 FOR IP): Performed by: STUDENT IN AN ORGANIZED HEALTH CARE EDUCATION/TRAINING PROGRAM

## 2022-06-15 PROCEDURE — 6360000002 HC RX W HCPCS: Performed by: INTERNAL MEDICINE

## 2022-06-15 PROCEDURE — 87040 BLOOD CULTURE FOR BACTERIA: CPT

## 2022-06-15 PROCEDURE — 6370000000 HC RX 637 (ALT 250 FOR IP): Performed by: NURSE PRACTITIONER

## 2022-06-15 PROCEDURE — 87077 CULTURE AEROBIC IDENTIFY: CPT

## 2022-06-15 PROCEDURE — C9113 INJ PANTOPRAZOLE SODIUM, VIA: HCPCS | Performed by: INTERNAL MEDICINE

## 2022-06-15 PROCEDURE — 71045 X-RAY EXAM CHEST 1 VIEW: CPT

## 2022-06-15 PROCEDURE — 2580000003 HC RX 258: Performed by: INTERNAL MEDICINE

## 2022-06-15 PROCEDURE — 6360000002 HC RX W HCPCS: Performed by: STUDENT IN AN ORGANIZED HEALTH CARE EDUCATION/TRAINING PROGRAM

## 2022-06-15 PROCEDURE — 84145 PROCALCITONIN (PCT): CPT

## 2022-06-15 PROCEDURE — 6370000000 HC RX 637 (ALT 250 FOR IP): Performed by: FAMILY MEDICINE

## 2022-06-15 PROCEDURE — 82805 BLOOD GASES W/O2 SATURATION: CPT

## 2022-06-15 PROCEDURE — 2580000003 HC RX 258: Performed by: STUDENT IN AN ORGANIZED HEALTH CARE EDUCATION/TRAINING PROGRAM

## 2022-06-15 PROCEDURE — 85025 COMPLETE CBC W/AUTO DIFF WBC: CPT

## 2022-06-15 PROCEDURE — 94640 AIRWAY INHALATION TREATMENT: CPT

## 2022-06-15 PROCEDURE — 43762 RPLC GTUBE NO REVJ TRC: CPT

## 2022-06-15 PROCEDURE — A4216 STERILE WATER/SALINE, 10 ML: HCPCS | Performed by: INTERNAL MEDICINE

## 2022-06-15 PROCEDURE — 36415 COLL VENOUS BLD VENIPUNCTURE: CPT

## 2022-06-15 PROCEDURE — 87070 CULTURE OTHR SPECIMN AEROBIC: CPT

## 2022-06-15 PROCEDURE — 82962 GLUCOSE BLOOD TEST: CPT

## 2022-06-15 PROCEDURE — 83605 ASSAY OF LACTIC ACID: CPT

## 2022-06-15 PROCEDURE — 87206 SMEAR FLUORESCENT/ACID STAI: CPT

## 2022-06-15 PROCEDURE — 99291 CRITICAL CARE FIRST HOUR: CPT | Performed by: INTERNAL MEDICINE

## 2022-06-15 PROCEDURE — 2500000003 HC RX 250 WO HCPCS

## 2022-06-15 PROCEDURE — 87088 URINE BACTERIA CULTURE: CPT

## 2022-06-15 RX ORDER — MIDODRINE HYDROCHLORIDE 10 MG/1
10 TABLET ORAL
Status: DISCONTINUED | OUTPATIENT
Start: 2022-06-15 | End: 2022-06-17

## 2022-06-15 RX ORDER — SODIUM CHLORIDE, SODIUM LACTATE, POTASSIUM CHLORIDE, AND CALCIUM CHLORIDE .6; .31; .03; .02 G/100ML; G/100ML; G/100ML; G/100ML
500 INJECTION, SOLUTION INTRAVENOUS ONCE
Status: COMPLETED | OUTPATIENT
Start: 2022-06-15 | End: 2022-06-15

## 2022-06-15 RX ORDER — 0.9 % SODIUM CHLORIDE 0.9 %
1000 INTRAVENOUS SOLUTION INTRAVENOUS ONCE
Status: COMPLETED | OUTPATIENT
Start: 2022-06-15 | End: 2022-06-15

## 2022-06-15 RX ADMIN — MICONAZOLE NITRATE: 20.6 POWDER TOPICAL at 21:06

## 2022-06-15 RX ADMIN — CLONAZEPAM 0.5 MG: 0.5 TABLET ORAL at 04:29

## 2022-06-15 RX ADMIN — SODIUM CHLORIDE 1000 ML: 9 INJECTION, SOLUTION INTRAVENOUS at 15:57

## 2022-06-15 RX ADMIN — POTASSIUM BICARBONATE 40 MEQ: 782 TABLET, EFFERVESCENT ORAL at 08:06

## 2022-06-15 RX ADMIN — MIDODRINE HYDROCHLORIDE 10 MG: 10 TABLET ORAL at 18:14

## 2022-06-15 RX ADMIN — RISPERIDONE 0.25 MG: 0.25 TABLET ORAL at 21:07

## 2022-06-15 RX ADMIN — MIDODRINE HYDROCHLORIDE 5 MG: 5 TABLET ORAL at 08:06

## 2022-06-15 RX ADMIN — SODIUM CHLORIDE, PRESERVATIVE FREE 40 MG: 5 INJECTION INTRAVENOUS at 21:07

## 2022-06-15 RX ADMIN — MIDODRINE HYDROCHLORIDE 10 MG: 10 TABLET ORAL at 13:08

## 2022-06-15 RX ADMIN — SODIUM CHLORIDE, PRESERVATIVE FREE 40 MG: 5 INJECTION INTRAVENOUS at 08:07

## 2022-06-15 RX ADMIN — IPRATROPIUM BROMIDE AND ALBUTEROL SULFATE 3 ML: .5; 2.5 SOLUTION RESPIRATORY (INHALATION) at 04:25

## 2022-06-15 RX ADMIN — Medication: at 08:07

## 2022-06-15 RX ADMIN — SODIUM CHLORIDE, PRESERVATIVE FREE 10 ML: 5 INJECTION INTRAVENOUS at 21:08

## 2022-06-15 RX ADMIN — IPRATROPIUM BROMIDE AND ALBUTEROL SULFATE 3 ML: .5; 2.5 SOLUTION RESPIRATORY (INHALATION) at 08:13

## 2022-06-15 RX ADMIN — ACETYLCYSTEINE 400 MG: 100 SOLUTION ORAL; RESPIRATORY (INHALATION) at 08:15

## 2022-06-15 RX ADMIN — ACETYLCYSTEINE 400 MG: 100 SOLUTION ORAL; RESPIRATORY (INHALATION) at 19:45

## 2022-06-15 RX ADMIN — LEVETIRACETAM 1000 MG: 10 INJECTION, SOLUTION INTRAVENOUS at 23:45

## 2022-06-15 RX ADMIN — ENOXAPARIN SODIUM 40 MG: 100 INJECTION SUBCUTANEOUS at 08:12

## 2022-06-15 RX ADMIN — LEVETIRACETAM 1000 MG: 10 INJECTION, SOLUTION INTRAVENOUS at 13:10

## 2022-06-15 RX ADMIN — BUDESONIDE 500 MCG: 0.5 SUSPENSION RESPIRATORY (INHALATION) at 19:45

## 2022-06-15 RX ADMIN — LEVOTHYROXINE SODIUM 88 MCG: 0.09 TABLET ORAL at 06:27

## 2022-06-15 RX ADMIN — IPRATROPIUM BROMIDE AND ALBUTEROL SULFATE 3 ML: .5; 2.5 SOLUTION RESPIRATORY (INHALATION) at 19:45

## 2022-06-15 RX ADMIN — CHLORHEXIDINE GLUCONATE 15 ML: 1.2 RINSE ORAL at 08:05

## 2022-06-15 RX ADMIN — CLONAZEPAM 0.5 MG: 0.5 TABLET ORAL at 13:10

## 2022-06-15 RX ADMIN — BUDESONIDE 500 MCG: 0.5 SUSPENSION RESPIRATORY (INHALATION) at 08:13

## 2022-06-15 RX ADMIN — CLONAZEPAM 0.5 MG: 0.5 TABLET ORAL at 21:08

## 2022-06-15 RX ADMIN — IPRATROPIUM BROMIDE AND ALBUTEROL SULFATE 3 ML: .5; 2.5 SOLUTION RESPIRATORY (INHALATION) at 16:30

## 2022-06-15 RX ADMIN — IPRATROPIUM BROMIDE AND ALBUTEROL SULFATE 3 ML: .5; 2.5 SOLUTION RESPIRATORY (INHALATION) at 11:42

## 2022-06-15 RX ADMIN — IPRATROPIUM BROMIDE AND ALBUTEROL SULFATE 3 ML: .5; 2.5 SOLUTION RESPIRATORY (INHALATION) at 01:00

## 2022-06-15 RX ADMIN — CHLORHEXIDINE GLUCONATE 15 ML: 1.2 RINSE ORAL at 21:19

## 2022-06-15 RX ADMIN — SODIUM CHLORIDE, PRESERVATIVE FREE 10 ML: 5 INJECTION INTRAVENOUS at 08:08

## 2022-06-15 RX ADMIN — Medication: at 21:06

## 2022-06-15 RX ADMIN — Medication 2000 UNITS: at 08:06

## 2022-06-15 RX ADMIN — Medication 5.12 MCG/MIN: at 08:11

## 2022-06-15 RX ADMIN — MICONAZOLE NITRATE: 20.6 POWDER TOPICAL at 08:07

## 2022-06-15 RX ADMIN — SODIUM CHLORIDE, POTASSIUM CHLORIDE, SODIUM LACTATE AND CALCIUM CHLORIDE 500 ML: 600; 310; 30; 20 INJECTION, SOLUTION INTRAVENOUS at 05:46

## 2022-06-15 RX ADMIN — RISPERIDONE 0.25 MG: 0.25 TABLET ORAL at 09:03

## 2022-06-15 RX ADMIN — DONEPEZIL HYDROCHLORIDE 5 MG: 5 TABLET, FILM COATED ORAL at 21:06

## 2022-06-15 RX ADMIN — HYDROCORTISONE SODIUM SUCCINATE 50 MG: 100 INJECTION, POWDER, FOR SOLUTION INTRAMUSCULAR; INTRAVENOUS at 08:05

## 2022-06-15 ASSESSMENT — PULMONARY FUNCTION TESTS
PIF_VALUE: 16
PIF_VALUE: 18
PIF_VALUE: 19
PIF_VALUE: 18
PIF_VALUE: 20
PIF_VALUE: 22
PIF_VALUE: 19
PIF_VALUE: 18
PIF_VALUE: 17
PIF_VALUE: 18
PIF_VALUE: 20
PIF_VALUE: 24
PIF_VALUE: 23
PIF_VALUE: 19
PIF_VALUE: 19
PIF_VALUE: 20
PIF_VALUE: 19

## 2022-06-15 ASSESSMENT — PAIN SCALES - GENERAL
PAINLEVEL_OUTOF10: 0

## 2022-06-15 ASSESSMENT — PAIN SCALES - WONG BAKER
WONGBAKER_NUMERICALRESPONSE: 0

## 2022-06-15 NOTE — CONSULTS
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 Arsen Gamez is known to me and she is a high myope. This condition is common in these patients. It is a normal finding and does not require any evaluation at this time. I did call and discussed this with the resident.   Thank you Chicho Jenkins

## 2022-06-15 NOTE — CARE COORDINATION
6/15:  Update CM Note:  Pt presented to the ER for SOB from [facility]. Pt was intubated & transferred to MICU. Pt remains intubated on Iv Levophed, Iv Solu-cortef, Iv Keppra & Iv Protonix. Neurology is following. Per note family is discussing goals of care. Pt is a long term resident at [facility]. Pt is a bed hold & can return once medically stable. Will need a Covid test done on day of d/c. Return envelope is done in in chart. [facility] following. [facility] will continue to follow for dc planning.  Electronically signed by Francy Walls RN on 6/15/2022 at 2:08 PM

## 2022-06-15 NOTE — CODE DOCUMENTATION
Patient's family had code status changing discussion with Dr. Natalya Fair, code status is changed to DNR CCA per family wish.       Electronically signed by Ranjeet Mcdaniels MD on 6/15/2022 at 4:21 PM

## 2022-06-15 NOTE — PLAN OF CARE
Problem: Safety - Medical Restraint  Goal: Remains free of injury from restraints (Restraint for Interference with Medical Device)  Description: INTERVENTIONS:  1. Determine that other, less restrictive measures have been tried or would not be effective before applying the restraint  2. Evaluate the patient's condition at the time of restraint application  3. Inform patient/family regarding the reason for restraint  4.  Q2H: Monitor safety, psychosocial status, comfort, nutrition and hydration  6/15/2022 1057 by Berenice Dickinson RN  Outcome: Completed  6/15/2022 1057 by Berenice Dickinson RN  Outcome: Progressing

## 2022-06-15 NOTE — PROGRESS NOTES
[address] [facility]   Department of Internal Medicine   Internal Medicine Residency  MICU Progress Note    Patient:  Kelvin Pérez See 65 y.o. female   MRN: [id number]       Date of Service: 6/15/2022    Allergy: Patient has no known allergies. Subjective     Patient was seen and examined this morning at bedside in no acute distress. Overnight, patient did not had any acute event. During early morning, patient blood pressure dropped, patient received IV fluid resuscitation blood blood pressure did not improve. NICOM was fluid responsive. Patient was started on levo. Midodrine increased to 10 mg 3 times daily. Patient is off levo in the afternoon. Objective     TEMPERATURE:  Current - Temp: 99.5 °F (37.5 °C); Max - Temp  Av.6 °F (37 °C)  Min: 97.9 °F (36.6 °C)  Max: 99.5 °F (37.5 °C)  RESPIRATIONS RANGE: Resp  Av.5  Min: 0  Max: 28  PULSE RANGE: Pulse  Av.8  Min: 72  Max: 105  BLOOD PRESSURE RANGE:  Systolic (23DLW), SPD:85 , Min:73 , PID:755   ; Diastolic (96IBK), EZR:21, Min:44, Max:70    PULSE OXIMETRY RANGE: SpO2  Av.4 %  Min: 96 %  Max: 100 %    I & O - 24hr:    Intake/Output Summary (Last 24 hours) at 6/15/2022 1755  Last data filed at 6/15/2022 1300  Gross per 24 hour   Intake 2466.43 ml   Output 1850 ml   Net 616.43 ml     I/O last 3 completed shifts: In: 3762.3 [NG/GT:2631; IV Piggyback:1131.3]  Out:  [Urine:] I/O this shift:  In: -   Out: 1050 [Urine:1050]   Weight change:     Physical Exam  Constitutional:       Comments: patient remain intubated, patient is off sedation, not made or doing any purposeful movement but withdraws to pain in all 4 quadrants. HENT:      Mouth/Throat:      Mouth: Mucous membranes are moist.   Eyes:      Pupils: Pupils are equal, round, and reactive to light. Cardiovascular:      Rate and Rhythm: Normal rate and regular rhythm. Pulses: Normal pulses. Heart sounds: Normal heart sounds. No murmur heard.       Pulmonary: Effort: Pulmonary effort is normal. No respiratory distress. Breath sounds: Normal breath sounds. No wheezing or rhonchi. Abdominal:      General: Abdomen is flat. Bowel sounds are normal. There is no distension. Palpations: Abdomen is soft. Tenderness: There is no abdominal tenderness. There is no guarding. Musculoskeletal:         General: No swelling or deformity. Normal range of motion. Right lower leg: No edema. Left lower leg: No edema. Skin:     Capillary Refill: Capillary refill takes less than 2 seconds.           Diet:   Diet NPO  ADULT TUBE FEEDING; PEG; Standard without Fiber; Continuous; 20; Yes; 10; Q 4 hours; 45; 150; Q 4 hours    Oxygen:     Vent Information  Ventilator ID: [id number]  Vent Mode: AC/VC  Additional Respiratory Assessments  Heart Rate: 87  Resp: 19  SpO2: 100 %  Position: Semi-Vera's  Humidification Source: Heated wire  Humidification Temp: 37  Circuit Condensation: Drained  Airway Type: ET  Airway Size: 7.5  Cuff Pressure (cm H2O):  (MLT)  Skin Barrier Applied: Yes       [REMOVED] External Urinary Catheter-Output (mL):  (incontinent, pad wet)  External Urinary Catheter-Output (mL): 450 mL  [REMOVED] Urinary Catheter Pkhqd-Kozhpibbdcs-Mnvwvp (mL): 80 mL    Medications     Continuous Infusions:   norepinephrine Stopped (06/15/22 1523)    sodium chloride       Scheduled Meds:   midodrine  10 mg Oral TID WC    sodium chloride  1,000 mL IntraVENous Once    hydrocortisone sodium succinate PF  50 mg IntraVENous Daily    lidocaine  5 mL IntraDERmal Once    sodium chloride flush  5-40 mL IntraVENous 2 times per day    heparin flush  1 mL IntraVENous 2 times per day    [START ON 6/18/2022] hydrocortisone  10 mg Oral BID    acetylcysteine  4 mL Inhalation BID    potassium bicarb-citric acid  40 mEq Oral Daily    clonazePAM  0.5 mg Oral Q8H    miconazole nitrate   Topical BID    miconazole   Topical BID    levETIRAcetam  1,000 mg IntraVENous Q12H Or    levETIRAcetam  1,000 mg Oral Q12H    enoxaparin  40 mg SubCUTAneous Daily    pantoprazole (PROTONIX) 40 mg injection  40 mg IntraVENous Q12H    vitamin D  2,000 Units Oral Daily    donepezil  5 mg Oral Nightly    levothyroxine  88 mcg Oral Daily    risperiDONE  0.25 mg Oral BID    budesonide  0.5 mg Nebulization BID    ipratropium-albuterol  3 mL Inhalation Q4H    chlorhexidine  15 mL Mouth/Throat BID     PRN Meds: sodium chloride flush, sodium chloride, heparin flush, miconazole nitrate **AND** miconazole nitrate, perflutren lipid microspheres, promethazine **OR** ondansetron, polyethylene glycol, acetaminophen **OR** acetaminophen  Nutrition:   NG/OG tube TF type: Pulmocare/Nephro/Glucerna/Jevity        At rate: 40 ml/h    Labs and Imaging Studies     ve  CBC:   Recent Labs     06/13/22  0502 06/14/22 0448 06/15/22  0458   WBC 6.8 6.6 6.0   HGB 10.1* 10.0* 10.4*   HCT 31.5* 31.7* 32.9*   MCV 94.9 95.8 96.8    202 199       BMP:    Recent Labs     06/13/22  0502 06/14/22 0448 06/15/22  0458    137 137   K 3.4* 3.7 4.0    104 102   CO2 24 24 24   BUN 9 9 11   CREATININE 0.5 0.5 0.5   GLUCOSE 137* 134* 109*       LIVER PROFILE:   Recent Labs     06/13/22  0502 06/14/22 0448 06/15/22  0458   AST 8 9 9   ALT 11 10 11   BILITOT 0.3 0.3 0.3   ALKPHOS 66 66 70     Imaging Studies:     Echo Complete    Result Date: 6/8/2022  Transthoracic Echocardiography Report (TTE)  Demographics   Patient Name    SEE Aj Conteh Gender            Female   Medical Record  [id number]    Room Number       9158  Number   Account #       [de-identified]   Procedure Date    06/08/2022   Corporate ID                Ordering                              Physician   Accession       [id number]  Referring  Number                      Physician   Date of Birth   1965  Sonographer       Venu Wilson   Age             65 year(s)  Interpreting      [address]                              Physician Physician Cardiology                                                Deonte Jacinto MD                               Any Other  Procedure Type of Study   TTE procedure:Echo Complete W/Doppler & Color Flow. Procedure Date Date: 06/08/2022 Start: 07:37 AM Study Location: Portable Technical Quality: Adequate visualization Indications:Cardiogenic shock. Patient Status: STAT Height: 58 inches Weight: 125 pounds BSA: 1.49 m^2 BMI: 26.12 kg/m^2 BP: 95/47 mmHg  Findings   Left Ventricle  Ejection fraction is visually estimated at 60 to 65%. Right Ventricle  Normal right ventricular size and function. Left Atrium  Normal sized left atrium. Right Atrium  Normal right atrium size. Mitral Valve  Physiologic and/or trace mitral regurgitation is present. Tricuspid Valve  Mild tricuspid regurgitation. Aortic Valve  Individual aortic valve leaflets are not clearly visualized. Pulmonic Valve  Physiologic and/or trace pulmonic regurgitation present. Pericardial Effusion  There is physiologic pericardial effusion without tamponade   Pleural Effusion  Pleural effusion   Conclusions   Summary  Ejection fraction is visually estimated at 60 to 65%. Normal right ventricular size and function. Mild tricuspid regurgitation. Physiologic and/or trace pulmonic regurgitation present.   There is physiologic pericardial effusion without tamponade   Signature   ----------------------------------------------------------------  Electronically signed by Deonte Jacinto MD(Interpreting  physician) on 06/08/2022 07:33 PM  ----------------------------------------------------------------  M-Mode/2D Measurements & Calculations   LV Diastolic    LV Systolic Dimension: 2.6   AV Cusp Separation: 1.8 cmLA  Dimension: 4 cm cm                           Dimension: 2.7 cmAO Root  LV FS:35 %      LV Volume Diastolic: 72 ml   Dimension: 2.6 cm  LV PW           LV Volume Systolic: 19.1 ml  Diastolic: 0.7  LV EDV/LV EDV Index: 72  cm              ml/48 ml/m^2LV ESV/LV ESV  Septum          Index: 23.8 ml/16ml/ m^2     RV Diastolic Dimension: 2.4  Diastolic: 0.8  EF Calculated: 66.9 %        cm  cm              LV Mass Index: 58 l/min*m^2   LV Mass: 85.78  g               LVOT: 2 cm  Doppler Measurements & Calculations   MV Peak E-Wave:    AV Peak Velocity: 1.28 m/s      LVOT Peak Velocity: 0.8  1.03 m/s           AV Peak Gradient: 6.57 mmHg     m/s  MV Peak A-Wave:    AV Mean Velocity: 0.8 m/s       LVOT Mean Velocity:  0.61 m/s           AV Mean Gradient: 3.2 mmHg      0.61 m/s  MV E/A Ratio: 1.69 AV VTI: 28.3 cm                 LVOT Peak Gradient: 2.6  MV Peak Gradient:  AV Area (Continuity):2.17 cm^2  mmHgLVOT Mean Gradient:  5.4 mmHg                                           1.6 mmHg  MV Mean Gradient:  LVOT VTI: 19.6 cm  2.1 mmHg           IVRT: 55.4 msec  MV Mean Velocity:  0.66 m/s           Pulm. Vein A Reversal  MV Deceleration    Duration:96.9 msec  Time: 129.7 msec   Pulm. Vein D Velocity:0.59      PV Peak Velocity: 0.82  MV P1/2t: 36.5     m/sPulm. Vein A Reversal        m/s  msec               Velocity:0.42 m/s               PV Peak Gradient: 2.68  MVA by PHT:6.03    Pulm. Vein S Velocity: 0.48 m/s mmHg  cm^2                                               PV Mean Velocity: 0.53  MV Area                                            m/s  (continuity): 2.2                                  PV Mean Gradient: 1.3  cm^2                                               mmHg  MV E' Septal  Velocity: 0.09 m/s  MV E' Lateral  Velocity: 7 m/s  [email or web address] [id number]    CT HEAD WO CONTRAST    Result Date: 6/9/2022  EXAMINATION: CT OF THE HEAD WITHOUT CONTRAST  6/9/2022 9:54 pm TECHNIQUE: CT of the head was performed without the administration of intravenous contrast. Automated exposure control, iterative reconstruction, and/or weight based adjustment of the mA/kV was utilized to reduce the radiation dose to as low as reasonably achievable. COMPARISON: CT head without contrast, 05/31/2022. HISTORY: ORDERING SYSTEM PROVIDED HISTORY: increased somnolence TECHNOLOGIST PROVIDED HISTORY: Reason for exam:->increased somnolence What reading provider will be dictating this exam?->CRC FINDINGS: BRAIN/VENTRICLES: Mild decrease in the chronic left subdural hemorrhage along the high left parietal convexity, which currently measures approximately 3 mm in maximal width. The hemorrhage along the anterior cerebral convexity has resolved. No acute hemorrhage is seen. Encephalomalacia in the anterior temporal and anterior frontal lobes is likely the result of remote trauma. There is persistent ventriculomegaly, with a right transfrontal ventriculostomy shunt terminating in the left lateral ventricle. ORBITS: The visualized portion of the orbits demonstrate no acute abnormality. SINUSES: The paranasal sinuses are clear. Small bilateral mastoid effusions. SOFT TISSUES/SKULL:  No acute abnormality of the visualized skull or soft tissues. 1.  No acute intracranial abnormality. 2. Trace residual chronic left parietal subdural hemorrhage (measuring 3 mm in maximal width). It has decreased in size as of 05/31/2022. RECOMMENDATIONS: Unavailable     CT HEAD WO CONTRAST    Result Date: 5/31/2022  EXAMINATION: CT OF THE HEAD WITHOUT CONTRAST  5/31/2022 10:35 am TECHNIQUE: CT of the head was performed without the administration of intravenous contrast. Automated exposure control, iterative reconstruction, and/or weight based adjustment of the mA/kV was utilized to reduce the radiation dose to as low as reasonably achievable. COMPARISON: 05/30/2022 HISTORY: ORDERING SYSTEM PROVIDED HISTORY: follow up subdural hematoma TECHNOLOGIST PROVIDED HISTORY: Reason for exam:->follow up subdural hematoma Has a \"code stroke\" or \"stroke alert\" been called? ->No What reading provider will be dictating this exam?->CRC FINDINGS: BRAIN/VENTRICLES: There is again seen small extra-axial collection felt to represent chronic subdural hematoma over the convexity of the left parietal lobe measuring a thickness of 5 mm. The high density associated with the collection is unchanged. There is hydrocephalus of the lateral to lesser extent 3rd ventricle. 4th ventricle within normal limits. Findings are unchanged. There is a right frontal ventriculostomy shunt catheter in place unchanged. However, the subarachnoid spaces are also prominent suggesting parenchymal volume loss. There is bifrontal lucency as well as lucency at the temporal lobes likely related to prior contusion. There is no new CT evidence for intra-axial or extra-axial fluid collection, no mass, nor hematoma. ORBITS: The visualized portion of the orbits demonstrate no acute abnormality. SINUSES: The visualized paranasal sinuses and mastoid air cells demonstrate no acute abnormality. SOFT TISSUES/SKULL:  No acute abnormality of the visualized skull or soft tissues. There is again seen predominantly chronic small subdural hematoma over the convexity of the left parietal lobe. This measures a thickness of 4.6 mm which is unchanged. Tiny focus of high density within the collection could represent more acute blood which is also unchanged. No significant mass effect. Ventriculomegaly and ventriculostomy shunt catheter again noted. Lucency related to chronic encephalomalacia and perhaps prior contusion at the anterior frontal lobes and temporal lobes is unchanged. CT Head WO Contrast    Result Date: 5/30/2022  EXAMINATION: CT OF THE HEAD WITHOUT CONTRAST  5/30/2022 9:06 pm TECHNIQUE: CT of the head was performed without the administration of intravenous contrast. Automated exposure control, iterative reconstruction, and/or weight based adjustment of the mA/kV was utilized to reduce the radiation dose to as low as reasonably achievable.  COMPARISON: May 4, 2022 HISTORY: ORDERING SYSTEM PROVIDED HISTORY: AMS, hypoxia TECHNOLOGIST PROVIDED HISTORY: Reason for exam:->AMS, hypoxia Has a \"code stroke\" or \"stroke alert\" been called? ->No Decision Support Exception - unselect if not a suspected or confirmed emergency medical condition->Emergency Medical Condition (MA) What reading provider will be dictating this exam?->CRC FINDINGS: Small focus of increased attenuation involving left parietal subdural space. Redemonstration of extra-axial, hypoattenuating collection overlying left parietal lobe. Stable position of right sided ventricular shunt catheter with distal tip abutting septum pellucidum. Stable areas of encephalomalacia involving inferior frontal lobes and stable area of encephalomalacia involving bilateral temporal lobes stable area of encephalomalacia involving right frontal white matter adjacent to ventricular shunt. Basilar cisterns are patent. Paranasal sinuses and mastoid air cells are clear. 1. Redemonstration of hypoattenuating extra-axial collection overlying left parietal lobe related to chronic subdural hygroma or chronic subdural hematoma. Small focus of increased attenuation within this collection could suggest small acute on chronic subdural hematoma. Results were called by Dr. Carson Orozco to Dr. Julienne Dodge on 5/30/2022 at 21:57. 2. Stable areas of encephalomalacia involving bilateral frontal lobes and bilateral temporal lobes. 3. Stable position of right frontal ventricular shunt catheter. 4. Stable moderate ventriculomegaly. XR CHEST PORTABLE    Result Date: 6/15/2022  EXAMINATION: ONE XRAY VIEW OF THE CHEST 6/15/2022 7:27 am COMPARISON: 06/14/2022 HISTORY: ORDERING SYSTEM PROVIDED HISTORY: pna and intubated TECHNOLOGIST PROVIDED HISTORY: Reason for exam:->pna and intubated What reading provider will be dictating this exam?->CRC FINDINGS: Heart size is normal.  There is left lower lobe infiltrate.   There is a catheter projecting in the base of the left hemithorax/upper abdomen. ET tube and right central line  are appropriate. Right lung is clear. No interval change. There is left lower lobe infiltrate. XR CHEST PORTABLE    Result Date: 6/14/2022  EXAMINATION: ONE XRAY VIEW OF THE CHEST 6/14/2022 7:29 am COMPARISON: 13 June 2022 HISTORY: ORDERING SYSTEM PROVIDED HISTORY: pna and intubated TECHNOLOGIST PROVIDED HISTORY: Reason for exam:->pna and intubated What reading provider will be dictating this exam?->CRC FINDINGS: Single AP upright portable chest demonstrates right IJ catheter and endotracheal tube remaining in satisfactory position. There is stable left lower lobe atelectasis/infiltrate with mild blunting of the costophrenic angles. There is no evidence of a pneumothorax. No significant change over the past 23 hours. XR CHEST PORTABLE    Result Date: 6/13/2022  EXAMINATION: ONE XRAY VIEW OF THE CHEST 6/13/2022 7:04 am COMPARISON: 12 June 2022 HISTORY: ORDERING SYSTEM PROVIDED HISTORY: pna and intubated TECHNOLOGIST PROVIDED HISTORY: Reason for exam:->pna and intubated What reading provider will be dictating this exam?->CRC FINDINGS: Unchanged support lines. Stable left lower lobe infiltrate and or atelectasis. No new abnormal findings. Unchanged left lower lobe infiltrate and or atelectasis. XR CHEST PORTABLE    Result Date: 6/12/2022  EXAMINATION: ONE XRAY VIEW OF THE CHEST 6/12/2022 7:33 am COMPARISON: 06/11/2022 HISTORY: ORDERING SYSTEM PROVIDED HISTORY: pna and intubated TECHNOLOGIST PROVIDED HISTORY: Reason for exam:->pna and intubated What reading provider will be dictating this exam?->CRC FINDINGS: Medial left lower lobe atelectasis and or pneumonia is present. Heart appears normal in size. Right lung is clear. Endotracheal tube tip is 2 cm above the louann. Right IJ central venous catheter tip is in the inferior SVC, above the right atrium in good position. No complications.    shunt tube catheter passes over the right chest.  Osseous structures are stable. 1.  Medial left lower lobe atelectasis or pneumonia. No interval change. 2.  No evidence of pneumothorax. 3.  Endotracheal tube tip is approximately 2 cm above the louann. XR CHEST PORTABLE    Result Date: 6/11/2022  EXAMINATION: ONE XRAY VIEW OF THE CHEST 6/11/2022 6:35 pm COMPARISON: 06/11/2022 at 0732 HISTORY: [facility]: ET tube readjustment TECHNOLOGIST PROVIDED HISTORY: Reason for exam:->ET tube readjustment What reading provider will be dictating this exam?->CRC FINDINGS: Endotracheal tube is identified and appears to have been advanced slightly and is now approximately 14 mm from the louann. Right internal jugular central venous line is stable. Appearance of the lungs are stable pleural spaces are clear. Heart is normal in size. Endotracheal tube with the tip less than 1.5 cm from the louann. Recommend retraction 2 cm. The findings were sent to the Radiology Results [address] at 7:33 pm on 6/11/2022 to be communicated to a licensed caregiver. XR CHEST PORTABLE    Result Date: 6/11/2022  EXAMINATION: ONE XRAY VIEW OF THE CHEST 6/11/2022 7:42 am COMPARISON: 06/09/2022, 06/10/2022 HISTORY: ORDERING SYSTEM PROVIDED HISTORY: pna and intubated TECHNOLOGIST PROVIDED HISTORY: Reason for exam:->pna and intubated What reading provider will be dictating this exam?->CRC FINDINGS: Unchanged minimal opacity in the retrocardiac region. This may represent atelectasis or pneumonia. There is associated poor definition of the left hemidiaphragm and costophrenic angle. Endotracheal tube is 1.5 cm above the louann. There is a right IJ central venous catheter with its tip in the SVC at the cavoatrial junction in good position. No complications. Probable  shunt tube over the right anterior chest extending into the left abdomen. Persistent medial left lower lobe opacity suggestive of atelectasis or pneumonia. Support tubes and catheters are stable. XR CHEST PORTABLE    Result Date: 6/10/2022  EXAMINATION: ONE XRAY VIEW OF THE CHEST 6/10/2022 7:32 am COMPARISON: 06/09/2022 HISTORY: ORDERING SYSTEM PROVIDED HISTORY: pna and intubated TECHNOLOGIST PROVIDED HISTORY: Reason for exam:->pna and intubated What reading provider will be dictating this exam?->CRC FINDINGS: The endotracheal tube and right internal jugular central venous catheter are unchanged in position. Note is made of a  shunt on the right The right lung is clear. There is a small infiltrate seen within the retrocardiac region. 1. The right lung is clear 2. Small infiltrate seen within the retrocardiac region. XR CHEST PORTABLE    Result Date: 6/9/2022  EXAMINATION: ONE XRAY VIEW OF THE CHEST 6/9/2022 6:37 am COMPARISON: 8 June 2022 HISTORY: ORDERING SYSTEM PROVIDED HISTORY: pna and intubated TECHNOLOGIST PROVIDED HISTORY: Reason for exam:->pna and intubated What reading provider will be dictating this exam?->CRC FINDINGS: New left lobe atelectasis. Unchanged support lines. Normal heart and pulmonary vascularity. Neither costophrenic angle is blunted. New left lower lobe atelectasis. XR CHEST PORTABLE    Result Date: 6/8/2022  EXAMINATION: ONE XRAY VIEW OF THE CHEST 6/8/2022 9:58 am COMPARISON: 7 June 2022 HISTORY: ORDERING SYSTEM PROVIDED HISTORY: pna and intubated TECHNOLOGIST PROVIDED HISTORY: Reason for exam:->pna and intubated What reading provider will be dictating this exam?->CRC FINDINGS: Stable support lines. The lungs are without acute focal process. There is no effusion or pneumothorax. The cardiomediastinal silhouette is without acute process. The osseous structures are without acute process. No acute process.      XR CHEST PORTABLE    Result Date: 6/7/2022  EXAMINATION: ONE XRAY VIEW OF THE CHEST 6/7/2022 7:34 am COMPARISON: 06/06/2022 HISTORY: ORDERING SYSTEM PROVIDED HISTORY: pna and intubated TECHNOLOGIST PROVIDED HISTORY: Reason for exam:->pna and intubated What reading provider will be dictating this exam?->CRC FINDINGS: Lungs appear generally clear. No pleural fluid or pneumonia. Slight linear subsegmental atelectasis on the left. Heart is normal in size. There is an endotracheal tube with its tip 2.5 cm above the louann. Right IJ central venous catheter tip is in the inferior SVC below the louann in good position. No complications. No evidence of pneumothorax. Osseous structures are stable. Slight linear subsegmental atelectasis on the left. Support tubes and catheters in good position. XR CHEST PORTABLE    Result Date: 6/6/2022  EXAMINATION: ONE XRAY VIEW OF THE CHEST 6/6/2022 7:27 am COMPARISON: 06/05/2022 HISTORY: ORDERING SYSTEM PROVIDED HISTORY: pna and intubated TECHNOLOGIST PROVIDED HISTORY: Reason for exam:->pna and intubated What reading provider will be dictating this exam?->CRC FINDINGS: The cardiac silhouette is within normals. Note is made of a right internal jugular central venous catheter. The endotracheal tube tip is located 1.5 cm proximal to the louann. Lungs are clear. There are no findings of pneumonia or failure. 1. There are no findings of failure or pneumonia 2. Satisfactory position of the endotracheal tube and right internal jugular central venous catheter     XR CHEST PORTABLE    Result Date: 6/5/2022  EXAMINATION: ONE XRAY VIEW OF THE CHEST 6/5/2022 4:50 pm COMPARISON: The previous study performed earlier in the day at 5:51 a.m. HISTORY: ORDERING SYSTEM PROVIDED HISTORY: s/p Rt IJ TECHNOLOGIST PROVIDED HISTORY: Reason for exam:->s/p Rt IJ What reading provider will be dictating this exam?->CRC FINDINGS: There has been interval placement of a right-sided IJ line. The tip is in the distal SVC. No pneumothorax is appreciated. Again seen is an endotracheal tube, with the tip 1.9 cm above the louann. No longer seen is the NG tube. A  shunt is again seen.   No longer identified are the left lung opacities. The left hemidiaphragm is now in a normal position. There has been resolution of the mediastinal shift to the left. There has been a decrease in the right lower lung field atelectasis. The cardiac silhouette and mediastinal structures are without significant interval change. Interval placement of a right-sided IJ line, when compared to the previous study performed earlier in the day. No pneumothorax. Previously noted left lung opacities have resolved. The left hemidiaphragm is now in a normal position and there has been resolution of the mediastinal shift to the left. Interval decrease in the right lower lung field atelectasis. XR CHEST PORTABLE    Result Date: 6/5/2022  EXAMINATION: ONE XRAY VIEW OF THE CHEST6/5/2022 TECHNIQUE: Frontal view submitted COMPARISON: None. HISTORY: ORDERING SYSTEM PROVIDED HISTORY: Post tube TECHNOLOGIST PROVIDED HISTORY: Reason for exam:->Post tube What reading provider will be dictating this exam?->CRC FINDINGS: The lungs demonstrate opacification in the left mid lung. The endotracheal tube tip is 2.9 cm above the oluann. Nasogastric tube is in good position. The cardiomediastinal silhouette is stable. Opacification in the  left lung, pneumonia cannot be excluded. Endotracheal tube tip 2.9 cm above louann. Nasogastric tube is in good position. XR CHEST PORTABLE    Result Date: 6/5/2022  EXAMINATION: ONE XRAY VIEW OF THE CHEST6/5/2022 TECHNIQUE: Frontal view submitted COMPARISON: None. HISTORY: ORDERING SYSTEM PROVIDED HISTORY: sob TECHNOLOGIST PROVIDED HISTORY: Reason for exam:->sob What reading provider will be dictating this exam?->CRC FINDINGS: The lungs are clear without focal consolidation, large pleural effusion, or pneumothorax. The cardiomediastinal silhouette is stable. No acute cardiopulmonary process.      CTA PULMONARY W CONTRAST    Result Date: 5/30/2022  EXAMINATION: CTA OF THE CHEST 5/30/2022 9:06 pm TECHNIQUE: CTA of the chest was performed after the administration of intravenous contrast.  Multiplanar reformatted images are provided for review. MIP images are provided for review. Automated exposure control, iterative reconstruction, and/or weight based adjustment of the mA/kV was utilized to reduce the radiation dose to as low as reasonably achievable. COMPARISON: None. HISTORY: ORDERING SYSTEM PROVIDED HISTORY: Hypoxia, evaluate for PE TECHNOLOGIST PROVIDED HISTORY: Reason for exam:->Hypoxia, evaluate for PE Decision Support Exception - unselect if not a suspected or confirmed emergency medical condition->Emergency Medical Condition (MA) What reading provider will be dictating this exam?->CRC FINDINGS: Pulmonary Arteries: Pulmonary arteries are adequately opacified for evaluation. No evidence of intraluminal filling defect to suggest pulmonary embolism. Main pulmonary artery is normal in caliber. Mediastinum: No evidence of mediastinal lymphadenopathy. The heart and pericardium demonstrate no acute abnormality. There is no acute abnormality of the thoracic aorta. Noted right aberrant subclavian artery of variant anatomic thoracic arch branching. Lungs/pleura: The lungs are without acute process. Subsegmental dependent atelectasis. No focal consolidation or pulmonary edema. No evidence of pleural effusion or pneumothorax. Upper Abdomen: Limited images of the upper abdomen are unremarkable. Soft Tissues/Bones: No acute bone or soft tissue abnormality. No evidence of pulmonary embolism or acute pulmonary abnormality. Noted right aberrant subclavian artery of variant anatomic thoracic arch branching. Subsegmental dependent atelectasis. XR ABDOMEN FOR NG/OG/NE TUBE PLACEMENT    Result Date: 6/5/2022  EXAMINATION: ONE SUPINE XRAY VIEW(S) OF THE ABDOMEN 6/5/2022 5:54 am COMPARISON: None.  HISTORY: ORDERING SYSTEM PROVIDED HISTORY: Confirmation of course of NG/OG/NE tube and location of tip of tube TECHNOLOGIST PROVIDED HISTORY: Reason for exam:->Confirmation of course of NG/OG/NE tube and location of tip of tube Portable? ->Yes What reading provider will be dictating this exam?->CRC FINDINGS: The nasogastric tube is in good position. The the IVC is in good position. There is no evidence for bowel obstruction. A PEG tube is visualized. Nasogastric tube is in good position. MRI BRAIN W WO CONTRAST    Result Date: 6/13/2022  EXAMINATION: MRI OF THE BRAIN WITHOUT AND WITH CONTRAST  6/13/2022 11:27 am TECHNIQUE: Multiplanar multisequence MRI of the head/brain was performed without and with the administration of intravenous contrast. COMPARISON: CT head without contrast, 06/09/2022. HISTORY: ORDERING SYSTEM PROVIDED HISTORY: pituitary evaluation for possible causes of adrenal insufficiency TECHNOLOGIST PROVIDED HISTORY: Reason for exam:->pituitary evaluation for possible causes of adrenal insufficiency What is the sedation requirement?->Sedation What reading provider will be dictating this exam?->CRC FINDINGS: PITUITARY: The pituitary gland is normal in size. No definite pituitary lesion is seen. Focus of apparent signal abnormality along the left lateral aspect of the pituitary gland on the postcontrast enhanced T1 weighted images (series 10, image 5) likely represents partial volume averaging. No corresponding signal abnormality is evident on the coronal sequence the infundibulum is midline. The optic chiasm and cavernous sinuses are grossly unremarkable. INTRACRANIAL STRUCTURES/VENTRICLES:  There is no acute infarct. Areas of encephalomalacia are seen in the anterior aspects of the frontal lobes, as well as the anterior temporal lobes, likely the result of remote trauma. There is disproportionately prominent volume loss in the mesial temporal lobes, as may be seen with Alzheimer's disease and other dementias. Clinical correlation is needed.   The remainder of the brain demonstrates relatively mild volume loss. The ventricles are mildly dilated. A right trans frontal ventriculostomy shunt is seen terminating in the left lateral ventricle. ORBITS: There is suggestion of thinning the posterior sclera of the left globe on the temporal aspect (series 4, image 10). SINUSES: Mild mucosal thickening is seen in the ethmoid and right sphenoid sinuses. Large bilateral mastoid effusions. BONES/SOFT TISSUES: The bone marrow signal intensity appears normal. The soft tissues demonstrate no acute abnormality. 1. No focal pituitary lesion is identified to indicate microadenoma. Apparent signal abnormality in the left lateral aspect of the gland on the sagittal T1 post-contrast enhanced sequence is likely artifactual. 2.  No acute intracranial abnormality. 3. Bilateral mastoid effusions, which may be due to eustachian tube dysfunction or otomastoiditis. Clinical correlation is needed. 4. Encephalomalacia in the anterior aspect of the frontal and temporal lobes bilaterally, likely result of remote trauma. 5. Disproportionately prominent volume loss in the mesial temporal lobes, as may be seen with Alzheimer's disease and other dementias. Clinical correlation is needed. 6. Ventriculomegaly. Right transfrontal ventriculostomy shunt in situ. 7. 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 Ophthalmology consultation is recommended. RECOMMENDATIONS: Unavailable         Resident's Assessment and Plan     Patient is a 64-year old female with a past medical histroy of Down syndrome and hydrocephalus s/p  shunt, chronic subdural hematoma, seizure, hypothyroidism, was recently admitted on 5/4-5-15 for seizures 2/2 subdural hematoma, then presented again <1 week PTA  For SOB and hypoxia. Currently being managed for the followin. Acute metabolic encephalopathy possibly 2/2 dementia vs seizure vs ventriculomegaly vs other causes  2.  Acute hypoxic respiratory failure 2/2 ?aspiration pneumonia  3. Shock, likely hypovolemic vs distributive vs cardiogenic vs adrenal insufficiency - resolved  4. Adrenal insufficiency; cosyntropin test not accurate as done while patient on steroids  5. Hypothyroidism, on levothyroxine; TSH: 10.33 (high), free T4: 1.2; total T3: 67.53  6. 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 History of Down's syndrome  8. History of early-onset alzheimer's disease, on Donepezil  9. 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 History of communication hydrocephalus s/p  shunt (11/2019)  11. History of seizure disorder, on Keppra  12. History of bilateral DVT s/p IV filter placement  13. Lactic acidosis 2/2 hypertension  14. MRSA positive s/p Bactroban.     Plan:  · S/p reprogramming of valve of  shunt yesterday  · Neurology and neurosurgery following, follow recommendation  · Continue breathing treatments  · Continue thiamine and provigil  · Wean off vent as able  · Continue breathing treatment  · Decrease hydrocortisone 50mg to daily for 3 more days  · Endocrinology following, appreciate recommendation  · Continue synthroid  · Ophthalmology consulted, will see patient tomorrow  · Continue Keppra and Klonipin  · Daily CBC and BMP  · Daily CXR and ABG  · Follow blood culture  · Follow lactic acid every 6 hours. · For midline placement if unable to get peripherals  · Remove CVC once midline has been placed (8days old)    Patient's family had code status changing discussion with Dr. Tony Steele, code status is changed to DNR CCA per family wish.   Family is coming from different part of the [facility], possibly CODE STATUS will be changed to DNR CC         DVT Prophylaxis   Lovenox        GI       PPI Prophylaxis  Protonix twice daily     Diet   tube feed          Code Status: DNR-CCA  PT/OT evaluation: No  Disposition: TBD        Omid Jasmnie MD, PGY-1  Attending physician: Dr. Johnson Goss [facility]  Department of Pulmonary, Critical Care and Sleep Medicine  [address]  Department of Internal Medicine      During multidisciplinary team rounds Dominic Early is a 65 y.o. female was seen, examined and discussed. This is confirmation that I have personally seen and examined the patient and that the key elements of the encounter were performed by me (> 85 % time). The medications & laboratory data was discussed and adjusted where necessary. The radiographic images were reviewed or with radiologist or consultant if felt dis-concordant with the exam or history. The above findings were corroborated, plans confirmed and changes made if needed. Family is updated at the bedside as available. Key issues of the case were discussed among consultants. Critical Care time is documented if appropriate. On small dose of levophed today. Intermittently opening eyes but not following any commands. Remains minimally responsive.        Critical Care time: 34 minutes  Aria Fairbanks DO

## 2022-06-15 NOTE — PROGRESS NOTES
Hospitalist Progress Note      SYNOPSIS: John Early is a 65year old female with a PMH of Down syndrome and hydrocephalus s/p  shunt, chronic subdural hematoma, Alzheimer's dementia, seizures and hypothyroidism. She was recently admitted in May 2022 with seizures 2/2 subdural hematoma. She presented to the ER on 2022 with shortness of breath. She was found to be hypoxic and required intubation. She was admitted to ICU with diagnosis of acute hypoxic respiratory failure 2/2 possible aspiration pneumonia, hemodynamic shock, acute metabolic encephalopathy and adrenal insufficiency. Neurology was consulted and an EEG was performed on  which revealed epileptiform foci in the right frontal lobe and her Keppra was increased. Neurosurgery was consulted for subacute on chronic subdural hematoma. Intervention was planned. An MRI was performed on  which revealed ventriculomegaly and thinning of the posterior sclera of the left globe along the posterior aspect concerning for staphyloma. Opthamology was consulted. Neurosurgery reprogrammed the valve for the  shunt on . SUBJECTIVE:  Records reviewed. Patient remains intubated but not sedated. Withdraws to painful stimuli but not following commands. Hypotensive this am- given IVF boluses. LA 3.8. Remains hypotensive despite IVF and midodrine. Norepi restarted. Pancultures obtained. Procalcitonin negative.   Family at bedside states that they are considering \"end of life care\"    Temp (24hrs), Av.3 °F (36.8 °C), Min:97.9 °F (36.6 °C), Max:98.9 °F (37.2 °C)    DIET: Diet NPO  ADULT TUBE FEEDING; PEG; Standard without Fiber; Continuous; 20; Yes; 10; Q 4 hours; 45; 150; Q 4 hours  CODE: Full Code    Intake/Output Summary (Last 24 hours) at 6/15/2022 1057  Last data filed at 6/15/2022 0700  Gross per 24 hour   Intake 2466.43 ml   Output 1550 ml   Net 916.43 ml       OBJECTIVE:    BP (!) 100/48   Pulse 78   Temp 98.9 °F (37.2 °C) (Axillary) team  11. Normocytic anemia-likely 2/2 phlebotomy  12. Dysphagia with PEG in situ   13. Alzheimer's dementia-on Aricept  14. Hx of DVT s/p IVC filter     15.  History of subdural hematoma s/p bur hole evacuation (4/2019)        PLAN:     Per ICU team      Medications:  REVIEWED DAILY    Infusion Medications    norepinephrine 5.12 mcg/min (06/15/22 0811)    sodium chloride       Scheduled Medications    midodrine  10 mg Oral TID WC    hydrocortisone sodium succinate PF  50 mg IntraVENous Daily    lidocaine  5 mL IntraDERmal Once    sodium chloride flush  5-40 mL IntraVENous 2 times per day    heparin flush  1 mL IntraVENous 2 times per day    [START ON 6/18/2022] hydrocortisone  10 mg Oral BID    acetylcysteine  4 mL Inhalation BID    potassium bicarb-citric acid  40 mEq Oral Daily    clonazePAM  0.5 mg Oral Q8H    miconazole nitrate   Topical BID    miconazole   Topical BID    levETIRAcetam  1,000 mg IntraVENous Q12H    Or    levETIRAcetam  1,000 mg Oral Q12H    enoxaparin  40 mg SubCUTAneous Daily    pantoprazole (PROTONIX) 40 mg injection  40 mg IntraVENous Q12H    vitamin D  2,000 Units Oral Daily    donepezil  5 mg Oral Nightly    levothyroxine  88 mcg Oral Daily    risperiDONE  0.25 mg Oral BID    budesonide  0.5 mg Nebulization BID    ipratropium-albuterol  3 mL Inhalation Q4H    chlorhexidine  15 mL Mouth/Throat BID     PRN Meds: sodium chloride flush, sodium chloride, heparin flush, miconazole nitrate **AND** miconazole nitrate, perflutren lipid microspheres, promethazine **OR** ondansetron, polyethylene glycol, acetaminophen **OR** acetaminophen    Labs:     Recent Labs     06/13/22  0502 06/14/22 0448 06/15/22  0458   WBC 6.8 6.6 6.0   HGB 10.1* 10.0* 10.4*   HCT 31.5* 31.7* 32.9*    202 199       Recent Labs     06/13/22  0502 06/14/22 0448 06/15/22  0458    137 137   K 3.4* 3.7 4.0    104 102   CO2 24 24 24   BUN 9 9 11   CREATININE 0.5 0.5 0.5   CALCIUM 7.8* 7. 8* 8.1*       Recent Labs     06/13/22  0502 06/14/22  0448 06/15/22  0458   PROT 5.3* 5.6* 5.8*   ALKPHOS 66 66 70   ALT 11 10 11   AST 8 9 9   BILITOT 0.3 0.3 0.3       No results for input(s): INR in the last 72 hours. No results for input(s): Verlena Ez in the last 72 hours. Chronic labs:    Lab Results   Component Value Date    CHOL 173 03/30/2022    TRIG 88 03/30/2022    HDL 62 03/30/2022    LDLCALC 93 03/30/2022    TSH 10.330 (H) 06/05/2022    INR 1.0 06/05/2022    LABA1C 5.2 03/30/2022       Radiology: REVIEWED DAILY    +++++++++++++++++++++++++++++++++++++++++++++++++  BOGDAN Chauhan - CNP  [facility] [place] Physician - Hospitalist  [address], [place]  +++++++++++++++++++++++++++++++++++++++++++++++++  NOTE: This report was transcribed using voice recognition software. Every effort was made to ensure accuracy; however, inadvertent computerized transcription errors may be present.

## 2022-06-16 ENCOUNTER — APPOINTMENT (OUTPATIENT)
Dept: GENERAL RADIOLOGY | Age: 57
DRG: 870 | End: 2022-06-16
Payer: MEDICARE

## 2022-06-16 LAB
AADO2: 98.7 MMHG
ALBUMIN SERPL-MCNC: 2.2 G/DL (ref 3.5–5.2)
ALP BLD-CCNC: 66 U/L (ref 35–104)
ALT SERPL-CCNC: 11 U/L (ref 0–32)
ANION GAP SERPL CALCULATED.3IONS-SCNC: 6 MMOL/L (ref 7–16)
AST SERPL-CCNC: 13 U/L (ref 0–31)
B.E.: 3.7 MMOL/L (ref -3–3)
BASOPHILS ABSOLUTE: 0.01 E9/L (ref 0–0.2)
BASOPHILS RELATIVE PERCENT: 0.2 % (ref 0–2)
BILIRUB SERPL-MCNC: 0.2 MG/DL (ref 0–1.2)
BUN BLDV-MCNC: 8 MG/DL (ref 6–20)
CALCIUM SERPL-MCNC: 7.8 MG/DL (ref 8.6–10.2)
CHLORIDE BLD-SCNC: 105 MMOL/L (ref 98–107)
CO2: 26 MMOL/L (ref 22–29)
COHB: 0.2 % (ref 0–1.5)
CREAT SERPL-MCNC: 0.5 MG/DL (ref 0.5–1)
CRITICAL: ABNORMAL
DATE ANALYZED: ABNORMAL
DATE OF COLLECTION: ABNORMAL
EKG ATRIAL RATE: 94 BPM
EKG P AXIS: 68 DEGREES
EKG P-R INTERVAL: 152 MS
EKG Q-T INTERVAL: 338 MS
EKG QRS DURATION: 68 MS
EKG QTC CALCULATION (BAZETT): 422 MS
EKG R AXIS: 39 DEGREES
EKG T AXIS: 23 DEGREES
EKG VENTRICULAR RATE: 94 BPM
EOSINOPHILS ABSOLUTE: 0.15 E9/L (ref 0.05–0.5)
EOSINOPHILS RELATIVE PERCENT: 3.1 % (ref 0–6)
FIO2: 40 %
GFR AFRICAN AMERICAN: >60
GFR NON-AFRICAN AMERICAN: >60 ML/MIN/1.73
GLUCOSE BLD-MCNC: 118 MG/DL (ref 74–99)
HCO3: 27.7 MMOL/L (ref 22–26)
HCT VFR BLD CALC: 31.9 % (ref 34–48)
HEMOGLOBIN: 10.1 G/DL (ref 11.5–15.5)
HHB: 1.4 % (ref 0–5)
IMMATURE GRANULOCYTES #: 0.04 E9/L
IMMATURE GRANULOCYTES %: 0.8 % (ref 0–5)
LAB: ABNORMAL
LACTIC ACID: 1.3 MMOL/L (ref 0.5–2.2)
LYMPHOCYTES ABSOLUTE: 0.77 E9/L (ref 1.5–4)
LYMPHOCYTES RELATIVE PERCENT: 15.9 % (ref 20–42)
Lab: ABNORMAL
MCH RBC QN AUTO: 30.4 PG (ref 26–35)
MCHC RBC AUTO-ENTMCNC: 31.7 % (ref 32–34.5)
MCV RBC AUTO: 96.1 FL (ref 80–99.9)
METER GLUCOSE: 106 MG/DL (ref 74–99)
METER GLUCOSE: 106 MG/DL (ref 74–99)
METER GLUCOSE: 137 MG/DL (ref 74–99)
METHB: 0.5 % (ref 0–1.5)
MODE: AC
MONOCYTES ABSOLUTE: 0.52 E9/L (ref 0.1–0.95)
MONOCYTES RELATIVE PERCENT: 10.8 % (ref 2–12)
NEUTROPHILS ABSOLUTE: 3.34 E9/L (ref 1.8–7.3)
NEUTROPHILS RELATIVE PERCENT: 69.2 % (ref 43–80)
O2 SATURATION: 98.8 % (ref 92–98.5)
O2HB: 97.9 % (ref 94–97)
OPERATOR ID: 2245
PATIENT TEMP: 37 C
PCO2: 39.5 MMHG (ref 35–45)
PDW BLD-RTO: 16.2 FL (ref 11.5–15)
PEEP/CPAP: 5 CMH2O
PFO2: 3.28 MMHG/%
PH BLOOD GAS: 7.46 (ref 7.35–7.45)
PLATELET # BLD: 209 E9/L (ref 130–450)
PMV BLD AUTO: 10.2 FL (ref 7–12)
PO2: 131.1 MMHG (ref 75–100)
POTASSIUM REFLEX MAGNESIUM: 3.7 MMOL/L (ref 3.5–5)
RBC # BLD: 3.32 E12/L (ref 3.5–5.5)
RI(T): 0.75
RR MECHANICAL: 12 B/MIN
SODIUM BLD-SCNC: 137 MMOL/L (ref 132–146)
SOURCE, BLOOD GAS: ABNORMAL
THB: 11.2 G/DL (ref 11.5–16.5)
TIME ANALYZED: 607
TOTAL PROTEIN: 5.3 G/DL (ref 6.4–8.3)
URINE CULTURE, ROUTINE: NORMAL
VT MECHANICAL: 300 ML
WBC # BLD: 4.8 E9/L (ref 4.5–11.5)

## 2022-06-16 PROCEDURE — 6360000002 HC RX W HCPCS: Performed by: INTERNAL MEDICINE

## 2022-06-16 PROCEDURE — 6370000000 HC RX 637 (ALT 250 FOR IP): Performed by: PHYSICIAN ASSISTANT

## 2022-06-16 PROCEDURE — 82805 BLOOD GASES W/O2 SATURATION: CPT

## 2022-06-16 PROCEDURE — 6360000002 HC RX W HCPCS: Performed by: STUDENT IN AN ORGANIZED HEALTH CARE EDUCATION/TRAINING PROGRAM

## 2022-06-16 PROCEDURE — 2580000003 HC RX 258: Performed by: STUDENT IN AN ORGANIZED HEALTH CARE EDUCATION/TRAINING PROGRAM

## 2022-06-16 PROCEDURE — 6370000000 HC RX 637 (ALT 250 FOR IP): Performed by: INTERNAL MEDICINE

## 2022-06-16 PROCEDURE — A4216 STERILE WATER/SALINE, 10 ML: HCPCS | Performed by: INTERNAL MEDICINE

## 2022-06-16 PROCEDURE — 6370000000 HC RX 637 (ALT 250 FOR IP): Performed by: STUDENT IN AN ORGANIZED HEALTH CARE EDUCATION/TRAINING PROGRAM

## 2022-06-16 PROCEDURE — 80053 COMPREHEN METABOLIC PANEL: CPT

## 2022-06-16 PROCEDURE — 6370000000 HC RX 637 (ALT 250 FOR IP): Performed by: FAMILY MEDICINE

## 2022-06-16 PROCEDURE — C9113 INJ PANTOPRAZOLE SODIUM, VIA: HCPCS | Performed by: INTERNAL MEDICINE

## 2022-06-16 PROCEDURE — 71045 X-RAY EXAM CHEST 1 VIEW: CPT

## 2022-06-16 PROCEDURE — 82962 GLUCOSE BLOOD TEST: CPT

## 2022-06-16 PROCEDURE — 94640 AIRWAY INHALATION TREATMENT: CPT

## 2022-06-16 PROCEDURE — 85025 COMPLETE CBC W/AUTO DIFF WBC: CPT

## 2022-06-16 PROCEDURE — 83605 ASSAY OF LACTIC ACID: CPT

## 2022-06-16 PROCEDURE — 94003 VENT MGMT INPAT SUBQ DAY: CPT

## 2022-06-16 PROCEDURE — 2000000000 HC ICU R&B

## 2022-06-16 PROCEDURE — 6370000000 HC RX 637 (ALT 250 FOR IP): Performed by: NURSE PRACTITIONER

## 2022-06-16 PROCEDURE — 99291 CRITICAL CARE FIRST HOUR: CPT | Performed by: INTERNAL MEDICINE

## 2022-06-16 PROCEDURE — 2580000003 HC RX 258: Performed by: INTERNAL MEDICINE

## 2022-06-16 RX ADMIN — DONEPEZIL HYDROCHLORIDE 5 MG: 5 TABLET, FILM COATED ORAL at 20:57

## 2022-06-16 RX ADMIN — IPRATROPIUM BROMIDE AND ALBUTEROL SULFATE 3 ML: .5; 2.5 SOLUTION RESPIRATORY (INHALATION) at 12:05

## 2022-06-16 RX ADMIN — MICONAZOLE NITRATE: 20.6 POWDER TOPICAL at 20:58

## 2022-06-16 RX ADMIN — MIDODRINE HYDROCHLORIDE 10 MG: 10 TABLET ORAL at 11:37

## 2022-06-16 RX ADMIN — CLONAZEPAM 0.5 MG: 0.5 TABLET ORAL at 06:15

## 2022-06-16 RX ADMIN — IPRATROPIUM BROMIDE AND ALBUTEROL SULFATE 3 ML: .5; 2.5 SOLUTION RESPIRATORY (INHALATION) at 07:53

## 2022-06-16 RX ADMIN — ENOXAPARIN SODIUM 40 MG: 100 INJECTION SUBCUTANEOUS at 08:37

## 2022-06-16 RX ADMIN — RISPERIDONE 0.25 MG: 0.25 TABLET ORAL at 08:37

## 2022-06-16 RX ADMIN — IPRATROPIUM BROMIDE AND ALBUTEROL SULFATE 3 ML: .5; 2.5 SOLUTION RESPIRATORY (INHALATION) at 16:50

## 2022-06-16 RX ADMIN — Medication: at 08:39

## 2022-06-16 RX ADMIN — SODIUM CHLORIDE, PRESERVATIVE FREE 40 MG: 5 INJECTION INTRAVENOUS at 08:37

## 2022-06-16 RX ADMIN — CHLORHEXIDINE GLUCONATE 15 ML: 1.2 RINSE ORAL at 20:59

## 2022-06-16 RX ADMIN — PIPERACILLIN AND TAZOBACTAM 4500 MG: 4; .5 INJECTION, POWDER, FOR SOLUTION INTRAVENOUS at 14:17

## 2022-06-16 RX ADMIN — ACETYLCYSTEINE 400 MG: 100 SOLUTION ORAL; RESPIRATORY (INHALATION) at 07:53

## 2022-06-16 RX ADMIN — LEVETIRACETAM 1000 MG: 500 TABLET, FILM COATED ORAL at 11:37

## 2022-06-16 RX ADMIN — BUDESONIDE 500 MCG: 0.5 SUSPENSION RESPIRATORY (INHALATION) at 07:53

## 2022-06-16 RX ADMIN — CHLORHEXIDINE GLUCONATE 15 ML: 1.2 RINSE ORAL at 08:37

## 2022-06-16 RX ADMIN — LEVOTHYROXINE SODIUM 88 MCG: 0.09 TABLET ORAL at 06:15

## 2022-06-16 RX ADMIN — HYDROCORTISONE SODIUM SUCCINATE 50 MG: 100 INJECTION, POWDER, FOR SOLUTION INTRAMUSCULAR; INTRAVENOUS at 08:38

## 2022-06-16 RX ADMIN — POTASSIUM BICARBONATE 40 MEQ: 782 TABLET, EFFERVESCENT ORAL at 08:51

## 2022-06-16 RX ADMIN — SODIUM CHLORIDE, PRESERVATIVE FREE 40 MG: 5 INJECTION INTRAVENOUS at 20:57

## 2022-06-16 RX ADMIN — IPRATROPIUM BROMIDE AND ALBUTEROL SULFATE 3 ML: .5; 2.5 SOLUTION RESPIRATORY (INHALATION) at 19:40

## 2022-06-16 RX ADMIN — ACETYLCYSTEINE 400 MG: 100 SOLUTION ORAL; RESPIRATORY (INHALATION) at 19:40

## 2022-06-16 RX ADMIN — MIDODRINE HYDROCHLORIDE 10 MG: 10 TABLET ORAL at 08:37

## 2022-06-16 RX ADMIN — Medication 2000 UNITS: at 08:37

## 2022-06-16 RX ADMIN — IPRATROPIUM BROMIDE AND ALBUTEROL SULFATE 3 ML: .5; 2.5 SOLUTION RESPIRATORY (INHALATION) at 03:42

## 2022-06-16 RX ADMIN — Medication: at 20:58

## 2022-06-16 RX ADMIN — IPRATROPIUM BROMIDE AND ALBUTEROL SULFATE 3 ML: .5; 2.5 SOLUTION RESPIRATORY (INHALATION) at 00:04

## 2022-06-16 RX ADMIN — SODIUM CHLORIDE, PRESERVATIVE FREE 10 ML: 5 INJECTION INTRAVENOUS at 08:40

## 2022-06-16 RX ADMIN — RISPERIDONE 0.25 MG: 0.25 TABLET ORAL at 20:57

## 2022-06-16 RX ADMIN — BUDESONIDE 500 MCG: 0.5 SUSPENSION RESPIRATORY (INHALATION) at 19:40

## 2022-06-16 RX ADMIN — MICONAZOLE NITRATE: 20.6 POWDER TOPICAL at 08:39

## 2022-06-16 RX ADMIN — MIDODRINE HYDROCHLORIDE 10 MG: 10 TABLET ORAL at 15:56

## 2022-06-16 RX ADMIN — PIPERACILLIN AND TAZOBACTAM 4500 MG: 4; .5 INJECTION, POWDER, FOR SOLUTION INTRAVENOUS at 20:14

## 2022-06-16 ASSESSMENT — PULMONARY FUNCTION TESTS
PIF_VALUE: 20
PIF_VALUE: 21
PIF_VALUE: 18
PIF_VALUE: 18
PIF_VALUE: 19
PIF_VALUE: 21
PIF_VALUE: 19
PIF_VALUE: 18
PIF_VALUE: 18
PIF_VALUE: 20
PIF_VALUE: 21
PIF_VALUE: 18
PIF_VALUE: 21
PIF_VALUE: 19
PIF_VALUE: 18
PIF_VALUE: 18
PIF_VALUE: 17
PIF_VALUE: 20
PIF_VALUE: 18
PIF_VALUE: 20
PIF_VALUE: 18
PIF_VALUE: 19
PIF_VALUE: 20

## 2022-06-16 ASSESSMENT — PAIN SCALES - GENERAL
PAINLEVEL_OUTOF10: 0

## 2022-06-16 NOTE — PROGRESS NOTES
[address] [facility]   Department of Internal Medicine   Internal Medicine Residency  MICU Progress Note    Patient:  Gavi Schwab See 65 y.o. female   MRN: [id number]       Date of Service: 2022    Allergy: Patient has no known allergies. Subjective     Overnight, patient did not had any acute event. This morning patient remain intubated. patient is off sedation and no purposeful movement. Patient's respiratory culture growing gm negative rods. Possible comfort care tomorrow. Objective     TEMPERATURE:  Current - Temp: 98.6 °F (37 °C); Max - Temp  Av.8 °F (37.1 °C)  Min: 98.2 °F (36.8 °C)  Max: 99.5 °F (37.5 °C)  RESPIRATIONS RANGE: Resp  Avg: 15  Min: 12  Max: 23  PULSE RANGE: Pulse  Av.8  Min: 67  Max: 101  BLOOD PRESSURE RANGE:  Systolic (10EWU), DYO:76 , Min:70 , SQD:831   ; Diastolic (34DVW), MLX:52, Min:45, Max:71    PULSE OXIMETRY RANGE: SpO2  Av %  Min: 99 %  Max: 100 %    I & O - 24hr:    Intake/Output Summary (Last 24 hours) at 2022 1813  Last data filed at 2022 1800  Gross per 24 hour   Intake 2570.49 ml   Output 2520 ml   Net 50.49 ml     I/O last 3 completed shifts: In: 5666.9 [I.V.:527.1; NG/GT:3879; IV Piggyback:1260.8]  Out: 7110 [Urine:3350; Emesis/NG output:20] I/O this shift: In: 975 [NG/GT:775; IV Piggyback:200]  Out: 1100 [Urine:1100]   Weight change:     Physical Exam  Constitutional:       Comments: patient remain intubated, patient is off sedation, not made or doing any purposeful movement but withdraws to pain in all 4 quadrants. HENT:      Mouth/Throat:      Mouth: Mucous membranes are moist.   Eyes:      Pupils: Pupils are equal, round, and reactive to light. Cardiovascular:      Rate and Rhythm: Normal rate and regular rhythm. Pulses: Normal pulses. Heart sounds: Normal heart sounds. No murmur heard. Pulmonary:      Effort: Pulmonary effort is normal. No respiratory distress. Breath sounds: Normal breath sounds.  No wheezing or rhonchi. Abdominal:      General: Abdomen is flat. Bowel sounds are normal. There is no distension. Palpations: Abdomen is soft. Tenderness: There is no abdominal tenderness. There is no guarding. Musculoskeletal:         General: No swelling or deformity. Normal range of motion. Right lower leg: No edema. Left lower leg: No edema. Skin:     Capillary Refill: Capillary refill takes less than 2 seconds.           Diet:   Diet NPO  ADULT TUBE FEEDING; PEG; Standard with Fiber; Continuous; 20; Yes; 10; Q 4 hours; 45; 150; Q 4 hours    Oxygen:     Vent Information  Ventilator ID: [id number]  Vent Mode: AC/VC  Additional Respiratory Assessments  Heart Rate: 89  Resp: 12  SpO2: 100 %  Position: Semi-Vera's  Humidification Source: Heated wire  Humidification Temp: 37  Circuit Condensation: Drained  Airway Type: ET  Airway Size: 7.5 (21)  Cuff Pressure (cm H2O): 29 cm H2O  Skin Barrier Applied: Yes       [REMOVED] External Urinary Catheter-Output (mL):  (incontinent, pad wet)  External Urinary Catheter-Output (mL): 1100 mL  [REMOVED] Urinary Catheter Hmfvi-Pjuhzpmfdpj-Dvtkvy (mL): 80 mL    Medications     Continuous Infusions:   sodium chloride       Scheduled Meds:   piperacillin-tazobactam  4,500 mg IntraVENous Q8H    midodrine  10 mg Oral TID WC    hydrocortisone sodium succinate PF  50 mg IntraVENous Daily    lidocaine  5 mL IntraDERmal Once    sodium chloride flush  5-40 mL IntraVENous 2 times per day    heparin flush  1 mL IntraVENous 2 times per day    [START ON 6/18/2022] hydrocortisone  10 mg Oral BID    acetylcysteine  4 mL Inhalation BID    potassium bicarb-citric acid  40 mEq Oral Daily    [Held by provider] clonazePAM  0.5 mg Oral Q8H    miconazole nitrate   Topical BID    miconazole   Topical BID    levETIRAcetam  1,000 mg IntraVENous Q12H    Or    levETIRAcetam  1,000 mg Oral Q12H    enoxaparin  40 mg SubCUTAneous Daily    pantoprazole (PROTONIX) 40 mg injection  40 mg IntraVENous Q12H    vitamin D  2,000 Units Oral Daily    donepezil  5 mg Oral Nightly    levothyroxine  88 mcg Oral Daily    risperiDONE  0.25 mg Oral BID    budesonide  0.5 mg Nebulization BID    ipratropium-albuterol  3 mL Inhalation Q4H    chlorhexidine  15 mL Mouth/Throat BID     PRN Meds: white petrolatum, sodium chloride flush, sodium chloride, heparin flush, miconazole nitrate **AND** miconazole nitrate, perflutren lipid microspheres, promethazine **OR** ondansetron, polyethylene glycol, acetaminophen **OR** acetaminophen  Nutrition:   NG/OG tube TF type: Pulmocare/Nephro/Glucerna/Jevity        At rate: 40 ml/h    Labs and Imaging Studies     ve  CBC:   Recent Labs     06/14/22 0448 06/15/22  0458 06/16/22 0442   WBC 6.6 6.0 4.8   HGB 10.0* 10.4* 10.1*   HCT 31.7* 32.9* 31.9*   MCV 95.8 96.8 96.1    199 209       BMP:    Recent Labs     06/14/22 0448 06/15/22  0458 06/16/22 0442    137 137   K 3.7 4.0 3.7    102 105   CO2 24 24 26   BUN 9 11 8   CREATININE 0.5 0.5 0.5   GLUCOSE 134* 109* 118*       LIVER PROFILE:   Recent Labs     06/14/22 0448 06/15/22  0458 06/16/22 0442   AST 9 9 13   ALT 10 11 11   BILITOT 0.3 0.3 0.2   ALKPHOS 66 70 66     Imaging Studies:     Echo Complete    Result Date: 6/8/2022  Transthoracic Echocardiography Report (TTE)  Demographics   Patient Name    SEE Charity Costa Gender            Female   Medical Record  [id number]    Room Number       0910  Number   Account #       [de-identified]   Procedure Date    06/08/2022   Corporate ID                Ordering                              Physician   Accession       [id number]  Referring  Number                      Physician   Date of Birth   1965  Sonographer       Vy Anglin   Age             65 year(s)  Interpreting      [address]                              Physician         Physician Cardiology                                                Vandana Zeng MD                               Any Other  Procedure Type of Study   TTE procedure:Echo Complete W/Doppler & Color Flow. Procedure Date Date: 06/08/2022 Start: 07:37 AM Study Location: Portable Technical Quality: Adequate visualization Indications:Cardiogenic shock. Patient Status: STAT Height: 58 inches Weight: 125 pounds BSA: 1.49 m^2 BMI: 26.12 kg/m^2 BP: 95/47 mmHg  Findings   Left Ventricle  Ejection fraction is visually estimated at 60 to 65%. Right Ventricle  Normal right ventricular size and function. Left Atrium  Normal sized left atrium. Right Atrium  Normal right atrium size. Mitral Valve  Physiologic and/or trace mitral regurgitation is present. Tricuspid Valve  Mild tricuspid regurgitation. Aortic Valve  Individual aortic valve leaflets are not clearly visualized. Pulmonic Valve  Physiologic and/or trace pulmonic regurgitation present. Pericardial Effusion  There is physiologic pericardial effusion without tamponade   Pleural Effusion  Pleural effusion   Conclusions   Summary  Ejection fraction is visually estimated at 60 to 65%. Normal right ventricular size and function. Mild tricuspid regurgitation. Physiologic and/or trace pulmonic regurgitation present.   There is physiologic pericardial effusion without tamponade   Signature   ----------------------------------------------------------------  Electronically signed by Lainey Szymanski MD(Interpreting  physician) on 06/08/2022 07:33 PM  ----------------------------------------------------------------  M-Mode/2D Measurements & Calculations   LV Diastolic    LV Systolic Dimension: 2.6   AV Cusp Separation: 1.8 cmLA  Dimension: 4 cm cm                           Dimension: 2.7 cmAO Root  LV FS:35 %      LV Volume Diastolic: 72 ml   Dimension: 2.6 cm  LV PW           LV Volume Systolic: 20.8 ml  Diastolic: 0.7  LV EDV/LV EDV Index: 72  cm              ml/48 ml/m^2LV ESV/LV ESV  Septum          Index: 23.8 ml/16ml/ m^2     RV Diastolic Dimension: 2.4  Diastolic: 0.8  EF Calculated: 66.9 %        cm  cm              LV Mass Index: 58 l/min*m^2   LV Mass: 85.78  g               LVOT: 2 cm  Doppler Measurements & Calculations   MV Peak E-Wave:    AV Peak Velocity: 1.28 m/s      LVOT Peak Velocity: 0.8  1.03 m/s           AV Peak Gradient: 6.57 mmHg     m/s  MV Peak A-Wave:    AV Mean Velocity: 0.8 m/s       LVOT Mean Velocity:  0.61 m/s           AV Mean Gradient: 3.2 mmHg      0.61 m/s  MV E/A Ratio: 1.69 AV VTI: 28.3 cm                 LVOT Peak Gradient: 2.6  MV Peak Gradient:  AV Area (Continuity):2.17 cm^2  mmHgLVOT Mean Gradient:  5.4 mmHg                                           1.6 mmHg  MV Mean Gradient:  LVOT VTI: 19.6 cm  2.1 mmHg           IVRT: 55.4 msec  MV Mean Velocity:  0.66 m/s           Pulm. Vein A Reversal  MV Deceleration    Duration:96.9 msec  Time: 129.7 msec   Pulm. Vein D Velocity:0.59      PV Peak Velocity: 0.82  MV P1/2t: 36.5     m/sPulm. Vein A Reversal        m/s  msec               Velocity:0.42 m/s               PV Peak Gradient: 2.68  MVA by PHT:6.03    Pulm. Vein S Velocity: 0.48 m/s mmHg  cm^2                                               PV Mean Velocity: 0.53  MV Area                                            m/s  (continuity): 2.2                                  PV Mean Gradient: 1.3  cm^2                                               mmHg  MV E' Septal  Velocity: 0.09 m/s  MV E' Lateral  Velocity: 7 m/s  [email or web address] [id number]    CT HEAD WO CONTRAST    Result Date: 6/9/2022  EXAMINATION: CT OF THE HEAD WITHOUT CONTRAST  6/9/2022 9:54 pm TECHNIQUE: CT of the head was performed without the administration of intravenous contrast. Automated exposure control, iterative reconstruction, and/or weight based adjustment of the mA/kV was utilized to reduce the radiation dose to as low as reasonably achievable.  COMPARISON: CT head without contrast, 05/31/2022. HISTORY: ORDERING SYSTEM PROVIDED HISTORY: increased somnolence TECHNOLOGIST PROVIDED HISTORY: Reason for exam:->increased somnolence What reading provider will be dictating this exam?->CRC FINDINGS: BRAIN/VENTRICLES: Mild decrease in the chronic left subdural hemorrhage along the high left parietal convexity, which currently measures approximately 3 mm in maximal width. The hemorrhage along the anterior cerebral convexity has resolved. No acute hemorrhage is seen. Encephalomalacia in the anterior temporal and anterior frontal lobes is likely the result of remote trauma. There is persistent ventriculomegaly, with a right transfrontal ventriculostomy shunt terminating in the left lateral ventricle. ORBITS: The visualized portion of the orbits demonstrate no acute abnormality. SINUSES: The paranasal sinuses are clear. Small bilateral mastoid effusions. SOFT TISSUES/SKULL:  No acute abnormality of the visualized skull or soft tissues. 1.  No acute intracranial abnormality. 2. Trace residual chronic left parietal subdural hemorrhage (measuring 3 mm in maximal width). It has decreased in size as of 05/31/2022. RECOMMENDATIONS: Unavailable     CT HEAD WO CONTRAST    Result Date: 5/31/2022  EXAMINATION: CT OF THE HEAD WITHOUT CONTRAST  5/31/2022 10:35 am TECHNIQUE: CT of the head was performed without the administration of intravenous contrast. Automated exposure control, iterative reconstruction, and/or weight based adjustment of the mA/kV was utilized to reduce the radiation dose to as low as reasonably achievable. COMPARISON: 05/30/2022 HISTORY: ORDERING SYSTEM PROVIDED HISTORY: follow up subdural hematoma TECHNOLOGIST PROVIDED HISTORY: Reason for exam:->follow up subdural hematoma Has a \"code stroke\" or \"stroke alert\" been called? ->No What reading provider will be dictating this exam?->CRC FINDINGS: BRAIN/VENTRICLES: There is again seen small extra-axial collection felt to represent chronic subdural hematoma over the convexity of the left parietal lobe measuring a thickness of 5 mm. The high density associated with the collection is unchanged. There is hydrocephalus of the lateral to lesser extent 3rd ventricle. 4th ventricle within normal limits. Findings are unchanged. There is a right frontal ventriculostomy shunt catheter in place unchanged. However, the subarachnoid spaces are also prominent suggesting parenchymal volume loss. There is bifrontal lucency as well as lucency at the temporal lobes likely related to prior contusion. There is no new CT evidence for intra-axial or extra-axial fluid collection, no mass, nor hematoma. ORBITS: The visualized portion of the orbits demonstrate no acute abnormality. SINUSES: The visualized paranasal sinuses and mastoid air cells demonstrate no acute abnormality. SOFT TISSUES/SKULL:  No acute abnormality of the visualized skull or soft tissues. There is again seen predominantly chronic small subdural hematoma over the convexity of the left parietal lobe. This measures a thickness of 4.6 mm which is unchanged. Tiny focus of high density within the collection could represent more acute blood which is also unchanged. No significant mass effect. Ventriculomegaly and ventriculostomy shunt catheter again noted. Lucency related to chronic encephalomalacia and perhaps prior contusion at the anterior frontal lobes and temporal lobes is unchanged. CT Head WO Contrast    Result Date: 5/30/2022  EXAMINATION: CT OF THE HEAD WITHOUT CONTRAST  5/30/2022 9:06 pm TECHNIQUE: CT of the head was performed without the administration of intravenous contrast. Automated exposure control, iterative reconstruction, and/or weight based adjustment of the mA/kV was utilized to reduce the radiation dose to as low as reasonably achievable.  COMPARISON: May 4, 2022 HISTORY: ORDERING SYSTEM PROVIDED HISTORY: AMS, hypoxia TECHNOLOGIST PROVIDED HISTORY: Reason for exam:->AMS, hypoxia Has a \"code stroke\" or \"stroke alert\" been called? ->No Decision Support Exception - unselect if not a suspected or confirmed emergency medical condition->Emergency Medical Condition (MA) What reading provider will be dictating this exam?->CRC FINDINGS: Small focus of increased attenuation involving left parietal subdural space. Redemonstration of extra-axial, hypoattenuating collection overlying left parietal lobe. Stable position of right sided ventricular shunt catheter with distal tip abutting septum pellucidum. Stable areas of encephalomalacia involving inferior frontal lobes and stable area of encephalomalacia involving bilateral temporal lobes stable area of encephalomalacia involving right frontal white matter adjacent to ventricular shunt. Basilar cisterns are patent. Paranasal sinuses and mastoid air cells are clear. 1. Redemonstration of hypoattenuating extra-axial collection overlying left parietal lobe related to chronic subdural hygroma or chronic subdural hematoma. Small focus of increased attenuation within this collection could suggest small acute on chronic subdural hematoma. Results were called by Dr. Page Braga to Dr. Mary Hebert on 5/30/2022 at 21:57. 2. Stable areas of encephalomalacia involving bilateral frontal lobes and bilateral temporal lobes. 3. Stable position of right frontal ventricular shunt catheter. 4. Stable moderate ventriculomegaly. XR CHEST PORTABLE    Result Date: 6/15/2022  EXAMINATION: ONE XRAY VIEW OF THE CHEST 6/15/2022 7:27 am COMPARISON: 06/14/2022 HISTORY: ORDERING SYSTEM PROVIDED HISTORY: pna and intubated TECHNOLOGIST PROVIDED HISTORY: Reason for exam:->pna and intubated What reading provider will be dictating this exam?->CRC FINDINGS: Heart size is normal.  There is left lower lobe infiltrate. There is a catheter projecting in the base of the left hemithorax/upper abdomen. ET tube and right central line  are appropriate.   Right lung is clear. No interval change. There is left lower lobe infiltrate. XR CHEST PORTABLE    Result Date: 6/14/2022  EXAMINATION: ONE XRAY VIEW OF THE CHEST 6/14/2022 7:29 am COMPARISON: 13 June 2022 HISTORY: ORDERING SYSTEM PROVIDED HISTORY: pna and intubated TECHNOLOGIST PROVIDED HISTORY: Reason for exam:->pna and intubated What reading provider will be dictating this exam?->CRC FINDINGS: Single AP upright portable chest demonstrates right IJ catheter and endotracheal tube remaining in satisfactory position. There is stable left lower lobe atelectasis/infiltrate with mild blunting of the costophrenic angles. There is no evidence of a pneumothorax. No significant change over the past 23 hours. XR CHEST PORTABLE    Result Date: 6/13/2022  EXAMINATION: ONE XRAY VIEW OF THE CHEST 6/13/2022 7:04 am COMPARISON: 12 June 2022 HISTORY: ORDERING SYSTEM PROVIDED HISTORY: pna and intubated TECHNOLOGIST PROVIDED HISTORY: Reason for exam:->pna and intubated What reading provider will be dictating this exam?->CRC FINDINGS: Unchanged support lines. Stable left lower lobe infiltrate and or atelectasis. No new abnormal findings. Unchanged left lower lobe infiltrate and or atelectasis. XR CHEST PORTABLE    Result Date: 6/12/2022  EXAMINATION: ONE XRAY VIEW OF THE CHEST 6/12/2022 7:33 am COMPARISON: 06/11/2022 HISTORY: ORDERING SYSTEM PROVIDED HISTORY: pna and intubated TECHNOLOGIST PROVIDED HISTORY: Reason for exam:->pna and intubated What reading provider will be dictating this exam?->CRC FINDINGS: Medial left lower lobe atelectasis and or pneumonia is present. Heart appears normal in size. Right lung is clear. Endotracheal tube tip is 2 cm above the louann. Right IJ central venous catheter tip is in the inferior SVC, above the right atrium in good position. No complications.  shunt tube catheter passes over the right chest.  Osseous structures are stable.      1.  Medial left lower lobe atelectasis or pneumonia. No interval change. 2.  No evidence of pneumothorax. 3.  Endotracheal tube tip is approximately 2 cm above the louann. XR CHEST PORTABLE    Result Date: 6/11/2022  EXAMINATION: ONE XRAY VIEW OF THE CHEST 6/11/2022 6:35 pm COMPARISON: 06/11/2022 at 0732 HISTORY: [address]: ET tube readjustment TECHNOLOGIST PROVIDED HISTORY: Reason for exam:->ET tube readjustment What reading provider will be dictating this exam?->CRC FINDINGS: Endotracheal tube is identified and appears to have been advanced slightly and is now approximately 14 mm from the louann. Right internal jugular central venous line is stable. Appearance of the lungs are stable pleural spaces are clear. Heart is normal in size. Endotracheal tube with the tip less than 1.5 cm from the louann. Recommend retraction 2 cm. The findings were sent to the Radiology Results [address] at 7:33 pm on 6/11/2022 to be communicated to a licensed caregiver. XR CHEST PORTABLE    Result Date: 6/11/2022  EXAMINATION: ONE XRAY VIEW OF THE CHEST 6/11/2022 7:42 am COMPARISON: 06/09/2022, 06/10/2022 HISTORY: ORDERING SYSTEM PROVIDED HISTORY: pna and intubated TECHNOLOGIST PROVIDED HISTORY: Reason for exam:->pna and intubated What reading provider will be dictating this exam?->CRC FINDINGS: Unchanged minimal opacity in the retrocardiac region. This may represent atelectasis or pneumonia. There is associated poor definition of the left hemidiaphragm and costophrenic angle. Endotracheal tube is 1.5 cm above the louann. There is a right IJ central venous catheter with its tip in the SVC at the cavoatrial junction in good position. No complications. Probable  shunt tube over the right anterior chest extending into the left abdomen. Persistent medial left lower lobe opacity suggestive of atelectasis or pneumonia. Support tubes and catheters are stable.      XR CHEST PORTABLE    Result Date: 6/10/2022  EXAMINATION: FINDINGS: Lungs appear generally clear. No pleural fluid or pneumonia. Slight linear subsegmental atelectasis on the left. Heart is normal in size. There is an endotracheal tube with its tip 2.5 cm above the louann. Right IJ central venous catheter tip is in the inferior SVC below the louann in good position. No complications. No evidence of pneumothorax. Osseous structures are stable. Slight linear subsegmental atelectasis on the left. Support tubes and catheters in good position. XR CHEST PORTABLE    Result Date: 6/6/2022  EXAMINATION: ONE XRAY VIEW OF THE CHEST 6/6/2022 7:27 am COMPARISON: 06/05/2022 HISTORY: ORDERING SYSTEM PROVIDED HISTORY: pna and intubated TECHNOLOGIST PROVIDED HISTORY: Reason for exam:->pna and intubated What reading provider will be dictating this exam?->CRC FINDINGS: The cardiac silhouette is within normals. Note is made of a right internal jugular central venous catheter. The endotracheal tube tip is located 1.5 cm proximal to the louann. Lungs are clear. There are no findings of pneumonia or failure. 1. There are no findings of failure or pneumonia 2. Satisfactory position of the endotracheal tube and right internal jugular central venous catheter     XR CHEST PORTABLE    Result Date: 6/5/2022  EXAMINATION: ONE XRAY VIEW OF THE CHEST 6/5/2022 4:50 pm COMPARISON: The previous study performed earlier in the day at 5:51 a.m. HISTORY: ORDERING SYSTEM PROVIDED HISTORY: s/p Rt IJ TECHNOLOGIST PROVIDED HISTORY: Reason for exam:->s/p Rt IJ What reading provider will be dictating this exam?->CRC FINDINGS: There has been interval placement of a right-sided IJ line. The tip is in the distal SVC. No pneumothorax is appreciated. Again seen is an endotracheal tube, with the tip 1.9 cm above the louann. No longer seen is the NG tube. A  shunt is again seen. No longer identified are the left lung opacities. The left hemidiaphragm is now in a normal position.   There has been resolution of the mediastinal shift to the left. There has been a decrease in the right lower lung field atelectasis. The cardiac silhouette and mediastinal structures are without significant interval change. Interval placement of a right-sided IJ line, when compared to the previous study performed earlier in the day. No pneumothorax. Previously noted left lung opacities have resolved. The left hemidiaphragm is now in a normal position and there has been resolution of the mediastinal shift to the left. Interval decrease in the right lower lung field atelectasis. XR CHEST PORTABLE    Result Date: 6/5/2022  EXAMINATION: ONE XRAY VIEW OF THE CHEST6/5/2022 TECHNIQUE: Frontal view submitted COMPARISON: None. HISTORY: ORDERING SYSTEM PROVIDED HISTORY: Post tube TECHNOLOGIST PROVIDED HISTORY: Reason for exam:->Post tube What reading provider will be dictating this exam?->CRC FINDINGS: The lungs demonstrate opacification in the left mid lung. The endotracheal tube tip is 2.9 cm above the louann. Nasogastric tube is in good position. The cardiomediastinal silhouette is stable. Opacification in the  left lung, pneumonia cannot be excluded. Endotracheal tube tip 2.9 cm above louann. Nasogastric tube is in good position. XR CHEST PORTABLE    Result Date: 6/5/2022  EXAMINATION: ONE XRAY VIEW OF THE CHEST6/5/2022 TECHNIQUE: Frontal view submitted COMPARISON: None. HISTORY: ORDERING SYSTEM PROVIDED HISTORY: sob TECHNOLOGIST PROVIDED HISTORY: Reason for exam:->sob What reading provider will be dictating this exam?->CRC FINDINGS: The lungs are clear without focal consolidation, large pleural effusion, or pneumothorax. The cardiomediastinal silhouette is stable. No acute cardiopulmonary process.      CTA PULMONARY W CONTRAST    Result Date: 5/30/2022  EXAMINATION: CTA OF THE CHEST 5/30/2022 9:06 pm TECHNIQUE: CTA of the chest was performed after the administration of intravenous contrast.  Multiplanar reformatted images are provided for review. MIP images are provided for review. Automated exposure control, iterative reconstruction, and/or weight based adjustment of the mA/kV was utilized to reduce the radiation dose to as low as reasonably achievable. COMPARISON: None. HISTORY: ORDERING SYSTEM PROVIDED HISTORY: Hypoxia, evaluate for PE TECHNOLOGIST PROVIDED HISTORY: Reason for exam:->Hypoxia, evaluate for PE Decision Support Exception - unselect if not a suspected or confirmed emergency medical condition->Emergency Medical Condition (MA) What reading provider will be dictating this exam?->CRC FINDINGS: Pulmonary Arteries: Pulmonary arteries are adequately opacified for evaluation. No evidence of intraluminal filling defect to suggest pulmonary embolism. Main pulmonary artery is normal in caliber. Mediastinum: No evidence of mediastinal lymphadenopathy. The heart and pericardium demonstrate no acute abnormality. There is no acute abnormality of the thoracic aorta. Noted right aberrant subclavian artery of variant anatomic thoracic arch branching. Lungs/pleura: The lungs are without acute process. Subsegmental dependent atelectasis. No focal consolidation or pulmonary edema. No evidence of pleural effusion or pneumothorax. Upper Abdomen: Limited images of the upper abdomen are unremarkable. Soft Tissues/Bones: No acute bone or soft tissue abnormality. No evidence of pulmonary embolism or acute pulmonary abnormality. Noted right aberrant subclavian artery of variant anatomic thoracic arch branching. Subsegmental dependent atelectasis. XR ABDOMEN FOR NG/OG/NE TUBE PLACEMENT    Result Date: 6/5/2022  EXAMINATION: ONE SUPINE XRAY VIEW(S) OF THE ABDOMEN 6/5/2022 5:54 am COMPARISON: None.  HISTORY: ORDERING SYSTEM PROVIDED HISTORY: Confirmation of course of NG/OG/NE tube and location of tip of tube TECHNOLOGIST PROVIDED HISTORY: Reason for exam:->Confirmation of course of NG/OG/NE tube and location of tip of tube Portable? ->Yes What reading provider will be dictating this exam?->CRC FINDINGS: The nasogastric tube is in good position. The the IVC is in good position. There is no evidence for bowel obstruction. A PEG tube is visualized. Nasogastric tube is in good position. MRI BRAIN W WO CONTRAST    Result Date: 6/13/2022  EXAMINATION: MRI OF THE BRAIN WITHOUT AND WITH CONTRAST  6/13/2022 11:27 am TECHNIQUE: Multiplanar multisequence MRI of the head/brain was performed without and with the administration of intravenous contrast. COMPARISON: CT head without contrast, 06/09/2022. HISTORY: ORDERING SYSTEM PROVIDED HISTORY: pituitary evaluation for possible causes of adrenal insufficiency TECHNOLOGIST PROVIDED HISTORY: Reason for exam:->pituitary evaluation for possible causes of adrenal insufficiency What is the sedation requirement?->Sedation What reading provider will be dictating this exam?->CRC FINDINGS: PITUITARY: The pituitary gland is normal in size. No definite pituitary lesion is seen. Focus of apparent signal abnormality along the left lateral aspect of the pituitary gland on the postcontrast enhanced T1 weighted images (series 10, image 5) likely represents partial volume averaging. No corresponding signal abnormality is evident on the coronal sequence the infundibulum is midline. The optic chiasm and cavernous sinuses are grossly unremarkable. INTRACRANIAL STRUCTURES/VENTRICLES:  There is no acute infarct. Areas of encephalomalacia are seen in the anterior aspects of the frontal lobes, as well as the anterior temporal lobes, likely the result of remote trauma. There is disproportionately prominent volume loss in the mesial temporal lobes, as may be seen with Alzheimer's disease and other dementias. Clinical correlation is needed. The remainder of the brain demonstrates relatively mild volume loss. The ventricles are mildly dilated.   A right trans frontal ventriculostomy shunt is seen terminating in the left lateral ventricle. ORBITS: There is suggestion of thinning the posterior sclera of the left globe on the temporal aspect (series 4, image 10). SINUSES: Mild mucosal thickening is seen in the ethmoid and right sphenoid sinuses. Large bilateral mastoid effusions. BONES/SOFT TISSUES: The bone marrow signal intensity appears normal. The soft tissues demonstrate no acute abnormality. 1. No focal pituitary lesion is identified to indicate microadenoma. Apparent signal abnormality in the left lateral aspect of the gland on the sagittal T1 post-contrast enhanced sequence is likely artifactual. 2.  No acute intracranial abnormality. 3. Bilateral mastoid effusions, which may be due to eustachian tube dysfunction or otomastoiditis. Clinical correlation is needed. 4. Encephalomalacia in the anterior aspect of the frontal and temporal lobes bilaterally, likely result of remote trauma. 5. Disproportionately prominent volume loss in the mesial temporal lobes, as may be seen with Alzheimer's disease and other dementias. Clinical correlation is needed. 6. Ventriculomegaly. Right transfrontal ventriculostomy shunt in situ. 7. 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 Ophthalmology consultation is recommended. RECOMMENDATIONS: Unavailable         Resident's Assessment and Plan     Patient is a 64-year old female with a past medical histroy of Down syndrome and hydrocephalus s/p  shunt, chronic subdural hematoma, seizure, hypothyroidism, was recently admitted on 5/4-5-15 for seizures 2/2 subdural hematoma, then presented again <1 week PTA  For SOB and hypoxia. Currently being managed for the followin. Acute metabolic encephalopathy possibly 2/2 dementia vs seizure vs ventriculomegaly vs other causes  2. Acute hypoxic respiratory failure 2/2 ?aspiration pneumonia VS VAP   3.  Shock, likely hypovolemic vs distributive vs cardiogenic vs adrenal insufficiency - resolved  4. Adrenal insufficiency; cosyntropin test not accurate as done while patient on steroids  5. Hypothyroidism, on levothyroxine; TSH: 10.33 (high), free T4: 1.2; total T3: 67.53  6. 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 History of Down's syndrome  8. History of early-onset alzheimer's disease, on Donepezil  9. 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 History of communication hydrocephalus s/p  shunt (11/2019)  11. History of seizure disorder, on Keppra  12. History of bilateral DVT s/p IV filter placement  13. Lactic acidosis 2/2 hypertension  14. MRSA positive s/p Bactroban.     Plan:  · S/p reprogramming of valve of  shunt yesterday  · Neurology and neurosurgery following, follow recommendation  · Continue breathing treatments  · Continue thiamine and provigil  · On zosyn for resp culture growing gm neg rods  · Wean off vent as able  · Continue breathing treatment  · Decrease hydrocortisone 50mg to daily  · Endocrinology following, appreciate recommendation  · Continue synthroid  · Ophthalmology consulted, will see patient tomorrow  · Continue Keppra   · Held klonopin   · Daily CBC and BMP  · Daily CXR and ABG  · Follow blood culture  · For midline placement if unable to get peripherals    Patient's family had code status changing discussion with Dr. Jeana King, code status is changed to DNR CCA per family wish. [place], possibly CODE STATUS will be changed to DNR CC tomorrow.          DVT Prophylaxis   Lovenox        GI       PPI Prophylaxis  Protonix twice daily     Diet   tube feed          Code Status: DNR-CCA  PT/OT evaluation: Essence Ahumada MD, PGY-1  Attending physician: Dr. Adrien Gallego  Department of Pulmonary, Critical Care and [facility]  Department of Internal Medicine      During multidisciplinary team adrián Mcnealsusie Denson See is a 65 y.o. female was seen, examined and discussed. This is confirmation that I have personally seen and examined the patient and that the key elements of the encounter were performed by me (> 85 % time). The medications & laboratory data was discussed and adjusted where necessary. The radiographic images were reviewed or with radiologist or consultant if felt dis-concordant with the exam or history. The above findings were corroborated, plans confirmed and changes made if needed. Family is updated at the bedside as available. Key issues of the case were discussed among consultants. Critical Care time is documented if appropriate.       Critical Care time: 33 minutes     Aria Fairbanks DO

## 2022-06-16 NOTE — PLAN OF CARE
Problem: Respiratory - Adult  Goal: Achieves optimal ventilation and oxygenation  6/16/2022 0347 by Elvira Teresa RCP  Outcome: Not Progressing

## 2022-06-16 NOTE — PLAN OF CARE
Problem: Respiratory - Adult  Goal: Achieves optimal ventilation and oxygenation  6/16/2022 1040 by Kahlil Crow RCP  Outcome: Not Progressing     Problem: Pain  Goal: Verbalizes/displays adequate comfort level or baseline comfort level  Outcome: Progressing     Problem: Skin/Tissue Integrity  Goal: Absence of new skin breakdown  Description: 1. Monitor for areas of redness and/or skin breakdown  2. Assess vascular access sites hourly  3. Every 4-6 hours minimum:  Change oxygen saturation probe site  4. Every 4-6 hours:  If on nasal continuous positive airway pressure, respiratory therapy assess nares and determine need for appliance change or resting period.   Outcome: Progressing     Problem: Safety - Adult  Goal: Free from fall injury  Outcome: Progressing     Problem: Skin/Tissue Integrity - Adult  Goal: Incisions, wounds, or drain sites healing without S/S of infection  Outcome: Progressing     Problem: Hematologic - Adult  Goal: Maintains hematologic stability  Outcome: Progressing

## 2022-06-16 NOTE — PLAN OF CARE
Problem: Respiratory - Adult  Goal: Achieves optimal ventilation and oxygenation  Outcome: Not Progressing     Problem: Neurosensory - Adult  Goal: Achieves stable or improved neurological status  Outcome: Not Progressing     Problem: Neurosensory - Adult  Goal: Absence of seizures  Outcome: Progressing     Problem: Neurosensory - Adult  Goal: Remains free of injury related to seizures activity  Outcome: Progressing     Problem: Cardiovascular - Adult  Goal: Maintains optimal cardiac output and hemodynamic stability  Outcome: Progressing     Problem: Gastrointestinal - Adult  Goal: Minimal or absence of nausea and vomiting  Outcome: Progressing     Problem: Gastrointestinal - Adult  Goal: Maintains adequate nutritional intake  Outcome: Progressing     Problem: Genitourinary - Adult  Goal: Absence of urinary retention  Outcome: Progressing     Problem: Metabolic/Fluid and Electrolytes - Adult  Goal: Electrolytes maintained within normal limits  Outcome: Not Progressing     Problem: Metabolic/Fluid and Electrolytes - Adult  Goal: Hemodynamic stability and optimal renal function maintained  Outcome: Not Progressing     Problem: Skin/Tissue Integrity - Adult  Goal: Incisions, wounds, or drain sites healing without S/S of infection  Outcome: Progressing     Problem: Skin/Tissue Integrity - Adult  Goal: Oral mucous membranes remain intact  Outcome: Progressing     Problem: Hematologic - Adult  Goal: Maintains hematologic stability  Outcome: Progressing

## 2022-06-16 NOTE — PLAN OF CARE
Problem: Respiratory - Adult  Goal: Achieves optimal ventilation and oxygenation  6/16/2022 1040 by Cody [address] RCNESTOR  Outcome: Not Progressing  6/16/2022 0347 by Nii Dill [facility]  Outcome: Not Progressing  6/16/2022 0132 by Macrina Diane RN  Outcome: Not Progressing

## 2022-06-16 NOTE — CARE COORDINATION
6/16:  Update CM Note:  Pt presented to the ER for SOB from [facility].  Pt was intubated & transferred to MICU.  Pt remains intubated on Iv Zosyn, Iv Solu-cortef, Iv Keppra & Iv Protonix. Neurology is following.  Pt is ISO for Contact/MRSA. PT is a DNRCCA. Per note family is discussing goals of care. Pt is a long term resident at [facility]. Pt is a bed hold & can return once medically stable.  Will need a Covid test done on day of d/c.  Return envelope is done in in chart.  Select following.  Sw/CM will continue to follow for dc planning.  Electronically signed by Arias Sandra RN on 6/16/2022 at 2:02 PM

## 2022-06-16 NOTE — PROGRESS NOTES
Attempted this morning and now on a PSV trial with no luck. Patient kept going apeneic and rr was 4 and minute ventilation below 150.

## 2022-06-16 NOTE — PROGRESS NOTES
auscultation bilaterally. Cardiovascular: Regular rate rhythm, normal S1-S2  Abdomen: Soft, nontender, nondistended  Musculoskeletal: No clubbing, cyanosis, no bilateral lower extremity edema. Brisk capillary refill. Skin:  No rashes  on visible skin  Neurologic:  Not following commands, withdraws to painful stimuli in all 4 extremities    ASSESSMENT:    1. Acute hypoxic respiratory failure-2/2 ? aspiration pneumonia. S/p intubation on admission. Remains intubated with FiO2 40%. Apneic with SBT again this am.  2. Possible aspiration pneumonia-s/p Zosyn and vancomycin course. Respiratory culture obtained yesterday growing GNR. 3. Hemodynamic shock-  likely hypovolemic vs distributive vs cardiogenic vs adrenal insufficiency (per ICU). Levophed off this am. Remains on midodrine. 4. Lactic acidosis- resolved s/p IVF boluses  5. Adrenal insufficiency-endocrine following, on Solu-Cortef- changed to hydrocortisone  6. Down syndrome  7. Hypothyroidism-for continued supplementation  8. Hx of hydrocephalus s/p  shunt-with ventriculomegaly on most recent imaging s/p reprogramming of shunt valve on 6/13 per neurosurgery  9. Seizures -neurology following, EEG 6/6 with polymorphic slowing suggestive of epileptiform foci in the right frontal lobe versus structural abnormality.  Diffuse slowing also noted consistent with moderate encephalopathy. MRI of the brain with no acute findings. No further seizure activity after increasing Keppra and clonazepam  10. 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 Ophthalmology saw patient and n intervention required. 11. Normocytic anemia-likely 2/2 phlebotomy  12. Dysphagia with PEG in situ   13. Alzheimer's dementia-on Aricept  14. Hx of DVT s/p IVC filter     15.  History of subdural hematoma s/p bur hole evacuation (4/2019)        PLAN:     Per ICU team      Medications:  REVIEWED DAILY    Infusion Medications    sodium chloride       Scheduled Medications    midodrine  10 mg Oral TID     hydrocortisone sodium succinate PF  50 mg IntraVENous Daily    lidocaine  5 mL IntraDERmal Once    sodium chloride flush  5-40 mL IntraVENous 2 times per day    heparin flush  1 mL IntraVENous 2 times per day    [START ON 6/18/2022] hydrocortisone  10 mg Oral BID    acetylcysteine  4 mL Inhalation BID    potassium bicarb-citric acid  40 mEq Oral Daily    [Held by provider] clonazePAM  0.5 mg Oral Q8H    miconazole nitrate   Topical BID    miconazole   Topical BID    levETIRAcetam  1,000 mg IntraVENous Q12H    Or    levETIRAcetam  1,000 mg Oral Q12H    enoxaparin  40 mg SubCUTAneous Daily    pantoprazole (PROTONIX) 40 mg injection  40 mg IntraVENous Q12H    vitamin D  2,000 Units Oral Daily    donepezil  5 mg Oral Nightly    levothyroxine  88 mcg Oral Daily    risperiDONE  0.25 mg Oral BID    budesonide  0.5 mg Nebulization BID    ipratropium-albuterol  3 mL Inhalation Q4H    chlorhexidine  15 mL Mouth/Throat BID     PRN Meds: white petrolatum, sodium chloride flush, sodium chloride, heparin flush, miconazole nitrate **AND** miconazole nitrate, perflutren lipid microspheres, promethazine **OR** ondansetron, polyethylene glycol, acetaminophen **OR** acetaminophen    Labs:     Recent Labs     06/14/22 0448 06/15/22  0458 06/16/22 0442   WBC 6.6 6.0 4.8   HGB 10.0* 10.4* 10.1*   HCT 31.7* 32.9* 31.9*    199 209       Recent Labs     06/14/22 0448 06/15/22  0458 06/16/22 0442    137 137   K 3.7 4.0 3.7    102 105   CO2 24 24 26   BUN 9 11 8   CREATININE 0.5 0.5 0.5   CALCIUM 7.8* 8.1* 7.8*       Recent Labs     06/14/22 0448 06/15/22  0458 06/16/22  0442   PROT 5.6* 5.8* 5.3*   ALKPHOS 66 70 66   ALT 10 11 11   AST 9 9 13   BILITOT 0.3 0.3 0.2       No results for input(s): INR in the last 72 hours. No results for input(s): Ranjana Basques in the last 72 hours.     Chronic labs:    Lab Results   Component Value Date    CHOL 173 03/30/2022    TRIG 88 03/30/2022    HDL 62 03/30/2022    LDLCALC 93 03/30/2022    TSH 10.330 (H) 06/05/2022    INR 1.0 06/05/2022    LABA1C 5.2 03/30/2022       Radiology: REVIEWED DAILY    +++++++++++++++++++++++++++++++++++++++++++++++++  BOGDAN Richardson CNP  [facility] Physician - Hospitalist  [address], [address]  +++++++++++++++++++++++++++++++++++++++++++++++++  NOTE: This report was transcribed using voice recognition software. Every effort was made to ensure accuracy; however, inadvertent computerized transcription errors may be present.

## 2022-06-16 NOTE — PROGRESS NOTES
This patient is on medication that requires renal, weight, and/or indication dose adjustment. Date Body Weight IBW  Adjusted BW SCr  CrCl Dialysis status   6/16/2022 127 lb 9.6 oz (57.9 kg) Ideal body weight: 47.1 kg (103 lb 14.5 oz)  Adjusted ideal body weight: 51.4 kg (113 lb 6.2 oz) Serum creatinine: 0.5 mg/dL 06/16/22 0442  Estimated creatinine clearance: 102 mL/min N/a       Pharmacy has dose-adjusted the following medication(s):    Date Previous Order Adjusted Order   6/16/2022 Zosyn 3375 q8h Zosyn 4500 q8h       These changes were made per protocol according to the [facility] Clinical Guidance for Pharmacists. *Please note this dose may need readjusted if patient's condition changes. Please contact pharmacy with any questions regarding these changes.     Eileen Darby, [facility]  6/16/2022  1:54 PM

## 2022-06-17 ENCOUNTER — APPOINTMENT (OUTPATIENT)
Dept: GENERAL RADIOLOGY | Age: 57
DRG: 870 | End: 2022-06-17
Payer: MEDICARE

## 2022-06-17 LAB
AADO2: 112.9 MMHG
ALBUMIN SERPL-MCNC: 2.2 G/DL (ref 3.5–5.2)
ALP BLD-CCNC: 94 U/L (ref 35–104)
ALT SERPL-CCNC: 28 U/L (ref 0–32)
ANION GAP SERPL CALCULATED.3IONS-SCNC: 11 MMOL/L (ref 7–16)
AST SERPL-CCNC: 33 U/L (ref 0–31)
B.E.: 3.7 MMOL/L (ref -3–3)
BASOPHILS ABSOLUTE: 0.02 E9/L (ref 0–0.2)
BASOPHILS RELATIVE PERCENT: 0.4 % (ref 0–2)
BILIRUB SERPL-MCNC: 0.3 MG/DL (ref 0–1.2)
BUN BLDV-MCNC: 11 MG/DL (ref 6–20)
CALCIUM SERPL-MCNC: 8.2 MG/DL (ref 8.6–10.2)
CHLORIDE BLD-SCNC: 102 MMOL/L (ref 98–107)
CO2: 25 MMOL/L (ref 22–29)
COHB: 0.7 % (ref 0–1.5)
CREAT SERPL-MCNC: 0.5 MG/DL (ref 0.5–1)
CRITICAL: ABNORMAL
CULTURE, RESPIRATORY: ABNORMAL
CULTURE, RESPIRATORY: ABNORMAL
DATE ANALYZED: ABNORMAL
DATE OF COLLECTION: ABNORMAL
EOSINOPHILS ABSOLUTE: 0.13 E9/L (ref 0.05–0.5)
EOSINOPHILS RELATIVE PERCENT: 2.7 % (ref 0–6)
FIO2: 40 %
GFR AFRICAN AMERICAN: >60
GFR NON-AFRICAN AMERICAN: >60 ML/MIN/1.73
GLUCOSE BLD-MCNC: 106 MG/DL (ref 74–99)
HCO3: 26.9 MMOL/L (ref 22–26)
HCT VFR BLD CALC: 33.4 % (ref 34–48)
HEMOGLOBIN: 10.4 G/DL (ref 11.5–15.5)
HHB: 1.5 % (ref 0–5)
IMMATURE GRANULOCYTES #: 0.04 E9/L
IMMATURE GRANULOCYTES %: 0.8 % (ref 0–5)
LAB: ABNORMAL
LYMPHOCYTES ABSOLUTE: 1 E9/L (ref 1.5–4)
LYMPHOCYTES RELATIVE PERCENT: 21 % (ref 20–42)
Lab: ABNORMAL
MCH RBC QN AUTO: 30.3 PG (ref 26–35)
MCHC RBC AUTO-ENTMCNC: 31.1 % (ref 32–34.5)
MCV RBC AUTO: 97.4 FL (ref 80–99.9)
METER GLUCOSE: 108 MG/DL (ref 74–99)
METER GLUCOSE: 112 MG/DL (ref 74–99)
METER GLUCOSE: 119 MG/DL (ref 74–99)
METHB: 0.4 % (ref 0–1.5)
MODE: AC
MONOCYTES ABSOLUTE: 0.65 E9/L (ref 0.1–0.95)
MONOCYTES RELATIVE PERCENT: 13.7 % (ref 2–12)
NEUTROPHILS ABSOLUTE: 2.92 E9/L (ref 1.8–7.3)
NEUTROPHILS RELATIVE PERCENT: 61.4 % (ref 43–80)
O2 SATURATION: 98.6 % (ref 92–98.5)
O2HB: 97.4 % (ref 94–97)
OPERATOR ID: 2245
ORGANISM: ABNORMAL
PATIENT TEMP: 37 C
PCO2: 36 MMHG (ref 35–45)
PDW BLD-RTO: 16.1 FL (ref 11.5–15)
PEEP/CPAP: 5 CMH2O
PFO2: 3.02 MMHG/%
PH BLOOD GAS: 7.49 (ref 7.35–7.45)
PLATELET # BLD: 214 E9/L (ref 130–450)
PMV BLD AUTO: 9.9 FL (ref 7–12)
PO2: 120.9 MMHG (ref 75–100)
POTASSIUM REFLEX MAGNESIUM: 4.1 MMOL/L (ref 3.5–5)
RBC # BLD: 3.43 E12/L (ref 3.5–5.5)
RI(T): 0.93
RR MECHANICAL: 12 B/MIN
SMEAR, RESPIRATORY: ABNORMAL
SODIUM BLD-SCNC: 138 MMOL/L (ref 132–146)
SOURCE, BLOOD GAS: ABNORMAL
THB: 11.7 G/DL (ref 11.5–16.5)
TIME ANALYZED: 539
TOTAL PROTEIN: 5.9 G/DL (ref 6.4–8.3)
VT MECHANICAL: 300 ML
WBC # BLD: 4.8 E9/L (ref 4.5–11.5)

## 2022-06-17 PROCEDURE — 6360000002 HC RX W HCPCS: Performed by: INTERNAL MEDICINE

## 2022-06-17 PROCEDURE — 6370000000 HC RX 637 (ALT 250 FOR IP): Performed by: FAMILY MEDICINE

## 2022-06-17 PROCEDURE — 85025 COMPLETE CBC W/AUTO DIFF WBC: CPT

## 2022-06-17 PROCEDURE — 2580000003 HC RX 258: Performed by: STUDENT IN AN ORGANIZED HEALTH CARE EDUCATION/TRAINING PROGRAM

## 2022-06-17 PROCEDURE — 2500000003 HC RX 250 WO HCPCS: Performed by: INTERNAL MEDICINE

## 2022-06-17 PROCEDURE — 6360000002 HC RX W HCPCS: Performed by: STUDENT IN AN ORGANIZED HEALTH CARE EDUCATION/TRAINING PROGRAM

## 2022-06-17 PROCEDURE — 6370000000 HC RX 637 (ALT 250 FOR IP): Performed by: STUDENT IN AN ORGANIZED HEALTH CARE EDUCATION/TRAINING PROGRAM

## 2022-06-17 PROCEDURE — 82805 BLOOD GASES W/O2 SATURATION: CPT

## 2022-06-17 PROCEDURE — 94003 VENT MGMT INPAT SUBQ DAY: CPT

## 2022-06-17 PROCEDURE — 6370000000 HC RX 637 (ALT 250 FOR IP): Performed by: INTERNAL MEDICINE

## 2022-06-17 PROCEDURE — 2580000003 HC RX 258: Performed by: INTERNAL MEDICINE

## 2022-06-17 PROCEDURE — 82962 GLUCOSE BLOOD TEST: CPT

## 2022-06-17 PROCEDURE — 99233 SBSQ HOSP IP/OBS HIGH 50: CPT | Performed by: INTERNAL MEDICINE

## 2022-06-17 PROCEDURE — 94640 AIRWAY INHALATION TREATMENT: CPT

## 2022-06-17 PROCEDURE — 71045 X-RAY EXAM CHEST 1 VIEW: CPT

## 2022-06-17 PROCEDURE — 80053 COMPREHEN METABOLIC PANEL: CPT

## 2022-06-17 PROCEDURE — C9113 INJ PANTOPRAZOLE SODIUM, VIA: HCPCS | Performed by: INTERNAL MEDICINE

## 2022-06-17 PROCEDURE — 2700000000 HC OXYGEN THERAPY PER DAY

## 2022-06-17 PROCEDURE — 6370000000 HC RX 637 (ALT 250 FOR IP): Performed by: PHYSICIAN ASSISTANT

## 2022-06-17 PROCEDURE — 2000000000 HC ICU R&B

## 2022-06-17 RX ORDER — LORAZEPAM 2 MG/ML
0.5 INJECTION INTRAMUSCULAR
Status: DISCONTINUED | OUTPATIENT
Start: 2022-06-17 | End: 2022-06-18 | Stop reason: HOSPADM

## 2022-06-17 RX ORDER — MORPHINE SULFATE 2 MG/ML
2 INJECTION, SOLUTION INTRAMUSCULAR; INTRAVENOUS
Status: DISCONTINUED | OUTPATIENT
Start: 2022-06-17 | End: 2022-06-18 | Stop reason: HOSPADM

## 2022-06-17 RX ORDER — GLYCOPYRROLATE 0.2 MG/ML
0.2 INJECTION INTRAMUSCULAR; INTRAVENOUS EVERY 4 HOURS PRN
Status: DISCONTINUED | OUTPATIENT
Start: 2022-06-17 | End: 2022-06-18 | Stop reason: HOSPADM

## 2022-06-17 RX ADMIN — LEVOTHYROXINE SODIUM 88 MCG: 0.09 TABLET ORAL at 08:11

## 2022-06-17 RX ADMIN — POTASSIUM BICARBONATE 40 MEQ: 782 TABLET, EFFERVESCENT ORAL at 08:11

## 2022-06-17 RX ADMIN — IPRATROPIUM BROMIDE AND ALBUTEROL SULFATE 3 ML: .5; 2.5 SOLUTION RESPIRATORY (INHALATION) at 08:02

## 2022-06-17 RX ADMIN — IPRATROPIUM BROMIDE AND ALBUTEROL SULFATE 3 ML: .5; 2.5 SOLUTION RESPIRATORY (INHALATION) at 03:30

## 2022-06-17 RX ADMIN — MORPHINE SULFATE 2 MG: 2 INJECTION, SOLUTION INTRAMUSCULAR; INTRAVENOUS at 16:37

## 2022-06-17 RX ADMIN — MIDODRINE HYDROCHLORIDE 10 MG: 10 TABLET ORAL at 12:14

## 2022-06-17 RX ADMIN — SODIUM CHLORIDE, PRESERVATIVE FREE 40 MG: 5 INJECTION INTRAVENOUS at 08:09

## 2022-06-17 RX ADMIN — IPRATROPIUM BROMIDE AND ALBUTEROL SULFATE 3 ML: .5; 2.5 SOLUTION RESPIRATORY (INHALATION) at 00:05

## 2022-06-17 RX ADMIN — PIPERACILLIN AND TAZOBACTAM 4500 MG: 4; .5 INJECTION, POWDER, FOR SOLUTION INTRAVENOUS at 05:00

## 2022-06-17 RX ADMIN — LORAZEPAM 0.5 MG: 2 INJECTION INTRAMUSCULAR; INTRAVENOUS at 16:37

## 2022-06-17 RX ADMIN — RISPERIDONE 0.25 MG: 0.25 TABLET ORAL at 08:11

## 2022-06-17 RX ADMIN — Medication: at 08:08

## 2022-06-17 RX ADMIN — PIPERACILLIN AND TAZOBACTAM 4500 MG: 4; .5 INJECTION, POWDER, FOR SOLUTION INTRAVENOUS at 13:49

## 2022-06-17 RX ADMIN — ACETYLCYSTEINE 400 MG: 100 SOLUTION ORAL; RESPIRATORY (INHALATION) at 08:02

## 2022-06-17 RX ADMIN — LORAZEPAM 0.5 MG: 2 INJECTION INTRAMUSCULAR; INTRAVENOUS at 21:04

## 2022-06-17 RX ADMIN — CHLORHEXIDINE GLUCONATE 15 ML: 1.2 RINSE ORAL at 08:11

## 2022-06-17 RX ADMIN — HYDROCORTISONE SODIUM SUCCINATE 50 MG: 100 INJECTION, POWDER, FOR SOLUTION INTRAMUSCULAR; INTRAVENOUS at 08:10

## 2022-06-17 RX ADMIN — MORPHINE SULFATE 2 MG: 2 INJECTION, SOLUTION INTRAMUSCULAR; INTRAVENOUS at 21:04

## 2022-06-17 RX ADMIN — ENOXAPARIN SODIUM 40 MG: 100 INJECTION SUBCUTANEOUS at 08:09

## 2022-06-17 RX ADMIN — PETROLATUM: 420 OINTMENT TOPICAL at 08:09

## 2022-06-17 RX ADMIN — Medication 2000 UNITS: at 08:15

## 2022-06-17 RX ADMIN — BUDESONIDE 500 MCG: 0.5 SUSPENSION RESPIRATORY (INHALATION) at 08:02

## 2022-06-17 RX ADMIN — GLYCOPYRROLATE 0.2 MG: 0.2 INJECTION INTRAMUSCULAR; INTRAVENOUS at 16:37

## 2022-06-17 RX ADMIN — LEVETIRACETAM 1000 MG: 500 TABLET, FILM COATED ORAL at 12:14

## 2022-06-17 RX ADMIN — IPRATROPIUM BROMIDE AND ALBUTEROL SULFATE 3 ML: .5; 2.5 SOLUTION RESPIRATORY (INHALATION) at 12:00

## 2022-06-17 RX ADMIN — MIDODRINE HYDROCHLORIDE 10 MG: 10 TABLET ORAL at 08:10

## 2022-06-17 RX ADMIN — MICONAZOLE NITRATE: 20.6 POWDER TOPICAL at 08:08

## 2022-06-17 RX ADMIN — LEVETIRACETAM 1000 MG: 500 TABLET, FILM COATED ORAL at 00:07

## 2022-06-17 RX ADMIN — MORPHINE SULFATE 2 MG: 2 INJECTION, SOLUTION INTRAMUSCULAR; INTRAVENOUS at 17:15

## 2022-06-17 ASSESSMENT — PULMONARY FUNCTION TESTS
PIF_VALUE: 21
PIF_VALUE: 27
PIF_VALUE: 16
PIF_VALUE: 19
PIF_VALUE: 23
PIF_VALUE: 20
PIF_VALUE: 22
PIF_VALUE: 20
PIF_VALUE: 16
PIF_VALUE: 18
PIF_VALUE: 16
PIF_VALUE: 23
PIF_VALUE: 20
PIF_VALUE: 18
PIF_VALUE: 16
PIF_VALUE: 20
PIF_VALUE: 21
PIF_VALUE: 20
PIF_VALUE: 19
PIF_VALUE: 24
PIF_VALUE: 19

## 2022-06-17 ASSESSMENT — PAIN SCALES - GENERAL
PAINLEVEL_OUTOF10: 0

## 2022-06-17 NOTE — CARE COORDINATION
6/17:  Update CM Note:  Pt presented to the ER for SOB from [facility].  Pt was intubated & transferred to MICU.  Pt remains intubated on Iv Zosyn, Iv Keppra & Iv Protonix. Neurology is following.  Pt is ISO for Contact/MRSA. Pt is a DNRCCA. Per notes pt's family want to compassionate withdrawal care today at 4pm.  Sw/CM will continue to follow.  Electronically signed by Yash Sher RN on 6/17/2022 at 9:25 AM

## 2022-06-17 NOTE — PLAN OF CARE
Problem: Pain  Goal: Verbalizes/displays adequate comfort level or baseline comfort level  6/17/2022 0510 by Maribel Roche RN  Outcome: Progressing     Problem: Skin/Tissue Integrity  Goal: Absence of new skin breakdown  6/17/2022 0510 by Maribel Roche RN  Outcome: Progressing     Problem: Safety - Adult  Goal: Free from fall injury  6/17/2022 0510 by Maribel Roche RN  Outcome: Progressing     Problem: Nutrition Deficit:  Goal: Optimize nutritional status  Outcome: Progressing

## 2022-06-17 NOTE — PROGRESS NOTES
Hospitalist Progress Note      SYNOPSIS: Sharlene Early is a 65year old female with a PMH of Down syndrome and hydrocephalus s/p  shunt, chronic subdural hematoma, Alzheimer's dementia, seizures and hypothyroidism. She was recently admitted in May 2022 with seizures 2/2 subdural hematoma. She presented to the ER on 2022 with shortness of breath. She was found to be hypoxic and required intubation. She was admitted to ICU with diagnosis of acute hypoxic respiratory failure 2/2 possible aspiration pneumonia, hemodynamic shock, acute metabolic encephalopathy and adrenal insufficiency. Neurology was consulted and an EEG was performed on  which revealed epileptiform foci in the right frontal lobe and her Keppra was increased. Neurosurgery was consulted for subacute on chronic subdural hematoma. Intervention was planned. An MRI was performed on  which revealed ventriculomegaly and thinning of the posterior sclera of the left globe along the posterior aspect concerning for staphyloma. Opthamology was consulted. Neurosurgery reprogrammed the valve for the  shunt on . SUBJECTIVE:  Records reviewed. Patient remains intubated but not sedated. Unfortunately the patient has been off of sedation for several days however has had no purposeful movement. Family would like to withdraw care today.       Temp (24hrs), Av.6 °F (37 °C), Min:98.5 °F (36.9 °C), Max:98.7 °F (37.1 °C)    DIET: Diet NPO  ADULT TUBE FEEDING; PEG; Standard with Fiber; Continuous; 20; Yes; 10; Q 4 hours; 45; 150; Q 4 hours  CODE: [id number]    Intake/Output Summary (Last 24 hours) at 2022 1007  Last data filed at 2022 0800  Gross per 24 hour   Intake 1862.6 ml   Output 1210 ml   Net 652.6 ml       OBJECTIVE:    BP 88/66   Pulse 76   Temp 98.7 °F (37.1 °C)   Resp 16   Ht 4' 10\" (1.473 m)   Wt 127 lb 9.6 oz (57.9 kg)   SpO2 100%   BMI 26.68 kg/m²     General appearance:  Intubated, not sedated  HEENT: Conjunctivae/corneas clear. Down syndrome facial features   Neck: Supple. No jugular venous distention. Respiratory: Oral ETT. Clear to auscultation bilaterally. Cardiovascular: Regular rate rhythm, normal S1-S2  Abdomen: Soft, nontender, nondistended  Musculoskeletal: No clubbing, cyanosis, no bilateral lower extremity edema. Brisk capillary refill. Skin:  No rashes  on visible skin  Neurologic:  Not following commands, withdraws to painful stimuli in all 4 extremities    ASSESSMENT:    1. Acute hypoxic respiratory failure-2/2 ? aspiration pneumonia. S/p intubation on admission. Remains intubated with FiO2 40%. Apneic with SBTs. 2. VAP- Respiratory culture obtained 6/15 growing klebsiella, on zosyn  3. Hemodynamic shock-  likely hypovolemic vs distributive vs cardiogenic vs adrenal insufficiency (per ICU). Remains on midodrine. 4. Lactic acidosis- resolved s/p IVF boluses  5. Adrenal insufficiency-endocrine following, on Solu-Cortef- changed to hydrocortisone  6. Down syndrome  7. Hypothyroidism-for continued supplementation  8. Hx of hydrocephalus s/p  shunt-with ventriculomegaly on most recent imaging s/p reprogramming of shunt valve on 6/13 per neurosurgery  9. Seizures -neurology following, EEG 6/6 with polymorphic slowing suggestive of epileptiform foci in the right frontal lobe versus structural abnormality.  Diffuse slowing also noted consistent with moderate encephalopathy. MRI of the brain with no acute findings. No further seizure activity after increasing Keppra and clonazepam  10. 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 Ophthalmology saw patient and n intervention required. 11. Normocytic anemia-likely 2/2 phlebotomy  12. Dysphagia with PEG in situ   13. Alzheimer's dementia-on Aricept  14. Hx of DVT s/p IVC filter     15.  History of subdural hematoma s/p bur hole evacuation (4/2019)        PLAN:     Per ICU team  Family choosing compassionate withdrawal of care today Medications:  REVIEWED DAILY    Infusion Medications    sodium chloride       Scheduled Medications    piperacillin-tazobactam  4,500 mg IntraVENous Q8H    midodrine  10 mg Oral TID WC    lidocaine  5 mL IntraDERmal Once    sodium chloride flush  5-40 mL IntraVENous 2 times per day    heparin flush  1 mL IntraVENous 2 times per day    [START ON 6/18/2022] hydrocortisone  10 mg Oral BID    acetylcysteine  4 mL Inhalation BID    potassium bicarb-citric acid  40 mEq Oral Daily    [Held by provider] clonazePAM  0.5 mg Oral Q8H    miconazole nitrate   Topical BID    miconazole   Topical BID    levETIRAcetam  1,000 mg IntraVENous Q12H    Or    levETIRAcetam  1,000 mg Oral Q12H    enoxaparin  40 mg SubCUTAneous Daily    pantoprazole (PROTONIX) 40 mg injection  40 mg IntraVENous Q12H    vitamin D  2,000 Units Oral Daily    donepezil  5 mg Oral Nightly    levothyroxine  88 mcg Oral Daily    risperiDONE  0.25 mg Oral BID    budesonide  0.5 mg Nebulization BID    ipratropium-albuterol  3 mL Inhalation Q4H    chlorhexidine  15 mL Mouth/Throat BID     PRN Meds: white petrolatum, sodium chloride flush, sodium chloride, heparin flush, miconazole nitrate **AND** miconazole nitrate, perflutren lipid microspheres, promethazine **OR** ondansetron, polyethylene glycol, acetaminophen **OR** acetaminophen    Labs:     Recent Labs     06/15/22  0458 06/16/22  0442 06/17/22  0405   WBC 6.0 4.8 4.8   HGB 10.4* 10.1* 10.4*   HCT 32.9* 31.9* 33.4*    209 214       Recent Labs     06/15/22  0458 06/16/22  0442 06/17/22  0405    137 138   K 4.0 3.7 4.1    105 102   CO2 24 26 25   BUN 11 8 11   CREATININE 0.5 0.5 0.5   CALCIUM 8.1* 7.8* 8.2*       Recent Labs     06/15/22  0458 06/16/22  0442 06/17/22  0405   PROT 5.8* 5.3* 5.9*   ALKPHOS 70 66 94   ALT 11 11 28   AST 9 13 33*   BILITOT 0.3 0.2 0.3       No results for input(s): INR in the last 72 hours.     No results for input(s): CKTOTAL, TROPONINI in the last 72 hours. Chronic labs:    Lab Results   Component Value Date    CHOL 173 03/30/2022    TRIG 88 03/30/2022    HDL 62 03/30/2022    LDLCALC 93 03/30/2022    TSH 10.330 (H) 06/05/2022    INR 1.0 06/05/2022    LABA1C 5.2 03/30/2022       Radiology: REVIEWED DAILY    +++++++++++++++++++++++++++++++++++++++++++++++++  BOGDAN Deal - CNP  [employer] - Hospitalist  [address], [place]  +++++++++++++++++++++++++++++++++++++++++++++++++  NOTE: This report was transcribed using voice recognition software. Every effort was made to ensure accuracy; however, inadvertent computerized transcription errors may be present.

## 2022-06-17 NOTE — PLAN OF CARE
Chart reviewed and spoke with Dr. Lenwood Merlin. Family has opted to make pt [facility]--- compassionate withdrawal of ventilator this afternoon.  Will sign off

## 2022-06-17 NOTE — PLAN OF CARE
Problem: Discharge Planning  Goal: Discharge to home or other facility with appropriate resources  Outcome: Progressing     Problem: Pain  Goal: Verbalizes/displays adequate comfort level or baseline comfort level  Outcome: Progressing     Problem: Respiratory - Adult  Goal: Achieves optimal ventilation and oxygenation  Outcome: Progressing     Problem: Skin/Tissue Integrity  Goal: Absence of new skin breakdown  Description: 1. Monitor for areas of redness and/or skin breakdown  2. Assess vascular access sites hourly  3. Every 4-6 hours minimum:  Change oxygen saturation probe site  4. Every 4-6 hours:  If on nasal continuous positive airway pressure, respiratory therapy assess nares and determine need for appliance change or resting period.   Outcome: Progressing     Problem: ABCDS Injury Assessment  Goal: Absence of physical injury  Outcome: Progressing     Problem: ABCDS Injury Assessment  Goal: Absence of physical injury  Outcome: Progressing     Problem: Safety - Adult  Goal: Free from fall injury  Outcome: Progressing     Problem: Nutrition Deficit:  Goal: Optimize nutritional status  Outcome: Progressing     Problem: Neurosensory - Adult  Goal: Achieves stable or improved neurological status  Outcome: Progressing  Goal: Absence of seizures  Outcome: Progressing  Goal: Remains free of injury related to seizures activity  Outcome: Progressing     Problem: Cardiovascular - Adult  Goal: Maintains optimal cardiac output and hemodynamic stability  Outcome: Progressing     Problem: Gastrointestinal - Adult  Goal: Minimal or absence of nausea and vomiting  Outcome: Progressing  Goal: Maintains adequate nutritional intake  Outcome: Progressing     Problem: Genitourinary - Adult  Goal: Absence of urinary retention  Outcome: Progressing     Problem: Metabolic/Fluid and Electrolytes - Adult  Goal: Electrolytes maintained within normal limits  Outcome: Progressing  Goal: Hemodynamic stability and optimal renal function maintained  Outcome: Progressing     Problem: Skin/Tissue Integrity - Adult  Goal: Incisions, wounds, or drain sites healing without S/S of infection  Outcome: Progressing  Goal: Oral mucous membranes remain intact  Outcome: Progressing     Problem: Hematologic - Adult  Goal: Maintains hematologic stability  Outcome: Progressing

## 2022-06-17 NOTE — PROGRESS NOTES
[address] [facility]   Department of Internal Medicine   Internal Medicine Residency  MICU Progress Note    Patient:  Kelley Park See 65 y.o. female   MRN: [id number]       Date of Service: 2022    Allergy: Patient has no known allergies. Subjective     Overnight, patient did not had any acute event. This morning patient remains intubated. No purposeful movement without sedation. Patient remains on antibiotics Zosyn, respiratory culture is growing Klebsiella. Objective     TEMPERATURE:  Current - Temp: 98.6 °F (37 °C); Max - Temp  Av.6 °F (37 °C)  Min: 98.5 °F (36.9 °C)  Max: 98.7 °F (37.1 °C)  RESPIRATIONS RANGE: Resp  Avg: 15.1  Min: 12  Max: 21  PULSE RANGE: Pulse  Av.8  Min: 62  Max: 94  BLOOD PRESSURE RANGE:  Systolic (47THE), PMO:00 , Min:69 , LAN:179   ; Diastolic (46TTE), DKS:93, Min:37, Max:71    PULSE OXIMETRY RANGE: SpO2  Av.6 %  Min: 93 %  Max: 100 %    I & O - 24hr:    Intake/Output Summary (Last 24 hours) at 2022 1358  Last data filed at 2022 0800  Gross per 24 hour   Intake 1862.6 ml   Output 1210 ml   Net 652.6 ml     I/O last 3 completed shifts: In: 3005.6 [I.V.:60; CI/WF:4693; IV Piggyback:304.6]  Out: 3367 [Urine:2610; Emesis/NG output:20] No intake/output data recorded. Weight change:     Physical Exam  Constitutional:       Comments: patient remain intubated, patient is off sedation, not made or doing any purposeful movement but withdraws to pain in all 4 quadrants. HENT:      Mouth/Throat:      Mouth: Mucous membranes are moist.   Eyes:      Pupils: Pupils are equal, round, and reactive to light. Cardiovascular:      Rate and Rhythm: Normal rate and regular rhythm. Pulses: Normal pulses. Heart sounds: Normal heart sounds. No murmur heard. Pulmonary:      Effort: Pulmonary effort is normal. No respiratory distress. Breath sounds: Normal breath sounds. No wheezing or rhonchi. Abdominal:      General: Abdomen is flat.  Bowel sounds are normal. There is no distension. Palpations: Abdomen is soft. Tenderness: There is no abdominal tenderness. There is no guarding. Musculoskeletal:         General: No swelling or deformity. Normal range of motion. Right lower leg: No edema. Left lower leg: No edema. Skin:     Capillary Refill: Capillary refill takes less than 2 seconds.           Diet:   Diet NPO  ADULT TUBE FEEDING; PEG; Standard with Fiber; Continuous; 20; Yes; 10; Q 4 hours; 45; 150; Q 4 hours    Oxygen:     Vent Information  Ventilator ID: [id number]  Vent Mode: AC/VC  Additional Respiratory Assessments  Heart Rate: 86  Resp: 14  SpO2: 100 %  Position: Semi-Vera's  Humidification Source: Heated wire  Humidification Temp: 37  Circuit Condensation: Drained  Airway Type: ET  Airway Size: 7.5 (19)  Cuff Pressure (cm H2O): 29 cm H2O  Skin Barrier Applied: Yes       [REMOVED] External Urinary Catheter-Output (mL):  (incontinent, pad wet)  External Urinary Catheter-Output (mL): 110 mL  [REMOVED] Urinary Catheter Qvtnh-Wtyimyhouss-Gjkvps (mL): 80 mL    Medications     Continuous Infusions:   sodium chloride       Scheduled Meds:   piperacillin-tazobactam  4,500 mg IntraVENous Q8H    midodrine  10 mg Oral TID WC    lidocaine  5 mL IntraDERmal Once    sodium chloride flush  5-40 mL IntraVENous 2 times per day    heparin flush  1 mL IntraVENous 2 times per day    [START ON 6/18/2022] hydrocortisone  10 mg Oral BID    acetylcysteine  4 mL Inhalation BID    potassium bicarb-citric acid  40 mEq Oral Daily    [Held by provider] clonazePAM  0.5 mg Oral Q8H    miconazole nitrate   Topical BID    miconazole   Topical BID    levETIRAcetam  1,000 mg IntraVENous Q12H    Or    levETIRAcetam  1,000 mg Oral Q12H    enoxaparin  40 mg SubCUTAneous Daily    pantoprazole (PROTONIX) 40 mg injection  40 mg IntraVENous Q12H    vitamin D  2,000 Units Oral Daily    donepezil  5 mg Oral Nightly    levothyroxine  88 mcg Oral Daily    risperiDONE  0.25 mg Oral BID    budesonide  0.5 mg Nebulization BID    ipratropium-albuterol  3 mL Inhalation Q4H    chlorhexidine  15 mL Mouth/Throat BID     PRN Meds: white petrolatum, sodium chloride flush, sodium chloride, heparin flush, miconazole nitrate **AND** miconazole nitrate, perflutren lipid microspheres, promethazine **OR** ondansetron, polyethylene glycol, acetaminophen **OR** acetaminophen  Nutrition:   NG/OG tube TF type: Pulmocare/Nephro/Glucerna/Jevity        At rate: 40 ml/h    Labs and Imaging Studies     ve  CBC:   Recent Labs     06/15/22  0458 06/16/22  0442 06/17/22  0405   WBC 6.0 4.8 4.8   HGB 10.4* 10.1* 10.4*   HCT 32.9* 31.9* 33.4*   MCV 96.8 96.1 97.4    209 214       BMP:    Recent Labs     06/15/22  0458 06/16/22  0442 06/17/22  0405    137 138   K 4.0 3.7 4.1    105 102   CO2 24 26 25   BUN 11 8 11   CREATININE 0.5 0.5 0.5   GLUCOSE 109* 118* 106*       LIVER PROFILE:   Recent Labs     06/15/22  0458 06/16/22  0442 06/17/22  0405   AST 9 13 33*   ALT 11 11 28   BILITOT 0.3 0.2 0.3   ALKPHOS 70 66 94     Imaging Studies:     Echo Complete    Result Date: 6/8/2022  Transthoracic Echocardiography Report (TTE)  Demographics   Patient Name    SEE Shad Starling Gender            Female   Medical Record  [id number]    Room Number       0671  Number   Account #       [de-identified]   Procedure Date    06/08/2022   Corporate ID                Ordering                              Physician   Accession       [id number]  Referring  Number                      Physician   Date of Birth   1965  Sonographer       Kim Gordillo   Age             65 year(s)  Interpreting      [address]                              Physician         Physician Cardiology                                                Monae Zeng MD                               Any Other  Procedure Type of Study   TTE procedure:Echo Complete W/Doppler & Color Flow.   Procedure Date Date: 06/08/2022 Start: 07:37 AM Study Location: Portable Technical Quality: Adequate visualization Indications:Cardiogenic shock. Patient Status: STAT Height: 58 inches Weight: 125 pounds BSA: 1.49 m^2 BMI: 26.12 kg/m^2 BP: 95/47 mmHg  Findings   Left Ventricle  Ejection fraction is visually estimated at 60 to 65%. Right Ventricle  Normal right ventricular size and function. Left Atrium  Normal sized left atrium. Right Atrium  Normal right atrium size. Mitral Valve  Physiologic and/or trace mitral regurgitation is present. Tricuspid Valve  Mild tricuspid regurgitation. Aortic Valve  Individual aortic valve leaflets are not clearly visualized. Pulmonic Valve  Physiologic and/or trace pulmonic regurgitation present. Pericardial Effusion  There is physiologic pericardial effusion without tamponade   Pleural Effusion  Pleural effusion   Conclusions   Summary  Ejection fraction is visually estimated at 60 to 65%. Normal right ventricular size and function. Mild tricuspid regurgitation. Physiologic and/or trace pulmonic regurgitation present.   There is physiologic pericardial effusion without tamponade   Signature   ----------------------------------------------------------------  Electronically signed by Joseph Hoover MD(Interpreting  physician) on 06/08/2022 07:33 PM  ----------------------------------------------------------------  M-Mode/2D Measurements & Calculations   LV Diastolic    LV Systolic Dimension: 2.6   AV Cusp Separation: 1.8 cmLA  Dimension: 4 cm cm                           Dimension: 2.7 cmAO Root  LV FS:35 %      LV Volume Diastolic: 72 ml   Dimension: 2.6 cm  LV PW           LV Volume Systolic: 83.7 ml  Diastolic: 0.7  LV EDV/LV EDV Index: 72  cm              ml/48 ml/m^2LV ESV/LV ESV  Septum          Index: 23.8 ml/16ml/ m^2     RV Diastolic Dimension: 2.4  Diastolic: 0.8  EF Calculated: 66.9 %        cm  cm              LV Mass Index: 58 l/min*m^2   LV Mass: 85.78  g               LVOT: 2 cm  Doppler Measurements & Calculations   MV Peak E-Wave:    AV Peak Velocity: 1.28 m/s      LVOT Peak Velocity: 0.8  1.03 m/s           AV Peak Gradient: 6.57 mmHg     m/s  MV Peak A-Wave:    AV Mean Velocity: 0.8 m/s       LVOT Mean Velocity:  0.61 m/s           AV Mean Gradient: 3.2 mmHg      0.61 m/s  MV E/A Ratio: 1.69 AV VTI: 28.3 cm                 LVOT Peak Gradient: 2.6  MV Peak Gradient:  AV Area (Continuity):2.17 cm^2  mmHgLVOT Mean Gradient:  5.4 mmHg                                           1.6 mmHg  MV Mean Gradient:  LVOT VTI: 19.6 cm  2.1 mmHg           IVRT: 55.4 msec  MV Mean Velocity:  0.66 m/s           Pulm. Vein A Reversal  MV Deceleration    Duration:96.9 msec  Time: 129.7 msec   Pulm. Vein D Velocity:0.59      PV Peak Velocity: 0.82  MV P1/2t: 36.5     m/sPulm. Vein A Reversal        m/s  msec               Velocity:0.42 m/s               PV Peak Gradient: 2.68  MVA by PHT:6.03    Pulm. Vein S Velocity: 0.48 m/s mmHg  cm^2                                               PV Mean Velocity: 0.53  MV Area                                            m/s  (continuity): 2.2                                  PV Mean Gradient: 1.3  cm^2                                               mmHg  MV E' Septal  Velocity: 0.09 m/s  MV E' Lateral  Velocity: 7 m/s  [email or web address] [id number]    CT HEAD WO CONTRAST    Result Date: 6/9/2022  EXAMINATION: CT OF THE HEAD WITHOUT CONTRAST  6/9/2022 9:54 pm TECHNIQUE: CT of the head was performed without the administration of intravenous contrast. Automated exposure control, iterative reconstruction, and/or weight based adjustment of the mA/kV was utilized to reduce the radiation dose to as low as reasonably achievable. COMPARISON: CT head without contrast, 05/31/2022.  HISTORY: ORDERING SYSTEM PROVIDED HISTORY: increased somnolence TECHNOLOGIST PROVIDED HISTORY: Reason for exam:->increased somnolence What reading provider will be dictating this exam?->CRC FINDINGS: BRAIN/VENTRICLES: Mild decrease in the chronic left subdural hemorrhage along the high left parietal convexity, which currently measures approximately 3 mm in maximal width. The hemorrhage along the anterior cerebral convexity has resolved. No acute hemorrhage is seen. Encephalomalacia in the anterior temporal and anterior frontal lobes is likely the result of remote trauma. There is persistent ventriculomegaly, with a right transfrontal ventriculostomy shunt terminating in the left lateral ventricle. ORBITS: The visualized portion of the orbits demonstrate no acute abnormality. SINUSES: The paranasal sinuses are clear. Small bilateral mastoid effusions. SOFT TISSUES/SKULL:  No acute abnormality of the visualized skull or soft tissues. 1.  No acute intracranial abnormality. 2. Trace residual chronic left parietal subdural hemorrhage (measuring 3 mm in maximal width). It has decreased in size as of 05/31/2022. RECOMMENDATIONS: Unavailable     CT HEAD WO CONTRAST    Result Date: 5/31/2022  EXAMINATION: CT OF THE HEAD WITHOUT CONTRAST  5/31/2022 10:35 am TECHNIQUE: CT of the head was performed without the administration of intravenous contrast. Automated exposure control, iterative reconstruction, and/or weight based adjustment of the mA/kV was utilized to reduce the radiation dose to as low as reasonably achievable. COMPARISON: 05/30/2022 HISTORY: ORDERING SYSTEM PROVIDED HISTORY: follow up subdural hematoma TECHNOLOGIST PROVIDED HISTORY: Reason for exam:->follow up subdural hematoma Has a \"code stroke\" or \"stroke alert\" been called? ->No What reading provider will be dictating this exam?->CRC FINDINGS: BRAIN/VENTRICLES: There is again seen small extra-axial collection felt to represent chronic subdural hematoma over the convexity of the left parietal lobe measuring a thickness of 5 mm.   The high density associated with the collection is unchanged. There is hydrocephalus of the lateral to lesser extent 3rd ventricle. 4th ventricle within normal limits. Findings are unchanged. There is a right frontal ventriculostomy shunt catheter in place unchanged. However, the subarachnoid spaces are also prominent suggesting parenchymal volume loss. There is bifrontal lucency as well as lucency at the temporal lobes likely related to prior contusion. There is no new CT evidence for intra-axial or extra-axial fluid collection, no mass, nor hematoma. ORBITS: The visualized portion of the orbits demonstrate no acute abnormality. SINUSES: The visualized paranasal sinuses and mastoid air cells demonstrate no acute abnormality. SOFT TISSUES/SKULL:  No acute abnormality of the visualized skull or soft tissues. There is again seen predominantly chronic small subdural hematoma over the convexity of the left parietal lobe. This measures a thickness of 4.6 mm which is unchanged. Tiny focus of high density within the collection could represent more acute blood which is also unchanged. No significant mass effect. Ventriculomegaly and ventriculostomy shunt catheter again noted. Lucency related to chronic encephalomalacia and perhaps prior contusion at the anterior frontal lobes and temporal lobes is unchanged. CT Head WO Contrast    Result Date: 5/30/2022  EXAMINATION: CT OF THE HEAD WITHOUT CONTRAST  5/30/2022 9:06 pm TECHNIQUE: CT of the head was performed without the administration of intravenous contrast. Automated exposure control, iterative reconstruction, and/or weight based adjustment of the mA/kV was utilized to reduce the radiation dose to as low as reasonably achievable. COMPARISON: May 4, 2022 HISTORY: ORDERING SYSTEM PROVIDED HISTORY: AMS, hypoxia TECHNOLOGIST PROVIDED HISTORY: Reason for exam:->AMS, hypoxia Has a \"code stroke\" or \"stroke alert\" been called? ->No Decision Support Exception - unselect if not a suspected or confirmed emergency medical condition->Emergency Medical Condition (MA) What reading provider will be dictating this exam?->CRC FINDINGS: Small focus of increased attenuation involving left parietal subdural space. Redemonstration of extra-axial, hypoattenuating collection overlying left parietal lobe. Stable position of right sided ventricular shunt catheter with distal tip abutting septum pellucidum. Stable areas of encephalomalacia involving inferior frontal lobes and stable area of encephalomalacia involving bilateral temporal lobes stable area of encephalomalacia involving right frontal white matter adjacent to ventricular shunt. Basilar cisterns are patent. Paranasal sinuses and mastoid air cells are clear. 1. Redemonstration of hypoattenuating extra-axial collection overlying left parietal lobe related to chronic subdural hygroma or chronic subdural hematoma. Small focus of increased attenuation within this collection could suggest small acute on chronic subdural hematoma. Results were called by Dr. Orly Dyer to Dr. Roro Damian on 5/30/2022 at 21:57. 2. Stable areas of encephalomalacia involving bilateral frontal lobes and bilateral temporal lobes. 3. Stable position of right frontal ventricular shunt catheter. 4. Stable moderate ventriculomegaly. XR CHEST PORTABLE    Result Date: 6/15/2022  EXAMINATION: ONE XRAY VIEW OF THE CHEST 6/15/2022 7:27 am COMPARISON: 06/14/2022 HISTORY: ORDERING SYSTEM PROVIDED HISTORY: pna and intubated TECHNOLOGIST PROVIDED HISTORY: Reason for exam:->pna and intubated What reading provider will be dictating this exam?->CRC FINDINGS: Heart size is normal.  There is left lower lobe infiltrate. There is a catheter projecting in the base of the left hemithorax/upper abdomen. ET tube and right central line  are appropriate. Right lung is clear. No interval change. There is left lower lobe infiltrate.      XR CHEST PORTABLE    Result Date: 6/14/2022  EXAMINATION: ONE XRAY VIEW OF THE CHEST 6/14/2022 7:29 am COMPARISON: 13 June 2022 HISTORY: ORDERING SYSTEM PROVIDED HISTORY: pna and intubated TECHNOLOGIST PROVIDED HISTORY: Reason for exam:->pna and intubated What reading provider will be dictating this exam?->CRC FINDINGS: Single AP upright portable chest demonstrates right IJ catheter and endotracheal tube remaining in satisfactory position. There is stable left lower lobe atelectasis/infiltrate with mild blunting of the costophrenic angles. There is no evidence of a pneumothorax. No significant change over the past 23 hours. XR CHEST PORTABLE    Result Date: 6/13/2022  EXAMINATION: ONE XRAY VIEW OF THE CHEST 6/13/2022 7:04 am COMPARISON: 12 June 2022 HISTORY: ORDERING SYSTEM PROVIDED HISTORY: pna and intubated TECHNOLOGIST PROVIDED HISTORY: Reason for exam:->pna and intubated What reading provider will be dictating this exam?->CRC FINDINGS: Unchanged support lines. Stable left lower lobe infiltrate and or atelectasis. No new abnormal findings. Unchanged left lower lobe infiltrate and or atelectasis. XR CHEST PORTABLE    Result Date: 6/12/2022  EXAMINATION: ONE XRAY VIEW OF THE CHEST 6/12/2022 7:33 am COMPARISON: 06/11/2022 HISTORY: ORDERING SYSTEM PROVIDED HISTORY: pna and intubated TECHNOLOGIST PROVIDED HISTORY: Reason for exam:->pna and intubated What reading provider will be dictating this exam?->CRC FINDINGS: Medial left lower lobe atelectasis and or pneumonia is present. Heart appears normal in size. Right lung is clear. Endotracheal tube tip is 2 cm above the louann. Right IJ central venous catheter tip is in the inferior SVC, above the right atrium in good position. No complications.  shunt tube catheter passes over the right chest.  Osseous structures are stable. 1.  Medial left lower lobe atelectasis or pneumonia. No interval change. 2.  No evidence of pneumothorax. 3.  Endotracheal tube tip is approximately 2 cm above the louann. XR CHEST PORTABLE    Result Date: 6/11/2022  EXAMINATION: ONE XRAY VIEW OF THE CHEST 6/11/2022 6:35 pm COMPARISON: 06/11/2022 at 0732 HISTORY: [facility]: ET tube readjustment TECHNOLOGIST PROVIDED HISTORY: Reason for exam:->ET tube readjustment What reading provider will be dictating this exam?->CRC FINDINGS: Endotracheal tube is identified and appears to have been advanced slightly and is now approximately 14 mm from the louann. Right internal jugular central venous line is stable. Appearance of the lungs are stable pleural spaces are clear. Heart is normal in size. Endotracheal tube with the tip less than 1.5 cm from the louann. Recommend retraction 2 cm. The findings were sent to the Radiology Results [address] at 7:33 pm on 6/11/2022 to be communicated to a licensed caregiver. XR CHEST PORTABLE    Result Date: 6/11/2022  EXAMINATION: ONE XRAY VIEW OF THE CHEST 6/11/2022 7:42 am COMPARISON: 06/09/2022, 06/10/2022 HISTORY: ORDERING SYSTEM PROVIDED HISTORY: pna and intubated TECHNOLOGIST PROVIDED HISTORY: Reason for exam:->pna and intubated What reading provider will be dictating this exam?->CRC FINDINGS: Unchanged minimal opacity in the retrocardiac region. This may represent atelectasis or pneumonia. There is associated poor definition of the left hemidiaphragm and costophrenic angle. Endotracheal tube is 1.5 cm above the louann. There is a right IJ central venous catheter with its tip in the SVC at the cavoatrial junction in good position. No complications. Probable  shunt tube over the right anterior chest extending into the left abdomen. Persistent medial left lower lobe opacity suggestive of atelectasis or pneumonia. Support tubes and catheters are stable.      XR CHEST PORTABLE    Result Date: 6/10/2022  EXAMINATION: ONE XRAY VIEW OF THE CHEST 6/10/2022 7:32 am COMPARISON: 06/09/2022 HISTORY: ORDERING SYSTEM PROVIDED HISTORY: pna and intubated TECHNOLOGIST PROVIDED HISTORY: Reason for exam:->pna and intubated What reading provider will be dictating this exam?->CRC FINDINGS: The endotracheal tube and right internal jugular central venous catheter are unchanged in position. Note is made of a  shunt on the right The right lung is clear. There is a small infiltrate seen within the retrocardiac region. 1. The right lung is clear 2. Small infiltrate seen within the retrocardiac region. XR CHEST PORTABLE    Result Date: 6/9/2022  EXAMINATION: ONE XRAY VIEW OF THE CHEST 6/9/2022 6:37 am COMPARISON: 8 June 2022 HISTORY: ORDERING SYSTEM PROVIDED HISTORY: pna and intubated TECHNOLOGIST PROVIDED HISTORY: Reason for exam:->pna and intubated What reading provider will be dictating this exam?->CRC FINDINGS: New left lobe atelectasis. Unchanged support lines. Normal heart and pulmonary vascularity. Neither costophrenic angle is blunted. New left lower lobe atelectasis. XR CHEST PORTABLE    Result Date: 6/8/2022  EXAMINATION: ONE XRAY VIEW OF THE CHEST 6/8/2022 9:58 am COMPARISON: 7 June 2022 HISTORY: ORDERING SYSTEM PROVIDED HISTORY: pna and intubated TECHNOLOGIST PROVIDED HISTORY: Reason for exam:->pna and intubated What reading provider will be dictating this exam?->CRC FINDINGS: Stable support lines. The lungs are without acute focal process. There is no effusion or pneumothorax. The cardiomediastinal silhouette is without acute process. The osseous structures are without acute process. No acute process. XR CHEST PORTABLE    Result Date: 6/7/2022  EXAMINATION: ONE XRAY VIEW OF THE CHEST 6/7/2022 7:34 am COMPARISON: 06/06/2022 HISTORY: ORDERING SYSTEM PROVIDED HISTORY: pna and intubated TECHNOLOGIST PROVIDED HISTORY: Reason for exam:->pna and intubated What reading provider will be dictating this exam?->CRC FINDINGS: Lungs appear generally clear. No pleural fluid or pneumonia. Slight linear subsegmental atelectasis on the left.   Heart is normal in size. There is an endotracheal tube with its tip 2.5 cm above the louann. Right IJ central venous catheter tip is in the inferior SVC below the louann in good position. No complications. No evidence of pneumothorax. Osseous structures are stable. Slight linear subsegmental atelectasis on the left. Support tubes and catheters in good position. XR CHEST PORTABLE    Result Date: 6/6/2022  EXAMINATION: ONE XRAY VIEW OF THE CHEST 6/6/2022 7:27 am COMPARISON: 06/05/2022 HISTORY: ORDERING SYSTEM PROVIDED HISTORY: pna and intubated TECHNOLOGIST PROVIDED HISTORY: Reason for exam:->pna and intubated What reading provider will be dictating this exam?->CRC FINDINGS: The cardiac silhouette is within normals. Note is made of a right internal jugular central venous catheter. The endotracheal tube tip is located 1.5 cm proximal to the louann. Lungs are clear. There are no findings of pneumonia or failure. 1. There are no findings of failure or pneumonia 2. Satisfactory position of the endotracheal tube and right internal jugular central venous catheter     XR CHEST PORTABLE    Result Date: 6/5/2022  EXAMINATION: ONE XRAY VIEW OF THE CHEST 6/5/2022 4:50 pm COMPARISON: The previous study performed earlier in the day at 5:51 a.m. HISTORY: ORDERING SYSTEM PROVIDED HISTORY: s/p Rt IJ TECHNOLOGIST PROVIDED HISTORY: Reason for exam:->s/p Rt IJ What reading provider will be dictating this exam?->CRC FINDINGS: There has been interval placement of a right-sided IJ line. The tip is in the distal SVC. No pneumothorax is appreciated. Again seen is an endotracheal tube, with the tip 1.9 cm above the louann. No longer seen is the NG tube. A  shunt is again seen. No longer identified are the left lung opacities. The left hemidiaphragm is now in a normal position. There has been resolution of the mediastinal shift to the left. There has been a decrease in the right lower lung field atelectasis.   The cardiac silhouette and mediastinal structures are without significant interval change. Interval placement of a right-sided IJ line, when compared to the previous study performed earlier in the day. No pneumothorax. Previously noted left lung opacities have resolved. The left hemidiaphragm is now in a normal position and there has been resolution of the mediastinal shift to the left. Interval decrease in the right lower lung field atelectasis. XR CHEST PORTABLE    Result Date: 6/5/2022  EXAMINATION: ONE XRAY VIEW OF THE CHEST6/5/2022 TECHNIQUE: Frontal view submitted COMPARISON: None. HISTORY: ORDERING SYSTEM PROVIDED HISTORY: Post tube TECHNOLOGIST PROVIDED HISTORY: Reason for exam:->Post tube What reading provider will be dictating this exam?->CRC FINDINGS: The lungs demonstrate opacification in the left mid lung. The endotracheal tube tip is 2.9 cm above the louann. Nasogastric tube is in good position. The cardiomediastinal silhouette is stable. Opacification in the  left lung, pneumonia cannot be excluded. Endotracheal tube tip 2.9 cm above louann. Nasogastric tube is in good position. XR CHEST PORTABLE    Result Date: 6/5/2022  EXAMINATION: ONE XRAY VIEW OF THE CHEST6/5/2022 TECHNIQUE: Frontal view submitted COMPARISON: None. HISTORY: ORDERING SYSTEM PROVIDED HISTORY: sob TECHNOLOGIST PROVIDED HISTORY: Reason for exam:->sob What reading provider will be dictating this exam?->CRC FINDINGS: The lungs are clear without focal consolidation, large pleural effusion, or pneumothorax. The cardiomediastinal silhouette is stable. No acute cardiopulmonary process. CTA PULMONARY W CONTRAST    Result Date: 5/30/2022  EXAMINATION: CTA OF THE CHEST 5/30/2022 9:06 pm TECHNIQUE: CTA of the chest was performed after the administration of intravenous contrast.  Multiplanar reformatted images are provided for review. MIP images are provided for review.  Automated exposure control, iterative reconstruction, and/or weight based adjustment of the mA/kV was utilized to reduce the radiation dose to as low as reasonably achievable. COMPARISON: None. HISTORY: ORDERING SYSTEM PROVIDED HISTORY: Hypoxia, evaluate for PE TECHNOLOGIST PROVIDED HISTORY: Reason for exam:->Hypoxia, evaluate for PE Decision Support Exception - unselect if not a suspected or confirmed emergency medical condition->Emergency Medical Condition (MA) What reading provider will be dictating this exam?->CRC FINDINGS: Pulmonary Arteries: Pulmonary arteries are adequately opacified for evaluation. No evidence of intraluminal filling defect to suggest pulmonary embolism. Main pulmonary artery is normal in caliber. Mediastinum: No evidence of mediastinal lymphadenopathy. The heart and pericardium demonstrate no acute abnormality. There is no acute abnormality of the thoracic aorta. Noted right aberrant subclavian artery of variant anatomic thoracic arch branching. Lungs/pleura: The lungs are without acute process. Subsegmental dependent atelectasis. No focal consolidation or pulmonary edema. No evidence of pleural effusion or pneumothorax. Upper Abdomen: Limited images of the upper abdomen are unremarkable. Soft Tissues/Bones: No acute bone or soft tissue abnormality. No evidence of pulmonary embolism or acute pulmonary abnormality. Noted right aberrant subclavian artery of variant anatomic thoracic arch branching. Subsegmental dependent atelectasis. XR ABDOMEN FOR NG/OG/NE TUBE PLACEMENT    Result Date: 6/5/2022  EXAMINATION: ONE SUPINE XRAY VIEW(S) OF THE ABDOMEN 6/5/2022 5:54 am COMPARISON: None. HISTORY: ORDERING SYSTEM PROVIDED HISTORY: Confirmation of course of NG/OG/NE tube and location of tip of tube TECHNOLOGIST PROVIDED HISTORY: Reason for exam:->Confirmation of course of NG/OG/NE tube and location of tip of tube Portable? ->Yes What reading provider will be dictating this exam?->CRC FINDINGS: The nasogastric tube is in good position. The the IVC is in good position. There is no evidence for bowel obstruction. A PEG tube is visualized. Nasogastric tube is in good position. MRI BRAIN W WO CONTRAST    Result Date: 6/13/2022  EXAMINATION: MRI OF THE BRAIN WITHOUT AND WITH CONTRAST  6/13/2022 11:27 am TECHNIQUE: Multiplanar multisequence MRI of the head/brain was performed without and with the administration of intravenous contrast. COMPARISON: CT head without contrast, 06/09/2022. HISTORY: ORDERING SYSTEM PROVIDED HISTORY: pituitary evaluation for possible causes of adrenal insufficiency TECHNOLOGIST PROVIDED HISTORY: Reason for exam:->pituitary evaluation for possible causes of adrenal insufficiency What is the sedation requirement?->Sedation What reading provider will be dictating this exam?->CRC FINDINGS: PITUITARY: The pituitary gland is normal in size. No definite pituitary lesion is seen. Focus of apparent signal abnormality along the left lateral aspect of the pituitary gland on the postcontrast enhanced T1 weighted images (series 10, image 5) likely represents partial volume averaging. No corresponding signal abnormality is evident on the coronal sequence the infundibulum is midline. The optic chiasm and cavernous sinuses are grossly unremarkable. INTRACRANIAL STRUCTURES/VENTRICLES:  There is no acute infarct. Areas of encephalomalacia are seen in the anterior aspects of the frontal lobes, as well as the anterior temporal lobes, likely the result of remote trauma. There is disproportionately prominent volume loss in the mesial temporal lobes, as may be seen with Alzheimer's disease and other dementias. Clinical correlation is needed. The remainder of the brain demonstrates relatively mild volume loss. The ventricles are mildly dilated. A right trans frontal ventriculostomy shunt is seen terminating in the left lateral ventricle.  ORBITS: There is suggestion of thinning the posterior sclera of the left globe on the temporal aspect (series 4, image 10). SINUSES: Mild mucosal thickening is seen in the ethmoid and right sphenoid sinuses. Large bilateral mastoid effusions. BONES/SOFT TISSUES: The bone marrow signal intensity appears normal. The soft tissues demonstrate no acute abnormality. 1. No focal pituitary lesion is identified to indicate microadenoma. Apparent signal abnormality in the left lateral aspect of the gland on the sagittal T1 post-contrast enhanced sequence is likely artifactual. 2.  No acute intracranial abnormality. 3. Bilateral mastoid effusions, which may be due to eustachian tube dysfunction or otomastoiditis. Clinical correlation is needed. 4. Encephalomalacia in the anterior aspect of the frontal and temporal lobes bilaterally, likely result of remote trauma. 5. Disproportionately prominent volume loss in the mesial temporal lobes, as may be seen with Alzheimer's disease and other dementias. Clinical correlation is needed. 6. Ventriculomegaly. Right transfrontal ventriculostomy shunt in situ. 7. 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 Ophthalmology consultation is recommended. RECOMMENDATIONS: Unavailable         Resident's Assessment and Plan     Patient is a 64-year old female with a past medical histroy of Down syndrome and hydrocephalus s/p  shunt, chronic subdural hematoma, seizure, hypothyroidism, was recently admitted on 5/4-5-15 for seizures 2/2 subdural hematoma, then presented again <1 week PTA  For SOB and hypoxia. Currently being managed for the followin. Acute metabolic encephalopathy possibly 2/2 dementia vs seizure vs ventriculomegaly vs other causes  2. Acute hypoxic respiratory failure 2/2 ?aspiration pneumonia VS VAP   3. Shock, likely hypovolemic vs distributive vs cardiogenic vs adrenal insufficiency - resolved  4.  Adrenal insufficiency; cosyntropin test not accurate as done while patient on steroids  5. Hypothyroidism, on levothyroxine; TSH: 10.33 (high), free T4: 1.2; total T3: 67.53  6. 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 History of Down's syndrome  8. History of early-onset alzheimer's disease, on Donepezil  9. 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 History of communication hydrocephalus s/p  shunt (11/2019)  11. History of seizure disorder, on Keppra  12. History of bilateral DVT s/p IV filter placement  13. Lactic acidosis 2/2 hypertension  14. MRSA positive s/p Bactroban.     Plan:  · S/p reprogramming of valve of  shunt yesterday  · Neurology and neurosurgery following, follow recommendation  · Continue breathing treatments  · Continue thiamine and provigil  · On zosyn for resp culture growing gm neg rods  · Continue breathing treatment  · Decrease hydrocortisone 50mg to daily  · Continue synthroid  · Continue Keppra   · Held klonopin   · Neuro signed off. Family decided to withdraw care with compassionate extubation today at 4 PM in the afternoon. DVT Prophylaxis   Lovenox        GI       PPI Prophylaxis  Protonix twice daily     Diet   tube feed          Code Status: DNR-CCA  PT/OT evaluation: No    Efrain Marion MD, PGY-1  Attending physician: Dr. John Masterson  Department of Pulmonary, Critical Care and Sleep Medicine  [address] [facility]  Department of Internal Medicine      During multidisciplinary team rounds Hamlet Early is a 65 y.o. female was seen, examined and discussed. This is confirmation that I have personally seen and examined the patient and that the key elements of the encounter were performed by me (> 85 % time). The medications & laboratory data was discussed and adjusted where necessary. The radiographic images were reviewed or with radiologist or consultant if felt dis-concordant with the exam or history.  The above findings were corroborated, plans confirmed and changes made if needed. Family is updated at the bedside as available. Key issues of the case were discussed among consultants. Critical Care time is documented if appropriate. Transitioned to comfort care today per family wishes.      Aria Fairbanks DO

## 2022-06-18 VITALS
WEIGHT: 127.6 LBS | OXYGEN SATURATION: 82 % | HEART RATE: 86 BPM | TEMPERATURE: 97.2 F | DIASTOLIC BLOOD PRESSURE: 57 MMHG | SYSTOLIC BLOOD PRESSURE: 75 MMHG | RESPIRATION RATE: 16 BRPM | BODY MASS INDEX: 26.79 KG/M2 | HEIGHT: 58 IN

## 2022-06-18 PROCEDURE — 51702 INSERT TEMP BLADDER CATH: CPT

## 2022-06-18 PROCEDURE — 99233 SBSQ HOSP IP/OBS HIGH 50: CPT | Performed by: INTERNAL MEDICINE

## 2022-06-18 PROCEDURE — 6360000002 HC RX W HCPCS: Performed by: INTERNAL MEDICINE

## 2022-06-18 PROCEDURE — 6360000002 HC RX W HCPCS: Performed by: STUDENT IN AN ORGANIZED HEALTH CARE EDUCATION/TRAINING PROGRAM

## 2022-06-18 PROCEDURE — 2500000003 HC RX 250 WO HCPCS: Performed by: INTERNAL MEDICINE

## 2022-06-18 RX ORDER — MORPHINE SULFATE 100 MG/5ML
5 SOLUTION ORAL
Qty: 15 ML | Refills: 0 | Status: SHIPPED | OUTPATIENT
Start: 2022-06-18 | End: 2022-06-25

## 2022-06-18 RX ORDER — LORAZEPAM 1 MG/1
1 TABLET ORAL EVERY 4 HOURS PRN
Qty: 16 TABLET | Refills: 0 | Status: SHIPPED | OUTPATIENT
Start: 2022-06-18 | End: 2022-07-18

## 2022-06-18 RX ORDER — GLYCOPYRROLATE 1 MG/1
2 TABLET ORAL EVERY 6 HOURS PRN
Qty: 90 TABLET | Refills: 0 | Status: SHIPPED | OUTPATIENT
Start: 2022-06-18 | End: 2022-07-04

## 2022-06-18 RX ORDER — MORPHINE SULFATE 2 MG/ML
2 INJECTION, SOLUTION INTRAMUSCULAR; INTRAVENOUS ONCE
Status: COMPLETED | OUTPATIENT
Start: 2022-06-18 | End: 2022-06-18

## 2022-06-18 RX ADMIN — LORAZEPAM 0.5 MG: 2 INJECTION INTRAMUSCULAR; INTRAVENOUS at 10:46

## 2022-06-18 RX ADMIN — LORAZEPAM 0.5 MG: 2 INJECTION INTRAMUSCULAR; INTRAVENOUS at 18:31

## 2022-06-18 RX ADMIN — MORPHINE SULFATE 2 MG: 2 INJECTION, SOLUTION INTRAMUSCULAR; INTRAVENOUS at 08:57

## 2022-06-18 RX ADMIN — GLYCOPYRROLATE 0.2 MG: 0.2 INJECTION INTRAMUSCULAR; INTRAVENOUS at 08:56

## 2022-06-18 RX ADMIN — MORPHINE SULFATE 2 MG: 2 INJECTION, SOLUTION INTRAMUSCULAR; INTRAVENOUS at 17:27

## 2022-06-18 RX ADMIN — MORPHINE SULFATE 2 MG: 2 INJECTION, SOLUTION INTRAMUSCULAR; INTRAVENOUS at 10:56

## 2022-06-18 RX ADMIN — MORPHINE SULFATE 2 MG: 2 INJECTION, SOLUTION INTRAMUSCULAR; INTRAVENOUS at 13:08

## 2022-06-18 RX ADMIN — MORPHINE SULFATE 2 MG: 2 INJECTION, SOLUTION INTRAMUSCULAR; INTRAVENOUS at 03:30

## 2022-06-18 RX ADMIN — LORAZEPAM 0.5 MG: 2 INJECTION INTRAMUSCULAR; INTRAVENOUS at 03:30

## 2022-06-18 RX ADMIN — LORAZEPAM 0.5 MG: 2 INJECTION INTRAMUSCULAR; INTRAVENOUS at 13:16

## 2022-06-18 NOTE — PROGRESS NOTES
[address] [facility]   Department of Internal Medicine   Internal Medicine Residency  MICU Progress Note    Patient:  Kristyn Agarwal See 65 y.o. female   MRN: [id number]       Date of Service: 2022    Allergy: Patient has no known allergies. Subjective     Overnight, patient was terminally extubated. Family was at the bedside. CODE STATUS changed to DNR CC. Objective     TEMPERATURE:  Current - Temp: 98.6 °F (37 °C); Max - Temp  Av.6 °F (37 °C)  Min: 98.6 °F (37 °C)  Max: 98.6 °F (37 °C)  RESPIRATIONS RANGE: Resp  Avg: 15.4  Min: 14  Max: 16  PULSE RANGE: Pulse  Av.1  Min: 48  Max: 106  BLOOD PRESSURE RANGE:  Systolic (65PWU), CCE:748 , Min:88 , RVZ:804   ; Diastolic (78BHC), LZ, Min:65, Max:75    PULSE OXIMETRY RANGE: SpO2  Av.3 %  Min: 83 %  Max: 100 %    I & O - 24hr:    Intake/Output Summary (Last 24 hours) at 2022 1003  Last data filed at 2022 1400  Gross per 24 hour   Intake 0 ml   Output 1100 ml   Net -1100 ml     I/O last 3 completed shifts: In: 987.6 [I.V.:60; NG/GT:823; IV Piggyback:104.6]  Out: 1210 [Urine:1210] No intake/output data recorded.    Weight change:         Diet:   No diet orders on file  Additional Respiratory Assessments  Heart Rate: 77  Resp: 16  SpO2: (!) 83 %  Position: Semi-Vera's  Humidification Source: Heated wire  Humidification Temp: 37  Circuit Condensation: Drained  Airway Type: ET  Airway Size: 7.5 (19)  Cuff Pressure (cm H2O): 29 cm H2O  Skin Barrier Applied: Yes       [REMOVED] External Urinary Catheter-Output (mL):  (incontinent, pad wet)  External Urinary Catheter-Output (mL): 1100 mL  [REMOVED] Urinary Catheter [id number]-[id number] (mL): 80 mL      Oxygen:     Vent Information  Ventilator ID: [id number]  Vent Mode: AC/VC  Additional Respiratory Assessments  Heart Rate: 77  Resp: 16  SpO2: (!) 83 %  Position: Semi-Vera's  Humidification Source: Heated wire  Humidification Temp: 37  Circuit Condensation: Drained  Airway Type: ET  Airway Atrium  Normal sized left atrium. Right Atrium  Normal right atrium size. Mitral Valve  Physiologic and/or trace mitral regurgitation is present. Tricuspid Valve  Mild tricuspid regurgitation. Aortic Valve  Individual aortic valve leaflets are not clearly visualized. Pulmonic Valve  Physiologic and/or trace pulmonic regurgitation present. Pericardial Effusion  There is physiologic pericardial effusion without tamponade   Pleural Effusion  Pleural effusion   Conclusions   Summary  Ejection fraction is visually estimated at 60 to 65%. Normal right ventricular size and function. Mild tricuspid regurgitation. Physiologic and/or trace pulmonic regurgitation present.   There is physiologic pericardial effusion without tamponade   Signature   ----------------------------------------------------------------  Electronically signed by Marielena Rothman MD(Interpreting  physician) on 06/08/2022 07:33 PM  ----------------------------------------------------------------  M-Mode/2D Measurements & Calculations   LV Diastolic    LV Systolic Dimension: 2.6   AV Cusp Separation: 1.8 cmLA  Dimension: 4 cm cm                           Dimension: 2.7 cmAO Root  LV FS:35 %      LV Volume Diastolic: 72 ml   Dimension: 2.6 cm  LV PW           LV Volume Systolic: 34.1 ml  Diastolic: 0.7  LV EDV/LV EDV Index: 72  cm              ml/48 ml/m^2LV ESV/LV ESV  Septum          Index: 23.8 ml/16ml/ m^2     RV Diastolic Dimension: 2.4  Diastolic: 0.8  EF Calculated: 66.9 %        cm  cm              LV Mass Index: 58 l/min*m^2   LV Mass: 85.78  g               LVOT: 2 cm  Doppler Measurements & Calculations   MV Peak E-Wave:    AV Peak Velocity: 1.28 m/s      LVOT Peak Velocity: 0.8  1.03 m/s           AV Peak Gradient: 6.57 mmHg     m/s  MV Peak A-Wave:    AV Mean Velocity: 0.8 m/s       LVOT Mean Velocity:  0.61 m/s           AV Mean Gradient: 3.2 mmHg      0.61 m/s  MV E/A Ratio: 1.69 AV VTI: 28.3 cm                 LVOT Peak Gradient: 2.6  MV Peak Gradient:  AV Area (Continuity):2.17 cm^2  mmHgLVOT Mean Gradient:  5.4 mmHg                                           1.6 mmHg  MV Mean Gradient:  LVOT VTI: 19.6 cm  2.1 mmHg           IVRT: 55.4 msec  MV Mean Velocity:  0.66 m/s           Pulm. Vein A Reversal  MV Deceleration    Duration:96.9 msec  Time: 129.7 msec   Pulm. Vein D Velocity:0.59      PV Peak Velocity: 0.82  MV P1/2t: 36.5     m/sPulm. Vein A Reversal        m/s  msec               Velocity:0.42 m/s               PV Peak Gradient: 2.68  MVA by PHT:6.03    Pulm. Vein S Velocity: 0.48 m/s mmHg  cm^2                                               PV Mean Velocity: 0.53  MV Area                                            m/s  (continuity): 2.2                                  PV Mean Gradient: 1.3  cm^2                                               mmHg  MV E' Septal  Velocity: 0.09 m/s  MV E' Lateral  Velocity: 7 m/s  [email or web address] [id number]    CT HEAD WO CONTRAST    Result Date: 6/9/2022  EXAMINATION: CT OF THE HEAD WITHOUT CONTRAST  6/9/2022 9:54 pm TECHNIQUE: CT of the head was performed without the administration of intravenous contrast. Automated exposure control, iterative reconstruction, and/or weight based adjustment of the mA/kV was utilized to reduce the radiation dose to as low as reasonably achievable. COMPARISON: CT head without contrast, 05/31/2022. HISTORY: ORDERING SYSTEM PROVIDED HISTORY: increased somnolence TECHNOLOGIST PROVIDED HISTORY: Reason for exam:->increased somnolence What reading provider will be dictating this exam?->CRC FINDINGS: BRAIN/VENTRICLES: Mild decrease in the chronic left subdural hemorrhage along the high left parietal convexity, which currently measures approximately 3 mm in maximal width. The hemorrhage along the anterior cerebral convexity has resolved. No acute hemorrhage is seen. Encephalomalacia in the anterior temporal and anterior frontal lobes is likely the result of remote trauma. There is persistent ventriculomegaly, with a right transfrontal ventriculostomy shunt terminating in the left lateral ventricle. ORBITS: The visualized portion of the orbits demonstrate no acute abnormality. SINUSES: The paranasal sinuses are clear. Small bilateral mastoid effusions. SOFT TISSUES/SKULL:  No acute abnormality of the visualized skull or soft tissues. 1.  No acute intracranial abnormality. 2. Trace residual chronic left parietal subdural hemorrhage (measuring 3 mm in maximal width). It has decreased in size as of 05/31/2022. RECOMMENDATIONS: Unavailable     CT HEAD WO CONTRAST    Result Date: 5/31/2022  EXAMINATION: CT OF THE HEAD WITHOUT CONTRAST  5/31/2022 10:35 am TECHNIQUE: CT of the head was performed without the administration of intravenous contrast. Automated exposure control, iterative reconstruction, and/or weight based adjustment of the mA/kV was utilized to reduce the radiation dose to as low as reasonably achievable. COMPARISON: 05/30/2022 HISTORY: ORDERING SYSTEM PROVIDED HISTORY: follow up subdural hematoma TECHNOLOGIST PROVIDED HISTORY: Reason for exam:->follow up subdural hematoma Has a \"code stroke\" or \"stroke alert\" been called? ->No What reading provider will be dictating this exam?->CRC FINDINGS: BRAIN/VENTRICLES: There is again seen small extra-axial collection felt to represent chronic subdural hematoma over the convexity of the left parietal lobe measuring a thickness of 5 mm. The high density associated with the collection is unchanged. There is hydrocephalus of the lateral to lesser extent 3rd ventricle. 4th ventricle within normal limits. Findings are unchanged. There is a right frontal ventriculostomy shunt catheter in place unchanged. However, the subarachnoid spaces are also prominent suggesting parenchymal volume loss.  There is bifrontal lucency as well as lucency at the temporal lobes likely related to prior contusion. There is no new CT evidence for intra-axial or extra-axial fluid collection, no mass, nor hematoma. ORBITS: The visualized portion of the orbits demonstrate no acute abnormality. SINUSES: The visualized paranasal sinuses and mastoid air cells demonstrate no acute abnormality. SOFT TISSUES/SKULL:  No acute abnormality of the visualized skull or soft tissues. There is again seen predominantly chronic small subdural hematoma over the convexity of the left parietal lobe. This measures a thickness of 4.6 mm which is unchanged. Tiny focus of high density within the collection could represent more acute blood which is also unchanged. No significant mass effect. Ventriculomegaly and ventriculostomy shunt catheter again noted. Lucency related to chronic encephalomalacia and perhaps prior contusion at the anterior frontal lobes and temporal lobes is unchanged. CT Head WO Contrast    Result Date: 5/30/2022  EXAMINATION: CT OF THE HEAD WITHOUT CONTRAST  5/30/2022 9:06 pm TECHNIQUE: CT of the head was performed without the administration of intravenous contrast. Automated exposure control, iterative reconstruction, and/or weight based adjustment of the mA/kV was utilized to reduce the radiation dose to as low as reasonably achievable. COMPARISON: May 4, 2022 HISTORY: ORDERING SYSTEM PROVIDED HISTORY: AMS, hypoxia TECHNOLOGIST PROVIDED HISTORY: Reason for exam:->AMS, hypoxia Has a \"code stroke\" or \"stroke alert\" been called? ->No Decision Support Exception - unselect if not a suspected or confirmed emergency medical condition->Emergency Medical Condition (MA) What reading provider will be dictating this exam?->CRC FINDINGS: Small focus of increased attenuation involving left parietal subdural space. Redemonstration of extra-axial, hypoattenuating collection overlying left parietal lobe.   Stable position of right sided ventricular shunt catheter with distal tip abutting septum pellucidum. Stable areas of encephalomalacia involving inferior frontal lobes and stable area of encephalomalacia involving bilateral temporal lobes stable area of encephalomalacia involving right frontal white matter adjacent to ventricular shunt. Basilar cisterns are patent. Paranasal sinuses and mastoid air cells are clear. 1. Redemonstration of hypoattenuating extra-axial collection overlying left parietal lobe related to chronic subdural hygroma or chronic subdural hematoma. Small focus of increased attenuation within this collection could suggest small acute on chronic subdural hematoma. Results were called by Dr. Esperanza Estevez to Dr. Allan Zhang on 5/30/2022 at 21:57. 2. Stable areas of encephalomalacia involving bilateral frontal lobes and bilateral temporal lobes. 3. Stable position of right frontal ventricular shunt catheter. 4. Stable moderate ventriculomegaly. XR CHEST PORTABLE    Result Date: 6/17/2022  EXAMINATION: ONE XRAY VIEW OF THE CHEST 6/17/2022 7:55 am COMPARISON: 16 June 2022 HISTORY: ORDERING SYSTEM PROVIDED HISTORY: pna and intubated TECHNOLOGIST PROVIDED HISTORY: Reason for exam:->pna and intubated What reading provider will be dictating this exam?->CRC FINDINGS: Single AP upright portable chest demonstrates endotracheal to unchanged proximally 1.5 cm above the louann. There is patchy infiltrate remaining at the left lung base. Otherwise lungs are clear. There is no evidence of a pneumothorax. No significant interval change of the past 23 hours. XR CHEST PORTABLE    Result Date: 6/16/2022  EXAMINATION: ONE XRAY VIEW OF THE CHEST 6/16/2022 8:15 am COMPARISON: 06/15/2022 HISTORY: ORDERING SYSTEM PROVIDED HISTORY: pna and intubated TECHNOLOGIST PROVIDED HISTORY: Reason for exam:->pna and intubated What reading provider will be dictating this exam?->CRC FINDINGS: The cardiac silhouette is within normals.  The endotracheal tube tip is located 1.5 cm proximal to louann. The right lung is clear. .  There is no focal infiltrate or effusion. Question is made of atelectasis is versus an infiltrate within the retrocardiac region. Note is made of a  shunt traversing the right hemithorax. 1. Stable atelectasis versus pneumonia within the retrocardiac region 2. The right lung is clear. 3. Stable position of the endotracheal tube. XR CHEST PORTABLE    Result Date: 6/15/2022  EXAMINATION: ONE XRAY VIEW OF THE CHEST 6/15/2022 7:27 am COMPARISON: 06/14/2022 HISTORY: ORDERING SYSTEM PROVIDED HISTORY: pna and intubated TECHNOLOGIST PROVIDED HISTORY: Reason for exam:->pna and intubated What reading provider will be dictating this exam?->CRC FINDINGS: Heart size is normal.  There is left lower lobe infiltrate. There is a catheter projecting in the base of the left hemithorax/upper abdomen. ET tube and right central line  are appropriate. Right lung is clear. No interval change. There is left lower lobe infiltrate. XR CHEST PORTABLE    Result Date: 6/14/2022  EXAMINATION: ONE XRAY VIEW OF THE CHEST 6/14/2022 7:29 am COMPARISON: 13 June 2022 HISTORY: ORDERING SYSTEM PROVIDED HISTORY: pna and intubated TECHNOLOGIST PROVIDED HISTORY: Reason for exam:->pna and intubated What reading provider will be dictating this exam?->CRC FINDINGS: Single AP upright portable chest demonstrates right IJ catheter and endotracheal tube remaining in satisfactory position. There is stable left lower lobe atelectasis/infiltrate with mild blunting of the costophrenic angles. There is no evidence of a pneumothorax. No significant change over the past 23 hours.      XR CHEST PORTABLE    Result Date: 6/13/2022  EXAMINATION: ONE XRAY VIEW OF THE CHEST 6/13/2022 7:04 am COMPARISON: 12 June 2022 HISTORY: ORDERING SYSTEM PROVIDED HISTORY: pna and intubated TECHNOLOGIST PROVIDED HISTORY: Reason for exam:->pna and intubated What reading provider will be dictating this exam?->CRC FINDINGS: Unchanged support lines. Stable left lower lobe infiltrate and or atelectasis. No new abnormal findings. Unchanged left lower lobe infiltrate and or atelectasis. XR CHEST PORTABLE    Result Date: 6/12/2022  EXAMINATION: ONE XRAY VIEW OF THE CHEST 6/12/2022 7:33 am COMPARISON: 06/11/2022 HISTORY: ORDERING SYSTEM PROVIDED HISTORY: pna and intubated TECHNOLOGIST PROVIDED HISTORY: Reason for exam:->pna and intubated What reading provider will be dictating this exam?->CRC FINDINGS: Medial left lower lobe atelectasis and or pneumonia is present. Heart appears normal in size. Right lung is clear. Endotracheal tube tip is 2 cm above the louann. Right IJ central venous catheter tip is in the inferior SVC, above the right atrium in good position. No complications.  shunt tube catheter passes over the right chest.  Osseous structures are stable. 1.  Medial left lower lobe atelectasis or pneumonia. No interval change. 2.  No evidence of pneumothorax. 3.  Endotracheal tube tip is approximately 2 cm above the louann. XR CHEST PORTABLE    Result Date: 6/11/2022  EXAMINATION: ONE XRAY VIEW OF THE CHEST 6/11/2022 6:35 pm COMPARISON: 06/11/2022 at 0732 HISTORY: [address]: ET tube readjustment TECHNOLOGIST PROVIDED HISTORY: Reason for exam:->ET tube readjustment What reading provider will be dictating this exam?->CRC FINDINGS: Endotracheal tube is identified and appears to have been advanced slightly and is now approximately 14 mm from the louann. Right internal jugular central venous line is stable. Appearance of the lungs are stable pleural spaces are clear. Heart is normal in size. Endotracheal tube with the tip less than 1.5 cm from the louann. Recommend retraction 2 cm. The findings were sent to the Radiology Results [address] at 7:33 pm on 6/11/2022 to be communicated to a licensed caregiver.      XR CHEST PORTABLE    Result Date: 6/11/2022  EXAMINATION: ONE XRAY VIEW OF THE CHEST 6/11/2022 7:42 am COMPARISON: 06/09/2022, 06/10/2022 HISTORY: ORDERING SYSTEM PROVIDED HISTORY: pna and intubated TECHNOLOGIST PROVIDED HISTORY: Reason for exam:->pna and intubated What reading provider will be dictating this exam?->CRC FINDINGS: Unchanged minimal opacity in the retrocardiac region. This may represent atelectasis or pneumonia. There is associated poor definition of the left hemidiaphragm and costophrenic angle. Endotracheal tube is 1.5 cm above the louann. There is a right IJ central venous catheter with its tip in the SVC at the cavoatrial junction in good position. No complications. Probable  shunt tube over the right anterior chest extending into the left abdomen. Persistent medial left lower lobe opacity suggestive of atelectasis or pneumonia. Support tubes and catheters are stable. XR CHEST PORTABLE    Result Date: 6/10/2022  EXAMINATION: ONE XRAY VIEW OF THE CHEST 6/10/2022 7:32 am COMPARISON: 06/09/2022 HISTORY: ORDERING SYSTEM PROVIDED HISTORY: pna and intubated TECHNOLOGIST PROVIDED HISTORY: Reason for exam:->pna and intubated What reading provider will be dictating this exam?->CRC FINDINGS: The endotracheal tube and right internal jugular central venous catheter are unchanged in position. Note is made of a  shunt on the right The right lung is clear. There is a small infiltrate seen within the retrocardiac region. 1. The right lung is clear 2. Small infiltrate seen within the retrocardiac region. XR CHEST PORTABLE    Result Date: 6/9/2022  EXAMINATION: ONE XRAY VIEW OF THE CHEST 6/9/2022 6:37 am COMPARISON: 8 June 2022 HISTORY: ORDERING SYSTEM PROVIDED HISTORY: pna and intubated TECHNOLOGIST PROVIDED HISTORY: Reason for exam:->pna and intubated What reading provider will be dictating this exam?->CRC FINDINGS: New left lobe atelectasis. Unchanged support lines. Normal heart and pulmonary vascularity.   Neither costophrenic angle is blunted. New left lower lobe atelectasis. XR CHEST PORTABLE    Result Date: 6/8/2022  EXAMINATION: ONE XRAY VIEW OF THE CHEST 6/8/2022 9:58 am COMPARISON: 7 June 2022 HISTORY: ORDERING SYSTEM PROVIDED HISTORY: pna and intubated TECHNOLOGIST PROVIDED HISTORY: Reason for exam:->pna and intubated What reading provider will be dictating this exam?->CRC FINDINGS: Stable support lines. The lungs are without acute focal process. There is no effusion or pneumothorax. The cardiomediastinal silhouette is without acute process. The osseous structures are without acute process. No acute process. XR CHEST PORTABLE    Result Date: 6/7/2022  EXAMINATION: ONE XRAY VIEW OF THE CHEST 6/7/2022 7:34 am COMPARISON: 06/06/2022 HISTORY: ORDERING SYSTEM PROVIDED HISTORY: pna and intubated TECHNOLOGIST PROVIDED HISTORY: Reason for exam:->pna and intubated What reading provider will be dictating this exam?->CRC FINDINGS: Lungs appear generally clear. No pleural fluid or pneumonia. Slight linear subsegmental atelectasis on the left. Heart is normal in size. There is an endotracheal tube with its tip 2.5 cm above the louann. Right IJ central venous catheter tip is in the inferior SVC below the louann in good position. No complications. No evidence of pneumothorax. Osseous structures are stable. Slight linear subsegmental atelectasis on the left. Support tubes and catheters in good position. XR CHEST PORTABLE    Result Date: 6/6/2022  EXAMINATION: ONE XRAY VIEW OF THE CHEST 6/6/2022 7:27 am COMPARISON: 06/05/2022 HISTORY: ORDERING SYSTEM PROVIDED HISTORY: pna and intubated TECHNOLOGIST PROVIDED HISTORY: Reason for exam:->pna and intubated What reading provider will be dictating this exam?->CRC FINDINGS: The cardiac silhouette is within normals. Note is made of a right internal jugular central venous catheter. The endotracheal tube tip is located 1.5 cm proximal to the louann.  Lungs are clear.  There are no findings of pneumonia or failure. 1. There are no findings of failure or pneumonia 2. Satisfactory position of the endotracheal tube and right internal jugular central venous catheter     XR CHEST PORTABLE    Result Date: 6/5/2022  EXAMINATION: ONE XRAY VIEW OF THE CHEST 6/5/2022 4:50 pm COMPARISON: The previous study performed earlier in the day at 5:51 a.m. HISTORY: ORDERING SYSTEM PROVIDED HISTORY: s/p Rt IJ TECHNOLOGIST PROVIDED HISTORY: Reason for exam:->s/p Rt IJ What reading provider will be dictating this exam?->CRC FINDINGS: There has been interval placement of a right-sided IJ line. The tip is in the distal SVC. No pneumothorax is appreciated. Again seen is an endotracheal tube, with the tip 1.9 cm above the louann. No longer seen is the NG tube. A  shunt is again seen. No longer identified are the left lung opacities. The left hemidiaphragm is now in a normal position. There has been resolution of the mediastinal shift to the left. There has been a decrease in the right lower lung field atelectasis. The cardiac silhouette and mediastinal structures are without significant interval change. Interval placement of a right-sided IJ line, when compared to the previous study performed earlier in the day. No pneumothorax. Previously noted left lung opacities have resolved. The left hemidiaphragm is now in a normal position and there has been resolution of the mediastinal shift to the left. Interval decrease in the right lower lung field atelectasis. XR CHEST PORTABLE    Result Date: 6/5/2022  EXAMINATION: ONE XRAY VIEW OF THE CHEST6/5/2022 TECHNIQUE: Frontal view submitted COMPARISON: None. HISTORY: ORDERING SYSTEM PROVIDED HISTORY: Post tube TECHNOLOGIST PROVIDED HISTORY: Reason for exam:->Post tube What reading provider will be dictating this exam?->CRC FINDINGS: The lungs demonstrate opacification in the left mid lung.   The endotracheal tube tip is 2.9 cm above the louann. Nasogastric tube is in good position. The cardiomediastinal silhouette is stable. Opacification in the  left lung, pneumonia cannot be excluded. Endotracheal tube tip 2.9 cm above louann. Nasogastric tube is in good position. XR CHEST PORTABLE    Result Date: 6/5/2022  EXAMINATION: ONE XRAY VIEW OF THE CHEST6/5/2022 TECHNIQUE: Frontal view submitted COMPARISON: None. HISTORY: ORDERING SYSTEM PROVIDED HISTORY: sob TECHNOLOGIST PROVIDED HISTORY: Reason for exam:->sob What reading provider will be dictating this exam?->CRC FINDINGS: The lungs are clear without focal consolidation, large pleural effusion, or pneumothorax. The cardiomediastinal silhouette is stable. No acute cardiopulmonary process. CTA PULMONARY W CONTRAST    Result Date: 5/30/2022  EXAMINATION: CTA OF THE CHEST 5/30/2022 9:06 pm TECHNIQUE: CTA of the chest was performed after the administration of intravenous contrast.  Multiplanar reformatted images are provided for review. MIP images are provided for review. Automated exposure control, iterative reconstruction, and/or weight based adjustment of the mA/kV was utilized to reduce the radiation dose to as low as reasonably achievable. COMPARISON: None. HISTORY: ORDERING SYSTEM PROVIDED HISTORY: Hypoxia, evaluate for PE TECHNOLOGIST PROVIDED HISTORY: Reason for exam:->Hypoxia, evaluate for PE Decision Support Exception - unselect if not a suspected or confirmed emergency medical condition->Emergency Medical Condition (MA) What reading provider will be dictating this exam?->CRC FINDINGS: Pulmonary Arteries: Pulmonary arteries are adequately opacified for evaluation. No evidence of intraluminal filling defect to suggest pulmonary embolism. Main pulmonary artery is normal in caliber. Mediastinum: No evidence of mediastinal lymphadenopathy. The heart and pericardium demonstrate no acute abnormality. There is no acute abnormality of the thoracic aorta.   Noted right aberrant subclavian artery of variant anatomic thoracic arch branching. Lungs/pleura: The lungs are without acute process. Subsegmental dependent atelectasis. No focal consolidation or pulmonary edema. No evidence of pleural effusion or pneumothorax. Upper Abdomen: Limited images of the upper abdomen are unremarkable. Soft Tissues/Bones: No acute bone or soft tissue abnormality. No evidence of pulmonary embolism or acute pulmonary abnormality. Noted right aberrant subclavian artery of variant anatomic thoracic arch branching. Subsegmental dependent atelectasis. XR ABDOMEN FOR NG/OG/NE TUBE PLACEMENT    Result Date: 6/5/2022  EXAMINATION: ONE SUPINE XRAY VIEW(S) OF THE ABDOMEN 6/5/2022 5:54 am COMPARISON: None. HISTORY: ORDERING SYSTEM PROVIDED HISTORY: Confirmation of course of NG/OG/NE tube and location of tip of tube TECHNOLOGIST PROVIDED HISTORY: Reason for exam:->Confirmation of course of NG/OG/NE tube and location of tip of tube Portable? ->Yes What reading provider will be dictating this exam?->CRC FINDINGS: The nasogastric tube is in good position. The the IVC is in good position. There is no evidence for bowel obstruction. A PEG tube is visualized. Nasogastric tube is in good position. MRI BRAIN W WO CONTRAST    Result Date: 6/13/2022  EXAMINATION: MRI OF THE BRAIN WITHOUT AND WITH CONTRAST  6/13/2022 11:27 am TECHNIQUE: Multiplanar multisequence MRI of the head/brain was performed without and with the administration of intravenous contrast. COMPARISON: CT head without contrast, 06/09/2022. HISTORY: ORDERING SYSTEM PROVIDED HISTORY: pituitary evaluation for possible causes of adrenal insufficiency TECHNOLOGIST PROVIDED HISTORY: Reason for exam:->pituitary evaluation for possible causes of adrenal insufficiency What is the sedation requirement?->Sedation What reading provider will be dictating this exam?->CRC FINDINGS: PITUITARY: The pituitary gland is normal in size.   No definite pituitary lesion is seen. Focus of apparent signal abnormality along the left lateral aspect of the pituitary gland on the postcontrast enhanced T1 weighted images (series 10, image 5) likely represents partial volume averaging. No corresponding signal abnormality is evident on the coronal sequence the infundibulum is midline. The optic chiasm and cavernous sinuses are grossly unremarkable. INTRACRANIAL STRUCTURES/VENTRICLES:  There is no acute infarct. Areas of encephalomalacia are seen in the anterior aspects of the frontal lobes, as well as the anterior temporal lobes, likely the result of remote trauma. There is disproportionately prominent volume loss in the mesial temporal lobes, as may be seen with Alzheimer's disease and other dementias. Clinical correlation is needed. The remainder of the brain demonstrates relatively mild volume loss. The ventricles are mildly dilated. A right trans frontal ventriculostomy shunt is seen terminating in the left lateral ventricle. ORBITS: There is suggestion of thinning the posterior sclera of the left globe on the temporal aspect (series 4, image 10). SINUSES: Mild mucosal thickening is seen in the ethmoid and right sphenoid sinuses. Large bilateral mastoid effusions. BONES/SOFT TISSUES: The bone marrow signal intensity appears normal. The soft tissues demonstrate no acute abnormality. 1. No focal pituitary lesion is identified to indicate microadenoma. Apparent signal abnormality in the left lateral aspect of the gland on the sagittal T1 post-contrast enhanced sequence is likely artifactual. 2.  No acute intracranial abnormality. 3. Bilateral mastoid effusions, which may be due to eustachian tube dysfunction or otomastoiditis. Clinical correlation is needed. 4. Encephalomalacia in the anterior aspect of the frontal and temporal lobes bilaterally, likely result of remote trauma.  5. Disproportionately prominent volume loss in the mesial temporal lobes, as may be seen with Alzheimer's disease and other dementias. Clinical correlation is needed. 6. Ventriculomegaly. Right transfrontal ventriculostomy shunt in situ. 7. 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 Ophthalmology consultation is recommended. RECOMMENDATIONS: Unavailable       Resident's Assessment and Plan       Overnight, patient's family decided for terminal extubation. CODE STATUS changed to DNR CC. Hospice consulted. Code Status: DNR-CC  Disposition: home with hospice. Sabina Beck MD, PGY-1  Attending physician: Dr. Dustin Reese  Department of Pulmonary, Critical Care and Sleep Medicine  [address] [place] [address]  Department of Internal Medicine      During multidisciplinary team rounds Bebeto Butts See is a 65 y.o. female was seen, examined and discussed. This is confirmation that I have personally seen and examined the patient and that the key elements of the encounter were performed by me (> 85 % time). The medications & laboratory data was discussed and adjusted where necessary. The radiographic images were reviewed or with radiologist or consultant if felt dis-concordant with the exam or history. The above findings were corroborated, plans confirmed and changes made if needed. Family is updated at the bedside as available. Key issues of the case were discussed among consultants. Continue to medicate prn for symptoms of air hunger. Plan for home with hospice.      Aria Fairbanks DO

## 2022-06-18 NOTE — PROGRESS NOTES
Follow-up with family at bedside. Answered all questions. Family decided to take patient home with hospice. DME needed being hospital bed and oxygen. DME ordered for patient. Updated [facility] intake department. Medical director to follow-obtained verbal CTI  Need comfort medications escribed to pharmacy at hospital or printed for family to take to local pharmacy. Pharmacy at hospital closes at 2pm.   Bedside nurse sent a message to physician. Waiting for response and scripts. Morphine oral concentrate 20mg/ml (15ml bottle) 0.25ml Q2 PRN - sublingual  Ativan 1mg tab, - 1 tab Q4 PRN via tube  Robinul 1mg tab - 2 tabs Q6PRN via tube. Electronically signed by Lenore Hearn RN on 6/18/2022 at 12:43 PM    Medications were sent to pharmacy. [facility] contacted and billing given to pharmacy. Set up transportation with [facility] for 1800. Ambulance forms in soft chart. Updated family.    Electronically signed by Lenore Hearn RN on 6/18/2022 at 1:07 PM

## 2022-06-18 NOTE — PROGRESS NOTES
CLINICAL PHARMACY NOTE: MEDS TO BEDS    Total # of Prescriptions Filled: 3   The following medications were delivered to the patient:  · Glycopyrrolate 1  · Lorazepam 1  · Morphine Sulfate Solution    Additional Documentation:  To JESSICA Mora

## 2022-06-18 NOTE — PLAN OF CARE
Problem: Discharge Planning  Goal: Discharge to home or other facility with appropriate resources  Outcome: Completed     Problem: Pain  Goal: Verbalizes/displays adequate comfort level or baseline comfort level  Outcome: Completed     Problem: Respiratory - Adult  Goal: Achieves optimal ventilation and oxygenation  Outcome: Completed     Problem: Skin/Tissue Integrity  Goal: Absence of new skin breakdown  Description: 1. Monitor for areas of redness and/or skin breakdown  2. Assess vascular access sites hourly  3. Every 4-6 hours minimum:  Change oxygen saturation probe site  4. Every 4-6 hours:  If on nasal continuous positive airway pressure, respiratory therapy assess nares and determine need for appliance change or resting period.   Outcome: Completed     Problem: ABCDS Injury Assessment  Goal: Absence of physical injury  Outcome: Completed     Problem: Safety - Adult  Goal: Free from fall injury  Outcome: Completed     Problem: Nutrition Deficit:  Goal: Optimize nutritional status  Outcome: Completed     Problem: Neurosensory - Adult  Goal: Achieves stable or improved neurological status  Outcome: Completed     Problem: Neurosensory - Adult  Goal: Absence of seizures  Outcome: Completed     Problem: Neurosensory - Adult  Goal: Remains free of injury related to seizures activity  Outcome: Completed     Problem: Cardiovascular - Adult  Goal: Maintains optimal cardiac output and hemodynamic stability  Outcome: Completed     Problem: Gastrointestinal - Adult  Goal: Minimal or absence of nausea and vomiting  Outcome: Completed     Problem: Gastrointestinal - Adult  Goal: Minimal or absence of nausea and vomiting  Outcome: Completed     Problem: Gastrointestinal - Adult  Goal: Maintains adequate nutritional intake  Outcome: Completed     Problem: Genitourinary - Adult  Goal: Absence of urinary retention  Outcome: Completed     Problem: Metabolic/Fluid and Electrolytes - Adult  Goal: Electrolytes maintained within normal limits  Outcome: Completed     Problem: Metabolic/Fluid and Electrolytes - Adult  Goal: Hemodynamic stability and optimal renal function maintained  Outcome: Completed     Problem: Skin/Tissue Integrity - Adult  Goal: Incisions, wounds, or drain sites healing without S/S of infection  Outcome: Completed     Problem: Skin/Tissue Integrity - Adult  Goal: Oral mucous membranes remain intact  Outcome: Completed     Problem: Hematologic - Adult  Goal: Maintains hematologic stability  Outcome: Completed

## 2022-06-18 NOTE — PLAN OF CARE
Problem: Discharge Planning  Goal: Discharge to home or other facility with appropriate resources  6/18/2022 0010 by Mihaela Junior RN  Outcome: Progressing  6/17/2022 1956 by Mihaela Junior RN  Outcome: Progressing     Problem: Pain  Goal: Verbalizes/displays adequate comfort level or baseline comfort level  6/18/2022 0010 by Mihaela Junior RN  Outcome: Progressing  6/17/2022 1956 by Mihaela Junior RN  Outcome: Progressing     Problem: Respiratory - Adult  Goal: Achieves optimal ventilation and oxygenation  6/18/2022 0010 by Mihaela Junior RN  Outcome: Progressing  6/17/2022 1956 by Mihaela Junior RN  Outcome: Progressing     Problem: Skin/Tissue Integrity  Goal: Absence of new skin breakdown  Description: 1. Monitor for areas of redness and/or skin breakdown  2. Assess vascular access sites hourly  3. Every 4-6 hours minimum:  Change oxygen saturation probe site  4. Every 4-6 hours:  If on nasal continuous positive airway pressure, respiratory therapy assess nares and determine need for appliance change or resting period.   6/18/2022 0010 by Mihaela Junior RN  Outcome: Progressing  6/17/2022 1956 by Mihaela Junior RN  Outcome: Progressing     Problem: ABCDS Injury Assessment  Goal: Absence of physical injury  6/18/2022 0010 by Mihaela Junior RN  Outcome: Progressing  6/17/2022 1956 by Mihaela Junior RN  Outcome: Progressing     Problem: Safety - Adult  Goal: Free from fall injury  6/18/2022 0010 by Mihaela Junior RN  Outcome: Progressing  6/17/2022 1956 by Mihaela Junior RN  Outcome: Progressing     Problem: Nutrition Deficit:  Goal: Optimize nutritional status  6/18/2022 0010 by Mihaela Junior RN  Outcome: Progressing  6/17/2022 1956 by Mihaela Junior RN  Outcome: Progressing     Problem: Neurosensory - Adult  Goal: Achieves stable or improved neurological status  6/18/2022 0010 by Mihaela Junior RN  Outcome: Progressing  6/17/2022 1956 by Mihaela Junior RN  Outcome: Progressing  Goal: Absence of seizures  6/18/2022 0010 by Ellen Connolly RN  Outcome: Progressing  6/17/2022 1956 by Ellen Connolly RN  Outcome: Progressing  Goal: Remains free of injury related to seizures activity  6/18/2022 0010 by Ellen Connolly RN  Outcome: Progressing  6/17/2022 1956 by Ellen Connolly RN  Outcome: Progressing     Problem: Cardiovascular - Adult  Goal: Maintains optimal cardiac output and hemodynamic stability  6/18/2022 0010 by Ellen Connolly RN  Outcome: Progressing  6/17/2022 1956 by Ellen Connolly RN  Outcome: Progressing     Problem: Gastrointestinal - Adult  Goal: Minimal or absence of nausea and vomiting  6/18/2022 0010 by Ellen Connolly RN  Outcome: Progressing  6/17/2022 1956 by Ellen Connolly RN  Outcome: Progressing  Goal: Maintains adequate nutritional intake  6/18/2022 0010 by Ellen Connolly RN  Outcome: Progressing  6/17/2022 1956 by Ellen Connolly RN  Outcome: Progressing     Problem: Genitourinary - Adult  Goal: Absence of urinary retention  6/18/2022 0010 by Ellen Connolly RN  Outcome: Progressing  6/17/2022 1956 by Ellen Connolly RN  Outcome: Progressing     Problem: Metabolic/Fluid and Electrolytes - Adult  Goal: Electrolytes maintained within normal limits  6/18/2022 0010 by Ellen Connolly RN  Outcome: Progressing  6/17/2022 1956 by Ellen Connolly RN  Outcome: Progressing  Goal: Hemodynamic stability and optimal renal function maintained  6/18/2022 0010 by Ellen Connolly RN  Outcome: Progressing  6/17/2022 1956 by Ellen Connolly RN  Outcome: Progressing     Problem: Skin/Tissue Integrity - Adult  Goal: Incisions, wounds, or drain sites healing without S/S of infection  6/18/2022 0010 by Ellen Connolly RN  Outcome: Progressing  6/17/2022 1956 by Ellen Connolly RN  Outcome: Progressing  Goal: Oral mucous membranes remain intact  6/18/2022 0010 by Ellen Connolly RN  Outcome: Progressing  6/17/2022 1956 by Ellen Connolly RN  Outcome: Progressing     Problem: Hematologic - Adult  Goal: Maintains hematologic stability  6/18/2022 0010 by Nancy Hernandez, RN  Outcome: Progressing  6/17/2022 1956 by Nancy Hernandez, RN  Outcome: Progressing

## 2022-06-18 NOTE — DISCHARGE SUMMARY
Hospitalist Discharge Summary    Patient ID: Gin Early   Patient : 1965  Patient's PCP: Jerald Luong DO    Admit Date: 2022   Admitting Physician: Davi Strickland MD    Discharge Date:  2022  Discharge Physician: BOGDAN Velazquez - CNP   Discharge Condition: Poor- hospice  Discharge Disposition: Home Hospice    History of presenting illness/hospital course:    Balbina Early is a 65year old female with a PMH of Down syndrome and hydrocephalus s/p  shunt, chronic subdural hematoma, Alzheimer's dementia, seizures and hypothyroidism. She was recently admitted in May 2022 with seizures 2/2 subdural hematoma.     She presented to the ER on 2022 with shortness of breath. She was found to be hypoxic and required intubation. She was admitted to ICU with diagnosis of acute hypoxic respiratory failure 2/2 possible aspiration pneumonia, hemodynamic shock, acute metabolic encephalopathy and adrenal insufficiency. Neurology was consulted and an EEG was performed on  which revealed epileptiform foci in the right frontal lobe and her Keppra was increased. Neurosurgery was consulted for subacute on chronic subdural hematoma. No intervention was planned. An MRI was performed on  which revealed ventriculomegaly and thinning of the posterior sclera of the left globe along the posterior aspect concerning for staphyloma. Opthamology was consulted. Neurosurgery reprogrammed the valve for the  shunt on . She was hypotensive throughout the admission requiring norepinephrine which was later transitioned to midodrine. She developed ventilator associated pneumonia with culture growing Klebsiella and was started on Zosyn. All sedation was discontinued and spontaneous breathing trials were attempted however she remained apneic during each trial.  The family ultimately decided to withdraw care.   Her CODE STATUS was changed to DNR CC and the patient was compassionately extubated on 6/17. Hospice was consulted. She is to be discharged to home with hospice care. Consults:   IP CONSULT TO CRITICAL CARE  IP CONSULT TO NEUROLOGY  IP CONSULT TO DIETITIAN  IP CONSULT TO OPHTHALMOLOGY    Discharge Diagnoses:      1. Acute hypoxic respiratory failure-2/2 ? aspiration pneumonia. S/p intubation on admission. Remains intubated with FiO2 40%. Apneic with SBTs. Family is to compassionately extubate yesterday. She is to go home with home hospice. 2. VAP- Respiratory culture obtained 6/15 growing klebsiella. Treated with Zosyn however this has been discontinued as she is now in hospice care. 3. Hemodynamic shock-  likely hypovolemic vs distributive vs cardiogenic vs adrenal insufficiency (per ICU). Norepinephrine discontinued and transition to midodrine. 4. Lactic acidosis- resolved s/p IVF boluses  5. Adrenal insufficiency-endocrine following, on Solu-Cortef- changed to hydrocortisone  6. Down syndrome  7. Hypothyroidism-for continued supplementation  8. Hx of hydrocephalus s/p  shunt-with ventriculomegaly on most recent imaging s/p reprogramming of shunt valve on 6/13 per neurosurgery  9. Seizures -neurology following, EEG 6/6 with polymorphic slowing suggestive of epileptiform foci in the right frontal lobe versus structural abnormality.  Diffuse slowing also noted consistent with moderate encephalopathy. MRI of the brain with no acute findings. No further seizure activity after increasing Keppra and clonazepam  10. 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 Ophthalmology saw patient and n intervention required. 11. Normocytic anemia-likely 2/2 phlebotomy  12. Dysphagia with PEG in situ   13. Alzheimer's dementia-on Aricept  14. Hx of DVT s/p IVC filter     15.  History of subdural hematoma s/p bur hole evacuation (4/2019)    Discharge Instructions / Follow up:    Hospice medications prescribed per request.    Activity: activity as tolerated    Significant labs:  CBC:   Recent Labs 06/16/22  0442 06/17/22  0405   WBC 4.8 4.8   RBC 3.32* 3.43*   HGB 10.1* 10.4*   HCT 31.9* 33.4*   MCV 96.1 97.4   RDW 16.2* 16.1*    214     BMP:   Recent Labs     06/16/22  0442 06/17/22  0405    138   K 3.7 4.1    102   CO2 26 25   BUN 8 11   CREATININE 0.5 0.5     LFT:  Recent Labs     06/16/22  0442 06/17/22  0405   PROT 5.3* 5.9*   ALKPHOS 66 94   ALT 11 28   AST 13 33*   BILITOT 0.2 0.3     PT/INR: No results for input(s): INR, APTT in the last 72 hours. BNP: No results for input(s): BNP in the last 72 hours.   Hgb A1C:   Lab Results   Component Value Date    LABA1C 5.2 03/30/2022     Folate and B12:   Lab Results   Component Value Date    ALXZTSFQ81 117 06/13/2022   ,   Lab Results   Component Value Date    FOLATE 11.2 06/13/2022     Thyroid Studies:   Lab Results   Component Value Date    TSH 10.330 (H) 06/05/2022    Y9XQOQY 67.53 (L) 06/05/2022    O1IYBKJ 5.6 06/05/2022       Urinalysis:    Lab Results   Component Value Date    NITRU Negative 06/05/2022    WBCUA 2-5 06/05/2022    WBCUA 0-1 05/12/2012    BACTERIA RARE 06/05/2022    RBCUA 0-1 06/05/2022    RBCUA NONE 05/12/2012    BLOODU Negative 06/05/2022    SPECGRAV 1.025 06/05/2022    GLUCOSEU Negative 06/05/2022    GLUCOSEU NEGATIVE 05/12/2012       Imaging:  Echo Complete    Result Date: 6/8/2022  Transthoracic Echocardiography Report (TTE)  Demographics   Patient Name    SEE Mayra Vasquez Gender            Female   Medical Record  [id number]    Room Number       9606  Number   Account #       [de-identified]   Procedure Date    06/08/2022   Corporate ID                Ordering                              Physician   Accession       [id number]  Referring  Number                      Physician   Date of Birth   1965  Sonographer       Diane Alcocer   Age             65 year(s)  Interpreting      [address]                              Physician         Physician Cardiology                                                Kleber Elizabeth Karri BUSH                               Any Other  Procedure Type of Study   TTE procedure:Echo Complete W/Doppler & Color Flow. Procedure Date Date: 06/08/2022 Start: 07:37 AM Study Location: Portable Technical Quality: Adequate visualization Indications:Cardiogenic shock. Patient Status: STAT Height: 58 inches Weight: 125 pounds BSA: 1.49 m^2 BMI: 26.12 kg/m^2 BP: 95/47 mmHg  Findings   Left Ventricle  Ejection fraction is visually estimated at 60 to 65%. Right Ventricle  Normal right ventricular size and function. Left Atrium  Normal sized left atrium. Right Atrium  Normal right atrium size. Mitral Valve  Physiologic and/or trace mitral regurgitation is present. Tricuspid Valve  Mild tricuspid regurgitation. Aortic Valve  Individual aortic valve leaflets are not clearly visualized. Pulmonic Valve  Physiologic and/or trace pulmonic regurgitation present. Pericardial Effusion  There is physiologic pericardial effusion without tamponade   Pleural Effusion  Pleural effusion   Conclusions   Summary  Ejection fraction is visually estimated at 60 to 65%. Normal right ventricular size and function. Mild tricuspid regurgitation. Physiologic and/or trace pulmonic regurgitation present.   There is physiologic pericardial effusion without tamponade   Signature   ----------------------------------------------------------------  Electronically signed by Clare Banegas MD(Interpreting  physician) on 06/08/2022 07:33 PM  ----------------------------------------------------------------  M-Mode/2D Measurements & Calculations   LV Diastolic    LV Systolic Dimension: 2.6   AV Cusp Separation: 1.8 cmLA  Dimension: 4 cm cm                           Dimension: 2.7 cmAO Root  LV FS:35 %      LV Volume Diastolic: 72 ml   Dimension: 2.6 cm  LV PW           LV Volume Systolic: 90.9 ml  Diastolic: 0.7  LV EDV/LV EDV Index: 72  cm              ml/48 ml/m^2LV ESV/LV ESV  Septum          Index: 23.8 ml/16ml/ m^2     RV Diastolic Dimension: 2.4  Diastolic: 0.8  EF Calculated: 66.9 %        cm  cm              LV Mass Index: 58 l/min*m^2   LV Mass: 85.78  g               LVOT: 2 cm  Doppler Measurements & Calculations   MV Peak E-Wave:    AV Peak Velocity: 1.28 m/s      LVOT Peak Velocity: 0.8  1.03 m/s           AV Peak Gradient: 6.57 mmHg     m/s  MV Peak A-Wave:    AV Mean Velocity: 0.8 m/s       LVOT Mean Velocity:  0.61 m/s           AV Mean Gradient: 3.2 mmHg      0.61 m/s  MV E/A Ratio: 1.69 AV VTI: 28.3 cm                 LVOT Peak Gradient: 2.6  MV Peak Gradient:  AV Area (Continuity):2.17 cm^2  mmHgLVOT Mean Gradient:  5.4 mmHg                                           1.6 mmHg  MV Mean Gradient:  LVOT VTI: 19.6 cm  2.1 mmHg           IVRT: 55.4 msec  MV Mean Velocity:  0.66 m/s           Pulm. Vein A Reversal  MV Deceleration    Duration:96.9 msec  Time: 129.7 msec   Pulm. Vein D Velocity:0.59      PV Peak Velocity: 0.82  MV P1/2t: 36.5     m/sPulm. Vein A Reversal        m/s  msec               Velocity:0.42 m/s               PV Peak Gradient: 2.68  MVA by PHT:6.03    Pulm. Vein S Velocity: 0.48 m/s mmHg  cm^2                                               PV Mean Velocity: 0.53  MV Area                                            m/s  (continuity): 2.2                                  PV Mean Gradient: 1.3  cm^2                                               mmHg  MV E' Septal  Velocity: 0.09 m/s  MV E' Lateral  Velocity: 7 m/s  [email or web address] [id number]    CT HEAD WO CONTRAST    Result Date: 6/9/2022  EXAMINATION: CT OF THE HEAD WITHOUT CONTRAST  6/9/2022 9:54 pm TECHNIQUE: CT of the head was performed without the administration of intravenous contrast. Automated exposure control, iterative reconstruction, and/or weight based adjustment of the mA/kV was utilized to reduce the radiation dose to as low as reasonably achievable. COMPARISON: CT head without contrast, 05/31/2022. HISTORY: ORDERING SYSTEM PROVIDED HISTORY: increased somnolence TECHNOLOGIST PROVIDED HISTORY: Reason for exam:->increased somnolence What reading provider will be dictating this exam?->CRC FINDINGS: BRAIN/VENTRICLES: Mild decrease in the chronic left subdural hemorrhage along the high left parietal convexity, which currently measures approximately 3 mm in maximal width. The hemorrhage along the anterior cerebral convexity has resolved. No acute hemorrhage is seen. Encephalomalacia in the anterior temporal and anterior frontal lobes is likely the result of remote trauma. There is persistent ventriculomegaly, with a right transfrontal ventriculostomy shunt terminating in the left lateral ventricle. ORBITS: The visualized portion of the orbits demonstrate no acute abnormality. SINUSES: The paranasal sinuses are clear. Small bilateral mastoid effusions. SOFT TISSUES/SKULL:  No acute abnormality of the visualized skull or soft tissues. 1.  No acute intracranial abnormality. 2. Trace residual chronic left parietal subdural hemorrhage (measuring 3 mm in maximal width). It has decreased in size as of 05/31/2022. RECOMMENDATIONS: Unavailable     CT HEAD WO CONTRAST    Result Date: 5/31/2022  EXAMINATION: CT OF THE HEAD WITHOUT CONTRAST  5/31/2022 10:35 am TECHNIQUE: CT of the head was performed without the administration of intravenous contrast. Automated exposure control, iterative reconstruction, and/or weight based adjustment of the mA/kV was utilized to reduce the radiation dose to as low as reasonably achievable. COMPARISON: 05/30/2022 HISTORY: ORDERING SYSTEM PROVIDED HISTORY: follow up subdural hematoma TECHNOLOGIST PROVIDED HISTORY: Reason for exam:->follow up subdural hematoma Has a \"code stroke\" or \"stroke alert\" been called? ->No What reading provider will be dictating this exam?->CRC FINDINGS: BRAIN/VENTRICLES: There is again seen small extra-axial collection felt to represent chronic subdural hematoma over the convexity of the left parietal lobe measuring a thickness of 5 mm. The high density associated with the collection is unchanged. There is hydrocephalus of the lateral to lesser extent 3rd ventricle. 4th ventricle within normal limits. Findings are unchanged. There is a right frontal ventriculostomy shunt catheter in place unchanged. However, the subarachnoid spaces are also prominent suggesting parenchymal volume loss. There is bifrontal lucency as well as lucency at the temporal lobes likely related to prior contusion. There is no new CT evidence for intra-axial or extra-axial fluid collection, no mass, nor hematoma. ORBITS: The visualized portion of the orbits demonstrate no acute abnormality. SINUSES: The visualized paranasal sinuses and mastoid air cells demonstrate no acute abnormality. SOFT TISSUES/SKULL:  No acute abnormality of the visualized skull or soft tissues. There is again seen predominantly chronic small subdural hematoma over the convexity of the left parietal lobe. This measures a thickness of 4.6 mm which is unchanged. Tiny focus of high density within the collection could represent more acute blood which is also unchanged. No significant mass effect. Ventriculomegaly and ventriculostomy shunt catheter again noted. Lucency related to chronic encephalomalacia and perhaps prior contusion at the anterior frontal lobes and temporal lobes is unchanged. CT Head WO Contrast    Result Date: 5/30/2022  EXAMINATION: CT OF THE HEAD WITHOUT CONTRAST  5/30/2022 9:06 pm TECHNIQUE: CT of the head was performed without the administration of intravenous contrast. Automated exposure control, iterative reconstruction, and/or weight based adjustment of the mA/kV was utilized to reduce the radiation dose to as low as reasonably achievable.  COMPARISON: May 4, 2022 HISTORY: ORDERING SYSTEM PROVIDED HISTORY: AMS, hypoxia TECHNOLOGIST PROVIDED HISTORY: Reason for exam:->AMS, hypoxia Has a \"code stroke\" or \"stroke alert\" been called? ->No Decision Support Exception - unselect if not a suspected or confirmed emergency medical condition->Emergency Medical Condition (MA) What reading provider will be dictating this exam?->CRC FINDINGS: Small focus of increased attenuation involving left parietal subdural space. Redemonstration of extra-axial, hypoattenuating collection overlying left parietal lobe. Stable position of right sided ventricular shunt catheter with distal tip abutting septum pellucidum. Stable areas of encephalomalacia involving inferior frontal lobes and stable area of encephalomalacia involving bilateral temporal lobes stable area of encephalomalacia involving right frontal white matter adjacent to ventricular shunt. Basilar cisterns are patent. Paranasal sinuses and mastoid air cells are clear. 1. Redemonstration of hypoattenuating extra-axial collection overlying left parietal lobe related to chronic subdural hygroma or chronic subdural hematoma. Small focus of increased attenuation within this collection could suggest small acute on chronic subdural hematoma. Results were called by Dr. Benjamin Dickson to Dr. Anay Rutherford on 5/30/2022 at 21:57. 2. Stable areas of encephalomalacia involving bilateral frontal lobes and bilateral temporal lobes. 3. Stable position of right frontal ventricular shunt catheter. 4. Stable moderate ventriculomegaly. XR CHEST PORTABLE    Result Date: 6/17/2022  EXAMINATION: ONE XRAY VIEW OF THE CHEST 6/17/2022 7:55 am COMPARISON: 16 June 2022 HISTORY: ORDERING SYSTEM PROVIDED HISTORY: pna and intubated TECHNOLOGIST PROVIDED HISTORY: Reason for exam:->pna and intubated What reading provider will be dictating this exam?->CRC FINDINGS: Single AP upright portable chest demonstrates endotracheal to unchanged proximally 1.5 cm above the louann.   There is patchy infiltrate remaining at the left lung base. Otherwise lungs are clear. There is no evidence of a pneumothorax. No significant interval change of the past 23 hours. XR CHEST PORTABLE    Result Date: 6/16/2022  EXAMINATION: ONE XRAY VIEW OF THE CHEST 6/16/2022 8:15 am COMPARISON: 06/15/2022 HISTORY: ORDERING SYSTEM PROVIDED HISTORY: pna and intubated TECHNOLOGIST PROVIDED HISTORY: Reason for exam:->pna and intubated What reading provider will be dictating this exam?->CRC FINDINGS: The cardiac silhouette is within normals. The endotracheal tube tip is located 1.5 cm proximal to louann. The right lung is clear. .  There is no focal infiltrate or effusion. Question is made of atelectasis is versus an infiltrate within the retrocardiac region. Note is made of a  shunt traversing the right hemithorax. 1. Stable atelectasis versus pneumonia within the retrocardiac region 2. The right lung is clear. 3. Stable position of the endotracheal tube. XR CHEST PORTABLE    Result Date: 6/15/2022  EXAMINATION: ONE XRAY VIEW OF THE CHEST 6/15/2022 7:27 am COMPARISON: 06/14/2022 HISTORY: ORDERING SYSTEM PROVIDED HISTORY: pna and intubated TECHNOLOGIST PROVIDED HISTORY: Reason for exam:->pna and intubated What reading provider will be dictating this exam?->CRC FINDINGS: Heart size is normal.  There is left lower lobe infiltrate. There is a catheter projecting in the base of the left hemithorax/upper abdomen. ET tube and right central line  are appropriate. Right lung is clear. No interval change. There is left lower lobe infiltrate.      XR CHEST PORTABLE    Result Date: 6/14/2022  EXAMINATION: ONE XRAY VIEW OF THE CHEST 6/14/2022 7:29 am COMPARISON: 13 June 2022 HISTORY: ORDERING SYSTEM PROVIDED HISTORY: pna and intubated TECHNOLOGIST PROVIDED HISTORY: Reason for exam:->pna and intubated What reading provider will be dictating this exam?->CRC FINDINGS: Single AP upright portable chest demonstrates right IJ catheter and endotracheal tube remaining in satisfactory position. There is stable left lower lobe atelectasis/infiltrate with mild blunting of the costophrenic angles. There is no evidence of a pneumothorax. No significant change over the past 23 hours. XR CHEST PORTABLE    Result Date: 6/13/2022  EXAMINATION: ONE XRAY VIEW OF THE CHEST 6/13/2022 7:04 am COMPARISON: 12 June 2022 HISTORY: ORDERING SYSTEM PROVIDED HISTORY: pna and intubated TECHNOLOGIST PROVIDED HISTORY: Reason for exam:->pna and intubated What reading provider will be dictating this exam?->CRC FINDINGS: Unchanged support lines. Stable left lower lobe infiltrate and or atelectasis. No new abnormal findings. Unchanged left lower lobe infiltrate and or atelectasis. XR CHEST PORTABLE    Result Date: 6/12/2022  EXAMINATION: ONE XRAY VIEW OF THE CHEST 6/12/2022 7:33 am COMPARISON: 06/11/2022 HISTORY: ORDERING SYSTEM PROVIDED HISTORY: pna and intubated TECHNOLOGIST PROVIDED HISTORY: Reason for exam:->pna and intubated What reading provider will be dictating this exam?->CRC FINDINGS: Medial left lower lobe atelectasis and or pneumonia is present. Heart appears normal in size. Right lung is clear. Endotracheal tube tip is 2 cm above the louann. Right IJ central venous catheter tip is in the inferior SVC, above the right atrium in good position. No complications.  shunt tube catheter passes over the right chest.  Osseous structures are stable. 1.  Medial left lower lobe atelectasis or pneumonia. No interval change. 2.  No evidence of pneumothorax. 3.  Endotracheal tube tip is approximately 2 cm above the louann.      XR CHEST PORTABLE    Result Date: 6/11/2022  EXAMINATION: ONE XRAY VIEW OF THE CHEST 6/11/2022 6:35 pm COMPARISON: 06/11/2022 at 0732 HISTORY: [address] [facility] [place], [address]: ET tube readjustment TECHNOLOGIST PROVIDED HISTORY: Reason for exam:->ET tube readjustment What reading provider will be dictating this exam?->CRC FINDINGS: Endotracheal tube is identified and appears to have been advanced slightly and is now approximately 14 mm from the louann. Right internal jugular central venous line is stable. Appearance of the lungs are stable pleural spaces are clear. Heart is normal in size. Endotracheal tube with the tip less than 1.5 cm from the louann. Recommend retraction 2 cm. The findings were sent to the Radiology Results [address] at 7:33 pm on 6/11/2022 to be communicated to a licensed caregiver. XR CHEST PORTABLE    Result Date: 6/11/2022  EXAMINATION: ONE XRAY VIEW OF THE CHEST 6/11/2022 7:42 am COMPARISON: 06/09/2022, 06/10/2022 HISTORY: ORDERING SYSTEM PROVIDED HISTORY: pna and intubated TECHNOLOGIST PROVIDED HISTORY: Reason for exam:->pna and intubated What reading provider will be dictating this exam?->CRC FINDINGS: Unchanged minimal opacity in the retrocardiac region. This may represent atelectasis or pneumonia. There is associated poor definition of the left hemidiaphragm and costophrenic angle. Endotracheal tube is 1.5 cm above the louann. There is a right IJ central venous catheter with its tip in the SVC at the cavoatrial junction in good position. No complications. Probable  shunt tube over the right anterior chest extending into the left abdomen. Persistent medial left lower lobe opacity suggestive of atelectasis or pneumonia. Support tubes and catheters are stable. XR CHEST PORTABLE    Result Date: 6/10/2022  EXAMINATION: ONE XRAY VIEW OF THE CHEST 6/10/2022 7:32 am COMPARISON: 06/09/2022 HISTORY: ORDERING SYSTEM PROVIDED HISTORY: pna and intubated TECHNOLOGIST PROVIDED HISTORY: Reason for exam:->pna and intubated What reading provider will be dictating this exam?->CRC FINDINGS: The endotracheal tube and right internal jugular central venous catheter are unchanged in position. Note is made of a  shunt on the right The right lung is clear.   There is a small infiltrate seen within the retrocardiac region. 1. The right lung is clear 2. Small infiltrate seen within the retrocardiac region. XR CHEST PORTABLE    Result Date: 6/9/2022  EXAMINATION: ONE XRAY VIEW OF THE CHEST 6/9/2022 6:37 am COMPARISON: 8 June 2022 HISTORY: ORDERING SYSTEM PROVIDED HISTORY: pna and intubated TECHNOLOGIST PROVIDED HISTORY: Reason for exam:->pna and intubated What reading provider will be dictating this exam?->CRC FINDINGS: New left lobe atelectasis. Unchanged support lines. Normal heart and pulmonary vascularity. Neither costophrenic angle is blunted. New left lower lobe atelectasis. XR CHEST PORTABLE    Result Date: 6/8/2022  EXAMINATION: ONE XRAY VIEW OF THE CHEST 6/8/2022 9:58 am COMPARISON: 7 June 2022 HISTORY: ORDERING SYSTEM PROVIDED HISTORY: pna and intubated TECHNOLOGIST PROVIDED HISTORY: Reason for exam:->pna and intubated What reading provider will be dictating this exam?->CRC FINDINGS: Stable support lines. The lungs are without acute focal process. There is no effusion or pneumothorax. The cardiomediastinal silhouette is without acute process. The osseous structures are without acute process. No acute process. XR CHEST PORTABLE    Result Date: 6/7/2022  EXAMINATION: ONE XRAY VIEW OF THE CHEST 6/7/2022 7:34 am COMPARISON: 06/06/2022 HISTORY: ORDERING SYSTEM PROVIDED HISTORY: pna and intubated TECHNOLOGIST PROVIDED HISTORY: Reason for exam:->pna and intubated What reading provider will be dictating this exam?->CRC FINDINGS: Lungs appear generally clear. No pleural fluid or pneumonia. Slight linear subsegmental atelectasis on the left. Heart is normal in size. There is an endotracheal tube with its tip 2.5 cm above the louann. Right IJ central venous catheter tip is in the inferior SVC below the louann in good position. No complications. No evidence of pneumothorax. Osseous structures are stable. Slight linear subsegmental atelectasis on the left.  Support tubes and catheters in good position. XR CHEST PORTABLE    Result Date: 6/6/2022  EXAMINATION: ONE XRAY VIEW OF THE CHEST 6/6/2022 7:27 am COMPARISON: 06/05/2022 HISTORY: ORDERING SYSTEM PROVIDED HISTORY: pna and intubated TECHNOLOGIST PROVIDED HISTORY: Reason for exam:->pna and intubated What reading provider will be dictating this exam?->CRC FINDINGS: The cardiac silhouette is within normals. Note is made of a right internal jugular central venous catheter. The endotracheal tube tip is located 1.5 cm proximal to the louann. Lungs are clear. There are no findings of pneumonia or failure. 1. There are no findings of failure or pneumonia 2. Satisfactory position of the endotracheal tube and right internal jugular central venous catheter     XR CHEST PORTABLE    Result Date: 6/5/2022  EXAMINATION: ONE XRAY VIEW OF THE CHEST 6/5/2022 4:50 pm COMPARISON: The previous study performed earlier in the day at 5:51 a.m. HISTORY: ORDERING SYSTEM PROVIDED HISTORY: s/p Rt IJ TECHNOLOGIST PROVIDED HISTORY: Reason for exam:->s/p Rt IJ What reading provider will be dictating this exam?->CRC FINDINGS: There has been interval placement of a right-sided IJ line. The tip is in the distal SVC. No pneumothorax is appreciated. Again seen is an endotracheal tube, with the tip 1.9 cm above the louann. No longer seen is the NG tube. A  shunt is again seen. No longer identified are the left lung opacities. The left hemidiaphragm is now in a normal position. There has been resolution of the mediastinal shift to the left. There has been a decrease in the right lower lung field atelectasis. The cardiac silhouette and mediastinal structures are without significant interval change. Interval placement of a right-sided IJ line, when compared to the previous study performed earlier in the day. No pneumothorax. Previously noted left lung opacities have resolved.   The left hemidiaphragm is now in a normal position and there has been resolution of the mediastinal shift to the left. Interval decrease in the right lower lung field atelectasis. XR CHEST PORTABLE    Result Date: 6/5/2022  EXAMINATION: ONE XRAY VIEW OF THE CHEST6/5/2022 TECHNIQUE: Frontal view submitted COMPARISON: None. HISTORY: ORDERING SYSTEM PROVIDED HISTORY: Post tube TECHNOLOGIST PROVIDED HISTORY: Reason for exam:->Post tube What reading provider will be dictating this exam?->CRC FINDINGS: The lungs demonstrate opacification in the left mid lung. The endotracheal tube tip is 2.9 cm above the louann. Nasogastric tube is in good position. The cardiomediastinal silhouette is stable. Opacification in the  left lung, pneumonia cannot be excluded. Endotracheal tube tip 2.9 cm above louann. Nasogastric tube is in good position. XR CHEST PORTABLE    Result Date: 6/5/2022  EXAMINATION: ONE XRAY VIEW OF THE CHEST6/5/2022 TECHNIQUE: Frontal view submitted COMPARISON: None. HISTORY: ORDERING SYSTEM PROVIDED HISTORY: sob TECHNOLOGIST PROVIDED HISTORY: Reason for exam:->sob What reading provider will be dictating this exam?->CRC FINDINGS: The lungs are clear without focal consolidation, large pleural effusion, or pneumothorax. The cardiomediastinal silhouette is stable. No acute cardiopulmonary process. CTA PULMONARY W CONTRAST    Result Date: 5/30/2022  EXAMINATION: CTA OF THE CHEST 5/30/2022 9:06 pm TECHNIQUE: CTA of the chest was performed after the administration of intravenous contrast.  Multiplanar reformatted images are provided for review. MIP images are provided for review. Automated exposure control, iterative reconstruction, and/or weight based adjustment of the mA/kV was utilized to reduce the radiation dose to as low as reasonably achievable. COMPARISON: None.  HISTORY: ORDERING SYSTEM PROVIDED HISTORY: Hypoxia, evaluate for PE TECHNOLOGIST PROVIDED HISTORY: Reason for exam:->Hypoxia, evaluate for PE Decision Support Exception - unselect if not a suspected or confirmed emergency medical condition->Emergency Medical Condition (MA) What reading provider will be dictating this exam?->CRC FINDINGS: Pulmonary Arteries: Pulmonary arteries are adequately opacified for evaluation. No evidence of intraluminal filling defect to suggest pulmonary embolism. Main pulmonary artery is normal in caliber. Mediastinum: No evidence of mediastinal lymphadenopathy. The heart and pericardium demonstrate no acute abnormality. There is no acute abnormality of the thoracic aorta. Noted right aberrant subclavian artery of variant anatomic thoracic arch branching. Lungs/pleura: The lungs are without acute process. Subsegmental dependent atelectasis. No focal consolidation or pulmonary edema. No evidence of pleural effusion or pneumothorax. Upper Abdomen: Limited images of the upper abdomen are unremarkable. Soft Tissues/Bones: No acute bone or soft tissue abnormality. No evidence of pulmonary embolism or acute pulmonary abnormality. Noted right aberrant subclavian artery of variant anatomic thoracic arch branching. Subsegmental dependent atelectasis. XR ABDOMEN FOR NG/OG/NE TUBE PLACEMENT    Result Date: 6/5/2022  EXAMINATION: ONE SUPINE XRAY VIEW(S) OF THE ABDOMEN 6/5/2022 5:54 am COMPARISON: None. HISTORY: ORDERING SYSTEM PROVIDED HISTORY: Confirmation of course of NG/OG/NE tube and location of tip of tube TECHNOLOGIST PROVIDED HISTORY: Reason for exam:->Confirmation of course of NG/OG/NE tube and location of tip of tube Portable? ->Yes What reading provider will be dictating this exam?->CRC FINDINGS: The nasogastric tube is in good position. The the IVC is in good position. There is no evidence for bowel obstruction. A PEG tube is visualized. Nasogastric tube is in good position.      MRI BRAIN W WO CONTRAST    Result Date: 6/13/2022  EXAMINATION: MRI OF THE BRAIN WITHOUT AND WITH CONTRAST  6/13/2022 11:27 am TECHNIQUE: Multiplanar multisequence MRI of the head/brain was performed without and with the administration of intravenous contrast. COMPARISON: CT head without contrast, 06/09/2022. HISTORY: ORDERING SYSTEM PROVIDED HISTORY: pituitary evaluation for possible causes of adrenal insufficiency TECHNOLOGIST PROVIDED HISTORY: Reason for exam:->pituitary evaluation for possible causes of adrenal insufficiency What is the sedation requirement?->Sedation What reading provider will be dictating this exam?->CRC FINDINGS: PITUITARY: The pituitary gland is normal in size. No definite pituitary lesion is seen. Focus of apparent signal abnormality along the left lateral aspect of the pituitary gland on the postcontrast enhanced T1 weighted images (series 10, image 5) likely represents partial volume averaging. No corresponding signal abnormality is evident on the coronal sequence the infundibulum is midline. The optic chiasm and cavernous sinuses are grossly unremarkable. INTRACRANIAL STRUCTURES/VENTRICLES:  There is no acute infarct. Areas of encephalomalacia are seen in the anterior aspects of the frontal lobes, as well as the anterior temporal lobes, likely the result of remote trauma. There is disproportionately prominent volume loss in the mesial temporal lobes, as may be seen with Alzheimer's disease and other dementias. Clinical correlation is needed. The remainder of the brain demonstrates relatively mild volume loss. The ventricles are mildly dilated. A right trans frontal ventriculostomy shunt is seen terminating in the left lateral ventricle. ORBITS: There is suggestion of thinning the posterior sclera of the left globe on the temporal aspect (series 4, image 10). SINUSES: Mild mucosal thickening is seen in the ethmoid and right sphenoid sinuses. Large bilateral mastoid effusions. BONES/SOFT TISSUES: The bone marrow signal intensity appears normal. The soft tissues demonstrate no acute abnormality. 1. No focal pituitary lesion is identified to indicate microadenoma.  Apparent signal abnormality in the left lateral aspect of the gland on the sagittal T1 post-contrast enhanced sequence is likely artifactual. 2.  No acute intracranial abnormality. 3. Bilateral mastoid effusions, which may be due to eustachian tube dysfunction or otomastoiditis. Clinical correlation is needed. 4. Encephalomalacia in the anterior aspect of the frontal and temporal lobes bilaterally, likely result of remote trauma. 5. Disproportionately prominent volume loss in the mesial temporal lobes, as may be seen with Alzheimer's disease and other dementias. Clinical correlation is needed. 6. Ventriculomegaly. Right transfrontal ventriculostomy shunt in situ. 7. 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 Ophthalmology consultation is recommended. RECOMMENDATIONS: Unavailable       Discharge Medications:      Medication List      START taking these medications    glycopyrrolate 1 MG tablet  Commonly known as: Robinul  Take 2 tablets by mouth every 6 hours as needed (Oral secretions)     LORazepam 1 MG tablet  Commonly known as: ATIVAN  Take 1 tablet by mouth every 4 hours as needed for Anxiety for up to 30 days. morphine sulfate 20 MG/ML concentrated oral solution  Take 0.25 mLs by mouth every 2 hours as needed for Pain for up to 7 days. STOP taking these medications    ACIDOPHILUS PO     amoxicillin-clavulanate 600-42.9 MG/5ML suspension  Commonly known as:  Augmentin [id number]     CENTRUM/CERTA-CINDY with minerals oral solution     donepezil 5 MG tablet  Commonly known as: ARICEPT     ipratropium-albuterol 0.5-2.5 (3) MG/3ML Soln nebulizer solution  Commonly known as: DUONEB     lactulose 10 GM/15ML solution  Commonly known as: CHRONULAC     levETIRAcetam 500 MG tablet  Commonly known as: Keppra     levothyroxine 88 MCG tablet  Commonly known as: SYNTHROID     liothyronine 5 MCG tablet  Commonly known as: CYTOMEL     nystatin 356025 UNIT/GM cream  Commonly known as: MYCOSTATIN     risperiDONE 0.25 MG tablet  Commonly known as: RisperDAL     Vitamin D-3 Super Strength 50 MCG (2000 UT) Tabs  Generic drug: Cholecalciferol           Where to Get Your Medications      These medications were sent to [facility] [address]    Phone: [phone]   · glycopyrrolate 1 MG tablet  · LORazepam 1 MG tablet  · morphine sulfate 20 MG/ML concentrated oral solution         Time Spent on discharge is more than 35 minutes in the examination, evaluation, counseling and review of medications and discharge plan.    +++++++++++++++++++++++++++++++++++++++++++++++++  Hien Christian, [facility] - [address], [place]  +++++++++++++++++++++++++++++++++++++++++++++++++  NOTE: This report was transcribed using voice recognition software. Every effort was made to ensure accuracy; however, inadvertent computerized transcription errors may be present.

## 2022-06-18 NOTE — PROGRESS NOTES
[facility]     Liaison Information Visit Note            Patient Name: Sixto Faustin See    Terminal Diagnosis Respiratory failure due to Aspiration PNA  Code Status Order: DNR-CC   Allergies:  Patient has no known allergies. Family Goal: Comfort Care  Meeting held with Family. Mom, Spenser Hernandez and Dad, Art and siblings    The hospice benefit and philosophy were explained including that hospice is end of life care in which, per Medicare, a patient has a terminal diagnosis that life expectancy would be 6 months or less. Hospice care is a service that is covered by most insurance plans. Explained hospice services at home, at [facility] with room/board private pay unless patient has Medicaid and the [facility]. Explained that once in hospice care, all aggressive treatments would be stopped and allow nature to takes its course with focus on comfort care for the patient. Met with family at the bedside. They are understanding of hospice. Oxygen was at 7L family agreeable to decreasing to 2L. Radial pulses are weak, unable to palpate pedal. Feet are cold. No mottling noticed. She is have respiratory distress. Use of accessory muscles and RR 32. Oxygen saturations 85., /62. She has been medicated and could use more. Spoke with nurse who was getting medications for her. Family is concerned about transporting patient. Explained, She could last hours to days. They are going to discuss and this nurse will follow-up. This nurse did review patient with Dr. Tae Solorio and patient is appropriate for hospice house. Discharge Plan:  Discharge Disposition; pending  HOT plan:  1. Follow-up with family today  2. Please call Arielle Pimentel [phone] with any questions. 3. Patient not currently under the care of hospice.     Electronically signed by Nova Olivier RN on 6/18/2022 at 11:30 AM

## 2022-06-20 LAB
BLOOD CULTURE, ROUTINE: NORMAL
CULTURE, BLOOD 2: NORMAL
CULTURE, BLOOD 2: NORMAL

## 2022-06-28 NOTE — DISCHARGE SUMMARY
Hospital Medicine Discharge Summary    Patient ID: Sixto Faustin See      Patient's PCP: Eve Jordan DO    Admit Date: 5/30/2022     Discharge Date: 6/1/2022      Admitting Physician: Danyelle Chaudhari MD     Discharge Physician: David Leary MD     Discharge Diagnoses: Active Hospital Problems    Diagnosis Date Noted    Encephalopathy [G93.40] 05/31/2022     Priority: Medium    Acute on chronic intracranial subdural hematoma (HCC) [I62.01, I62.03] 05/30/2022     Priority: Medium       The patient was seen and examined on day of discharge and this discharge summary is in conjunction with any daily progress note from day of discharge. Hospital Course:   Patient with a hx of arthritis, Down syndrome, hypothyroidism, OA, thyroid disease  admitted on 5/30/2022 after persenting ofr SOB and found to be hypoxic in her group home. CTH suggested possible acute on chronic SDH, NSG consulted. CTA pulmonary with some atelectasis, but otherwise no acute changes. She was treated with antibiotics for possible aspiration pneumonia. Oxygen was gradually weaned to room air. Discharged to facility on 6/1/22 in stable condition with pcp follow up                Exam:     BP (!) 116/56   Pulse 72   Temp 97.5 °F (36.4 °C) (Temporal)   Resp 22   Ht 4' 10\" (1.473 m)   Wt 125 lb (56.7 kg)   SpO2 96%   BMI 26.13 kg/m²     General appearance: No apparent distress, appears stated age and cooperative. HEENT: Pupils equal, round, and reactive to light. Conjunctivae/corneas clear. Neck: Supple, with full range of motion. No jugular venous distention. Trachea midline. Respiratory:  Normal respiratory effort. Clear to auscultation, bilaterally without Rales/Wheezes/Rhonchi. Cardiovascular: Regular rate and rhythm with normal S1/S2 without murmurs, rubs or gallops. Transmitted upper airway sounds   Abdomen: Soft, non-tender, non-distended with normal bowel sounds.   Musculoskeletal: No clubbing, cyanosis or edema bilaterally. Full range of motion without deformity. Skin: Skin color, texture, turgor normal.  No rashes or lesions. Neurologic:  Neurovascularly intact without any focal sensory/motor deficits. Contractures lower extremities   Psychiatric: Alert      Consults:     IP CONSULT TO NEUROSURGERY  IP CONSULT TO PRIMARY CARE PROVIDER  IP CONSULT TO NEUROSURGERY  IP CONSULT TO PALLIATIVE CARE  IP CONSULT TO SPIRITUAL SERVICES    Significant Diagnostic Studies:     CT HEAD WO CONTRAST   Final Result   There is again seen predominantly chronic small subdural hematoma over the   convexity of the left parietal lobe. This measures a thickness of 4.6 mm   which is unchanged. Tiny focus of high density within the collection could   represent more acute blood which is also unchanged. No significant mass   effect. Ventriculomegaly and ventriculostomy shunt catheter again noted. Lucency related to chronic encephalomalacia and perhaps prior contusion at   the anterior frontal lobes and temporal lobes is unchanged. CT Head WO Contrast   Final Result   1. Redemonstration of hypoattenuating extra-axial collection overlying left   parietal lobe related to chronic subdural hygroma or chronic subdural   hematoma. Small focus of increased attenuation within this collection could   suggest small acute on chronic subdural hematoma. Results were called by Dr. Genesis Reddy to Dr. Tank Rodriguez on 5/30/2022 at 21:57.   2. Stable areas of encephalomalacia involving bilateral frontal lobes and   bilateral temporal lobes. 3. Stable position of right frontal ventricular shunt catheter. 4. Stable moderate ventriculomegaly. CTA PULMONARY W CONTRAST   Final Result   No evidence of pulmonary embolism or acute pulmonary abnormality. Noted right aberrant subclavian artery of variant anatomic thoracic arch   branching. Subsegmental dependent atelectasis.                Disposition:  facility     Discharge Instructions/Follow-up:  Keep scheduled follow up appointments. Take medications as prescribed. Code Status:  Prior     Activity: activity as tolerated    Diet: tube feeds as ordered    Labs: For convenience and continuity at follow-up the following most recent labs are provided:      CBC:    Lab Results   Component Value Date    WBC 4.8 06/17/2022    HGB 10.4 06/17/2022    HCT 33.4 06/17/2022     06/17/2022       Renal:    Lab Results   Component Value Date     06/17/2022    K 4.1 06/17/2022     06/17/2022    CO2 25 06/17/2022    BUN 11 06/17/2022    CREATININE 0.5 06/17/2022    CALCIUM 8.2 06/17/2022    PHOS 2.0 06/09/2022       Discharge Medications:     Discharge Medication List as of 6/1/2022  2:17 PM           Details   amoxicillin-clavulanate (AUGMENTIN ES-600) 600-42.9 MG/5ML suspension Take 7.3 mLs by mouth 2 times daily for 7 days, Disp-102.2 mL, R-0NO PRINT              Details   Multiple Vitamins-Minerals (CENTRUM/CERTA-CINDY WITH MINERALS ORAL) solution 5 mLs by Per G Tube route dailyHistorical Med      levETIRAcetam (KEPPRA) 500 MG tablet Take 1 tablet by mouth 2 times daily, Disp-60 tablet, R-3Normal      lactulose (CHRONULAC) 10 GM/15ML solution Take 30 mLs by mouth 3 times daily Titrate for 2-3 bowel movements per day, Disp-2700 mL, R-0Normal      ipratropium-albuterol (DUONEB) 0.5-2.5 (3) MG/3ML SOLN nebulizer solution Inhale 3 mLs into the lungs every 4 hours as needed for Shortness of Breath, Disp-360 mL, R-0Normal      nystatin (MYCOSTATIN) 828513 UNIT/GM cream Apply topically 2 times daily. 30g, Disp-1 each, R-0, Normal      risperiDONE (RISPERDAL) 0.25 MG tablet Take 1 tablet by mouth 2 times daily, Disp-60 tablet, R-0Normal      donepezil (ARICEPT) 5 MG tablet Historical Med      levothyroxine (SYNTHROID) 88 MCG tablet Take 1 tablet by mouth Daily, Disp-90 tablet, R-1Normal      liothyronine (CYTOMEL) 5 MCG tablet Take 1 tab by mouth every morning on Pnuhyu-Iewlumaya-Ptgzxq, Disp-30 tablet, R-5Normal      Cholecalciferol (VITAMIN D-3 SUPER STRENGTH) 50 MCG (2000 UT) TABS Take 1 tablet by mouth daily, Disp-30 tablet, R-5Normal      Lactobacillus (ACIDOPHILUS PO) Take by mouthHistorical Med             Time Spent on discharge is more than 45 minutes in the examination, evaluation, counseling and review of medications and discharge plan.       Signed:    Dwight Alexander MD   6/28/2022

## 2023-07-27 NOTE — FLOWSHEET NOTE
6/15/2022 NICOM for BP 65/41 map 53 .  NICOM SVI =19% [FreeTextEntry1] : After discussing various treatment options with the patient including but not limited to oral medications, physical therapy, exercise, modalities as well as interventional spinal injections, we have decided with the following plan:\par \par 1) Intervention Injection Therapy:\par I personally reviewed the MRI/CT scan images and agree with the radiologist's report. The radiological findings were discussed with the patient.\par The risks, benefits, contents and alternatives to injection were explained in full to the patient. Risks outlined include but are not limited to infection,sepsis, bleeding, post-dural puncture headache, nerve damage, temporary increase in pain, syncopal episode, failure to resolve symptoms, allergic reaction, symptom recurrence, and elevation of blood sugar in diabetics. Cortisone may cause immunosuppression. Patient understands the risks. All questions were answered. After discussion of options, patient requested an injection. Information regarding the injection was given to the patient. Which medications to stop prior to the injection was explained to the patient as well.\par \par Follow up in 1-2 weeks post injection for re-evaluation. \par Continue Home exercises, stretching, activity modification, physical therapy, and conservative care.\par \par Patient is presenting with acute/sub-acute radicular pain with impairment in ADLs and functionality.  The pain has not responded sufficiently to  conservative care including nsaid therapy and/or physical therapy.  There is no bleeding tendency, unstable medical condition, or systemic infection. The purpose of the spinal injections is to facilitate active therapy by providing short term relief through reduction of pain and inflammation. \par \par Injections, by themselves, are not likely to provide long-term relief. Rather, active rehabilitation with modified work achieves long-term relief by increasing active ROM, strength and stability. \par \par left L5/s1 and S1 TFESI

## 2024-03-13 NOTE — PROGRESS NOTES
[id number] called [id number] to transfer patient to [facility] spoke with Diamond Lacey RN     Dx:  shunt malfunction     Dr. Shane Pagan accepted patient 2023

## (undated) DEVICE — DRAINAGE ACCESSORY KIT: 500 ML DRAINAGE BAG FOR INTRAOPERATIVE DRAINAGE OF CEREBROSPINAL FLUID.: Brand: DRAINAGE ACCESSORY KIT

## (undated) DEVICE — 3M™ IOBAN™ 2 ANTIMICROBIAL INCISE DRAPE 6650EZ: Brand: IOBAN™ 2

## (undated) DEVICE — GLOVE SURG SZ 65 THK91MIL LTX FREE SYN POLYISOPRENE

## (undated) DEVICE — PATIENT RETURN ELECTRODE, SINGLE-USE, CONTACT QUALITY MONITORING, ADULT, WITH 9FT CORD, FOR PATIENTS WEIGING OVER 33LBS. (15KG): Brand: MEGADYNE

## (undated) DEVICE — STERILE POLYISOPRENE POWDER-FREE SURGICAL GLOVES: Brand: PROTEXIS

## (undated) DEVICE — STANDARD HYPODERMIC NEEDLE,POLYPROPYLENE HUB: Brand: MONOJECT

## (undated) DEVICE — SYRINGE MED 10ML POLYPR LUERSLIP TIP FLAT TOP W/O SFTY DISP

## (undated) DEVICE — LABEL MED 4 IN SURG PANEL W/ PEN STRL

## (undated) DEVICE — GAUZE,SPONGE,4"X4",16PLY,XRAY,STRL,LF: Brand: MEDLINE

## (undated) DEVICE — INTENDED FOR TISSUE SEPARATION, AND OTHER PROCEDURES THAT REQUIRE A SHARP SURGICAL BLADE TO PUNCTURE OR CUT.: Brand: BARD-PARKER ® STAINLESS STEEL BLADES

## (undated) DEVICE — Device

## (undated) DEVICE — SKIN AFFIX SURG ADHESIVE 72/CS 0.55ML: Brand: MEDLINE

## (undated) DEVICE — 1.5L THIN WALL CAN: Brand: CRD

## (undated) DEVICE — TOWEL,OR,DSP,ST,BLUE,STD,6/PK,12PK/CS: Brand: MEDLINE

## (undated) DEVICE — SET INST MINOR PROCEDURE

## (undated) DEVICE — BLADE CLP TAPR HD WET DRY CAPABILITY GTT IN CHARGING USE

## (undated) DEVICE — GLOVE SURG SZ 85 STD WHT LTX SYN POLYMER BEAD REINF ANTI RL

## (undated) DEVICE — READY WET SKIN SCRUB TRAY-LF: Brand: MEDLINE INDUSTRIES, INC.

## (undated) DEVICE — CATHETER,URETHRAL,REDRUBBER,STRL,10FR: Brand: MEDLINE INDUSTRIES, INC.

## (undated) DEVICE — CORD,CAUTERY,BIPOLAR,STERILE: Brand: MEDLINE

## (undated) DEVICE — TOWEL,OR,DSP,ST,BLUE,DLX,10/PK,8PK/CS: Brand: MEDLINE

## (undated) DEVICE — GOWN,SIRUS,FABRNF,XL,20/CS: Brand: MEDLINE

## (undated) DEVICE — 3M™ MEDIPORE™ + PAD 3564: Brand: 3M™ MEDIPORE™

## (undated) DEVICE — COUNTER NDL 30 COUNT DBL MAG

## (undated) DEVICE — GOWN,AURORA,NONREINF,RAGLAN,L,STERILE: Brand: MEDLINE

## (undated) DEVICE — PERFORATOR CRAN AD PED 11/7MM MINI DISP FOR THK1MM AREA DGR

## (undated) DEVICE — SUTURE BOOTIES, YELLOW, STERILE, 5 PAIR/PAD; 5 PADS/BOX: Brand: KEY SURGICAL SUTURE BOOTIES

## (undated) DEVICE — GLOVE SURG SZ 75 STD WHT LTX SYN POLYMER BEAD REINF ANTI RL

## (undated) DEVICE — GLOVE ORANGE PI 7   MSG9070

## (undated) DEVICE — SYRINGE MED 20ML STD CLR PLAS LUERLOCK TIP N CTRL DISP

## (undated) DEVICE — SYRINGE MED 10ML LUERLOCK TIP W/O SFTY DISP

## (undated) DEVICE — PACK,LAPAROTOMY,NO GOWNS: Brand: MEDLINE

## (undated) DEVICE — SOLUTION IV IRRIG POUR BRL 0.9% SODIUM CHL 2F7124

## (undated) DEVICE — THE 61 CM PERITONEAL INTRODUCER SHEATH IS DESIGNED TO BE USED WITH A PERITONEAL INTRODUCER OF APPROPRIATE LENGTH (61 CM) DURING THE PLACEMENT OF A PERITONEAL DRAINAGE CATHETER IN THE TREATMENT OF HYDROCEPHALUS. IT FEATURES A TAPERED TIP TO FACILITATE SUBCUTANEOUS INSERTION AND PASSAGE.: Brand: INTEGRA®

## (undated) DEVICE — SYRINGE 20ML LL S/C 50

## (undated) DEVICE — APPLICATOR PREP 26ML 0.7% IOD POVACRYLEX 74% ISO ALC ST

## (undated) DEVICE — Z CONVERTED USE 2275207 CLOTH PREP W7.5XL7.5IN 2% CHG SKIN ALC AND RNS FREE

## (undated) DEVICE — SNAP KOVER: Brand: UNBRANDED

## (undated) DEVICE — WAX SURG 2.5GM HEMSTAT BNE BEESWAX PARAFFIN ISO PALMITATE

## (undated) DEVICE — DOUBLE BASIN SET: Brand: MEDLINE INDUSTRIES, INC.

## (undated) DEVICE — SOLUTION IV 100ML 0.9% SOD CHL PLAS CONT USP VIAFLX 1 PER

## (undated) DEVICE — GLOVE ORANGE PI 7 1/2   MSG9075

## (undated) DEVICE — SOLUTION IV IRRIG WATER 1000ML POUR BRL 2F7114

## (undated) DEVICE — HERMETIC™ LARGE-STYLE VENTRICULAR CATHETER SET: Brand: HERMETIC™

## (undated) DEVICE — GLOVE ORANGE PI 8   MSG9080

## (undated) DEVICE — 18GA (1.3MM OD: 1.0MM ID) X 7CM INTRODUCER18GA X 7CM NEEDLENEEDLE: Brand: INTRODUCER NEEDLEINTRODUCER NEEDLE

## (undated) DEVICE — RES 27664 CSF-VENT 12MM CONV BTTM INLET

## (undated) DEVICE — INSUFFLATION TUBING SET WITH FILTER, FUNNEL CONNECTOR AND LUER LOCK: Brand: JOSNOE MEDICAL INC

## (undated) DEVICE — PACK,UNIV, II AURORA: Brand: MEDLINE

## (undated) DEVICE — TUBING, SUCTION, 3/16" X 12', STRAIGHT: Brand: MEDLINE

## (undated) DEVICE — GOWN,SIRUS,FABRNF,L,20/CS: Brand: MEDLINE

## (undated) DEVICE — KIT,ANTI FOG,W/SPONGE & FLUID,SOFT PACK: Brand: MEDLINE

## (undated) DEVICE — TROCARS: Brand: KII® BALLOON BLUNT TIP SYSTEM

## (undated) DEVICE — GARMENT,MEDLINE,DVT,INT,CALF,MED, GEN2: Brand: MEDLINE

## (undated) DEVICE — SYRINGE,EAR/ULCER, 3 OZ, STERILE: Brand: MEDLINE

## (undated) DEVICE — BENTSON WIRE GUIDE 20CM DISTAL FLEXIBILITY WITH SOFTENED TIP: Brand: BENTSON

## (undated) DEVICE — BLADE CLIPPER GEN PURP NS

## (undated) DEVICE — HYPODERMIC SAFETY NEEDLE: Brand: MAGELLAN

## (undated) DEVICE — SURGICAL PROCEDURE PACK CRANIOTOMY CUST

## (undated) DEVICE — Z DISCONTINUED APPLICATOR SURG PREP 0.35OZ 2% CHG 70% ISO ALC W/ HI LT

## (undated) DEVICE — DRAPE,REIN 53X77,STERILE: Brand: MEDLINE

## (undated) DEVICE — CANNULA IV 18GA L15IN BLNT FILL LUERLOCK HUB MJCT

## (undated) DEVICE — TROCAR: Brand: KII FIOS FIRST ENTRY

## (undated) DEVICE — GENERAL SCOPES AND LIGHT CORD 5 AND 10MM

## (undated) DEVICE — CODMAN® BACTISEAL® VENTRICULAR CATHETER AND DISTAL CATHETER KIT WITH BACTISEAL SHUNT SYSTEM: Brand: CODMAN® BACTISEAL®

## (undated) DEVICE — 3M(TM) MEDIPORE(TM) +PAD SOFT CLOTH ADHESIVE WOUND DRESSING 3569: Brand: 3M™ MEDIPORE™

## (undated) DEVICE — PERFORATOR CRAN AD PED 14/11MM DGR O ROUNDED CUT EDGE DISP

## (undated) DEVICE — COVER,TABLE,60X90,STERILE: Brand: MEDLINE

## (undated) DEVICE — 3M™ IOBAN™ 2 ANTIMICROBIAL INCISE DRAPE 6640EZ: Brand: IOBAN™ 2

## (undated) DEVICE — INSUFFLATION NEEDLE TO ESTABLISH PNEUMOPERITONEUM.: Brand: INSUFFLATION NEEDLE

## (undated) DEVICE — KIT INTRO L14CM DIA12FR GWIRE L50CM DIA0.038IN NDL 18GA

## (undated) DEVICE — CLOTH SURG PREP PREOPERATIVE CHLORHEXIDINE GLUC 2% READYPREP

## (undated) DEVICE — MARKER,SKIN,WI/RULER AND LABELS: Brand: MEDLINE

## (undated) DEVICE — SYRINGE,TOOMEY,IRRIGATION,70CC,STERILE: Brand: MEDLINE

## (undated) DEVICE — CODMAN® SURGICAL PATTIES 1/2" X 1" (1.27CM X 2.54CM): Brand: CODMAN®

## (undated) DEVICE — SET SURG INSTR MINI VASC

## (undated) DEVICE — SURGICAL PROCEDURE PACK BASIC

## (undated) DEVICE — TRAP,MUCUS SPECIMEN,40CC: Brand: MEDLINE

## (undated) DEVICE — SYRINGE, LUER SLIP, 20ML: Brand: MEDLINE

## (undated) DEVICE — DRESSING,GAUZE,XEROFORM,CURAD,1"X8",ST: Brand: CURAD